# Patient Record
Sex: FEMALE | Race: WHITE | Employment: UNEMPLOYED | URBAN - METROPOLITAN AREA
[De-identification: names, ages, dates, MRNs, and addresses within clinical notes are randomized per-mention and may not be internally consistent; named-entity substitution may affect disease eponyms.]

---

## 2017-01-04 ENCOUNTER — HOSPITAL ENCOUNTER (OUTPATIENT)
Dept: WOUND CARE | Age: 52
Discharge: HOME | End: 2017-01-04
Payer: COMMERCIAL

## 2017-01-04 VITALS
SYSTOLIC BLOOD PRESSURE: 120 MMHG | HEART RATE: 80 BPM | RESPIRATION RATE: 18 BRPM | DIASTOLIC BLOOD PRESSURE: 73 MMHG | TEMPERATURE: 98 F

## 2017-01-04 DIAGNOSIS — T07.XXXA MULTIPLE OPEN WOUNDS: Chronic | ICD-10-CM

## 2017-01-04 PROCEDURE — 99213 OFFICE O/P EST LOW 20 MIN: CPT

## 2017-01-04 ASSESSMENT — PAIN DESCRIPTION - LOCATION: LOCATION: BUTTOCKS;GROIN

## 2017-01-04 ASSESSMENT — PAIN DESCRIPTION - FREQUENCY: FREQUENCY: INTERMITTENT

## 2017-01-04 ASSESSMENT — PAIN SCALES - GENERAL: PAINLEVEL_OUTOF10: 8

## 2017-03-17 ENCOUNTER — HOSPITAL ENCOUNTER (EMERGENCY)
Age: 52
Discharge: HOME | End: 2017-03-18
Payer: COMMERCIAL

## 2017-03-17 DIAGNOSIS — D64.9 ANEMIA, UNSPECIFIED TYPE: ICD-10-CM

## 2017-03-17 DIAGNOSIS — Z85.6 HISTORY OF LEUKEMIA: ICD-10-CM

## 2017-03-17 DIAGNOSIS — L97.911 ULCERS OF BOTH LOWER EXTREMITIES, LIMITED TO BREAKDOWN OF SKIN (HCC): Primary | ICD-10-CM

## 2017-03-17 DIAGNOSIS — M79.672 PAIN IN BOTH FEET: ICD-10-CM

## 2017-03-17 DIAGNOSIS — M79.671 PAIN IN BOTH FEET: ICD-10-CM

## 2017-03-17 DIAGNOSIS — L97.921 ULCERS OF BOTH LOWER EXTREMITIES, LIMITED TO BREAKDOWN OF SKIN (HCC): Primary | ICD-10-CM

## 2017-03-17 PROCEDURE — 99284 EMERGENCY DEPT VISIT MOD MDM: CPT

## 2017-03-18 VITALS
TEMPERATURE: 98.9 F | BODY MASS INDEX: 28.32 KG/M2 | OXYGEN SATURATION: 100 % | RESPIRATION RATE: 20 BRPM | HEIGHT: 65 IN | WEIGHT: 170 LBS | SYSTOLIC BLOOD PRESSURE: 120 MMHG | HEART RATE: 80 BPM | DIASTOLIC BLOOD PRESSURE: 56 MMHG

## 2017-03-18 LAB
ALBUMIN SERPL-MCNC: 3.3 G/DL (ref 3.9–4.9)
ALP BLD-CCNC: 63 U/L (ref 40–130)
ALT SERPL-CCNC: 14 U/L (ref 0–33)
ANION GAP SERPL CALCULATED.3IONS-SCNC: 12 MEQ/L (ref 7–13)
AST SERPL-CCNC: 11 U/L (ref 0–35)
BASOPHILS ABSOLUTE: 0.2 K/UL (ref 0–0.2)
BASOPHILS RELATIVE PERCENT: 1.1 %
BILIRUB SERPL-MCNC: 0.7 MG/DL (ref 0–1.2)
BUN BLDV-MCNC: 22 MG/DL (ref 6–20)
CALCIUM SERPL-MCNC: 8.9 MG/DL (ref 8.6–10.2)
CHLORIDE BLD-SCNC: 102 MEQ/L (ref 98–107)
CO2: 25 MEQ/L (ref 22–29)
CREAT SERPL-MCNC: 0.84 MG/DL (ref 0.5–0.9)
EOSINOPHILS ABSOLUTE: 0 K/UL (ref 0–0.7)
EOSINOPHILS RELATIVE PERCENT: 0.2 %
GFR AFRICAN AMERICAN: >60
GFR NON-AFRICAN AMERICAN: >60
GLOBULIN: 2.2 G/DL (ref 2.3–3.5)
GLUCOSE BLD-MCNC: 123 MG/DL (ref 74–109)
HCT VFR BLD CALC: 27.9 % (ref 37–47)
HEMOGLOBIN: 9 G/DL (ref 12–16)
LYMPHOCYTES ABSOLUTE: 3 K/UL (ref 1–4.8)
LYMPHOCYTES RELATIVE PERCENT: 18.1 %
MCH RBC QN AUTO: 31.9 PG (ref 27–31.3)
MCHC RBC AUTO-ENTMCNC: 32.3 % (ref 33–37)
MCV RBC AUTO: 98.7 FL (ref 82–100)
MONOCYTES ABSOLUTE: 0.9 K/UL (ref 0.2–0.8)
MONOCYTES RELATIVE PERCENT: 5.6 %
NEUTROPHILS ABSOLUTE: 12.2 K/UL (ref 1.4–6.5)
NEUTROPHILS RELATIVE PERCENT: 75 %
PDW BLD-RTO: 17.5 % (ref 11.5–14.5)
PLATELET # BLD: 348 K/UL (ref 130–400)
POTASSIUM SERPL-SCNC: 4 MEQ/L (ref 3.5–5.1)
RBC # BLD: 2.82 M/UL (ref 4.2–5.4)
SODIUM BLD-SCNC: 139 MEQ/L (ref 132–144)
TOTAL PROTEIN: 5.5 G/DL (ref 6.4–8.1)
WBC # BLD: 16.3 K/UL (ref 4.8–10.8)

## 2017-03-18 PROCEDURE — 80053 COMPREHEN METABOLIC PANEL: CPT

## 2017-03-18 PROCEDURE — 96375 TX/PRO/DX INJ NEW DRUG ADDON: CPT

## 2017-03-18 PROCEDURE — 36415 COLL VENOUS BLD VENIPUNCTURE: CPT

## 2017-03-18 PROCEDURE — 96374 THER/PROPH/DIAG INJ IV PUSH: CPT

## 2017-03-18 PROCEDURE — 85025 COMPLETE CBC W/AUTO DIFF WBC: CPT

## 2017-03-18 PROCEDURE — 6360000002 HC RX W HCPCS: Performed by: PHYSICIAN ASSISTANT

## 2017-03-18 RX ORDER — MORPHINE SULFATE 4 MG/ML
4 INJECTION, SOLUTION INTRAMUSCULAR; INTRAVENOUS ONCE
Status: COMPLETED | OUTPATIENT
Start: 2017-03-18 | End: 2017-03-18

## 2017-03-18 RX ORDER — PREDNISONE 1 MG/1
40 TABLET ORAL DAILY
Status: ON HOLD | COMMUNITY
End: 2017-05-02

## 2017-03-18 RX ORDER — GABAPENTIN 100 MG/1
300 CAPSULE ORAL 3 TIMES DAILY
COMMUNITY
End: 2017-05-03 | Stop reason: SDUPTHER

## 2017-03-18 RX ORDER — ONDANSETRON 2 MG/ML
4 INJECTION INTRAMUSCULAR; INTRAVENOUS ONCE
Status: COMPLETED | OUTPATIENT
Start: 2017-03-18 | End: 2017-03-18

## 2017-03-18 RX ORDER — LORAZEPAM 1 MG/1
1 TABLET ORAL EVERY 6 HOURS PRN
Status: ON HOLD | COMMUNITY
End: 2017-05-02

## 2017-03-18 RX ORDER — ROPINIROLE 1 MG/1
1 TABLET, FILM COATED ORAL 3 TIMES DAILY
COMMUNITY
End: 2017-05-03 | Stop reason: SDUPTHER

## 2017-03-18 RX ADMIN — ONDANSETRON 4 MG: 2 INJECTION, SOLUTION INTRAMUSCULAR; INTRAVENOUS at 00:31

## 2017-03-18 RX ADMIN — MORPHINE SULFATE 4 MG: 4 INJECTION, SOLUTION INTRAMUSCULAR; INTRAVENOUS at 00:31

## 2017-03-18 ASSESSMENT — PAIN SCALES - GENERAL
PAINLEVEL_OUTOF10: 10
PAINLEVEL_OUTOF10: 6

## 2017-03-18 ASSESSMENT — ENCOUNTER SYMPTOMS
NAUSEA: 0
EYE DISCHARGE: 0
ABDOMINAL PAIN: 0
VOMITING: 0
ABDOMINAL DISTENTION: 0
APNEA: 0
PHOTOPHOBIA: 0
VOICE CHANGE: 0
ANAL BLEEDING: 0

## 2017-03-18 ASSESSMENT — PAIN DESCRIPTION - DESCRIPTORS: DESCRIPTORS: BURNING

## 2017-03-18 ASSESSMENT — PAIN DESCRIPTION - LOCATION: LOCATION: LEG;FOOT

## 2017-03-18 ASSESSMENT — PAIN DESCRIPTION - PAIN TYPE: TYPE: ACUTE PAIN

## 2017-03-18 ASSESSMENT — PAIN DESCRIPTION - ONSET: ONSET: AWAKENED FROM SLEEP

## 2017-03-18 ASSESSMENT — PAIN DESCRIPTION - PROGRESSION: CLINICAL_PROGRESSION: GRADUALLY WORSENING

## 2017-03-18 ASSESSMENT — PAIN DESCRIPTION - FREQUENCY: FREQUENCY: CONTINUOUS

## 2017-04-04 ENCOUNTER — HOSPITAL ENCOUNTER (EMERGENCY)
Age: 52
Discharge: OTHER INSTITUTION | End: 2017-04-04
Attending: EMERGENCY MEDICINE
Payer: COMMERCIAL

## 2017-04-04 VITALS
SYSTOLIC BLOOD PRESSURE: 122 MMHG | HEART RATE: 82 BPM | BODY MASS INDEX: 30.73 KG/M2 | RESPIRATION RATE: 18 BRPM | TEMPERATURE: 97.5 F | HEIGHT: 64 IN | DIASTOLIC BLOOD PRESSURE: 68 MMHG | WEIGHT: 180 LBS | OXYGEN SATURATION: 95 %

## 2017-04-04 DIAGNOSIS — L02.419 CELLULITIS AND ABSCESS OF LEG: Primary | ICD-10-CM

## 2017-04-04 DIAGNOSIS — L03.119 CELLULITIS AND ABSCESS OF LEG: Primary | ICD-10-CM

## 2017-04-04 LAB
ALBUMIN SERPL-MCNC: 3.9 G/DL (ref 3.9–4.9)
ALP BLD-CCNC: 84 U/L (ref 40–130)
ALT SERPL-CCNC: 13 U/L (ref 0–33)
ANION GAP SERPL CALCULATED.3IONS-SCNC: 11 MEQ/L (ref 7–13)
AST SERPL-CCNC: 13 U/L (ref 0–35)
BASOPHILS ABSOLUTE: 0.1 K/UL (ref 0–0.2)
BASOPHILS RELATIVE PERCENT: 0.9 %
BILIRUB SERPL-MCNC: 0.2 MG/DL (ref 0–1.2)
BUN BLDV-MCNC: 14 MG/DL (ref 6–20)
CALCIUM SERPL-MCNC: 9.1 MG/DL (ref 8.6–10.2)
CHLORIDE BLD-SCNC: 103 MEQ/L (ref 98–107)
CO2: 24 MEQ/L (ref 22–29)
CREAT SERPL-MCNC: 0.78 MG/DL (ref 0.5–0.9)
EOSINOPHILS ABSOLUTE: 0 K/UL (ref 0–0.7)
EOSINOPHILS RELATIVE PERCENT: 0 %
GFR AFRICAN AMERICAN: >60
GFR NON-AFRICAN AMERICAN: >60
GLOBULIN: 2.6 G/DL (ref 2.3–3.5)
GLUCOSE BLD-MCNC: 132 MG/DL (ref 74–109)
HCT VFR BLD CALC: 35.8 % (ref 37–47)
HEMOGLOBIN: 11.7 G/DL (ref 12–16)
LACTIC ACID: 1.3 MMOL/L (ref 0.5–2.2)
LYMPHOCYTES ABSOLUTE: 1.7 K/UL (ref 1–4.8)
LYMPHOCYTES RELATIVE PERCENT: 16.1 %
MCH RBC QN AUTO: 31.5 PG (ref 27–31.3)
MCHC RBC AUTO-ENTMCNC: 32.8 % (ref 33–37)
MCV RBC AUTO: 96.2 FL (ref 82–100)
MONOCYTES ABSOLUTE: 0.5 K/UL (ref 0.2–0.8)
MONOCYTES RELATIVE PERCENT: 4.7 %
NEUTROPHILS ABSOLUTE: 8.2 K/UL (ref 1.4–6.5)
NEUTROPHILS RELATIVE PERCENT: 78.3 %
PDW BLD-RTO: 15.6 % (ref 11.5–14.5)
PLATELET # BLD: 340 K/UL (ref 130–400)
POTASSIUM SERPL-SCNC: 4.4 MEQ/L (ref 3.5–5.1)
RBC # BLD: 3.72 M/UL (ref 4.2–5.4)
SODIUM BLD-SCNC: 138 MEQ/L (ref 132–144)
TOTAL PROTEIN: 6.5 G/DL (ref 6.4–8.1)
WBC # BLD: 10.4 K/UL (ref 4.8–10.8)

## 2017-04-04 PROCEDURE — 87040 BLOOD CULTURE FOR BACTERIA: CPT

## 2017-04-04 PROCEDURE — 6360000002 HC RX W HCPCS: Performed by: STUDENT IN AN ORGANIZED HEALTH CARE EDUCATION/TRAINING PROGRAM

## 2017-04-04 PROCEDURE — 85025 COMPLETE CBC W/AUTO DIFF WBC: CPT

## 2017-04-04 PROCEDURE — 96375 TX/PRO/DX INJ NEW DRUG ADDON: CPT

## 2017-04-04 PROCEDURE — 96376 TX/PRO/DX INJ SAME DRUG ADON: CPT

## 2017-04-04 PROCEDURE — 96366 THER/PROPH/DIAG IV INF ADDON: CPT

## 2017-04-04 PROCEDURE — 96365 THER/PROPH/DIAG IV INF INIT: CPT

## 2017-04-04 PROCEDURE — 96361 HYDRATE IV INFUSION ADD-ON: CPT

## 2017-04-04 PROCEDURE — 6370000000 HC RX 637 (ALT 250 FOR IP): Performed by: EMERGENCY MEDICINE

## 2017-04-04 PROCEDURE — 83605 ASSAY OF LACTIC ACID: CPT

## 2017-04-04 PROCEDURE — 99284 EMERGENCY DEPT VISIT MOD MDM: CPT

## 2017-04-04 PROCEDURE — 80053 COMPREHEN METABOLIC PANEL: CPT

## 2017-04-04 PROCEDURE — 2580000003 HC RX 258: Performed by: EMERGENCY MEDICINE

## 2017-04-04 PROCEDURE — 6360000002 HC RX W HCPCS: Performed by: EMERGENCY MEDICINE

## 2017-04-04 PROCEDURE — 36415 COLL VENOUS BLD VENIPUNCTURE: CPT

## 2017-04-04 RX ORDER — FENTANYL CITRATE 50 UG/ML
100 INJECTION, SOLUTION INTRAMUSCULAR; INTRAVENOUS ONCE
Status: COMPLETED | OUTPATIENT
Start: 2017-04-04 | End: 2017-04-04

## 2017-04-04 RX ORDER — GABAPENTIN 100 MG/1
200 CAPSULE ORAL ONCE
Status: COMPLETED | OUTPATIENT
Start: 2017-04-04 | End: 2017-04-04

## 2017-04-04 RX ORDER — DIPHENHYDRAMINE HYDROCHLORIDE 50 MG/ML
50 INJECTION INTRAMUSCULAR; INTRAVENOUS ONCE
Status: COMPLETED | OUTPATIENT
Start: 2017-04-04 | End: 2017-04-04

## 2017-04-04 RX ORDER — SODIUM CHLORIDE 9 MG/ML
INJECTION, SOLUTION INTRAVENOUS CONTINUOUS
Status: DISCONTINUED | OUTPATIENT
Start: 2017-04-04 | End: 2017-04-04 | Stop reason: HOSPADM

## 2017-04-04 RX ORDER — MAGNESIUM HYDROXIDE 1200 MG/15ML
LIQUID ORAL
Status: DISCONTINUED
Start: 2017-04-04 | End: 2017-04-04 | Stop reason: HOSPADM

## 2017-04-04 RX ORDER — LORAZEPAM 2 MG/ML
2 INJECTION INTRAMUSCULAR ONCE
Status: COMPLETED | OUTPATIENT
Start: 2017-04-04 | End: 2017-04-04

## 2017-04-04 RX ORDER — ONDANSETRON 2 MG/ML
4 INJECTION INTRAMUSCULAR; INTRAVENOUS ONCE
Status: COMPLETED | OUTPATIENT
Start: 2017-04-04 | End: 2017-04-04

## 2017-04-04 RX ADMIN — SODIUM CHLORIDE: 9 INJECTION, SOLUTION INTRAVENOUS at 00:33

## 2017-04-04 RX ADMIN — DIPHENHYDRAMINE HYDROCHLORIDE 50 MG: 50 INJECTION, SOLUTION INTRAMUSCULAR; INTRAVENOUS at 05:43

## 2017-04-04 RX ADMIN — FENTANYL CITRATE 100 MCG: 50 INJECTION, SOLUTION INTRAMUSCULAR; INTRAVENOUS at 12:26

## 2017-04-04 RX ADMIN — VANCOMYCIN HYDROCHLORIDE 1000 MG: 1 INJECTION, POWDER, LYOPHILIZED, FOR SOLUTION INTRAVENOUS at 00:51

## 2017-04-04 RX ADMIN — ONDANSETRON 4 MG: 2 INJECTION, SOLUTION INTRAMUSCULAR; INTRAVENOUS at 00:33

## 2017-04-04 RX ADMIN — HYDROMORPHONE HYDROCHLORIDE 1 MG: 1 INJECTION, SOLUTION INTRAMUSCULAR; INTRAVENOUS; SUBCUTANEOUS at 02:06

## 2017-04-04 RX ADMIN — HYDROMORPHONE HYDROCHLORIDE 1 MG: 1 INJECTION, SOLUTION INTRAMUSCULAR; INTRAVENOUS; SUBCUTANEOUS at 00:33

## 2017-04-04 RX ADMIN — GABAPENTIN 200 MG: 100 CAPSULE ORAL at 02:54

## 2017-04-04 RX ADMIN — LORAZEPAM 2 MG: 2 INJECTION INTRAMUSCULAR; INTRAVENOUS at 05:34

## 2017-04-04 ASSESSMENT — PAIN SCALES - GENERAL
PAINLEVEL_OUTOF10: 8
PAINLEVEL_OUTOF10: 10
PAINLEVEL_OUTOF10: 10
PAINLEVEL_OUTOF10: 7
PAINLEVEL_OUTOF10: 10
PAINLEVEL_OUTOF10: 8

## 2017-04-04 ASSESSMENT — PAIN DESCRIPTION - DESCRIPTORS
DESCRIPTORS: SHARP
DESCRIPTORS: THROBBING;SHARP

## 2017-04-04 ASSESSMENT — ENCOUNTER SYMPTOMS
CHEST TIGHTNESS: 0
COUGH: 0
PHOTOPHOBIA: 0
EYE DISCHARGE: 0
ABDOMINAL DISTENTION: 0
WHEEZING: 0
ABDOMINAL PAIN: 0
SORE THROAT: 0
SHORTNESS OF BREATH: 0
VOMITING: 0

## 2017-04-04 ASSESSMENT — PAIN DESCRIPTION - PAIN TYPE
TYPE: ACUTE PAIN

## 2017-04-04 ASSESSMENT — PAIN DESCRIPTION - FREQUENCY: FREQUENCY: CONTINUOUS

## 2017-04-04 ASSESSMENT — PAIN DESCRIPTION - ORIENTATION
ORIENTATION: RIGHT;LEFT
ORIENTATION: RIGHT;LEFT

## 2017-04-04 ASSESSMENT — PAIN DESCRIPTION - LOCATION
LOCATION: LEG
LOCATION: LEG

## 2017-04-04 ASSESSMENT — PAIN SCALES - WONG BAKER
WONGBAKER_NUMERICALRESPONSE: 10
WONGBAKER_NUMERICALRESPONSE: 8

## 2017-04-04 ASSESSMENT — PAIN DESCRIPTION - ONSET: ONSET: ON-GOING

## 2017-04-09 LAB
BLOOD CULTURE, ROUTINE: NORMAL
CULTURE, BLOOD 2: NORMAL

## 2017-04-27 ENCOUNTER — HOSPITAL ENCOUNTER (INPATIENT)
Age: 52
LOS: 5 days | Discharge: NURSING, REHAB OR LONG-TERM CARE | DRG: 871 | End: 2017-05-02
Attending: HOSPITALIST | Admitting: INTERNAL MEDICINE
Payer: COMMERCIAL

## 2017-04-27 DIAGNOSIS — S81.809A OPEN WOUNDS INVOLVING MULTIPLE REGIONS OF LOWER EXTREMITY: ICD-10-CM

## 2017-04-27 DIAGNOSIS — D72.829 LEUKOCYTOSIS, UNSPECIFIED TYPE: ICD-10-CM

## 2017-04-27 DIAGNOSIS — L03.818 CELLULITIS OF OTHER SPECIFIED SITE: ICD-10-CM

## 2017-04-27 DIAGNOSIS — A41.9 SEPSIS AFFECTING SKIN: Primary | ICD-10-CM

## 2017-04-27 PROBLEM — J96.01 ACUTE RESPIRATORY FAILURE WITH HYPOXIA (HCC): Status: ACTIVE | Noted: 2017-04-27

## 2017-04-27 PROBLEM — I95.9 HYPOTENSION: Status: ACTIVE | Noted: 2017-04-27

## 2017-04-27 PROBLEM — R06.82 TACHYPNEA: Status: ACTIVE | Noted: 2017-04-27

## 2017-04-27 PROBLEM — N17.9 ACUTE KIDNEY INJURY (HCC): Status: ACTIVE | Noted: 2017-04-27

## 2017-04-27 PROBLEM — R41.82 ALTERED MENTAL STATUS: Status: ACTIVE | Noted: 2017-04-27

## 2017-04-27 LAB
ALBUMIN SERPL-MCNC: 3.3 G/DL (ref 3.9–4.9)
ALP BLD-CCNC: 78 U/L (ref 40–130)
ALT SERPL-CCNC: 19 U/L (ref 0–33)
ANION GAP SERPL CALCULATED.3IONS-SCNC: 12 MEQ/L (ref 7–13)
AST SERPL-CCNC: 22 U/L (ref 0–35)
BILIRUB SERPL-MCNC: 1.1 MG/DL (ref 0–1.2)
BUN BLDV-MCNC: 19 MG/DL (ref 6–20)
CALCIUM SERPL-MCNC: 8.3 MG/DL (ref 8.6–10.2)
CHLORIDE BLD-SCNC: 98 MEQ/L (ref 98–107)
CO2: 22 MEQ/L (ref 22–29)
CREAT SERPL-MCNC: 1.15 MG/DL (ref 0.5–0.9)
GFR AFRICAN AMERICAN: >60
GFR NON-AFRICAN AMERICAN: 49.7
GLOBULIN: 2.5 G/DL (ref 2.3–3.5)
GLUCOSE BLD-MCNC: 211 MG/DL (ref 74–109)
HCT VFR BLD CALC: 34.2 % (ref 37–47)
HEMOGLOBIN: 10.9 G/DL (ref 12–16)
LIPASE: 14 U/L (ref 13–60)
MCH RBC QN AUTO: 29.7 PG (ref 27–31.3)
MCHC RBC AUTO-ENTMCNC: 31.9 % (ref 33–37)
MCV RBC AUTO: 93 FL (ref 82–100)
PDW BLD-RTO: 16.8 % (ref 11.5–14.5)
PLATELET # BLD: 286 K/UL (ref 130–400)
POTASSIUM SERPL-SCNC: 3.9 MEQ/L (ref 3.5–5.1)
RBC # BLD: 3.67 M/UL (ref 4.2–5.4)
SODIUM BLD-SCNC: 132 MEQ/L (ref 132–144)
TOTAL PROTEIN: 5.8 G/DL (ref 6.4–8.1)
WBC # BLD: 18.1 K/UL (ref 4.8–10.8)

## 2017-04-27 PROCEDURE — 87075 CULTR BACTERIA EXCEPT BLOOD: CPT

## 2017-04-27 PROCEDURE — 1210000000 HC MED SURG R&B

## 2017-04-27 PROCEDURE — 87040 BLOOD CULTURE FOR BACTERIA: CPT

## 2017-04-27 PROCEDURE — 2580000003 HC RX 258: Performed by: HOSPITALIST

## 2017-04-27 PROCEDURE — 36415 COLL VENOUS BLD VENIPUNCTURE: CPT

## 2017-04-27 PROCEDURE — 83690 ASSAY OF LIPASE: CPT

## 2017-04-27 PROCEDURE — 80053 COMPREHEN METABOLIC PANEL: CPT

## 2017-04-27 PROCEDURE — 96375 TX/PRO/DX INJ NEW DRUG ADDON: CPT

## 2017-04-27 PROCEDURE — 6370000000 HC RX 637 (ALT 250 FOR IP): Performed by: HOSPITALIST

## 2017-04-27 PROCEDURE — 6360000002 HC RX W HCPCS: Performed by: HOSPITALIST

## 2017-04-27 PROCEDURE — 96365 THER/PROPH/DIAG IV INF INIT: CPT

## 2017-04-27 PROCEDURE — 87186 SC STD MICRODIL/AGAR DIL: CPT

## 2017-04-27 PROCEDURE — 6360000002 HC RX W HCPCS: Performed by: PHYSICIAN ASSISTANT

## 2017-04-27 PROCEDURE — 85027 COMPLETE CBC AUTOMATED: CPT

## 2017-04-27 PROCEDURE — 87070 CULTURE OTHR SPECIMN AEROBIC: CPT

## 2017-04-27 PROCEDURE — 87077 CULTURE AEROBIC IDENTIFY: CPT

## 2017-04-27 PROCEDURE — 99285 EMERGENCY DEPT VISIT HI MDM: CPT

## 2017-04-27 PROCEDURE — 2580000003 HC RX 258: Performed by: PHYSICIAN ASSISTANT

## 2017-04-27 PROCEDURE — 87205 SMEAR GRAM STAIN: CPT

## 2017-04-27 RX ORDER — SODIUM CHLORIDE 0.9 % (FLUSH) 0.9 %
10 SYRINGE (ML) INJECTION PRN
Status: DISCONTINUED | OUTPATIENT
Start: 2017-04-27 | End: 2017-04-27 | Stop reason: SDUPTHER

## 2017-04-27 RX ORDER — LORAZEPAM 1 MG/1
1 TABLET ORAL EVERY 6 HOURS PRN
Status: DISCONTINUED | OUTPATIENT
Start: 2017-04-27 | End: 2017-05-02

## 2017-04-27 RX ORDER — DULOXETIN HYDROCHLORIDE 60 MG/1
60 CAPSULE, DELAYED RELEASE ORAL DAILY
Status: DISCONTINUED | OUTPATIENT
Start: 2017-04-27 | End: 2017-05-02 | Stop reason: HOSPADM

## 2017-04-27 RX ORDER — ACETAMINOPHEN 325 MG/1
650 TABLET ORAL EVERY 4 HOURS PRN
Status: DISCONTINUED | OUTPATIENT
Start: 2017-04-27 | End: 2017-04-27 | Stop reason: SDUPTHER

## 2017-04-27 RX ORDER — ACETAMINOPHEN 325 MG/1
650 TABLET ORAL EVERY 4 HOURS PRN
Status: DISCONTINUED | OUTPATIENT
Start: 2017-04-27 | End: 2017-05-02 | Stop reason: HOSPADM

## 2017-04-27 RX ORDER — OXYCODONE HYDROCHLORIDE 5 MG/1
5 TABLET ORAL EVERY 8 HOURS PRN
Status: ON HOLD | COMMUNITY
End: 2017-05-02 | Stop reason: HOSPADM

## 2017-04-27 RX ORDER — DAPSONE 100 MG/1
100 TABLET ORAL DAILY
Status: DISCONTINUED | OUTPATIENT
Start: 2017-04-27 | End: 2017-05-02 | Stop reason: HOSPADM

## 2017-04-27 RX ORDER — DAPSONE 100 MG/1
100 TABLET ORAL DAILY
COMMUNITY
End: 2017-05-03 | Stop reason: SDUPTHER

## 2017-04-27 RX ORDER — ONDANSETRON 2 MG/ML
2 INJECTION INTRAMUSCULAR; INTRAVENOUS EVERY 6 HOURS PRN
Status: DISCONTINUED | OUTPATIENT
Start: 2017-04-27 | End: 2017-05-02 | Stop reason: HOSPADM

## 2017-04-27 RX ORDER — MORPHINE SULFATE 4 MG/ML
4 INJECTION, SOLUTION INTRAMUSCULAR; INTRAVENOUS EVERY 4 HOURS PRN
Status: DISCONTINUED | OUTPATIENT
Start: 2017-04-27 | End: 2017-05-01

## 2017-04-27 RX ORDER — SODIUM CHLORIDE 9 MG/ML
INJECTION, SOLUTION INTRAVENOUS CONTINUOUS
Status: DISCONTINUED | OUTPATIENT
Start: 2017-04-27 | End: 2017-05-01

## 2017-04-27 RX ORDER — DOCUSATE SODIUM 100 MG/1
100 CAPSULE, LIQUID FILLED ORAL 2 TIMES DAILY
Status: DISCONTINUED | OUTPATIENT
Start: 2017-04-27 | End: 2017-05-02 | Stop reason: HOSPADM

## 2017-04-27 RX ORDER — 0.9 % SODIUM CHLORIDE 0.9 %
1000 INTRAVENOUS SOLUTION INTRAVENOUS ONCE
Status: COMPLETED | OUTPATIENT
Start: 2017-04-27 | End: 2017-04-27

## 2017-04-27 RX ORDER — MORPHINE SULFATE 2 MG/ML
2 INJECTION, SOLUTION INTRAMUSCULAR; INTRAVENOUS ONCE
Status: COMPLETED | OUTPATIENT
Start: 2017-04-27 | End: 2017-04-27

## 2017-04-27 RX ORDER — SODIUM CHLORIDE 0.9 % (FLUSH) 0.9 %
10 SYRINGE (ML) INJECTION EVERY 12 HOURS SCHEDULED
Status: DISCONTINUED | OUTPATIENT
Start: 2017-04-27 | End: 2017-05-02 | Stop reason: SDUPTHER

## 2017-04-27 RX ORDER — SODIUM CHLORIDE 0.9 % (FLUSH) 0.9 %
10 SYRINGE (ML) INJECTION EVERY 12 HOURS SCHEDULED
Status: DISCONTINUED | OUTPATIENT
Start: 2017-04-27 | End: 2017-04-27 | Stop reason: SDUPTHER

## 2017-04-27 RX ORDER — ONDANSETRON 2 MG/ML
4 INJECTION INTRAMUSCULAR; INTRAVENOUS ONCE
Status: COMPLETED | OUTPATIENT
Start: 2017-04-27 | End: 2017-04-27

## 2017-04-27 RX ORDER — TRAZODONE HYDROCHLORIDE 100 MG/1
100 TABLET ORAL NIGHTLY
Status: DISCONTINUED | OUTPATIENT
Start: 2017-04-27 | End: 2017-05-02 | Stop reason: HOSPADM

## 2017-04-27 RX ORDER — SODIUM CHLORIDE 0.9 % (FLUSH) 0.9 %
10 SYRINGE (ML) INJECTION PRN
Status: DISCONTINUED | OUTPATIENT
Start: 2017-04-27 | End: 2017-05-02 | Stop reason: SDUPTHER

## 2017-04-27 RX ORDER — LANOLIN ALCOHOL/MO/W.PET/CERES
9 CREAM (GRAM) TOPICAL NIGHTLY PRN
COMMUNITY
End: 2017-05-03 | Stop reason: SDUPTHER

## 2017-04-27 RX ORDER — GABAPENTIN 300 MG/1
300 CAPSULE ORAL 3 TIMES DAILY
Status: DISCONTINUED | OUTPATIENT
Start: 2017-04-27 | End: 2017-05-02 | Stop reason: HOSPADM

## 2017-04-27 RX ORDER — ROPINIROLE 1 MG/1
1 TABLET, FILM COATED ORAL 3 TIMES DAILY
Status: DISCONTINUED | OUTPATIENT
Start: 2017-04-27 | End: 2017-05-02 | Stop reason: HOSPADM

## 2017-04-27 RX ORDER — MORPHINE SULFATE 2 MG/ML
2 INJECTION, SOLUTION INTRAMUSCULAR; INTRAVENOUS EVERY 4 HOURS PRN
Status: DISCONTINUED | OUTPATIENT
Start: 2017-04-27 | End: 2017-05-01

## 2017-04-27 RX ORDER — CYCLOSPORINE 100 MG/1
125 CAPSULE, GELATIN COATED ORAL 2 TIMES DAILY
COMMUNITY
End: 2017-05-03 | Stop reason: SDUPTHER

## 2017-04-27 RX ORDER — BENZONATATE 100 MG/1
100 CAPSULE ORAL 3 TIMES DAILY PRN
Status: DISCONTINUED | OUTPATIENT
Start: 2017-04-27 | End: 2017-05-02 | Stop reason: HOSPADM

## 2017-04-27 RX ORDER — PRAMIPEXOLE DIHYDROCHLORIDE 1 MG/1
1 TABLET ORAL DAILY
Status: DISCONTINUED | OUTPATIENT
Start: 2017-04-27 | End: 2017-04-29

## 2017-04-27 RX ORDER — AMITRIPTYLINE HYDROCHLORIDE 25 MG/1
50 TABLET, FILM COATED ORAL NIGHTLY
Status: DISCONTINUED | OUTPATIENT
Start: 2017-04-27 | End: 2017-05-02 | Stop reason: HOSPADM

## 2017-04-27 RX ADMIN — ACETAMINOPHEN 650 MG: 325 TABLET ORAL at 21:12

## 2017-04-27 RX ADMIN — GABAPENTIN 300 MG: 300 CAPSULE ORAL at 21:13

## 2017-04-27 RX ADMIN — SODIUM CHLORIDE 1000 ML: 9 INJECTION, SOLUTION INTRAVENOUS at 12:55

## 2017-04-27 RX ADMIN — ENOXAPARIN SODIUM 40 MG: 40 INJECTION SUBCUTANEOUS at 21:13

## 2017-04-27 RX ADMIN — ONDANSETRON 4 MG: 2 INJECTION, SOLUTION INTRAMUSCULAR; INTRAVENOUS at 14:36

## 2017-04-27 RX ADMIN — ONDANSETRON HYDROCHLORIDE 2 MG: 2 INJECTION, SOLUTION INTRAMUSCULAR; INTRAVENOUS at 21:14

## 2017-04-27 RX ADMIN — TRAZODONE HYDROCHLORIDE 100 MG: 100 TABLET ORAL at 21:13

## 2017-04-27 RX ADMIN — DULOXETINE 60 MG: 60 CAPSULE, DELAYED RELEASE ORAL at 21:27

## 2017-04-27 RX ADMIN — MORPHINE SULFATE 2 MG: 2 INJECTION, SOLUTION INTRAMUSCULAR; INTRAVENOUS at 14:54

## 2017-04-27 RX ADMIN — DAPSONE 100 MG: 100 TABLET ORAL at 21:12

## 2017-04-27 RX ADMIN — Medication 10 ML: at 21:22

## 2017-04-27 RX ADMIN — AMITRIPTYLINE HYDROCHLORIDE 50 MG: 25 TABLET, FILM COATED ORAL at 21:13

## 2017-04-27 RX ADMIN — DOCUSATE SODIUM 100 MG: 100 CAPSULE, LIQUID FILLED ORAL at 21:21

## 2017-04-27 RX ADMIN — SODIUM CHLORIDE: 9 INJECTION, SOLUTION INTRAVENOUS at 21:15

## 2017-04-27 RX ADMIN — VANCOMYCIN HYDROCHLORIDE 1000 MG: 1 INJECTION, POWDER, LYOPHILIZED, FOR SOLUTION INTRAVENOUS at 15:48

## 2017-04-27 RX ADMIN — SODIUM CHLORIDE 1000 ML: 9 INJECTION, SOLUTION INTRAVENOUS at 16:36

## 2017-04-27 RX ADMIN — CYCLOSPORINE 125 MG: 100 CAPSULE, GELATIN COATED ORAL at 21:13

## 2017-04-27 ASSESSMENT — PAIN SCALES - GENERAL
PAINLEVEL_OUTOF10: 5
PAINLEVEL_OUTOF10: 10
PAINLEVEL_OUTOF10: 9
PAINLEVEL_OUTOF10: 10
PAINLEVEL_OUTOF10: 10

## 2017-04-27 ASSESSMENT — ENCOUNTER SYMPTOMS
RECTAL PAIN: 0
RHINORRHEA: 0
COLOR CHANGE: 0
SORE THROAT: 0
EYE DISCHARGE: 0
NAUSEA: 0
SHORTNESS OF BREATH: 0
WHEEZING: 0
CONSTIPATION: 0
DIARRHEA: 0
VOMITING: 0
CHOKING: 0
ABDOMINAL DISTENTION: 0
ABDOMINAL PAIN: 0
STRIDOR: 0
COUGH: 0

## 2017-04-27 ASSESSMENT — PAIN DESCRIPTION - LOCATION
LOCATION: OTHER (COMMENT)
LOCATION: FOOT

## 2017-04-27 ASSESSMENT — PAIN DESCRIPTION - ORIENTATION
ORIENTATION: RIGHT
ORIENTATION: RIGHT

## 2017-04-27 ASSESSMENT — PAIN DESCRIPTION - FREQUENCY: FREQUENCY: CONTINUOUS

## 2017-04-27 ASSESSMENT — PAIN DESCRIPTION - DESCRIPTORS: DESCRIPTORS: BURNING

## 2017-04-28 ENCOUNTER — APPOINTMENT (OUTPATIENT)
Dept: NUCLEAR MEDICINE | Age: 52
DRG: 871 | End: 2017-04-28
Payer: COMMERCIAL

## 2017-04-28 ENCOUNTER — APPOINTMENT (OUTPATIENT)
Dept: MRI IMAGING | Age: 52
DRG: 871 | End: 2017-04-28
Payer: COMMERCIAL

## 2017-04-28 ENCOUNTER — APPOINTMENT (OUTPATIENT)
Dept: GENERAL RADIOLOGY | Age: 52
DRG: 871 | End: 2017-04-28
Payer: COMMERCIAL

## 2017-04-28 PROBLEM — L03.115 CELLULITIS OF RIGHT LEG: Status: ACTIVE | Noted: 2017-04-28

## 2017-04-28 PROBLEM — L88 PYODERMA GANGRENOSUM: Status: ACTIVE | Noted: 2017-04-28

## 2017-04-28 PROBLEM — L97.912 ULCER OF RIGHT LOWER EXTREMITY WITH FAT LAYER EXPOSED (HCC): Status: ACTIVE | Noted: 2017-04-28

## 2017-04-28 LAB
ANION GAP SERPL CALCULATED.3IONS-SCNC: 12 MEQ/L (ref 7–13)
BUN BLDV-MCNC: 22 MG/DL (ref 6–20)
CALCIUM SERPL-MCNC: 8.1 MG/DL (ref 8.6–10.2)
CHLORIDE BLD-SCNC: 99 MEQ/L (ref 98–107)
CO2: 21 MEQ/L (ref 22–29)
CREAT SERPL-MCNC: 1.17 MG/DL (ref 0.5–0.9)
D DIMER: 1.27 MG/L FEU (ref 0–0.5)
GFR AFRICAN AMERICAN: 58.9
GFR NON-AFRICAN AMERICAN: 48.7
GLUCOSE BLD-MCNC: 101 MG/DL (ref 74–109)
MAGNESIUM: 2.2 MG/DL (ref 1.7–2.3)
POTASSIUM SERPL-SCNC: 3.4 MEQ/L (ref 3.5–5.1)
SODIUM BLD-SCNC: 132 MEQ/L (ref 132–144)

## 2017-04-28 PROCEDURE — 6370000000 HC RX 637 (ALT 250 FOR IP): Performed by: HOSPITALIST

## 2017-04-28 PROCEDURE — 36415 COLL VENOUS BLD VENIPUNCTURE: CPT

## 2017-04-28 PROCEDURE — 2580000003 HC RX 258: Performed by: RADIOLOGY

## 2017-04-28 PROCEDURE — A9579 GAD-BASE MR CONTRAST NOS,1ML: HCPCS | Performed by: RADIOLOGY

## 2017-04-28 PROCEDURE — 2580000003 HC RX 258: Performed by: INTERNAL MEDICINE

## 2017-04-28 PROCEDURE — 94664 DEMO&/EVAL PT USE INHALER: CPT

## 2017-04-28 PROCEDURE — A9540 TC99M MAA: HCPCS | Performed by: PHYSICIAN ASSISTANT

## 2017-04-28 PROCEDURE — 3430000000 HC RX DIAGNOSTIC RADIOPHARMACEUTICAL: Performed by: PHYSICIAN ASSISTANT

## 2017-04-28 PROCEDURE — 6360000002 HC RX W HCPCS: Performed by: INTERNAL MEDICINE

## 2017-04-28 PROCEDURE — 80048 BASIC METABOLIC PNL TOTAL CA: CPT

## 2017-04-28 PROCEDURE — 83735 ASSAY OF MAGNESIUM: CPT

## 2017-04-28 PROCEDURE — 99254 IP/OBS CNSLTJ NEW/EST MOD 60: CPT | Performed by: INTERNAL MEDICINE

## 2017-04-28 PROCEDURE — 70553 MRI BRAIN STEM W/O & W/DYE: CPT

## 2017-04-28 PROCEDURE — 78582 LUNG VENTILAT&PERFUS IMAGING: CPT

## 2017-04-28 PROCEDURE — 6370000000 HC RX 637 (ALT 250 FOR IP): Performed by: PHYSICIAN ASSISTANT

## 2017-04-28 PROCEDURE — A9539 TC99M PENTETATE: HCPCS | Performed by: PHYSICIAN ASSISTANT

## 2017-04-28 PROCEDURE — 99253 IP/OBS CNSLTJ NEW/EST LOW 45: CPT | Performed by: INTERNAL MEDICINE

## 2017-04-28 PROCEDURE — 2580000003 HC RX 258: Performed by: HOSPITALIST

## 2017-04-28 PROCEDURE — 1210000000 HC MED SURG R&B

## 2017-04-28 PROCEDURE — 85379 FIBRIN DEGRADATION QUANT: CPT

## 2017-04-28 PROCEDURE — 6360000002 HC RX W HCPCS: Performed by: HOSPITALIST

## 2017-04-28 PROCEDURE — 71020 XR CHEST STANDARD TWO VW: CPT

## 2017-04-28 PROCEDURE — 6360000004 HC RX CONTRAST MEDICATION: Performed by: RADIOLOGY

## 2017-04-28 RX ORDER — ALBUTEROL SULFATE 2.5 MG/3ML
2.5 SOLUTION RESPIRATORY (INHALATION) EVERY 6 HOURS PRN
Status: DISCONTINUED | OUTPATIENT
Start: 2017-04-28 | End: 2017-04-28

## 2017-04-28 RX ORDER — KIT FOR THE PREPARATION OF TECHNETIUM TC 99M PENTETATE 20 MG/1
1 INJECTION, POWDER, LYOPHILIZED, FOR SOLUTION INTRAVENOUS; RESPIRATORY (INHALATION)
Status: COMPLETED | OUTPATIENT
Start: 2017-04-28 | End: 2017-04-28

## 2017-04-28 RX ORDER — SODIUM CHLORIDE 0.9 % (FLUSH) 0.9 %
10 SYRINGE (ML) INJECTION PRN
Status: DISCONTINUED | OUTPATIENT
Start: 2017-04-28 | End: 2017-05-02 | Stop reason: SDUPTHER

## 2017-04-28 RX ORDER — POTASSIUM CHLORIDE 20 MEQ/1
20 TABLET, EXTENDED RELEASE ORAL ONCE
Status: COMPLETED | OUTPATIENT
Start: 2017-04-28 | End: 2017-04-28

## 2017-04-28 RX ORDER — ALBUTEROL SULFATE 2.5 MG/3ML
2.5 SOLUTION RESPIRATORY (INHALATION) EVERY 4 HOURS PRN
Status: DISCONTINUED | OUTPATIENT
Start: 2017-04-28 | End: 2017-05-02 | Stop reason: HOSPADM

## 2017-04-28 RX ORDER — SODIUM CHLORIDE 0.9 % (FLUSH) 0.9 %
10 SYRINGE (ML) INJECTION 2 TIMES DAILY
Status: DISCONTINUED | OUTPATIENT
Start: 2017-04-28 | End: 2017-05-02 | Stop reason: SDUPTHER

## 2017-04-28 RX ADMIN — DULOXETINE 60 MG: 60 CAPSULE, DELAYED RELEASE ORAL at 10:32

## 2017-04-28 RX ADMIN — MORPHINE SULFATE 4 MG: 4 INJECTION, SOLUTION INTRAMUSCULAR; INTRAVENOUS at 23:01

## 2017-04-28 RX ADMIN — KIT FOR THE PREPARATION OF TECHNETIUM TC 99M PENTETATE 1 MILLICURIE: 20 INJECTION, POWDER, LYOPHILIZED, FOR SOLUTION INTRAVENOUS; RESPIRATORY (INHALATION) at 13:20

## 2017-04-28 RX ADMIN — ACETAMINOPHEN 650 MG: 325 TABLET ORAL at 04:50

## 2017-04-28 RX ADMIN — MORPHINE SULFATE 4 MG: 4 INJECTION, SOLUTION INTRAMUSCULAR; INTRAVENOUS at 14:39

## 2017-04-28 RX ADMIN — GABAPENTIN 300 MG: 300 CAPSULE ORAL at 23:01

## 2017-04-28 RX ADMIN — ONDANSETRON HYDROCHLORIDE 2 MG: 2 INJECTION, SOLUTION INTRAMUSCULAR; INTRAVENOUS at 08:05

## 2017-04-28 RX ADMIN — GABAPENTIN 300 MG: 300 CAPSULE ORAL at 10:31

## 2017-04-28 RX ADMIN — DOCUSATE SODIUM 100 MG: 100 CAPSULE, LIQUID FILLED ORAL at 23:00

## 2017-04-28 RX ADMIN — ROPINIROLE HYDROCHLORIDE 1 MG: 1 TABLET, FILM COATED ORAL at 10:32

## 2017-04-28 RX ADMIN — GABAPENTIN 300 MG: 300 CAPSULE ORAL at 14:30

## 2017-04-28 RX ADMIN — POTASSIUM CHLORIDE 20 MEQ: 1500 TABLET, EXTENDED RELEASE ORAL at 10:32

## 2017-04-28 RX ADMIN — LORAZEPAM 1 MG: 1 TABLET ORAL at 23:11

## 2017-04-28 RX ADMIN — LORAZEPAM 1 MG: 1 TABLET ORAL at 03:46

## 2017-04-28 RX ADMIN — Medication 10 ML: at 23:08

## 2017-04-28 RX ADMIN — ACETAMINOPHEN 650 MG: 325 TABLET ORAL at 10:53

## 2017-04-28 RX ADMIN — AMITRIPTYLINE HYDROCHLORIDE 50 MG: 25 TABLET, FILM COATED ORAL at 23:51

## 2017-04-28 RX ADMIN — MORPHINE SULFATE 4 MG: 4 INJECTION, SOLUTION INTRAMUSCULAR; INTRAVENOUS at 08:05

## 2017-04-28 RX ADMIN — DAPSONE 100 MG: 100 TABLET ORAL at 10:32

## 2017-04-28 RX ADMIN — MEROPENEM 1 G: 1 INJECTION, POWDER, FOR SOLUTION INTRAVENOUS at 23:13

## 2017-04-28 RX ADMIN — GADOPENTETATE DIMEGLUMINE 15 ML: 469.01 INJECTION INTRAVENOUS at 20:42

## 2017-04-28 RX ADMIN — ROPINIROLE HYDROCHLORIDE 1 MG: 1 TABLET, FILM COATED ORAL at 14:30

## 2017-04-28 RX ADMIN — Medication 10 ML: at 13:35

## 2017-04-28 RX ADMIN — LORAZEPAM 1 MG: 1 TABLET ORAL at 14:32

## 2017-04-28 RX ADMIN — Medication 5.2 MILLICURIE: at 13:35

## 2017-04-28 RX ADMIN — ENOXAPARIN SODIUM 40 MG: 40 INJECTION SUBCUTANEOUS at 10:32

## 2017-04-28 RX ADMIN — ROPINIROLE HYDROCHLORIDE 1 MG: 1 TABLET, FILM COATED ORAL at 23:00

## 2017-04-28 RX ADMIN — TRAZODONE HYDROCHLORIDE 100 MG: 100 TABLET ORAL at 23:11

## 2017-04-28 RX ADMIN — SODIUM CHLORIDE: 9 INJECTION, SOLUTION INTRAVENOUS at 22:59

## 2017-04-28 RX ADMIN — MORPHINE SULFATE 4 MG: 4 INJECTION, SOLUTION INTRAMUSCULAR; INTRAVENOUS at 02:22

## 2017-04-28 RX ADMIN — DOCUSATE SODIUM 100 MG: 100 CAPSULE, LIQUID FILLED ORAL at 10:31

## 2017-04-28 ASSESSMENT — PAIN SCALES - GENERAL
PAINLEVEL_OUTOF10: 8
PAINLEVEL_OUTOF10: 9
PAINLEVEL_OUTOF10: 10
PAINLEVEL_OUTOF10: 4
PAINLEVEL_OUTOF10: 10
PAINLEVEL_OUTOF10: 10

## 2017-04-29 LAB
ANAEROBIC CULTURE: ABNORMAL
BASOPHILS ABSOLUTE: 0.1 K/UL (ref 0–0.2)
BASOPHILS RELATIVE PERCENT: 0.4 %
EOSINOPHILS ABSOLUTE: 0 K/UL (ref 0–0.7)
EOSINOPHILS RELATIVE PERCENT: 0.2 %
GRAM STAIN RESULT: ABNORMAL
HCT VFR BLD CALC: 30.4 % (ref 37–47)
HEMOGLOBIN: 9.7 G/DL (ref 12–16)
LYMPHOCYTES ABSOLUTE: 1.7 K/UL (ref 1–4.8)
LYMPHOCYTES RELATIVE PERCENT: 12.8 %
MAGNESIUM: 1.9 MG/DL (ref 1.7–2.3)
MCH RBC QN AUTO: 29.9 PG (ref 27–31.3)
MCHC RBC AUTO-ENTMCNC: 31.9 % (ref 33–37)
MCV RBC AUTO: 93.8 FL (ref 82–100)
MONOCYTES ABSOLUTE: 0.8 K/UL (ref 0.2–0.8)
MONOCYTES RELATIVE PERCENT: 6.1 %
NEUTROPHILS ABSOLUTE: 10.8 K/UL (ref 1.4–6.5)
NEUTROPHILS RELATIVE PERCENT: 80.5 %
ORGANISM: ABNORMAL
ORGANISM: ABNORMAL
PDW BLD-RTO: 16.2 % (ref 11.5–14.5)
PLATELET # BLD: 275 K/UL (ref 130–400)
RBC # BLD: 3.24 M/UL (ref 4.2–5.4)
WBC # BLD: 13.4 K/UL (ref 4.8–10.8)
WOUND/ABSCESS: ABNORMAL

## 2017-04-29 PROCEDURE — 6360000002 HC RX W HCPCS: Performed by: INTERNAL MEDICINE

## 2017-04-29 PROCEDURE — 97163 PT EVAL HIGH COMPLEX 45 MIN: CPT

## 2017-04-29 PROCEDURE — G8982 BODY POS GOAL STATUS: HCPCS

## 2017-04-29 PROCEDURE — 2580000003 HC RX 258: Performed by: INTERNAL MEDICINE

## 2017-04-29 PROCEDURE — G8981 BODY POS CURRENT STATUS: HCPCS

## 2017-04-29 PROCEDURE — G8988 SELF CARE GOAL STATUS: HCPCS

## 2017-04-29 PROCEDURE — 6360000002 HC RX W HCPCS: Performed by: HOSPITALIST

## 2017-04-29 PROCEDURE — G8987 SELF CARE CURRENT STATUS: HCPCS

## 2017-04-29 PROCEDURE — 95816 EEG AWAKE AND DROWSY: CPT

## 2017-04-29 PROCEDURE — 85025 COMPLETE CBC W/AUTO DIFF WBC: CPT

## 2017-04-29 PROCEDURE — 99232 SBSQ HOSP IP/OBS MODERATE 35: CPT | Performed by: INTERNAL MEDICINE

## 2017-04-29 PROCEDURE — 83735 ASSAY OF MAGNESIUM: CPT

## 2017-04-29 PROCEDURE — 36415 COLL VENOUS BLD VENIPUNCTURE: CPT

## 2017-04-29 PROCEDURE — 1210000000 HC MED SURG R&B

## 2017-04-29 PROCEDURE — 97167 OT EVAL HIGH COMPLEX 60 MIN: CPT

## 2017-04-29 PROCEDURE — 6370000000 HC RX 637 (ALT 250 FOR IP): Performed by: HOSPITALIST

## 2017-04-29 RX ADMIN — DAPSONE 100 MG: 100 TABLET ORAL at 08:38

## 2017-04-29 RX ADMIN — ROPINIROLE HYDROCHLORIDE 1 MG: 1 TABLET, FILM COATED ORAL at 08:37

## 2017-04-29 RX ADMIN — GABAPENTIN 300 MG: 300 CAPSULE ORAL at 08:45

## 2017-04-29 RX ADMIN — CYCLOSPORINE 125 MG: 100 CAPSULE, GELATIN COATED ORAL at 01:21

## 2017-04-29 RX ADMIN — MORPHINE SULFATE 4 MG: 4 INJECTION, SOLUTION INTRAMUSCULAR; INTRAVENOUS at 17:03

## 2017-04-29 RX ADMIN — MEROPENEM 1 G: 1 INJECTION, POWDER, FOR SOLUTION INTRAVENOUS at 06:32

## 2017-04-29 RX ADMIN — DOCUSATE SODIUM 100 MG: 100 CAPSULE, LIQUID FILLED ORAL at 08:36

## 2017-04-29 RX ADMIN — CYCLOSPORINE 125 MG: 100 CAPSULE, GELATIN COATED ORAL at 08:36

## 2017-04-29 RX ADMIN — AMITRIPTYLINE HYDROCHLORIDE 50 MG: 25 TABLET, FILM COATED ORAL at 20:41

## 2017-04-29 RX ADMIN — ROPINIROLE HYDROCHLORIDE 1 MG: 1 TABLET, FILM COATED ORAL at 13:24

## 2017-04-29 RX ADMIN — DOCUSATE SODIUM 100 MG: 100 CAPSULE, LIQUID FILLED ORAL at 20:42

## 2017-04-29 RX ADMIN — MEROPENEM 1 G: 1 INJECTION, POWDER, FOR SOLUTION INTRAVENOUS at 20:42

## 2017-04-29 RX ADMIN — DULOXETINE 60 MG: 60 CAPSULE, DELAYED RELEASE ORAL at 08:37

## 2017-04-29 RX ADMIN — GABAPENTIN 300 MG: 300 CAPSULE ORAL at 20:42

## 2017-04-29 RX ADMIN — ROPINIROLE HYDROCHLORIDE 1 MG: 1 TABLET, FILM COATED ORAL at 20:42

## 2017-04-29 RX ADMIN — MORPHINE SULFATE 4 MG: 4 INJECTION, SOLUTION INTRAMUSCULAR; INTRAVENOUS at 11:45

## 2017-04-29 RX ADMIN — GABAPENTIN 300 MG: 300 CAPSULE ORAL at 13:24

## 2017-04-29 RX ADMIN — TRAZODONE HYDROCHLORIDE 100 MG: 100 TABLET ORAL at 20:42

## 2017-04-29 RX ADMIN — MORPHINE SULFATE 4 MG: 4 INJECTION, SOLUTION INTRAMUSCULAR; INTRAVENOUS at 06:30

## 2017-04-29 RX ADMIN — CYCLOSPORINE 125 MG: 100 CAPSULE, GELATIN COATED ORAL at 20:41

## 2017-04-29 RX ADMIN — MEROPENEM 1 G: 1 INJECTION, POWDER, FOR SOLUTION INTRAVENOUS at 13:24

## 2017-04-29 RX ADMIN — ENOXAPARIN SODIUM 40 MG: 40 INJECTION SUBCUTANEOUS at 08:45

## 2017-04-29 RX ADMIN — LORAZEPAM 1 MG: 1 TABLET ORAL at 20:40

## 2017-04-29 ASSESSMENT — PAIN SCALES - GENERAL
PAINLEVEL_OUTOF10: 8
PAINLEVEL_OUTOF10: 10
PAINLEVEL_OUTOF10: 7
PAINLEVEL_OUTOF10: 10

## 2017-04-29 ASSESSMENT — PAIN DESCRIPTION - ORIENTATION: ORIENTATION: RIGHT

## 2017-04-29 ASSESSMENT — PAIN DESCRIPTION - LOCATION: LOCATION: ANKLE;LEG

## 2017-04-30 ENCOUNTER — APPOINTMENT (OUTPATIENT)
Dept: GENERAL RADIOLOGY | Age: 52
DRG: 871 | End: 2017-04-30
Payer: COMMERCIAL

## 2017-04-30 LAB
AMPHETAMINE SCREEN, URINE: ABNORMAL
BARBITURATE SCREEN URINE: ABNORMAL
BASOPHILS ABSOLUTE: 0 K/UL (ref 0–0.2)
BASOPHILS RELATIVE PERCENT: 0.5 %
BENZODIAZEPINE SCREEN, URINE: ABNORMAL
CANNABINOID SCREEN URINE: ABNORMAL
COCAINE METABOLITE SCREEN URINE: ABNORMAL
EOSINOPHILS ABSOLUTE: 0 K/UL (ref 0–0.7)
EOSINOPHILS RELATIVE PERCENT: 0.4 %
HCT VFR BLD CALC: 25.7 % (ref 37–47)
HEMOGLOBIN: 8.5 G/DL (ref 12–16)
LYMPHOCYTES ABSOLUTE: 1.6 K/UL (ref 1–4.8)
LYMPHOCYTES RELATIVE PERCENT: 15 %
Lab: ABNORMAL
MAGNESIUM: 1.8 MG/DL (ref 1.7–2.3)
MCH RBC QN AUTO: 30.1 PG (ref 27–31.3)
MCHC RBC AUTO-ENTMCNC: 32.9 % (ref 33–37)
MCV RBC AUTO: 91.6 FL (ref 82–100)
MONOCYTES ABSOLUTE: 0.7 K/UL (ref 0.2–0.8)
MONOCYTES RELATIVE PERCENT: 7.1 %
NEUTROPHILS ABSOLUTE: 8 K/UL (ref 1.4–6.5)
NEUTROPHILS RELATIVE PERCENT: 77 %
OPIATE SCREEN URINE: POSITIVE
PDW BLD-RTO: 16.2 % (ref 11.5–14.5)
PHENCYCLIDINE SCREEN URINE: ABNORMAL
PLATELET # BLD: 277 K/UL (ref 130–400)
RBC # BLD: 2.81 M/UL (ref 4.2–5.4)
WBC # BLD: 10.4 K/UL (ref 4.8–10.8)

## 2017-04-30 PROCEDURE — 6360000002 HC RX W HCPCS: Performed by: INTERNAL MEDICINE

## 2017-04-30 PROCEDURE — 71010 XR CHEST PORTABLE: CPT

## 2017-04-30 PROCEDURE — 1210000000 HC MED SURG R&B

## 2017-04-30 PROCEDURE — 83735 ASSAY OF MAGNESIUM: CPT

## 2017-04-30 PROCEDURE — 80307 DRUG TEST PRSMV CHEM ANLYZR: CPT

## 2017-04-30 PROCEDURE — 85025 COMPLETE CBC W/AUTO DIFF WBC: CPT

## 2017-04-30 PROCEDURE — 2580000003 HC RX 258: Performed by: INTERNAL MEDICINE

## 2017-04-30 PROCEDURE — 36415 COLL VENOUS BLD VENIPUNCTURE: CPT

## 2017-04-30 PROCEDURE — 2580000003 HC RX 258: Performed by: RADIOLOGY

## 2017-04-30 PROCEDURE — 6370000000 HC RX 637 (ALT 250 FOR IP): Performed by: HOSPITALIST

## 2017-04-30 PROCEDURE — 2580000003 HC RX 258: Performed by: HOSPITALIST

## 2017-04-30 PROCEDURE — 6360000002 HC RX W HCPCS: Performed by: HOSPITALIST

## 2017-04-30 PROCEDURE — 2700000000 HC OXYGEN THERAPY PER DAY

## 2017-04-30 PROCEDURE — 99222 1ST HOSP IP/OBS MODERATE 55: CPT | Performed by: INTERNAL MEDICINE

## 2017-04-30 RX ORDER — NICOTINE 21 MG/24HR
1 PATCH, TRANSDERMAL 24 HOURS TRANSDERMAL DAILY
Status: DISCONTINUED | OUTPATIENT
Start: 2017-04-30 | End: 2017-05-02 | Stop reason: HOSPADM

## 2017-04-30 RX ADMIN — ROPINIROLE HYDROCHLORIDE 1 MG: 1 TABLET, FILM COATED ORAL at 09:11

## 2017-04-30 RX ADMIN — LORAZEPAM 1 MG: 1 TABLET ORAL at 18:40

## 2017-04-30 RX ADMIN — SODIUM CHLORIDE: 9 INJECTION, SOLUTION INTRAVENOUS at 10:28

## 2017-04-30 RX ADMIN — MORPHINE SULFATE 4 MG: 4 INJECTION, SOLUTION INTRAMUSCULAR; INTRAVENOUS at 13:40

## 2017-04-30 RX ADMIN — ROPINIROLE HYDROCHLORIDE 1 MG: 1 TABLET, FILM COATED ORAL at 13:09

## 2017-04-30 RX ADMIN — DAPSONE 100 MG: 100 TABLET ORAL at 09:11

## 2017-04-30 RX ADMIN — GABAPENTIN 300 MG: 300 CAPSULE ORAL at 09:11

## 2017-04-30 RX ADMIN — MEROPENEM 1 G: 1 INJECTION, POWDER, FOR SOLUTION INTRAVENOUS at 07:02

## 2017-04-30 RX ADMIN — MEROPENEM 1 G: 1 INJECTION, POWDER, FOR SOLUTION INTRAVENOUS at 14:36

## 2017-04-30 RX ADMIN — DOCUSATE SODIUM 100 MG: 100 CAPSULE, LIQUID FILLED ORAL at 09:10

## 2017-04-30 RX ADMIN — Medication 10 ML: at 09:10

## 2017-04-30 RX ADMIN — GABAPENTIN 300 MG: 300 CAPSULE ORAL at 13:09

## 2017-04-30 RX ADMIN — DULOXETINE 60 MG: 60 CAPSULE, DELAYED RELEASE ORAL at 09:10

## 2017-04-30 RX ADMIN — CYCLOSPORINE 125 MG: 100 CAPSULE, GELATIN COATED ORAL at 09:11

## 2017-04-30 RX ADMIN — MORPHINE SULFATE 4 MG: 4 INJECTION, SOLUTION INTRAMUSCULAR; INTRAVENOUS at 04:51

## 2017-04-30 RX ADMIN — LORAZEPAM 1 MG: 1 TABLET ORAL at 07:36

## 2017-04-30 RX ADMIN — MORPHINE SULFATE 4 MG: 4 INJECTION, SOLUTION INTRAMUSCULAR; INTRAVENOUS at 09:10

## 2017-04-30 RX ADMIN — ENOXAPARIN SODIUM 40 MG: 40 INJECTION SUBCUTANEOUS at 09:10

## 2017-04-30 ASSESSMENT — PAIN SCALES - GENERAL
PAINLEVEL_OUTOF10: 10
PAINLEVEL_OUTOF10: 10
PAINLEVEL_OUTOF10: 9
PAINLEVEL_OUTOF10: 10

## 2017-05-01 PROBLEM — D64.9 ANEMIA: Chronic | Status: ACTIVE | Noted: 2017-05-01

## 2017-05-01 PROBLEM — D72.829 LEUKOCYTOSIS: Status: RESOLVED | Noted: 2017-04-27 | Resolved: 2017-05-01

## 2017-05-01 PROBLEM — J96.01 ACUTE RESPIRATORY FAILURE WITH HYPOXIA (HCC): Status: RESOLVED | Noted: 2017-04-27 | Resolved: 2017-05-01

## 2017-05-01 PROBLEM — R06.82 TACHYPNEA: Status: RESOLVED | Noted: 2017-04-27 | Resolved: 2017-05-01

## 2017-05-01 PROBLEM — R41.82 ALTERED MENTAL STATUS: Status: RESOLVED | Noted: 2017-04-27 | Resolved: 2017-05-01

## 2017-05-01 PROBLEM — N17.9 ACUTE KIDNEY INJURY (HCC): Status: RESOLVED | Noted: 2017-04-27 | Resolved: 2017-05-01

## 2017-05-01 PROBLEM — E66.9 OBESITY: Chronic | Status: ACTIVE | Noted: 2017-05-01

## 2017-05-01 PROBLEM — L97.912 ULCER OF RIGHT LOWER EXTREMITY WITH FAT LAYER EXPOSED (HCC): Status: RESOLVED | Noted: 2017-04-28 | Resolved: 2017-05-01

## 2017-05-01 PROBLEM — T07.XXXA MULTIPLE OPEN WOUNDS: Chronic | Status: RESOLVED | Noted: 2017-01-04 | Resolved: 2017-05-01

## 2017-05-01 LAB
ALBUMIN SERPL-MCNC: 2.6 G/DL (ref 3.9–4.9)
ALP BLD-CCNC: 67 U/L (ref 40–130)
ALT SERPL-CCNC: 11 U/L (ref 0–33)
ANION GAP SERPL CALCULATED.3IONS-SCNC: 11 MEQ/L (ref 7–13)
ANISOCYTOSIS: ABNORMAL
AST SERPL-CCNC: 14 U/L (ref 0–35)
BANDED NEUTROPHILS RELATIVE PERCENT: 2 % (ref 5–11)
BASOPHILS ABSOLUTE: 0 K/UL (ref 0–0.2)
BASOPHILS RELATIVE PERCENT: 0.4 %
BILIRUB SERPL-MCNC: 0.7 MG/DL (ref 0–1.2)
BUN BLDV-MCNC: 7 MG/DL (ref 6–20)
CALCIUM SERPL-MCNC: 8.2 MG/DL (ref 8.6–10.2)
CHLORIDE BLD-SCNC: 103 MEQ/L (ref 98–107)
CO2: 24 MEQ/L (ref 22–29)
CREAT SERPL-MCNC: 0.65 MG/DL (ref 0.5–0.9)
EOSINOPHILS ABSOLUTE: 0.1 K/UL (ref 0–0.7)
EOSINOPHILS RELATIVE PERCENT: 2 %
GFR AFRICAN AMERICAN: >60
GFR NON-AFRICAN AMERICAN: >60
GLOBULIN: 2.4 G/DL (ref 2.3–3.5)
GLUCOSE BLD-MCNC: 124 MG/DL (ref 74–109)
HCT VFR BLD CALC: 26.2 % (ref 37–47)
HEMOGLOBIN: 8.7 G/DL (ref 12–16)
HYPOCHROMIA: 0
LYMPHOCYTES ABSOLUTE: 1.8 K/UL (ref 1–4.8)
LYMPHOCYTES RELATIVE PERCENT: 31 %
MACROCYTES: 0
MAGNESIUM: 1.8 MG/DL (ref 1.7–2.3)
MCH RBC QN AUTO: 30.5 PG (ref 27–31.3)
MCHC RBC AUTO-ENTMCNC: 33.3 % (ref 33–37)
MCV RBC AUTO: 91.8 FL (ref 82–100)
METAMYELOCYTES RELATIVE PERCENT: 1 %
MONOCYTES ABSOLUTE: 0.3 K/UL (ref 0.2–0.8)
MONOCYTES RELATIVE PERCENT: 5.3 %
NEUTROPHILS ABSOLUTE: 3.6 K/UL (ref 1.4–6.5)
NEUTROPHILS RELATIVE PERCENT: 60 %
PDW BLD-RTO: 16.4 % (ref 11.5–14.5)
PLATELET # BLD: 309 K/UL (ref 130–400)
PLATELET SLIDE REVIEW: ADEQUATE
POIKILOCYTES: 0
POLYCHROMASIA: 0
POTASSIUM SERPL-SCNC: 3.9 MEQ/L (ref 3.5–5.1)
RBC # BLD: 2.85 M/UL (ref 4.2–5.4)
SODIUM BLD-SCNC: 138 MEQ/L (ref 132–144)
TOTAL PROTEIN: 5 G/DL (ref 6.4–8.1)
WBC # BLD: 5.7 K/UL (ref 4.8–10.8)

## 2017-05-01 PROCEDURE — 6370000000 HC RX 637 (ALT 250 FOR IP): Performed by: INTERNAL MEDICINE

## 2017-05-01 PROCEDURE — 6360000002 HC RX W HCPCS: Performed by: INTERNAL MEDICINE

## 2017-05-01 PROCEDURE — 97112 NEUROMUSCULAR REEDUCATION: CPT

## 2017-05-01 PROCEDURE — 6360000002 HC RX W HCPCS: Performed by: HOSPITALIST

## 2017-05-01 PROCEDURE — 2700000000 HC OXYGEN THERAPY PER DAY

## 2017-05-01 PROCEDURE — 83735 ASSAY OF MAGNESIUM: CPT

## 2017-05-01 PROCEDURE — 2580000003 HC RX 258: Performed by: RADIOLOGY

## 2017-05-01 PROCEDURE — 6370000000 HC RX 637 (ALT 250 FOR IP): Performed by: HOSPITALIST

## 2017-05-01 PROCEDURE — 2580000003 HC RX 258: Performed by: HOSPITALIST

## 2017-05-01 PROCEDURE — 2580000003 HC RX 258: Performed by: INTERNAL MEDICINE

## 2017-05-01 PROCEDURE — 1210000000 HC MED SURG R&B

## 2017-05-01 PROCEDURE — 36415 COLL VENOUS BLD VENIPUNCTURE: CPT

## 2017-05-01 PROCEDURE — 80053 COMPREHEN METABOLIC PANEL: CPT

## 2017-05-01 PROCEDURE — 99232 SBSQ HOSP IP/OBS MODERATE 35: CPT | Performed by: INTERNAL MEDICINE

## 2017-05-01 PROCEDURE — 85025 COMPLETE CBC W/AUTO DIFF WBC: CPT

## 2017-05-01 RX ORDER — MORPHINE SULFATE 4 MG/ML
4 INJECTION, SOLUTION INTRAMUSCULAR; INTRAVENOUS
Status: DISCONTINUED | OUTPATIENT
Start: 2017-05-01 | End: 2017-05-02 | Stop reason: HOSPADM

## 2017-05-01 RX ORDER — OXYCODONE HYDROCHLORIDE AND ACETAMINOPHEN 5; 325 MG/1; MG/1
2 TABLET ORAL EVERY 4 HOURS PRN
Status: DISCONTINUED | OUTPATIENT
Start: 2017-05-01 | End: 2017-05-02 | Stop reason: HOSPADM

## 2017-05-01 RX ADMIN — ENOXAPARIN SODIUM 40 MG: 40 INJECTION SUBCUTANEOUS at 08:45

## 2017-05-01 RX ADMIN — ROPINIROLE HYDROCHLORIDE 1 MG: 1 TABLET, FILM COATED ORAL at 14:15

## 2017-05-01 RX ADMIN — MORPHINE SULFATE 4 MG: 4 INJECTION, SOLUTION INTRAMUSCULAR; INTRAVENOUS at 08:46

## 2017-05-01 RX ADMIN — ACETAMINOPHEN 650 MG: 325 TABLET ORAL at 00:18

## 2017-05-01 RX ADMIN — ROPINIROLE HYDROCHLORIDE 1 MG: 1 TABLET, FILM COATED ORAL at 00:18

## 2017-05-01 RX ADMIN — ONDANSETRON HYDROCHLORIDE 2 MG: 2 INJECTION, SOLUTION INTRAMUSCULAR; INTRAVENOUS at 21:01

## 2017-05-01 RX ADMIN — Medication 10 ML: at 10:13

## 2017-05-01 RX ADMIN — GABAPENTIN 300 MG: 300 CAPSULE ORAL at 14:15

## 2017-05-01 RX ADMIN — DULOXETINE 60 MG: 60 CAPSULE, DELAYED RELEASE ORAL at 08:45

## 2017-05-01 RX ADMIN — GABAPENTIN 300 MG: 300 CAPSULE ORAL at 00:17

## 2017-05-01 RX ADMIN — LORAZEPAM 1 MG: 1 TABLET ORAL at 08:46

## 2017-05-01 RX ADMIN — DAPSONE 100 MG: 100 TABLET ORAL at 08:45

## 2017-05-01 RX ADMIN — CYCLOSPORINE 125 MG: 100 CAPSULE, GELATIN COATED ORAL at 08:45

## 2017-05-01 RX ADMIN — TRAZODONE HYDROCHLORIDE 100 MG: 100 TABLET ORAL at 00:13

## 2017-05-01 RX ADMIN — OXYCODONE HYDROCHLORIDE AND ACETAMINOPHEN 2 TABLET: 5; 325 TABLET ORAL at 13:47

## 2017-05-01 RX ADMIN — AMITRIPTYLINE HYDROCHLORIDE 50 MG: 25 TABLET, FILM COATED ORAL at 00:15

## 2017-05-01 RX ADMIN — LORAZEPAM 1 MG: 1 TABLET ORAL at 01:31

## 2017-05-01 RX ADMIN — Medication 10 ML: at 00:19

## 2017-05-01 RX ADMIN — MEROPENEM 1 G: 1 INJECTION, POWDER, FOR SOLUTION INTRAVENOUS at 08:44

## 2017-05-01 RX ADMIN — CYCLOSPORINE 125 MG: 100 CAPSULE, GELATIN COATED ORAL at 00:17

## 2017-05-01 RX ADMIN — ONDANSETRON HYDROCHLORIDE 2 MG: 2 INJECTION, SOLUTION INTRAMUSCULAR; INTRAVENOUS at 00:11

## 2017-05-01 RX ADMIN — OXYCODONE HYDROCHLORIDE AND ACETAMINOPHEN 2 TABLET: 5; 325 TABLET ORAL at 21:00

## 2017-05-01 RX ADMIN — ROPINIROLE HYDROCHLORIDE 1 MG: 1 TABLET, FILM COATED ORAL at 10:12

## 2017-05-01 RX ADMIN — DOCUSATE SODIUM 100 MG: 100 CAPSULE, LIQUID FILLED ORAL at 00:17

## 2017-05-01 RX ADMIN — MORPHINE SULFATE 4 MG: 4 INJECTION, SOLUTION INTRAMUSCULAR; INTRAVENOUS at 00:12

## 2017-05-01 RX ADMIN — MORPHINE SULFATE 4 MG: 4 INJECTION, SOLUTION INTRAMUSCULAR; INTRAVENOUS at 15:33

## 2017-05-01 RX ADMIN — Medication 10 ML: at 00:20

## 2017-05-01 RX ADMIN — MEROPENEM 1 G: 1 INJECTION, POWDER, FOR SOLUTION INTRAVENOUS at 00:20

## 2017-05-01 RX ADMIN — MEROPENEM 1 G: 1 INJECTION, POWDER, FOR SOLUTION INTRAVENOUS at 16:11

## 2017-05-01 RX ADMIN — DOCUSATE SODIUM 100 MG: 100 CAPSULE, LIQUID FILLED ORAL at 08:45

## 2017-05-01 RX ADMIN — GABAPENTIN 300 MG: 300 CAPSULE ORAL at 08:46

## 2017-05-01 ASSESSMENT — PAIN SCALES - GENERAL
PAINLEVEL_OUTOF10: 5
PAINLEVEL_OUTOF10: 0
PAINLEVEL_OUTOF10: 10
PAINLEVEL_OUTOF10: 10
PAINLEVEL_OUTOF10: 7
PAINLEVEL_OUTOF10: 10
PAINLEVEL_OUTOF10: 5

## 2017-05-01 ASSESSMENT — ENCOUNTER SYMPTOMS
COUGH: 0
VOMITING: 0
SHORTNESS OF BREATH: 1
NAUSEA: 0
DIARRHEA: 0

## 2017-05-02 ENCOUNTER — APPOINTMENT (OUTPATIENT)
Dept: INTERVENTIONAL RADIOLOGY/VASCULAR | Age: 52
DRG: 871 | End: 2017-05-02
Payer: COMMERCIAL

## 2017-05-02 VITALS
WEIGHT: 185.85 LBS | TEMPERATURE: 98.4 F | HEART RATE: 67 BPM | OXYGEN SATURATION: 83 % | RESPIRATION RATE: 18 BRPM | SYSTOLIC BLOOD PRESSURE: 84 MMHG | HEIGHT: 65 IN | BODY MASS INDEX: 30.96 KG/M2 | DIASTOLIC BLOOD PRESSURE: 55 MMHG

## 2017-05-02 LAB
BLOOD CULTURE, ROUTINE: NORMAL
CULTURE, BLOOD 2: NORMAL

## 2017-05-02 PROCEDURE — 6370000000 HC RX 637 (ALT 250 FOR IP): Performed by: INTERNAL MEDICINE

## 2017-05-02 PROCEDURE — 6360000002 HC RX W HCPCS: Performed by: INTERNAL MEDICINE

## 2017-05-02 PROCEDURE — 2580000003 HC RX 258: Performed by: INTERNAL MEDICINE

## 2017-05-02 PROCEDURE — 6370000000 HC RX 637 (ALT 250 FOR IP): Performed by: HOSPITALIST

## 2017-05-02 PROCEDURE — 97535 SELF CARE MNGMENT TRAINING: CPT

## 2017-05-02 PROCEDURE — 6360000002 HC RX W HCPCS: Performed by: HOSPITALIST

## 2017-05-02 PROCEDURE — 99232 SBSQ HOSP IP/OBS MODERATE 35: CPT | Performed by: INTERNAL MEDICINE

## 2017-05-02 PROCEDURE — 2580000003 HC RX 258: Performed by: HOSPITALIST

## 2017-05-02 PROCEDURE — 2500000003 HC RX 250 WO HCPCS: Performed by: INTERNAL MEDICINE

## 2017-05-02 PROCEDURE — C1751 CATH, INF, PER/CENT/MIDLINE: HCPCS

## 2017-05-02 PROCEDURE — 2580000003 HC RX 258: Performed by: RADIOLOGY

## 2017-05-02 RX ORDER — SODIUM CHLORIDE 0.9 % (FLUSH) 0.9 %
10 SYRINGE (ML) INJECTION EVERY 12 HOURS
Status: DISCONTINUED | OUTPATIENT
Start: 2017-05-02 | End: 2017-05-02 | Stop reason: HOSPADM

## 2017-05-02 RX ORDER — LORAZEPAM 1 MG/1
1 TABLET ORAL EVERY 6 HOURS PRN
Qty: 20 TABLET | Refills: 0 | Status: SHIPPED | OUTPATIENT
Start: 2017-05-02 | End: 2017-05-03 | Stop reason: SDUPTHER

## 2017-05-02 RX ORDER — ACETAMINOPHEN 325 MG/1
650 TABLET ORAL EVERY 4 HOURS PRN
Qty: 120 TABLET | Refills: 3 | COMMUNITY
Start: 2017-05-02 | End: 2017-05-03 | Stop reason: SDUPTHER

## 2017-05-02 RX ORDER — OXYCODONE HYDROCHLORIDE AND ACETAMINOPHEN 5; 325 MG/1; MG/1
2 TABLET ORAL EVERY 4 HOURS PRN
Qty: 40 TABLET | Refills: 0 | Status: SHIPPED | OUTPATIENT
Start: 2017-05-02 | End: 2017-05-03 | Stop reason: SDUPTHER

## 2017-05-02 RX ORDER — LORAZEPAM 0.5 MG/1
0.5 TABLET ORAL EVERY 6 HOURS PRN
Status: DISCONTINUED | OUTPATIENT
Start: 2017-05-02 | End: 2017-05-02

## 2017-05-02 RX ORDER — PREDNISONE 1 MG/1
5 TABLET ORAL DAILY
DISCHARGE
Start: 2017-05-02 | End: 2017-05-03 | Stop reason: SDUPTHER

## 2017-05-02 RX ORDER — SODIUM CHLORIDE 0.9 % (FLUSH) 0.9 %
10 SYRINGE (ML) INJECTION PRN
Status: DISCONTINUED | OUTPATIENT
Start: 2017-05-02 | End: 2017-05-02 | Stop reason: HOSPADM

## 2017-05-02 RX ORDER — ALBUTEROL SULFATE 2.5 MG/3ML
2.5 SOLUTION RESPIRATORY (INHALATION) EVERY 4 HOURS PRN
Qty: 120 EACH | Refills: 3 | DISCHARGE
Start: 2017-05-02 | End: 2017-05-03 | Stop reason: SDUPTHER

## 2017-05-02 RX ORDER — LIDOCAINE HYDROCHLORIDE 20 MG/ML
5 INJECTION, SOLUTION INFILTRATION; PERINEURAL ONCE
Status: COMPLETED | OUTPATIENT
Start: 2017-05-02 | End: 2017-05-02

## 2017-05-02 RX ORDER — LORAZEPAM 1 MG/1
1 TABLET ORAL EVERY 6 HOURS PRN
Status: DISCONTINUED | OUTPATIENT
Start: 2017-05-02 | End: 2017-05-02 | Stop reason: HOSPADM

## 2017-05-02 RX ORDER — SODIUM CHLORIDE 9 MG/ML
250 INJECTION, SOLUTION INTRAVENOUS ONCE
Status: DISCONTINUED | OUTPATIENT
Start: 2017-05-02 | End: 2017-05-02

## 2017-05-02 RX ADMIN — ROPINIROLE HYDROCHLORIDE 1 MG: 1 TABLET, FILM COATED ORAL at 01:46

## 2017-05-02 RX ADMIN — OXYCODONE HYDROCHLORIDE AND ACETAMINOPHEN 2 TABLET: 5; 325 TABLET ORAL at 10:44

## 2017-05-02 RX ADMIN — DAPSONE 100 MG: 100 TABLET ORAL at 10:44

## 2017-05-02 RX ADMIN — MEROPENEM 1 G: 1 INJECTION, POWDER, FOR SOLUTION INTRAVENOUS at 15:35

## 2017-05-02 RX ADMIN — MORPHINE SULFATE 4 MG: 4 INJECTION, SOLUTION INTRAMUSCULAR; INTRAVENOUS at 08:30

## 2017-05-02 RX ADMIN — LIDOCAINE HYDROCHLORIDE 5 ML: 20 INJECTION, SOLUTION INFILTRATION; PERINEURAL at 09:50

## 2017-05-02 RX ADMIN — DOCUSATE SODIUM 100 MG: 100 CAPSULE, LIQUID FILLED ORAL at 10:43

## 2017-05-02 RX ADMIN — MORPHINE SULFATE 4 MG: 4 INJECTION, SOLUTION INTRAMUSCULAR; INTRAVENOUS at 01:13

## 2017-05-02 RX ADMIN — ONDANSETRON HYDROCHLORIDE 2 MG: 2 INJECTION, SOLUTION INTRAMUSCULAR; INTRAVENOUS at 05:13

## 2017-05-02 RX ADMIN — MEROPENEM 1 G: 1 INJECTION, POWDER, FOR SOLUTION INTRAVENOUS at 01:17

## 2017-05-02 RX ADMIN — DOCUSATE SODIUM 100 MG: 100 CAPSULE, LIQUID FILLED ORAL at 01:14

## 2017-05-02 RX ADMIN — ROPINIROLE HYDROCHLORIDE 1 MG: 1 TABLET, FILM COATED ORAL at 14:45

## 2017-05-02 RX ADMIN — Medication 10 ML: at 01:15

## 2017-05-02 RX ADMIN — LORAZEPAM 1 MG: 1 TABLET ORAL at 08:30

## 2017-05-02 RX ADMIN — CYCLOSPORINE 125 MG: 100 CAPSULE, GELATIN COATED ORAL at 01:14

## 2017-05-02 RX ADMIN — TRAZODONE HYDROCHLORIDE 100 MG: 100 TABLET ORAL at 01:13

## 2017-05-02 RX ADMIN — Medication 10 ML: at 01:16

## 2017-05-02 RX ADMIN — AMITRIPTYLINE HYDROCHLORIDE 50 MG: 25 TABLET, FILM COATED ORAL at 01:14

## 2017-05-02 RX ADMIN — Medication 10 ML: at 09:50

## 2017-05-02 RX ADMIN — DULOXETINE 60 MG: 60 CAPSULE, DELAYED RELEASE ORAL at 10:43

## 2017-05-02 RX ADMIN — ROPINIROLE HYDROCHLORIDE 1 MG: 1 TABLET, FILM COATED ORAL at 10:43

## 2017-05-02 RX ADMIN — CYCLOSPORINE 125 MG: 100 CAPSULE, GELATIN COATED ORAL at 10:42

## 2017-05-02 RX ADMIN — GABAPENTIN 300 MG: 300 CAPSULE ORAL at 10:43

## 2017-05-02 RX ADMIN — OXYCODONE HYDROCHLORIDE AND ACETAMINOPHEN 2 TABLET: 5; 325 TABLET ORAL at 03:06

## 2017-05-02 RX ADMIN — GABAPENTIN 300 MG: 300 CAPSULE ORAL at 01:13

## 2017-05-02 RX ADMIN — SODIUM CHLORIDE, PRESERVATIVE FREE 10 ML: 5 INJECTION INTRAVENOUS at 10:42

## 2017-05-02 RX ADMIN — LORAZEPAM 1 MG: 1 TABLET ORAL at 01:13

## 2017-05-02 RX ADMIN — MORPHINE SULFATE 4 MG: 4 INJECTION, SOLUTION INTRAMUSCULAR; INTRAVENOUS at 05:13

## 2017-05-02 RX ADMIN — GABAPENTIN 300 MG: 300 CAPSULE ORAL at 14:45

## 2017-05-02 RX ADMIN — OXYCODONE HYDROCHLORIDE AND ACETAMINOPHEN 2 TABLET: 5; 325 TABLET ORAL at 18:43

## 2017-05-02 RX ADMIN — MEROPENEM 1 G: 1 INJECTION, POWDER, FOR SOLUTION INTRAVENOUS at 10:42

## 2017-05-02 RX ADMIN — MORPHINE SULFATE 4 MG: 4 INJECTION, SOLUTION INTRAMUSCULAR; INTRAVENOUS at 14:45

## 2017-05-02 ASSESSMENT — ENCOUNTER SYMPTOMS
VOMITING: 0
COUGH: 0
SHORTNESS OF BREATH: 1
NAUSEA: 0
DIARRHEA: 0

## 2017-05-02 ASSESSMENT — PAIN DESCRIPTION - FREQUENCY: FREQUENCY: CONTINUOUS

## 2017-05-02 ASSESSMENT — PAIN SCALES - GENERAL
PAINLEVEL_OUTOF10: 10
PAINLEVEL_OUTOF10: 10
PAINLEVEL_OUTOF10: 7
PAINLEVEL_OUTOF10: 10
PAINLEVEL_OUTOF10: 0
PAINLEVEL_OUTOF10: 9
PAINLEVEL_OUTOF10: 5
PAINLEVEL_OUTOF10: 10
PAINLEVEL_OUTOF10: 9
PAINLEVEL_OUTOF10: 3
PAINLEVEL_OUTOF10: 5
PAINLEVEL_OUTOF10: 5

## 2017-05-02 ASSESSMENT — PAIN DESCRIPTION - PAIN TYPE
TYPE: ACUTE PAIN
TYPE: ACUTE PAIN

## 2017-05-02 ASSESSMENT — PAIN DESCRIPTION - LOCATION
LOCATION: ANKLE;FOOT;LEG
LOCATION: ANKLE;FOOT;LEG

## 2017-05-02 ASSESSMENT — PAIN DESCRIPTION - ORIENTATION: ORIENTATION: RIGHT;LEFT

## 2017-05-03 ENCOUNTER — NURSE ONLY (OUTPATIENT)
Dept: GERIATRIC MEDICINE | Age: 52
End: 2017-05-03

## 2017-05-03 DIAGNOSIS — I10 ESSENTIAL HYPERTENSION: ICD-10-CM

## 2017-05-03 DIAGNOSIS — R53.1 WEAKNESS: ICD-10-CM

## 2017-05-03 DIAGNOSIS — G25.81 RLS (RESTLESS LEGS SYNDROME): ICD-10-CM

## 2017-05-03 DIAGNOSIS — L88 PYODERMA GANGRENOSA: Primary | ICD-10-CM

## 2017-05-03 PROCEDURE — 99306 1ST NF CARE HIGH MDM 50: CPT | Performed by: INTERNAL MEDICINE

## 2017-05-03 RX ORDER — GABAPENTIN 800 MG/1
800 TABLET ORAL 4 TIMES DAILY
Status: ON HOLD | COMMUNITY
End: 2020-02-18 | Stop reason: HOSPADM

## 2017-05-03 RX ORDER — AMITRIPTYLINE HYDROCHLORIDE 50 MG/1
150 TABLET, FILM COATED ORAL NIGHTLY
Status: ON HOLD | COMMUNITY
End: 2017-12-24 | Stop reason: HOSPADM

## 2017-05-03 RX ORDER — ACETAMINOPHEN 650 MG/1
650 SUPPOSITORY RECTAL EVERY 4 HOURS PRN
Status: ON HOLD | COMMUNITY
End: 2017-12-20 | Stop reason: CLARIF

## 2017-05-03 RX ORDER — ROPINIROLE 1 MG/1
1 TABLET, FILM COATED ORAL 3 TIMES DAILY
COMMUNITY
End: 2019-09-18

## 2017-05-03 RX ORDER — MELOXICAM 15 MG/1
15 TABLET ORAL DAILY
Status: ON HOLD | COMMUNITY
End: 2020-05-08

## 2017-05-03 RX ORDER — CYCLOSPORINE 100 MG/1
125 CAPSULE, GELATIN COATED ORAL 2 TIMES DAILY
COMMUNITY
End: 2019-09-18

## 2017-05-03 RX ORDER — TRAZODONE HYDROCHLORIDE 100 MG/1
200 TABLET ORAL NIGHTLY
COMMUNITY
End: 2021-06-18

## 2017-05-03 RX ORDER — LORAZEPAM 0.5 MG/1
0.5 TABLET ORAL NIGHTLY
COMMUNITY
End: 2019-09-20 | Stop reason: SDUPTHER

## 2017-05-03 RX ORDER — OXYCODONE HYDROCHLORIDE AND ACETAMINOPHEN 5; 325 MG/1; MG/1
2 TABLET ORAL EVERY 4 HOURS PRN
Status: ON HOLD | COMMUNITY
End: 2017-12-24 | Stop reason: HOSPADM

## 2017-05-03 RX ORDER — DAPSONE 100 MG/1
100 TABLET ORAL DAILY
COMMUNITY
End: 2019-09-18

## 2017-05-03 RX ORDER — PREDNISONE 1 MG/1
5 TABLET ORAL DAILY
Status: ON HOLD | COMMUNITY
End: 2017-12-20

## 2017-05-03 RX ORDER — DULOXETIN HYDROCHLORIDE 60 MG/1
60 CAPSULE, DELAYED RELEASE ORAL 2 TIMES DAILY
COMMUNITY
End: 2019-09-18

## 2017-05-03 RX ORDER — PRAMIPEXOLE DIHYDROCHLORIDE 1 MG/1
1 TABLET ORAL EVERY MORNING
Status: ON HOLD | COMMUNITY
End: 2017-12-24 | Stop reason: HOSPADM

## 2017-05-03 RX ORDER — ALBUTEROL SULFATE 2.5 MG/3ML
2.5 SOLUTION RESPIRATORY (INHALATION) EVERY 4 HOURS PRN
COMMUNITY
End: 2019-09-18

## 2017-05-03 RX ORDER — ACETAMINOPHEN 325 MG/1
650 TABLET ORAL EVERY 4 HOURS PRN
COMMUNITY

## 2017-05-03 RX ORDER — LANOLIN ALCOHOL/MO/W.PET/CERES
9 CREAM (GRAM) TOPICAL NIGHTLY PRN
Status: ON HOLD | COMMUNITY
End: 2017-12-20

## 2017-05-03 RX ORDER — BENZONATATE 100 MG/1
100 CAPSULE ORAL 3 TIMES DAILY PRN
Status: ON HOLD | COMMUNITY
End: 2017-12-20

## 2017-05-08 ENCOUNTER — OFFICE VISIT (OUTPATIENT)
Dept: GERIATRIC MEDICINE | Age: 52
End: 2017-05-08

## 2017-05-08 DIAGNOSIS — F14.10 COCAINE ABUSE (HCC): ICD-10-CM

## 2017-05-08 DIAGNOSIS — L03.115 CELLULITIS OF RIGHT LEG: ICD-10-CM

## 2017-05-08 DIAGNOSIS — K59.00 CONSTIPATION, UNSPECIFIED CONSTIPATION TYPE: Primary | ICD-10-CM

## 2017-05-08 DIAGNOSIS — F17.200 TOBACCO DEPENDENCE: ICD-10-CM

## 2017-05-08 PROCEDURE — 99308 SBSQ NF CARE LOW MDM 20: CPT | Performed by: NURSE PRACTITIONER

## 2017-05-08 PROCEDURE — 3017F COLORECTAL CA SCREEN DOC REV: CPT | Performed by: NURSE PRACTITIONER

## 2017-05-08 RX ORDER — NICOTINE 21 MG/24HR
1 PATCH, TRANSDERMAL 24 HOURS TRANSDERMAL EVERY 24 HOURS
Qty: 30 PATCH | Refills: 3 | Status: ON HOLD
Start: 2017-05-08 | End: 2017-12-20

## 2017-05-08 ASSESSMENT — ENCOUNTER SYMPTOMS
CONSTIPATION: 1
COLOR CHANGE: 1
COUGH: 0
SHORTNESS OF BREATH: 0
ABDOMINAL PAIN: 0
WHEEZING: 0

## 2017-05-09 VITALS
HEIGHT: 65 IN | BODY MASS INDEX: 30.66 KG/M2 | SYSTOLIC BLOOD PRESSURE: 169 MMHG | HEART RATE: 77 BPM | DIASTOLIC BLOOD PRESSURE: 94 MMHG | WEIGHT: 184 LBS | RESPIRATION RATE: 20 BRPM | TEMPERATURE: 98.3 F | OXYGEN SATURATION: 95 %

## 2017-05-16 ENCOUNTER — OFFICE VISIT (OUTPATIENT)
Dept: INFECTIOUS DISEASES | Age: 52
End: 2017-05-16

## 2017-05-16 VITALS
SYSTOLIC BLOOD PRESSURE: 125 MMHG | WEIGHT: 186.6 LBS | DIASTOLIC BLOOD PRESSURE: 84 MMHG | BODY MASS INDEX: 31.09 KG/M2 | RESPIRATION RATE: 14 BRPM | HEIGHT: 65 IN | TEMPERATURE: 98.4 F | HEART RATE: 80 BPM

## 2017-05-16 DIAGNOSIS — L08.9: Primary | ICD-10-CM

## 2017-05-16 DIAGNOSIS — L98.493: Primary | ICD-10-CM

## 2017-05-16 PROCEDURE — 1111F DSCHRG MED/CURRENT MED MERGE: CPT | Performed by: INTERNAL MEDICINE

## 2017-05-16 PROCEDURE — 3014F SCREEN MAMMO DOC REV: CPT | Performed by: INTERNAL MEDICINE

## 2017-05-16 PROCEDURE — G8427 DOCREV CUR MEDS BY ELIG CLIN: HCPCS | Performed by: INTERNAL MEDICINE

## 2017-05-16 PROCEDURE — 99213 OFFICE O/P EST LOW 20 MIN: CPT | Performed by: INTERNAL MEDICINE

## 2017-05-16 PROCEDURE — G8419 CALC BMI OUT NRM PARAM NOF/U: HCPCS | Performed by: INTERNAL MEDICINE

## 2017-05-16 PROCEDURE — 4004F PT TOBACCO SCREEN RCVD TLK: CPT | Performed by: INTERNAL MEDICINE

## 2017-05-16 PROCEDURE — 3017F COLORECTAL CA SCREEN DOC REV: CPT | Performed by: INTERNAL MEDICINE

## 2017-05-16 RX ORDER — CEPHALEXIN 500 MG/1
500 CAPSULE ORAL 3 TIMES DAILY
Qty: 45 CAPSULE | Refills: 0 | Status: SHIPPED | OUTPATIENT
Start: 2017-05-16 | End: 2017-05-31

## 2017-05-16 ASSESSMENT — ENCOUNTER SYMPTOMS: RESPIRATORY NEGATIVE: 1

## 2017-12-20 ENCOUNTER — HOSPITAL ENCOUNTER (INPATIENT)
Age: 52
LOS: 1 days | Discharge: HOME | DRG: 189 | End: 2017-12-21
Attending: EMERGENCY MEDICINE | Admitting: INTERNAL MEDICINE
Payer: COMMERCIAL

## 2017-12-20 ENCOUNTER — APPOINTMENT (OUTPATIENT)
Dept: GENERAL RADIOLOGY | Age: 52
DRG: 189 | End: 2017-12-20
Payer: COMMERCIAL

## 2017-12-20 DIAGNOSIS — J20.9 ACUTE BRONCHITIS, UNSPECIFIED ORGANISM: Primary | ICD-10-CM

## 2017-12-20 DIAGNOSIS — R50.9 FEVER, UNSPECIFIED FEVER CAUSE: ICD-10-CM

## 2017-12-20 DIAGNOSIS — R09.02 HYPOXIA: ICD-10-CM

## 2017-12-20 PROBLEM — J96.01 ACUTE HYPOXEMIC RESPIRATORY FAILURE (HCC): Status: ACTIVE | Noted: 2017-12-20

## 2017-12-20 LAB
ALBUMIN SERPL-MCNC: 3.9 G/DL (ref 3.9–4.9)
ALP BLD-CCNC: 77 U/L (ref 40–130)
ALT SERPL-CCNC: 14 U/L (ref 0–33)
AMPHETAMINE SCREEN, URINE: ABNORMAL
ANION GAP SERPL CALCULATED.3IONS-SCNC: 14 MEQ/L (ref 7–13)
ANISOCYTOSIS: ABNORMAL
AST SERPL-CCNC: 23 U/L (ref 0–35)
BARBITURATE SCREEN URINE: ABNORMAL
BASE EXCESS ARTERIAL: 0 (ref -3–3)
BASOPHILS ABSOLUTE: 0.1 K/UL (ref 0–0.2)
BASOPHILS RELATIVE PERCENT: 1 %
BENZODIAZEPINE SCREEN, URINE: ABNORMAL
BILIRUB SERPL-MCNC: 0.6 MG/DL (ref 0–1.2)
BILIRUBIN URINE: NEGATIVE
BLOOD, URINE: NEGATIVE
BUN BLDV-MCNC: 11 MG/DL (ref 6–20)
CALCIUM SERPL-MCNC: 8.7 MG/DL (ref 8.6–10.2)
CANNABINOID SCREEN URINE: ABNORMAL
CHLORIDE BLD-SCNC: 99 MEQ/L (ref 98–107)
CLARITY: CLEAR
CO2: 24 MEQ/L (ref 22–29)
COCAINE METABOLITE SCREEN URINE: POSITIVE
COLOR: YELLOW
CREAT SERPL-MCNC: 0.77 MG/DL (ref 0.5–0.9)
EOSINOPHILS ABSOLUTE: 0 K/UL (ref 0–0.7)
EOSINOPHILS RELATIVE PERCENT: 0.2 %
GFR AFRICAN AMERICAN: >60
GFR NON-AFRICAN AMERICAN: >60
GLOBULIN: 2.7 G/DL (ref 2.3–3.5)
GLUCOSE BLD-MCNC: 94 MG/DL (ref 74–109)
GLUCOSE URINE: 500 MG/DL
HCO3 ARTERIAL: 25 MMOL/L (ref 21–29)
HCT VFR BLD CALC: 35.3 % (ref 37–47)
HEMOGLOBIN: 11.7 G/DL (ref 12–16)
KETONES, URINE: NEGATIVE MG/DL
LACTATE: 0.46 MMOL/L (ref 0.4–2)
LACTIC ACID: 1.7 MMOL/L (ref 0.5–2.2)
LEUKOCYTE ESTERASE, URINE: NEGATIVE
LYMPHOCYTES ABSOLUTE: 1 K/UL (ref 1–4.8)
LYMPHOCYTES RELATIVE PERCENT: 19.8 %
Lab: ABNORMAL
MCH RBC QN AUTO: 30.1 PG (ref 27–31.3)
MCHC RBC AUTO-ENTMCNC: 33.2 % (ref 33–37)
MCV RBC AUTO: 90.7 FL (ref 82–100)
MICROCYTES: ABNORMAL
MONOCYTES ABSOLUTE: 0.5 K/UL (ref 0.2–0.8)
MONOCYTES RELATIVE PERCENT: 9.7 %
NEUTROPHILS ABSOLUTE: 3.5 K/UL (ref 1.4–6.5)
NEUTROPHILS RELATIVE PERCENT: 69.3 %
NITRITE, URINE: NEGATIVE
O2 SAT, ARTERIAL: 96 % (ref 93–100)
OPIATE SCREEN URINE: ABNORMAL
PCO2 ARTERIAL: 40 MM HG (ref 35–45)
PDW BLD-RTO: 17.2 % (ref 11.5–14.5)
PERFORMED ON: ABNORMAL
PH ARTERIAL: 7.4 (ref 7.35–7.45)
PH UA: 5 (ref 5–9)
PHENCYCLIDINE SCREEN URINE: ABNORMAL
PLATELET # BLD: 200 K/UL (ref 130–400)
PO2 ARTERIAL: 85 MM HG (ref 75–108)
POC FIO2: 4
POC SAMPLE TYPE: ABNORMAL
POTASSIUM SERPL-SCNC: 4.4 MEQ/L (ref 3.5–5.1)
PROTEIN UA: NEGATIVE MG/DL
RAPID INFLUENZA  B AGN: NEGATIVE
RAPID INFLUENZA A AGN: NEGATIVE
RBC # BLD: 3.89 M/UL (ref 4.2–5.4)
SODIUM BLD-SCNC: 137 MEQ/L (ref 132–144)
SPECIFIC GRAVITY UA: 1.01 (ref 1–1.03)
TCO2 ARTERIAL: 26 (ref 22–29)
TOTAL PROTEIN: 6.6 G/DL (ref 6.4–8.1)
UROBILINOGEN, URINE: 0.2 E.U./DL
WBC # BLD: 5 K/UL (ref 4.8–10.8)

## 2017-12-20 PROCEDURE — 87040 BLOOD CULTURE FOR BACTERIA: CPT

## 2017-12-20 PROCEDURE — 6370000000 HC RX 637 (ALT 250 FOR IP): Performed by: INTERNAL MEDICINE

## 2017-12-20 PROCEDURE — 86403 PARTICLE AGGLUT ANTBDY SCRN: CPT

## 2017-12-20 PROCEDURE — 82803 BLOOD GASES ANY COMBINATION: CPT

## 2017-12-20 PROCEDURE — 6360000002 HC RX W HCPCS: Performed by: EMERGENCY MEDICINE

## 2017-12-20 PROCEDURE — 36415 COLL VENOUS BLD VENIPUNCTURE: CPT

## 2017-12-20 PROCEDURE — 94760 N-INVAS EAR/PLS OXIMETRY 1: CPT

## 2017-12-20 PROCEDURE — 99222 1ST HOSP IP/OBS MODERATE 55: CPT | Performed by: INTERNAL MEDICINE

## 2017-12-20 PROCEDURE — 6360000002 HC RX W HCPCS: Performed by: NURSE PRACTITIONER

## 2017-12-20 PROCEDURE — 94640 AIRWAY INHALATION TREATMENT: CPT

## 2017-12-20 PROCEDURE — 94664 DEMO&/EVAL PT USE INHALER: CPT

## 2017-12-20 PROCEDURE — 2580000003 HC RX 258: Performed by: NURSE PRACTITIONER

## 2017-12-20 PROCEDURE — 99285 EMERGENCY DEPT VISIT HI MDM: CPT

## 2017-12-20 PROCEDURE — 6370000000 HC RX 637 (ALT 250 FOR IP): Performed by: CLINICAL NURSE SPECIALIST

## 2017-12-20 PROCEDURE — 80053 COMPREHEN METABOLIC PANEL: CPT

## 2017-12-20 PROCEDURE — 2060000000 HC ICU INTERMEDIATE R&B

## 2017-12-20 PROCEDURE — 6370000000 HC RX 637 (ALT 250 FOR IP): Performed by: EMERGENCY MEDICINE

## 2017-12-20 PROCEDURE — 71010 XR CHEST PORTABLE: CPT

## 2017-12-20 PROCEDURE — 80307 DRUG TEST PRSMV CHEM ANLYZR: CPT

## 2017-12-20 PROCEDURE — 96374 THER/PROPH/DIAG INJ IV PUSH: CPT

## 2017-12-20 PROCEDURE — 6360000002 HC RX W HCPCS: Performed by: INTERNAL MEDICINE

## 2017-12-20 PROCEDURE — 83605 ASSAY OF LACTIC ACID: CPT

## 2017-12-20 PROCEDURE — 6370000000 HC RX 637 (ALT 250 FOR IP): Performed by: NURSE PRACTITIONER

## 2017-12-20 PROCEDURE — 2580000003 HC RX 258: Performed by: EMERGENCY MEDICINE

## 2017-12-20 PROCEDURE — 85025 COMPLETE CBC W/AUTO DIFF WBC: CPT

## 2017-12-20 PROCEDURE — 81003 URINALYSIS AUTO W/O SCOPE: CPT

## 2017-12-20 RX ORDER — ACETAMINOPHEN 325 MG/1
650 TABLET ORAL EVERY 4 HOURS PRN
Status: DISCONTINUED | OUTPATIENT
Start: 2017-12-20 | End: 2017-12-21 | Stop reason: HOSPADM

## 2017-12-20 RX ORDER — SODIUM CHLORIDE 0.9 % (FLUSH) 0.9 %
10 SYRINGE (ML) INJECTION PRN
Status: DISCONTINUED | OUTPATIENT
Start: 2017-12-20 | End: 2017-12-21 | Stop reason: HOSPADM

## 2017-12-20 RX ORDER — TRAZODONE HYDROCHLORIDE 150 MG/1
150 TABLET ORAL NIGHTLY
Status: DISCONTINUED | OUTPATIENT
Start: 2017-12-20 | End: 2017-12-21 | Stop reason: HOSPADM

## 2017-12-20 RX ORDER — NICOTINE 21 MG/24HR
1 PATCH, TRANSDERMAL 24 HOURS TRANSDERMAL EVERY 24 HOURS
Status: DISCONTINUED | OUTPATIENT
Start: 2017-12-20 | End: 2017-12-21 | Stop reason: HOSPADM

## 2017-12-20 RX ORDER — MELOXICAM 7.5 MG/1
15 TABLET ORAL DAILY
Status: DISCONTINUED | OUTPATIENT
Start: 2017-12-20 | End: 2017-12-21 | Stop reason: HOSPADM

## 2017-12-20 RX ORDER — IPRATROPIUM BROMIDE AND ALBUTEROL SULFATE 2.5; .5 MG/3ML; MG/3ML
1 SOLUTION RESPIRATORY (INHALATION) EVERY 4 HOURS PRN
Status: DISCONTINUED | OUTPATIENT
Start: 2017-12-20 | End: 2017-12-21 | Stop reason: HOSPADM

## 2017-12-20 RX ORDER — SIMVASTATIN 40 MG
40 TABLET ORAL NIGHTLY
Status: DISCONTINUED | OUTPATIENT
Start: 2017-12-20 | End: 2017-12-20

## 2017-12-20 RX ORDER — GABAPENTIN 300 MG/1
300 CAPSULE ORAL 2 TIMES DAILY
Status: DISCONTINUED | OUTPATIENT
Start: 2017-12-20 | End: 2017-12-21 | Stop reason: HOSPADM

## 2017-12-20 RX ORDER — BUSPIRONE HYDROCHLORIDE 5 MG/1
5 TABLET ORAL 3 TIMES DAILY
COMMUNITY
End: 2019-09-18

## 2017-12-20 RX ORDER — IPRATROPIUM BROMIDE AND ALBUTEROL SULFATE 2.5; .5 MG/3ML; MG/3ML
1 SOLUTION RESPIRATORY (INHALATION)
Status: DISCONTINUED | OUTPATIENT
Start: 2017-12-20 | End: 2017-12-20

## 2017-12-20 RX ORDER — TRAZODONE HYDROCHLORIDE 100 MG/1
100 TABLET ORAL NIGHTLY
Status: DISCONTINUED | OUTPATIENT
Start: 2017-12-20 | End: 2017-12-20

## 2017-12-20 RX ORDER — LEVOFLOXACIN 5 MG/ML
750 INJECTION, SOLUTION INTRAVENOUS EVERY 24 HOURS
Status: DISCONTINUED | OUTPATIENT
Start: 2017-12-21 | End: 2017-12-20

## 2017-12-20 RX ORDER — METHYLPREDNISOLONE SODIUM SUCCINATE 125 MG/2ML
125 INJECTION, POWDER, LYOPHILIZED, FOR SOLUTION INTRAMUSCULAR; INTRAVENOUS ONCE
Status: COMPLETED | OUTPATIENT
Start: 2017-12-20 | End: 2017-12-20

## 2017-12-20 RX ORDER — QUETIAPINE FUMARATE 50 MG/1
50 TABLET, EXTENDED RELEASE ORAL NIGHTLY
Status: ON HOLD | COMMUNITY
End: 2017-12-24 | Stop reason: HOSPADM

## 2017-12-20 RX ORDER — AMITRIPTYLINE HYDROCHLORIDE 75 MG/1
150 TABLET, FILM COATED ORAL NIGHTLY
Status: DISCONTINUED | OUTPATIENT
Start: 2017-12-20 | End: 2017-12-20

## 2017-12-20 RX ORDER — BUSPIRONE HYDROCHLORIDE 5 MG/1
5 TABLET ORAL 3 TIMES DAILY
Status: DISCONTINUED | OUTPATIENT
Start: 2017-12-20 | End: 2017-12-21 | Stop reason: HOSPADM

## 2017-12-20 RX ORDER — SODIUM CHLORIDE 0.9 % (FLUSH) 0.9 %
10 SYRINGE (ML) INJECTION EVERY 12 HOURS SCHEDULED
Status: DISCONTINUED | OUTPATIENT
Start: 2017-12-20 | End: 2017-12-21 | Stop reason: HOSPADM

## 2017-12-20 RX ORDER — IBUPROFEN 400 MG/1
800 TABLET ORAL ONCE
Status: COMPLETED | OUTPATIENT
Start: 2017-12-20 | End: 2017-12-20

## 2017-12-20 RX ORDER — ROPINIROLE 0.5 MG/1
1 TABLET, FILM COATED ORAL 3 TIMES DAILY
Status: DISCONTINUED | OUTPATIENT
Start: 2017-12-20 | End: 2017-12-21 | Stop reason: HOSPADM

## 2017-12-20 RX ORDER — DAPSONE 100 MG/1
100 TABLET ORAL DAILY
Status: DISCONTINUED | OUTPATIENT
Start: 2017-12-20 | End: 2017-12-21 | Stop reason: HOSPADM

## 2017-12-20 RX ORDER — ALBUTEROL SULFATE 90 UG/1
2 AEROSOL, METERED RESPIRATORY (INHALATION) EVERY 6 HOURS PRN
Status: DISCONTINUED | OUTPATIENT
Start: 2017-12-20 | End: 2017-12-21 | Stop reason: HOSPADM

## 2017-12-20 RX ORDER — SIMVASTATIN 40 MG
80 TABLET ORAL NIGHTLY
Status: ON HOLD | COMMUNITY
End: 2017-12-20

## 2017-12-20 RX ORDER — QUETIAPINE FUMARATE 50 MG/1
50 TABLET, EXTENDED RELEASE ORAL NIGHTLY
Status: DISCONTINUED | OUTPATIENT
Start: 2017-12-20 | End: 2017-12-21 | Stop reason: HOSPADM

## 2017-12-20 RX ORDER — CYCLOSPORINE 25 MG/1
75 CAPSULE, GELATIN COATED ORAL 2 TIMES DAILY
Status: DISCONTINUED | OUTPATIENT
Start: 2017-12-20 | End: 2017-12-21 | Stop reason: HOSPADM

## 2017-12-20 RX ORDER — ONDANSETRON 2 MG/ML
4 INJECTION INTRAMUSCULAR; INTRAVENOUS EVERY 6 HOURS PRN
Status: DISCONTINUED | OUTPATIENT
Start: 2017-12-20 | End: 2017-12-21 | Stop reason: HOSPADM

## 2017-12-20 RX ORDER — DULOXETIN HYDROCHLORIDE 60 MG/1
60 CAPSULE, DELAYED RELEASE ORAL 2 TIMES DAILY
Status: DISCONTINUED | OUTPATIENT
Start: 2017-12-20 | End: 2017-12-21 | Stop reason: HOSPADM

## 2017-12-20 RX ORDER — SODIUM CHLORIDE 9 MG/ML
INJECTION, SOLUTION INTRAVENOUS CONTINUOUS
Status: DISCONTINUED | OUTPATIENT
Start: 2017-12-20 | End: 2017-12-21 | Stop reason: HOSPADM

## 2017-12-20 RX ORDER — PRAVASTATIN SODIUM 20 MG
20 TABLET ORAL NIGHTLY
Status: DISCONTINUED | OUTPATIENT
Start: 2017-12-20 | End: 2017-12-21 | Stop reason: HOSPADM

## 2017-12-20 RX ADMIN — IBUPROFEN 800 MG: 400 TABLET, FILM COATED ORAL at 13:04

## 2017-12-20 RX ADMIN — CYCLOSPORINE 75 MG: 25 CAPSULE, LIQUID FILLED ORAL at 22:32

## 2017-12-20 RX ADMIN — GABAPENTIN 300 MG: 300 CAPSULE ORAL at 17:05

## 2017-12-20 RX ADMIN — AZITHROMYCIN MONOHYDRATE 500 MG: 500 INJECTION, POWDER, LYOPHILIZED, FOR SOLUTION INTRAVENOUS at 13:23

## 2017-12-20 RX ADMIN — Medication 10 ML: at 22:23

## 2017-12-20 RX ADMIN — MELOXICAM 15 MG: 7.5 TABLET ORAL at 17:01

## 2017-12-20 RX ADMIN — IPRATROPIUM BROMIDE AND ALBUTEROL SULFATE 1 AMPULE: .5; 3 SOLUTION RESPIRATORY (INHALATION) at 16:54

## 2017-12-20 RX ADMIN — BUSPIRONE HYDROCHLORIDE 5 MG: 5 TABLET ORAL at 17:05

## 2017-12-20 RX ADMIN — IPRATROPIUM BROMIDE AND ALBUTEROL SULFATE 1 AMPULE: .5; 3 SOLUTION RESPIRATORY (INHALATION) at 10:39

## 2017-12-20 RX ADMIN — IPRATROPIUM BROMIDE AND ALBUTEROL SULFATE 1 AMPULE: .5; 3 SOLUTION RESPIRATORY (INHALATION) at 11:58

## 2017-12-20 RX ADMIN — ROPINIROLE HYDROCHLORIDE 1 MG: 0.5 TABLET, FILM COATED ORAL at 17:05

## 2017-12-20 RX ADMIN — CEFTRIAXONE 1 G: 1 INJECTION, POWDER, FOR SOLUTION INTRAMUSCULAR; INTRAVENOUS at 13:06

## 2017-12-20 RX ADMIN — VANCOMYCIN HYDROCHLORIDE 1250 MG: 1 INJECTION, POWDER, LYOPHILIZED, FOR SOLUTION INTRAVENOUS at 22:23

## 2017-12-20 RX ADMIN — TRAZODONE HYDROCHLORIDE 150 MG: 150 TABLET ORAL at 21:58

## 2017-12-20 RX ADMIN — PRAVASTATIN SODIUM 20 MG: 20 TABLET ORAL at 21:58

## 2017-12-20 RX ADMIN — BUSPIRONE HYDROCHLORIDE 5 MG: 5 TABLET ORAL at 21:58

## 2017-12-20 RX ADMIN — METHYLPREDNISOLONE SODIUM SUCCINATE 125 MG: 125 INJECTION, POWDER, FOR SOLUTION INTRAMUSCULAR; INTRAVENOUS at 10:55

## 2017-12-20 RX ADMIN — ENOXAPARIN SODIUM 40 MG: 40 INJECTION, SOLUTION INTRAVENOUS; SUBCUTANEOUS at 17:06

## 2017-12-20 RX ADMIN — DAPSONE 100 MG: 100 TABLET ORAL at 22:31

## 2017-12-20 RX ADMIN — DULOXETINE HYDROCHLORIDE 60 MG: 60 CAPSULE, DELAYED RELEASE ORAL at 21:58

## 2017-12-20 RX ADMIN — TAZOBACTAM SODIUM AND PIPERACILLIN SODIUM 3.38 G: 375; 3 INJECTION, SOLUTION INTRAVENOUS at 17:06

## 2017-12-20 RX ADMIN — ROPINIROLE HYDROCHLORIDE 1 MG: 0.5 TABLET, FILM COATED ORAL at 21:59

## 2017-12-20 RX ADMIN — SODIUM CHLORIDE: 9 INJECTION, SOLUTION INTRAVENOUS at 17:06

## 2017-12-20 ASSESSMENT — ENCOUNTER SYMPTOMS
WHEEZING: 1
SHORTNESS OF BREATH: 1
SORE THROAT: 0
VOMITING: 0
ABDOMINAL PAIN: 0
EYE PAIN: 0
NAUSEA: 0
CHEST TIGHTNESS: 1

## 2017-12-20 ASSESSMENT — PAIN DESCRIPTION - LOCATION: LOCATION: THROAT

## 2017-12-20 ASSESSMENT — PAIN DESCRIPTION - FREQUENCY: FREQUENCY: CONTINUOUS

## 2017-12-20 ASSESSMENT — PAIN SCALES - GENERAL
PAINLEVEL_OUTOF10: 7
PAINLEVEL_OUTOF10: 3
PAINLEVEL_OUTOF10: 5

## 2017-12-20 ASSESSMENT — PAIN DESCRIPTION - DESCRIPTORS: DESCRIPTORS: BURNING

## 2017-12-20 NOTE — CONSULTS
pain   Nausea  Vomiting  Diarrhea   Other:  :   Dysuria   Frequency  Hematuria  Discharge   Other:  Possible Pregnancy: Yes   No   LMP:   Musculoskeletal:  Back pain  Neck pain  Recent Injury   Skin:  Rash   Itching   Other:  Neurologic:   Headache   Focal weakness   Sensory changes Other:  Endocrine:   Polyuria   Polydipsia   Hair Loss   Other:  Lymphatic:    Swollen glands   Psychiatric:  As per HPI    All other systems negative except as marked or mentioned/indicated in the HPI. Gevena Ron PHYSICAL EXAM:  Vitals:  BP (!) 106/59   Pulse 98   Temp 99.4 °F (37.4 °C) (Oral)   Resp 20   Ht 5' 4\" (1.626 m)   Wt 205 lb 4.8 oz (93.1 kg)   LMP 01/01/1997 (Exact Date) Comment: hysterectomy  SpO2 92%   BMI 35.24 kg/m²    Neuro Exam:   Muscle Strength & Tone:unable to assess pt laying in bed   Gait: unalbe to assess pt laying in bed   Involuntary Movements: No    Mental Status Examination:    Level of consciousness:  awake   Appearance:  hospital attire, lying in bed, fair grooming and fair hygiene  Behavior/Motor:  no abnormalities noted  Attitude toward examiner:  cooperative, attentive and fair  eye contact  Speech:  spontaneous, normal rate and normal volume   Mood: \"just not feeling good physically\"  Affect:  mood congruent  Thought processes:  goal directed and coherent   Thought content:  Preoccupied with follow up with [facility]:  oriented to person, place, and time   Concentration intact  Memory intact  Mini Mental Status not completed   Insight fair   Judgement fair   Fund of Knowledge adequate     DIAGNOSIS:    Unspecified Depressive Disorder with Chronic pain  R/O Bipolar type 2       RECOMMENDATIONS    Risk Management:  routine:  no special precautions necessary    Medications:  Continue current psychotropic medications / multiple meds being used for dual purpose of pain and depression . Will D/C elavil Pt reports not helpful for sleep or pain. Will increase Trazodone. Continue other meds. Follow up with outpt [address] [facility] to make further adjustments. Psychotherapy: pt has scheduled  outpt follow up with [facility]. Pt is on wait list for psychiatrist. Pt appt with counselor on 12-28-17.    Ok to discharge from psych standpoint

## 2017-12-20 NOTE — H&P
bilateral cataract surgery    HYSTERECTOMY      SKIN BIOPSY      SPINAL FUSION      TONSILLECTOMY         Medications Prior to Admission:      Prior to Admission medications    Medication Sig Start Date End Date Taking?  Authorizing Provider   simvastatin (ZOCOR) 40 MG tablet Take 80 mg by mouth nightly   Yes Historical Provider, MD   QUEtiapine (SEROQUEL XR) 50 MG extended release tablet Take 50 mg by mouth nightly   Yes Historical Provider, MD   busPIRone (BUSPAR) 5 MG tablet Take 5 mg by mouth 3 times daily   Yes Historical Provider, MD   amitriptyline (ELAVIL) 50 MG tablet Take 150 mg by mouth nightly   Yes Historical Provider, MD   DULoxetine (CYMBALTA) 60 MG extended release capsule Take 60 mg by mouth 2 times daily   Yes Historical Provider, MD   dapsone 100 MG tablet Take 100 mg by mouth daily   Yes Historical Provider, MD   gabapentin (NEURONTIN) 300 MG capsule Take 300 mg by mouth 2 times daily    Yes Historical Provider, MD   rOPINIRole (REQUIP) 1 MG tablet Take 1 mg by mouth 3 times daily   Yes Historical Provider, MD   traZODone (DESYREL) 100 MG tablet Take 100 mg by mouth nightly   Yes Historical Provider, MD   nicotine (NICODERM CQ) 21 MG/24HR Place 1 patch onto the skin every 24 hours 5/8/17 5/8/18  Carmen Buckley CNP   Probiotic CAPS Take 1 capsule by mouth 3 times daily 5/8/17   Carmen Buckley CNP   acetaminophen (TYLENOL) 650 MG suppository Place 650 mg rectally every 4 hours as needed for Fever    Historical Provider, MD   acetaminophen (TYLENOL) 325 MG tablet Take 650 mg by mouth every 4 hours as needed for Pain    Historical Provider, MD   albuterol (PROVENTIL) (2.5 MG/3ML) 0.083% nebulizer solution Take 2.5 mg by nebulization every 4 hours as needed for Wheezing    Historical Provider, MD   benzonatate (TESSALON) 100 MG capsule Take 100 mg by mouth 3 times daily as needed for Cough    Historical Provider, MD   cycloSPORINE (SANDIMMUNE) 100 MG capsule Take 125 mg by mouth 2 times daily Historical Provider, MD   LORazepam (ATIVAN) 1 MG tablet Take 1 mg by mouth every 6 hours as needed for Anxiety    Historical Provider, MD   melatonin 3 MG TABS tablet Take 9 mg by mouth nightly as needed    Historical Provider, MD   meloxicam (MOBIC) 15 MG tablet Take 15 mg by mouth daily    Historical Provider, MD   pramipexole (MIRAPEX) 1 MG tablet Take 1 mg by mouth every morning    Historical Provider, MD   oxyCODONE-acetaminophen (PERCOCET) 5-325 MG per tablet Take 2 tablets by mouth every 4 hours as needed for Pain . Historical Provider, MD   predniSONE (DELTASONE) 5 MG tablet Take 5 mg by mouth daily    Historical Provider, MD       Allergies:  Amoxicillin-pot clavulanate; Doxycycline monohydrate; and Other    Social History:      The patient currently lives in shelter    TOBACCO:   reports that she has been smoking Cigarettes. She has a 20.00 pack-year smoking history. She has never used smokeless tobacco.  ETOH:   reports that she drinks alcohol. Family History:       Reviewed in detail and negative for DM, CAD, Cancer, CVA. Positive as follows:    History reviewed. No pertinent family history. REVIEW OF SYSTEMS:   Pertinent positives as noted in the HPI. All other systems reviewed and negative. PHYSICAL EXAM:    BP 94/71   Pulse 103   Temp 101 °F (38.3 °C) (Oral)   Resp 20   Ht 5' 4\" (1.626 m)   Wt 180 lb (81.6 kg)   LMP 01/01/1997 (Exact Date) Comment: hysterectomy  SpO2 (!) 84%   BMI 30.90 kg/m²     General appearance:  Appears ill,restless, appears stated age and cooperative. HEENT:  Normal cephalic, atraumatic without obvious deformity. Pupils equal, round, and reactive to light. Extra ocular muscles intact. Conjunctivae/corneas clear. Neck: Supple, with full range of motion. No jugular venous distention. Trachea midline.   Respiratory:  Increased work of breathing, diminished lung sounds  Cardiovascular:  Regular rate and rhythm with normal S1/S2   Abdomen: Soft, non-tender, non-distended with normal bowel sounds. Musculoskeletal:  No clubbing, cyanosis or edema bilaterally. Full range of motion without deformity. Skin: open wounds to LLQ, left inner thigh and right ankle without drainage or odor  Neurologic:  Neurovascularly intact without any focal sensory/motor deficits. Cranial nerves: II-XII intact, grossly non-focal.  Psychiatric:  Alert and oriented, thought content appropriate, normal insight  Capillary Refill: Brisk,< 3 seconds   Peripheral Pulses: +2 palpable, equal bilaterally       Labs:     Recent Labs      12/20/17   1030   WBC  5.0   HGB  11.7*   HCT  35.3*   PLT  200     Recent Labs      12/20/17   1030   NA  137   K  4.4   CL  99   CO2  24   BUN  11   CREATININE  0.77   CALCIUM  8.7     Recent Labs      12/20/17   1030   AST  23   ALT  14   BILITOT  0.6   ALKPHOS  77     No results for input(s): INR in the last 72 hours. No results for input(s): Vida Jean in the last 72 hours. Urinalysis:      Lab Results   Component Value Date    NITRU Negative 11/29/2016    WBCUA 5-10 10/08/2012    BACTERIA 1+ 10/08/2012    RBCUA 0-2 10/08/2012    BLOODU Negative 11/29/2016    SPECGRAV 1.012 11/29/2016    GLUCOSEU Negative 11/29/2016    GLUCOSEU NEG 11/02/2011       Radiology:     CXR: I have reviewed the CXR with the following interpretation: pending  EKG:  I have reviewed the EKG with the following interpretation: pending    XR Chest Portable   Final Result   NO ACUTE ACTIVE CARDIOPULMONARY PROCESS           ASSESSMENT:    Active Hospital Problems    Diagnosis Date Noted    Acute hypoxemic respiratory failure ([facility] [address] [J96.01] 12/20/2017       PLAN:    1. Hypoxia- pulmonology consulted, remain on oxygen as needed  2. COPD- breathing treatments as needed, encourage cough and deep breathing with use of IS  3. Leukemia- consult to Hem/Onc, repeat labs in AM  4. Fever  5. Pyoderma- keeps wounds clean and dry, wound nurse consulted, continue home medications   6.

## 2017-12-20 NOTE — PROGRESS NOTES
[facility] EMERGENCY [facility] Respiratory Therapy Evaluation   Current Order:  Yon Sanchez Q6prn Albuterol MDI    Home Regimen: prn      Ordering Physician: Sabina Reyes  Re-evaluation Date:       Diagnosis: Fever      Patient Status: Stable / Unstable + Physician notified    The following MDI Criteria must be met in order to convert aerosol to MDI with spacer. If unable to meet, MDI will be converted to aerosol:  []  Patient able to demonstrate the ability to use MDI effectively  []  Patient alert and cooperative  []  Patient able to take deep breath with 5-10 second hold  []  Medication(s) available in this delivery method   []  Peak flow greater than or equal to 200 ml/min            Current Order Substituted To  (same drug, same frequency)   Aerosol to MDI [] Albuterol Sulfate 0.083% unit dose by aerosol Albuterol Sulfate MDI 2 puffs by inhalation with spacer    [] Levalbuterol 1.25 mg unit dose by aerosol Levalbuterol MDI 2 puffs by inhalation with spacer    [] Levalbuterol 0.63 mg unit dose by aerosol Levalbuterol MDI 2 puffs by inhalation with spacer    [] Ipratropium Bromide 0.02% unit dose by aerosol Ipratropium Bromide MDI 2 puffs by inhalation with spacer    [] Duoneb (Ipratropium + Albuterol) unit dose by aerosol Ipratropium MDI + Albuterol MDI 2 puffs by inhalation w/spacer   MDI to Aerosol [] Albuterol Sulfate MDI Albuterol Sulfate 0.083% unit dose by aerosol    [] Levalbuterol MDI 2 puffs by inhalation Levalbuterol 1.25 mg unit dose by aerosol    [] Ipratropium Bromide MDI by inhalation Ipratropium Bromide 0.02% unit dose by aerosol    [] Combivent (Ipratropium + Albuterol) MDI by inhalation Duoneb (Ipratropium + Albuterol) unit dose by aerosol   Treatment Assessment [Frequency/Schedule]:  Change frequency to: ___________no changes_______________________________________per Protocol, P&T, MEC      Points 0 1 2 3 4   Pulmonary Status  Non-Smoker  []   Smoking history   < 20 pack years  []   Smoking history  ?  20 pack years  []

## 2017-12-20 NOTE — PROGRESS NOTES
Pharmacy Note  Vancomycin Consult    Ronne Barthel is a 47 y.o. female started on Vancomycin for possible MRSA bacteremias in past due to open wounds; consult received from Dr. Dae Tyler to manage therapy. Also receiving the following antibiotics:ZOSYN    Patient Active Problem List   Diagnosis    Chronic pain    Cocaine use    Sepsis ([address]    Hypotension    Multiple open wounds of lower leg    Pyoderma gangrenosum    Cellulitis of right leg    Anemia    Obesity    Acute hypoxemic respiratory failure (HCC)       Allergies:  Amoxicillin-pot clavulanate; Doxycycline monohydrate; and Other       Recent Labs      12/20/17   1030   BUN  11       Recent Labs      12/20/17   1030   CREATININE  0.77       Recent Labs      12/20/17   1030   WBC  5.0       No intake or output data in the 24 hours ending 12/20/17 1555    Culture Date      Source                       Results  12-20-17               Blood                         In process    Ht Readings from Last 1 Encounters:   12/20/17 5' 4\" (1.626 m)        Wt Readings from Last 1 Encounters:   12/20/17 205 lb 4.8 oz (93.1 kg)         Body mass index is 35.24 kg/m². Estimated Creatinine Clearance: 96 mL/min (based on SCr of 0.77 mg/dL). Assessment/Plan:  Will initiate Vancomycin 1250 mg IV every 12 hours. Timing of trough level will be determined based on culture results, renal function, and clinical response. Please draw trough BEFORE 4th dose. (am dose on 12-22-17)      Thank you for the consult. Will continue to follow.     Jodie Weber [facility]  Staff Pharmacist  12/20/2017  4:02 PM

## 2017-12-20 NOTE — ED PROVIDER NOTES
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    Neuromuscular disorder ([facility] [address]     Osteoarthritis     Pyoderma gangrenosa     Ulcerative colitis ([facility] [address]          SURGICAL HISTORY       Past Surgical History:   Procedure Laterality Date    ABDOMEN SURGERY      sleen repair    APPENDECTOMY      BACK SURGERY      BREAST SURGERY      fatty tumor removed, benign    COLONOSCOPY      COSMETIC SURGERY      tummy tuck    EYE SURGERY      bilateral cataract surgery    HYSTERECTOMY      SKIN BIOPSY      SPINAL FUSION      TONSILLECTOMY           CURRENT MEDICATIONS       Previous Medications    ACETAMINOPHEN (TYLENOL) 325 MG TABLET    Take 650 mg by mouth every 4 hours as needed for Pain    ACETAMINOPHEN (TYLENOL) 650 MG SUPPOSITORY    Place 650 mg rectally every 4 hours as needed for Fever    ALBUTEROL (PROVENTIL) (2.5 MG/3ML) 0.083% NEBULIZER SOLUTION    Take 2.5 mg by nebulization every 4 hours as needed for Wheezing    AMITRIPTYLINE (ELAVIL) 50 MG TABLET    Take 150 mg by mouth nightly    BENZONATATE (TESSALON) 100 MG CAPSULE    Take 100 mg by mouth 3 times daily as needed for Cough    CYCLOSPORINE (SANDIMMUNE) 100 MG CAPSULE    Take 125 mg by mouth 2 times daily    DAPSONE 100 MG TABLET    Take 100 mg by mouth daily    DULOXETINE (CYMBALTA) 60 MG EXTENDED RELEASE CAPSULE    Take 60 mg by mouth 2 times daily    GABAPENTIN (NEURONTIN) 300 MG CAPSULE    Take 300 mg by mouth 3 times daily    LORAZEPAM (ATIVAN) 1 MG TABLET    Take 1 mg by mouth every 6 hours as needed for Anxiety    MELATONIN 3 MG TABS TABLET    Take 9 mg by mouth nightly as needed    MELOXICAM (MOBIC) 15 MG TABLET    Take 15 mg by mouth daily    NICOTINE (NICODERM CQ) 21 MG/24HR    Place 1 patch onto the skin every 24 hours    OXYCODONE-ACETAMINOPHEN (PERCOCET) 5-325 MG PER TABLET    Take 2 tablets by mouth distress. She has wheezes. Abdominal: Soft. Bowel sounds are normal. There is no tenderness. There is no guarding. Musculoskeletal: Normal range of motion. She exhibits no edema or tenderness. Neurological: She is alert and oriented to person, place, and time. No cranial nerve deficit. Skin: Skin is warm and dry. No rash noted. She is not diaphoretic. Psychiatric: She has a normal mood and affect. Her behavior is normal. Judgment and thought content normal.       DIAGNOSTIC RESULTS     EKG: All EKG's are interpreted by the Emergency Department Physician who either signs or Co-signs this chart in the absence of a cardiologist.        RADIOLOGY:   Non-plain film images such as CT, Ultrasound and MRI are read by the radiologist. Plain radiographic images are visualized and preliminarily interpreted by the emergency physician with the below findings:    Chest x-ray shows no acute pulmonary disease    Interpretation per the Radiologist below, if available at the time of this note:    XR Chest Portable    (Results Pending)         ED BEDSIDE ULTRASOUND:   Performed by ED Physician - none    LABS:  Labs Reviewed   CULTURE BLOOD #1   CULTURE BLOOD #2   RAPID INFLUENZA A/B ANTIGENS   COMPREHENSIVE METABOLIC PANEL   CBC WITH AUTO DIFFERENTIAL       All other labs were within normal range or not returned as of this dictation. EMERGENCY DEPARTMENT COURSE and DIFFERENTIAL DIAGNOSIS/MDM:   Vitals:    Vitals:    12/20/17 1023   BP: 117/79   Pulse: 99   Resp: 20   Temp: 102.1 °F (38.9 °C)   TempSrc: Oral   SpO2: (!) 88%   Weight: 180 lb (81.6 kg)   Height: 5' 4\" (1.626 m)       Patient presented with fever chills cough congestion and shortness of breath. She presented like influenza but her influenza swabs were negative. She was hypoxic despite several DuoNeb's and Solu-Medrol. Patient was admitted for acute bronchitis with bronchospasm and hypoxia and flulike illness.     MDM      REASSESSMENT     ED Course CRITICAL CARE TIME   Total Critical Care time was 30 minutes, excluding separately reportable procedures. There was a high probability of clinically significant/life threatening deterioration in the patient's condition which required my urgent intervention. CONSULTS:  None    PROCEDURES:  Unless otherwise noted below, none     Procedures    FINAL IMPRESSION      1. Acute bronchitis, unspecified organism    2. Hypoxia    3. Fever, unspecified fever cause          DISPOSITION/PLAN   DISPOSITION  admit      PATIENT REFERRED TO:  No follow-up provider specified.     DISCHARGE MEDICATIONS:  Current Discharge Medication List             (Please note that portions of this note were completed with a voice recognition program.  Efforts were made to edit the dictations but occasionally words are mis-transcribed.)    Tahmina Dolan DO (electronically signed)  Attending Emergency Physician          Tahmina Dolan DO  12/20/17 [id number]

## 2017-12-20 NOTE — BH NOTE
MH assessment completed. Full note to follow. Dg Unspecified depressive Disorder with chronic pain. Unspecified anxiety Disorder Will D/C Elavil. Increase Trazodone. Continue all other meds. Pt had intake with [facility] yesterday/next appt 12-18-17 they can make further adjustments to meds as needed.  OK to discharge from psych standpoint

## 2017-12-21 ENCOUNTER — APPOINTMENT (OUTPATIENT)
Dept: GENERAL RADIOLOGY | Age: 52
DRG: 189 | End: 2017-12-21
Payer: COMMERCIAL

## 2017-12-21 VITALS
TEMPERATURE: 98.4 F | BODY MASS INDEX: 35.05 KG/M2 | RESPIRATION RATE: 20 BRPM | OXYGEN SATURATION: 93 % | WEIGHT: 205.3 LBS | SYSTOLIC BLOOD PRESSURE: 102 MMHG | DIASTOLIC BLOOD PRESSURE: 50 MMHG | HEIGHT: 64 IN | HEART RATE: 76 BPM

## 2017-12-21 PROBLEM — J96.01 ACUTE HYPOXEMIC RESPIRATORY FAILURE (HCC): Status: RESOLVED | Noted: 2017-12-20 | Resolved: 2017-12-21

## 2017-12-21 LAB
ANION GAP SERPL CALCULATED.3IONS-SCNC: 15 MEQ/L (ref 7–13)
BASOPHILS ABSOLUTE: 0 K/UL (ref 0–0.2)
BASOPHILS RELATIVE PERCENT: 0.6 %
BUN BLDV-MCNC: 15 MG/DL (ref 6–20)
CALCIUM SERPL-MCNC: 8.5 MG/DL (ref 8.6–10.2)
CHLORIDE BLD-SCNC: 102 MEQ/L (ref 98–107)
CO2: 24 MEQ/L (ref 22–29)
CREAT SERPL-MCNC: 0.78 MG/DL (ref 0.5–0.9)
EKG ATRIAL RATE: 69 BPM
EKG P AXIS: 66 DEGREES
EKG P-R INTERVAL: 160 MS
EKG Q-T INTERVAL: 438 MS
EKG QRS DURATION: 96 MS
EKG QTC CALCULATION (BAZETT): 469 MS
EKG R AXIS: 48 DEGREES
EKG T AXIS: 57 DEGREES
EKG VENTRICULAR RATE: 69 BPM
EOSINOPHILS ABSOLUTE: 0 K/UL (ref 0–0.7)
EOSINOPHILS RELATIVE PERCENT: 0 %
GFR AFRICAN AMERICAN: >60
GFR NON-AFRICAN AMERICAN: >60
GLUCOSE BLD-MCNC: 124 MG/DL (ref 74–109)
HCT VFR BLD CALC: 32 % (ref 37–47)
HEMOGLOBIN: 10.7 G/DL (ref 12–16)
LYMPHOCYTES ABSOLUTE: 1.4 K/UL (ref 1–4.8)
LYMPHOCYTES RELATIVE PERCENT: 30 %
MAGNESIUM: 2.4 MG/DL (ref 1.7–2.3)
MCH RBC QN AUTO: 30.4 PG (ref 27–31.3)
MCHC RBC AUTO-ENTMCNC: 33.3 % (ref 33–37)
MCV RBC AUTO: 91.4 FL (ref 82–100)
MONOCYTES ABSOLUTE: 0.6 K/UL (ref 0.2–0.8)
MONOCYTES RELATIVE PERCENT: 12.5 %
NEUTROPHILS ABSOLUTE: 2.7 K/UL (ref 1.4–6.5)
NEUTROPHILS RELATIVE PERCENT: 56.9 %
PDW BLD-RTO: 17.4 % (ref 11.5–14.5)
PLATELET # BLD: 190 K/UL (ref 130–400)
POTASSIUM SERPL-SCNC: 4 MEQ/L (ref 3.5–5.1)
RBC # BLD: 3.5 M/UL (ref 4.2–5.4)
SODIUM BLD-SCNC: 141 MEQ/L (ref 132–144)
WBC # BLD: 4.7 K/UL (ref 4.8–10.8)

## 2017-12-21 PROCEDURE — 6370000000 HC RX 637 (ALT 250 FOR IP): Performed by: INTERNAL MEDICINE

## 2017-12-21 PROCEDURE — 85025 COMPLETE CBC W/AUTO DIFF WBC: CPT

## 2017-12-21 PROCEDURE — 6360000002 HC RX W HCPCS: Performed by: NURSE PRACTITIONER

## 2017-12-21 PROCEDURE — 36415 COLL VENOUS BLD VENIPUNCTURE: CPT

## 2017-12-21 PROCEDURE — 6370000000 HC RX 637 (ALT 250 FOR IP): Performed by: NURSE PRACTITIONER

## 2017-12-21 PROCEDURE — 99232 SBSQ HOSP IP/OBS MODERATE 35: CPT | Performed by: INTERNAL MEDICINE

## 2017-12-21 PROCEDURE — 2580000003 HC RX 258: Performed by: NURSE PRACTITIONER

## 2017-12-21 PROCEDURE — 71020 XR CHEST STANDARD TWO VW: CPT

## 2017-12-21 PROCEDURE — 94640 AIRWAY INHALATION TREATMENT: CPT

## 2017-12-21 PROCEDURE — 87075 CULTR BACTERIA EXCEPT BLOOD: CPT

## 2017-12-21 PROCEDURE — 94760 N-INVAS EAR/PLS OXIMETRY 1: CPT

## 2017-12-21 PROCEDURE — 83735 ASSAY OF MAGNESIUM: CPT

## 2017-12-21 PROCEDURE — 87070 CULTURE OTHR SPECIMN AEROBIC: CPT

## 2017-12-21 PROCEDURE — 99214 OFFICE O/P EST MOD 30 MIN: CPT

## 2017-12-21 PROCEDURE — 6360000002 HC RX W HCPCS: Performed by: INTERNAL MEDICINE

## 2017-12-21 PROCEDURE — 87205 SMEAR GRAM STAIN: CPT

## 2017-12-21 PROCEDURE — 93005 ELECTROCARDIOGRAM TRACING: CPT

## 2017-12-21 PROCEDURE — 80048 BASIC METABOLIC PNL TOTAL CA: CPT

## 2017-12-21 RX ORDER — BENZONATATE 100 MG/1
200 CAPSULE ORAL 3 TIMES DAILY PRN
Status: DISCONTINUED | OUTPATIENT
Start: 2017-12-21 | End: 2017-12-21 | Stop reason: HOSPADM

## 2017-12-21 RX ADMIN — ROPINIROLE HYDROCHLORIDE 1 MG: 0.5 TABLET, FILM COATED ORAL at 14:26

## 2017-12-21 RX ADMIN — SODIUM CHLORIDE: 9 INJECTION, SOLUTION INTRAVENOUS at 09:12

## 2017-12-21 RX ADMIN — VANCOMYCIN HYDROCHLORIDE 1250 MG: 1 INJECTION, POWDER, LYOPHILIZED, FOR SOLUTION INTRAVENOUS at 09:11

## 2017-12-21 RX ADMIN — IPRATROPIUM BROMIDE AND ALBUTEROL SULFATE 1 AMPULE: .5; 3 SOLUTION RESPIRATORY (INHALATION) at 15:20

## 2017-12-21 RX ADMIN — GABAPENTIN 300 MG: 300 CAPSULE ORAL at 09:13

## 2017-12-21 RX ADMIN — ENOXAPARIN SODIUM 40 MG: 40 INJECTION, SOLUTION INTRAVENOUS; SUBCUTANEOUS at 09:12

## 2017-12-21 RX ADMIN — TAZOBACTAM SODIUM AND PIPERACILLIN SODIUM 3.38 G: 375; 3 INJECTION, SOLUTION INTRAVENOUS at 09:11

## 2017-12-21 RX ADMIN — Medication 10 ML: at 09:15

## 2017-12-21 RX ADMIN — MELOXICAM 15 MG: 7.5 TABLET ORAL at 09:13

## 2017-12-21 RX ADMIN — ROPINIROLE HYDROCHLORIDE 1 MG: 0.5 TABLET, FILM COATED ORAL at 09:12

## 2017-12-21 RX ADMIN — ACETAMINOPHEN 650 MG: 325 TABLET, FILM COATED ORAL at 03:40

## 2017-12-21 RX ADMIN — BENZONATATE 200 MG: 100 CAPSULE ORAL at 14:27

## 2017-12-21 RX ADMIN — BUSPIRONE HYDROCHLORIDE 5 MG: 5 TABLET ORAL at 14:27

## 2017-12-21 RX ADMIN — DULOXETINE HYDROCHLORIDE 60 MG: 60 CAPSULE, DELAYED RELEASE ORAL at 09:13

## 2017-12-21 RX ADMIN — BUSPIRONE HYDROCHLORIDE 5 MG: 5 TABLET ORAL at 09:14

## 2017-12-21 RX ADMIN — TAZOBACTAM SODIUM AND PIPERACILLIN SODIUM 3.38 G: 375; 3 INJECTION, SOLUTION INTRAVENOUS at 00:30

## 2017-12-21 RX ADMIN — CYCLOSPORINE 75 MG: 25 CAPSULE, LIQUID FILLED ORAL at 09:15

## 2017-12-21 RX ADMIN — DAPSONE 100 MG: 100 TABLET ORAL at 09:13

## 2017-12-21 ASSESSMENT — PAIN SCALES - GENERAL
PAINLEVEL_OUTOF10: 7
PAINLEVEL_OUTOF10: 5

## 2017-12-21 NOTE — PROGRESS NOTES
Wound Ostomy Continence Nurse  Consult Note       NAME:  Suzie Holt RECORD NUMBER:  [id number]  AGE: 47 y.o. GENDER: female  : 1965  TODAY'S DATE:  2017    Subjective   Reason for [address] Nurse Evaluation and Assessment: multiple wounds - pyoderma gangrenosum      Yamel Wilkerson is a 47 y.o. female referred by:   [x] Physician  [] Nursing  [] Other:     Wound Identification:  Wound Type: pyoderma  Contributing Factors: History Ulcerative Colitis    Wound History: Patient has had significant history with wounds related to pyoderma gangrenosum. Previous treated by Dr. Oneil Novak. Multiple areas of healed/scar tissue from previous pyoderma wounds. Patient states wounds just gradually appear and open up - exquisite pain associated with wounds  Current Wound Care Treatment:  Due to complexity of wounds associated with pyoderma and difficulty healing - recommending Consultation to Dr. Oneil Novak for evaluation. Also, concerns for right posterior distal leg wound - pain, induration, purulent drainage, and edema. Sera SANTOS aware and to notify attending of recommendation. Silvercel dressing change ordered for now, until seen by Dr. Oneil Novak.     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    Neuromuscular disorder ([facility] [address]     Osteoarthritis     Pyoderma gangrenosa     Ulcerative colitis ([facility] [address]        PAST SURGICAL HISTORY    Past Surgical History:   Procedure Laterality Date    ABDOMEN SURGERY      sleen repair    APPENDECTOMY      BACK SURGERY      BREAST SURGERY (REQUIP) 1 MG tablet Take 1 mg by mouth nightly       traZODone (DESYREL) 100 MG tablet Take 100 mg by mouth nightly         Objective    BP (!) 102/50   Pulse 76   Temp 98.4 °F (36.9 °C) (Oral)   Resp 20   Ht 5' 4\" (1.626 m)   Wt 205 lb 4.8 oz (93.1 kg)   LMP 01/01/1997 (Exact Date) Comment: hysterectomy  SpO2 93%   BMI 35.24 kg/m²     LABS:  WBC:    Lab Results   Component Value Date    WBC 4.7 12/21/2017     H/H:    Lab Results   Component Value Date    HGB 10.7 12/21/2017    HCT 32.0 12/21/2017     PTT:  No results found for: APTT, PTT[APTT}  PT/INR:  No results found for: PROTIME, INR  HgBA1c:  No results found for: LABA1C    Assessment   Skinny Risk Score: Skinny Scale Score: 17    Patient Active Problem List   Diagnosis    Chronic pain    Cocaine use    Sepsis (HCC)    Hypotension    Multiple open wounds of lower leg    Pyoderma gangrenosum    Cellulitis of right leg    Anemia    Obesity    COPD exacerbation (HCC)       Measurements:  Wound 12/20/17 Other (Comment) Leg Right;Distal;Posterior Pyoderma Gangrenosum (Active)   Wound Type Wound 12/21/2017  4:15 PM   Wound Other 12/21/2017  4:15 PM   Wound Length (cm) 2 cm 12/21/2017  4:15 PM   Wound Width (cm) 2 cm 12/21/2017  4:15 PM   Wound Depth (cm)  1.2 12/21/2017  4:15 PM   Calculated Wound Size (cm^2) (l*w) 4 cm^2 12/21/2017  4:15 PM   Wound Assessment Drainage;Painful;Edema;Pink;Yellow 12/21/2017  4:15 PM   Drainage Amount Moderate 12/21/2017  4:15 PM   Drainage Description Purulent;Tan;Green 12/21/2017  4:15 PM   Odor None 12/21/2017  4:15 PM   Kanchan-wound Assessment Painful; Induration;Edema 12/21/2017  4:15 PM   [place]%Wound Bed 60 12/21/2017  4:15 PM   Yellow%Wound Bed 40 12/21/2017  4:15 PM   Number of days: 1       Wound 12/20/17 Other (Comment) Thigh Left; Inner Pyoderma Gangrenosum (Active)   Wound Type Wound 12/21/2017  4:15 PM   Wound Other 12/21/2017  4:15 PM   Wound Length (cm) 0.5 cm 12/21/2017  4:15 PM   Wound Width (cm) 0.5 cm 12/21/2017  4:15 PM   Wound Depth (cm)  0.3 12/21/2017  4:15 PM   Calculated Wound Size (cm^2) (l*w) 0.25 cm^2 12/21/2017  4:15 PM   Wound Assessment Clean;Painful;Red;Drainage 12/21/2017  4:15 PM   Drainage Amount Small 12/21/2017  4:15 PM   Drainage Description Serous 12/21/2017  4:15 PM   Odor None 12/21/2017  4:15 PM   Kanchan-wound Assessment Dry; Intact 12/21/2017  4:15 PM   Red%Wound Bed 100 12/21/2017  4:15 PM   Number of days: 1       Wound 12/20/17 Other (Comment) Abdomen Left; Lower;Quadrant Pyoderma Gangrenosum (Active)   Wound Type Wound 12/21/2017  4:15 PM   Wound Other 12/21/2017  4:15 PM   Wound Length (cm) 2 cm 12/21/2017  4:15 PM   Wound Width (cm) 1.5 cm 12/21/2017  4:15 PM   Wound Depth (cm)  0.1 12/21/2017  4:15 PM   Calculated Wound Size (cm^2) (l*w) 3 cm^2 12/21/2017  4:15 PM   Wound Assessment Red;Drainage;Painful 12/21/2017  4:15 PM   Drainage Amount Moderate 12/21/2017  4:15 PM   Drainage Description Brown;Green;Purulent 12/21/2017  4:15 PM   Odor Mild 12/21/2017  4:15 PM   [place]-wound Assessment Painful; Intact 12/21/2017  4:15 PM   [place]%Wound Bed 100 12/21/2017  4:15 PM   Number of days: 1       Assessment:    Patient has had significant history with wounds related to pyoderma gangrenosum. Previous treated by Dr. La Paiz. Multiple areas of healed/scar tissue from previous pyoderma wounds. Patient states wounds just gradually appear and open up - exquisite pain associated with wounds. Wounds loacted to the right leg, distal posterior aspect is patient's most concern - full thickness, affects how patient walks, thick tan/green purluent drainage present, some yellow fibrin slough in base. Wound to the LLQ of abdomen appears clean but moderate amount of brown purulent drainage coming from wound. Left innder thigh wound also appears clean at this time. Due to complexity of wounds associated with pyoderma and difficulty healing - recommending Consultation to Dr. La Paiz for evaluation.  Also, concerns

## 2017-12-21 NOTE — FLOWSHEET NOTE
Patient called me to her room. I got there and patient was fully dressed with her IV out and  Pt stated \" I am leaving right now. \"I asked her why she wanted to leave and she said \" I just want to\"  I told the patient that we are trying to help her. Patient stated that she doesn't use cocaine. I told her she wasd leaving against medical advice. Patient took off down the [place] and left with me trying to persuade her to return. Patient refused to sign the AMA form.

## 2017-12-21 NOTE — PROGRESS NOTES
INPATIENT PROGRESS NOTES    PATIENT NAME: Cheyenne Sinha  MRN: [id number]  SERVICE DATE:  December 21, 2017   SERVICE TIME:  12:05 PM      PRIMARY SERVICE:  Pulmonary Medicine     INTERVAL HPI: Patient seen and examined at bedside, Interval Notes, orders reviewed. Nursing notes noted: Patient reports some improvement in her respiratory status today. She is receiving IV Zosyn for pyoderma gangrenosum. Patient is currently on 4 L of oxygen via nasal cannula and is at 94% SPO2. Patient reports that she is chronically on oxygen at home however recently she's been living in a homeless shelter and they will not allow her to have oxygen there which is why she ended up in the hospital.  Patient reports persistent coughing but difficulty expectorating sputum. Repeat chest x-ray from today showed no acute infiltrates with just a a atelectasis. Patient reports chronic pain from fibromyalgia. Patient denies any chest pain, leg swelling, abdominal pain, nausea fever and chills. Review of Systems  Please see HPI above. OBJECTIVE    Body mass index is 35.24 kg/m². PHYSICAL EXAM:  Vitals:  BP (!) 96/53   Pulse 69   Temp 97.7 °F (36.5 °C)   Resp 20   Ht 5' 4\" (1.626 m)   Wt 205 lb 4.8 oz (93.1 kg)   LMP 01/01/1997 (Exact Date) Comment: hysterectomy  SpO2 94%   BMI 35.24 kg/m²   General: Patient is comfortable in bed, No distress. Head: Atraumatic , Normocephalic   Eyes: PERRL. No sclera icterus. No conjunctival injection. No discharge   ENT: No nasal  discharge. Pharynx clear. Neck:  Trachea midline. No thyromegaly, no JVD, No cervical adenopathy. Resp : Diminished breath sounds bilaterally with few scattered rhonchi but otherwise clear to auscultation  CV: Normal  rate. Regular rhythm. No mumur ,  Rub or gallop  ABD: Non-tender. Non-distended. No masses. No organmegaly. Normal bowel sounds. No hernia.   EXT: No Pitting, No Cyanosis No clubbing  Neuro: no focal weakness  Skin: Pyoderma gangrenosa    DATA: Recent Labs      12/20/17   1030  12/21/17   0550   WBC  5.0  4.7*   HGB  11.7*  10.7*   HCT  35.3*  32.0*   MCV  90.7  91.4   PLT  200  190     Recent Labs      12/20/17   1030  12/21/17   0550   NA  137  141   K  4.4  4.0   CL  99  102   CO2  24  24   BUN  11  15   CREATININE  0.77  0.78   GLUCOSE  94  124*   CALCIUM  8.7  8.5*   PROT  6.6   --    LABALBU  3.9   --    BILITOT  0.6   --    ALKPHOS  77   --    AST  23   --    ALT  14   --    LABGLOM  >60.0  >60.0   GFRAA  >60.0  >60.0   GLOB  2.7   --          Recent Labs      12/20/17   1326   PHART  7.402   IXH1IPB  40   PO2ART  85*   NFA7MNZ  25.0   BEART  0   [id number]  96*     O2 Device: None (Room air)  O2 Flow Rate (L/min): 4 L/min  Lab Results   Component Value Date    LACTA 1.7 12/20/2017        MEDICATIONS during current hospitalization:    Continuous Infusions:   sodium chloride 75 mL/hr at 12/21/17 9796       Scheduled Meds:   sodium chloride flush  10 mL Intravenous 2 times per day    enoxaparin  40 mg Subcutaneous Daily    busPIRone  5 mg Oral TID    dapsone  100 mg Oral Daily    DULoxetine  60 mg Oral BID    gabapentin  300 mg Oral BID    meloxicam  15 mg Oral Daily    nicotine  1 patch Transdermal Q24H    QUEtiapine  50 mg Oral Nightly    rOPINIRole  1 mg Oral TID    cycloSPORINE  75 mg Oral BID    pravastatin  20 mg Oral Nightly    piperacillin-tazobactam  3.375 g Intravenous Q8H    vancomycin  1,250 mg Intravenous Q12H    vancomycin (VANCOCIN) intermittent dosing (placeholder)   Other RX Placeholder    traZODone  150 mg Oral Nightly       PRN Meds:benzonatate, sodium chloride flush, acetaminophen, magnesium hydroxide, ondansetron, albuterol sulfate HFA, ipratropium-albuterol    Radiology  Xr Chest Standard (2 Vw)    Result Date: 12/21/2017  EXAMINATION: XR CHEST STANDARD TWO VW  CLINICAL HISTORY: Cough, congestion, fever COMPARISONS: December 20, 2017  FINDINGS: Two views of the chest are submitted.   The cardiac silhouette is of normal size configuration. The mediastinum is unremarkable. Pulmonary vascular unremarkable. Right sided trachea. Small area of atelectasis left lower lobe No focal infiltrates. No effusions. No Pneumothoraces. NO ACUTE ACTIVE CARDIOPULMONARY PROCESS    Xr Chest Portable    Result Date: 12/20/2017  EXAMINATION: CHEST PORTABLE VIEW  CLINICAL HISTORY: Fever COMPARISONS: April 30, 2017  FINDINGS: Single  views of the chest is submitted. The cardiac silhouette is of normal size configuration. The mediastinum is unremarkable. Pulmonary vascular unremarkable. Right sided trachea. No focal infiltrates. No Pneumothoraces. NO ACUTE ACTIVE CARDIOPULMONARY PROCESS     ASSESSMENT /Plan:  1. Acute viral illness with negative influenza screen  2. Superimposed bacterial pneumonia or pneumonitis cannot totally be excluded, repeat chest x-ray unremarkable  3. Underlying asthma with COPD with acute exacerbation due to to underlying illness  4. History of leukemia  5. Immunosuppression  6. Pyoderma gangrenosa    Patient is currently oxygenating well on 4 L via nasal cannula she is at 94% SPO2. She is receiving IV Zosyn as well as IV steroids and bronchodilator nebulizer treatments. Patient is slowly improving. Continue current treatment plan we will continue to follow.     Electronically signed by Marthann Essex, PA-C on 12/21/2017 at 12:05 PM

## 2017-12-21 NOTE — PROGRESS NOTES
Hospitalist Progress Note      PCP: No primary care provider on file. Date of Admission: 12/20/2017    Interval HPI: patient states feeling a little better today. She states still having midsternal Cp, but only with coughing. She is still having drainage from open wounds to right back ankle and LLQ abdomen. Patient had to be reminded again to keep her oxygen on at all times. Denies SOB/N/V/D/fever/chills    Subjective: :Constitutional: No fever, chills, weakness, otherwise negative  Eyes: No eye pain or redness, otherwise negative  Ears, nose, mouth, throat, and face: No ear ache, no nose bleed no sore throat otherwise negative  Respiratory: No cough, sob, hemoptysis, otherwise  negative  Cardiovascular: No chest pain, palpitation, otherwise negative  Gastrointestinal: No nausea, vomiting diarrhea otherwise negative  Genitourinary:No hematuria, no dysuria, no frequency otherwise negative  Integument/breast: No pain, discomfort, redness otherwise negative  Hematologic/lymphatic: No bleed, lymph node swelling, petechia otherwise negative  Musculoskeletal:No back, muscle or joint pain swelling, otherwise negative  Neurological: No headache, seizure, loss of consciousness otherwise negative  Behavioral/Psych: No depression, suicidal or homicidal ideation otherwise negative  Endocrine: No thyroid pain, warmth or tenderness.  No wt loss otherwise negative  Allergic/Immunologic: No sneezing, itching or rash otherwise negative        Medications:  Reviewed    Infusion Medications    sodium chloride 75 mL/hr at 12/21/17 0912     Scheduled Medications    sodium chloride flush  10 mL Intravenous 2 times per day    enoxaparin  40 mg Subcutaneous Daily    busPIRone  5 mg Oral TID    dapsone  100 mg Oral Daily    DULoxetine  60 mg Oral BID    gabapentin  300 mg Oral BID    meloxicam  15 mg Oral Daily    nicotine  1 patch Transdermal Q24H    QUEtiapine  50 mg Oral Nightly    rOPINIRole  1 mg Oral TID    cycloSPORINE  75 mg Oral BID    pravastatin  20 mg Oral Nightly    piperacillin-tazobactam  3.375 g Intravenous Q8H    vancomycin  1,250 mg Intravenous Q12H    vancomycin (VANCOCIN) intermittent dosing (placeholder)   Other RX Placeholder    traZODone  150 mg Oral Nightly     PRN Meds: benzonatate, sodium chloride flush, acetaminophen, magnesium hydroxide, ondansetron, albuterol sulfate HFA, ipratropium-albuterol      Intake/Output Summary (Last 24 hours) at 12/21/17 1142  Last data filed at 12/21/17 6619   Gross per 24 hour   Intake              200 ml   Output                0 ml   Net              200 ml       Exam:    BP (!) 96/53   Pulse 63   Temp 97.7 °F (36.5 °C)   Resp 20   Ht 5' 4\" (1.626 m)   Wt 205 lb 4.8 oz (93.1 kg)   LMP 01/01/1997 (Exact Date) Comment: hysterectomy  SpO2 94%   BMI 35.24 kg/m²     General appearance: No apparent distress, appears stated age and cooperative. HEENT: Pupils equal, round, and reactive to light. Conjunctivae/corneas clear. Neck: Supple, with full range of motion. No jugular venous distention. Trachea midline. Respiratory:  Normal respiratory effort. Clear to auscultation, bilaterally   Cardiovascular: Regular rate and rhythm with normal S1/S2   Abdomen: Soft, non-tender, non-distended with normal bowel sounds. Musculoskeletal: No clubbing, cyanosis or edema bilaterally. Full range of motion without deformity. Skin: open wounds to right posterior ankle and LLQ abdomen with minimal drainage, no odor. Neuro: Non Focal. Symetrical motor and tone. Nl Comprehension, Alert,awake and oriented. NL CN. Symetrical tone and reflexes.   Psychiatric: Alert and oriented, thought content appropriate, normal insight  Capillary Refill: Brisk,< 3 seconds   Peripheral Pulses: +2 palpable, equal bilaterally       Labs:   Recent Labs      12/20/17   1030  12/21/17   0550   WBC  5.0  4.7*   HGB  11.7*  10.7*   HCT  35.3*  32.0*   PLT  200  190     Recent Labs      12/20/17 1030  12/21/17   0550   NA  137  141   K  4.4  4.0   CL  99  102   CO2  24  24   BUN  11  15   CREATININE  0.77  0.78   CALCIUM  8.7  8.5*     Recent Labs      12/20/17   1030   AST  23   ALT  14   BILITOT  0.6   ALKPHOS  77     No results for input(s): INR in the last 72 hours. No results for input(s): Sagrario Bickers in the last 72 hours. Urinalysis:    Lab Results   Component Value Date    NITRU Negative 12/20/2017    WBCUA 5-10 10/08/2012    BACTERIA 1+ 10/08/2012    RBCUA 0-2 10/08/2012    BLOODU Negative 12/20/2017    SPECGRAV 1.010 12/20/2017    GLUCOSEU 500 12/20/2017    GLUCOSEU NEG 11/02/2011       Radiology:  XR CHEST STANDARD (2 VW)   Final Result   NO ACUTE ACTIVE CARDIOPULMONARY PROCESS      XR Chest Portable   Final Result   NO ACUTE ACTIVE CARDIOPULMONARY PROCESS               Assessment/Plan:    Active Hospital Problems    Diagnosis Date Noted    Acute hypoxemic respiratory failure (HCC) [J96.01] 12/20/2017    Pyoderma gangrenosum [L88] 04/28/2017    Cocaine use [F14.10] 03/24/2015     1. Acute hypoxia with respiratory failure- continuous O2 use, pulmonology following, breathing treatments as needed, encourage cough and deep breathing exercises with IS use, Tessalon Perles ordered. 2. Pyoderma gangrenosum- continue IV vanc and zosyn, PLEASE OBTAIN WOUND CULTURE, keep areas clean and dry with dressing changes daily or as needed. 3.  Depression- psych consulted and cleared, medications adjusted. Additional work up or/and treatment plan may be added today or then after based on clinical progression. I am managing a portion of pt care. Some medical issues are handled by other specialists. Additional work up and treatment should be done in out pt setting by pt PCP and other out pt providers. In addition to examining and evaluating pt, I spent additional time explaining care, normal and abnormal findings, and treatment plan. All of pt questions were answered.  Counseling, diet and

## 2017-12-21 NOTE — PROGRESS NOTES
Physical Therapy   [id number]    NAME: Lilo Moffett    : 1965 (47 y.o.)  MRN: [id number]    Account: [de-identified]  Gender: female    PT evaluation and treatment orders received. Chart reviewed. PT eval attempted. Patient unavailable: off unit at radiology    Will attempt PT evaluation again at earliest convenience.         Electronically signed by Nkechi York PT on 17 at 8:37 AM Stress testing completed. Report to follow. Patient armband removed and given to patient to take home.   Patient was informed of the privacy risks if armband lost or stolen

## 2017-12-22 ENCOUNTER — APPOINTMENT (OUTPATIENT)
Dept: GENERAL RADIOLOGY | Age: 52
DRG: 871 | End: 2017-12-22
Payer: COMMERCIAL

## 2017-12-22 ENCOUNTER — HOSPITAL ENCOUNTER (INPATIENT)
Age: 52
LOS: 2 days | Discharge: HOME | DRG: 871 | End: 2017-12-24
Attending: STUDENT IN AN ORGANIZED HEALTH CARE EDUCATION/TRAINING PROGRAM | Admitting: INTERNAL MEDICINE
Payer: COMMERCIAL

## 2017-12-22 DIAGNOSIS — A41.9 SEPSIS, DUE TO UNSPECIFIED ORGANISM: ICD-10-CM

## 2017-12-22 DIAGNOSIS — R50.9 ACUTE FEBRILE ILLNESS: ICD-10-CM

## 2017-12-22 DIAGNOSIS — J18.9 PNEUMONIA DUE TO ORGANISM: Primary | ICD-10-CM

## 2017-12-22 DIAGNOSIS — J11.00 INFLUENZA AND PNEUMONIA: ICD-10-CM

## 2017-12-22 DIAGNOSIS — E86.0 DEHYDRATION: ICD-10-CM

## 2017-12-22 DIAGNOSIS — J44.1 ACUTE EXACERBATION OF CHRONIC OBSTRUCTIVE PULMONARY DISEASE (COPD) (HCC): ICD-10-CM

## 2017-12-22 DIAGNOSIS — E78.5 DYSLIPIDEMIA (HIGH LDL; LOW HDL): ICD-10-CM

## 2017-12-22 DIAGNOSIS — E66.9 OBESITY (BMI 30-39.9): ICD-10-CM

## 2017-12-22 DIAGNOSIS — R79.82 ELEVATED C-REACTIVE PROTEIN (CRP): ICD-10-CM

## 2017-12-22 DIAGNOSIS — F17.200 TOBACCO DEPENDENCE: ICD-10-CM

## 2017-12-22 DIAGNOSIS — R09.02 HYPOXIA: ICD-10-CM

## 2017-12-22 PROBLEM — A41.3 SEPSIS DUE TO HAEMOPHILUS INFLUENZAE (HCC): Status: ACTIVE | Noted: 2017-12-22

## 2017-12-22 PROBLEM — A41.89 VIRAL SEPSIS (HCC): Status: ACTIVE | Noted: 2017-04-27

## 2017-12-22 PROBLEM — M25.571 RIGHT ANKLE PAIN: Status: ACTIVE | Noted: 2017-08-17

## 2017-12-22 PROBLEM — B97.89 VIRAL SEPSIS (HCC): Status: ACTIVE | Noted: 2017-04-27

## 2017-12-22 PROBLEM — M79.7 FIBROMYALGIA: Status: ACTIVE | Noted: 2017-04-04

## 2017-12-22 PROBLEM — J10.1 INFLUENZA A WITH RESPIRATORY MANIFESTATIONS: Status: ACTIVE | Noted: 2017-12-22

## 2017-12-22 PROBLEM — L03.012 CELLULITIS OF LEFT THUMB: Status: ACTIVE | Noted: 2017-08-19

## 2017-12-22 LAB
ALBUMIN SERPL-MCNC: 3.7 G/DL (ref 3.9–4.9)
ALBUMIN SERPL-MCNC: 3.8 G/DL (ref 3.9–4.9)
ALP BLD-CCNC: 62 U/L (ref 40–130)
ALP BLD-CCNC: 64 U/L (ref 40–130)
ALT SERPL-CCNC: 14 U/L (ref 0–33)
ALT SERPL-CCNC: 15 U/L (ref 0–33)
ANION GAP SERPL CALCULATED.3IONS-SCNC: 13 MEQ/L (ref 7–13)
ANION GAP SERPL CALCULATED.3IONS-SCNC: 16 MEQ/L (ref 7–13)
ANISOCYTOSIS: ABNORMAL
APTT: 26.8 SEC (ref 21.6–35.4)
AST SERPL-CCNC: 20 U/L (ref 0–35)
AST SERPL-CCNC: 21 U/L (ref 0–35)
BASOPHILS ABSOLUTE: 0 K/UL (ref 0–0.2)
BASOPHILS RELATIVE PERCENT: 0.8 %
BILIRUB SERPL-MCNC: 0.3 MG/DL (ref 0–1.2)
BILIRUB SERPL-MCNC: 0.5 MG/DL (ref 0–1.2)
BILIRUBIN URINE: NEGATIVE
BLOOD, URINE: NEGATIVE
BUN BLDV-MCNC: 10 MG/DL (ref 6–20)
BUN BLDV-MCNC: 9 MG/DL (ref 6–20)
C-REACTIVE PROTEIN, HIGH SENSITIVITY: 50.4 MG/L (ref 0–5)
CALCIUM SERPL-MCNC: 7.7 MG/DL (ref 8.6–10.2)
CALCIUM SERPL-MCNC: 8.6 MG/DL (ref 8.6–10.2)
CHLORIDE BLD-SCNC: 102 MEQ/L (ref 98–107)
CHLORIDE BLD-SCNC: 105 MEQ/L (ref 98–107)
CHOLESTEROL, TOTAL: 224 MG/DL (ref 0–199)
CLARITY: CLEAR
CO2: 20 MEQ/L (ref 22–29)
CO2: 24 MEQ/L (ref 22–29)
COLOR: YELLOW
CREAT SERPL-MCNC: 0.74 MG/DL (ref 0.5–0.9)
CREAT SERPL-MCNC: 0.95 MG/DL (ref 0.5–0.9)
EKG ATRIAL RATE: 78 BPM
EKG P AXIS: 63 DEGREES
EKG P-R INTERVAL: 154 MS
EKG Q-T INTERVAL: 452 MS
EKG QRS DURATION: 86 MS
EKG QTC CALCULATION (BAZETT): 515 MS
EKG R AXIS: 46 DEGREES
EKG T AXIS: 81 DEGREES
EKG VENTRICULAR RATE: 78 BPM
EOSINOPHILS ABSOLUTE: 0 K/UL (ref 0–0.7)
EOSINOPHILS RELATIVE PERCENT: 0.3 %
GFR AFRICAN AMERICAN: >60
GFR AFRICAN AMERICAN: >60
GFR NON-AFRICAN AMERICAN: >60
GFR NON-AFRICAN AMERICAN: >60
GLOBULIN: 2.2 G/DL (ref 2.3–3.5)
GLOBULIN: 2.4 G/DL (ref 2.3–3.5)
GLUCOSE BLD-MCNC: 272 MG/DL (ref 74–109)
GLUCOSE BLD-MCNC: 99 MG/DL (ref 74–109)
GLUCOSE URINE: NEGATIVE MG/DL
HCT VFR BLD CALC: 34.8 % (ref 37–47)
HDLC SERPL-MCNC: 35 MG/DL (ref 40–59)
HEMOGLOBIN: 11.5 G/DL (ref 12–16)
HYPOCHROMIA: 0
INR BLD: 0.9
KETONES, URINE: NEGATIVE MG/DL
LACTIC ACID: 1.4 MMOL/L (ref 0.5–2.2)
LDL CHOLESTEROL CALCULATED: 140 MG/DL (ref 0–129)
LEUKOCYTE ESTERASE, URINE: NEGATIVE
LYMPHOCYTES ABSOLUTE: 1.7 K/UL (ref 1–4.8)
LYMPHOCYTES RELATIVE PERCENT: 30 %
MACROCYTES: 0
MAGNESIUM: 1.8 MG/DL (ref 1.7–2.3)
MCH RBC QN AUTO: 30.1 PG (ref 27–31.3)
MCHC RBC AUTO-ENTMCNC: 33.2 % (ref 33–37)
MCV RBC AUTO: 90.8 FL (ref 82–100)
MICROCYTES: 0
MONO TEST: NEGATIVE
MONOCYTES ABSOLUTE: 0.4 K/UL (ref 0.2–0.8)
MONOCYTES RELATIVE PERCENT: 7.3 %
NEUTROPHILS ABSOLUTE: 3.4 K/UL (ref 1.4–6.5)
NEUTROPHILS RELATIVE PERCENT: 61.6 %
NITRITE, URINE: NEGATIVE
PDW BLD-RTO: 16.5 % (ref 11.5–14.5)
PH UA: 5 (ref 5–9)
PLATELET # BLD: 199 K/UL (ref 130–400)
PLATELET SLIDE REVIEW: ADEQUATE
POIKILOCYTES: 0
POLYCHROMASIA: 0
POTASSIUM SERPL-SCNC: 3.8 MEQ/L (ref 3.5–5.1)
POTASSIUM SERPL-SCNC: 4.3 MEQ/L (ref 3.5–5.1)
PRO-BNP: 917 PG/ML
PROTEIN UA: NEGATIVE MG/DL
PROTHROMBIN TIME: 9.8 SEC (ref 8.1–13.7)
RAPID INFLUENZA  B AGN: NEGATIVE
RAPID INFLUENZA A AGN: POSITIVE
RBC # BLD: 3.83 M/UL (ref 4.2–5.4)
S PYO AG THROAT QL: NEGATIVE
SLIDE REVIEW: ABNORMAL
SODIUM BLD-SCNC: 139 MEQ/L (ref 132–144)
SODIUM BLD-SCNC: 141 MEQ/L (ref 132–144)
SPECIFIC GRAVITY UA: 1.01 (ref 1–1.03)
TOTAL CK: 60 U/L (ref 0–170)
TOTAL PROTEIN: 6 G/DL (ref 6.4–8.1)
TOTAL PROTEIN: 6.1 G/DL (ref 6.4–8.1)
TRIGL SERPL-MCNC: 245 MG/DL (ref 0–200)
TROPONIN: <0.01 NG/ML (ref 0–0.01)
TSH SERPL DL<=0.05 MIU/L-ACNC: 4.18 UIU/ML (ref 0.27–4.2)
URINE REFLEX TO CULTURE: NORMAL
UROBILINOGEN, URINE: 0.2 E.U./DL
WBC # BLD: 5.6 K/UL (ref 4.8–10.8)

## 2017-12-22 PROCEDURE — 82550 ASSAY OF CK (CPK): CPT

## 2017-12-22 PROCEDURE — 94640 AIRWAY INHALATION TREATMENT: CPT

## 2017-12-22 PROCEDURE — 86403 PARTICLE AGGLUT ANTBDY SCRN: CPT

## 2017-12-22 PROCEDURE — 80061 LIPID PANEL: CPT

## 2017-12-22 PROCEDURE — 94664 DEMO&/EVAL PT USE INHALER: CPT

## 2017-12-22 PROCEDURE — 87880 STREP A ASSAY W/OPTIC: CPT

## 2017-12-22 PROCEDURE — 96368 THER/DIAG CONCURRENT INF: CPT

## 2017-12-22 PROCEDURE — 94760 N-INVAS EAR/PLS OXIMETRY 1: CPT

## 2017-12-22 PROCEDURE — 2580000003 HC RX 258: Performed by: STUDENT IN AN ORGANIZED HEALTH CARE EDUCATION/TRAINING PROGRAM

## 2017-12-22 PROCEDURE — 87040 BLOOD CULTURE FOR BACTERIA: CPT

## 2017-12-22 PROCEDURE — 6370000000 HC RX 637 (ALT 250 FOR IP): Performed by: STUDENT IN AN ORGANIZED HEALTH CARE EDUCATION/TRAINING PROGRAM

## 2017-12-22 PROCEDURE — 6360000002 HC RX W HCPCS: Performed by: INTERNAL MEDICINE

## 2017-12-22 PROCEDURE — 85025 COMPLETE CBC W/AUTO DIFF WBC: CPT

## 2017-12-22 PROCEDURE — 6360000002 HC RX W HCPCS: Performed by: STUDENT IN AN ORGANIZED HEALTH CARE EDUCATION/TRAINING PROGRAM

## 2017-12-22 PROCEDURE — 83880 ASSAY OF NATRIURETIC PEPTIDE: CPT

## 2017-12-22 PROCEDURE — 6370000000 HC RX 637 (ALT 250 FOR IP): Performed by: INTERNAL MEDICINE

## 2017-12-22 PROCEDURE — 86141 C-REACTIVE PROTEIN HS: CPT

## 2017-12-22 PROCEDURE — 2580000003 HC RX 258: Performed by: INTERNAL MEDICINE

## 2017-12-22 PROCEDURE — 80053 COMPREHEN METABOLIC PANEL: CPT

## 2017-12-22 PROCEDURE — 87081 CULTURE SCREEN ONLY: CPT

## 2017-12-22 PROCEDURE — 84484 ASSAY OF TROPONIN QUANT: CPT

## 2017-12-22 PROCEDURE — 99232 SBSQ HOSP IP/OBS MODERATE 35: CPT | Performed by: INTERNAL MEDICINE

## 2017-12-22 PROCEDURE — 93005 ELECTROCARDIOGRAM TRACING: CPT

## 2017-12-22 PROCEDURE — 83735 ASSAY OF MAGNESIUM: CPT

## 2017-12-22 PROCEDURE — 71020 XR CHEST STANDARD TWO VW: CPT

## 2017-12-22 PROCEDURE — 85610 PROTHROMBIN TIME: CPT

## 2017-12-22 PROCEDURE — 81003 URINALYSIS AUTO W/O SCOPE: CPT

## 2017-12-22 PROCEDURE — 86308 HETEROPHILE ANTIBODY SCREEN: CPT

## 2017-12-22 PROCEDURE — 96375 TX/PRO/DX INJ NEW DRUG ADDON: CPT

## 2017-12-22 PROCEDURE — 1210000000 HC MED SURG R&B

## 2017-12-22 PROCEDURE — 85730 THROMBOPLASTIN TIME PARTIAL: CPT

## 2017-12-22 PROCEDURE — 84443 ASSAY THYROID STIM HORMONE: CPT

## 2017-12-22 PROCEDURE — 83605 ASSAY OF LACTIC ACID: CPT

## 2017-12-22 PROCEDURE — 99285 EMERGENCY DEPT VISIT HI MDM: CPT

## 2017-12-22 PROCEDURE — 96365 THER/PROPH/DIAG IV INF INIT: CPT

## 2017-12-22 PROCEDURE — 36415 COLL VENOUS BLD VENIPUNCTURE: CPT

## 2017-12-22 RX ORDER — ACETAMINOPHEN 325 MG/1
650 TABLET ORAL EVERY 4 HOURS PRN
Status: DISCONTINUED | OUTPATIENT
Start: 2017-12-22 | End: 2017-12-22

## 2017-12-22 RX ORDER — ALBUTEROL SULFATE 2.5 MG/3ML
2.5 SOLUTION RESPIRATORY (INHALATION)
Status: DISCONTINUED | OUTPATIENT
Start: 2017-12-22 | End: 2017-12-24 | Stop reason: HOSPADM

## 2017-12-22 RX ORDER — OSELTAMIVIR PHOSPHATE 75 MG/1
75 CAPSULE ORAL ONCE
Status: COMPLETED | OUTPATIENT
Start: 2017-12-22 | End: 2017-12-22

## 2017-12-22 RX ORDER — GABAPENTIN 300 MG/1
600 CAPSULE ORAL 3 TIMES DAILY
Status: DISCONTINUED | OUTPATIENT
Start: 2017-12-22 | End: 2017-12-24 | Stop reason: HOSPADM

## 2017-12-22 RX ORDER — SODIUM CHLORIDE 0.9 % (FLUSH) 0.9 %
10 SYRINGE (ML) INJECTION EVERY 12 HOURS SCHEDULED
Status: DISCONTINUED | OUTPATIENT
Start: 2017-12-22 | End: 2017-12-24 | Stop reason: HOSPADM

## 2017-12-22 RX ORDER — 0.9 % SODIUM CHLORIDE 0.9 %
1000 INTRAVENOUS SOLUTION INTRAVENOUS ONCE
Status: COMPLETED | OUTPATIENT
Start: 2017-12-22 | End: 2017-12-22

## 2017-12-22 RX ORDER — ACETAMINOPHEN 325 MG/1
650 TABLET ORAL EVERY 8 HOURS SCHEDULED
Status: DISCONTINUED | OUTPATIENT
Start: 2017-12-22 | End: 2017-12-24 | Stop reason: HOSPADM

## 2017-12-22 RX ORDER — LEVOFLOXACIN 5 MG/ML
750 INJECTION, SOLUTION INTRAVENOUS ONCE
Status: COMPLETED | OUTPATIENT
Start: 2017-12-22 | End: 2017-12-22

## 2017-12-22 RX ORDER — IPRATROPIUM BROMIDE AND ALBUTEROL SULFATE 2.5; .5 MG/3ML; MG/3ML
1 SOLUTION RESPIRATORY (INHALATION)
Status: DISCONTINUED | OUTPATIENT
Start: 2017-12-22 | End: 2017-12-22

## 2017-12-22 RX ORDER — QUETIAPINE FUMARATE 50 MG/1
50 TABLET, EXTENDED RELEASE ORAL NIGHTLY
Status: DISCONTINUED | OUTPATIENT
Start: 2017-12-22 | End: 2017-12-23

## 2017-12-22 RX ORDER — HYDROCODONE BITARTRATE AND ACETAMINOPHEN 5; 325 MG/1; MG/1
2 TABLET ORAL EVERY 4 HOURS PRN
Status: DISCONTINUED | OUTPATIENT
Start: 2017-12-22 | End: 2017-12-22 | Stop reason: ALTCHOICE

## 2017-12-22 RX ORDER — ALBUTEROL SULFATE 2.5 MG/3ML
2.5 SOLUTION RESPIRATORY (INHALATION) ONCE
Status: COMPLETED | OUTPATIENT
Start: 2017-12-22 | End: 2017-12-22

## 2017-12-22 RX ORDER — IPRATROPIUM BROMIDE AND ALBUTEROL SULFATE 2.5; .5 MG/3ML; MG/3ML
1 SOLUTION RESPIRATORY (INHALATION) ONCE
Status: COMPLETED | OUTPATIENT
Start: 2017-12-22 | End: 2017-12-22

## 2017-12-22 RX ORDER — HYDROCODONE BITARTRATE AND ACETAMINOPHEN 5; 325 MG/1; MG/1
1 TABLET ORAL EVERY 4 HOURS PRN
Status: DISCONTINUED | OUTPATIENT
Start: 2017-12-22 | End: 2017-12-22

## 2017-12-22 RX ORDER — OSELTAMIVIR PHOSPHATE 75 MG/1
75 CAPSULE ORAL 2 TIMES DAILY
Status: DISCONTINUED | OUTPATIENT
Start: 2017-12-22 | End: 2017-12-24 | Stop reason: HOSPADM

## 2017-12-22 RX ORDER — LORAZEPAM 0.5 MG/1
0.5 TABLET ORAL EVERY 6 HOURS PRN
Status: DISCONTINUED | OUTPATIENT
Start: 2017-12-22 | End: 2017-12-24 | Stop reason: HOSPADM

## 2017-12-22 RX ORDER — FAMOTIDINE 20 MG/1
20 TABLET, FILM COATED ORAL 2 TIMES DAILY
Status: DISCONTINUED | OUTPATIENT
Start: 2017-12-22 | End: 2017-12-24 | Stop reason: HOSPADM

## 2017-12-22 RX ORDER — MAGNESIUM SULFATE IN WATER 40 MG/ML
4 INJECTION, SOLUTION INTRAVENOUS ONCE
Status: COMPLETED | OUTPATIENT
Start: 2017-12-22 | End: 2017-12-22

## 2017-12-22 RX ORDER — SODIUM CHLORIDE 0.9 % (FLUSH) 0.9 %
10 SYRINGE (ML) INJECTION PRN
Status: DISCONTINUED | OUTPATIENT
Start: 2017-12-22 | End: 2017-12-24 | Stop reason: HOSPADM

## 2017-12-22 RX ORDER — TRAZODONE HYDROCHLORIDE 100 MG/1
100 TABLET ORAL NIGHTLY
Status: DISCONTINUED | OUTPATIENT
Start: 2017-12-22 | End: 2017-12-24 | Stop reason: HOSPADM

## 2017-12-22 RX ORDER — NICOTINE 21 MG/24HR
1 PATCH, TRANSDERMAL 24 HOURS TRANSDERMAL DAILY
Status: DISCONTINUED | OUTPATIENT
Start: 2017-12-22 | End: 2017-12-24 | Stop reason: HOSPADM

## 2017-12-22 RX ORDER — IPRATROPIUM BROMIDE AND ALBUTEROL SULFATE 2.5; .5 MG/3ML; MG/3ML
1 SOLUTION RESPIRATORY (INHALATION) 3 TIMES DAILY
Status: DISCONTINUED | OUTPATIENT
Start: 2017-12-22 | End: 2017-12-24 | Stop reason: HOSPADM

## 2017-12-22 RX ORDER — 0.9 % SODIUM CHLORIDE 0.9 %
1000 INTRAVENOUS SOLUTION INTRAVENOUS ONCE
Status: DISCONTINUED | OUTPATIENT
Start: 2017-12-22 | End: 2017-12-22

## 2017-12-22 RX ORDER — ACETAMINOPHEN 500 MG
1000 TABLET ORAL ONCE
Status: COMPLETED | OUTPATIENT
Start: 2017-12-22 | End: 2017-12-22

## 2017-12-22 RX ORDER — METHYLPREDNISOLONE SODIUM SUCCINATE 125 MG/2ML
125 INJECTION, POWDER, LYOPHILIZED, FOR SOLUTION INTRAMUSCULAR; INTRAVENOUS ONCE
Status: COMPLETED | OUTPATIENT
Start: 2017-12-22 | End: 2017-12-22

## 2017-12-22 RX ORDER — HYDROCODONE BITARTRATE AND ACETAMINOPHEN 5; 325 MG/1; MG/1
1 TABLET ORAL EVERY 4 HOURS PRN
Status: DISCONTINUED | OUTPATIENT
Start: 2017-12-22 | End: 2017-12-24 | Stop reason: HOSPADM

## 2017-12-22 RX ORDER — ACETAMINOPHEN 325 MG/1
325 TABLET ORAL EVERY 8 HOURS PRN
Status: DISCONTINUED | OUTPATIENT
Start: 2017-12-22 | End: 2017-12-24 | Stop reason: HOSPADM

## 2017-12-22 RX ORDER — SODIUM CHLORIDE 9 MG/ML
INJECTION, SOLUTION INTRAVENOUS CONTINUOUS
Status: DISCONTINUED | OUTPATIENT
Start: 2017-12-22 | End: 2017-12-24

## 2017-12-22 RX ADMIN — HYDROCODONE BITARTRATE AND ACETAMINOPHEN 2 TABLET: 5; 325 TABLET ORAL at 13:00

## 2017-12-22 RX ADMIN — ENOXAPARIN SODIUM 40 MG: 40 INJECTION, SOLUTION INTRAVENOUS; SUBCUTANEOUS at 13:00

## 2017-12-22 RX ADMIN — ACETAMINOPHEN 650 MG: 325 TABLET, FILM COATED ORAL at 23:24

## 2017-12-22 RX ADMIN — IPRATROPIUM BROMIDE AND ALBUTEROL SULFATE 1 AMPULE: .5; 3 SOLUTION RESPIRATORY (INHALATION) at 19:47

## 2017-12-22 RX ADMIN — GABAPENTIN 600 MG: 300 CAPSULE ORAL at 23:24

## 2017-12-22 RX ADMIN — Medication 10 ML: at 20:55

## 2017-12-22 RX ADMIN — QUETIAPINE FUMARATE 50 MG: 50 TABLET, FILM COATED, EXTENDED RELEASE ORAL at 23:23

## 2017-12-22 RX ADMIN — MAGNESIUM SULFATE HEPTAHYDRATE 4 G: 40 INJECTION, SOLUTION INTRAVENOUS at 09:11

## 2017-12-22 RX ADMIN — HYDROCODONE BITARTRATE AND ACETAMINOPHEN 1 TABLET: 5; 325 TABLET ORAL at 20:22

## 2017-12-22 RX ADMIN — LEVOFLOXACIN 750 MG: 5 INJECTION, SOLUTION INTRAVENOUS at 09:44

## 2017-12-22 RX ADMIN — FAMOTIDINE 20 MG: 20 TABLET, FILM COATED ORAL at 13:00

## 2017-12-22 RX ADMIN — METHYLPREDNISOLONE SODIUM SUCCINATE 125 MG: 125 INJECTION, POWDER, FOR SOLUTION INTRAMUSCULAR; INTRAVENOUS at 09:10

## 2017-12-22 RX ADMIN — SODIUM CHLORIDE 1000 ML: 9 INJECTION, SOLUTION INTRAVENOUS at 10:27

## 2017-12-22 RX ADMIN — IPRATROPIUM BROMIDE 0.5 MG: 0.5 SOLUTION RESPIRATORY (INHALATION) at 09:14

## 2017-12-22 RX ADMIN — ACETAMINOPHEN 1000 MG: 500 TABLET ORAL at 10:26

## 2017-12-22 RX ADMIN — SODIUM CHLORIDE: 9 INJECTION, SOLUTION INTRAVENOUS at 14:48

## 2017-12-22 RX ADMIN — SODIUM CHLORIDE 1000 ML: 9 INJECTION, SOLUTION INTRAVENOUS at 13:04

## 2017-12-22 RX ADMIN — FAMOTIDINE 20 MG: 20 TABLET, FILM COATED ORAL at 20:21

## 2017-12-22 RX ADMIN — ACETAMINOPHEN 650 MG: 325 TABLET, FILM COATED ORAL at 14:48

## 2017-12-22 RX ADMIN — SODIUM CHLORIDE 1000 ML: 9 INJECTION, SOLUTION INTRAVENOUS at 09:44

## 2017-12-22 RX ADMIN — IPRATROPIUM BROMIDE AND ALBUTEROL SULFATE 1 AMPULE: .5; 3 SOLUTION RESPIRATORY (INHALATION) at 14:59

## 2017-12-22 RX ADMIN — OSELTAMIVIR PHOSPHATE 75 MG: 75 CAPSULE ORAL at 20:22

## 2017-12-22 RX ADMIN — ALBUTEROL SULFATE 2.5 MG: 2.5 SOLUTION RESPIRATORY (INHALATION) at 09:14

## 2017-12-22 RX ADMIN — IPRATROPIUM BROMIDE AND ALBUTEROL SULFATE 1 AMPULE: .5; 3 SOLUTION RESPIRATORY (INHALATION) at 09:14

## 2017-12-22 RX ADMIN — TRAZODONE HYDROCHLORIDE 100 MG: 100 TABLET ORAL at 23:24

## 2017-12-22 RX ADMIN — OSELTAMIVIR PHOSPHATE 75 MG: 75 CAPSULE ORAL at 10:26

## 2017-12-22 ASSESSMENT — PAIN DESCRIPTION - PROGRESSION: CLINICAL_PROGRESSION: NOT CHANGED

## 2017-12-22 ASSESSMENT — PAIN SCALES - GENERAL
PAINLEVEL_OUTOF10: 7
PAINLEVEL_OUTOF10: 8
PAINLEVEL_OUTOF10: 8
PAINLEVEL_OUTOF10: 7
PAINLEVEL_OUTOF10: 8
PAINLEVEL_OUTOF10: 0
PAINLEVEL_OUTOF10: 1
PAINLEVEL_OUTOF10: 8

## 2017-12-22 ASSESSMENT — ENCOUNTER SYMPTOMS
WHEEZING: 1
CHEST TIGHTNESS: 0
COUGH: 1
DIARRHEA: 0
VOMITING: 0
BACK PAIN: 0
TROUBLE SWALLOWING: 0
SHORTNESS OF BREATH: 1
ABDOMINAL PAIN: 0
SINUS PRESSURE: 0

## 2017-12-22 ASSESSMENT — PAIN DESCRIPTION - PAIN TYPE
TYPE: ACUTE PAIN
TYPE: ACUTE PAIN

## 2017-12-22 ASSESSMENT — PAIN SCALES - WONG BAKER: WONGBAKER_NUMERICALRESPONSE: 2

## 2017-12-22 ASSESSMENT — PAIN DESCRIPTION - DESCRIPTORS: DESCRIPTORS: SORE

## 2017-12-22 ASSESSMENT — PAIN DESCRIPTION - LOCATION
LOCATION: OTHER (COMMENT)
LOCATION: BACK

## 2017-12-22 ASSESSMENT — PULMONARY FUNCTION TESTS
PEFR_L/MIN: 250
PEFR_L/MIN: 210
PEFR_L/MIN: 240

## 2017-12-22 ASSESSMENT — PAIN DESCRIPTION - FREQUENCY: FREQUENCY: CONTINUOUS

## 2017-12-22 ASSESSMENT — PAIN DESCRIPTION - ONSET: ONSET: ON-GOING

## 2017-12-22 NOTE — PROGRESS NOTES
Pt arrived to 4W in stable condition. Pt temp of 99.3. Pt on 3L and pulse Ox of 93%. Pt states pain 7/10 on her area of sores. Pt oriented to room and remote use. Educated on hourly rounding of staff. No concerns at this time. Will continue to monitor.

## 2017-12-22 NOTE — ED PROVIDER NOTES
[address] [facility] ED  eMERGENCY dEPARTMENT eNCOUnter      Pt Name: Ezra Dsouza  MRN: [id number]  [place] 1965  Date of evaluation: 12/22/2017  Provider: Juanita Rolle DO    CHIEF COMPLAINT       Chief Complaint   Patient presents with    Shortness of Breath         HISTORY OF PRESENT ILLNESS   (Location/Symptom, Timing/Onset, Context/Setting, Quality, Duration, Modifying Factors, Severity)  Note limiting factors. Ezra Dsouza is a 47 y.o. female who presents to the emergency department with complaint of cough, fever, and SOB. Patient's pulse ox is 90% initially when EMS got there. They stated that her lungs were tight and he didn't urinate wheezes. Patient was receiving aerosolized breathing treatment by EMS. Patient was taken off oxygen and her sat was 88% she was wheezing and had rhonchi in all lung fields. Patient denies any chest pain, nausea, or vomiting. Patient has a fever of 101.1. Patient was just admitted to the hospital for bronchitis. HPI    Nursing Notes were reviewed. REVIEW OF SYSTEMS    (2-9 systems for level 4, 10 or more for level 5)     Review of Systems   Constitutional: Positive for fever. Negative for activity change, appetite change, chills and unexpected weight change. HENT: Negative for drooling, ear pain, nosebleeds, sinus pressure and trouble swallowing. Respiratory: Positive for cough, shortness of breath and wheezing. Negative for chest tightness. Cardiovascular: Negative for chest pain and leg swelling. Gastrointestinal: Negative for abdominal pain, diarrhea and vomiting. Endocrine: Negative for polydipsia and polyphagia. Genitourinary: Negative for dysuria, flank pain and frequency. Musculoskeletal: Negative for back pain and myalgias. Skin: Negative for pallor and rash. Neurological: Negative for syncope, weakness and headaches. Hematological: Does not bruise/bleed easily. All other systems reviewed and are negative.       Except as noted above the remainder of the review of systems was reviewed and negative.        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    [facility]romuscular disorder [facility]     Osteoarthritis     Pyoderma gangrenosa     Ulcerative colitis ([facility]          SURGICAL HISTORY       Past Surgical History:   Procedure Laterality Date    ABDOMEN SURGERY      sleen repair    APPENDECTOMY      BACK SURGERY      BREAST SURGERY      fatty tumor removed, benign    COLONOSCOPY      COSMETIC SURGERY      tummy tuck    EYE SURGERY      bilateral cataract surgery    HYSTERECTOMY      SKIN BIOPSY      SPINAL FUSION      TONSILLECTOMY           CURRENT MEDICATIONS       Previous Medications    ACETAMINOPHEN (TYLENOL) 325 MG TABLET    Take 650 mg by mouth every 4 hours as needed for Pain    ALBUTEROL (PROVENTIL) (2.5 MG/3ML) 0.083% NEBULIZER SOLUTION    Take 2.5 mg by nebulization every 4 hours as needed for Wheezing    AMITRIPTYLINE (ELAVIL) 50 MG TABLET    Take 150 mg by mouth nightly    BUSPIRONE (BUSPAR) 5 MG TABLET    Take 5 mg by mouth 3 times daily    CYCLOSPORINE (SANDIMMUNE) 100 MG CAPSULE    Take 125 mg by mouth 2 times daily    DAPSONE 100 MG TABLET    Take 100 mg by mouth daily    DULOXETINE (CYMBALTA) 60 MG EXTENDED RELEASE CAPSULE    Take 60 mg by mouth 2 times daily    GABAPENTIN (NEURONTIN) 300 MG CAPSULE    Take 600 mg by mouth 3 times daily     LORAZEPAM (ATIVAN) 1 MG TABLET    Take 1 mg by mouth every 6 hours as needed for Anxiety    MELOXICAM (MOBIC) 15 MG TABLET    Take 15 mg by mouth daily    OXYCODONE-ACETAMINOPHEN (PERCOCET) 5-325 MG PER TABLET    Take 2 tablets by mouth every 4 hours as needed for Pain . PRAMIPEXOLE (MIRAPEX) 1 MG TABLET    Take 1 mg by mouth every morning    QUETIAPINE (SEROQUEL XR) 50 MG EXTENDED RELEASE TABLET    Take 50 mg by mouth nightly    ROPINIROLE (REQUIP) 1 MG TABLET    Take 1 mg by mouth nightly     TRAZODONE (DESYREL) 100 MG TABLET    Take 100 mg by mouth nightly       ALLERGIES     Amoxicillin-pot clavulanate; Doxycycline monohydrate; and Other    FAMILY HISTORY     No family history on file. SOCIAL HISTORY       Social History     Social History    Marital status:      Spouse name: N/A    Number of children: N/A    Years of education: N/A     Social History Main Topics    Smoking status: Current Every Day Smoker     Packs/day: 0.50     Years: 40.00     Types: Cigarettes    Smokeless tobacco: Never Used    Alcohol use No      Comment: ocassionally    Drug use: No    Sexual activity: Not on file     Other Topics Concern    Not on file     Social History Narrative    No narrative on file       SCREENINGS             PHYSICAL EXAM    (up to 7 for level 4, 8 or more for level 5)     ED Triage Vitals [12/22/17 0858]   BP Temp Temp Source Pulse Resp SpO2 Height Weight   103/77 101.1 °F (38.4 °C) Oral 104 25 91 % 5' 4\" (1.626 m) 190 lb (86.2 kg)       Physical Exam   Constitutional: She is oriented to person, place, and time. She appears well-developed and well-nourished. She appears distressed. HENT:   Head: Normocephalic and atraumatic. Head is without Capps's sign. Right Ear: External ear normal.   Left Ear: External ear normal.   Nose: Nose normal.   Mouth/Throat: Oropharynx is clear and moist. No oropharyngeal exudate. Eyes: Conjunctivae and EOM are normal. Pupils are equal, round, and reactive to light. Right eye exhibits no discharge. No foreign body present in the right eye. Left eye exhibits no discharge and no exudate. No scleral icterus. Neck: Normal range of motion. Neck supple. No JVD present.

## 2017-12-22 NOTE — DISCHARGE SUMMARY
Physician Discharge Summary     Patient ID:  Jeferson Ng  [id number]  42 y.o.  1965    Admit date: 12/20/2017    Discharge date and time: 12/21/2017  5:06 PM     Admitting Physician: Carter Key MD     Discharge Physician: Carter Key MD    Admission Diagnoses: Acute hypoxemic respiratory failure [facility]) [J96.01]    Discharge Diagnoses: Active Hospital Problems    Diagnosis Date Noted    COPD exacerbation ([address] [J44.1]     Pyoderma gangrenosum [L88] 04/28/2017    Cocaine use [F14.10] 03/24/2015         Admission Condition: fair    Discharged Condition: good    Indication for Admission: Shortness of breath    Hospital Course: Patient was admitted for shortness of breath. She was treated for COPD exacerbation. She also complained of multiple wounds o her body which was diagnosed as pyoderma gangrenosum and she had been following up with a dermatologist at [facility] for the same. Her blood cultures and wound cultures were negative and she was empirically treated with vancomycin and zosyn due to MRSA in past. Patient left AMA.     Consults: wound , pulm      Discharge Exam:  BP (!) 102/50   Pulse 76   Temp 98.4 °F (36.9 °C) (Oral)   Resp 20   Ht 5' 4\" (1.626 m)   Wt 205 lb 4.8 oz (93.1 kg)   LMP 01/01/1997 (Exact Date) Comment: hysterectomy  SpO2 93%   BMI 35.24 kg/m²     General Appearance:    Alert, cooperative, no distress, appears stated age   Head:    Normocephalic, without obvious abnormality, atraumatic   Eyes:    PERRL, conjunctiva/corneas clear, EOM's intact, fundi     benign, both eyes   Ears:    Normal TM's and external ear canals, both ears   Nose:   Nares normal, septum midline, mucosa normal, no drainage    or sinus tenderness   Throat:   Lips, mucosa, and tongue normal; teeth and gums normal   Neck:   Supple, symmetrical, trachea midline, no adenopathy;     thyroid:  no enlargement/tenderness/nodules; no carotid    bruit or JVD   Back:     Symmetric, no curvature, ROM normal, no CVA capsule Take 60 mg by mouth 2 times dailyHistorical Med      dapsone 100 MG tablet Take 100 mg by mouth dailyHistorical Med      gabapentin (NEURONTIN) 300 MG capsule Take 600 mg by mouth 3 times daily Historical Med      LORazepam (ATIVAN) 1 MG tablet Take 1 mg by mouth every 6 hours as needed for AnxietyHistorical Med      meloxicam (MOBIC) 15 MG tablet Take 15 mg by mouth dailyHistorical Med      pramipexole (MIRAPEX) 1 MG tablet Take 1 mg by mouth every morningHistorical Med      oxyCODONE-acetaminophen (PERCOCET) 5-325 MG per tablet Take 2 tablets by mouth every 4 hours as needed for Pain . Historical Med      rOPINIRole (REQUIP) 1 MG tablet Take 1 mg by mouth nightly Historical Med      traZODone (DESYREL) 100 MG tablet Take 100 mg by mouth nightlyHistorical Med         STOP taking these medications       simvastatin (ZOCOR) 40 MG tablet Comments:   Reason for Stopping:         nicotine (NICODERM CQ) 21 MG/24HR Comments:   Reason for Stopping:         Probiotic CAPS Comments:   Reason for Stopping:         benzonatate (TESSALON) 100 MG capsule Comments:   Reason for Stopping:         melatonin 3 MG TABS tablet Comments:   Reason for Stopping:         predniSONE (DELTASONE) 5 MG tablet Comments:   Reason for Stopping:             Patient left AMA    Signed:  Angela Noonan MD  Pager : [phone]    12/22/2017  10:08 AM

## 2017-12-22 NOTE — PROGRESS NOTES
Assumed pt care, no note able changes since previous assessment, granddaughter in to visit, stated wei was just changed not due to be changed until next month, pt resting in bed no SOB noted, denies pain, N&V, call light within reach.

## 2017-12-22 NOTE — PROGRESS NOTES
[facility] AT [place] Respiratory Therapy Evaluation   Current Order: duo q4     Home Regimen: no      Ordering Physician: jean  Re-evaluation Date:  12/25   Diagnosis: sepsis     Patient Status: Stable / Unstable + Physician notified    The following MDI Criteria must be met in order to convert aerosol to MDI with spacer. If unable to meet, MDI will be converted to aerosol:  []  Patient able to demonstrate the ability to use MDI effectively  []  Patient alert and cooperative  []  Patient able to take deep breath with 5-10 second hold  []  Medication(s) available in this delivery method   []  Peak flow greater than or equal to 200 ml/min            Current Order Substituted To  (same drug, same frequency)   Aerosol to MDI [] Albuterol Sulfate 0.083% unit dose by aerosol Albuterol Sulfate MDI 2 puffs by inhalation with spacer    [] Levalbuterol 1.25 mg unit dose by aerosol Levalbuterol MDI 2 puffs by inhalation with spacer    [] Levalbuterol 0.63 mg unit dose by aerosol Levalbuterol MDI 2 puffs by inhalation with spacer    [] Ipratropium Bromide 0.02% unit dose by aerosol Ipratropium Bromide MDI 2 puffs by inhalation with spacer    [] Duoneb (Ipratropium + Albuterol) unit dose by aerosol Ipratropium MDI + Albuterol MDI 2 puffs by inhalation w/spacer   MDI to Aerosol [] Albuterol Sulfate MDI Albuterol Sulfate 0.083% unit dose by aerosol    [] Levalbuterol MDI 2 puffs by inhalation Levalbuterol 1.25 mg unit dose by aerosol    [] Ipratropium Bromide MDI by inhalation Ipratropium Bromide 0.02% unit dose by aerosol    [] Combivent (Ipratropium + Albuterol) MDI by inhalation Duoneb (Ipratropium + Albuterol) unit dose by aerosol   Treatment Assessment [Frequency/Schedule]:  Change frequency to: ___________duo tid_______________________________________per Protocol, P&T, MEC      Points 0 1 2 3 4   Pulmonary Status  Non-Smoker  []   Smoking history   < 20 pack years  []   Smoking history  ?  20 pack years  [x]   Pulmonary Disorder  (acute or chronic)  []   Severe or Chronic w/ Exacerbation  []     Surgical Status No [x]   Surgeries     General []   Surgery Lower []   Abdominal Thoracic or []   Upper Abdominal Thoracic with  PulmonaryDisorder  []     Chest X-ray Clear/Not  Ordered     []  Chronic Changes  Results Pending  []  Infiltrates, atelectasis, pleural effusion, or edema  [x]  Infiltrates in more than one lobe []  Infiltrate + Atelectasis, &/or pleural effusion  []    Respiratory Pattern Regular,  RR = 12-20 [x]  Increased,  RR = 21-25 []  CHU, irregular,  or RR = 26-30 []  Decreased FEV1  or RR = 31-35 []  Severe SOB, use  of accessory muscles, or RR ? 35  []    Mental Status Alert, oriented,  Cooperative [x]  Confused but Follows commands []  Lethargic or unable to follow commands []  Obtunded  []  Comatose  []    Breath Sounds Clear to  auscultation  [x]  Decreased unilaterally or  in bases only []  Decreased  bilaterally  [x]  Crackles or intermittent wheezes []  Wheezes []    Cough Strong, Spontan., & nonproductive [x]  Strong,  spontaneous, &  productive []  Weak,  Nonproductive []  Weak, productive or  with wheezes []  No spontaneous  cough or may require suctioning []    Level of Activity Ambulatory [x]  Ambulatory w/ Assist  []  Non-ambulatory []  Paraplegic []  Quadriplegic []    Total    Score:___6 ____     Triage Score:____4____      Tri       Triage:     1. (>20) Freq: Q3    2. (16-20) Freq: Q4   3. (11-15) Freq: QID & Albuterol Q2 PRN    4. (6-10) Freq: TID & Albuterol Q2 PRN    5. (0-5) Freq Q4prn

## 2017-12-22 NOTE — H&P
TONSILLECTOMY         Medications Prior to Admission:      Prior to Admission medications    Medication Sig Start Date End Date Taking? Authorizing Provider   QUEtiapine (SEROQUEL XR) 50 MG extended release tablet Take 50 mg by mouth nightly    Historical Provider, MD   busPIRone (BUSPAR) 5 MG tablet Take 5 mg by mouth 3 times daily    Historical Provider, MD   acetaminophen (TYLENOL) 325 MG tablet Take 650 mg by mouth every 4 hours as needed for Pain    Historical Provider, MD   albuterol (PROVENTIL) (2.5 MG/3ML) 0.083% nebulizer solution Take 2.5 mg by nebulization every 4 hours as needed for Wheezing    Historical Provider, MD   amitriptyline (ELAVIL) 50 MG tablet Take 150 mg by mouth nightly    Historical Provider, MD   cycloSPORINE (SANDIMMUNE) 100 MG capsule Take 125 mg by mouth 2 times daily    Historical Provider, MD   DULoxetine (CYMBALTA) 60 MG extended release capsule Take 60 mg by mouth 2 times daily    Historical Provider, MD   dapsone 100 MG tablet Take 100 mg by mouth daily    Historical Provider, MD   gabapentin (NEURONTIN) 300 MG capsule Take 600 mg by mouth 3 times daily     Historical Provider, MD   LORazepam (ATIVAN) 1 MG tablet Take 1 mg by mouth every 6 hours as needed for Anxiety    Historical Provider, MD   meloxicam (MOBIC) 15 MG tablet Take 15 mg by mouth daily    Historical Provider, MD   pramipexole (MIRAPEX) 1 MG tablet Take 1 mg by mouth every morning    Historical Provider, MD   oxyCODONE-acetaminophen (PERCOCET) 5-325 MG per tablet Take 2 tablets by mouth every 4 hours as needed for Pain .     Historical Provider, MD   rOPINIRole (REQUIP) 1 MG tablet Take 1 mg by mouth nightly     Historical Provider, MD   traZODone (DESYREL) 100 MG tablet Take 100 mg by mouth nightly    Historical Provider, MD       Allergies:  Amoxicillin-pot clavulanate; Doxycycline monohydrate; and Other    Social History:      The patient currently lives in shelter    TOBACCO:   reports that she has been smoking

## 2017-12-22 NOTE — PROGRESS NOTES
12/21/2017  EXAMINATION: XR CHEST STANDARD TWO VW  CLINICAL HISTORY: Cough, congestion, fever COMPARISONS: December 20, 2017  FINDINGS: Two views of the chest are submitted. The cardiac silhouette is of normal size configuration. The mediastinum is unremarkable. Pulmonary vascular unremarkable. Right sided trachea. Small area of atelectasis left lower lobe No focal infiltrates. No effusions. No Pneumothoraces. NO ACUTE ACTIVE CARDIOPULMONARY PROCESS    Xr Chest Portable    Result Date: 12/20/2017  EXAMINATION: CHEST PORTABLE VIEW  CLINICAL HISTORY: Fever COMPARISONS: April 30, 2017  FINDINGS: Single  views of the chest is submitted. The cardiac silhouette is of normal size configuration. The mediastinum is unremarkable. Pulmonary vascular unremarkable. Right sided trachea. No focal infiltrates. No Pneumothoraces. NO ACUTE ACTIVE CARDIOPULMONARY PROCESS             IMPRESSION AND SUGGESTION:  1. Acute viral illness with exacerbation of underlying chronic lung disease  2. Superimposed bacterial pneumonia or pneumonitis cannot totally be excluded, repeat chest x-ray unremarkable  3. Underlying asthma with COPD with acute exacerbation due to to underlying illness  4. History of leukemia  5. Immunosuppression  6.  Pyoderma gangrenosa            Electronically signed by Rodolfo Kaur MD, FCCP on 12/22/2017 at 3:51 PM

## 2017-12-22 NOTE — CARE COORDINATION
SPOKE WITH DESHAWN DAVID REGARDING CONCERNS FOR PT AT DISCHARGE, ALSO SPOKE TO PT REGARDING OPINION OF GOING TO NH FOR RECOVERY IF PREAUTHORIZATION IS OBTAINABLE. PT NODS HEAD AFFIRMATIVELY SHE IS WILLING. ELGIN TEIXEIRA MENTIONED, WAS AFFIRMATIVE NOD FOR THAT AS WELL. CALL TO JOANN TO UPDATE. PT ON WAY TO FLOOR WITH MASK IN PLACE, OXYGEN NC MAINTAINED.

## 2017-12-23 LAB
ANAEROBIC CULTURE: NORMAL
GRAM STAIN RESULT: NORMAL
WOUND/ABSCESS: NORMAL

## 2017-12-23 PROCEDURE — 99232 SBSQ HOSP IP/OBS MODERATE 35: CPT | Performed by: INTERNAL MEDICINE

## 2017-12-23 PROCEDURE — G8988 SELF CARE GOAL STATUS: HCPCS

## 2017-12-23 PROCEDURE — G8980 MOBILITY D/C STATUS: HCPCS

## 2017-12-23 PROCEDURE — G8989 SELF CARE D/C STATUS: HCPCS

## 2017-12-23 PROCEDURE — 6370000000 HC RX 637 (ALT 250 FOR IP): Performed by: PSYCHIATRY & NEUROLOGY

## 2017-12-23 PROCEDURE — 6370000000 HC RX 637 (ALT 250 FOR IP): Performed by: INTERNAL MEDICINE

## 2017-12-23 PROCEDURE — 6360000002 HC RX W HCPCS: Performed by: INTERNAL MEDICINE

## 2017-12-23 PROCEDURE — G8978 MOBILITY CURRENT STATUS: HCPCS

## 2017-12-23 PROCEDURE — G8987 SELF CARE CURRENT STATUS: HCPCS

## 2017-12-23 PROCEDURE — 97165 OT EVAL LOW COMPLEX 30 MIN: CPT

## 2017-12-23 PROCEDURE — 2580000003 HC RX 258: Performed by: INTERNAL MEDICINE

## 2017-12-23 PROCEDURE — 2700000000 HC OXYGEN THERAPY PER DAY

## 2017-12-23 PROCEDURE — 94640 AIRWAY INHALATION TREATMENT: CPT

## 2017-12-23 PROCEDURE — 1210000000 HC MED SURG R&B

## 2017-12-23 PROCEDURE — G8979 MOBILITY GOAL STATUS: HCPCS

## 2017-12-23 PROCEDURE — 97162 PT EVAL MOD COMPLEX 30 MIN: CPT

## 2017-12-23 RX ORDER — ROPINIROLE 0.25 MG/1
0.25 TABLET, FILM COATED ORAL NIGHTLY
Status: DISCONTINUED | OUTPATIENT
Start: 2017-12-23 | End: 2017-12-24 | Stop reason: HOSPADM

## 2017-12-23 RX ORDER — AMITRIPTYLINE HYDROCHLORIDE 75 MG/1
150 TABLET, FILM COATED ORAL NIGHTLY
Status: DISCONTINUED | OUTPATIENT
Start: 2017-12-23 | End: 2017-12-23

## 2017-12-23 RX ORDER — POLYETHYLENE GLYCOL 3350 17 G/17G
17 POWDER, FOR SOLUTION ORAL DAILY
Status: DISCONTINUED | OUTPATIENT
Start: 2017-12-23 | End: 2017-12-24 | Stop reason: HOSPADM

## 2017-12-23 RX ORDER — DULOXETIN HYDROCHLORIDE 60 MG/1
60 CAPSULE, DELAYED RELEASE ORAL 2 TIMES DAILY
Status: DISCONTINUED | OUTPATIENT
Start: 2017-12-23 | End: 2017-12-24 | Stop reason: HOSPADM

## 2017-12-23 RX ORDER — MELOXICAM 7.5 MG/1
15 TABLET ORAL DAILY
Status: DISCONTINUED | OUTPATIENT
Start: 2017-12-23 | End: 2017-12-24 | Stop reason: HOSPADM

## 2017-12-23 RX ORDER — BUSPIRONE HYDROCHLORIDE 5 MG/1
5 TABLET ORAL 3 TIMES DAILY
Status: DISCONTINUED | OUTPATIENT
Start: 2017-12-23 | End: 2017-12-24 | Stop reason: HOSPADM

## 2017-12-23 RX ADMIN — MELOXICAM 15 MG: 7.5 TABLET ORAL at 14:26

## 2017-12-23 RX ADMIN — ENOXAPARIN SODIUM 40 MG: 40 INJECTION, SOLUTION INTRAVENOUS; SUBCUTANEOUS at 09:15

## 2017-12-23 RX ADMIN — ROPINIROLE HYDROCHLORIDE 0.25 MG: 0.25 TABLET, FILM COATED ORAL at 20:30

## 2017-12-23 RX ADMIN — ACETAMINOPHEN 650 MG: 325 TABLET, FILM COATED ORAL at 14:26

## 2017-12-23 RX ADMIN — OSELTAMIVIR PHOSPHATE 75 MG: 75 CAPSULE ORAL at 20:33

## 2017-12-23 RX ADMIN — GABAPENTIN 600 MG: 300 CAPSULE ORAL at 09:13

## 2017-12-23 RX ADMIN — MELATONIN TAB 3 MG 5 MG: 3 TAB at 20:37

## 2017-12-23 RX ADMIN — BUSPIRONE HYDROCHLORIDE 5 MG: 5 TABLET ORAL at 14:26

## 2017-12-23 RX ADMIN — TRAZODONE HYDROCHLORIDE 100 MG: 100 TABLET ORAL at 20:30

## 2017-12-23 RX ADMIN — BUSPIRONE HYDROCHLORIDE 5 MG: 5 TABLET ORAL at 20:30

## 2017-12-23 RX ADMIN — LORAZEPAM 0.5 MG: 0.5 TABLET ORAL at 19:16

## 2017-12-23 RX ADMIN — DULOXETINE HYDROCHLORIDE 60 MG: 60 CAPSULE, DELAYED RELEASE ORAL at 14:26

## 2017-12-23 RX ADMIN — OSELTAMIVIR PHOSPHATE 75 MG: 75 CAPSULE ORAL at 09:14

## 2017-12-23 RX ADMIN — FAMOTIDINE 20 MG: 20 TABLET, FILM COATED ORAL at 20:30

## 2017-12-23 RX ADMIN — Medication 10 ML: at 20:31

## 2017-12-23 RX ADMIN — CYCLOSPORINE 125 MG: 100 CAPSULE, GELATIN COATED ORAL at 20:30

## 2017-12-23 RX ADMIN — ACETAMINOPHEN 650 MG: 325 TABLET, FILM COATED ORAL at 06:02

## 2017-12-23 RX ADMIN — POLYETHYLENE GLYCOL 3350 17 G: 17 POWDER, FOR SOLUTION ORAL at 14:27

## 2017-12-23 RX ADMIN — GABAPENTIN 600 MG: 300 CAPSULE ORAL at 20:30

## 2017-12-23 RX ADMIN — Medication 10 ML: at 09:15

## 2017-12-23 RX ADMIN — IPRATROPIUM BROMIDE AND ALBUTEROL SULFATE 1 AMPULE: .5; 3 SOLUTION RESPIRATORY (INHALATION) at 16:22

## 2017-12-23 RX ADMIN — FAMOTIDINE 20 MG: 20 TABLET, FILM COATED ORAL at 09:14

## 2017-12-23 RX ADMIN — GABAPENTIN 600 MG: 300 CAPSULE ORAL at 14:26

## 2017-12-23 RX ADMIN — DULOXETINE HYDROCHLORIDE 60 MG: 60 CAPSULE, DELAYED RELEASE ORAL at 20:30

## 2017-12-23 RX ADMIN — HYDROCODONE BITARTRATE AND ACETAMINOPHEN 1 TABLET: 5; 325 TABLET ORAL at 15:43

## 2017-12-23 ASSESSMENT — PAIN SCALES - GENERAL
PAINLEVEL_OUTOF10: 6
PAINLEVEL_OUTOF10: 7
PAINLEVEL_OUTOF10: 6

## 2017-12-23 ASSESSMENT — PAIN DESCRIPTION - LOCATION: LOCATION: FOOT

## 2017-12-23 ASSESSMENT — PAIN DESCRIPTION - PAIN TYPE
TYPE: ACUTE PAIN
TYPE: ACUTE PAIN

## 2017-12-23 ASSESSMENT — PAIN DESCRIPTION - FREQUENCY: FREQUENCY: CONTINUOUS

## 2017-12-23 ASSESSMENT — PAIN DESCRIPTION - ORIENTATION: ORIENTATION: RIGHT

## 2017-12-23 NOTE — PROGRESS NOTES
Nutrition Assessment    Type and Reason for Visit: Initial, Positive Nutrition Screen (wound)    Nutrition Recommendations: Continue with General, selective diet    Malnutrition Assessment:  · Malnutrition Status: Insufficient data  · Context: Acute illness or injury  · Findings of the 6 clinical characteristics of malnutrition (Minimum of 2 out of 6 clinical characteristics is required to make the diagnosis of moderate or severe Protein Calorie Malnutrition based on AND/ASPEN Guidelines):  1. Energy Intake-Not available, not able to assess    2. Weight Loss-No significant weight loss,    3. Fat Loss-Unable to assess (pt requested to be left alone),    4. Muscle Loss-Unable to assess,    5. Fluid Accumulation-No significant fluid accumulation,    6.  Strength-     Nutrition Diagnosis:   · Problem: No nutrition diagnosis at this time    Nutrition Assessment:  · Subjective Assessment: Pt came to hospital for SOB, left AMA and back to ED with c/o SOB ( PNA-Influenza-A), staff reports that pt wants to be left alone this am because she slept poorly last night.  Per ' Notes' has been noncompliant with care  · Nutrition-Focused Physical Findings: no edema per nursing, last Bm 12/20- stated, peripheral IV access, noted to be A & O per staff  · Wound Type: Multiple, Stage I (see wound flowsheet)  · Current Nutrition Therapies:  · Oral Diet Orders: 2gm Sodium, Cardiac   · Oral Diet intake: Unable to assess (no intake records kept, staff reprot ' about half of B')  · Oral Nutrition Supplement (ONS) Orders: None  · Anthropometric Measures:  · Ht: 5' 4\" (162.6 cm)   · Current Body Wt: 194 lb (88 kg)  · Admission Body Wt: 190 lb (86.2 kg) (stated)  · Usual Body Wt: 186 lb (84.4 kg) ((5/17), 180# ( 4/17-stated))  · % Weight Change: 0,  0  · Ideal Body Wt: 120 lb (54.4 kg), % Ideal Body > 100%  · BMI Classification: BMI 30.0 - 34.9 Obese Class I  · Comparative Standards (Estimated Nutrition Needs):  · Estimated Daily Total Kcal: 0188-5313  · Estimated Daily Protein (g): 65-76    Estimated Intake vs Estimated Needs: Insufficient Data    Nutrition Risk Level: Moderate    Nutrition Interventions:   Continue current diet  Continued Inpatient Monitoring, Education not appropriate at this time    Nutrition Evaluation:   · Evaluation: Goals set   · Goals: po  > 75%    · Monitoring: Meal Intake, Weight, Pertinent Labs, Skin Integrity, Wound Healing    See Adult Nutrition Doc Flowsheet for more detail.      Electronically signed by Pankaj Condon RD, LD on 12/23/17 at 9:57 AM

## 2017-12-23 NOTE — PROGRESS NOTES
Physical Therapy Med Surg Initial Assessment  Facility/Department: [facility] MED SURG UNIT  [id number]       NAME: Ezra Dsouza  : 1965 (47 y.o.)  MRN: [id number]  CODE STATUS: Full Code    Date of Service: 2017    Patient Diagnosis(es): Sepsis due to Haemophilus influenzae [facility]) [A41.3]   Chief Complaint   Patient presents with    Shortness of Breath     Patient Active Problem List    Diagnosis Date Noted    Sepsis due to Haemophilus influenzae ([address] 2017    Influenza A with respiratory manifestations 2017    COPD exacerbation ([address]     Cellulitis of left thumb 2017    Right ankle pain 2017    Anemia 2017    Obesity 2017    Pyoderma gangrenosum 2017    Cellulitis of right leg 2017    Viral sepsis ([address] 2017    Hypotension 2017    Multiple open wounds of lower leg 2017    Fibromyalgia 2017    Cocaine use 2015    Chronic pain 2014        Past Medical History:   Diagnosis Date    Anxiety     Arthritis     Cancer ([address] 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    Chronic kidney disease     COPD exacerbation ([address]     Depression     Disease of blood and blood forming organ 2017    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    Neuromuscular disorder ([address]     Osteoarthritis     Pyoderma gangrenosa     Ulcerative colitis ([address]      Past Surgical History:   Procedure Laterality Date    ABDOMEN SURGERY      sleen repair    APPENDECTOMY      BACK SURGERY      BREAST SURGERY      fatty tumor removed, benign    COLONOSCOPY      COSMETIC SURGERY      tummy tuck    EYE SURGERY      bilateral cataract surgery    HYSTERECTOMY      SKIN BIOPSY      SPINAL FUSION      TONSILLECTOMY         Chart Reviewed: Yes  Patient assessed for rehabilitation services?: Yes  Family / Caregiver Present: No    Restrictions:  Restrictions/Precautions: Isolation, Contact Precautions (droplet/MRSA/ influenza)  Body mass index is 33.3 kg/m².      SUBJECTIVE: Subjective: I need someone to look at my foot  Pre Treatment Pain Screening  Pain at present: 7  Scale Used: Numeric Score  Intervention List: Patient able to continue with treatment;Nurse/physician notified    Post Treatment Pain Screening:   Pain Screening  Patient Currently in Pain: Yes  Pain Assessment  Pain Assessment: 0-10  Pain Level: 7  Pain Location: Foot  Pain Orientation: Right  Pain Frequency: Continuous    Prior Level of Function:  Social/Functional History  Lives With: Other (comment) (group home/shelter)  Home Layout: Two level (12 steps to living area, 12 steps to dining area, with handrails)  Home Access: Stairs to enter with rails  Bathroom Shower/Tub: Tub/Shower unit  Home Equipment: Cane, Quad cane, Rolling walker  ADL Assistance: Independent  Homemaking Assistance: Independent  Homemaking Responsibilities: No  Ambulation Assistance: Independent  Transfer Assistance: Independent    OBJECTIVE:   Vision/Hearing:  Vision: Within Functional Limits  Hearing: Within functional limits    Cognition:  Overall Orientation Status: Within Functional Limits  Follows Commands: Within Functional Limits    Observation/Palpation  Observation: no acute distress, on RA    ROM:  RLE AROM: WFL  LLE AROM : WFL  RUE AROM : WFL  LUE AROM : WFL    Strength:  Strength RLE  Strength RLE: WFL  Strength LLE  Strength LLE: WFL  Strength Other  Other: core WFL for functional mobility tasks    Neuro:  Balance  Sitting - Static: Good  Sitting - Dynamic: Good (dons slippers without assistance)  Standing - Static: Good  Standing - Dynamic: Good;- (functional reaching >6 inch OOBOS with no UE support)     Motor Control  Gross Motor?: WFL  Sensation  Overall Sensation Status: WFL    Bed mobility  Supine to Sit: Modified independent  Sit to Supine: Modified Gabrieal Gary, PT, 12/23/17 at 10:05 AM

## 2017-12-23 NOTE — CONSULTS
PSYCHIATRY ATTENDING CONSULT    REASON FOR CONSULT:  Mood disorder hindering medical management    REQUESTING PHYSICIAN:  Dr. Marcus Lamb: \"I am feeling better\"    HISTORY OF PRESENT ILLNESS:  Anthony Boyce  is a 47 y.o. female who was admitted on 12/22/17 due to Sepsis due to Influenza A. She had been disruptive, and poorly cooperative with treatment, which led to the request for this consult. When interviewed today, the patient said she was feeling \"better\" than when she came in. She said she had been upset because \"they're not giving me my psych meds\". She said she had been on Cymbalta, Gabapentin (600 mg in am, and 900 mg qhs), Trazodone, Buspar, and Elavil. She said she had been living in a homeless shelter since November, when her sister had kicked her out. She said she has been diagnosed with Leukemia, and was taking cyclosporine at this time. She reported a history of Bipolar Disorder, and ADHD. She denied having been diagnosed by a Psychiatrist though, and did not seem to report having had manic episodes. She said she had been first told she may have Bipolar Disorder when she was in [facility], in [place]. She did confirm feeling depressed, and was emotional during the interview. She denied though having any suicidal or homicidal ideations, and she denied having any hallucinations, paranoia, or other delusions. PAST PSYCHIATRIC HISTORY:  The patient reported having been diagnosed with Bipolar Disorder while in [facility]. She denied having been admitted to Psychiatry. She denied having any current outpatient psychiatrist; however she said she had an intake appointment at the [facility].       PAST MEDICAL HISTORY:       Diagnosis Date    Anxiety     Arthritis     Cancer ([facility] 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    Chronic kidney disease     COPD exacerbation (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    Neuromuscular disorder (HCC)     Osteoarthritis     Pneumonia due to organism     Pyoderma gangrenosa     Ulcerative colitis ([facility]            PAST SURGICAL HISTORY:       Procedure Laterality Date    ABDOMEN SURGERY      sleen repair    APPENDECTOMY      BACK SURGERY      BREAST SURGERY      fatty tumor removed, benign    COLONOSCOPY      COSMETIC SURGERY      tummy tuck    EYE SURGERY      bilateral cataract surgery    HYSTERECTOMY      SKIN BIOPSY      SPINAL FUSION      TONSILLECTOMY         MEDICATIONS: Current Facility-Administered Medications: polyethylene glycol (GLYCOLAX) packet 17 g, 17 g, Oral, Daily  cycloSPORINE (SANDIMMUNE) capsule 125 mg, 125 mg, Oral, BID  amitriptyline (ELAVIL) tablet 150 mg, 150 mg, Oral, Nightly  DULoxetine (CYMBALTA) extended release capsule 60 mg, 60 mg, Oral, BID  rOPINIRole (REQUIP) tablet 0.25 mg, 0.25 mg, Oral, Nightly  busPIRone (BUSPAR) tablet 5 mg, 5 mg, Oral, TID  meloxicam (MOBIC) tablet 15 mg, 15 mg, Oral, Daily  0.9 % sodium chloride infusion, , Intravenous, Continuous  sodium chloride flush 0.9 % injection 10 mL, 10 mL, Intravenous, 2 times per day  sodium chloride flush 0.9 % injection 10 mL, 10 mL, Intravenous, PRN  famotidine (PEPCID) tablet 20 mg, 20 mg, Oral, BID  enoxaparin (LOVENOX) injection 40 mg, 40 mg, Subcutaneous, Daily  oseltamivir (TAMIFLU) capsule 75 mg, 75 mg, Oral, BID  nicotine (NICODERM CQ) patch REMOVAL, 1 patch, Transdermal, Daily  nicotine (NICODERM CQ) 14 MG/24HR 1 patch, 1 patch, Transdermal, Daily  nicotine polacrilex (NICORETTE) gum 2 mg, 2 mg, Oral, PRN  ipratropium-albuterol (DUONEB) nebulizer solution 1 ampule, 1 ampule, Inhalation, TID  acetaminophen (TYLENOL) tablet 650 mg, 650 mg, Oral, 3 times per day  HYDROcodone-acetaminophen (NORCO) 5-325 MG per tablet 1 tablet, 1 tablet, Oral, Q4H PRN **OR** [DISCONTINUED] HYDROcodone-acetaminophen Reference Range    Less than 74 yrs   0-125 pg/mL    Greater than 74 yrs   0-450 pg/mL    Other possible causes of an elevated NT-proBNP include:  cardiac ischemia, acute coronary syndrome, COPD, pneumonia,  atrial fibrillation, pulmonary emboli, pulmonary hypertension,  pericarditis    Reference:  Thania Souza., et al. NT-proBNP testing for diagnosis and  short-term prognosis in acute destabilized HF: an international  pooled analysis of 1256 patients.  Heart Journal.  [id number];[id number]        WBC 12/22/2017 5.6  4.8 - 10.8 K/uL Final    RBC 12/22/2017 3.83* 4.20 - 5.40 M/uL Final    Hemoglobin 12/22/2017 11.5* 12.0 - 16.0 g/dL Final    Hematocrit 12/22/2017 34.8* 37.0 - 47.0 % Final    MCV 12/22/2017 90.8  82.0 - 100.0 fL Final    MCH 12/22/2017 30.1  27.0 - 31.3 pg Final    MCHC 12/22/2017 33.2  33.0 - 37.0 % Final    RDW 12/22/2017 16.5* 11.5 - 14.5 % Final    Platelets 00/67/4150 199  130 - 400 K/uL Final    PLATELET SLIDE REVIEW 12/22/2017 Adequate   Final    SLIDE REVIEW 12/22/2017 see below   Final    Neutrophils % 12/22/2017 61.6  % Final    Lymphocytes % 12/22/2017 30.0  % Final    Monocytes % 12/22/2017 7.3  % Final    Eosinophils % 12/22/2017 0.3  % Final    Basophils % 12/22/2017 0.8  % Final    Neutrophils # 12/22/2017 3.4  1.4 - 6.5 K/uL Final    Lymphocytes # 12/22/2017 1.7  1.0 - 4.8 K/uL Final    Monocytes # 12/22/2017 0.4  0.2 - 0.8 K/uL Final    Eosinophils # 12/22/2017 0.0  0.0 - 0.7 K/uL Final    Basophils # 12/22/2017 0.0  0.0 - 0.2 K/uL Final    Anisocytosis 12/22/2017 1+   Final    Macrocytes 12/22/2017 0   Final    Microcytes 12/22/2017 0   Final    Polychromasia 12/22/2017 0   Final    Hypochromia 12/22/2017 0   Final    Poikilocytes 12/22/2017 0   Final    Total CK 12/22/2017 60  0 - 170 U/L Final    Sodium 12/22/2017 139  132 - 144 mEq/L Final    Comment: Revert to previous reference range.   Effective:  12/14/2017      Potassium 12/22/2017 3.8 3.5 - 5.1 mEq/L Final    Comment: Revert to previous reference range. Effective:  12/14/2017      Chloride 12/22/2017 102  98 - 107 mEq/L Final    Comment: Revert to previous reference range. Effective:  12/14/2017      CO2 12/22/2017 24  22 - 29 mEq/L Final    Comment: Revert to previous reference range. Effective:  12/14/2017      Anion Gap 12/22/2017 13  7 - 13 mEq/L Final    Comment: Revert to previous reference range. Effective:  12/14/2017      Glucose 12/22/2017 99  74 - 109 mg/dL Final    BUN 12/22/2017 10  6 - 20 mg/dL Final    CREATININE 12/22/2017 0.74  0.50 - 0.90 mg/dL Final    GFR Non- 12/22/2017 >60.0  >60 Final    Comment: >60 mL/min/1.73m2 EGFR, calc. for ages 25 and older using the  MDRD formula (not corrected for weight), is valid for stable  renal function.  GFR  12/22/2017 >60.0  >60 Final    Comment: >60 mL/min/1.73m2 EGFR, calc. for ages 25 and older using the  MDRD formula (not corrected for weight), is valid for stable  renal function.  Calcium 12/22/2017 8.6  8.6 - 10.2 mg/dL Final    Total Protein 12/22/2017 6.1* 6.4 - 8.1 g/dL Final    Alb 12/22/2017 3.7* 3.9 - 4.9 g/dL Final    Total Bilirubin 12/22/2017 0.5  0.0 - 1.2 mg/dL Final    Alkaline Phosphatase 12/22/2017 64  40 - 130 U/L Final    ALT 12/22/2017 14  0 - 33 U/L Final    AST 12/22/2017 20  0 - 35 U/L Final    Globulin 12/22/2017 2.4  2.3 - 3.5 g/dL Final    Blood Culture, Routine 12/23/2017 No Growth to date. Any change in status will be called. Preliminary    Culture, Blood 2 12/23/2017 No Growth to date. Any change in status will be called.    Preliminary    CRP High Sensitivity 12/22/2017 50.4* 0.0 - 5.0 mg/L Final    Lactic Acid 12/22/2017 1.4  0.5 - 2.2 mmol/L Final    Cholesterol, Total 12/22/2017 224* 0 - 199 mg/dL Final    Triglycerides 12/22/2017 245* 0 - 200 mg/dL Final    HDL 12/22/2017 35* 40 - 59 mg/dL Final    Comment: ATP III HDL Cholestrol Classification is low. Expected Values:    Males:    >55 = No Risk            35-55 = Moderate Risk            <35 = High Risk    Females:  >65 = No Risk            45-65 = Moderate Risk            <45 = High Risk    NCEP Guidelines: Third Report May 2001  >59 = negative risk factor for CHD  <40 = major risk factor for CHD      LDL Calculated 12/22/2017 140* 0 - 129 mg/dL Final    Magnesium 12/22/2017 1.8  1.7 - 2.3 mg/dL Final    Protime 12/22/2017 9.8  8.1 - 13.7 sec Final    INR 12/22/2017 0.9   Final    Comment: Recommended INR therapeutic ranges for oral anticoagulant  therapy    Prophylaxis/treatment of:                       INR     Venous Thrombosis, Pulmonary Embolism       2.0-3  Prevention of Systemic Embolism from:     Atrial Fibrillation                         2.0-3.0     Myocardial Infarction                       2.0-3.0     Mechanical Prosthetics Heart Valves         2.5-3.5     Recurrent Systemic Embolism                 2.5-3.5  Guidelines for patients with coagulopathy, e.g. liver disease:  Use the Protime resulted in seconds.     Mild        12.9-17.0 sec    Moderate    17.1-22.6 sec    Severe      G.T. 22.6 sec      Troponin 12/22/2017 <0.010  0.000 - 0.010 ng/mL Final    TSH 12/22/2017 4.180  0.270 - 4.200 uIU/mL Final    Color, UA 12/22/2017 Yellow  Straw/Yellow Final    Clarity, UA 12/22/2017 Clear  Clear Final    Glucose, Ur 12/22/2017 Negative  Negative mg/dL Final    Bilirubin Urine 12/22/2017 Negative  Negative Final    Ketones, Urine 12/22/2017 Negative  Negative mg/dL Final    Specific Gravity, UA 12/22/2017 1.007  1.005 - 1.030 Final    Blood, Urine 12/22/2017 Negative  Negative Final    pH, UA 12/22/2017 5.0  5.0 - 9.0 Final    Protein, UA 12/22/2017 Negative  Negative mg/dL Final    Urobilinogen, Urine 12/22/2017 0.2  <2.0 E.U./dL Final    Nitrite, Urine 12/22/2017 Negative  Negative Final    Leukocyte Esterase, Urine 12/22/2017 Negative  Negative Final    Urine Reflex to Culture 12/22/2017 Not Indicated   Final    Mono Test 12/22/2017 Negative   Final    Rapid Strep A Screen 12/22/2017 Negative  Negative Final    Comment: A culture confirmation plate has been set up and a separate  report will follow. See micro report.  Strep A Culture 12/23/2017 No Beta Streptococcus isolated in 24 hours   Preliminary    Sodium 12/22/2017 141  132 - 144 mEq/L Final    Comment: Revert to previous reference range. Effective:  12/14/2017      Potassium 12/22/2017 4.3  3.5 - 5.1 mEq/L Final    Comment: Revert to previous reference range. Effective:  12/14/2017      Chloride 12/22/2017 105  98 - 107 mEq/L Final    Comment: Revert to previous reference range. Effective:  12/14/2017      CO2 12/22/2017 20* 22 - 29 mEq/L Final    Comment: Revert to previous reference range. Effective:  12/14/2017      Anion Gap 12/22/2017 16* 7 - 13 mEq/L Final    Comment: Revert to previous reference range. Effective:  12/14/2017      Glucose 12/22/2017 272* 74 - 109 mg/dL Final    BUN 12/22/2017 9  6 - 20 mg/dL Final    CREATININE 12/22/2017 0.95* 0.50 - 0.90 mg/dL Final    GFR Non- 12/22/2017 >60.0  >60 Final    Comment: >60 mL/min/1.73m2 EGFR, calc. for ages 25 and older using the  MDRD formula (not corrected for weight), is valid for stable  renal function.  GFR  12/22/2017 >60.0  >60 Final    Comment: >60 mL/min/1.73m2 EGFR, calc. for ages 25 and older using the  MDRD formula (not corrected for weight), is valid for stable  renal function.       Calcium 12/22/2017 7.7* 8.6 - 10.2 mg/dL Final    Total Protein 12/22/2017 6.0* 6.4 - 8.1 g/dL Final    Alb 12/22/2017 3.8* 3.9 - 4.9 g/dL Final    Total Bilirubin 12/22/2017 0.3  0.0 - 1.2 mg/dL Final    Alkaline Phosphatase 12/22/2017 62  40 - 130 U/L Final    ALT 12/22/2017 15  0 - 33 U/L Final    AST 12/22/2017 21  0 - 35 U/L Final    Globulin 12/22/2017 2.2* 2.3 - 3.5 g/dL Final    Ventricular

## 2017-12-23 NOTE — PROGRESS NOTES
Hospitalist Progress Note      PCP: Thony Terrazas MD    Date of Admission: 12/22/2017    Chief Complaint:    Chief Complaint   Patient presents with    Shortness of Breath     Subjective:  Patient is tearful; has some SOB but is on room air; denies CP, fever, night sweats, chills. 12 point ROS negative other than mentioned above     Medications:  Reviewed    Infusion Medications    sodium chloride Stopped (12/23/17 0325)     Scheduled Medications    polyethylene glycol  17 g Oral Daily    cycloSPORINE  125 mg Oral BID    amitriptyline  150 mg Oral Nightly    DULoxetine  60 mg Oral BID    rOPINIRole  0.25 mg Oral Nightly    busPIRone  5 mg Oral TID    meloxicam  15 mg Oral Daily    sodium chloride flush  10 mL Intravenous 2 times per day    famotidine  20 mg Oral BID    enoxaparin  40 mg Subcutaneous Daily    oseltamivir  75 mg Oral BID    nicotine  1 patch Transdermal Daily    nicotine  1 patch Transdermal Daily    ipratropium-albuterol  1 ampule Inhalation TID    acetaminophen  650 mg Oral 3 times per day    gabapentin  600 mg Oral TID    QUEtiapine  50 mg Oral Nightly    traZODone  100 mg Oral Nightly     PRN Meds: sodium chloride flush, nicotine polacrilex, HYDROcodone 5 mg - acetaminophen **OR** [DISCONTINUED] HYDROcodone 5 mg - acetaminophen, LORazepam, acetaminophen, albuterol      Intake/Output Summary (Last 24 hours) at 12/23/17 1601  Last data filed at 12/22/17 2055   Gross per 24 hour   Intake               10 ml   Output                0 ml   Net               10 ml     Exam:    /79   Pulse 64   Temp 97.3 °F (36.3 °C) (Oral)   Resp 19   Ht 5' 4\" (1.626 m)   Wt 194 lb 0.1 oz (88 kg)   LMP 01/01/1997 (Exact Date) Comment: hysterectomy  SpO2 95%   BMI 33.30 kg/m²     General appearance: No apparent distress, appears stated age and cooperative. HEENT: Pupils equal, round, and reactive to light. Conjunctivae/corneas clear. Neck: Supple, with full range of motion.  No jugular non compliance and substance abuse      - Pt denies substance abuse; breaks down crying when confronted    #Pyoderma gangrenosa - continue wound care as previously documented      Active Hospital Problems    Diagnosis Date Noted    Influenza A with respiratory manifestations [J10.1] 12/22/2017    COPD exacerbation ([facility] [J44.1]     Obesity [E66.9] 05/01/2017    Viral sepsis ([facility] [A41.89, B97.89] 04/27/2017    Chronic pain [G89.29] 11/28/2014     Additional work up or/and treatment plan may be added today or then after based on clinical progression. I am managing a portion of pt care. Some medical issues are handled by other specialists. Additional work up and treatment should be done in out pt setting by pt PCP and other out pt providers. In addition to examining and evaluating pt, I spent additional time explaining care, normal and abnormal findings, and treatment plan. All of pt questions were answered. Counseling, diet and education were  provided. Case will be discussed with nursing staff when appropriate. Family will be updated if and when appropriate. Diet: DIET GENERAL; Low Sodium (2 GM);  Low Cholesterol    Code Status: Full Code    PT/OT Eval     Electronically signed by Sherri Lopez MD on 12/23/2017 at 4:01 PM

## 2017-12-23 NOTE — FLOWSHEET NOTE
Pt assessment complete. Pt is alert and orientated x 4. Pink, warm and dry. Pt complains of pain in her lower extremities and rates it a 8/10. No sob noted. Lungs sound clear bilaterally. Pt does have wounds on her lower left abdomen along with her left ankle. Dressings are clean, dry and intact. Call light is within reach. No other current needs at this time. Will continue to monitor along with hourly rounding.  Electronically signed by Julissa Wells RN on 12/22/2017 at 8:21 PM

## 2017-12-23 NOTE — PROGRESS NOTES
[facility] OCCUPATIONAL THERAPY EVALUATION - ACUTE     Date: 2017  Patient Name: Erika Thornton        MRN: [id number]  Account: [de-identified]   : 1965  (47 y.o.)  Room: [place] [address]    Chart Review:  Diagnosis:  The primary encounter diagnosis was Pneumonia due to organism. Diagnoses of Acute exacerbation of chronic obstructive pulmonary disease (COPD) ([facility] Dehydration, Acute febrile illness, Sepsis, due to unspecified organism ([facility] Dyslipidemia (high LDL; low HDL), Obesity (BMI 30-39.9), Tobacco dependence, Hypoxia, Elevated C-reactive protein (CRP), and Influenza and pneumonia were also pertinent to this visit. 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    Neuromuscular disorder [facility]     Osteoarthritis     Pyoderma gangrenosa     Ulcerative colitis ([facility]      Past Surgical History:   Procedure Laterality Date    ABDOMEN SURGERY      sleen repair    APPENDECTOMY      BACK SURGERY      BREAST SURGERY      fatty tumor removed, benign    COLONOSCOPY      COSMETIC SURGERY      tummy tuck    EYE SURGERY      bilateral cataract surgery    HYSTERECTOMY      SKIN BIOPSY      SPINAL FUSION      TONSILLECTOMY       Precautions:  Droplet, contact   Restrictions/Precautions: Isolation, Contact Precautions (droplet/MRSA/ influenza)    Evaluation and Pt. rights have been reviewed: [x]Yes   [] No   If no why not:   Falls safety interventions in place  []Yes   [x] No    Comments:     Subjective: Pt seen with PT.      Prior living arrangement:     Pt lives: [x] Alone   [] With spouse   [] Other   Comment: Lives at shelter     Home: [] Single level   []  Two level   [] ADLs  Feeding: IND  UE Dressing: Anticipated IND  LB Dressing:  IND   Bathing:  NT  Toileting: NT  Grooming: IND     Treatment Plan will consist of:   [] ADL Training   [] Strengthening   [] Endurance   [] Transfer Training   []  DME ed      [] HEP  [] Manual Therapy   [] AROM/PROM    [] Coordination   [] Cognitive Training   []Safety training   [] Other :    Goals:   Patient will:   []  Improve functional endurance to tolerate/complete  mins of ADL's   []  Be  in UB ADLs    []  Be  in LB ADLs   []  Be  in ADL transfers without LOB   []  Be  in toileting tasks   []  Improve  hand fine motor coordination to  in order to manage clothing fasteners/self-care containers in a timely manner   []  Improve  UE Function (AROM, strength, motor control, tone normalization) to complete ADLs as projected. []  Improve  UE strength and endurance to  in order to participate in self-care activities as projected. []  Access appropriate D/C site with as few architectural barriers as possible.   []  Sequence self-care tasks with    []  Other :      Patient Goal: Go to SNF  Discussed and agreed upon: [x] Yes   [] No         Comments:     Assessment/Discharge Disposition:     Performance deficits / Impairments: Decreased balance  Discharge Recommendations: Continue to assess pending progress  History: High   Exam: Low   Assistance / Modification: Low     Prognosis:  [x] Good   []Fair   [] Poor     Barriers to Improvement:  Pain and wound on left heel     Recommended DME:  [] W/W   [] Charmayne Brakeman   [] Rollator   [] W/C   [] Hilton Mendes  [] Shower Chair   []Dressing AD []  [facility]  [] Other:    Plan:Times per week: No OT recommended at this time. ,      G-Codes:  OT G-codes  Functional Limitation: Self care  Self Care Current Status (): At least 1 percent but less than 20 percent impaired, limited or restricted  Self Care Goal Status ():  At least 1 percent but less than 20 percent impaired, limited or restricted  Self Care Discharge Status (): At least 1 percent but less than 20 percent impaired, limited or restricted    Time in:  9:37  Time out:  9:56  Timed treatment minutes:  19  Total treatment time/minutes:  30    Electronically signed by:     Victor Manuel Rojas OT  12/23/2017, 10:07 AM

## 2017-12-23 NOTE — PROGRESS NOTES
INPATIENT PROGRESS NOTES    PATIENT NAME: Edwin Kaufman  MRN: [id number]  SERVICE DATE:  December 23, 2017   SERVICE TIME:  4:00 PM      PRIMARY SERVICE: Pulmonary Disease    CHIEF COMPLAINTS: Cough and shortness breath    INTERVAL HPI: Patient seen and examined at bedside, Interval Notes, orders reviewed. Nursing notes noted  Patient is feeling much better She states she is still having some difficulty breathing cough and wheezing still but better than she was before. No fever chills , no nausea,  vomiting , diarrhea or abdominal pain. OBJECTIVE    Body mass index is 33.3 kg/m². PHYSICAL EXAM:  Vitals:  /79   Pulse 64   Temp 97.3 °F (36.3 °C) (Oral)   Resp 19   Ht 5' 4\" (1.626 m)   Wt 194 lb 0.1 oz (88 kg)   LMP 01/01/1997 (Exact Date) Comment: hysterectomy  SpO2 95%   BMI 33.30 kg/m²   General: Patient is  Alert, awake . comfortable in bed, No distress. Head: Atraumatic , Normocephalic   Eyes: PERRL. No sclera icterus. No conjunctival injection. No discharge   ENT: No nasal  discharge. Pharynx clear. Neck:  Trachea midline. No thyromegaly, no JVD, No cervical adenopathy. Chest : Bilaterally symmetrical ,Normal effort,  No accessory muscle use  Lung : Diminished breath sounds bilaterally with few scattered rhonchi in the bases   Heart[de-identified] Normal  rate. Regular rhythm. No mumur ,  Rub or gallop  ABD: Non-tender. Non-distended. No masses. No organmegaly. Normal bowel sounds. No hernia. EXT: No Pitting both leg , No Cyanosis No clubbing  Neuro: no focal weakness  Skin: Warm and dry. No erythema rash on exposed extremities.       DATA:   Recent Labs      12/21/17   0550  12/22/17   0910   WBC  4.7*  5.6   HGB  10.7*  11.5*   HCT  32.0*  34.8*   MCV  91.4  90.8   PLT  190  199     Recent Labs      12/22/17   0911  12/22/17   1446   NA  139  141   K  3.8  4.3   CL  102  105   CO2  24  20*   BUN  10  9   CREATININE  0.74  0.95*   GLUCOSE  99  272*   CALCIUM  8.6  7.7*   PROT  6.1*  6.0*   LABALBU  3.7*

## 2017-12-23 NOTE — PLAN OF CARE
Problem: Pain:  Goal: Pain level will decrease  Pain level will decrease   Outcome: Ongoing  PRN pain meds in place and to be used as necessary

## 2017-12-23 NOTE — PLAN OF CARE
Problem: Nutrition  Goal: Optimal nutrition therapy  Outcome: Ongoing  Nutrition Problem: No nutrition diagnosis at this time  Intervention: Food and/or Nutrient Delivery: Continue current diet  Nutritional Goals: po  > 75%

## 2017-12-24 VITALS
HEART RATE: 74 BPM | HEIGHT: 64 IN | SYSTOLIC BLOOD PRESSURE: 177 MMHG | DIASTOLIC BLOOD PRESSURE: 88 MMHG | OXYGEN SATURATION: 97 % | RESPIRATION RATE: 16 BRPM | TEMPERATURE: 97.9 F | BODY MASS INDEX: 33.12 KG/M2 | WEIGHT: 194 LBS

## 2017-12-24 LAB — S PYO THROAT QL CULT: NORMAL

## 2017-12-24 PROCEDURE — 2700000000 HC OXYGEN THERAPY PER DAY

## 2017-12-24 PROCEDURE — 2580000003 HC RX 258: Performed by: INTERNAL MEDICINE

## 2017-12-24 PROCEDURE — 6360000002 HC RX W HCPCS: Performed by: INTERNAL MEDICINE

## 2017-12-24 PROCEDURE — 94640 AIRWAY INHALATION TREATMENT: CPT

## 2017-12-24 PROCEDURE — 6370000000 HC RX 637 (ALT 250 FOR IP): Performed by: INTERNAL MEDICINE

## 2017-12-24 PROCEDURE — 99232 SBSQ HOSP IP/OBS MODERATE 35: CPT | Performed by: INTERNAL MEDICINE

## 2017-12-24 RX ORDER — OSELTAMIVIR PHOSPHATE 75 MG/1
75 CAPSULE ORAL 2 TIMES DAILY
Qty: 8 CAPSULE | Refills: 0 | Status: SHIPPED | OUTPATIENT
Start: 2017-12-24 | End: 2017-12-24

## 2017-12-24 RX ORDER — OSELTAMIVIR PHOSPHATE 75 MG/1
75 CAPSULE ORAL 2 TIMES DAILY
Qty: 8 CAPSULE | Refills: 0 | Status: SHIPPED | OUTPATIENT
Start: 2017-12-24 | End: 2017-12-28

## 2017-12-24 RX ADMIN — MELOXICAM 15 MG: 7.5 TABLET ORAL at 08:49

## 2017-12-24 RX ADMIN — POLYETHYLENE GLYCOL 3350 17 G: 17 POWDER, FOR SOLUTION ORAL at 08:48

## 2017-12-24 RX ADMIN — FAMOTIDINE 20 MG: 20 TABLET, FILM COATED ORAL at 08:49

## 2017-12-24 RX ADMIN — Medication 10 ML: at 08:49

## 2017-12-24 RX ADMIN — BUSPIRONE HYDROCHLORIDE 5 MG: 5 TABLET ORAL at 08:49

## 2017-12-24 RX ADMIN — IPRATROPIUM BROMIDE AND ALBUTEROL SULFATE 1 AMPULE: .5; 3 SOLUTION RESPIRATORY (INHALATION) at 13:47

## 2017-12-24 RX ADMIN — OSELTAMIVIR PHOSPHATE 75 MG: 75 CAPSULE ORAL at 08:49

## 2017-12-24 RX ADMIN — ENOXAPARIN SODIUM 40 MG: 40 INJECTION, SOLUTION INTRAVENOUS; SUBCUTANEOUS at 08:48

## 2017-12-24 RX ADMIN — LORAZEPAM 0.5 MG: 0.5 TABLET ORAL at 12:08

## 2017-12-24 RX ADMIN — DULOXETINE HYDROCHLORIDE 60 MG: 60 CAPSULE, DELAYED RELEASE ORAL at 08:49

## 2017-12-24 RX ADMIN — CYCLOSPORINE 125 MG: 100 CAPSULE, GELATIN COATED ORAL at 08:48

## 2017-12-24 RX ADMIN — ACETAMINOPHEN 650 MG: 325 TABLET, FILM COATED ORAL at 02:46

## 2017-12-24 RX ADMIN — GABAPENTIN 600 MG: 300 CAPSULE ORAL at 08:49

## 2017-12-24 RX ADMIN — IPRATROPIUM BROMIDE AND ALBUTEROL SULFATE 1 AMPULE: .5; 3 SOLUTION RESPIRATORY (INHALATION) at 08:07

## 2017-12-24 RX ADMIN — BUSPIRONE HYDROCHLORIDE 5 MG: 5 TABLET ORAL at 15:06

## 2017-12-24 RX ADMIN — GABAPENTIN 600 MG: 300 CAPSULE ORAL at 15:06

## 2017-12-24 ASSESSMENT — PAIN SCALES - GENERAL
PAINLEVEL_OUTOF10: 7
PAINLEVEL_OUTOF10: 6

## 2017-12-24 NOTE — PLAN OF CARE
Problem: Pain:  Goal: Pain level will decrease  Pain level will decrease   Outcome: Ongoing  PRN pain meds in place and being utilized as necessary. Pt also gets scheduled meloxicam. Pt currently states her pain is controlled with current regimen.

## 2017-12-24 NOTE — PROGRESS NOTES
Patient awake and walked around the [place] a few times. She is currently in bed reading a book. She was not bothered throughout the night and states she feels much better since she has gotten sleep. She does not seem to be anxious or in any distress this morning.   Electronically signed by Manohar Burgos RN on 12/24/2017 at 6:42 AM

## 2017-12-24 NOTE — PROGRESS NOTES
INPATIENT PROGRESS NOTES    PATIENT NAME: Anthony Boyce  MRN: [id number]  SERVICE DATE:  December 24, 2017   SERVICE TIME:  2:28 PM      PRIMARY SERVICE: Pulmonary Disease    CHIEF COMPLAINTS: Cough and shortness breath    INTERVAL HPI: Patient seen and examined at bedside, Interval Notes, orders reviewed. Nursing notes noted  Patient is feeling much better, but not sure breath cough and wheezing improved. No fever chills , no nausea,  vomiting , diarrhea or abdominal pain. OBJECTIVE    Body mass index is 33.3 kg/m². PHYSICAL EXAM:  Vitals:  BP (!) 177/88   Pulse 74   Temp 97.9 °F (36.6 °C) (Oral)   Resp 16   Ht 5' 4\" (1.626 m)   Wt 194 lb 0.1 oz (88 kg)   LMP 01/01/1997 (Exact Date) Comment: hysterectomy  SpO2 97%   BMI 33.30 kg/m²   General: Patient is  Alert, awake . comfortable in bed, No distress. Head: Atraumatic , Normocephalic   Eyes: PERRL. No sclera icterus. No conjunctival injection. No discharge   ENT: No nasal  discharge. Pharynx clear. Neck:  Trachea midline. No thyromegaly, no JVD, No cervical adenopathy. Chest : Bilaterally symmetrical ,Normal effort,  No accessory muscle use  Lung : Diminished breath sounds bilaterally with few scattered rhonchi in the bases   Heart[de-identified] Normal  rate. Regular rhythm. No mumur ,  Rub or gallop  ABD: Non-tender. Non-distended. No masses. No organmegaly. Normal bowel sounds. No hernia. EXT: No Pitting both leg , No Cyanosis No clubbing  Neuro: no focal weakness  Skin: Warm and dry. No erythema rash on exposed extremities.       DATA:   Recent Labs      12/22/17   0910   WBC  5.6   HGB  11.5*   HCT  34.8*   MCV  90.8   PLT  199     Recent Labs      12/22/17   0911  12/22/17   1446   NA  139  141   K  3.8  4.3   CL  102  105   CO2  24  20*   BUN  10  9   CREATININE  0.74  0.95*   GLUCOSE  99  272*   CALCIUM  8.6  7.7*   PROT  6.1*  6.0*   LABALBU  3.7*  3.8*   BILITOT  0.5  0.3   ALKPHOS  64  62   AST  20  21   ALT  14  15   LABGLOM  >60.0  >60.0   GFRAA >60.0  >60.0   GLOB  2.4  2.2*       MV Settings:          No results for input(s): PHART, ILW1TDO, PO2ART, DFV5RNL, BEART, F3QNHKHL in the last 72 hours. O2 Device: None (Room air)  O2 Flow Rate (L/min): 2 L/min    DIET GENERAL; Low Sodium (2 GM); Low Cholesterol     MEDICATIONS during current hospitalization:    Continuous Infusions:       Scheduled Meds:   polyethylene glycol  17 g Oral Daily    cycloSPORINE  125 mg Oral BID    DULoxetine  60 mg Oral BID    rOPINIRole  0.25 mg Oral Nightly    busPIRone  5 mg Oral TID    meloxicam  15 mg Oral Daily    sodium chloride flush  10 mL Intravenous 2 times per day    famotidine  20 mg Oral BID    enoxaparin  40 mg Subcutaneous Daily    oseltamivir  75 mg Oral BID    nicotine  1 patch Transdermal Daily    nicotine  1 patch Transdermal Daily    ipratropium-albuterol  1 ampule Inhalation TID    acetaminophen  650 mg Oral 3 times per day    gabapentin  600 mg Oral TID    traZODone  100 mg Oral Nightly       PRN Meds:melatonin, sodium chloride flush, nicotine polacrilex, HYDROcodone 5 mg - acetaminophen **OR** [DISCONTINUED] HYDROcodone 5 mg - acetaminophen, LORazepam, acetaminophen, albuterol    Radiology  Xr Chest Standard (2 Vw)    Result Date: 12/22/2017  EXAMINATION: CHEST X-RAY, PA AND LATERAL CLINICAL HISTORY: UPPER RESPIRATORY CONGESTION, RESPIRATORY WHEEZING, COUGH AND FEVER COMPARISONS: 12/21/2017 FINDINGS: Heart and mediastinum appear normal. There is bibasilar subsegmental atelectasis or possibly developing small infiltrates in the lung bases. There are no pulmonary consolidations. There is no pneumothorax. There are no pleural effusions. Visualized osseous structures and remainder of the chest appear unremarkable. BIBASILAR SUBSEGMENTAL ATELECTASIS OR PERHAPS A DEVELOPING SMALL INFILTRATE IN THE LUNG BASES. THERE ARE NO PULMONARY CONSOLIDATIONS AND THERE ARE NO PLEURAL EFFUSIONS.     Xr Chest Standard (2 Vw)    Result Date: 12/21/2017  EXAMINATION: XR CHEST STANDARD TWO VW  CLINICAL HISTORY: Cough, congestion, fever COMPARISONS: December 20, 2017  FINDINGS: Two views of the chest are submitted. The cardiac silhouette is of normal size configuration. The mediastinum is unremarkable. Pulmonary vascular unremarkable. Right sided trachea. Small area of atelectasis left lower lobe No focal infiltrates. No effusions. No Pneumothoraces. NO ACUTE ACTIVE CARDIOPULMONARY PROCESS    Xr Chest Portable    Result Date: 12/20/2017  EXAMINATION: CHEST PORTABLE VIEW  CLINICAL HISTORY: Fever COMPARISONS: April 30, 2017  FINDINGS: Single  views of the chest is submitted. The cardiac silhouette is of normal size configuration. The mediastinum is unremarkable. Pulmonary vascular unremarkable. Right sided trachea. No focal infiltrates. No Pneumothoraces. NO ACUTE ACTIVE CARDIOPULMONARY PROCESS     IMPRESSION AND SUGGESTION:  1. Acute viral illness with exacerbation of underlying chronic lung disease ,  improved  2. Superimposed bacterial pneumonia or pneumonitis cannot totally be excluded most likely atelectasis  3. Underlying asthma with COPD with acute exacerbation due to to underlying illness  4. History of leukemia  5. Immunosuppression  6. Pyoderma gangrenosa    At this time continue present treatment plan with bronchodilator DuoNeb 3 times a day. Tamiflu 75 mg twice a day. last chest x-ray shows bibasilar subsegmental atelectasis and perhaps developing infiltration in lung bases.   She is doing well clinically okay to discharge    Electronically signed by Hetal Melo MD, FCCP on 12/24/2017 at 2:28 PM

## 2017-12-24 NOTE — DISCHARGE SUMMARY
Hospital Medicine Discharge Summary    Ronne Barthel  :  1965  MRN:  [id number]    Admit date:  2017  Discharge date:  2017    Admitting Physician:  No admitting provider for patient encounter. Primary Care Physician:  Pk Mai MD      Discharge Diagnoses:    Principal Problem:    Viral sepsis ([facility]  Active Problems:    Chronic pain    Obesity    Acute exacerbation of chronic obstructive pulmonary disease (COPD) ([facility]    Influenza A with respiratory manifestations    Pneumonia due to organism    Chief Complaint   Patient presents with    Shortness of [place] Course:   Ronne Barthel is a 47 y.o. female that was admitted and treated at [facility] for the following medical issues:     Principal Problem:    Viral sepsis ([facility]  Active Problems:    Chronic pain    Obesity    [facility] disease [facility]    Influenza A with respiratory manifestations    Pneumonia due to organism    46 y/o F who presented with signs of sepsis; found to have Influenza A. Improved with tamiflu and was discharged in stable condition. Pt was discharge in a stable condition. Exam on discharge:   BP (!) 177/88   Pulse 74   Temp 97.9 °F (36.6 °C) (Oral)   Resp 16   Ht 5' 4\" (1.626 m)   Wt 194 lb 0.1 oz (88 kg)   LMP 1997 (Exact Date) Comment: hysterectomy  SpO2 97%   BMI 33.30 kg/m²   General appearance: No apparent distress, appears stated age and cooperative. HEENT: Pupils equal, round, and reactive to light. Conjunctivae/corneas clear. Neck: Supple, with full range of motion. No jugular venous distention. Trachea midline. Respiratory:  Clear bilateral breath sounds  Cardiovascular: Regular rate and rhythm with normal S1/S2 without murmurs, rubs or gallops. Abdomen: Soft, non-tender, non-distended with normal bowel sounds. Musculoskeletal: No clubbing, cyanosis or edema bilaterally.     Skin: Skin color, texture, turgor gabapentin (NEURONTIN) 300 MG capsule  Take 600 mg by mouth 3 times daily              LORazepam (ATIVAN) 1 MG tablet  Take 1 mg by mouth every 6 hours as needed for Anxiety             meloxicam (MOBIC) 15 MG tablet  Take 15 mg by mouth daily             oseltamivir (TAMIFLU) 75 MG capsule  Take 1 capsule by mouth 2 times daily for 4 days             rOPINIRole (REQUIP) 1 MG tablet  Take 1 mg by mouth 3 times daily              traZODone (DESYREL) 100 MG tablet  Take 100 mg by mouth nightly                 Disposition:   If discharged to Home, Any [facility] needs that were indicated and/or required as been addressed and set up by Social Work. Condition at discharge: Pt was medically stable at the time of discharge. Activity: activity as tolerated    Total time taken for discharging this patient: 40 minutes. Greater than 70% of time was spent focused exclusively on this patient. Time was taken to review chart, discuss plans with consultants, reconciling medications, discussing plan answering questions with patient.      Kristian Henson  12/24/2017, 3:48 PM  ----------------------------------------------------------------------------------------------------------------------    Erika Thornton,

## 2017-12-25 LAB
BLOOD CULTURE, ROUTINE: NORMAL
CULTURE, BLOOD 2: NORMAL

## 2017-12-26 PROCEDURE — 93010 ELECTROCARDIOGRAM REPORT: CPT | Performed by: INTERNAL MEDICINE

## 2017-12-27 LAB
BLOOD CULTURE, ROUTINE: NORMAL
CULTURE, BLOOD 2: NORMAL

## 2018-01-20 ENCOUNTER — HOSPITAL ENCOUNTER (EMERGENCY)
Age: 53
Discharge: HOME | End: 2018-01-20
Attending: FAMILY MEDICINE
Payer: COMMERCIAL

## 2018-01-20 ENCOUNTER — APPOINTMENT (OUTPATIENT)
Dept: GENERAL RADIOLOGY | Age: 53
End: 2018-01-20
Payer: COMMERCIAL

## 2018-01-20 VITALS
RESPIRATION RATE: 20 BRPM | DIASTOLIC BLOOD PRESSURE: 75 MMHG | HEIGHT: 64 IN | HEART RATE: 62 BPM | OXYGEN SATURATION: 97 % | BODY MASS INDEX: 33.12 KG/M2 | SYSTOLIC BLOOD PRESSURE: 116 MMHG | WEIGHT: 194 LBS | TEMPERATURE: 97.9 F

## 2018-01-20 DIAGNOSIS — S83.92XA SPRAIN OF LEFT KNEE, UNSPECIFIED LIGAMENT, INITIAL ENCOUNTER: Primary | ICD-10-CM

## 2018-01-20 PROCEDURE — 6370000000 HC RX 637 (ALT 250 FOR IP): Performed by: FAMILY MEDICINE

## 2018-01-20 PROCEDURE — 99284 EMERGENCY DEPT VISIT MOD MDM: CPT

## 2018-01-20 PROCEDURE — 73562 X-RAY EXAM OF KNEE 3: CPT

## 2018-01-20 PROCEDURE — 29505 APPLICATION LONG LEG SPLINT: CPT

## 2018-01-20 RX ORDER — HYDROCODONE BITARTRATE AND ACETAMINOPHEN 5; 325 MG/1; MG/1
1 TABLET ORAL ONCE
Status: COMPLETED | OUTPATIENT
Start: 2018-01-20 | End: 2018-01-20

## 2018-01-20 RX ORDER — CYCLOBENZAPRINE HCL 10 MG
10 TABLET ORAL 3 TIMES DAILY PRN
Qty: 20 TABLET | Refills: 0 | Status: SHIPPED | OUTPATIENT
Start: 2018-01-20 | End: 2018-01-30

## 2018-01-20 RX ADMIN — HYDROCODONE BITARTRATE AND ACETAMINOPHEN 1 TABLET: 5; 325 TABLET ORAL at 18:47

## 2018-01-20 ASSESSMENT — PAIN DESCRIPTION - PAIN TYPE: TYPE: ACUTE PAIN

## 2018-01-20 ASSESSMENT — PAIN SCALES - GENERAL
PAINLEVEL_OUTOF10: 7
PAINLEVEL_OUTOF10: 9

## 2018-01-20 ASSESSMENT — PAIN DESCRIPTION - ORIENTATION: ORIENTATION: LEFT

## 2018-01-20 ASSESSMENT — PAIN DESCRIPTION - LOCATION: LOCATION: KNEE

## 2018-01-20 NOTE — ED NOTES
Bed: 24  Expected date: 1/20/18  Expected time:   Means of arrival:   Comments:  47 female cancer/o left leg pain. felt a pop behind knee earlier in the day. Pain from thigh to toes. 152/80-88-99% on ra.      Nacho Laureano, DANIELLE  01/20/18 0712

## 2018-01-20 NOTE — ED PROVIDER NOTES
ABDOMEN SURGERY      sleen repair    APPENDECTOMY      BACK SURGERY      BREAST SURGERY      fatty tumor removed, benign    COLONOSCOPY      COSMETIC SURGERY      tummy tuck    EYE SURGERY      bilateral cataract surgery    HYSTERECTOMY      SKIN BIOPSY      SPINAL FUSION      TONSILLECTOMY           CURRENT MEDICATIONS       Discharge Medication List as of 1/20/2018  7:18 PM      CONTINUE these medications which have NOT CHANGED    Details   busPIRone (BUSPAR) 5 MG tablet Take 5 mg by mouth 3 times dailyHistorical Med      acetaminophen (TYLENOL) 325 MG tablet Take 650 mg by mouth every 4 hours as needed for PainHistorical Med      albuterol (PROVENTIL) (2.5 MG/3ML) 0.083% nebulizer solution Take 2.5 mg by nebulization every 4 hours as needed for Wheezing (night time) Historical Med      cycloSPORINE (SANDIMMUNE) 100 MG capsule Take 125 mg by mouth 2 times dailyHistorical Med      DULoxetine (CYMBALTA) 60 MG extended release capsule Take 60 mg by mouth 2 times dailyHistorical Med      dapsone 100 MG tablet Take 100 mg by mouth dailyHistorical Med      gabapentin (NEURONTIN) 300 MG capsule Take 600 mg by mouth 3 times daily Historical Med      LORazepam (ATIVAN) 1 MG tablet Take 1 mg by mouth every 6 hours as needed for AnxietyHistorical Med      meloxicam (MOBIC) 15 MG tablet Take 15 mg by mouth dailyHistorical Med      rOPINIRole (REQUIP) 1 MG tablet Take 1 mg by mouth 3 times daily Historical Med      traZODone (DESYREL) 100 MG tablet Take 100 mg by mouth nightlyHistorical Med             ALLERGIES     Amoxicillin-pot clavulanate and Other    FAMILY HISTORY     History reviewed. No pertinent family history. SOCIAL HISTORY       Social History     Social History    Marital status:       Spouse name: N/A    Number of children: N/A    Years of education: N/A     Social History Main Topics    Smoking status: Current Every Day Smoker     Packs/day: 0.50     Years: 40.00     Types: Cigarettes  Smokeless tobacco: Never Used    Alcohol use No      Comment: ocassionally    Drug use: No    Sexual activity: Not Asked     Other Topics Concern    None     Social History Narrative    None       SCREENINGS             PHYSICAL EXAM    (up to 7 for level 4, 8 or more for level 5)     ED Triage Vitals [01/20/18 1738]   BP Temp Temp Source Pulse Resp SpO2 Height Weight   116/75 97.9 °F (36.6 °C) Oral 65 22 91 % 5' 4\" (1.626 m) 194 lb (88 kg)       Physical Exam   Constitutional: She appears well-developed and well-nourished. HENT:   Head: Normocephalic and atraumatic. Eyes: Conjunctivae and EOM are normal. Pupils are equal, round, and reactive to light. Neck: Normal range of motion. Neck supple. Cardiovascular: Normal rate and regular rhythm. Pulmonary/Chest: Effort normal and breath sounds normal.   Abdominal: Soft. Bowel sounds are normal.   Musculoskeletal:        Left knee: She exhibits decreased range of motion. She exhibits no swelling, no effusion, no ecchymosis, no deformity, no laceration, no erythema and normal alignment. Neurological: She is alert. She has normal reflexes. Skin: Skin is warm and dry. Psychiatric: She has a normal mood and affect. Her behavior is normal. Judgment and thought content normal.   Nursing note and vitals reviewed.       DIAGNOSTIC RESULTS     EKG: All EKG's are interpreted by the Emergency Department Physician who either signs or Co-signs this chart in the absence of a cardiologist.         RADIOLOGY:   Non-plain film images such as CT, Ultrasound and MRI are read by the radiologist. Plain radiographic images are visualized and preliminarily interpreted by the emergency physician with the below findings:    Interpretation per the Radiologist below, if available at the time of this note:    XR KNEE LEFT (3 VIEWS)    (Results Pending)   No acute fracture    ED BEDSIDE ULTRASOUND:   Performed by ED Physician - none    LABS:  Labs Reviewed - No data to

## 2018-01-21 ASSESSMENT — ENCOUNTER SYMPTOMS: RESPIRATORY NEGATIVE: 1

## 2018-03-20 ENCOUNTER — HOSPITAL ENCOUNTER (INPATIENT)
Age: 53
LOS: 3 days | Discharge: NURSING, REHAB OR LONG-TERM CARE | DRG: 603 | End: 2018-03-23
Attending: INTERNAL MEDICINE | Admitting: INTERNAL MEDICINE
Payer: COMMERCIAL

## 2018-03-20 ENCOUNTER — APPOINTMENT (OUTPATIENT)
Dept: GENERAL RADIOLOGY | Age: 53
DRG: 603 | End: 2018-03-20
Payer: COMMERCIAL

## 2018-03-20 ENCOUNTER — APPOINTMENT (OUTPATIENT)
Dept: MRI IMAGING | Age: 53
DRG: 603 | End: 2018-03-20
Payer: COMMERCIAL

## 2018-03-20 DIAGNOSIS — L88 PYODERMA GANGRENOSUM: ICD-10-CM

## 2018-03-20 DIAGNOSIS — G89.4 CHRONIC PAIN SYNDROME: ICD-10-CM

## 2018-03-20 DIAGNOSIS — L03.115 CELLULITIS OF RIGHT LEG: ICD-10-CM

## 2018-03-20 DIAGNOSIS — L08.0 PYODERMA (SKIN INFECTION): Primary | ICD-10-CM

## 2018-03-20 PROBLEM — A41.3 SEPSIS DUE TO HAEMOPHILUS INFLUENZAE (HCC): Status: RESOLVED | Noted: 2017-12-22 | Resolved: 2018-03-20

## 2018-03-20 PROBLEM — L03.012 CELLULITIS OF LEFT THUMB: Status: RESOLVED | Noted: 2017-08-19 | Resolved: 2018-03-20

## 2018-03-20 PROBLEM — L03.119 RECURRENT CELLULITIS OF LOWER LEG: Status: ACTIVE | Noted: 2018-03-20

## 2018-03-20 PROBLEM — J10.1 INFLUENZA A WITH RESPIRATORY MANIFESTATIONS: Status: RESOLVED | Noted: 2017-12-22 | Resolved: 2018-03-20

## 2018-03-20 PROBLEM — I95.9 HYPOTENSION: Status: RESOLVED | Noted: 2017-04-27 | Resolved: 2018-03-20

## 2018-03-20 PROBLEM — S81.809A MULTIPLE OPEN WOUNDS OF LOWER LEG: Status: RESOLVED | Noted: 2017-04-27 | Resolved: 2018-03-20

## 2018-03-20 PROBLEM — M25.571 RIGHT ANKLE PAIN: Status: RESOLVED | Noted: 2017-08-17 | Resolved: 2018-03-20

## 2018-03-20 PROBLEM — A41.89 VIRAL SEPSIS (HCC): Status: RESOLVED | Noted: 2017-04-27 | Resolved: 2018-03-20

## 2018-03-20 PROBLEM — B97.89 VIRAL SEPSIS (HCC): Status: RESOLVED | Noted: 2017-04-27 | Resolved: 2018-03-20

## 2018-03-20 PROBLEM — L03.119 RECURRENT CELLULITIS OF LOWER LEG: Status: RESOLVED | Noted: 2018-03-20 | Resolved: 2018-03-20

## 2018-03-20 LAB
ALBUMIN SERPL-MCNC: 3.8 G/DL (ref 3.9–4.9)
ALP BLD-CCNC: 90 U/L (ref 40–130)
ALT SERPL-CCNC: 9 U/L (ref 0–33)
AMPHETAMINE SCREEN, URINE: ABNORMAL
ANION GAP SERPL CALCULATED.3IONS-SCNC: 13 MEQ/L (ref 7–13)
AST SERPL-CCNC: 13 U/L (ref 0–35)
BARBITURATE SCREEN URINE: ABNORMAL
BASOPHILS ABSOLUTE: 0.1 K/UL (ref 0–0.2)
BASOPHILS RELATIVE PERCENT: 1.3 %
BENZODIAZEPINE SCREEN, URINE: ABNORMAL
BILIRUB SERPL-MCNC: 0.6 MG/DL (ref 0–1.2)
BILIRUBIN URINE: NEGATIVE
BLOOD, URINE: NEGATIVE
BUN BLDV-MCNC: 9 MG/DL (ref 6–20)
C-REACTIVE PROTEIN, HIGH SENSITIVITY: 108.7 MG/L (ref 0–5)
CALCIUM SERPL-MCNC: 9 MG/DL (ref 8.6–10.2)
CANNABINOID SCREEN URINE: ABNORMAL
CHLORIDE BLD-SCNC: 103 MEQ/L (ref 98–107)
CLARITY: CLEAR
CO2: 22 MEQ/L (ref 22–29)
COCAINE METABOLITE SCREEN URINE: POSITIVE
COLOR: YELLOW
CREAT SERPL-MCNC: 0.76 MG/DL (ref 0.5–0.9)
EOSINOPHILS ABSOLUTE: 0.1 K/UL (ref 0–0.7)
EOSINOPHILS RELATIVE PERCENT: 0.7 %
GFR AFRICAN AMERICAN: >60
GFR NON-AFRICAN AMERICAN: >60
GLOBULIN: 2.6 G/DL (ref 2.3–3.5)
GLUCOSE BLD-MCNC: 109 MG/DL (ref 74–109)
GLUCOSE URINE: NEGATIVE MG/DL
HCT VFR BLD CALC: 33.5 % (ref 37–47)
HEMOGLOBIN: 11.1 G/DL (ref 12–16)
KETONES, URINE: NEGATIVE MG/DL
LACTIC ACID: 0.8 MMOL/L (ref 0.5–2.2)
LEUKOCYTE ESTERASE, URINE: NEGATIVE
LYMPHOCYTES ABSOLUTE: 2.2 K/UL (ref 1–4.8)
LYMPHOCYTES RELATIVE PERCENT: 22.4 %
Lab: ABNORMAL
MCH RBC QN AUTO: 30.8 PG (ref 27–31.3)
MCHC RBC AUTO-ENTMCNC: 33.1 % (ref 33–37)
MCV RBC AUTO: 93.1 FL (ref 82–100)
MONOCYTES ABSOLUTE: 0.9 K/UL (ref 0.2–0.8)
MONOCYTES RELATIVE PERCENT: 8.9 %
NEUTROPHILS ABSOLUTE: 6.5 K/UL (ref 1.4–6.5)
NEUTROPHILS RELATIVE PERCENT: 66.7 %
NITRITE, URINE: NEGATIVE
OPIATE SCREEN URINE: POSITIVE
PDW BLD-RTO: 13.9 % (ref 11.5–14.5)
PH UA: 5.5 (ref 5–9)
PHENCYCLIDINE SCREEN URINE: ABNORMAL
PLATELET # BLD: 251 K/UL (ref 130–400)
POTASSIUM SERPL-SCNC: 4.4 MEQ/L (ref 3.5–5.1)
PROTEIN UA: NEGATIVE MG/DL
RBC # BLD: 3.6 M/UL (ref 4.2–5.4)
SEDIMENTATION RATE, ERYTHROCYTE: 59 MM (ref 0–30)
SODIUM BLD-SCNC: 138 MEQ/L (ref 132–144)
SPECIFIC GRAVITY UA: 1.01 (ref 1–1.03)
TOTAL PROTEIN: 6.4 G/DL (ref 6.4–8.1)
URINE REFLEX TO CULTURE: NORMAL
UROBILINOGEN, URINE: 1 E.U./DL
WBC # BLD: 9.8 K/UL (ref 4.8–10.8)

## 2018-03-20 PROCEDURE — 80307 DRUG TEST PRSMV CHEM ANLYZR: CPT

## 2018-03-20 PROCEDURE — 80053 COMPREHEN METABOLIC PANEL: CPT

## 2018-03-20 PROCEDURE — 1210000000 HC MED SURG R&B

## 2018-03-20 PROCEDURE — 6370000000 HC RX 637 (ALT 250 FOR IP): Performed by: INTERNAL MEDICINE

## 2018-03-20 PROCEDURE — 99222 1ST HOSP IP/OBS MODERATE 55: CPT | Performed by: INTERNAL MEDICINE

## 2018-03-20 PROCEDURE — G8988 SELF CARE GOAL STATUS: HCPCS

## 2018-03-20 PROCEDURE — G8987 SELF CARE CURRENT STATUS: HCPCS

## 2018-03-20 PROCEDURE — 85025 COMPLETE CBC W/AUTO DIFF WBC: CPT

## 2018-03-20 PROCEDURE — 87040 BLOOD CULTURE FOR BACTERIA: CPT

## 2018-03-20 PROCEDURE — 86141 C-REACTIVE PROTEIN HS: CPT

## 2018-03-20 PROCEDURE — 83605 ASSAY OF LACTIC ACID: CPT

## 2018-03-20 PROCEDURE — 6360000002 HC RX W HCPCS: Performed by: PHYSICIAN ASSISTANT

## 2018-03-20 PROCEDURE — 2580000003 HC RX 258: Performed by: INTERNAL MEDICINE

## 2018-03-20 PROCEDURE — 2580000003 HC RX 258: Performed by: PHYSICIAN ASSISTANT

## 2018-03-20 PROCEDURE — 81003 URINALYSIS AUTO W/O SCOPE: CPT

## 2018-03-20 PROCEDURE — 99285 EMERGENCY DEPT VISIT HI MDM: CPT

## 2018-03-20 PROCEDURE — 96374 THER/PROPH/DIAG INJ IV PUSH: CPT

## 2018-03-20 PROCEDURE — 73610 X-RAY EXAM OF ANKLE: CPT

## 2018-03-20 PROCEDURE — 85652 RBC SED RATE AUTOMATED: CPT

## 2018-03-20 PROCEDURE — 97165 OT EVAL LOW COMPLEX 30 MIN: CPT

## 2018-03-20 PROCEDURE — 36415 COLL VENOUS BLD VENIPUNCTURE: CPT

## 2018-03-20 PROCEDURE — 96375 TX/PRO/DX INJ NEW DRUG ADDON: CPT

## 2018-03-20 PROCEDURE — 6360000002 HC RX W HCPCS: Performed by: INTERNAL MEDICINE

## 2018-03-20 RX ORDER — BUSPIRONE HYDROCHLORIDE 5 MG/1
5 TABLET ORAL 3 TIMES DAILY
Status: DISCONTINUED | OUTPATIENT
Start: 2018-03-20 | End: 2018-03-23 | Stop reason: HOSPADM

## 2018-03-20 RX ORDER — DAPSONE 100 MG/1
100 TABLET ORAL DAILY
Status: DISCONTINUED | OUTPATIENT
Start: 2018-03-20 | End: 2018-03-23 | Stop reason: HOSPADM

## 2018-03-20 RX ORDER — TRAZODONE HYDROCHLORIDE 50 MG/1
100 TABLET ORAL NIGHTLY
Status: DISCONTINUED | OUTPATIENT
Start: 2018-03-20 | End: 2018-03-23 | Stop reason: HOSPADM

## 2018-03-20 RX ORDER — DOCUSATE SODIUM 100 MG/1
100 CAPSULE, LIQUID FILLED ORAL 2 TIMES DAILY
Status: DISCONTINUED | OUTPATIENT
Start: 2018-03-20 | End: 2018-03-23 | Stop reason: HOSPADM

## 2018-03-20 RX ORDER — ONDANSETRON 2 MG/ML
4 INJECTION INTRAMUSCULAR; INTRAVENOUS EVERY 6 HOURS PRN
Status: DISCONTINUED | OUTPATIENT
Start: 2018-03-20 | End: 2018-03-23 | Stop reason: HOSPADM

## 2018-03-20 RX ORDER — VANCOMYCIN HYDROCHLORIDE 1 G/200ML
15 INJECTION, SOLUTION INTRAVENOUS EVERY 12 HOURS
Status: DISCONTINUED | OUTPATIENT
Start: 2018-03-21 | End: 2018-03-22

## 2018-03-20 RX ORDER — ONDANSETRON 2 MG/ML
4 INJECTION INTRAMUSCULAR; INTRAVENOUS ONCE
Status: COMPLETED | OUTPATIENT
Start: 2018-03-20 | End: 2018-03-20

## 2018-03-20 RX ORDER — LORAZEPAM 1 MG/1
1 TABLET ORAL EVERY 6 HOURS PRN
Status: DISCONTINUED | OUTPATIENT
Start: 2018-03-20 | End: 2018-03-23 | Stop reason: HOSPADM

## 2018-03-20 RX ORDER — VANCOMYCIN HYDROCHLORIDE 1 G/200ML
1000 INJECTION, SOLUTION INTRAVENOUS EVERY 12 HOURS
Status: DISCONTINUED | OUTPATIENT
Start: 2018-03-20 | End: 2018-03-20

## 2018-03-20 RX ORDER — ACETAMINOPHEN 325 MG/1
650 TABLET ORAL EVERY 4 HOURS PRN
Status: DISCONTINUED | OUTPATIENT
Start: 2018-03-20 | End: 2018-03-23 | Stop reason: HOSPADM

## 2018-03-20 RX ORDER — FAMOTIDINE 20 MG/1
20 TABLET, FILM COATED ORAL 2 TIMES DAILY
Status: DISCONTINUED | OUTPATIENT
Start: 2018-03-20 | End: 2018-03-23 | Stop reason: HOSPADM

## 2018-03-20 RX ORDER — MORPHINE SULFATE 4 MG/ML
4 INJECTION, SOLUTION INTRAMUSCULAR; INTRAVENOUS EVERY 4 HOURS PRN
Status: DISCONTINUED | OUTPATIENT
Start: 2018-03-20 | End: 2018-03-23

## 2018-03-20 RX ORDER — SODIUM CHLORIDE 0.9 % (FLUSH) 0.9 %
10 SYRINGE (ML) INJECTION EVERY 12 HOURS SCHEDULED
Status: DISCONTINUED | OUTPATIENT
Start: 2018-03-20 | End: 2018-03-23 | Stop reason: HOSPADM

## 2018-03-20 RX ORDER — ROPINIROLE 1 MG/1
1 TABLET, FILM COATED ORAL 3 TIMES DAILY
Status: DISCONTINUED | OUTPATIENT
Start: 2018-03-20 | End: 2018-03-23 | Stop reason: HOSPADM

## 2018-03-20 RX ORDER — GABAPENTIN 300 MG/1
600 CAPSULE ORAL 3 TIMES DAILY
Status: DISCONTINUED | OUTPATIENT
Start: 2018-03-20 | End: 2018-03-23 | Stop reason: HOSPADM

## 2018-03-20 RX ORDER — SODIUM CHLORIDE 0.9 % (FLUSH) 0.9 %
10 SYRINGE (ML) INJECTION PRN
Status: DISCONTINUED | OUTPATIENT
Start: 2018-03-20 | End: 2018-03-23 | Stop reason: HOSPADM

## 2018-03-20 RX ORDER — ALBUTEROL SULFATE 2.5 MG/3ML
2.5 SOLUTION RESPIRATORY (INHALATION) EVERY 4 HOURS PRN
Status: DISCONTINUED | OUTPATIENT
Start: 2018-03-20 | End: 2018-03-23 | Stop reason: HOSPADM

## 2018-03-20 RX ORDER — MORPHINE SULFATE 4 MG/ML
4 INJECTION, SOLUTION INTRAMUSCULAR; INTRAVENOUS ONCE
Status: COMPLETED | OUTPATIENT
Start: 2018-03-20 | End: 2018-03-20

## 2018-03-20 RX ORDER — DULOXETIN HYDROCHLORIDE 60 MG/1
60 CAPSULE, DELAYED RELEASE ORAL 2 TIMES DAILY
Status: DISCONTINUED | OUTPATIENT
Start: 2018-03-20 | End: 2018-03-23 | Stop reason: HOSPADM

## 2018-03-20 RX ORDER — 0.9 % SODIUM CHLORIDE 0.9 %
500 INTRAVENOUS SOLUTION INTRAVENOUS ONCE
Status: COMPLETED | OUTPATIENT
Start: 2018-03-20 | End: 2018-03-20

## 2018-03-20 RX ADMIN — MORPHINE SULFATE 4 MG: 4 INJECTION, SOLUTION INTRAMUSCULAR; INTRAVENOUS at 06:57

## 2018-03-20 RX ADMIN — VANCOMYCIN HYDROCHLORIDE 1000 MG: 1 INJECTION, SOLUTION INTRAVENOUS at 14:07

## 2018-03-20 RX ADMIN — MORPHINE SULFATE 4 MG: 4 INJECTION, SOLUTION INTRAMUSCULAR; INTRAVENOUS at 19:19

## 2018-03-20 RX ADMIN — GABAPENTIN 600 MG: 300 CAPSULE ORAL at 14:16

## 2018-03-20 RX ADMIN — MORPHINE SULFATE 4 MG: 4 INJECTION, SOLUTION INTRAMUSCULAR; INTRAVENOUS at 15:01

## 2018-03-20 RX ADMIN — GABAPENTIN 600 MG: 300 CAPSULE ORAL at 20:18

## 2018-03-20 RX ADMIN — FAMOTIDINE 20 MG: 20 TABLET, FILM COATED ORAL at 20:18

## 2018-03-20 RX ADMIN — GABAPENTIN 600 MG: 300 CAPSULE ORAL at 10:52

## 2018-03-20 RX ADMIN — DULOXETINE HYDROCHLORIDE 60 MG: 60 CAPSULE, DELAYED RELEASE ORAL at 10:52

## 2018-03-20 RX ADMIN — ONDANSETRON 4 MG: 2 INJECTION, SOLUTION INTRAMUSCULAR; INTRAVENOUS at 06:57

## 2018-03-20 RX ADMIN — DOCUSATE SODIUM 100 MG: 100 CAPSULE, LIQUID FILLED ORAL at 20:17

## 2018-03-20 RX ADMIN — Medication 10 ML: at 14:11

## 2018-03-20 RX ADMIN — TRAZODONE HYDROCHLORIDE 100 MG: 50 TABLET ORAL at 20:18

## 2018-03-20 RX ADMIN — BUSPIRONE HYDROCHLORIDE 5 MG: 5 TABLET ORAL at 20:18

## 2018-03-20 RX ADMIN — FAMOTIDINE 20 MG: 20 TABLET, FILM COATED ORAL at 10:53

## 2018-03-20 RX ADMIN — MORPHINE SULFATE 4 MG: 4 INJECTION, SOLUTION INTRAMUSCULAR; INTRAVENOUS at 10:49

## 2018-03-20 RX ADMIN — SODIUM CHLORIDE 500 ML: 9 INJECTION, SOLUTION INTRAVENOUS at 06:56

## 2018-03-20 RX ADMIN — ROPINIROLE HYDROCHLORIDE 1 MG: 1 TABLET, FILM COATED ORAL at 10:52

## 2018-03-20 RX ADMIN — DOCUSATE SODIUM 100 MG: 100 CAPSULE, LIQUID FILLED ORAL at 10:52

## 2018-03-20 RX ADMIN — CYCLOSPORINE 125 MG: 100 CAPSULE, GELATIN COATED ORAL at 14:06

## 2018-03-20 RX ADMIN — DULOXETINE HYDROCHLORIDE 60 MG: 60 CAPSULE, DELAYED RELEASE ORAL at 20:17

## 2018-03-20 RX ADMIN — Medication 10 ML: at 20:21

## 2018-03-20 RX ADMIN — ROPINIROLE HYDROCHLORIDE 1 MG: 1 TABLET, FILM COATED ORAL at 14:16

## 2018-03-20 RX ADMIN — BUSPIRONE HYDROCHLORIDE 5 MG: 5 TABLET ORAL at 14:16

## 2018-03-20 RX ADMIN — ROPINIROLE HYDROCHLORIDE 1 MG: 1 TABLET, FILM COATED ORAL at 20:18

## 2018-03-20 RX ADMIN — ENOXAPARIN SODIUM 40 MG: 40 INJECTION SUBCUTANEOUS at 10:53

## 2018-03-20 RX ADMIN — BUSPIRONE HYDROCHLORIDE 5 MG: 5 TABLET ORAL at 10:52

## 2018-03-20 ASSESSMENT — PAIN DESCRIPTION - PAIN TYPE
TYPE: ACUTE PAIN

## 2018-03-20 ASSESSMENT — PAIN DESCRIPTION - ORIENTATION
ORIENTATION: RIGHT

## 2018-03-20 ASSESSMENT — PAIN DESCRIPTION - LOCATION
LOCATION: ANKLE

## 2018-03-20 ASSESSMENT — ENCOUNTER SYMPTOMS
BACK PAIN: 0
TROUBLE SWALLOWING: 0
ABDOMINAL PAIN: 0
SORE THROAT: 0
SHORTNESS OF BREATH: 0
PHOTOPHOBIA: 0
EYES NEGATIVE: 1
COUGH: 0
RESPIRATORY NEGATIVE: 1
GASTROINTESTINAL NEGATIVE: 1
VOMITING: 0

## 2018-03-20 ASSESSMENT — PAIN SCALES - GENERAL
PAINLEVEL_OUTOF10: 5
PAINLEVEL_OUTOF10: 10
PAINLEVEL_OUTOF10: 10
PAINLEVEL_OUTOF10: 5
PAINLEVEL_OUTOF10: 5
PAINLEVEL_OUTOF10: 10
PAINLEVEL_OUTOF10: 9
PAINLEVEL_OUTOF10: 10

## 2018-03-20 ASSESSMENT — PAIN DESCRIPTION - FREQUENCY: FREQUENCY: CONTINUOUS

## 2018-03-20 ASSESSMENT — PAIN DESCRIPTION - DESCRIPTORS: DESCRIPTORS: THROBBING;NAGGING;ACHING

## 2018-03-20 NOTE — PROGRESS NOTES
Disorder  (acute or chronic)  [x]   Severe or Chronic w/ Exacerbation  []     Surgical Status No [x]   Surgeries     General []   Surgery Lower []   Abdominal Thoracic or []   Upper Abdominal Thoracic with  PulmonaryDisorder  []     Chest X-ray Clear/Not  Ordered     [x]  Chronic Changes  Results Pending  []  Infiltrates, atelectasis, pleural effusion, or edema  []  Infiltrates in more than one lobe []  Infiltrate + Atelectasis, &/or pleural effusion  []    Respiratory Pattern Regular,  RR = 12-20 [x]  Increased,  RR = 21-25 []  CHU, irregular,  or RR = 26-30 []  Decreased FEV1  or RR = 31-35 []  Severe SOB, use  of accessory muscles, or RR ? 35  []    Mental Status Alert, oriented,  Cooperative [x]  Confused but Follows commands []  Lethargic or unable to follow commands []  Obtunded  []  Comatose  []    Breath Sounds Clear to  auscultation  [x]  Decreased unilaterally or  in bases only []  Decreased  bilaterally  []  Crackles or intermittent wheezes []  Wheezes []    Cough Strong, Spontan., & nonproductive [x]  Strong,  spontaneous, &  productive []  Weak,  Nonproductive []  Weak, productive or  with wheezes []  No spontaneous  cough or may require suctioning []    Level of Activity Ambulatory [x]  Ambulatory w/ Assist  []  Non-ambulatory []  Paraplegic []  Quadriplegic []    Total    Score:__3_____     Triage Score:__5______      Tri       Triage:     1. (>20) Freq: Q3    2. (16-20) Freq: Q4   3. (11-15) Freq: QID & Albuterol Q2 PRN    4. (6-10) Freq: TID & Albuterol Q2 PRN    5. (0-5) Freq Q4prn

## 2018-03-20 NOTE — CONSULTS
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    Neuromuscular disorder (HCC)     Osteoarthritis     Pneumonia due to organism     Pyoderma gangrenosa     Ulcerative colitis ([facility]        Past Surgical History:   Procedure Laterality Date    ABDOMEN SURGERY      sleen repair    APPENDECTOMY      BACK SURGERY      BREAST SURGERY      fatty tumor removed, benign    COLONOSCOPY      COSMETIC SURGERY      tummy tuck    EYE SURGERY      bilateral cataract surgery    HYSTERECTOMY      SKIN BIOPSY      SPINAL FUSION      TONSILLECTOMY         No current facility-administered medications on file prior to encounter. Current Outpatient Prescriptions on File Prior to Encounter   Medication Sig Dispense Refill    busPIRone (BUSPAR) 5 MG tablet Take 5 mg by mouth 3 times daily      acetaminophen (TYLENOL) 325 MG tablet Take 650 mg by mouth every 4 hours as needed for Pain      albuterol (PROVENTIL) (2.5 MG/3ML) 0.083% nebulizer solution Take 2.5 mg by nebulization every 4 hours as needed for Wheezing (night time)       cycloSPORINE (SANDIMMUNE) 100 MG capsule Take 125 mg by mouth 2 times daily      DULoxetine (CYMBALTA) 60 MG extended release capsule Take 60 mg by mouth 2 times daily      dapsone 100 MG tablet Take 100 mg by mouth daily      gabapentin (NEURONTIN) 300 MG capsule Take 600 mg by mouth 3 times daily       LORazepam (ATIVAN) 1 MG tablet Take 1 mg by mouth every 6 hours as needed for Anxiety      meloxicam (MOBIC) 15 MG tablet Take 15 mg by mouth daily      rOPINIRole (REQUIP) 1 MG tablet Take 1 mg by mouth 3 times daily       traZODone (DESYREL) 100 MG tablet Take 100 mg by mouth nightly         Allergies   Allergen Reactions    Amoxicillin-Pot Clavulanate      Other reaction(s): GI Upset, Intolerance, Other: See Comments  Nose bleed    Other Itching     IVP dye         History reviewed.  No pertinent family AM    The resident/student was under my direct supervision during today's patient encounter. reflection of my personal examination, assessment and treatment plan. The patient was seen and examined with the resident/student. The above findings and recommendations were reviewed and I agree with above treatment plan. Any questions or concerns, please Dr. Debbie Pérez or podiatry resident on call.     Mauricio Tobar DPM  Podiatry Attending  03/21/18  9:11 AM

## 2018-03-20 NOTE — ED NOTES
Report given to Evelyne Solomon on [address], [facility]  03/20/18 6794

## 2018-03-20 NOTE — PLAN OF CARE
Problem: Activity:  Goal: Ability to tolerate increased activity will improve  Ability to tolerate increased activity will improve  Outcome: Ongoing  See pt's current OT evaluation and/or progress notes for pt's present level of function. Pt's OT POC addresses pt's need for increased activity tolerance to complete ADLs with greater safety and independence.

## 2018-03-20 NOTE — H&P
Fabricio Gunter is an 47 y.o.  female. presents for evaluation of Redness, swelling and tenderness to the right lower leg/ ankle area. Patient states that this has been intermittently present over the past several months, but has worsened since Friday. She does report a history of leukemia with pyoderma gangrenosa that is being followed by dermatology at the [facility]. She states she saw her dermatologist approximately one month ago and at that time things were improving. She denies fever, but does report chills. She was seen at [facility] ED yesterday for same. Chart review does reveal normal laboratory evaluation and negative DVT study.       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       Current Outpatient Prescriptions on File Prior to Encounter   Medication Sig Dispense Refill    busPIRone (BUSPAR) 5 MG tablet Take 5 mg by mouth 3 times daily      acetaminophen (TYLENOL) 325 MG tablet Take 650 mg by mouth every 4 hours as needed for Pain      albuterol (PROVENTIL) (2.5 MG/3ML) 0.083% nebulizer solution Take 2.5 mg by nebulization every 4 hours as needed for Wheezing (night time)       cycloSPORINE (SANDIMMUNE) 100 MG capsule Take 125 mg by mouth 2 times daily      DULoxetine (CYMBALTA) 60 MG extended release capsule Take 60 mg by mouth 2 times daily      dapsone 100 MG tablet Take 100 mg by mouth daily      gabapentin (NEURONTIN) 300 MG capsule Take 600 mg by mouth 3 times daily       LORazepam (ATIVAN) 1 MG tablet Take 1 mg by mouth every 6 hours as needed for Anxiety      meloxicam (MOBIC) 15 MG tablet Take 15 mg by mouth daily      rOPINIRole (REQUIP) 1 MG tablet Take 1 mg by mouth 3 times daily       traZODone (DESYREL) 100 MG tablet Take 100 mg by mouth nightly       Social History     Social History    Marital status:      Spouse name: N/A    Number of children: N/A    Years of education: N/A     Occupational History    Not on file. Social History Main Topics    Smoking status: Current Every Day Smoker     Packs/day: 0.50     Years: 40.00     Types: Cigarettes    Smokeless tobacco: Never Used    Alcohol use No      Comment: ocassionally    Drug use: No    Sexual activity: Not on file     Other Topics Concern    Not on file     Social History Narrative    No narrative on file     History reviewed. No pertinent family history. Past Surgical History:   Procedure Laterality Date    ABDOMEN SURGERY      sleen repair    APPENDECTOMY      BACK SURGERY      BREAST SURGERY      fatty tumor removed, benign    COLONOSCOPY      COSMETIC SURGERY      tummy tuck    EYE SURGERY      bilateral cataract surgery    HYSTERECTOMY      SKIN BIOPSY      SPINAL FUSION      TONSILLECTOMY         Allergies: Allergies   Allergen Reactions    Amoxicillin-Pot Clavulanate      Other reaction(s): GI Upset, Intolerance, Other: See Comments  Nose bleed    Other Itching     IVP dye         Principal Problem:    Cellulitis of right leg  Active Problems:    Chronic pain    Cocaine use    Pyoderma gangrenosum    Anemia    Obesity    COPD (chronic obstructive pulmonary disease) (HCC)    Fibromyalgia    Depression  Resolved Problems:    Recurrent cellulitis of lower leg    Blood pressure 105/64, pulse 67, temperature 99.1 °F (37.3 °C), temperature source Oral, resp.  rate 16, weight 155 lb (70.3 kg), last menstrual period 01/01/1997, SpO2 96

## 2018-03-21 ENCOUNTER — APPOINTMENT (OUTPATIENT)
Dept: MRI IMAGING | Age: 53
DRG: 603 | End: 2018-03-21
Payer: COMMERCIAL

## 2018-03-21 PROCEDURE — 6360000002 HC RX W HCPCS: Performed by: INTERNAL MEDICINE

## 2018-03-21 PROCEDURE — 1210000000 HC MED SURG R&B

## 2018-03-21 PROCEDURE — 2700000000 HC OXYGEN THERAPY PER DAY

## 2018-03-21 PROCEDURE — 99232 SBSQ HOSP IP/OBS MODERATE 35: CPT | Performed by: INTERNAL MEDICINE

## 2018-03-21 PROCEDURE — 6370000000 HC RX 637 (ALT 250 FOR IP): Performed by: INTERNAL MEDICINE

## 2018-03-21 PROCEDURE — 73721 MRI JNT OF LWR EXTRE W/O DYE: CPT

## 2018-03-21 PROCEDURE — 2580000003 HC RX 258: Performed by: INTERNAL MEDICINE

## 2018-03-21 RX ORDER — KETOROLAC TROMETHAMINE 15 MG/ML
15 INJECTION, SOLUTION INTRAMUSCULAR; INTRAVENOUS EVERY 6 HOURS PRN
Status: DISCONTINUED | OUTPATIENT
Start: 2018-03-21 | End: 2018-03-23 | Stop reason: HOSPADM

## 2018-03-21 RX ADMIN — ACETAMINOPHEN 650 MG: 325 TABLET ORAL at 04:55

## 2018-03-21 RX ADMIN — KETOROLAC TROMETHAMINE 15 MG: 15 INJECTION, SOLUTION INTRAMUSCULAR; INTRAVENOUS at 23:21

## 2018-03-21 RX ADMIN — DULOXETINE HYDROCHLORIDE 60 MG: 60 CAPSULE, DELAYED RELEASE ORAL at 23:24

## 2018-03-21 RX ADMIN — DOCUSATE SODIUM 100 MG: 100 CAPSULE, LIQUID FILLED ORAL at 23:23

## 2018-03-21 RX ADMIN — LORAZEPAM 1 MG: 1 TABLET ORAL at 23:23

## 2018-03-21 RX ADMIN — BUSPIRONE HYDROCHLORIDE 5 MG: 5 TABLET ORAL at 23:23

## 2018-03-21 RX ADMIN — MORPHINE SULFATE 4 MG: 4 INJECTION, SOLUTION INTRAMUSCULAR; INTRAVENOUS at 01:56

## 2018-03-21 RX ADMIN — ROPINIROLE HYDROCHLORIDE 1 MG: 1 TABLET, FILM COATED ORAL at 15:08

## 2018-03-21 RX ADMIN — Medication 10 ML: at 23:27

## 2018-03-21 RX ADMIN — ROPINIROLE HYDROCHLORIDE 1 MG: 1 TABLET, FILM COATED ORAL at 09:04

## 2018-03-21 RX ADMIN — DULOXETINE HYDROCHLORIDE 60 MG: 60 CAPSULE, DELAYED RELEASE ORAL at 09:04

## 2018-03-21 RX ADMIN — VANCOMYCIN HYDROCHLORIDE 1000 MG: 1 INJECTION, SOLUTION INTRAVENOUS at 01:56

## 2018-03-21 RX ADMIN — FAMOTIDINE 20 MG: 20 TABLET, FILM COATED ORAL at 09:05

## 2018-03-21 RX ADMIN — BUSPIRONE HYDROCHLORIDE 5 MG: 5 TABLET ORAL at 15:09

## 2018-03-21 RX ADMIN — LORAZEPAM 1 MG: 1 TABLET ORAL at 12:07

## 2018-03-21 RX ADMIN — GABAPENTIN 600 MG: 300 CAPSULE ORAL at 15:08

## 2018-03-21 RX ADMIN — GABAPENTIN 600 MG: 300 CAPSULE ORAL at 23:23

## 2018-03-21 RX ADMIN — KETOROLAC TROMETHAMINE 15 MG: 15 INJECTION, SOLUTION INTRAMUSCULAR; INTRAVENOUS at 11:29

## 2018-03-21 RX ADMIN — VANCOMYCIN HYDROCHLORIDE 1000 MG: 1 INJECTION, SOLUTION INTRAVENOUS at 15:08

## 2018-03-21 RX ADMIN — MORPHINE SULFATE 4 MG: 4 INJECTION, SOLUTION INTRAMUSCULAR; INTRAVENOUS at 12:07

## 2018-03-21 RX ADMIN — Medication 10 ML: at 09:10

## 2018-03-21 RX ADMIN — ENOXAPARIN SODIUM 40 MG: 40 INJECTION SUBCUTANEOUS at 09:05

## 2018-03-21 RX ADMIN — FAMOTIDINE 20 MG: 20 TABLET, FILM COATED ORAL at 23:23

## 2018-03-21 RX ADMIN — MORPHINE SULFATE 4 MG: 4 INJECTION, SOLUTION INTRAMUSCULAR; INTRAVENOUS at 06:02

## 2018-03-21 RX ADMIN — DOCUSATE SODIUM 100 MG: 100 CAPSULE, LIQUID FILLED ORAL at 09:04

## 2018-03-21 RX ADMIN — TRAZODONE HYDROCHLORIDE 100 MG: 50 TABLET ORAL at 23:24

## 2018-03-21 RX ADMIN — ROPINIROLE HYDROCHLORIDE 1 MG: 1 TABLET, FILM COATED ORAL at 23:28

## 2018-03-21 RX ADMIN — GABAPENTIN 600 MG: 300 CAPSULE ORAL at 09:04

## 2018-03-21 RX ADMIN — MORPHINE SULFATE 4 MG: 4 INJECTION, SOLUTION INTRAMUSCULAR; INTRAVENOUS at 20:27

## 2018-03-21 RX ADMIN — Medication 10 ML: at 01:57

## 2018-03-21 RX ADMIN — BUSPIRONE HYDROCHLORIDE 5 MG: 5 TABLET ORAL at 09:04

## 2018-03-21 ASSESSMENT — PAIN DESCRIPTION - LOCATION
LOCATION: ANKLE

## 2018-03-21 ASSESSMENT — PAIN DESCRIPTION - FREQUENCY
FREQUENCY: CONTINUOUS

## 2018-03-21 ASSESSMENT — PAIN DESCRIPTION - DESCRIPTORS
DESCRIPTORS: ACHING;NAGGING;THROBBING
DESCRIPTORS: ACHING;NAGGING;THROBBING
DESCRIPTORS: ACHING;THROBBING;NAGGING
DESCRIPTORS: THROBBING;ACHING;NAGGING
DESCRIPTORS: ACHING;NAGGING;THROBBING
DESCRIPTORS: ACHING;NAGGING;THROBBING

## 2018-03-21 ASSESSMENT — ENCOUNTER SYMPTOMS
VOMITING: 0
SHORTNESS OF BREATH: 0
NAUSEA: 0
RESPIRATORY NEGATIVE: 1

## 2018-03-21 ASSESSMENT — PAIN DESCRIPTION - ORIENTATION
ORIENTATION: RIGHT

## 2018-03-21 ASSESSMENT — PAIN SCALES - GENERAL
PAINLEVEL_OUTOF10: 8
PAINLEVEL_OUTOF10: 8
PAINLEVEL_OUTOF10: 6
PAINLEVEL_OUTOF10: 4
PAINLEVEL_OUTOF10: 9
PAINLEVEL_OUTOF10: 9
PAINLEVEL_OUTOF10: 6
PAINLEVEL_OUTOF10: 6
PAINLEVEL_OUTOF10: 3
PAINLEVEL_OUTOF10: 10
PAINLEVEL_OUTOF10: 6
PAINLEVEL_OUTOF10: 9
PAINLEVEL_OUTOF10: 10
PAINLEVEL_OUTOF10: 6

## 2018-03-21 ASSESSMENT — PAIN DESCRIPTION - PAIN TYPE
TYPE: ACUTE PAIN

## 2018-03-21 NOTE — PROGRESS NOTES
[facility]   [facility]    NAME: Raisa Storey    : 1965 (47 y.o.)  MRN: [id number]    Account: [de-identified]  Gender: female    PT evaluation and treatment orders received. Chart reviewed. PT eval attempted. Patient Unavailable: off unit at MRI      Will attempt PT evaluation again at earliest convenience.       Electronically signed by Helio Sánchez PT on 3/21/18 at 1:18 PM

## 2018-03-21 NOTE — PROGRESS NOTES
outlined above. Darshan Brock, PGY-1  Please first page Podiatry On Call, [phone]  March 21, 2018  1:33 PM    The resident/student was under my direct supervision during today's patient encounter. reflection of my personal examination, assessment and treatment plan. The patient was seen and examined with the resident/student. The above findings and recommendations were reviewed and I agree with above treatment plan. Any questions or concerns, please Dr. Loki Garner or podiatry resident on call.     Betsy Childers DPM  Podiatry Attending  03/22/18  2:03 PM

## 2018-03-21 NOTE — PROGRESS NOTES
tenderness. There is no rebound tenderness. There is no guarding. There is no mass. There is no splenomegaly. There is no hepatomegaly. Extremities: (Right leg remains somewhat red and warm and tender)  Neurological: Patient is alert. MRI ANKLE RIGHT WO CONTRAST   Status: Final result   Order Providers     Authorizing Billing   TERELL [place]   Replaced:  MRI ANKLE RIGHT W WO CONTRAST          Signed by     Signed Date/Time  Phone Pager   Sam Emmanuel 3/21/2018 15:59 [phone]    [facility]     Read Date Phone Pager   Sam Emmanuel Mar 21, 2018 [phone]    Radiation Dose Estimates     No radiation information found for this patient   Narrative       Patient: Milli Allen  Time Out: 15:59   Exam(s): MRI RIGHT ANKLE Without Contrast        EXAM:     MR Right Lower Extremity Without Intravenous Contrast, Ankle       CLINICAL HISTORY:      Reason for exam: possible right ankle abscess. . Additional notes: 391    images       TECHNIQUE:     Multiplanar magnetic resonance images of the right ankle without    intravenous contrast.       COMPARISON:     No relevant prior studies available.       FINDINGS:     Artifacts:  Exam is limited by motion artifact.    LIGAMENTS:     Anterior talofibular:  Unremarkable.     Posterior talofibular:  Unremarkable.     Anterior tibiofibular:  Unremarkable.     Posterior tibiofibular: Milon SealOdetta Plants.     Deltoid: Hurman Kays.     Spring:  Unremarkable.     Lisfranc:  Unremarkable.        TENDONS:     Achilles:  There is T2 signal hyperintensity extending through the full-   thickness of the Achilles tendon.  No evidence of rupture.  The signal is    located 7.8 cm from the insertion on the Achilles tendon.  Skin defect    noted overlying the T2 signal hyperintensity.  Edema and a small amount    of fluid are seen underlying the Achilles tendon defect in an area    measuring approximately

## 2018-03-21 NOTE — PROGRESS NOTES
Pt had a temp of 102.2  Tylenol given. Rt foot ankle area draining small amount of bloody drainage. abd. Dressing applied.

## 2018-03-21 NOTE — PROGRESS NOTES
Late entry  Patient returned from MRI at approximately 14:30. There were two people waiting in her room for her to return, one male and one female. Angélica  went to see patient and called me to tell me that she was acting really strange and unable to answer questions. Spoke with Betty Gatica,  regarding concerns of patient taking other medications that are not ordered for her. At this time we are keeping patient door open and monitoring patient often. Jany Monroe RN  will speak with Jessica LEIVA when she returns.

## 2018-03-22 LAB — VANCOMYCIN TROUGH: 9.8 UG/ML (ref 10–20)

## 2018-03-22 PROCEDURE — 6370000000 HC RX 637 (ALT 250 FOR IP): Performed by: INTERNAL MEDICINE

## 2018-03-22 PROCEDURE — 2580000003 HC RX 258: Performed by: INTERNAL MEDICINE

## 2018-03-22 PROCEDURE — 6370000000 HC RX 637 (ALT 250 FOR IP): Performed by: HOSPITALIST

## 2018-03-22 PROCEDURE — 6360000002 HC RX W HCPCS: Performed by: INTERNAL MEDICINE

## 2018-03-22 PROCEDURE — 2700000000 HC OXYGEN THERAPY PER DAY

## 2018-03-22 PROCEDURE — 80202 ASSAY OF VANCOMYCIN: CPT

## 2018-03-22 PROCEDURE — 36415 COLL VENOUS BLD VENIPUNCTURE: CPT

## 2018-03-22 PROCEDURE — 97162 PT EVAL MOD COMPLEX 30 MIN: CPT

## 2018-03-22 PROCEDURE — 1210000000 HC MED SURG R&B

## 2018-03-22 PROCEDURE — 99232 SBSQ HOSP IP/OBS MODERATE 35: CPT | Performed by: INTERNAL MEDICINE

## 2018-03-22 PROCEDURE — G8978 MOBILITY CURRENT STATUS: HCPCS

## 2018-03-22 PROCEDURE — G8979 MOBILITY GOAL STATUS: HCPCS

## 2018-03-22 RX ORDER — NICOTINE 21 MG/24HR
1 PATCH, TRANSDERMAL 24 HOURS TRANSDERMAL DAILY
Status: DISCONTINUED | OUTPATIENT
Start: 2018-03-22 | End: 2018-03-23 | Stop reason: HOSPADM

## 2018-03-22 RX ADMIN — ROPINIROLE HYDROCHLORIDE 1 MG: 1 TABLET, FILM COATED ORAL at 09:39

## 2018-03-22 RX ADMIN — VANCOMYCIN HYDROCHLORIDE 1000 MG: 1 INJECTION, SOLUTION INTRAVENOUS at 02:58

## 2018-03-22 RX ADMIN — Medication 10 ML: at 22:03

## 2018-03-22 RX ADMIN — GABAPENTIN 600 MG: 300 CAPSULE ORAL at 14:32

## 2018-03-22 RX ADMIN — DOCUSATE SODIUM 100 MG: 100 CAPSULE, LIQUID FILLED ORAL at 09:39

## 2018-03-22 RX ADMIN — FAMOTIDINE 20 MG: 20 TABLET, FILM COATED ORAL at 20:31

## 2018-03-22 RX ADMIN — DULOXETINE HYDROCHLORIDE 60 MG: 60 CAPSULE, DELAYED RELEASE ORAL at 20:31

## 2018-03-22 RX ADMIN — KETOROLAC TROMETHAMINE 15 MG: 15 INJECTION, SOLUTION INTRAMUSCULAR; INTRAVENOUS at 09:38

## 2018-03-22 RX ADMIN — GABAPENTIN 600 MG: 300 CAPSULE ORAL at 20:31

## 2018-03-22 RX ADMIN — ENOXAPARIN SODIUM 40 MG: 40 INJECTION SUBCUTANEOUS at 09:39

## 2018-03-22 RX ADMIN — FAMOTIDINE 20 MG: 20 TABLET, FILM COATED ORAL at 09:39

## 2018-03-22 RX ADMIN — DOCUSATE SODIUM 100 MG: 100 CAPSULE, LIQUID FILLED ORAL at 20:31

## 2018-03-22 RX ADMIN — BUSPIRONE HYDROCHLORIDE 5 MG: 5 TABLET ORAL at 20:31

## 2018-03-22 RX ADMIN — TRAZODONE HYDROCHLORIDE 100 MG: 50 TABLET ORAL at 20:31

## 2018-03-22 RX ADMIN — MORPHINE SULFATE 4 MG: 4 INJECTION, SOLUTION INTRAMUSCULAR; INTRAVENOUS at 22:02

## 2018-03-22 RX ADMIN — BUSPIRONE HYDROCHLORIDE 5 MG: 5 TABLET ORAL at 09:39

## 2018-03-22 RX ADMIN — MORPHINE SULFATE 4 MG: 4 INJECTION, SOLUTION INTRAMUSCULAR; INTRAVENOUS at 06:07

## 2018-03-22 RX ADMIN — VANCOMYCIN HYDROCHLORIDE 1250 MG: 5 INJECTION, POWDER, LYOPHILIZED, FOR SOLUTION INTRAVENOUS at 15:18

## 2018-03-22 RX ADMIN — DULOXETINE HYDROCHLORIDE 60 MG: 60 CAPSULE, DELAYED RELEASE ORAL at 09:39

## 2018-03-22 RX ADMIN — ROPINIROLE HYDROCHLORIDE 1 MG: 1 TABLET, FILM COATED ORAL at 14:33

## 2018-03-22 RX ADMIN — BUSPIRONE HYDROCHLORIDE 5 MG: 5 TABLET ORAL at 14:33

## 2018-03-22 RX ADMIN — ROPINIROLE HYDROCHLORIDE 1 MG: 1 TABLET, FILM COATED ORAL at 20:31

## 2018-03-22 RX ADMIN — LORAZEPAM 1 MG: 1 TABLET ORAL at 09:39

## 2018-03-22 RX ADMIN — GABAPENTIN 600 MG: 300 CAPSULE ORAL at 09:39

## 2018-03-22 RX ADMIN — MORPHINE SULFATE 4 MG: 4 INJECTION, SOLUTION INTRAMUSCULAR; INTRAVENOUS at 11:14

## 2018-03-22 RX ADMIN — MORPHINE SULFATE 4 MG: 4 INJECTION, SOLUTION INTRAMUSCULAR; INTRAVENOUS at 17:59

## 2018-03-22 RX ADMIN — Medication 10 ML: at 09:40

## 2018-03-22 ASSESSMENT — PAIN SCALES - GENERAL
PAINLEVEL_OUTOF10: 8
PAINLEVEL_OUTOF10: 8
PAINLEVEL_OUTOF10: 6
PAINLEVEL_OUTOF10: 8
PAINLEVEL_OUTOF10: 8
PAINLEVEL_OUTOF10: 6

## 2018-03-22 ASSESSMENT — ENCOUNTER SYMPTOMS
NAUSEA: 0
VOMITING: 0

## 2018-03-22 ASSESSMENT — PAIN DESCRIPTION - LOCATION: LOCATION: ANKLE

## 2018-03-22 ASSESSMENT — PAIN DESCRIPTION - PAIN TYPE: TYPE: ACUTE PAIN

## 2018-03-22 ASSESSMENT — PAIN DESCRIPTION - FREQUENCY: FREQUENCY: CONTINUOUS

## 2018-03-22 ASSESSMENT — PAIN DESCRIPTION - DESCRIPTORS: DESCRIPTORS: ACHING

## 2018-03-22 NOTE — PROGRESS NOTES
Physical Therapy Med Surg Initial Assessment  Facility/Department[facility]  [facility]       NAME: Jesus Marx  : 1965 (47 y.o.)  MRN: [id number]  CODE STATUS: Full Code    Date of Service: 3/22/2018    Patient Diagnosis(es): Recurrent cellulitis of lower leg [U14.357]   Chief Complaint   Patient presents with    Wound Infection     Patient Active Problem List    Diagnosis Date Noted    Depression     [facility] [address]     Anemia 2017    Obesity 2017    Pyoderma gangrenosum 2017    Cellulitis of right leg 2017    Fibromyalgia 2017    Cocaine use 2015    Chronic pain 2014        Past Medical History:   Diagnosis Date    Anxiety     Arthritis     Cancer ([facility] [address] 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    Chronic kidney disease     COPD exacerbation ([facility] [address]     Depression     Disease of blood and blood forming organ 2017    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    Neuromuscular disorder ([facility] [address]     Osteoarthritis     Pneumonia due to organism     Pyoderma gangrenosa     Ulcerative colitis ([facility] [address]      Past Surgical History:   Procedure Laterality Date    ABDOMEN SURGERY      sleen repair    APPENDECTOMY      BACK SURGERY      BREAST SURGERY      fatty tumor removed, benign    COLONOSCOPY      COSMETIC SURGERY      tummy tuck    EYE SURGERY      bilateral cataract surgery    HYSTERECTOMY      SKIN BIOPSY      SPINAL FUSION      TONSILLECTOMY         Chart Reviewed: Yes  Patient assessed for rehabilitation services?: Yes  Family / Caregiver Present: Yes    Restrictions:  Restrictions/Precautions: Fall Risk, Weight Bearing  Lower Extremity Weight Bearing Restrictions  Right Lower Extremity Weight Bearing: Non Weight Bearing  Partial Weight Bearing Percentage Or Pounds: Pt reports that podiatrist told her she

## 2018-03-22 NOTE — CARE COORDINATION
Pt was residing at the [facility] homeless shelter. Met with her due to current care needs and recommendation for SNF at [place]. Pt in agreement and would like DC to [facility] first and [facility] 2nd. Referral made to [facility]. Pre-cert will be needed.

## 2018-03-22 NOTE — PROGRESS NOTES
mg Oral BID Citlali Lundberg MD   20 mg at 03/22/18 6853    enoxaparin (LOVENOX) injection 40 mg  40 mg Subcutaneous Daily Citlali Lundberg MD   40 mg at 03/22/18 6083    vancomycin (VANCOCIN) intermittent dosing (placeholder)   Other RX Placeholder Shirley Hilario MD         Allergies   Allergen Reactions    Amoxicillin-Pot Clavulanate      Other reaction(s): GI Upset, Intolerance, Other: See Comments  Nose bleed    Other Itching     IVP dye       Principal Problem:    Cellulitis of right leg  Active Problems:    Chronic pain    Cocaine use    Pyoderma gangrenosum    Anemia    Obesity    COPD (chronic obstructive pulmonary disease) ([facility])    Fibromyalgia    Depression  Resolved Problems:    Recurrent cellulitis of lower leg    Blood pressure (!) 137/55, pulse 67, temperature 97.9 °F (36.6 °C), temperature source Oral, resp. rate 16, height 5' 4.17\" (1.63 m), weight 155 lb (70.3 kg), last menstrual period 01/01/1997, SpO2 92 %. Subjective:  Symptoms:  (Complains of right ankle pain). Diet:  Adequate intake. No nausea or vomiting. Activity level: Impaired due to pain. Pain:  She complains of pain that is moderate. She reports pain is unchanged. Pain is requiring pain medication. Objective:  General Appearance: In no acute distress. Vital signs: (most recent): Blood pressure (!) 137/55, pulse 67, temperature 97.9 °F (36.6 °C), temperature source Oral, resp. rate 16, height 5' 4.17\" (1.63 m), weight 155 lb (70.3 kg), last menstrual period 01/01/1997, SpO2 92 %. Vital signs are normal.  No fever. Output: Producing urine. HEENT: Normal HEENT exam.    Lungs:  Normal effort and normal respiratory rate. Heart: Normal rate. Regular rhythm. Abdomen: Abdomen is soft. Hypoactive bowel sounds. There is no abdominal tenderness. Extremities: Decreased range of motion. Pulses: Distal pulses are intact. Neurological: Patient is alert and oriented to person, place and time. Pupils:  Pupils are equal, round, and reactive to light. Skin:  Warm. Assessment:  (Right ankle abscess  Patient Active Problem List:     Chronic pain     Cocaine use     Pyoderma gangrenosum     Cellulitis of right leg     Anemia     Obesity     COPD (chronic obstructive pulmonary disease) ([facility])     Fibromyalgia     Depression    ). Plan:   (Continue Vancomycin for now  DC planning in progress).        Harshal Woods MD  3/22/2018

## 2018-03-22 NOTE — PROGRESS NOTES
Rebecca Coles is a 47 y.o. female patient. Right ankle cellulitis    MRI does not show any osteomyelitis or abscess were made  And soft tissue defect       Principal Problem:    Cellulitis of right leg  Active Problems:    Chronic pain    Cocaine use    Pyoderma gangrenosum    Anemia    Obesity    COPD (chronic obstructive pulmonary disease) ([facility])    Fibromyalgia    Depression  Resolved Problems:    Recurrent cellulitis of lower leg        Pain still with the  Denies fevers chills  No nausea vomiting diarrhea  No chest pain heaviness  noShortness of breath    BP (!) 137/55   Pulse 67   Temp 97.9 °F (36.6 °C) (Oral)   Resp 16   Ht 5' 4.17\" (1.63 m)   Wt 155 lb (70.3 kg)   LMP 01/01/1997 (Exact Date) Comment: hysterectomy  SpO2 92%   BMI 26.46 kg/m²      Lungs:  Normal effort. She is not in respiratory distress. No decreased breath sounds or wheezes. Heart: S1 normal and S2 normal.  No murmur or gallop. Abdomen: There is no abdominal tenderness. There is no dalia-umbilical, suprapubic area or incisional tenderness. There is no rebound tenderness. There is no guarding. There is no mass. There is no splenomegaly. There is no hepatomegaly. Extremities: (Right leg remains somewhat red and warm and tender)  Neurological: Patient is alert.          MRI ANKLE RIGHT WO CONTRAST   Status: Final result   Order Providers     Authorizing Billing   TERELL Mclean [place]   Replaced:  MRI ANKLE RIGHT W WO CONTRAST          Signed by     Signed Date/Time  Phone Pager   Cherelle Persons 3/21/2018 15:59 [phone]    [facility]     Read Date Phone Pager   Cherelle Persons Mar 21, 2018 [phone]    Radiation Dose Estimates     No radiation information found for this patient   Narrative       Patient: Liane Come  Time Out: 15:59   Exam(s): MRI RIGHT ANKLE Without Contrast        EXAM:     MR Right Lower Extremity Without Intravenous Contrast, Ankle       CLINICAL HISTORY:    Reason for exam: possible right ankle abscess. . Additional notes: 391    images       TECHNIQUE:     Multiplanar magnetic resonance images of the right ankle without    intravenous contrast.       COMPARISON:     No relevant prior studies available.       FINDINGS:     Artifacts:  Exam is limited by motion artifact.    LIGAMENTS:     Anterior talofibular:  Unremarkable.     Posterior talofibular:  Unremarkable.     Anterior tibiofibular:  Unremarkable.     Posterior tibiofibular: Veleta GeorgiaLeander Canner.     Deltoid: Travis Savory.     Spring:  Unremarkable.     Lisfranc:  Unremarkable.        TENDONS:     Achilles:  There is T2 signal hyperintensity extending through the full-   thickness of the Achilles tendon.  No evidence of rupture.  The signal is    located 7.8 cm from the insertion on the Achilles tendon.  Skin defect    noted overlying the T2 signal hyperintensity.  Edema and a small amount    of fluid are seen underlying the Achilles tendon defect in an area    measuring approximately 2.7 x 1.1 x 1.6 cm.     Flexor:  Unremarkable.     Extensor: Travis Savory.     PeronealLeander Canner.     Tibialis anterior:  Unremarkable.     Tibialis posterior:  Unremarkable.         Muscles:  Unremarkable.     Fluid:  Unremarkable.  No joint effusion.     Sinus tarsi:  Unremarkable.     Tarsal tunnel:  Unremarkable.     Plantar fascia:  Unremarkable.     Cartilage:  Unremarkable.     Bones/joints:  Unremarkable.           Impression     1.  Findings consistent with a focal defect or laceration extending    through the Achilles tendon causing a full-thickness intrasubstance or    split tear type defect without evidence of rupture or retraction.  Fluid    noted in the defect extending into the tail years fat pad where there is    a soft tissue edema and a small amount of fluid.  No evidence of well-   formed abscess; however, phlegmon or early abscess formation are not    excluded.

## 2018-03-22 NOTE — PROGRESS NOTES
Saw patient walking in [place] with jacket on while sitting at nursing station. Reminded patient that she is a falls risk and she has an order for nonweightbearing to right leg. Pt refuses to sit in chair and demanding to go outside to smoke. Greta RN notified and tried to offer wheelchair for patient but pt refused. Security called and Greta RN brought patient back up to room. Cigarettes and lighter locked in patient lock box. Safety maintained. Perfect serve sent to hospitalist for nicotine patch. Call light in reach. Bed alarm on.  Electronically signed by Kalie Bro RN on 3/22/2018 at 5:48 PM

## 2018-03-23 ENCOUNTER — APPOINTMENT (OUTPATIENT)
Dept: INTERVENTIONAL RADIOLOGY/VASCULAR | Age: 53
DRG: 603 | End: 2018-03-23
Payer: COMMERCIAL

## 2018-03-23 VITALS
WEIGHT: 155 LBS | DIASTOLIC BLOOD PRESSURE: 53 MMHG | SYSTOLIC BLOOD PRESSURE: 105 MMHG | HEART RATE: 59 BPM | BODY MASS INDEX: 26.46 KG/M2 | OXYGEN SATURATION: 97 % | TEMPERATURE: 98.2 F | HEIGHT: 64 IN | RESPIRATION RATE: 18 BRPM

## 2018-03-23 PROCEDURE — 2500000003 HC RX 250 WO HCPCS: Performed by: INTERNAL MEDICINE

## 2018-03-23 PROCEDURE — 97535 SELF CARE MNGMENT TRAINING: CPT

## 2018-03-23 PROCEDURE — 6370000000 HC RX 637 (ALT 250 FOR IP): Performed by: INTERNAL MEDICINE

## 2018-03-23 PROCEDURE — 76937 US GUIDE VASCULAR ACCESS: CPT | Performed by: RADIOLOGY

## 2018-03-23 PROCEDURE — 2580000003 HC RX 258: Performed by: INTERNAL MEDICINE

## 2018-03-23 PROCEDURE — 02HV33Z INSERTION OF INFUSION DEVICE INTO SUPERIOR VENA CAVA, PERCUTANEOUS APPROACH: ICD-10-PCS | Performed by: INTERNAL MEDICINE

## 2018-03-23 PROCEDURE — 99232 SBSQ HOSP IP/OBS MODERATE 35: CPT | Performed by: INTERNAL MEDICINE

## 2018-03-23 PROCEDURE — C1751 CATH, INF, PER/CENT/MIDLINE: HCPCS

## 2018-03-23 PROCEDURE — 6370000000 HC RX 637 (ALT 250 FOR IP): Performed by: HOSPITALIST

## 2018-03-23 PROCEDURE — 97116 GAIT TRAINING THERAPY: CPT

## 2018-03-23 PROCEDURE — 6360000002 HC RX W HCPCS: Performed by: INTERNAL MEDICINE

## 2018-03-23 PROCEDURE — 36569 INSJ PICC 5 YR+ W/O IMAGING: CPT | Performed by: RADIOLOGY

## 2018-03-23 PROCEDURE — 77001 FLUOROGUIDE FOR VEIN DEVICE: CPT | Performed by: RADIOLOGY

## 2018-03-23 RX ORDER — LIDOCAINE HYDROCHLORIDE 20 MG/ML
5 INJECTION, SOLUTION INFILTRATION; PERINEURAL ONCE
Status: COMPLETED | OUTPATIENT
Start: 2018-03-23 | End: 2018-03-23

## 2018-03-23 RX ORDER — SODIUM CHLORIDE 9 MG/ML
250 INJECTION, SOLUTION INTRAVENOUS ONCE
Status: COMPLETED | OUTPATIENT
Start: 2018-03-23 | End: 2018-03-23

## 2018-03-23 RX ORDER — OXYCODONE HYDROCHLORIDE AND ACETAMINOPHEN 5; 325 MG/1; MG/1
1 TABLET ORAL EVERY 4 HOURS PRN
Qty: 18 TABLET | Refills: 0 | Status: SHIPPED | OUTPATIENT
Start: 2018-03-23 | End: 2018-03-26

## 2018-03-23 RX ORDER — SODIUM CHLORIDE 0.9 % (FLUSH) 0.9 %
10 SYRINGE (ML) INJECTION PRN
Status: DISCONTINUED | OUTPATIENT
Start: 2018-03-23 | End: 2018-03-23 | Stop reason: HOSPADM

## 2018-03-23 RX ORDER — SODIUM CHLORIDE 0.9 % (FLUSH) 0.9 %
10 SYRINGE (ML) INJECTION EVERY 12 HOURS SCHEDULED
Status: DISCONTINUED | OUTPATIENT
Start: 2018-03-23 | End: 2018-03-23 | Stop reason: HOSPADM

## 2018-03-23 RX ORDER — OXYCODONE HYDROCHLORIDE AND ACETAMINOPHEN 5; 325 MG/1; MG/1
1 TABLET ORAL EVERY 4 HOURS PRN
Status: DISCONTINUED | OUTPATIENT
Start: 2018-03-23 | End: 2018-03-23 | Stop reason: HOSPADM

## 2018-03-23 RX ADMIN — GABAPENTIN 600 MG: 300 CAPSULE ORAL at 11:15

## 2018-03-23 RX ADMIN — VANCOMYCIN HYDROCHLORIDE 1250 MG: 5 INJECTION, POWDER, LYOPHILIZED, FOR SOLUTION INTRAVENOUS at 15:23

## 2018-03-23 RX ADMIN — DOCUSATE SODIUM 100 MG: 100 CAPSULE, LIQUID FILLED ORAL at 11:16

## 2018-03-23 RX ADMIN — FAMOTIDINE 20 MG: 20 TABLET, FILM COATED ORAL at 11:16

## 2018-03-23 RX ADMIN — ROPINIROLE HYDROCHLORIDE 1 MG: 1 TABLET, FILM COATED ORAL at 11:16

## 2018-03-23 RX ADMIN — DULOXETINE HYDROCHLORIDE 60 MG: 60 CAPSULE, DELAYED RELEASE ORAL at 11:15

## 2018-03-23 RX ADMIN — LIDOCAINE HYDROCHLORIDE 5 ML: 20 INJECTION, SOLUTION EPIDURAL; INFILTRATION; INTRACAUDAL; PERINEURAL at 17:41

## 2018-03-23 RX ADMIN — MORPHINE SULFATE 4 MG: 4 INJECTION, SOLUTION INTRAMUSCULAR; INTRAVENOUS at 07:04

## 2018-03-23 RX ADMIN — SODIUM CHLORIDE 250 ML: 9 INJECTION, SOLUTION INTRAVENOUS at 17:41

## 2018-03-23 RX ADMIN — Medication 10 ML: at 11:19

## 2018-03-23 RX ADMIN — KETOROLAC TROMETHAMINE 15 MG: 15 INJECTION, SOLUTION INTRAMUSCULAR; INTRAVENOUS at 00:58

## 2018-03-23 RX ADMIN — DAPSONE 100 MG: 100 TABLET ORAL at 15:22

## 2018-03-23 RX ADMIN — VANCOMYCIN HYDROCHLORIDE 1250 MG: 5 INJECTION, POWDER, LYOPHILIZED, FOR SOLUTION INTRAVENOUS at 02:03

## 2018-03-23 RX ADMIN — Medication 10 ML: at 11:17

## 2018-03-23 RX ADMIN — KETOROLAC TROMETHAMINE 15 MG: 15 INJECTION, SOLUTION INTRAMUSCULAR; INTRAVENOUS at 14:20

## 2018-03-23 RX ADMIN — BUSPIRONE HYDROCHLORIDE 5 MG: 5 TABLET ORAL at 14:20

## 2018-03-23 RX ADMIN — GABAPENTIN 600 MG: 300 CAPSULE ORAL at 14:20

## 2018-03-23 RX ADMIN — MORPHINE SULFATE 4 MG: 4 INJECTION, SOLUTION INTRAMUSCULAR; INTRAVENOUS at 02:03

## 2018-03-23 RX ADMIN — BUSPIRONE HYDROCHLORIDE 5 MG: 5 TABLET ORAL at 11:16

## 2018-03-23 RX ADMIN — OXYCODONE HYDROCHLORIDE AND ACETAMINOPHEN 1 TABLET: 5; 325 TABLET ORAL at 15:22

## 2018-03-23 RX ADMIN — OXYCODONE HYDROCHLORIDE AND ACETAMINOPHEN 1 TABLET: 5; 325 TABLET ORAL at 11:16

## 2018-03-23 RX ADMIN — ROPINIROLE HYDROCHLORIDE 1 MG: 1 TABLET, FILM COATED ORAL at 14:20

## 2018-03-23 ASSESSMENT — PAIN DESCRIPTION - ORIENTATION
ORIENTATION: RIGHT

## 2018-03-23 ASSESSMENT — PAIN SCALES - GENERAL
PAINLEVEL_OUTOF10: 0
PAINLEVEL_OUTOF10: 6
PAINLEVEL_OUTOF10: 4
PAINLEVEL_OUTOF10: 10
PAINLEVEL_OUTOF10: 8
PAINLEVEL_OUTOF10: 0
PAINLEVEL_OUTOF10: 0

## 2018-03-23 ASSESSMENT — PAIN DESCRIPTION - PAIN TYPE
TYPE: ACUTE PAIN

## 2018-03-23 ASSESSMENT — PAIN DESCRIPTION - LOCATION
LOCATION: FOOT;LEG
LOCATION: FOOT;LEG
LOCATION: LEG;FOOT
LOCATION: FOOT;LEG

## 2018-03-23 ASSESSMENT — ENCOUNTER SYMPTOMS
VOMITING: 0
NAUSEA: 0

## 2018-03-23 ASSESSMENT — PAIN DESCRIPTION - DESCRIPTORS
DESCRIPTORS: ACHING
DESCRIPTORS: ACHING

## 2018-03-23 NOTE — PROGRESS NOTES
01/01/1997 (Exact Date) Comment: hysterectomy  SpO2 94%   BMI 26.46 kg/m²   Patient is alert and oriented x 3 in NAD. Vascular:   Palpable Dorsalis Pedis and Palpable Posterior Tibial Pulses B/L   Capillary Fill time < 3 seconds to B/L digits  Skin temperature warm to warm tibial tuberosity to the digits B/L, noticeable increase in warmth to touch on the right LE medial and lateral ankle and anterior leg. Improving erythema and edema to right posterior and medial ankle. Hair growth present to digits  moderate nonpitting edema, + varicosities      Neurological:   Epicritic sensation intact B/L  Protective sensation via monofilament testing intact B/L  Sharp/dull sensation intact to plantar foot B/L     Musculoskeletal/Orthopaedic:   Structural Deformities: decreased medial longitudinal arch   5/5 muscle strength Dorsiflexion, Plantarflexion, Inversion, Eversion B/L  ROM decreased pedal and ankle joints B/L.   ++ severe pain on palpation to right LE especially periwound , right medial and lateral ankle, anterior lower leg.     Dermatological:   Skin appears well hydrated and supple with good temperature, texture, turgor. .  Nails 1-5 B/L appear wnl. Interspaces 1-4 B/L are clear and without debris. Improving Erythema noted extending from periwound and up the anterior ankle, greatest foci to medial ankle periwound  Open lesions present to right LE as described below.      Ulceration #1:   Location: posterior right ankle  Measurements: 0.3 cm x 0.3 cm x ( unable to assess depth secondary to pain)  Base: Fibrotic slough with new central opening  Borders: extensive pink, new skin  Exudate: heavy serohemmoragic drainage. Comments: improving periwound erythema and edema extending beyond wound borders with evidence of ascending lymphangitis.      Ulceration #2:   Location: right medial ankle  Measurements: 0.1 cm x 0.1cm x depth unknown (unable to probe due to pain)  Borders: epithelial edges  Exudate: heavy Call, [phone]  March 23, 2018  10:16 AM    The resident/student was under my direct supervision during today's patient encounter. reflection of my personal examination, assessment and treatment plan. The patient was seen and examined with the resident/student. The above findings and recommendations were reviewed and I agree with above treatment plan. Any questions or concerns, please Dr. Georgia Sherwood or podiatry resident on call.     Francisco Teague DPM  Podiatry Attending  03/23/18  2:46 PM

## 2018-03-23 NOTE — PROGRESS NOTES
order    Subjective   General  Chart Reviewed: Yes  Response To Previous Treatment: Patient with no complaints from previous session. Family / Caregiver Present: No  Pre Treatment Pain Screening  Pain at present: 7  Scale Used: Numeric Score  Intervention List: Patient able to continue with treatment;Patient declined any intervention  Comments / Details: right foot; constant throb    Pain Screening  Patient Currently in Pain: Yes     Pain Reassessment:   Pain Assessment  Pain Level: 8  Pain Type: Acute pain  Pain Location: Foot;Leg  Pain Orientation: Right  Pain Descriptors: Aching  Pain Intervention(s): Medication (see eMar); Declines       Orientation  Orientation  Overall Orientation Status: Within Normal Limits    Objective   Bed mobility  Rolling to Left: Modified independent  Rolling to Right: Modified independent  Supine to Sit: Modified independent  Sit to Supine: Modified independent  Scooting: Modified independent    Transfers  Sit to Stand: Stand by assistance  Stand to sit: Stand by assistance  Bed to Chair: Stand by assistance  Comment: Pt educated to use back of leg against toilet for balance while managing pants. Ambulation  Ambulation?: Yes  Ambulation 1  Surface: level tile  Device: Rolling Walker  Assistance: Stand by assistance  Quality of Gait: Able to maintain right NWB well. Steady gait. Distance: 20' x 2  Comments: Pt limited destination secondary pain and fatigue. Stairs/Curb  Stairs?: No      Assessment   Pt with fair tolerance. Discharge Recommendations:  Continue to assess pending progress    Goals  Short term goals  Short term goal 1: indep with bed mobility   Short term goal 2: indep with functional transfers  Short term goal 3: amb >50ft with 2ww and mod I  Short term goal 4: indep with HEP to improve LE strength   Short term goal 5: standing balance with UE support >Fair+  Patient Goals   Patient goals : \"get stronger. \"    Plan    Plan  Times per week: 3-6  Times per

## 2018-03-23 NOTE — PROGRESS NOTES
Jose M Paredes is a 47 y.o. female patient.     Current Facility-Administered Medications   Medication Dose Route Frequency Provider Last Rate Last Dose    oxyCODONE-acetaminophen (PERCOCET) 5-325 MG per tablet 1 tablet  1 tablet Oral Q4H PRN Stephen Mcneil MD        vancomycin (VANCOCIN) 1,250 mg in dextrose 5 % 250 mL IVPB  1,250 mg Intravenous Q12H Charisma Saxena MD   Stopped at 03/23/18 0410    nicotine (NICODERM CQ) 21 MG/24HR 1 patch  1 patch Transdermal Daily Stefani Dumas MD   1 patch at 03/22/18 1811    ketorolac (TORADOL) injection 15 mg  15 mg Intravenous Q6H PRN Stephen Mcneil MD   15 mg at 03/23/18 0058    ondansetron (ZOFRAN) injection 4 mg  4 mg Intravenous Q6H PRN Stephen Mcneil MD        albuterol (PROVENTIL) nebulizer solution 2.5 mg  2.5 mg Nebulization Q4H PRN Stephen Mcneil MD        busPIRone (BUSPAR) tablet 5 mg  5 mg Oral TID Stephen Mcneil MD   5 mg at 03/22/18 2031    dapsone tablet 100 mg  100 mg Oral Daily Stephen Mcneil MD        DULoxetine (CYMBALTA) extended release capsule 60 mg  60 mg Oral BID Stephen Mcneil MD   60 mg at 03/22/18 2031    gabapentin (NEURONTIN) capsule 600 mg  600 mg Oral TID Stephen Mcneil MD   600 mg at 03/22/18 2031    LORazepam (ATIVAN) tablet 1 mg  1 mg Oral Q6H PRN Stephen Mcneil MD   1 mg at 03/22/18 4987    rOPINIRole (REQUIP) tablet 1 mg  1 mg Oral TID Stephen Mcneil MD   1 mg at 03/22/18 2031    traZODone (DESYREL) tablet 100 mg  100 mg Oral Nightly Stephen Mcneil MD   100 mg at 03/22/18 2031    sodium chloride flush 0.9 % injection 10 mL  10 mL Intravenous 2 times per day Stephen Mcneil MD   10 mL at 03/22/18 2203    sodium chloride flush 0.9 % injection 10 mL  10 mL Intravenous PRN Stephen Mcneil MD        acetaminophen (TYLENOL) tablet 650 mg  650 mg Oral Q4H PRN Stephen Mcneil MD   650 mg at 03/21/18 0455    magnesium hydroxide (MILK OF MAGNESIA) 400 MG/5ML suspension 30 mL  30 mL Oral Daily PRN Gail ALEMAN

## 2018-03-25 LAB
BLOOD CULTURE, ROUTINE: NORMAL
CULTURE, BLOOD 2: NORMAL

## 2018-03-26 LAB
HCT VFR BLD CALC: 30.6 % (ref 37–47)
HEMOGLOBIN: 10.1 G/DL (ref 12–16)
MCH RBC QN AUTO: 31.2 PG (ref 27–31.3)
MCHC RBC AUTO-ENTMCNC: 33.2 % (ref 33–37)
MCV RBC AUTO: 94 FL (ref 82–100)
PDW BLD-RTO: 14 % (ref 11.5–14.5)
PLATELET # BLD: 312 K/UL (ref 130–400)
RBC # BLD: 3.25 M/UL (ref 4.2–5.4)
VANCOMYCIN TROUGH: 11.5 UG/ML (ref 10–20)
WBC # BLD: 7 K/UL (ref 4.8–10.8)

## 2018-03-28 NOTE — DISCHARGE SUMMARY
Physician Discharge Summary     Patient ID:  Jesus Marx  [id number]  37 y.o.  1965    Admit date: 3/20/2018    Discharge date and time: 3/23/2018  8:00 PM     Admitting Physician: Cuong Woo MD     Discharge Physician: Cuong Woo      Admission Diagnoses: Recurrent cellulitis of lower leg [W98.938]    Discharge Diagnoses: same    Admission Condition: fair    Discharged Condition: fair    Indication for Admission: as above    Hospital Course: The patient was admitted to medical floor and she was given IV vancomycin. Infectious disease and podiatry consultations were obtained. Her right ankle abscess drained and dressing changes were applied. The patient required long-term IV antibiotics and she was discharged to skilled nursing unit for that. Consults: ID and podiatry    Significant Diagnostic Studies: see chart    Treatments: antibiotics: vancomycin    Discharge Exam:  Middle-aged female with right ankle dressing for abscess with stable vital signs and no acute distress    Disposition: SNF    In process/preliminary results:  Outstanding Order Results     Date and Time Order Name Status Description    3/23/2018 1745 IR ULTRASOUND GUIDANCE VASCULAR ACCESS In process     3/23/2018 1745 IR FLUORO GUIDED CVA DEVICE PLACEMENT In process     3/23/2018 1745 IR PICC WO SQ PORT/PUMP > 5 YEARS In process           Patient Instructions:   Discharge Medication List as of 3/23/2018  7:01 PM      START taking these medications    Details   oxyCODONE-acetaminophen (PERCOCET) 5-325 MG per tablet Take 1 tablet by mouth every 4 hours as needed for Pain for up to 3 days. , Disp-18 tablet, R-0Print      vancomycin (VANCOCIN) infusion Infuse 1,250 mg intravenously every 12 hours for 14 days Compound per protocol., Disp-29828 mg, R-0Print         CONTINUE these medications which have NOT CHANGED    Details   busPIRone (BUSPAR) 5 MG tablet Take 5 mg by mouth 3 times dailyHistorical Med      acetaminophen (TYLENOL) 325

## 2018-03-29 ENCOUNTER — TELEPHONE (OUTPATIENT)
Dept: INFECTIOUS DISEASES | Age: 53
End: 2018-03-29

## 2018-03-30 ENCOUNTER — TELEPHONE (OUTPATIENT)
Dept: INFECTIOUS DISEASES | Age: 53
End: 2018-03-30

## 2018-04-10 ENCOUNTER — OFFICE VISIT (OUTPATIENT)
Dept: INFECTIOUS DISEASES | Age: 53
End: 2018-04-10
Payer: COMMERCIAL

## 2018-04-10 VITALS
HEIGHT: 64 IN | WEIGHT: 174 LBS | TEMPERATURE: 99.3 F | HEART RATE: 82 BPM | BODY MASS INDEX: 29.71 KG/M2 | DIASTOLIC BLOOD PRESSURE: 71 MMHG | SYSTOLIC BLOOD PRESSURE: 116 MMHG | RESPIRATION RATE: 22 BRPM

## 2018-04-10 DIAGNOSIS — L03.119 CELLULITIS OF LOWER EXTREMITY, UNSPECIFIED LATERALITY: Primary | ICD-10-CM

## 2018-04-10 PROCEDURE — G8427 DOCREV CUR MEDS BY ELIG CLIN: HCPCS | Performed by: INTERNAL MEDICINE

## 2018-04-10 PROCEDURE — G8419 CALC BMI OUT NRM PARAM NOF/U: HCPCS | Performed by: INTERNAL MEDICINE

## 2018-04-10 PROCEDURE — 3014F SCREEN MAMMO DOC REV: CPT | Performed by: INTERNAL MEDICINE

## 2018-04-10 PROCEDURE — 4004F PT TOBACCO SCREEN RCVD TLK: CPT | Performed by: INTERNAL MEDICINE

## 2018-04-10 PROCEDURE — 99212 OFFICE O/P EST SF 10 MIN: CPT | Performed by: INTERNAL MEDICINE

## 2018-04-10 PROCEDURE — 3017F COLORECTAL CA SCREEN DOC REV: CPT | Performed by: INTERNAL MEDICINE

## 2018-04-10 PROCEDURE — 1111F DSCHRG MED/CURRENT MED MERGE: CPT | Performed by: INTERNAL MEDICINE

## 2018-04-10 ASSESSMENT — ENCOUNTER SYMPTOMS
RESPIRATORY NEGATIVE: 1
GASTROINTESTINAL NEGATIVE: 1

## 2018-04-18 ENCOUNTER — HOSPITAL ENCOUNTER (EMERGENCY)
Age: 53
Discharge: HOME | End: 2018-04-18
Payer: COMMERCIAL

## 2019-04-12 ENCOUNTER — HOSPITAL ENCOUNTER (EMERGENCY)
Age: 54
Discharge: HOME | End: 2019-04-12
Attending: EMERGENCY MEDICINE
Payer: COMMERCIAL

## 2019-04-12 VITALS
OXYGEN SATURATION: 95 % | HEIGHT: 64 IN | HEART RATE: 60 BPM | SYSTOLIC BLOOD PRESSURE: 90 MMHG | BODY MASS INDEX: 28.68 KG/M2 | WEIGHT: 168 LBS | TEMPERATURE: 98.7 F | DIASTOLIC BLOOD PRESSURE: 52 MMHG | RESPIRATION RATE: 16 BRPM

## 2019-04-12 DIAGNOSIS — G89.4 CHRONIC PAIN SYNDROME: Primary | ICD-10-CM

## 2019-04-12 PROCEDURE — 6360000002 HC RX W HCPCS: Performed by: EMERGENCY MEDICINE

## 2019-04-12 PROCEDURE — 99283 EMERGENCY DEPT VISIT LOW MDM: CPT

## 2019-04-12 PROCEDURE — 96374 THER/PROPH/DIAG INJ IV PUSH: CPT

## 2019-04-12 RX ORDER — LORAZEPAM 2 MG/ML
1 INJECTION INTRAMUSCULAR ONCE
Status: DISCONTINUED | OUTPATIENT
Start: 2019-04-12 | End: 2019-04-12

## 2019-04-12 RX ADMIN — HYDROMORPHONE HYDROCHLORIDE 0.5 MG: 1 INJECTION, SOLUTION INTRAMUSCULAR; INTRAVENOUS; SUBCUTANEOUS at 02:45

## 2019-04-12 ASSESSMENT — ENCOUNTER SYMPTOMS
COUGH: 0
ABDOMINAL PAIN: 0
PHOTOPHOBIA: 0
SORE THROAT: 0
SHORTNESS OF BREATH: 0
WHEEZING: 0
VOMITING: 0
EYE DISCHARGE: 0
CHEST TIGHTNESS: 0
ABDOMINAL DISTENTION: 0

## 2019-04-12 ASSESSMENT — PAIN DESCRIPTION - ORIENTATION: ORIENTATION: RIGHT

## 2019-04-12 ASSESSMENT — PAIN DESCRIPTION - FREQUENCY: FREQUENCY: CONTINUOUS

## 2019-04-12 ASSESSMENT — PAIN DESCRIPTION - ONSET: ONSET: ON-GOING

## 2019-04-12 ASSESSMENT — PAIN DESCRIPTION - DESCRIPTORS: DESCRIPTORS: STABBING

## 2019-04-12 ASSESSMENT — PAIN SCALES - GENERAL
PAINLEVEL_OUTOF10: 8
PAINLEVEL_OUTOF10: 8

## 2019-04-12 ASSESSMENT — PAIN DESCRIPTION - PAIN TYPE: TYPE: CHRONIC PAIN

## 2019-04-12 ASSESSMENT — PAIN DESCRIPTION - LOCATION: LOCATION: FOOT

## 2019-04-12 NOTE — ED TRIAGE NOTES
Patient to ED via [facility] with c/o 8/10 right foot pain. Patient has a chronic ulcer on achilles tendon which she is receiving wound care 3 x week. Patient states pain has worsened over the last three days. Patient states she is unable to tolerate pain any longer. Patient states she is going for lidocaine infusion in the morning in [facility]. Wound is reddened with slight drainage. No acute distress noted.

## 2019-04-12 NOTE — ED PROVIDER NOTES
[address] [facility] ED  eMERGENCY dEPARTMENT eNCOUnter      Pt Name: Everett Meza  MRN: [id number]  [place] 1965  Date of evaluation: 4/12/2019  Provider: Brittni Bucklye MD    CHIEF COMPLAINT       Chief Complaint   Patient presents with    Ankle Pain     right         HISTORY OF PRESENT ILLNESS   (Location/Symptom, Timing/Onset,Context/Setting, Quality, Duration, Modifying Factors, Severity)  Note limiting factors. Everett Meza is a 49 y.o. female who presents to the emergency department with complaints of nerve pain in her right foot and around her chronic ulcer involving the right heel area. Patient's had a open ulceration for the past 2 years involving her right Achilles area. This is being treated 3 times weekly with the nurse visiting nurse. She has a dermatologist following the progress. She is currently on gabapentin and presents via EMS tonight for breakthrough pain. She denies fever or chills. She admits the wound does not look any different than it has last several months. Current pain level is 8 out of 10. HPI    NursingNotes were reviewed. REVIEW OF SYSTEMS    (2-9 systems for level 4, 10 or more for level 5)     Review of Systems   Constitutional: Negative for chills and diaphoresis. HENT: Negative for congestion, ear pain, mouth sores and sore throat. Eyes: Negative for photophobia and discharge. Respiratory: Negative for cough, chest tightness, shortness of breath and wheezing. Cardiovascular: Negative for chest pain and palpitations. Gastrointestinal: Negative for abdominal distention, abdominal pain and vomiting. Endocrine: Negative for cold intolerance. Genitourinary: Negative for difficulty urinating and genital sores. Musculoskeletal: Negative for arthralgias, myalgias and neck pain. Skin: Positive for wound. Negative for pallor and rash. Allergic/Immunologic: Negative for immunocompromised state.    Neurological: Negative for dizziness and syncope. Hematological: Negative for adenopathy. Psychiatric/Behavioral: Negative for agitation and hallucinations. All other systems reviewed and are negative. Except as noted above the remainder of the review of systems was reviewed and negative.        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    Neuromuscular disorder ([facility] [address] [facility]     Osteoarthritis     Pneumonia due to organism     Pyoderma gangrenosa     Ulcerative colitis ([facility] [address]          SURGICALHISTORY       Past Surgical History:   Procedure Laterality Date    ABDOMEN SURGERY      sleen repair    APPENDECTOMY      BACK SURGERY      BREAST SURGERY      fatty tumor removed, benign    COLONOSCOPY      COSMETIC SURGERY      tummy tuck    EYE SURGERY      bilateral cataract surgery    HYSTERECTOMY      SKIN BIOPSY      SPINAL FUSION      TONSILLECTOMY           CURRENT MEDICATIONS       Previous Medications    ACETAMINOPHEN (TYLENOL) 325 MG TABLET    Take 650 mg by mouth every 4 hours as needed for Pain    ALBUTEROL (PROVENTIL) (2.5 MG/3ML) 0.083% NEBULIZER SOLUTION    Take 2.5 mg by nebulization every 4 hours as needed for Wheezing (night time)     BUSPIRONE (BUSPAR) 5 MG TABLET    Take 5 mg by mouth 3 times daily    CYCLOSPORINE (SANDIMMUNE) 100 MG CAPSULE    Take 125 mg by mouth 2 times daily    DAPSONE 100 MG TABLET    Take 100 mg by mouth daily    DULOXETINE (CYMBALTA) 60 MG EXTENDED RELEASE CAPSULE    Take 60 mg by mouth 2 times daily    GABAPENTIN (NEURONTIN) 300 MG CAPSULE    Take 600 mg by mouth 3 times daily     LORAZEPAM (ATIVAN) 1 MG TABLET    Take 1 mg by mouth every 6 hours as needed for Anxiety    MELOXICAM (MOBIC) 15 MG TABLET    Take 15 mg by mouth daily    ROPINIROLE (REQUIP) 1 MG TABLET    Take 1 mg by mouth 3 times daily     TRAZODONE (DESYREL) 100 MG TABLET    Take 100 mg by mouth nightly       ALLERGIES     Amoxicillin-pot clavulanate and Other    FAMILY HISTORY     History reviewed. No pertinent family history. SOCIAL HISTORY       Social History     Socioeconomic History    Marital status:       Spouse name: None    Number of children: None    Years of education: None    Highest education level: None   Occupational History    None   Social Needs    Financial resource strain: None    Food insecurity:     Worry: None     Inability: None    Transportation needs:     Medical: None     Non-medical: None   Tobacco Use    Smoking status: Current Every Day Smoker     Packs/day: 0.50     Years: 40.00     Pack years: 20.00     Types: Cigarettes    Smokeless tobacco: Never Used   Substance and Sexual Activity    Alcohol use: No     Comment: ocassionally    Drug use: No    Sexual activity: None   Lifestyle    Physical activity:     Days per week: None     Minutes per session: None    Stress: None   Relationships    Social connections:     Talks on phone: None     Gets together: None     Attends Hinduism service: None     Active member of club or organization: None     Attends meetings of clubs or organizations: None     Relationship status: None    Intimate partner violence:     Fear of current or ex partner: None     Emotionally abused: None     Physically abused: None     Forced sexual activity: None   Other Topics Concern    None   Social History Narrative    None       SCREENINGS      @FLOW([id number])@      PHYSICAL EXAM    (up to 7 for level 4, 8 or more for level 5)     ED Triage Vitals [04/12/19 0032]   BP Temp Temp Source Pulse Resp SpO2 Height Weight   (!) 110/99 98.7 °F (37.1 °C) Oral 60 18 95 % 5' 4\" (1.626 m) 168 lb (76.2 kg)       Physical Exam Constitutional: She is oriented to person, place, and time. She appears well-developed. HENT:   Head: Normocephalic. Nose: Nose normal.   Eyes: Pupils are equal, round, and reactive to light. Conjunctivae are normal.   Neck: Normal range of motion. Neck supple. Cardiovascular: Normal rate, regular rhythm, normal heart sounds and intact distal pulses. Pulmonary/Chest: Effort normal and breath sounds normal.   Abdominal: Soft. Bowel sounds are normal. There is no tenderness. There is no guarding. No hernia. Musculoskeletal: Normal range of motion. Feet:    Neurological: She is alert and oriented to person, place, and time. Skin: Skin is warm and dry. Psychiatric: She has a normal mood and affect. Thought content normal.   Nursing note and vitals reviewed. DIAGNOSTIC RESULTS     EKG: All EKG's are interpreted by the Emergency Department Physician who either signs or Co-signsthis chart in the absence of a cardiologist.        RADIOLOGY:   Arleene Sam such as CT, Ultrasound and MRI are read by the radiologist. Plain radiographic images are visualized and preliminarily interpreted by the emergency physician with the below findings:        Interpretation per the Radiologist below, if available at the time ofthis note:    No orders to display         ED BEDSIDE ULTRASOUND:   Performed by ED Physician - none    LABS:  Labs Reviewed - No data to display    All other labs were within normal range or not returned as of this dictation. EMERGENCY DEPARTMENT COURSE and DIFFERENTIAL DIAGNOSIS/MDM:   Vitals:    Vitals:    04/12/19 0032 04/12/19 0131   BP: (!) 110/99 108/72   Pulse: 60 61   Resp: 18 16   Temp: 98.7 °F (37.1 °C)    TempSrc: Oral    SpO2: 95% 94%   Weight: 168 lb (76.2 kg)    Height: 5' 4\" (1.626 m)             MDM patient basically had breakthrough pain. This does not appear to be infected. She is received narcotic prescription earlier this month.   For comfort prescribing any

## 2019-04-12 NOTE — ED NOTES
Bed: 15  Expected date: 4/12/19  Expected time:   Means of arrival:   Comments:  49 F, foot pain      Norbert Cueto RN  04/12/19 8871

## 2019-04-12 NOTE — ED NOTES
Pt resting in bed with eyes closed, skin w/d/pale, pulses palp, msp's intact, easy to arouse, slightly drowsy, 0 c/o at this time, will monitor.      Christian Trevino RN  04/12/19 9133

## 2019-04-12 NOTE — ED NOTES
Wound packed with prism, covered with 2x2 and wrapped with kerlix.      Alma Warren RN  04/12/19 1534

## 2019-06-01 ENCOUNTER — HOSPITAL ENCOUNTER (EMERGENCY)
Age: 54
Discharge: HOME | End: 2019-06-01
Payer: COMMERCIAL

## 2019-06-01 ENCOUNTER — APPOINTMENT (OUTPATIENT)
Dept: GENERAL RADIOLOGY | Age: 54
End: 2019-06-01
Payer: COMMERCIAL

## 2019-06-01 VITALS
WEIGHT: 155 LBS | HEIGHT: 64 IN | OXYGEN SATURATION: 98 % | RESPIRATION RATE: 18 BRPM | TEMPERATURE: 98.1 F | BODY MASS INDEX: 26.46 KG/M2 | HEART RATE: 82 BPM | DIASTOLIC BLOOD PRESSURE: 82 MMHG | SYSTOLIC BLOOD PRESSURE: 116 MMHG

## 2019-06-01 DIAGNOSIS — L03.019 FELON OF FINGER: Primary | ICD-10-CM

## 2019-06-01 DIAGNOSIS — S63.502A SPRAIN OF LEFT WRIST, INITIAL ENCOUNTER: ICD-10-CM

## 2019-06-01 PROCEDURE — 99283 EMERGENCY DEPT VISIT LOW MDM: CPT

## 2019-06-01 PROCEDURE — 6370000000 HC RX 637 (ALT 250 FOR IP): Performed by: PERSONAL EMERGENCY RESPONSE ATTENDANT

## 2019-06-01 PROCEDURE — 73130 X-RAY EXAM OF HAND: CPT

## 2019-06-01 RX ORDER — DIAPER,BRIEF,INFANT-TODD,DISP
EACH MISCELLANEOUS ONCE
Status: COMPLETED | OUTPATIENT
Start: 2019-06-01 | End: 2019-06-01

## 2019-06-01 RX ORDER — CLINDAMYCIN HYDROCHLORIDE 150 MG/1
300 CAPSULE ORAL 4 TIMES DAILY
Qty: 28 CAPSULE | Refills: 0 | Status: SHIPPED | OUTPATIENT
Start: 2019-06-01 | End: 2019-06-08

## 2019-06-01 RX ORDER — CLINDAMYCIN HYDROCHLORIDE 150 MG/1
300 CAPSULE ORAL ONCE
Status: COMPLETED | OUTPATIENT
Start: 2019-06-01 | End: 2019-06-01

## 2019-06-01 RX ORDER — HYDROCODONE BITARTRATE AND ACETAMINOPHEN 5; 325 MG/1; MG/1
1 TABLET ORAL ONCE
Status: COMPLETED | OUTPATIENT
Start: 2019-06-01 | End: 2019-06-01

## 2019-06-01 RX ADMIN — HYDROCODONE BITARTRATE AND ACETAMINOPHEN 1 TABLET: 5; 325 TABLET ORAL at 05:40

## 2019-06-01 RX ADMIN — CLINDAMYCIN HYDROCHLORIDE 300 MG: 150 CAPSULE ORAL at 05:40

## 2019-06-01 RX ADMIN — BACITRACIN ZINC 1 G: 500 OINTMENT TOPICAL at 05:40

## 2019-06-01 ASSESSMENT — PAIN DESCRIPTION - PROGRESSION: CLINICAL_PROGRESSION: GRADUALLY IMPROVING

## 2019-06-01 ASSESSMENT — PAIN DESCRIPTION - ORIENTATION: ORIENTATION: LEFT

## 2019-06-01 ASSESSMENT — PAIN SCALES - GENERAL
PAINLEVEL_OUTOF10: 9
PAINLEVEL_OUTOF10: 4
PAINLEVEL_OUTOF10: 9

## 2019-06-01 ASSESSMENT — ENCOUNTER SYMPTOMS
NAUSEA: 0
SORE THROAT: 0
COUGH: 0
ABDOMINAL PAIN: 0
DIARRHEA: 0
BLOOD IN STOOL: 0
SHORTNESS OF BREATH: 0
COLOR CHANGE: 0
RHINORRHEA: 0
VOMITING: 0

## 2019-06-01 ASSESSMENT — PAIN DESCRIPTION - LOCATION: LOCATION: HAND

## 2019-06-01 ASSESSMENT — PAIN DESCRIPTION - PAIN TYPE: TYPE: ACUTE PAIN

## 2019-06-01 ASSESSMENT — PAIN DESCRIPTION - FREQUENCY
FREQUENCY: CONTINUOUS
FREQUENCY: CONTINUOUS

## 2019-06-01 ASSESSMENT — PAIN DESCRIPTION - ONSET: ONSET: PROGRESSIVE

## 2019-06-01 ASSESSMENT — PAIN DESCRIPTION - DESCRIPTORS: DESCRIPTORS: THROBBING

## 2019-06-01 NOTE — ED PROVIDER NOTES
 APPENDECTOMY      BACK SURGERY      BREAST SURGERY      fatty tumor removed, benign    COLONOSCOPY      COSMETIC SURGERY      tummy tuck    EYE SURGERY      bilateral cataract surgery    HYSTERECTOMY      SKIN BIOPSY      SPINAL FUSION      TONSILLECTOMY           CURRENT MEDICATIONS       Discharge Medication List as of 6/1/2019  6:21 AM      CONTINUE these medications which have NOT CHANGED    Details   busPIRone (BUSPAR) 5 MG tablet Take 5 mg by mouth 3 times dailyHistorical Med      acetaminophen (TYLENOL) 325 MG tablet Take 650 mg by mouth every 4 hours as needed for PainHistorical Med      albuterol (PROVENTIL) (2.5 MG/3ML) 0.083% nebulizer solution Take 2.5 mg by nebulization every 4 hours as needed for Wheezing (night time) Historical Med      cycloSPORINE (SANDIMMUNE) 100 MG capsule Take 125 mg by mouth 2 times dailyHistorical Med      DULoxetine (CYMBALTA) 60 MG extended release capsule Take 60 mg by mouth 2 times dailyHistorical Med      dapsone 100 MG tablet Take 100 mg by mouth dailyHistorical Med      gabapentin (NEURONTIN) 300 MG capsule Take 600 mg by mouth 3 times daily Historical Med      LORazepam (ATIVAN) 1 MG tablet Take 1 mg by mouth every 6 hours as needed for AnxietyHistorical Med      meloxicam (MOBIC) 15 MG tablet Take 15 mg by mouth dailyHistorical Med      rOPINIRole (REQUIP) 1 MG tablet Take 1 mg by mouth 3 times daily Historical Med      traZODone (DESYREL) 100 MG tablet Take 100 mg by mouth nightlyHistorical Med             ALLERGIES     Amoxicillin-pot clavulanate and Other    FAMILY HISTORY     History reviewed. No pertinent family history. SOCIAL HISTORY       Social History     Socioeconomic History    Marital status:       Spouse name: None    Number of children: None    Years of education: None    Highest education level: None   Occupational History    None   Social Needs    Financial resource strain: None    Food insecurity:     Worry: None Inability: None    Transportation needs:     Medical: None     Non-medical: None   Tobacco Use    Smoking status: Current Every Day Smoker     Packs/day: 0.50     Years: 40.00     Pack years: 20.00     Types: Cigarettes    Smokeless tobacco: Never Used   Substance and Sexual Activity    Alcohol use: No     Comment: ocassionally    Drug use: No    Sexual activity: None   Lifestyle    Physical activity:     Days per week: None     Minutes per session: None    Stress: None   Relationships    Social connections:     Talks on phone: None     Gets together: None     Attends Baptist service: None     Active member of club or organization: None     Attends meetings of clubs or organizations: None     Relationship status: None    Intimate partner violence:     Fear of current or ex partner: None     Emotionally abused: None     Physically abused: None     Forced sexual activity: None   Other Topics Concern    None   Social History Narrative    None         PHYSICAL EXAM         ED Triage Vitals [06/01/19 0528]   BP Temp Temp Source Pulse Resp SpO2 Height Weight   115/81 98.1 °F (36.7 °C) Oral 84 18 97 % 5' 4\" (1.626 m) 155 lb (70.3 kg)       Physical Exam   Constitutional: She is oriented to person, place, and time. She appears well-developed and well-nourished. HENT:   Head: Normocephalic and atraumatic. Mouth/Throat: Oropharynx is clear and moist.   Eyes: Pupils are equal, round, and reactive to light. Conjunctivae and EOM are normal.   Neck: Normal range of motion. Neck supple. No tracheal deviation present. Cardiovascular: Normal heart sounds and intact distal pulses. Pulmonary/Chest: Effort normal and breath sounds normal. No stridor. No respiratory distress. Abdominal: Soft. Bowel sounds are normal. She exhibits no distension and no mass. There is no tenderness. There is no rebound and no guarding. Musculoskeletal: Normal range of motion.    Neurological: She is alert and oriented to person,

## 2019-06-01 NOTE — ED NOTES
Bed: 04  Expected date: 6/1/19  Expected time: 5:22 AM  Means of arrival:   Comments:  48 y/o female   Finger turning black     Ramiro Strong RN  06/01/19 2808

## 2019-08-18 ENCOUNTER — HOSPITAL ENCOUNTER (INPATIENT)
Age: 54
LOS: 3 days | Discharge: ANOTHER HOSPITAL | DRG: 603 | End: 2019-08-21
Attending: EMERGENCY MEDICINE | Admitting: INTERNAL MEDICINE
Payer: COMMERCIAL

## 2019-08-18 DIAGNOSIS — L88 PYODERMA GANGRENOSA: Primary | ICD-10-CM

## 2019-08-18 PROBLEM — L03.90 CELLULITIS: Status: ACTIVE | Noted: 2019-08-18

## 2019-08-18 LAB
ALBUMIN SERPL-MCNC: 3.4 G/DL (ref 3.5–4.6)
ALP BLD-CCNC: 84 U/L (ref 40–130)
ALT SERPL-CCNC: 12 U/L (ref 0–33)
ANION GAP SERPL CALCULATED.3IONS-SCNC: 15 MEQ/L (ref 9–15)
AST SERPL-CCNC: 16 U/L (ref 0–35)
BASOPHILS ABSOLUTE: 0.1 K/UL (ref 0–0.2)
BASOPHILS RELATIVE PERCENT: 0.9 %
BILIRUB SERPL-MCNC: <0.2 MG/DL (ref 0.2–0.7)
BUN BLDV-MCNC: 15 MG/DL (ref 6–20)
CALCIUM SERPL-MCNC: 8.8 MG/DL (ref 8.5–9.9)
CHLORIDE BLD-SCNC: 102 MEQ/L (ref 95–107)
CO2: 26 MEQ/L (ref 20–31)
CREAT SERPL-MCNC: 0.76 MG/DL (ref 0.5–0.9)
EOSINOPHILS ABSOLUTE: 0.1 K/UL (ref 0–0.7)
EOSINOPHILS RELATIVE PERCENT: 0.6 %
GFR AFRICAN AMERICAN: >60
GFR NON-AFRICAN AMERICAN: >60
GLOBULIN: 3.6 G/DL (ref 2.3–3.5)
GLUCOSE BLD-MCNC: 175 MG/DL (ref 70–99)
HCT VFR BLD CALC: 38.8 % (ref 37–47)
HEMOGLOBIN: 13.2 G/DL (ref 12–16)
LACTIC ACID: 1.9 MMOL/L (ref 0.5–2.2)
LYMPHOCYTES ABSOLUTE: 2.8 K/UL (ref 1–4.8)
LYMPHOCYTES RELATIVE PERCENT: 28.6 %
MCH RBC QN AUTO: 29.2 PG (ref 27–31.3)
MCHC RBC AUTO-ENTMCNC: 34 % (ref 33–37)
MCV RBC AUTO: 85.9 FL (ref 82–100)
MONOCYTES ABSOLUTE: 0.5 K/UL (ref 0.2–0.8)
MONOCYTES RELATIVE PERCENT: 5.2 %
NEUTROPHILS ABSOLUTE: 6.2 K/UL (ref 1.4–6.5)
NEUTROPHILS RELATIVE PERCENT: 64.7 %
PDW BLD-RTO: 17.3 % (ref 11.5–14.5)
PLATELET # BLD: 363 K/UL (ref 130–400)
POTASSIUM SERPL-SCNC: 4.1 MEQ/L (ref 3.4–4.9)
RBC # BLD: 4.52 M/UL (ref 4.2–5.4)
SODIUM BLD-SCNC: 143 MEQ/L (ref 135–144)
TOTAL PROTEIN: 7 G/DL (ref 6.3–8)
WBC # BLD: 9.6 K/UL (ref 4.8–10.8)

## 2019-08-18 PROCEDURE — 1210000000 HC MED SURG R&B

## 2019-08-18 PROCEDURE — 85025 COMPLETE CBC W/AUTO DIFF WBC: CPT

## 2019-08-18 PROCEDURE — 96375 TX/PRO/DX INJ NEW DRUG ADDON: CPT

## 2019-08-18 PROCEDURE — 96374 THER/PROPH/DIAG INJ IV PUSH: CPT

## 2019-08-18 PROCEDURE — 96372 THER/PROPH/DIAG INJ SC/IM: CPT

## 2019-08-18 PROCEDURE — 80053 COMPREHEN METABOLIC PANEL: CPT

## 2019-08-18 PROCEDURE — 6360000002 HC RX W HCPCS: Performed by: EMERGENCY MEDICINE

## 2019-08-18 PROCEDURE — 83605 ASSAY OF LACTIC ACID: CPT

## 2019-08-18 PROCEDURE — 99284 EMERGENCY DEPT VISIT MOD MDM: CPT

## 2019-08-18 PROCEDURE — 87040 BLOOD CULTURE FOR BACTERIA: CPT

## 2019-08-18 PROCEDURE — 36415 COLL VENOUS BLD VENIPUNCTURE: CPT

## 2019-08-18 PROCEDURE — 2580000003 HC RX 258: Performed by: EMERGENCY MEDICINE

## 2019-08-18 RX ORDER — LORAZEPAM 2 MG/ML
1 INJECTION INTRAMUSCULAR ONCE
Status: COMPLETED | OUTPATIENT
Start: 2019-08-18 | End: 2019-08-18

## 2019-08-18 RX ORDER — SODIUM CHLORIDE 9 MG/ML
INJECTION, SOLUTION INTRAVENOUS CONTINUOUS
Status: DISCONTINUED | OUTPATIENT
Start: 2019-08-18 | End: 2019-08-20

## 2019-08-18 RX ORDER — ONDANSETRON 2 MG/ML
4 INJECTION INTRAMUSCULAR; INTRAVENOUS EVERY 6 HOURS PRN
Status: DISCONTINUED | OUTPATIENT
Start: 2019-08-18 | End: 2019-08-21 | Stop reason: HOSPADM

## 2019-08-18 RX ORDER — ONDANSETRON 2 MG/ML
4 INJECTION INTRAMUSCULAR; INTRAVENOUS ONCE
Status: COMPLETED | OUTPATIENT
Start: 2019-08-18 | End: 2019-08-18

## 2019-08-18 RX ORDER — SODIUM CHLORIDE 0.9 % (FLUSH) 0.9 %
10 SYRINGE (ML) INJECTION PRN
Status: DISCONTINUED | OUTPATIENT
Start: 2019-08-18 | End: 2019-08-21 | Stop reason: HOSPADM

## 2019-08-18 RX ORDER — 0.9 % SODIUM CHLORIDE 0.9 %
1000 INTRAVENOUS SOLUTION INTRAVENOUS ONCE
Status: COMPLETED | OUTPATIENT
Start: 2019-08-18 | End: 2019-08-18

## 2019-08-18 RX ORDER — SODIUM CHLORIDE 0.9 % (FLUSH) 0.9 %
10 SYRINGE (ML) INJECTION EVERY 12 HOURS SCHEDULED
Status: DISCONTINUED | OUTPATIENT
Start: 2019-08-18 | End: 2019-08-21 | Stop reason: HOSPADM

## 2019-08-18 RX ADMIN — LORAZEPAM 1 MG: 2 INJECTION INTRAMUSCULAR; INTRAVENOUS at 21:40

## 2019-08-18 RX ADMIN — VANCOMYCIN HYDROCHLORIDE 1000 MG: 1 INJECTION, POWDER, LYOPHILIZED, FOR SOLUTION INTRAVENOUS at 21:40

## 2019-08-18 RX ADMIN — ONDANSETRON 4 MG: 2 INJECTION INTRAMUSCULAR; INTRAVENOUS at 21:40

## 2019-08-18 RX ADMIN — HYDROMORPHONE HYDROCHLORIDE 2 MG: 1 INJECTION, SOLUTION INTRAMUSCULAR; INTRAVENOUS; SUBCUTANEOUS at 21:39

## 2019-08-18 RX ADMIN — SODIUM CHLORIDE 1000 ML: 9 INJECTION, SOLUTION INTRAVENOUS at 21:40

## 2019-08-18 ASSESSMENT — PAIN SCALES - GENERAL
PAINLEVEL_OUTOF10: 10
PAINLEVEL_OUTOF10: 0
PAINLEVEL_OUTOF10: 0
PAINLEVEL_OUTOF10: 10

## 2019-08-18 ASSESSMENT — ENCOUNTER SYMPTOMS
VOMITING: 0
EYE DISCHARGE: 0
COLOR CHANGE: 0
SORE THROAT: 0
CHEST TIGHTNESS: 0
BACK PAIN: 0
VOICE CHANGE: 0
EYE REDNESS: 0
ANAL BLEEDING: 0
TROUBLE SWALLOWING: 0
BLOOD IN STOOL: 0
CHOKING: 0
DIARRHEA: 0
EYE ITCHING: 0
SINUS PRESSURE: 0
SHORTNESS OF BREATH: 0
ABDOMINAL DISTENTION: 0
STRIDOR: 0
FACIAL SWELLING: 0
COUGH: 0
NAUSEA: 0
PHOTOPHOBIA: 0
ABDOMINAL PAIN: 0
EYE PAIN: 0
WHEEZING: 0
RHINORRHEA: 0
CONSTIPATION: 0

## 2019-08-18 ASSESSMENT — PAIN DESCRIPTION - ORIENTATION: ORIENTATION: RIGHT;LEFT

## 2019-08-18 ASSESSMENT — PAIN DESCRIPTION - PAIN TYPE: TYPE: ACUTE PAIN;CHRONIC PAIN

## 2019-08-19 LAB
BILIRUBIN URINE: NEGATIVE
BLOOD, URINE: NEGATIVE
CLARITY: CLEAR
COLOR: YELLOW
GLUCOSE URINE: NEGATIVE MG/DL
KETONES, URINE: NEGATIVE MG/DL
LEUKOCYTE ESTERASE, URINE: NEGATIVE
NITRITE, URINE: NEGATIVE
PH UA: 5.5 (ref 5–9)
PROTEIN UA: NEGATIVE MG/DL
SPECIFIC GRAVITY UA: 1.02 (ref 1–1.03)
URINE REFLEX TO CULTURE: NORMAL
UROBILINOGEN, URINE: 0.2 E.U./DL

## 2019-08-19 PROCEDURE — 97166 OT EVAL MOD COMPLEX 45 MIN: CPT

## 2019-08-19 PROCEDURE — 6370000000 HC RX 637 (ALT 250 FOR IP): Performed by: HOSPITALIST

## 2019-08-19 PROCEDURE — 99222 1ST HOSP IP/OBS MODERATE 55: CPT | Performed by: INTERNAL MEDICINE

## 2019-08-19 PROCEDURE — 99211 OFF/OP EST MAY X REQ PHY/QHP: CPT

## 2019-08-19 PROCEDURE — 87077 CULTURE AEROBIC IDENTIFY: CPT

## 2019-08-19 PROCEDURE — 94664 DEMO&/EVAL PT USE INHALER: CPT

## 2019-08-19 PROCEDURE — 97167 OT EVAL HIGH COMPLEX 60 MIN: CPT

## 2019-08-19 PROCEDURE — 97116 GAIT TRAINING THERAPY: CPT

## 2019-08-19 PROCEDURE — 6360000002 HC RX W HCPCS: Performed by: PHYSICIAN ASSISTANT

## 2019-08-19 PROCEDURE — 97163 PT EVAL HIGH COMPLEX 45 MIN: CPT

## 2019-08-19 PROCEDURE — 6360000002 HC RX W HCPCS: Performed by: INTERNAL MEDICINE

## 2019-08-19 PROCEDURE — 2580000003 HC RX 258: Performed by: PHYSICIAN ASSISTANT

## 2019-08-19 PROCEDURE — 87070 CULTURE OTHR SPECIMN AEROBIC: CPT

## 2019-08-19 PROCEDURE — 1210000000 HC MED SURG R&B

## 2019-08-19 PROCEDURE — 2580000003 HC RX 258: Performed by: INTERNAL MEDICINE

## 2019-08-19 PROCEDURE — 81003 URINALYSIS AUTO W/O SCOPE: CPT

## 2019-08-19 PROCEDURE — 87205 SMEAR GRAM STAIN: CPT

## 2019-08-19 PROCEDURE — 87186 SC STD MICRODIL/AGAR DIL: CPT

## 2019-08-19 RX ORDER — DULOXETIN HYDROCHLORIDE 60 MG/1
60 CAPSULE, DELAYED RELEASE ORAL 2 TIMES DAILY
Status: DISCONTINUED | OUTPATIENT
Start: 2019-08-19 | End: 2019-08-21 | Stop reason: HOSPADM

## 2019-08-19 RX ORDER — TRAZODONE HYDROCHLORIDE 100 MG/1
200 TABLET ORAL NIGHTLY
Status: DISCONTINUED | OUTPATIENT
Start: 2019-08-19 | End: 2019-08-21 | Stop reason: HOSPADM

## 2019-08-19 RX ORDER — PREGABALIN 100 MG/1
100 CAPSULE ORAL 3 TIMES DAILY
Status: DISCONTINUED | OUTPATIENT
Start: 2019-08-19 | End: 2019-08-21 | Stop reason: HOSPADM

## 2019-08-19 RX ORDER — TRAMADOL HYDROCHLORIDE 50 MG/1
50 TABLET ORAL EVERY 6 HOURS PRN
Status: DISCONTINUED | OUTPATIENT
Start: 2019-08-19 | End: 2019-08-21 | Stop reason: HOSPADM

## 2019-08-19 RX ORDER — ACETAMINOPHEN 325 MG/1
650 TABLET ORAL EVERY 4 HOURS PRN
Status: DISCONTINUED | OUTPATIENT
Start: 2019-08-19 | End: 2019-08-21 | Stop reason: HOSPADM

## 2019-08-19 RX ORDER — MORPHINE SULFATE 2 MG/ML
2 INJECTION, SOLUTION INTRAMUSCULAR; INTRAVENOUS EVERY 4 HOURS PRN
Status: DISCONTINUED | OUTPATIENT
Start: 2019-08-19 | End: 2019-08-20

## 2019-08-19 RX ORDER — KETOROLAC TROMETHAMINE 15 MG/ML
15 INJECTION, SOLUTION INTRAMUSCULAR; INTRAVENOUS EVERY 6 HOURS PRN
Status: DISCONTINUED | OUTPATIENT
Start: 2019-08-19 | End: 2019-08-21 | Stop reason: HOSPADM

## 2019-08-19 RX ORDER — METHYLPREDNISOLONE SODIUM SUCCINATE 40 MG/ML
40 INJECTION, POWDER, LYOPHILIZED, FOR SOLUTION INTRAMUSCULAR; INTRAVENOUS DAILY
Status: DISCONTINUED | OUTPATIENT
Start: 2019-08-19 | End: 2019-08-21 | Stop reason: HOSPADM

## 2019-08-19 RX ORDER — GABAPENTIN 400 MG/1
800 CAPSULE ORAL 4 TIMES DAILY
Status: DISCONTINUED | OUTPATIENT
Start: 2019-08-19 | End: 2019-08-21 | Stop reason: HOSPADM

## 2019-08-19 RX ORDER — PREGABALIN 100 MG/1
100 CAPSULE ORAL 3 TIMES DAILY
COMMUNITY
End: 2019-09-20 | Stop reason: SDUPTHER

## 2019-08-19 RX ORDER — MELOXICAM 7.5 MG/1
15 TABLET ORAL DAILY
Status: DISCONTINUED | OUTPATIENT
Start: 2019-08-19 | End: 2019-08-20

## 2019-08-19 RX ORDER — LORAZEPAM 0.5 MG/1
0.5 TABLET ORAL DAILY PRN
Status: DISCONTINUED | OUTPATIENT
Start: 2019-08-19 | End: 2019-08-21 | Stop reason: HOSPADM

## 2019-08-19 RX ADMIN — PREGABALIN 100 MG: 100 CAPSULE ORAL at 12:43

## 2019-08-19 RX ADMIN — MORPHINE SULFATE 2 MG: 2 INJECTION, SOLUTION INTRAMUSCULAR; INTRAVENOUS at 12:44

## 2019-08-19 RX ADMIN — GABAPENTIN 800 MG: 400 CAPSULE ORAL at 10:14

## 2019-08-19 RX ADMIN — DULOXETINE HYDROCHLORIDE 60 MG: 60 CAPSULE, DELAYED RELEASE ORAL at 22:48

## 2019-08-19 RX ADMIN — Medication 10 ML: at 22:53

## 2019-08-19 RX ADMIN — GABAPENTIN 800 MG: 400 CAPSULE ORAL at 12:43

## 2019-08-19 RX ADMIN — DULOXETINE HYDROCHLORIDE 60 MG: 60 CAPSULE, DELAYED RELEASE ORAL at 10:13

## 2019-08-19 RX ADMIN — SODIUM CHLORIDE: 9 INJECTION, SOLUTION INTRAVENOUS at 10:54

## 2019-08-19 RX ADMIN — SODIUM CHLORIDE: 9 INJECTION, SOLUTION INTRAVENOUS at 00:38

## 2019-08-19 RX ADMIN — ACETAMINOPHEN 650 MG: 325 TABLET ORAL at 04:54

## 2019-08-19 RX ADMIN — METHYLPREDNISOLONE SODIUM SUCCINATE 40 MG: 40 INJECTION, POWDER, FOR SOLUTION INTRAMUSCULAR; INTRAVENOUS at 10:14

## 2019-08-19 RX ADMIN — TRAZODONE HYDROCHLORIDE 200 MG: 100 TABLET ORAL at 22:49

## 2019-08-19 RX ADMIN — TRAMADOL HYDROCHLORIDE 50 MG: 50 TABLET, FILM COATED ORAL at 04:13

## 2019-08-19 RX ADMIN — PREGABALIN 100 MG: 100 CAPSULE ORAL at 22:49

## 2019-08-19 RX ADMIN — GABAPENTIN 800 MG: 400 CAPSULE ORAL at 17:48

## 2019-08-19 RX ADMIN — KETOROLAC TROMETHAMINE 15 MG: 15 INJECTION, SOLUTION INTRAMUSCULAR; INTRAVENOUS at 16:19

## 2019-08-19 RX ADMIN — PREGABALIN 100 MG: 100 CAPSULE ORAL at 10:13

## 2019-08-19 RX ADMIN — KETOROLAC TROMETHAMINE 15 MG: 15 INJECTION, SOLUTION INTRAMUSCULAR; INTRAVENOUS at 05:25

## 2019-08-19 RX ADMIN — MELOXICAM 15 MG: 7.5 TABLET ORAL at 16:17

## 2019-08-19 RX ADMIN — ENOXAPARIN SODIUM 40 MG: 40 INJECTION SUBCUTANEOUS at 10:21

## 2019-08-19 RX ADMIN — TRAMADOL HYDROCHLORIDE 50 MG: 50 TABLET, FILM COATED ORAL at 10:13

## 2019-08-19 RX ADMIN — TRAMADOL HYDROCHLORIDE 50 MG: 50 TABLET, FILM COATED ORAL at 16:19

## 2019-08-19 RX ADMIN — VANCOMYCIN HYDROCHLORIDE 1000 MG: 1 INJECTION, POWDER, LYOPHILIZED, FOR SOLUTION INTRAVENOUS at 10:17

## 2019-08-19 RX ADMIN — GABAPENTIN 800 MG: 400 CAPSULE ORAL at 22:49

## 2019-08-19 RX ADMIN — LORAZEPAM 0.5 MG: 0.5 TABLET ORAL at 04:54

## 2019-08-19 RX ADMIN — Medication 10 ML: at 00:38

## 2019-08-19 RX ADMIN — MORPHINE SULFATE 2 MG: 2 INJECTION, SOLUTION INTRAMUSCULAR; INTRAVENOUS at 22:46

## 2019-08-19 RX ADMIN — MORPHINE SULFATE 2 MG: 2 INJECTION, SOLUTION INTRAMUSCULAR; INTRAVENOUS at 18:24

## 2019-08-19 RX ADMIN — VANCOMYCIN HYDROCHLORIDE 1000 MG: 1 INJECTION, POWDER, LYOPHILIZED, FOR SOLUTION INTRAVENOUS at 22:52

## 2019-08-19 ASSESSMENT — PAIN SCALES - GENERAL
PAINLEVEL_OUTOF10: 10
PAINLEVEL_OUTOF10: 9
PAINLEVEL_OUTOF10: 8
PAINLEVEL_OUTOF10: 7
PAINLEVEL_OUTOF10: 10
PAINLEVEL_OUTOF10: 8
PAINLEVEL_OUTOF10: 8
PAINLEVEL_OUTOF10: 10
PAINLEVEL_OUTOF10: 10
PAINLEVEL_OUTOF10: 2
PAINLEVEL_OUTOF10: 9

## 2019-08-19 ASSESSMENT — PAIN DESCRIPTION - ORIENTATION: ORIENTATION: RIGHT;LEFT

## 2019-08-19 ASSESSMENT — PAIN DESCRIPTION - DESCRIPTORS: DESCRIPTORS: SHARP;CONSTANT

## 2019-08-19 ASSESSMENT — PAIN DESCRIPTION - FREQUENCY: FREQUENCY: CONTINUOUS

## 2019-08-19 ASSESSMENT — PAIN DESCRIPTION - LOCATION
LOCATION: ABDOMEN
LOCATION: ABDOMEN

## 2019-08-19 ASSESSMENT — PAIN DESCRIPTION - PAIN TYPE
TYPE: ACUTE PAIN
TYPE: ACUTE PAIN

## 2019-08-19 NOTE — CARE COORDINATION
LSW spoke with admissions at [facility] and they are checking pt's insurance and will start a precert if appropriate. LSW to follow.

## 2019-08-19 NOTE — ED PROVIDER NOTES
normal. No stridor. No respiratory distress. She has no wheezes. She has no rales. She exhibits no tenderness. Abdominal: Soft. Bowel sounds are normal. She exhibits no distension and no mass. There is no tenderness. There is no rebound and no guarding. Musculoskeletal: Normal range of motion. She exhibits no edema or tenderness. Lymphadenopathy:     She has no cervical adenopathy. Neurological: She is alert and oriented to person, place, and time. She has normal reflexes. She displays normal reflexes. No cranial nerve deficit. She exhibits normal muscle tone. Coordination normal.   Skin: Skin is warm and dry. Rash noted. She is not diaphoretic. No erythema. No pallor. Patient has a large excoriated ulcer on the right upper quadrant of her abdomen. She also has multiple ulcers on the hips and buttocks. There are very deep ulcers on the inner aspect of the right calf and Achilles area. The depth of these ulcers is over 2 cm. Psychiatric: She has a normal mood and affect. Her behavior is normal. Judgment and thought content normal.   Nursing note and vitals reviewed. DIAGNOSTIC RESULTS     EKG: All EKG's are interpreted by the Emergency Department Physician who either signs or Co-signs this chart in the absence of a cardiologist.    No EKG was indicated or ordered.     RADIOLOGY:   Non-plain film images such as CT, Ultrasound and MRI are read by the radiologist. Plain radiographic images are visualized and preliminarily interpreted by the emergency physician with the below findings:    No diagnostic imaging was indicated or ordered    Interpretation per the Radiologist below, if available at the time of this note:    No orders to display         ED BEDSIDE ULTRASOUND:   Performed by ED Physician - none    LABS:  Labs Reviewed   COMPREHENSIVE METABOLIC PANEL - Abnormal; Notable for the following components:       Result Value    Glucose 175 (*)     Alb 3.4 (*)     Globulin 3.6 (*)     All other

## 2019-08-19 NOTE — CONSULTS
Infectious Diseases Inpatient Consult Note      Reason for Consult:   Wound infection  Requesting Physician:   López York CNP  Primary Care Physician:  Candelaria Beckford MD  History Obtained From:   Pt, EPIC    Admit Date: 8/18/2019  Hospital Day: 2      HISTORY OF PRESENT ILLNESS:  This is a 49 y.o. female was admitted to [address] [place] [address]  from home through ER with progressively worsening necrotic wounds over abdomen and B thighs and legs. + copious drainage that started 1 year ago. No fevers, normal WBC. Has H/O pyoderma gangrenosum and was treated with Prednisone 03/2019 by Dr Becky Doshi. Has been off Prednisone recently. Has severe burning pain, in all wounds. Miserable.     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    Neuromuscular disorder ([facility] [address]     Osteoarthritis     Pneumonia due to organism     Pyoderma gangrenosa     Ulcerative colitis ([facility] [address]        Past Surgical History:   Procedure Laterality Date    ABDOMEN SURGERY      sleen repair    APPENDECTOMY      BACK SURGERY      BREAST SURGERY      fatty tumor removed, benign    COLONOSCOPY      COSMETIC SURGERY      tummy tuck    EYE SURGERY      bilateral cataract surgery    HYSTERECTOMY      SKIN BIOPSY      SPINAL FUSION      TONSILLECTOMY         Current Medications:     methylPREDNISolone  40 mg Intravenous Daily    DULoxetine  60 mg Oral BID    gabapentin  800 mg Oral 4x Daily    pregabalin  100 mg Oral TID    meloxicam  15 mg Oral Daily    traZODone  200 mg Oral Nightly    sodium chloride flush  10 mL Intravenous 2 times per day    vancomycin  1,000 mg Intravenous Q12H    enoxaparin  40 wound Cx  · IV Solumedrol for now    Discussed with patient    Rhys Maciel MD

## 2019-08-19 NOTE — ED NOTES
Report given to Oneil Chan RN [address] [place] [address], [address] [place] [address]  08/18/19 0674

## 2019-08-19 NOTE — CONSULTS
Consults                           Consultation Dictated                Impression: Pyoderma Gangrenosum. Recommend: Agree with IV Steroid treatement,    In view of that the wounds woursening with topical steroid. No indications for surgical debridement at present time. [facility]

## 2019-08-19 NOTE — DISCHARGE SUMMARY
Hospital Medicine Discharge Summary    Aminta Wild  :  1965  MRN:  [id number]    Admit date:  2019  Discharge date:  19    Admitting Physician: Bridget Sapp MD  Primary Care Physician:  Oksana Garcia MD      Discharge Diagnoses: Active Problems:    Cellulitis  Resolved Problems:    * No resolved hospital problems. *      Hospital Course:   Aminta Wild is a 49 y.o. female that was admitted and treated at [facility] for progressive Pyoderma gangrenosum. Patient arrived to the emergency department several 1 to 2 cm gluteal lesions are very relatively superficial, 2 very large tunneling right ankle ulcerations, one very large ventral ulceration with satellite ulcerations developing. Patient was seen and evaluated by wound care, infectious disease, general surgery who all recommended and agreed with transfer to tertiary care. On arrival patient was started on IV vancomycin due to the purulent nature of the wounds is been continued wound care has been recommended however due to the severity of the wounds and the progressive worsening with current treatment the determination was made that the patient will need transfer to tertiary care for escalation of management, rheumatologic evaluation and further care. Patient was seen by the following consultants while admitted to [facility]:   Consults:  Chasity Rivera CONSULT TO PAIN MANAGEMENT    Physical Exam:   General appearance: Moderate to severe distress and pain, awake alert pleasant  HEENT: Pupils equal, round, and reactive to light. Conjunctivae/corneas clear. Neck: Supple, with full range of motion. No jugular venous distention. Trachea midline. Respiratory:  Normal respiratory effort. Clear to auscultation, bilaterally without Rales/Wheezes/Rhonchi.   Cardiovascular: Regular rate and rhythm with normal S1/S2

## 2019-08-19 NOTE — DISCHARGE INSTR - COC
Continuity of Care Form    Patient Name: Inessa England   :  1965  MRN:  [id number]    Admit date:  2019  Discharge date:  ***    Code Status Order: Full Code   Advance Directives:   Advance Care Flowsheet Documentation     Date/Time Healthcare Directive Type of Healthcare Directive Copy in [address] Agent's Name Healthcare Agent's Phone Number    [phone]  No, patient does not have an advance directive for healthcare treatment -- -- -- -- --          Admitting Physician: Leann Webb MD  PCP: Velvet Salguero MD    Discharging Nurse: [facility] Unit/Room#: B373/Y876-09  Discharging Unit Phone Number: ***    Emergency Contact:   Extended Emergency Contact Information  Primary Emergency Contact: Hu Michel [address] Phone: [phone]  Relation: Parent    Past Surgical History:  Past Surgical History:   Procedure Laterality Date    ABDOMEN SURGERY      sleen repair    APPENDECTOMY      BACK SURGERY      BREAST SURGERY      fatty tumor removed, benign    COLONOSCOPY      COSMETIC SURGERY      tummy tuck    EYE SURGERY      bilateral cataract surgery    HYSTERECTOMY      SKIN BIOPSY      SPINAL FUSION      TONSILLECTOMY         Immunization History: There is no immunization history on file for this patient.     Active Problems:  Patient Active Problem List   Diagnosis Code    Chronic pain G89.29    Cocaine use F14.90    Pyoderma gangrenosum L88    Cellulitis of right leg L03.115    Anemia D64.9    Obesity E66.9    COPD (chronic obstructive pulmonary disease) ([facility]) J44.9    Fibromyalgia M79.7    Depression F32.9    Cellulitis L03.90       Isolation/Infection:   Isolation          Contact        Patient Infection Status     Infection Onset Added Last Indicated Last Indicated By Review Planned Expiration Resolved Resolved By    MRSA [address] Chung RN        MRSA skin on

## 2019-08-19 NOTE — H&P
motion. No jugular venous distention. Trachea midline. Respiratory:  Normal respiratory effort. Clear to auscultation, bilaterally without Rales/Wheezes/Rhonchi. Cardiovascular:  Regular rate and rhythm with normal S1/S2 without murmurs, rubs or gallops. Abdomen: Soft, non-tender, non-distended with normal bowel sounds. Musculoskeletal:  No clubbing, cyanosis or edema bilaterally. Full range of motion without deformity. Skin: multiple ulcers on the abdomin and posterior aspect of the leg  Neurologic:  Neurovascularly intact without any focal sensory/motor deficits. Cranial nerves: II-XII intact, grossly non-focal.  Psychiatric:  Alert and oriented, thought content appropriate, normal insight  Capillary Refill: Brisk,< 3 seconds   Peripheral Pulses: +2 palpable, equal bilaterally       Labs:     Recent Labs     08/18/19  2130   WBC 9.6   HGB 13.2   HCT 38.8        Recent Labs     08/18/19  2130      K 4.1      CO2 26   BUN 15   CREATININE 0.76   CALCIUM 8.8     Recent Labs     08/18/19  2130   AST 16   ALT 12   BILITOT <0.2   ALKPHOS 84     No results for input(s): INR in the last 72 hours. No results for input(s): Redd Prier in the last 72 hours. Urinalysis:      Lab Results   Component Value Date    NITRU Negative 03/20/2018    WBCUA 5-10 10/08/2012    BACTERIA 1+ 10/08/2012    RBCUA 0-2 10/08/2012    BLOODU Negative 03/20/2018    SPECGRAV 1.006 03/20/2018    GLUCOSEU Negative 03/20/2018    GLUCOSEU NEG 11/02/2011       Radiology:     CXR: I have reviewed the CXR with the following interpretation: pending  EKG:  I have reviewed the EKG with the following interpretation: pending    No orders to display       ASSESSMENT:    Active Hospital Problems    Diagnosis Date Noted    Cellulitis [L03.90] 08/18/2019       PLAN:        DVT Prophylaxis: lovenox  Diet: No diet orders on file  Code Status: Prior    PT/OT Eval Status: eval and tx    1.  Pyoderma gangrenosa-will admit and consult

## 2019-08-19 NOTE — PROGRESS NOTES
Nutrition Assessment    Type and Reason for Visit: Initial, Positive Nutrition Screen(Poor po)    Nutrition Recommendations: Continue current diet, Start ONS(Wound healing ONS BID)    Nutrition Assessment: Pt compromised from a nutrition standpoint as evidenced by weight loss, poor po intake, and wounds. At risk for further decline due to continued poor po intake related to pain and increased nutrient losses for wound healing. Will start ONS. Malnutrition Assessment:  · Malnutrition Status: At risk for malnutrition  · Context: Chronic illness  · Findings of the 6 clinical characteristics of malnutrition (Minimum of 2 out of 6 clinical characteristics is required to make the diagnosis of moderate or severe Protein Calorie Malnutrition based on AND/ASPEN Guidelines):  1. Energy Intake-Less than or equal to 75% of estimated energy requirement, Greater than or equal to 3 months    2. Weight Loss-10% loss or greater, in 3 months  3. Fat Loss-No significant subcutaneous fat loss,    4. Muscle Loss-No significant muscle mass loss,    5. Fluid Accumulation-No significant fluid accumulation,    6.  Strength-Not measured    Nutrition Risk Level: High    Nutrient Needs:  · Estimated Daily Total Kcal: 8619-2404 (23-25 kcals/kg)  · Estimated Daily Protein (g): 76-87 (1.4-1.6 g/kg)  · Estimated Daily Total Fluid (ml/day): 0188-6194 mL (1 mL/kcal)    Nutrition Diagnosis:   · Problem: Increased nutrient needs  · Etiology: related to Increased demand for energy/nutrients     Signs and symptoms:  as evidenced by Presence of wounds    Objective Information:  · Nutrition-Focused Physical Findings: NS @100 mL/hr. No edema per nsg.  BM 8/18  · Wound Type: (Infected skin ulbers on ABD and RLE)  · Current Nutrition Therapies:  · Oral Diet Orders: General   · Oral Diet intake: 1-25%  · Oral Nutrition Supplement (ONS) Orders: None  · Anthropometric Measures:  · Ht: 5' 4\" (162.6 cm)   · Current Body Wt: 150 lb (68 kg)(8/19 Bed scale)  · Admission Body Wt: 150 lb (68 kg)(8/18 Stated)  · Usual Body Wt: 168 lb (76.2 kg)(4/12 Stated; 155 lbs. 6/1)  · % Weight Change:  ,  10.7% (18 lbs.) x4.5 months per EMR weights. Pt states that she has lost weight but is unsure of amount. · Ideal Body Wt: 120 lb (54.4 kg), % Ideal Body >100%  · BMI Classification: BMI 25.0 - 29.9 Overweight    Nutrition Interventions:   Continue current diet, Start ONS(Wound healing ONS BID)  Continued Inpatient Monitoring, Education Not Indicated    Nutrition Evaluation:   · Evaluation: Goals set   · Goals: PO intake >50% of meals/ONS. Improved wound status.     · Monitoring: Meal Intake, Supplement Intake, Weight, Pertinent Labs, Wound Healing      Electronically signed by Pat Sanford RD, MICHA on 8/19/19 at 1:28 PM

## 2019-08-19 NOTE — PROGRESS NOTES
[facility] OCCUPATIONAL THERAPY EVALUATION - ACUTE     Date: 2019  Patient Name: Aminta Wild        MRN: [id number]  Account: [de-identified]   : 1965  (49 y.o.)  [facility]    Chart Review:  Diagnosis:  The encounter diagnosis was Pyoderma gangrenosa. 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     Osteoarthritis     Pneumonia due to organism     Pyoderma gangrenosa     Ulcerative colitis ([facility]      Past Surgical History:   Procedure Laterality Date    ABDOMEN SURGERY      sleen repair    APPENDECTOMY      BACK SURGERY      BREAST SURGERY      fatty tumor removed, benign    COLONOSCOPY      COSMETIC SURGERY      tummy tuck    EYE SURGERY      bilateral cataract surgery    HYSTERECTOMY      SKIN BIOPSY      SPINAL FUSION      TONSILLECTOMY         Restrictions  Restrictions/Precautions: Fall Risk, Contact Precautions     Safety Devices: Safety Devices  Safety Devices in place: Yes  Type of devices:  All fall risk precautions in place    Subjective  Pre Treatment Pain Screening  Pain at present: 8  Scale Used: Numeric Score  Intervention List: Patient able to continue with treatment, Nurse/Physician notified    Pain Reassessment:   Pain Assessment  Patient Currently in Pain: Yes  Pain Assessment: 0-10  Pain Level: 10  Pain Type: Acute pain  Pain Location: Abdomen  Pain Orientation: Right, Left  Pain Descriptors: Sharp, Constant  Pain Frequency: Continuous       Prior Level of Function:  Social/Functional History  Lives With: Alone  Type of Home: Apartment  Home Layout: (7th story)  Home Access: Elevator, Level entry  [place]

## 2019-08-19 NOTE — PROGRESS NOTES
Pharmacy Note  Vancomycin Consult    Adilia Moses is a 49 y.o. female started on Vancomycin for cellulitis; consult received from Belen ROJAS to manage therapy. Also receiving the following antibiotics: none. Patient Active Problem List   Diagnosis    Chronic pain    Cocaine use    Pyoderma gangrenosum    Cellulitis of right leg    Anemia    Obesity    COPD (chronic obstructive pulmonary disease) (HCC)    Fibromyalgia    Depression    Cellulitis       Allergies:  Amoxicillin-pot clavulanate and Other     Temp max: 98.2F    Recent Labs     08/18/19  2130   BUN 15       Recent Labs     08/18/19  2130   CREATININE 0.76       Recent Labs     08/18/19  2130   WBC 9.6         Intake/Output Summary (Last 24 hours) at 8/18/2019 2341  Last data filed at 8/18/2019 2251  Gross per 24 hour   Intake 250 ml   Output --   Net 250 ml       Culture Date      Source                       Results  8/18/19                blood                          pending    Ht Readings from Last 1 Encounters:   08/18/19 5' 4\" (1.626 m)        Wt Readings from Last 1 Encounters:   08/18/19 150 lb (68 kg)         Body mass index is 25.75 kg/m². Estimated Creatinine Clearance: 81 mL/min (based on SCr of 0.76 mg/dL). Goal Trough Level: 10-20 mcg/mL    Assessment/Plan:  Will initiate vancomycin 1000 mg IV every 12 hours, first dose given in ED 8/18 2200. Trough prior to 4th dose 8/20 1000. Timing of trough level will be determined based on culture results, renal function, and clinical response. Thank you for the consult. Will continue to follow. IVONNE Barrientos. Ph.  8/18/2019  11:48 PM

## 2019-08-20 VITALS
RESPIRATION RATE: 18 BRPM | DIASTOLIC BLOOD PRESSURE: 86 MMHG | WEIGHT: 150 LBS | HEIGHT: 64 IN | HEART RATE: 61 BPM | SYSTOLIC BLOOD PRESSURE: 156 MMHG | BODY MASS INDEX: 25.61 KG/M2 | TEMPERATURE: 97.9 F | OXYGEN SATURATION: 99 %

## 2019-08-20 PROBLEM — T14.8XXA PAIN ASSOCIATED WITH WOUND: Status: ACTIVE | Noted: 2019-08-20

## 2019-08-20 PROBLEM — R52 PAIN ASSOCIATED WITH WOUND: Status: ACTIVE | Noted: 2019-08-20

## 2019-08-20 PROBLEM — L88: Status: ACTIVE | Noted: 2019-08-20

## 2019-08-20 LAB
ANION GAP SERPL CALCULATED.3IONS-SCNC: 13 MEQ/L (ref 9–15)
BASOPHILS ABSOLUTE: 0 K/UL (ref 0–0.2)
BASOPHILS RELATIVE PERCENT: 0.6 %
BUN BLDV-MCNC: 12 MG/DL (ref 6–20)
CALCIUM SERPL-MCNC: 9.1 MG/DL (ref 8.5–9.9)
CHLORIDE BLD-SCNC: 106 MEQ/L (ref 95–107)
CO2: 26 MEQ/L (ref 20–31)
CREAT SERPL-MCNC: 0.58 MG/DL (ref 0.5–0.9)
EOSINOPHILS ABSOLUTE: 0 K/UL (ref 0–0.7)
EOSINOPHILS RELATIVE PERCENT: 0.2 %
GFR AFRICAN AMERICAN: >60
GFR NON-AFRICAN AMERICAN: >60
GLUCOSE BLD-MCNC: 170 MG/DL (ref 70–99)
HCT VFR BLD CALC: 36.6 % (ref 37–47)
HEMOGLOBIN: 12.3 G/DL (ref 12–16)
LACTIC ACID: 2.1 MMOL/L (ref 0.5–2.2)
LYMPHOCYTES ABSOLUTE: 2.2 K/UL (ref 1–4.8)
LYMPHOCYTES RELATIVE PERCENT: 28.2 %
MCH RBC QN AUTO: 29.5 PG (ref 27–31.3)
MCHC RBC AUTO-ENTMCNC: 33.6 % (ref 33–37)
MCV RBC AUTO: 88 FL (ref 82–100)
MONOCYTES ABSOLUTE: 0.6 K/UL (ref 0.2–0.8)
MONOCYTES RELATIVE PERCENT: 7.9 %
NEUTROPHILS ABSOLUTE: 4.8 K/UL (ref 1.4–6.5)
NEUTROPHILS RELATIVE PERCENT: 63.1 %
PDW BLD-RTO: 17.1 % (ref 11.5–14.5)
PLATELET # BLD: 277 K/UL (ref 130–400)
POTASSIUM REFLEX MAGNESIUM: 4.3 MEQ/L (ref 3.4–4.9)
RBC # BLD: 4.16 M/UL (ref 4.2–5.4)
SODIUM BLD-SCNC: 145 MEQ/L (ref 135–144)
VANCOMYCIN TROUGH: 10 UG/ML (ref 10–20)
WBC # BLD: 7.6 K/UL (ref 4.8–10.8)

## 2019-08-20 PROCEDURE — 2580000003 HC RX 258: Performed by: INTERNAL MEDICINE

## 2019-08-20 PROCEDURE — 36415 COLL VENOUS BLD VENIPUNCTURE: CPT

## 2019-08-20 PROCEDURE — 6360000002 HC RX W HCPCS: Performed by: INTERNAL MEDICINE

## 2019-08-20 PROCEDURE — 97116 GAIT TRAINING THERAPY: CPT

## 2019-08-20 PROCEDURE — 1210000000 HC MED SURG R&B

## 2019-08-20 PROCEDURE — 6370000000 HC RX 637 (ALT 250 FOR IP): Performed by: INTERNAL MEDICINE

## 2019-08-20 PROCEDURE — 85025 COMPLETE CBC W/AUTO DIFF WBC: CPT

## 2019-08-20 PROCEDURE — 6360000002 HC RX W HCPCS: Performed by: PHYSICIAN ASSISTANT

## 2019-08-20 PROCEDURE — 80202 ASSAY OF VANCOMYCIN: CPT

## 2019-08-20 PROCEDURE — 83605 ASSAY OF LACTIC ACID: CPT

## 2019-08-20 PROCEDURE — 80048 BASIC METABOLIC PNL TOTAL CA: CPT

## 2019-08-20 PROCEDURE — 6360000002 HC RX W HCPCS: Performed by: ANESTHESIOLOGY

## 2019-08-20 PROCEDURE — 6370000000 HC RX 637 (ALT 250 FOR IP): Performed by: HOSPITALIST

## 2019-08-20 PROCEDURE — 99232 SBSQ HOSP IP/OBS MODERATE 35: CPT | Performed by: INTERNAL MEDICINE

## 2019-08-20 PROCEDURE — 2580000003 HC RX 258: Performed by: PHYSICIAN ASSISTANT

## 2019-08-20 RX ORDER — LORAZEPAM 0.5 MG/1
0.5 TABLET ORAL ONCE
Status: COMPLETED | OUTPATIENT
Start: 2019-08-20 | End: 2019-08-20

## 2019-08-20 RX ORDER — HYDROXYZINE HYDROCHLORIDE 10 MG/1
25 TABLET, FILM COATED ORAL 3 TIMES DAILY PRN
Status: DISCONTINUED | OUTPATIENT
Start: 2019-08-20 | End: 2019-08-21 | Stop reason: HOSPADM

## 2019-08-20 RX ORDER — IPRATROPIUM BROMIDE AND ALBUTEROL SULFATE 2.5; .5 MG/3ML; MG/3ML
1 SOLUTION RESPIRATORY (INHALATION) EVERY 8 HOURS PRN
Status: DISCONTINUED | OUTPATIENT
Start: 2019-08-20 | End: 2019-08-21 | Stop reason: HOSPADM

## 2019-08-20 RX ORDER — NICOTINE 21 MG/24HR
1 PATCH, TRANSDERMAL 24 HOURS TRANSDERMAL DAILY
Status: DISCONTINUED | OUTPATIENT
Start: 2019-08-20 | End: 2019-08-21 | Stop reason: HOSPADM

## 2019-08-20 RX ADMIN — KETOROLAC TROMETHAMINE 15 MG: 15 INJECTION, SOLUTION INTRAMUSCULAR; INTRAVENOUS at 02:59

## 2019-08-20 RX ADMIN — SODIUM CHLORIDE: 9 INJECTION, SOLUTION INTRAVENOUS at 03:04

## 2019-08-20 RX ADMIN — HYDROMORPHONE HYDROCHLORIDE 1 MG: 1 INJECTION, SOLUTION INTRAMUSCULAR; INTRAVENOUS; SUBCUTANEOUS at 23:19

## 2019-08-20 RX ADMIN — KETOROLAC TROMETHAMINE 15 MG: 15 INJECTION, SOLUTION INTRAMUSCULAR; INTRAVENOUS at 18:58

## 2019-08-20 RX ADMIN — GABAPENTIN 800 MG: 400 CAPSULE ORAL at 09:58

## 2019-08-20 RX ADMIN — Medication 10 ML: at 10:04

## 2019-08-20 RX ADMIN — DULOXETINE HYDROCHLORIDE 60 MG: 60 CAPSULE, DELAYED RELEASE ORAL at 20:21

## 2019-08-20 RX ADMIN — GABAPENTIN 800 MG: 400 CAPSULE ORAL at 11:57

## 2019-08-20 RX ADMIN — PREGABALIN 100 MG: 100 CAPSULE ORAL at 11:57

## 2019-08-20 RX ADMIN — HYDROMORPHONE HYDROCHLORIDE 1 MG: 1 INJECTION, SOLUTION INTRAMUSCULAR; INTRAVENOUS; SUBCUTANEOUS at 09:57

## 2019-08-20 RX ADMIN — HYDROMORPHONE HYDROCHLORIDE 1 MG: 1 INJECTION, SOLUTION INTRAMUSCULAR; INTRAVENOUS; SUBCUTANEOUS at 16:11

## 2019-08-20 RX ADMIN — MEROPENEM 1 G: 1 INJECTION INTRAVENOUS at 20:19

## 2019-08-20 RX ADMIN — PREGABALIN 100 MG: 100 CAPSULE ORAL at 09:58

## 2019-08-20 RX ADMIN — GABAPENTIN 800 MG: 400 CAPSULE ORAL at 16:11

## 2019-08-20 RX ADMIN — VANCOMYCIN HYDROCHLORIDE 1000 MG: 1 INJECTION, POWDER, LYOPHILIZED, FOR SOLUTION INTRAVENOUS at 10:46

## 2019-08-20 RX ADMIN — Medication 10 ML: at 20:20

## 2019-08-20 RX ADMIN — PREGABALIN 100 MG: 100 CAPSULE ORAL at 20:21

## 2019-08-20 RX ADMIN — METHYLPREDNISOLONE SODIUM SUCCINATE 40 MG: 40 INJECTION, POWDER, FOR SOLUTION INTRAMUSCULAR; INTRAVENOUS at 09:57

## 2019-08-20 RX ADMIN — DULOXETINE HYDROCHLORIDE 60 MG: 60 CAPSULE, DELAYED RELEASE ORAL at 09:58

## 2019-08-20 RX ADMIN — LORAZEPAM 0.5 MG: 0.5 TABLET ORAL at 18:32

## 2019-08-20 RX ADMIN — TRAMADOL HYDROCHLORIDE 50 MG: 50 TABLET, FILM COATED ORAL at 18:58

## 2019-08-20 RX ADMIN — HYDROMORPHONE HYDROCHLORIDE 1 MG: 1 INJECTION, SOLUTION INTRAMUSCULAR; INTRAVENOUS; SUBCUTANEOUS at 13:37

## 2019-08-20 RX ADMIN — MEROPENEM 1 G: 1 INJECTION INTRAVENOUS at 14:06

## 2019-08-20 RX ADMIN — GABAPENTIN 800 MG: 400 CAPSULE ORAL at 20:20

## 2019-08-20 RX ADMIN — TRAZODONE HYDROCHLORIDE 200 MG: 100 TABLET ORAL at 20:21

## 2019-08-20 RX ADMIN — ENOXAPARIN SODIUM 40 MG: 40 INJECTION SUBCUTANEOUS at 09:57

## 2019-08-20 ASSESSMENT — PAIN DESCRIPTION - LOCATION
LOCATION: ABDOMEN;FOOT
LOCATION: ABDOMEN;FOOT;TOE (COMMENT WHICH ONE)

## 2019-08-20 ASSESSMENT — PAIN SCALES - GENERAL
PAINLEVEL_OUTOF10: 4
PAINLEVEL_OUTOF10: 10
PAINLEVEL_OUTOF10: 10
PAINLEVEL_OUTOF10: 3
PAINLEVEL_OUTOF10: 6
PAINLEVEL_OUTOF10: 7
PAINLEVEL_OUTOF10: 10
PAINLEVEL_OUTOF10: 9
PAINLEVEL_OUTOF10: 0
PAINLEVEL_OUTOF10: 9

## 2019-08-20 ASSESSMENT — PAIN DESCRIPTION - PAIN TYPE: TYPE: ACUTE PAIN

## 2019-08-20 ASSESSMENT — ENCOUNTER SYMPTOMS
VOMITING: 0
ABDOMINAL PAIN: 1
BLOOD IN STOOL: 0
ABDOMINAL DISTENTION: 0
ANAL BLEEDING: 0
RECTAL PAIN: 0
CONSTIPATION: 0
NAUSEA: 0
EYES NEGATIVE: 1
RESPIRATORY NEGATIVE: 1
BACK PAIN: 1
COLOR CHANGE: 1
DIARRHEA: 0

## 2019-08-20 ASSESSMENT — PAIN DESCRIPTION - ORIENTATION: ORIENTATION: RIGHT

## 2019-08-20 ASSESSMENT — PAIN DESCRIPTION - DESCRIPTORS: DESCRIPTORS: ACHING;CONSTANT

## 2019-08-20 NOTE — CONSULTS
[address]                      [address], [address] [facility]                                  CONSULTATION    PATIENT NAME: Emilio Mccray                      :        1965  MED REC NO:   [id number]                            ROOM:       W266  ACCOUNT NO:   [de-identified]                           ADMIT DATE: 2019  PROVIDER:     Luz Whitney MD    CONSULT DATE:  2019    HISTORY OF PRESENT ILLNESS:  This is a 60-year-old female patient is  seen in consultation because of multiple wounds involving the abdominal  wall, the left gluteal area, sacral area, the legs, and thighs. This  patient is known to have a history of pyoderma gangrenosum, which has  been treated with topical steroid by Dr. Ramakrishna Chand, and according to the  patient the wound has been very progressively worsening. The patient is  complaining of the pain and increasing drainage. Otherwise, the patient  is alert and stable. 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 PAST SURGICAL HISTORY:  The patient has repair of the splenic injury,  has a history of appendectomy, back surgery, has an excision of the  benign tumor of the breast, colonoscopy, the patient had cosmetic  surgery with tummy tuck, bilateral cataract surgery, hysterectomy, skin  biopsy, spinal fusion, and tonsillectomy. HOME MEDICATIONS:  The patient was on duloxetine, gabapentin,  pregabalin, meloxicam, trazodone, fish oil, subcu heparin, and  vancomycin. ALLERGIES:  The patient is allergic to AMOXICILLIN.     SOCIAL HABITS:  The patient smokes about a pack of cigarettes a day,  denies any alcohol or drugs.    PHYSICAL EXAMINATION:  GENERAL:  Otherwise, the patient is stable and comfortable. VITAL SIGNS:  Temperature of 98.8, respirations were 18, pulse was 62,  and blood pressure, the last one 96/56. HEENT:  Sclerae, no jaundice. NECK:  Supple. LUNGS:  Clear. ABDOMEN:  Soft, no tenderness, and no distention. SKIN:  The patient presented with multiple skin ulcerations and lesions  involving the anterior abdominal wall, about 8 round full-thickness  ulcerations involving the gluteal area measuring each one 1 or 2 cm in  diameter, two large ulcerations involving the muscle and fascia  involving the right ankle with tunneling, also one in the lower anterior  abdominal wall in the suprapubic area, also which is full-thickness with  some granulation tissue, also presented one small in the sacral area. EXTREMITIES:  The peripheral pulses are present. LABORATORY TESTS:  The electrolytes are normal, BUN and creatinine  normal, lactic acid normal, glucose 175, the liver function tests with  albumin 3.4, globulin 3.6, the remaining liver function tests are  normal.  CBC with normal WBC, hemoglobin 13.2, hematocrit 38.28, and  platelet count 298,023. IMPRESSION:  The patient has pyoderma gangrenosum, which apparently has  now been responding to topical steroid, which according to the patient  has been treated at the [facility] by Dr. Yuri Arechiga. RECOMMENDATIONS:  I agree with treatment with IV steroid at the present  time. Also, the patient should avoid any irritating-type dressing in  the wound to avoid any aggravation. I also agree with transferring the  patient to a tertiary center, the transfer is pending according to the  medical doctor. At the present time, I do not believe a surgical  debridement has a place.         Yash Varela MD    D: 08/19/2019 19:07:26       T: 08/20/2019 0:18:34     [email or web address]  Job#: [id number]     Doc#: [id number]    CC:  Alejandra Felix DO

## 2019-08-20 NOTE — CONSULTS
organization: Not on file     Attends meetings of clubs or organizations: Not on file     Relationship status: Not on file    Intimate partner violence:     Fear of current or ex partner: Not on file     Emotionally abused: Not on file     Physically abused: Not on file     Forced sexual activity: Not on file   Other Topics Concern    Not on file   Social History Narrative    Not on file     PSYCHOLOGICAL HISTORY: Chronic depression and anxiety she is on Ativan and trazodone at home    MEDICATIONS:  Medications Prior to Admission: pregabalin (LYRICA) 100 MG capsule, Take 100 mg by mouth 3 times daily. acetaminophen (TYLENOL) 325 MG tablet, Take 650 mg by mouth every 4 hours as needed for Pain  DULoxetine (CYMBALTA) 60 MG extended release capsule, Take 60 mg by mouth 2 times daily  gabapentin (NEURONTIN) 300 MG capsule, Take 800 mg by mouth 4 times daily. LORazepam (ATIVAN) 1 MG tablet, Take 0.5 mg by mouth daily as needed for Anxiety. meloxicam (MOBIC) 15 MG tablet, Take 15 mg by mouth daily  traZODone (DESYREL) 100 MG tablet, Take 200 mg by mouth nightly   busPIRone (BUSPAR) 5 MG tablet, Take 5 mg by mouth 3 times daily  albuterol (PROVENTIL) (2.5 MG/3ML) 0.083% nebulizer solution, Take 2.5 mg by nebulization every 4 hours as needed for Wheezing (night time)   cycloSPORINE (SANDIMMUNE) 100 MG capsule, Take 125 mg by mouth 2 times daily  dapsone 100 MG tablet, Take 100 mg by mouth daily  rOPINIRole (REQUIP) 1 MG tablet, Take 1 mg by mouth 3 times daily   [unfilled]    ALLERGIES:  Amoxicillin-pot clavulanate and Other    COMPLETE REVIEW OF SYSTEMS:  As noted in HPI, 12 point ROS reviewed and otherwise negative. Review of Systems   Constitutional: Positive for activity change and fatigue. Negative for appetite change, chills, diaphoresis, fever and unexpected weight change. HENT: Negative. Eyes: Negative. Respiratory: Negative. Cardiovascular: Negative.     Gastrointestinal: Positive for abdominal pain. Negative for abdominal distention, anal bleeding, blood in stool, constipation, diarrhea, nausea, rectal pain and vomiting. Endocrine: Negative. Genitourinary: Negative. Musculoskeletal: Positive for arthralgias, back pain, myalgias and neck stiffness. Negative for gait problem, joint swelling and neck pain. Skin: Positive for color change, pallor, rash and wound. Allergic/Immunologic: Positive for immunocompromised state. Negative for environmental allergies and food allergies. Neurological: Negative. Hematological: Negative for adenopathy. Bruises/bleeds easily. Psychiatric/Behavioral: Positive for sleep disturbance. Negative for agitation, behavioral problems, confusion, decreased concentration, dysphoric mood, hallucinations, self-injury and suicidal ideas. The patient is nervous/anxious. The patient is not hyperactive. OBJECTIVE  PHYSICAL EXAM:  /60   Pulse 64   Temp 98.6 °F (37 °C) (Oral)   Resp 18   Ht 5' 4\" (1.626 m)   Wt 150 lb (68 kg)   LMP 01/01/1997 (Exact Date) Comment: hysterectomy  SpO2 98%   BMI 25.75 kg/m²   Body mass index is 25.75 kg/m².   CONSTITUTIONAL: Patient awake, alert cooperative but appears to be in severe intractable pain involving the whole abdominal region and thigh and leg area  EYES:  vision intact  ENT:  normocepalic, without obvious abnormality, atraumatic  NECK:  supple, symmetrical, trachea midline, skin normal and no stridor  BACK:  symmetric and no curvature  LUNGS:  no increased work of breathing and good air exchange  CARDIOVASCULAR:  regular rate and rhythm  ABDOMEN: Soft nontender nondistended however skin exam seems multiple large infiltrative wound ulcerative throughout the whole skin and underlying area surrounded with black painful and tender edges  MUSCULOSKELETAL: Generalized muscular achiness around the wound involving upper and lower extremity mainly affected the wound in the abdomen and thigh and pelvic and lower

## 2019-08-20 NOTE — PROGRESS NOTES
mL/hr at 08/20/19 0304     Scheduled Medications:    methylPREDNISolone  40 mg Intravenous Daily    DULoxetine  60 mg Oral BID    gabapentin  800 mg Oral 4x Daily    pregabalin  100 mg Oral TID    meloxicam  15 mg Oral Daily    traZODone  200 mg Oral Nightly    sodium chloride flush  10 mL Intravenous 2 times per day    vancomycin  1,000 mg Intravenous Q12H    enoxaparin  40 mg Subcutaneous Daily    vancomycin (VANCOCIN) intermittent dosing (placeholder)   Other RX Placeholder     PRN Meds: acetaminophen, LORazepam, traMADol, ketorolac, morphine, sodium chloride flush, magnesium hydroxide, ondansetron    Labs:   Recent Labs     08/18/19 2130 08/20/19  0523   WBC 9.6 7.6   HGB 13.2 12.3   HCT 38.8 36.6*    277     Recent Labs     08/18/19 2130 08/20/19  0523    145*   K 4.1 4.3    106   CO2 26 26   BUN 15 12   CREATININE 0.76 0.58   CALCIUM 8.8 9.1     Recent Labs     08/18/19 2130   AST 16   ALT 12   BILITOT <0.2   ALKPHOS 84     No results for input(s): INR in the last 72 hours. No results for input(s): Ardyce Cane in the last 72 hours. Urinalysis:   Lab Results   Component Value Date    NITRU Negative 08/19/2019    WBCUA 5-10 10/08/2012    BACTERIA 1+ 10/08/2012    RBCUA 0-2 10/08/2012    BLOODU Negative 08/19/2019    SPECGRAV 1.016 08/19/2019    GLUCOSEU Negative 08/19/2019    GLUCOSEU NEG 11/02/2011       Radiology:   Most recent    Chest CT      WITH CONTRAST:No results found for this or any previous visit. WITHOUT CONTRAST: No results found for this or any previous visit.     CXR      2-view:   Results for orders placed during the hospital encounter of 12/22/17   XR CHEST STANDARD (2 VW)    Narrative EXAMINATION: CHEST X-RAY, PA AND LATERAL    CLINICAL HISTORY: UPPER RESPIRATORY CONGESTION, RESPIRATORY WHEEZING, COUGH AND FEVER    COMPARISONS: 12/21/2017    FINDINGS: Heart and mediastinum appear normal. There is bibasilar subsegmental atelectasis or possibly

## 2019-08-20 NOTE — PROGRESS NOTES
Physical Therapy Med Surg Daily Treatment Note  [facility]  [facility]       NAME: Alfreda Pineda  : 1965 (49 y.o.)  MRN: [id number]  CODE STATUS: Full Code    Date of Service: 2019    Patient Diagnosis(es): Cellulitis [D77.02]   Chief Complaint   Patient presents with    Other     skin blisters caused by patient's leukemia     Patient Active Problem List    Diagnosis Date Noted    Pyodermic gangrenosum 2019    Pain associated with wound 2019    Cellulitis 2019    Depression     COPD [facility]ronic obstructive [facility] disease) [facility] [address]     Anemia 2017    Obesity 2017    Pyoderma gangrenosum 2017    Cellulitis of right leg 2017    Fibromyalgia 2017    Cocaine use 2015    Chronic pain 2014        Past Medical History:   Diagnosis Date    Anxiety     Arthritis     Cancer ([facility] [facility] 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    Chronic kidney disease     COPD exacerbation (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    Neuromuscular disorder ([address]     Osteoarthritis     Pneumonia due to organism     Pyoderma gangrenosa     Ulcerative colitis ([address]      Past Surgical History:   Procedure Laterality Date    ABDOMEN SURGERY      sleen repair    APPENDECTOMY      BACK SURGERY      BREAST SURGERY      fatty tumor removed, benign    COLONOSCOPY      COSMETIC SURGERY      tummy tuck    EYE SURGERY      bilateral cataract surgery    HYSTERECTOMY      SKIN BIOPSY      SPINAL FUSION      TONSILLECTOMY            Restrictions:  Restrictions/Precautions: Fall Risk, Contact Precautions    SUBJECTIVE:  Subjective  Subjective: Pt agreeable to tx. I'm just waiting for a bed to [facility] to [facility].      Pre Pain Assessment:  Pre Treatment Pain Screening  Pain at present: 6  Intervention List: Patient able to continue with treatment  Comments / Details: recently medicated          Post Pain Assessment:   Pain Assessment  Pain Level: 6  Pain Type: Acute pain  Pain Location: Abdomen; Foot  Pain Orientation: Right  Pain Descriptors: Aching;Constant       OBJECTIVE:         Bed mobility  Supine to Sit: Supervision  Sit to Supine: Supervision  Comment: steady, increased time to complete    Transfers  Sit to Stand: Supervision  Stand to sit: Supervision  Bed to Chair: Supervision  Comment: slow paced, good hand placement    Ambulation 1  Surface: level tile  Device: No Device  Assistance: Supervision  Quality of Gait: pt flexed fwd at trunk/hips, markedly slow ana maria due to pain  Distance: 48'  Comments: ambulation in room only. Neuromuscular Education  Neuromuscular Comments: reaching OOBOS confidently                           ASSESSMENT:  Body structures, Functions, Activity limitations: Decreased functional mobility ; Decreased strength;Decreased endurance;Decreased balance; Increased Pain    Assessment: Good participation. Pt up walking in room without device.  Steady    Activity Tolerance  Activity Tolerance: Patient Tolerated treatment well       Discharge Recommendations:  Continue to assess pending progress, Patient would benefit from continued therapy after discharge    Goals:  Long term goals  Long term goal 1: indep with bed mobility  Long term goal 2: indep with transfers  Long term goal 3: pt to ambulate >50 ft LRAD mod indep   Long term goal 4: tolerate >10 min dynamic activity  Patient Goals   Patient goals : to get rid of this pain    PLAN:   Plan  Times per week: 3-6  Current Treatment Recommendations: Strengthening, Functional Mobility Training, Neuromuscular Re-education, Equipment Evaluation, Education, & procurement, Transfer Training, Gait Training, Safety Education & Training, Balance Training, Endurance Training, Pain Management, Patient/Caregiver Education & Training, Positioning, Home Exercise Program  Plan Comment: Cont. POC  Safety Devices  Type of devices:  All fall risk precautions in place, Call light within reach     [facility] (6 CLICK) [facility] 28 Inpatient Mobility Raw Score : 23      Therapy Time   Individual   Time In 1435   Time Out 1450   Minutes 15      bm/Trsf - 5 mins  Gait - 10 mins       Pialr Thompson PTA, 08/20/19 at 2:56 PM

## 2019-08-21 LAB
ANION GAP SERPL CALCULATED.3IONS-SCNC: 15 MEQ/L (ref 9–15)
BASOPHILS ABSOLUTE: 0.1 K/UL (ref 0–0.2)
BASOPHILS RELATIVE PERCENT: 0.9 %
BUN BLDV-MCNC: 18 MG/DL (ref 6–20)
CALCIUM SERPL-MCNC: 9.5 MG/DL (ref 8.5–9.9)
CHLORIDE BLD-SCNC: 103 MEQ/L (ref 95–107)
CO2: 21 MEQ/L (ref 20–31)
CREAT SERPL-MCNC: 0.56 MG/DL (ref 0.5–0.9)
EOSINOPHILS ABSOLUTE: 0 K/UL (ref 0–0.7)
EOSINOPHILS RELATIVE PERCENT: 0.2 %
GFR AFRICAN AMERICAN: >60
GFR NON-AFRICAN AMERICAN: >60
GLUCOSE BLD-MCNC: 138 MG/DL (ref 70–99)
GRAM STAIN RESULT: ABNORMAL
HCT VFR BLD CALC: 36.3 % (ref 37–47)
HEMOGLOBIN: 11.9 G/DL (ref 12–16)
LACTIC ACID: 1 MMOL/L (ref 0.5–2.2)
LYMPHOCYTES ABSOLUTE: 3 K/UL (ref 1–4.8)
LYMPHOCYTES RELATIVE PERCENT: 27.5 %
MCH RBC QN AUTO: 28.8 PG (ref 27–31.3)
MCHC RBC AUTO-ENTMCNC: 32.8 % (ref 33–37)
MCV RBC AUTO: 87.9 FL (ref 82–100)
MONOCYTES ABSOLUTE: 0.6 K/UL (ref 0.2–0.8)
MONOCYTES RELATIVE PERCENT: 5.2 %
NEUTROPHILS ABSOLUTE: 7.1 K/UL (ref 1.4–6.5)
NEUTROPHILS RELATIVE PERCENT: 66.2 %
ORGANISM: ABNORMAL
PDW BLD-RTO: 16.9 % (ref 11.5–14.5)
PLATELET # BLD: 250 K/UL (ref 130–400)
PLATELET SLIDE REVIEW: ADEQUATE
POTASSIUM REFLEX MAGNESIUM: 5.3 MEQ/L (ref 3.4–4.9)
RBC # BLD: 4.13 M/UL (ref 4.2–5.4)
SLIDE REVIEW: ABNORMAL
SODIUM BLD-SCNC: 139 MEQ/L (ref 135–144)
WBC # BLD: 10.7 K/UL (ref 4.8–10.8)
WOUND/ABSCESS: ABNORMAL

## 2019-08-21 PROCEDURE — 6360000002 HC RX W HCPCS: Performed by: INTERNAL MEDICINE

## 2019-08-21 PROCEDURE — 6360000002 HC RX W HCPCS: Performed by: ANESTHESIOLOGY

## 2019-08-21 PROCEDURE — 2580000003 HC RX 258: Performed by: PHYSICIAN ASSISTANT

## 2019-08-21 PROCEDURE — 6370000000 HC RX 637 (ALT 250 FOR IP): Performed by: HOSPITALIST

## 2019-08-21 PROCEDURE — 36415 COLL VENOUS BLD VENIPUNCTURE: CPT

## 2019-08-21 PROCEDURE — 99232 SBSQ HOSP IP/OBS MODERATE 35: CPT | Performed by: INTERNAL MEDICINE

## 2019-08-21 PROCEDURE — 85025 COMPLETE CBC W/AUTO DIFF WBC: CPT

## 2019-08-21 PROCEDURE — 2580000003 HC RX 258: Performed by: INTERNAL MEDICINE

## 2019-08-21 PROCEDURE — 80048 BASIC METABOLIC PNL TOTAL CA: CPT

## 2019-08-21 PROCEDURE — 6360000002 HC RX W HCPCS: Performed by: PHYSICIAN ASSISTANT

## 2019-08-21 PROCEDURE — 83605 ASSAY OF LACTIC ACID: CPT

## 2019-08-21 PROCEDURE — 6370000000 HC RX 637 (ALT 250 FOR IP): Performed by: INTERNAL MEDICINE

## 2019-08-21 RX ORDER — MORPHINE SULFATE 2 MG/ML
1 INJECTION, SOLUTION INTRAMUSCULAR; INTRAVENOUS ONCE
Status: COMPLETED | OUTPATIENT
Start: 2019-08-21 | End: 2019-08-21

## 2019-08-21 RX ADMIN — HYDROMORPHONE HYDROCHLORIDE 1 MG: 1 INJECTION, SOLUTION INTRAMUSCULAR; INTRAVENOUS; SUBCUTANEOUS at 14:09

## 2019-08-21 RX ADMIN — Medication 10 ML: at 09:40

## 2019-08-21 RX ADMIN — GABAPENTIN 800 MG: 400 CAPSULE ORAL at 16:20

## 2019-08-21 RX ADMIN — HYDROMORPHONE HYDROCHLORIDE 1 MG: 1 INJECTION, SOLUTION INTRAMUSCULAR; INTRAVENOUS; SUBCUTANEOUS at 05:16

## 2019-08-21 RX ADMIN — DULOXETINE HYDROCHLORIDE 60 MG: 60 CAPSULE, DELAYED RELEASE ORAL at 09:41

## 2019-08-21 RX ADMIN — PREGABALIN 100 MG: 100 CAPSULE ORAL at 14:17

## 2019-08-21 RX ADMIN — MORPHINE SULFATE 1 MG: 2 INJECTION, SOLUTION INTRAMUSCULAR; INTRAVENOUS at 16:20

## 2019-08-21 RX ADMIN — METHYLPREDNISOLONE SODIUM SUCCINATE 40 MG: 40 INJECTION, POWDER, FOR SOLUTION INTRAMUSCULAR; INTRAVENOUS at 09:40

## 2019-08-21 RX ADMIN — GABAPENTIN 800 MG: 400 CAPSULE ORAL at 09:40

## 2019-08-21 RX ADMIN — PREGABALIN 100 MG: 100 CAPSULE ORAL at 09:40

## 2019-08-21 RX ADMIN — HYDROMORPHONE HYDROCHLORIDE 1 MG: 1 INJECTION, SOLUTION INTRAMUSCULAR; INTRAVENOUS; SUBCUTANEOUS at 09:41

## 2019-08-21 RX ADMIN — HYDROMORPHONE HYDROCHLORIDE 1 MG: 1 INJECTION, SOLUTION INTRAMUSCULAR; INTRAVENOUS; SUBCUTANEOUS at 02:21

## 2019-08-21 RX ADMIN — HYDROXYZINE HYDROCHLORIDE 25 MG: 10 TABLET, FILM COATED ORAL at 14:08

## 2019-08-21 RX ADMIN — ENOXAPARIN SODIUM 40 MG: 40 INJECTION SUBCUTANEOUS at 09:40

## 2019-08-21 RX ADMIN — GABAPENTIN 800 MG: 400 CAPSULE ORAL at 14:09

## 2019-08-21 RX ADMIN — Medication 10 ML: at 05:16

## 2019-08-21 RX ADMIN — MEROPENEM 1 G: 1 INJECTION INTRAVENOUS at 14:09

## 2019-08-21 RX ADMIN — MEROPENEM 1 G: 1 INJECTION INTRAVENOUS at 05:17

## 2019-08-21 RX ADMIN — Medication 10 ML: at 02:21

## 2019-08-21 ASSESSMENT — PAIN SCALES - GENERAL
PAINLEVEL_OUTOF10: 0
PAINLEVEL_OUTOF10: 10

## 2019-08-21 ASSESSMENT — ENCOUNTER SYMPTOMS: SHORTNESS OF BREATH: 1

## 2019-08-21 NOTE — PROGRESS NOTES
Infectious Diseases Inpatient Progress Note          HISTORY OF PRESENT ILLNESS:  Follow up multiple wounds 2ry to pyoderma gangrenosum with severe pain, decreased with pain meds. Infected wounds on  IV Meropenem, well tolerated. Patient has no fevers, normal WBC, on IV Solumedrol. + nausea, feels like R leg is going to fall off. Awaiting bed at [facility]. Current Medications:     meropenem  1 g Intravenous Q8H    nicotine  1 patch Transdermal Daily    methylPREDNISolone  40 mg Intravenous Daily    DULoxetine  60 mg Oral BID    gabapentin  800 mg Oral 4x Daily    pregabalin  100 mg Oral TID    traZODone  200 mg Oral Nightly    sodium chloride flush  10 mL Intravenous 2 times per day    enoxaparin  40 mg Subcutaneous Daily       Allergies:  Amoxicillin-pot clavulanate and Other      Review of Systems   Respiratory: Positive for shortness of breath. Skin: Positive for wound. 14 system review is negative other than HPI    Physical Exam  Vitals:    08/18/19 2323 08/19/19 0725 08/19/19 1915 08/20/19 2035   BP: 118/74 (!) 96/56 100/60 (!) 156/86   Pulse: 72 62 64 61   Resp: 20 18 18    Temp: 98.8 °F (37.1 °C) 98.8 °F (37.1 °C) 98.6 °F (37 °C) 97.9 °F (36.6 °C)   TempSrc: Oral Oral Oral    SpO2: 98% 98%  99%   Weight:       Height:         General Appearance: alert and oriented to person, place and time, well-developed and well-nourished, in no acute distress  Skin: warm and dry, no rash. Head: normocephalic and atraumatic  Eyes: anicteric sclerae  ENT: oropharynx clear and moist with normal mucous membranes.  No oral thrush  Lungs: normal respiratory effort  Abdomen: soft, no tenderness  No leg edema  No erythema, no tenderness  Extensive tender ulcers of B thighs, legs, L foot and abdomen    DATA:    Lab Results   Component Value Date    WBC 10.7 08/21/2019    HGB 11.9 (L) 08/21/2019    HCT 36.3 (L) 08/21/2019    MCV 87.9 08/21/2019     08/21/2019     Lab Results   Component Value Date

## 2019-08-24 LAB
BLOOD CULTURE, ROUTINE: NORMAL
CULTURE, BLOOD 2: NORMAL

## 2019-09-02 ENCOUNTER — HOSPITAL ENCOUNTER (EMERGENCY)
Age: 54
Discharge: LEFT BEFORE DISCHARGE | End: 2019-09-02
Payer: COMMERCIAL

## 2019-09-02 VITALS
HEART RATE: 78 BPM | SYSTOLIC BLOOD PRESSURE: 155 MMHG | OXYGEN SATURATION: 97 % | BODY MASS INDEX: 25.61 KG/M2 | HEIGHT: 64 IN | TEMPERATURE: 98.8 F | DIASTOLIC BLOOD PRESSURE: 132 MMHG | WEIGHT: 150 LBS | RESPIRATION RATE: 22 BRPM

## 2019-09-02 DIAGNOSIS — R52 PAIN ASSOCIATED WITH WOUND: Primary | ICD-10-CM

## 2019-09-02 DIAGNOSIS — T14.8XXA PAIN ASSOCIATED WITH WOUND: Primary | ICD-10-CM

## 2019-09-02 PROCEDURE — 6360000002 HC RX W HCPCS: Performed by: PHYSICIAN ASSISTANT

## 2019-09-02 PROCEDURE — 6370000000 HC RX 637 (ALT 250 FOR IP): Performed by: PHYSICIAN ASSISTANT

## 2019-09-02 PROCEDURE — 96372 THER/PROPH/DIAG INJ SC/IM: CPT

## 2019-09-02 PROCEDURE — 99281 EMR DPT VST MAYX REQ PHY/QHP: CPT

## 2019-09-02 RX ORDER — KETOROLAC TROMETHAMINE 30 MG/ML
60 INJECTION, SOLUTION INTRAMUSCULAR; INTRAVENOUS ONCE
Status: COMPLETED | OUTPATIENT
Start: 2019-09-02 | End: 2019-09-02

## 2019-09-02 RX ORDER — MORPHINE SULFATE 2 MG/ML
4 INJECTION, SOLUTION INTRAMUSCULAR; INTRAVENOUS ONCE
Status: COMPLETED | OUTPATIENT
Start: 2019-09-02 | End: 2019-09-02

## 2019-09-02 RX ORDER — ONDANSETRON 4 MG/1
4 TABLET, ORALLY DISINTEGRATING ORAL ONCE
Status: COMPLETED | OUTPATIENT
Start: 2019-09-02 | End: 2019-09-02

## 2019-09-02 RX ADMIN — ONDANSETRON 4 MG: 4 TABLET, ORALLY DISINTEGRATING ORAL at 00:47

## 2019-09-02 RX ADMIN — KETOROLAC TROMETHAMINE 60 MG: 30 INJECTION, SOLUTION INTRAMUSCULAR; INTRAVENOUS at 00:47

## 2019-09-02 RX ADMIN — MORPHINE SULFATE 4 MG: 2 INJECTION, SOLUTION INTRAMUSCULAR; INTRAVENOUS at 00:47

## 2019-09-02 ASSESSMENT — PAIN DESCRIPTION - DESCRIPTORS: DESCRIPTORS: STABBING;ACHING

## 2019-09-02 ASSESSMENT — ENCOUNTER SYMPTOMS
APNEA: 0
EYE PAIN: 0
ABDOMINAL PAIN: 0
ALLERGIC/IMMUNOLOGIC NEGATIVE: 1
COLOR CHANGE: 0
SHORTNESS OF BREATH: 0
TROUBLE SWALLOWING: 0

## 2019-09-02 ASSESSMENT — PAIN DESCRIPTION - FREQUENCY: FREQUENCY: CONTINUOUS

## 2019-09-02 ASSESSMENT — PAIN DESCRIPTION - LOCATION: LOCATION: OTHER (COMMENT)

## 2019-09-02 ASSESSMENT — PAIN SCALES - GENERAL: PAINLEVEL_OUTOF10: 10

## 2019-09-02 ASSESSMENT — PAIN DESCRIPTION - PAIN TYPE: TYPE: ACUTE PAIN

## 2019-09-02 NOTE — ED PROVIDER NOTES
3 times daily. ROPINIROLE (REQUIP) 1 MG TABLET    Take 1 mg by mouth 3 times daily     TRAZODONE (DESYREL) 100 MG TABLET    Take 200 mg by mouth nightly        ALLERGIES     Amoxicillin-pot clavulanate and Other    FAMILY HISTORY     History reviewed. No pertinent family history. SOCIAL HISTORY       Social History     Socioeconomic History    Marital status:      Spouse name: None    Number of children: None    Years of education: None    Highest education level: None   Occupational History    None   Social Needs    Financial resource strain: None    Food insecurity:     Worry: None     Inability: None    Transportation needs:     Medical: None     Non-medical: None   Tobacco Use    Smoking status: Current Every Day Smoker     Packs/day: 0.50     Years: 40.00     Pack years: 20.00     Types: Cigarettes    Smokeless tobacco: Never Used   Substance and Sexual Activity    Alcohol use: No     Comment: ocassionally    Drug use: No    Sexual activity: None   Lifestyle    Physical activity:     Days per week: None     Minutes per session: None    Stress: None   Relationships    Social connections:     Talks on phone: None     Gets together: None     Attends [employer] service: None     Active member of club or organization: None     Attends meetings of clubs or organizations: None     Relationship status: None    Intimate partner violence:     Fear of current or ex partner: None     Emotionally abused: None     Physically abused: None     Forced sexual activity: None   Other Topics Concern    None   Social History Narrative    None       SCREENINGS           PHYSICAL EXAM    (up to 7 forlevel 4, 8 or more for level 5)     ED Triage Vitals [09/02/19 0035]   BP Temp Temp Source Pulse Resp SpO2 Height Weight   (!) 155/132 98.8 °F (37.1 °C) Oral 78 22 97 % 5' 4\" (1.626 m) 150 lb (68 kg)       Physical Exam   Constitutional: She is oriented to person, place, and time.  She appears and Toradol. Patient continually stating that we are doing nothing for her and her pain, peeling off all of her bandages that were in place and patient got up and left the emergency department. Attempts to get the patient to return of the room were unsuccessful. Patient was alert and oriented x3 and able to make her own decisions. [facility]    PROCEDURES:    Procedures      FINAL IMPRESSION      1. Pain associated with wound          DISPOSITION/PLAN   DISPOSITION Eloped - Left Before Treatment Complete 09/02/2019 01:23:29 AM      PATIENT REFERRED TO:  No follow-up provider specified.     DISCHARGE MEDICATIONS:  New Prescriptions    No medications on file       (Please note that portions of this note were completed with a voice recognition program.  Efforts were made to edit the dictations but occasionally words are mis-transcribed.)    MARY Bernal PA-C  09/02/19 0123

## 2019-09-02 NOTE — ED NOTES
Patient wondering through hallway speaking in loud voice \" I want more pain meds, what that Dr gave me didn't work. \"   This RN informed patient that she was medicated with more than one pain medication. Patient replied, \" So they aren't going to give me anything stronger? \"  This RN replied, most likely not. Patient replied, \" Fine! Where's the damn door at then? Get me the fuck out of here. You people don't even care that I am having pain. \"  Patient walked over and got a cup of ice water and left out of the exit with a steady gait.         Lucy Mendez RN  09/02/19 0911

## 2019-09-16 DIAGNOSIS — G89.4 CHRONIC PAIN DISORDER: Primary | ICD-10-CM

## 2019-09-16 RX ORDER — OXYCODONE HYDROCHLORIDE 5 MG/1
5 TABLET ORAL EVERY 6 HOURS PRN
COMMUNITY
End: 2019-09-20 | Stop reason: SDUPTHER

## 2019-09-18 RX ORDER — DULOXETIN HYDROCHLORIDE 60 MG/1
120 CAPSULE, DELAYED RELEASE ORAL DAILY
COMMUNITY
End: 2021-03-31 | Stop reason: SDUPTHER

## 2019-09-18 RX ORDER — SENNA PLUS 8.6 MG/1
1 TABLET ORAL 2 TIMES DAILY PRN
COMMUNITY
End: 2020-05-22

## 2019-09-18 RX ORDER — PREDNISONE 10 MG/1
40 TABLET ORAL DAILY
Status: ON HOLD | COMMUNITY
End: 2020-02-18 | Stop reason: HOSPADM

## 2019-09-18 RX ORDER — ERTAPENEM 1 G/1
1000 INJECTION, POWDER, LYOPHILIZED, FOR SOLUTION INTRAMUSCULAR; INTRAVENOUS EVERY 24 HOURS
COMMUNITY
Start: 2019-09-11 | End: 2019-10-11

## 2019-09-18 RX ORDER — FOLIC ACID 1 MG/1
1 TABLET ORAL DAILY
Status: ON HOLD | COMMUNITY
End: 2020-05-08

## 2019-09-18 RX ORDER — HYDROXYZINE HYDROCHLORIDE 25 MG/1
25 TABLET, FILM COATED ORAL EVERY 6 HOURS PRN
COMMUNITY
End: 2022-04-12 | Stop reason: SDUPTHER

## 2019-09-18 RX ORDER — LANOLIN ALCOHOL/MO/W.PET/CERES
1000 CREAM (GRAM) TOPICAL DAILY
COMMUNITY
End: 2020-02-25 | Stop reason: SDUPTHER

## 2019-09-19 ENCOUNTER — OFFICE VISIT (OUTPATIENT)
Dept: GERIATRIC MEDICINE | Age: 54
End: 2019-09-19
Payer: COMMERCIAL

## 2019-09-19 DIAGNOSIS — R23.8 SKIN BREAKDOWN: Primary | ICD-10-CM

## 2019-09-19 DIAGNOSIS — M06.9 RHEUMATOID ARTHRITIS, RHEUMATOID FACTOR STATUS UNKNOWN (HCC): ICD-10-CM

## 2019-09-19 DIAGNOSIS — G89.4 CHRONIC PAIN DISORDER: ICD-10-CM

## 2019-09-19 PROCEDURE — 99305 1ST NF CARE MODERATE MDM 35: CPT | Performed by: INTERNAL MEDICINE

## 2019-09-20 LAB
ALBUMIN SERPL-MCNC: 3.7 G/DL (ref 3.5–4.6)
ALP BLD-CCNC: 60 U/L (ref 40–130)
ALT SERPL-CCNC: 32 U/L (ref 0–33)
ANION GAP SERPL CALCULATED.3IONS-SCNC: 12 MEQ/L (ref 9–15)
AST SERPL-CCNC: 17 U/L (ref 0–35)
BASOPHILS ABSOLUTE: 0.1 K/UL (ref 0–0.2)
BASOPHILS RELATIVE PERCENT: 1.3 %
BILIRUB SERPL-MCNC: 0.3 MG/DL (ref 0.2–0.7)
BUN BLDV-MCNC: 16 MG/DL (ref 6–20)
CALCIUM SERPL-MCNC: 9.1 MG/DL (ref 8.5–9.9)
CHLORIDE BLD-SCNC: 104 MEQ/L (ref 95–107)
CO2: 27 MEQ/L (ref 20–31)
CREAT SERPL-MCNC: 0.75 MG/DL (ref 0.5–0.9)
EOSINOPHILS ABSOLUTE: 0.1 K/UL (ref 0–0.7)
EOSINOPHILS RELATIVE PERCENT: 0.9 %
GFR AFRICAN AMERICAN: >60
GFR NON-AFRICAN AMERICAN: >60
GLOBULIN: 2.9 G/DL (ref 2.3–3.5)
GLUCOSE BLD-MCNC: 78 MG/DL (ref 70–99)
HCT VFR BLD CALC: 38 % (ref 37–47)
HEMOGLOBIN: 12.3 G/DL (ref 12–16)
LYMPHOCYTES ABSOLUTE: 4 K/UL (ref 1–4.8)
LYMPHOCYTES RELATIVE PERCENT: 36.2 %
MCH RBC QN AUTO: 29.1 PG (ref 27–31.3)
MCHC RBC AUTO-ENTMCNC: 32.4 % (ref 33–37)
MCV RBC AUTO: 89.7 FL (ref 82–100)
MONOCYTES ABSOLUTE: 0.7 K/UL (ref 0.2–0.8)
MONOCYTES RELATIVE PERCENT: 6.2 %
NEUTROPHILS ABSOLUTE: 6.2 K/UL (ref 1.4–6.5)
NEUTROPHILS RELATIVE PERCENT: 55.4 %
PDW BLD-RTO: 18.3 % (ref 11.5–14.5)
PLATELET # BLD: 338 K/UL (ref 130–400)
POTASSIUM SERPL-SCNC: 4.4 MEQ/L (ref 3.4–4.9)
RBC # BLD: 4.23 M/UL (ref 4.2–5.4)
SODIUM BLD-SCNC: 143 MEQ/L (ref 135–144)
TOTAL PROTEIN: 6.6 G/DL (ref 6.3–8)
WBC # BLD: 11.1 K/UL (ref 4.8–10.8)

## 2019-09-20 RX ORDER — OXYCODONE HYDROCHLORIDE 5 MG/1
5 TABLET ORAL EVERY 6 HOURS PRN
Qty: 40 TABLET | Refills: 0 | Status: SHIPPED | OUTPATIENT
Start: 2019-09-20 | End: 2019-09-30 | Stop reason: SDUPTHER

## 2019-09-20 RX ORDER — LORAZEPAM 0.5 MG/1
0.5 TABLET ORAL NIGHTLY
Qty: 14 TABLET | Refills: 0 | Status: SHIPPED | OUTPATIENT
Start: 2019-09-20 | End: 2019-10-04

## 2019-09-20 RX ORDER — PREGABALIN 100 MG/1
100 CAPSULE ORAL 3 TIMES DAILY
Qty: 90 CAPSULE | Refills: 0 | Status: SHIPPED | OUTPATIENT
Start: 2019-09-20 | End: 2020-02-25

## 2019-09-25 LAB
ANION GAP SERPL CALCULATED.3IONS-SCNC: 12 MEQ/L (ref 9–15)
BUN BLDV-MCNC: 14 MG/DL (ref 6–20)
CALCIUM SERPL-MCNC: 8.9 MG/DL (ref 8.5–9.9)
CHLORIDE BLD-SCNC: 107 MEQ/L (ref 95–107)
CO2: 26 MEQ/L (ref 20–31)
CREAT SERPL-MCNC: 0.76 MG/DL (ref 0.5–0.9)
GFR AFRICAN AMERICAN: >60
GFR NON-AFRICAN AMERICAN: >60
GLUCOSE BLD-MCNC: 141 MG/DL (ref 70–99)
HCT VFR BLD CALC: 38.9 % (ref 37–47)
HEMOGLOBIN: 12.4 G/DL (ref 12–16)
MCH RBC QN AUTO: 28.7 PG (ref 27–31.3)
MCHC RBC AUTO-ENTMCNC: 31.9 % (ref 33–37)
MCV RBC AUTO: 89.8 FL (ref 82–100)
PDW BLD-RTO: 18.4 % (ref 11.5–14.5)
PLATELET # BLD: 334 K/UL (ref 130–400)
POTASSIUM SERPL-SCNC: 4.1 MEQ/L (ref 3.4–4.9)
RBC # BLD: 4.33 M/UL (ref 4.2–5.4)
SODIUM BLD-SCNC: 145 MEQ/L (ref 135–144)
WBC # BLD: 9.2 K/UL (ref 4.8–10.8)

## 2019-09-26 LAB
ALBUMIN SERPL-MCNC: 3.6 G/DL (ref 3.5–4.6)
ALP BLD-CCNC: 70 U/L (ref 40–130)
ALT SERPL-CCNC: 25 U/L (ref 0–33)
AST SERPL-CCNC: 16 U/L (ref 0–35)
BILIRUB SERPL-MCNC: <0.2 MG/DL (ref 0.2–0.7)
BILIRUBIN DIRECT: <0.2 MG/DL (ref 0–0.4)
BILIRUBIN, INDIRECT: NORMAL MG/DL (ref 0–0.6)
HCT VFR BLD CALC: 38.8 % (ref 37–47)
HEMOGLOBIN: 12.3 G/DL (ref 12–16)
MCH RBC QN AUTO: 28.8 PG (ref 27–31.3)
MCHC RBC AUTO-ENTMCNC: 31.7 % (ref 33–37)
MCV RBC AUTO: 90.9 FL (ref 82–100)
PDW BLD-RTO: 17.8 % (ref 11.5–14.5)
PLATELET # BLD: 348 K/UL (ref 130–400)
RBC # BLD: 4.26 M/UL (ref 4.2–5.4)
TOTAL PROTEIN: 6.7 G/DL (ref 6.3–8)
WBC # BLD: 11 K/UL (ref 4.8–10.8)

## 2019-09-30 ENCOUNTER — HOSPITAL ENCOUNTER (EMERGENCY)
Age: 54
Discharge: HOME | End: 2019-09-30
Payer: COMMERCIAL

## 2019-09-30 ENCOUNTER — APPOINTMENT (OUTPATIENT)
Dept: GENERAL RADIOLOGY | Age: 54
End: 2019-09-30
Payer: COMMERCIAL

## 2019-09-30 VITALS
OXYGEN SATURATION: 94 % | BODY MASS INDEX: 29.02 KG/M2 | HEIGHT: 64 IN | RESPIRATION RATE: 18 BRPM | WEIGHT: 170 LBS | DIASTOLIC BLOOD PRESSURE: 73 MMHG | TEMPERATURE: 98.7 F | SYSTOLIC BLOOD PRESSURE: 130 MMHG | HEART RATE: 76 BPM

## 2019-09-30 DIAGNOSIS — R07.89 CHEST WALL PAIN: Primary | ICD-10-CM

## 2019-09-30 DIAGNOSIS — G89.4 CHRONIC PAIN DISORDER: ICD-10-CM

## 2019-09-30 LAB
ALBUMIN SERPL-MCNC: 3.7 G/DL (ref 3.5–4.6)
ALP BLD-CCNC: 78 U/L (ref 40–130)
ALT SERPL-CCNC: 22 U/L (ref 0–33)
ANION GAP SERPL CALCULATED.3IONS-SCNC: 15 MEQ/L (ref 9–15)
ANION GAP SERPL CALCULATED.3IONS-SCNC: 9 MEQ/L (ref 9–15)
AST SERPL-CCNC: 16 U/L (ref 0–35)
BASOPHILS ABSOLUTE: 0 K/UL (ref 0–0.2)
BASOPHILS ABSOLUTE: 0.1 K/UL (ref 0–0.2)
BASOPHILS RELATIVE PERCENT: 0.5 %
BASOPHILS RELATIVE PERCENT: 0.7 %
BILIRUB SERPL-MCNC: 0.3 MG/DL (ref 0.2–0.7)
BUN BLDV-MCNC: 11 MG/DL (ref 6–20)
BUN BLDV-MCNC: 11 MG/DL (ref 6–20)
CALCIUM SERPL-MCNC: 9.3 MG/DL (ref 8.5–9.9)
CALCIUM SERPL-MCNC: 9.6 MG/DL (ref 8.5–9.9)
CHLORIDE BLD-SCNC: 103 MEQ/L (ref 95–107)
CHLORIDE BLD-SCNC: 104 MEQ/L (ref 95–107)
CO2: 24 MEQ/L (ref 20–31)
CO2: 31 MEQ/L (ref 20–31)
CREAT SERPL-MCNC: 0.74 MG/DL (ref 0.5–0.9)
CREAT SERPL-MCNC: 0.81 MG/DL (ref 0.5–0.9)
EKG ATRIAL RATE: 65 BPM
EKG P AXIS: 48 DEGREES
EKG P-R INTERVAL: 130 MS
EKG Q-T INTERVAL: 408 MS
EKG QRS DURATION: 84 MS
EKG QTC CALCULATION (BAZETT): 424 MS
EKG R AXIS: 43 DEGREES
EKG T AXIS: 48 DEGREES
EKG VENTRICULAR RATE: 65 BPM
EOSINOPHILS ABSOLUTE: 0 K/UL (ref 0–0.7)
EOSINOPHILS ABSOLUTE: 0 K/UL (ref 0–0.7)
EOSINOPHILS RELATIVE PERCENT: 0.5 %
EOSINOPHILS RELATIVE PERCENT: 0.5 %
GFR AFRICAN AMERICAN: >60
GFR AFRICAN AMERICAN: >60
GFR NON-AFRICAN AMERICAN: >60
GFR NON-AFRICAN AMERICAN: >60
GLOBULIN: 3.1 G/DL (ref 2.3–3.5)
GLUCOSE BLD-MCNC: 93 MG/DL (ref 70–99)
GLUCOSE BLD-MCNC: 99 MG/DL (ref 70–99)
HCT VFR BLD CALC: 37.1 % (ref 37–47)
HCT VFR BLD CALC: 37.2 % (ref 37–47)
HEMOGLOBIN: 12.1 G/DL (ref 12–16)
HEMOGLOBIN: 12.4 G/DL (ref 12–16)
LACTIC ACID: 1.2 MMOL/L (ref 0.5–2.2)
LIPASE: 31 U/L (ref 12–95)
LYMPHOCYTES ABSOLUTE: 2.8 K/UL (ref 1–4.8)
LYMPHOCYTES ABSOLUTE: 3.4 K/UL (ref 1–4.8)
LYMPHOCYTES RELATIVE PERCENT: 35.7 %
LYMPHOCYTES RELATIVE PERCENT: 36.7 %
MCH RBC QN AUTO: 29.4 PG (ref 27–31.3)
MCH RBC QN AUTO: 29.6 PG (ref 27–31.3)
MCHC RBC AUTO-ENTMCNC: 32.6 % (ref 33–37)
MCHC RBC AUTO-ENTMCNC: 33.3 % (ref 33–37)
MCV RBC AUTO: 88.8 FL (ref 82–100)
MCV RBC AUTO: 90.2 FL (ref 82–100)
MONOCYTES ABSOLUTE: 0.4 K/UL (ref 0.2–0.8)
MONOCYTES ABSOLUTE: 0.5 K/UL (ref 0.2–0.8)
MONOCYTES RELATIVE PERCENT: 5.1 %
MONOCYTES RELATIVE PERCENT: 5.3 %
NEUTROPHILS ABSOLUTE: 4.4 K/UL (ref 1.4–6.5)
NEUTROPHILS ABSOLUTE: 5.6 K/UL (ref 1.4–6.5)
NEUTROPHILS RELATIVE PERCENT: 57 %
NEUTROPHILS RELATIVE PERCENT: 58 %
PDW BLD-RTO: 17.8 % (ref 11.5–14.5)
PDW BLD-RTO: 18.1 % (ref 11.5–14.5)
PLATELET # BLD: 300 K/UL (ref 130–400)
PLATELET # BLD: 322 K/UL (ref 130–400)
POTASSIUM SERPL-SCNC: 3.7 MEQ/L (ref 3.4–4.9)
POTASSIUM SERPL-SCNC: 4 MEQ/L (ref 3.4–4.9)
RBC # BLD: 4.11 M/UL (ref 4.2–5.4)
RBC # BLD: 4.19 M/UL (ref 4.2–5.4)
SODIUM BLD-SCNC: 143 MEQ/L (ref 135–144)
SODIUM BLD-SCNC: 143 MEQ/L (ref 135–144)
TOTAL CK: 41 U/L (ref 0–170)
TOTAL PROTEIN: 6.8 G/DL (ref 6.3–8)
TROPONIN: <0.01 NG/ML (ref 0–0.01)
WBC # BLD: 7.7 K/UL (ref 4.8–10.8)
WBC # BLD: 9.7 K/UL (ref 4.8–10.8)

## 2019-09-30 PROCEDURE — 82550 ASSAY OF CK (CPK): CPT

## 2019-09-30 PROCEDURE — 83605 ASSAY OF LACTIC ACID: CPT

## 2019-09-30 PROCEDURE — 84484 ASSAY OF TROPONIN QUANT: CPT

## 2019-09-30 PROCEDURE — 83690 ASSAY OF LIPASE: CPT

## 2019-09-30 PROCEDURE — 85025 COMPLETE CBC W/AUTO DIFF WBC: CPT

## 2019-09-30 PROCEDURE — 71045 X-RAY EXAM CHEST 1 VIEW: CPT

## 2019-09-30 PROCEDURE — 36415 COLL VENOUS BLD VENIPUNCTURE: CPT

## 2019-09-30 PROCEDURE — 93005 ELECTROCARDIOGRAM TRACING: CPT | Performed by: EMERGENCY MEDICINE

## 2019-09-30 PROCEDURE — 99285 EMERGENCY DEPT VISIT HI MDM: CPT

## 2019-09-30 PROCEDURE — 80053 COMPREHEN METABOLIC PANEL: CPT

## 2019-09-30 RX ORDER — KETOROLAC TROMETHAMINE 15 MG/ML
15 INJECTION, SOLUTION INTRAMUSCULAR; INTRAVENOUS ONCE
Status: DISCONTINUED | OUTPATIENT
Start: 2019-09-30 | End: 2019-09-30 | Stop reason: HOSPADM

## 2019-09-30 RX ORDER — OXYCODONE HYDROCHLORIDE 5 MG/1
5 TABLET ORAL EVERY 6 HOURS PRN
Qty: 28 TABLET | Refills: 0 | Status: SHIPPED | OUTPATIENT
Start: 2019-09-30 | End: 2019-10-07

## 2019-09-30 ASSESSMENT — PAIN DESCRIPTION - LOCATION: LOCATION: CHEST

## 2019-09-30 ASSESSMENT — ENCOUNTER SYMPTOMS
ABDOMINAL DISTENTION: 0
DIARRHEA: 0
EYE DISCHARGE: 0
NAUSEA: 0
RHINORRHEA: 0
CONSTIPATION: 0
ABDOMINAL PAIN: 0
COLOR CHANGE: 0
VOMITING: 0
SORE THROAT: 0
SHORTNESS OF BREATH: 0

## 2019-09-30 ASSESSMENT — PAIN SCALES - GENERAL: PAINLEVEL_OUTOF10: 8

## 2019-09-30 ASSESSMENT — PAIN DESCRIPTION - PAIN TYPE: TYPE: ACUTE PAIN

## 2019-10-01 PROCEDURE — 93010 ELECTROCARDIOGRAM REPORT: CPT | Performed by: INTERNAL MEDICINE

## 2019-10-02 LAB
ANION GAP SERPL CALCULATED.3IONS-SCNC: 11 MEQ/L (ref 9–15)
BUN BLDV-MCNC: 14 MG/DL (ref 6–20)
CALCIUM SERPL-MCNC: 9.3 MG/DL (ref 8.5–9.9)
CHLORIDE BLD-SCNC: 104 MEQ/L (ref 95–107)
CO2: 27 MEQ/L (ref 20–31)
CREAT SERPL-MCNC: 0.75 MG/DL (ref 0.5–0.9)
GFR AFRICAN AMERICAN: >60
GFR NON-AFRICAN AMERICAN: >60
GLUCOSE BLD-MCNC: 126 MG/DL (ref 70–99)
HCT VFR BLD CALC: 37.9 % (ref 37–47)
HEMOGLOBIN: 12.3 G/DL (ref 12–16)
MCH RBC QN AUTO: 29.3 PG (ref 27–31.3)
MCHC RBC AUTO-ENTMCNC: 32.4 % (ref 33–37)
MCV RBC AUTO: 90.4 FL (ref 82–100)
PDW BLD-RTO: 18.3 % (ref 11.5–14.5)
PLATELET # BLD: 347 K/UL (ref 130–400)
POTASSIUM SERPL-SCNC: 3.8 MEQ/L (ref 3.4–4.9)
RBC # BLD: 4.19 M/UL (ref 4.2–5.4)
SODIUM BLD-SCNC: 142 MEQ/L (ref 135–144)
WBC # BLD: 8.3 K/UL (ref 4.8–10.8)

## 2019-10-05 ENCOUNTER — HOSPITAL ENCOUNTER (EMERGENCY)
Age: 54
Discharge: OTHER INSTITUTION | End: 2019-10-05
Payer: COMMERCIAL

## 2019-10-05 VITALS
WEIGHT: 170 LBS | BODY MASS INDEX: 29.02 KG/M2 | TEMPERATURE: 99 F | OXYGEN SATURATION: 96 % | RESPIRATION RATE: 18 BRPM | SYSTOLIC BLOOD PRESSURE: 110 MMHG | DIASTOLIC BLOOD PRESSURE: 67 MMHG | HEIGHT: 64 IN | HEART RATE: 80 BPM

## 2019-10-05 DIAGNOSIS — T80.219A INFECTION OF PERIPHERALLY INSERTED CENTRAL VENOUS CATHETER (PICC), INITIAL ENCOUNTER: Primary | ICD-10-CM

## 2019-10-05 LAB
ANION GAP SERPL CALCULATED.3IONS-SCNC: 15 MEQ/L (ref 9–15)
APTT: 32.9 SEC (ref 24.4–36.8)
BASOPHILS ABSOLUTE: 0.1 K/UL (ref 0–0.2)
BASOPHILS RELATIVE PERCENT: 0.6 %
BUN BLDV-MCNC: 11 MG/DL (ref 6–20)
CALCIUM SERPL-MCNC: 8.9 MG/DL (ref 8.5–9.9)
CHLORIDE BLD-SCNC: 101 MEQ/L (ref 95–107)
CO2: 23 MEQ/L (ref 20–31)
CREAT SERPL-MCNC: 0.76 MG/DL (ref 0.5–0.9)
EOSINOPHILS ABSOLUTE: 0 K/UL (ref 0–0.7)
EOSINOPHILS RELATIVE PERCENT: 0.3 %
GFR AFRICAN AMERICAN: >60
GFR NON-AFRICAN AMERICAN: >60
GLUCOSE BLD-MCNC: 176 MG/DL (ref 70–99)
HCT VFR BLD CALC: 37.4 % (ref 37–47)
HEMOGLOBIN: 12.2 G/DL (ref 12–16)
INR BLD: 0.9
LYMPHOCYTES ABSOLUTE: 1.1 K/UL (ref 1–4.8)
LYMPHOCYTES RELATIVE PERCENT: 12 %
MCH RBC QN AUTO: 29 PG (ref 27–31.3)
MCHC RBC AUTO-ENTMCNC: 32.5 % (ref 33–37)
MCV RBC AUTO: 89 FL (ref 82–100)
MONOCYTES ABSOLUTE: 0.3 K/UL (ref 0.2–0.8)
MONOCYTES RELATIVE PERCENT: 2.8 %
NEUTROPHILS ABSOLUTE: 7.7 K/UL (ref 1.4–6.5)
NEUTROPHILS RELATIVE PERCENT: 84.3 %
PDW BLD-RTO: 17.8 % (ref 11.5–14.5)
PLATELET # BLD: 340 K/UL (ref 130–400)
POTASSIUM SERPL-SCNC: 4.2 MEQ/L (ref 3.4–4.9)
PROTHROMBIN TIME: 13 SEC (ref 12.3–14.9)
RBC # BLD: 4.2 M/UL (ref 4.2–5.4)
SODIUM BLD-SCNC: 139 MEQ/L (ref 135–144)
WBC # BLD: 9.1 K/UL (ref 4.8–10.8)

## 2019-10-05 PROCEDURE — 96374 THER/PROPH/DIAG INJ IV PUSH: CPT

## 2019-10-05 PROCEDURE — 87075 CULTR BACTERIA EXCEPT BLOOD: CPT

## 2019-10-05 PROCEDURE — 2580000003 HC RX 258: Performed by: NURSE PRACTITIONER

## 2019-10-05 PROCEDURE — 85025 COMPLETE CBC W/AUTO DIFF WBC: CPT

## 2019-10-05 PROCEDURE — 87070 CULTURE OTHR SPECIMN AEROBIC: CPT

## 2019-10-05 PROCEDURE — 85610 PROTHROMBIN TIME: CPT

## 2019-10-05 PROCEDURE — 80048 BASIC METABOLIC PNL TOTAL CA: CPT

## 2019-10-05 PROCEDURE — 99283 EMERGENCY DEPT VISIT LOW MDM: CPT

## 2019-10-05 PROCEDURE — 6360000002 HC RX W HCPCS: Performed by: NURSE PRACTITIONER

## 2019-10-05 PROCEDURE — 85730 THROMBOPLASTIN TIME PARTIAL: CPT

## 2019-10-05 PROCEDURE — 36415 COLL VENOUS BLD VENIPUNCTURE: CPT

## 2019-10-05 RX ORDER — 0.9 % SODIUM CHLORIDE 0.9 %
1000 INTRAVENOUS SOLUTION INTRAVENOUS ONCE
Status: COMPLETED | OUTPATIENT
Start: 2019-10-05 | End: 2019-10-05

## 2019-10-05 RX ORDER — KETOROLAC TROMETHAMINE 30 MG/ML
30 INJECTION, SOLUTION INTRAMUSCULAR; INTRAVENOUS ONCE
Status: COMPLETED | OUTPATIENT
Start: 2019-10-05 | End: 2019-10-05

## 2019-10-05 RX ADMIN — KETOROLAC TROMETHAMINE 30 MG: 30 INJECTION, SOLUTION INTRAMUSCULAR at 15:03

## 2019-10-05 RX ADMIN — SODIUM CHLORIDE 1000 ML: 9 INJECTION, SOLUTION INTRAVENOUS at 14:32

## 2019-10-05 ASSESSMENT — ENCOUNTER SYMPTOMS
EYE PAIN: 0
SHORTNESS OF BREATH: 0
VOMITING: 0
PHOTOPHOBIA: 0
SORE THROAT: 0
BACK PAIN: 0
DIARRHEA: 0
NAUSEA: 0
RHINORRHEA: 0
COUGH: 0
ABDOMINAL PAIN: 0

## 2019-10-05 ASSESSMENT — PAIN SCALES - GENERAL
PAINLEVEL_OUTOF10: 10
PAINLEVEL_OUTOF10: 8

## 2019-10-05 ASSESSMENT — PAIN DESCRIPTION - DESCRIPTORS: DESCRIPTORS: BURNING

## 2019-10-05 ASSESSMENT — PAIN DESCRIPTION - ORIENTATION: ORIENTATION: RIGHT

## 2019-10-05 ASSESSMENT — PAIN DESCRIPTION - LOCATION: LOCATION: ARM

## 2019-10-05 ASSESSMENT — PAIN DESCRIPTION - FREQUENCY: FREQUENCY: CONTINUOUS

## 2019-10-07 ENCOUNTER — TELEPHONE (OUTPATIENT)
Dept: GERIATRIC MEDICINE | Age: 54
End: 2019-10-07

## 2019-10-08 LAB
CULTURE CATHETER TIP: ABNORMAL
ORGANISM: ABNORMAL

## 2019-10-09 LAB
ANION GAP SERPL CALCULATED.3IONS-SCNC: 15 MEQ/L (ref 9–15)
BUN BLDV-MCNC: 14 MG/DL (ref 6–20)
CALCIUM SERPL-MCNC: 9.4 MG/DL (ref 8.5–9.9)
CHLORIDE BLD-SCNC: 103 MEQ/L (ref 95–107)
CO2: 26 MEQ/L (ref 20–31)
CREAT SERPL-MCNC: 0.71 MG/DL (ref 0.5–0.9)
GFR AFRICAN AMERICAN: >60
GFR NON-AFRICAN AMERICAN: >60
GLUCOSE BLD-MCNC: 89 MG/DL (ref 70–99)
HCT VFR BLD CALC: 38.1 % (ref 37–47)
HEMOGLOBIN: 12.2 G/DL (ref 12–16)
MCH RBC QN AUTO: 28.6 PG (ref 27–31.3)
MCHC RBC AUTO-ENTMCNC: 32 % (ref 33–37)
MCV RBC AUTO: 89.3 FL (ref 82–100)
PDW BLD-RTO: 17.9 % (ref 11.5–14.5)
PLATELET # BLD: 431 K/UL (ref 130–400)
POTASSIUM SERPL-SCNC: 4.4 MEQ/L (ref 3.4–4.9)
RBC # BLD: 4.27 M/UL (ref 4.2–5.4)
SODIUM BLD-SCNC: 144 MEQ/L (ref 135–144)
WBC # BLD: 11.3 K/UL (ref 4.8–10.8)

## 2019-10-13 LAB
BASOPHILS ABSOLUTE: 0.1 K/UL (ref 0–0.2)
BASOPHILS RELATIVE PERCENT: 0.6 %
EOSINOPHILS ABSOLUTE: 0.1 K/UL (ref 0–0.7)
EOSINOPHILS RELATIVE PERCENT: 0.5 %
HCT VFR BLD CALC: 37.3 % (ref 37–47)
HEMOGLOBIN: 12.1 G/DL (ref 12–16)
LYMPHOCYTES ABSOLUTE: 3.5 K/UL (ref 1–4.8)
LYMPHOCYTES RELATIVE PERCENT: 30 %
MCH RBC QN AUTO: 28.9 PG (ref 27–31.3)
MCHC RBC AUTO-ENTMCNC: 32.3 % (ref 33–37)
MCV RBC AUTO: 89.3 FL (ref 82–100)
MONOCYTES ABSOLUTE: 0.7 K/UL (ref 0.2–0.8)
MONOCYTES RELATIVE PERCENT: 5.8 %
NEUTROPHILS ABSOLUTE: 7.4 K/UL (ref 1.4–6.5)
NEUTROPHILS RELATIVE PERCENT: 63.1 %
PDW BLD-RTO: 17.2 % (ref 11.5–14.5)
PLATELET # BLD: 421 K/UL (ref 130–400)
RBC # BLD: 4.18 M/UL (ref 4.2–5.4)
WBC # BLD: 11.7 K/UL (ref 4.8–10.8)

## 2020-02-16 ENCOUNTER — APPOINTMENT (OUTPATIENT)
Dept: CT IMAGING | Age: 55
DRG: 092 | End: 2020-02-16
Payer: COMMERCIAL

## 2020-02-16 ENCOUNTER — APPOINTMENT (OUTPATIENT)
Dept: GENERAL RADIOLOGY | Age: 55
DRG: 092 | End: 2020-02-16
Payer: COMMERCIAL

## 2020-02-16 ENCOUNTER — HOSPITAL ENCOUNTER (INPATIENT)
Age: 55
LOS: 2 days | Discharge: HOME | DRG: 092 | End: 2020-02-18
Attending: EMERGENCY MEDICINE | Admitting: INTERNAL MEDICINE
Payer: COMMERCIAL

## 2020-02-16 PROBLEM — J44.1 COPD EXACERBATION (HCC): Status: ACTIVE | Noted: 2020-02-16

## 2020-02-16 LAB
ALBUMIN SERPL-MCNC: 3.7 G/DL (ref 3.5–4.6)
ALP BLD-CCNC: 98 U/L (ref 40–130)
ALT SERPL-CCNC: <5 U/L (ref 0–33)
ANION GAP SERPL CALCULATED.3IONS-SCNC: 16 MEQ/L (ref 9–15)
AST SERPL-CCNC: 12 U/L (ref 0–35)
BACTERIA: NEGATIVE /HPF
BASOPHILS ABSOLUTE: 0.2 K/UL (ref 0–0.2)
BASOPHILS RELATIVE PERCENT: 1.3 %
BILIRUB SERPL-MCNC: 0.5 MG/DL (ref 0.2–0.7)
BILIRUBIN URINE: NEGATIVE
BLOOD, URINE: NEGATIVE
BUN BLDV-MCNC: 12 MG/DL (ref 6–20)
C-REACTIVE PROTEIN: 70.6 MG/L (ref 0–5)
CALCIUM SERPL-MCNC: 9 MG/DL (ref 8.5–9.9)
CHLORIDE BLD-SCNC: 100 MEQ/L (ref 95–107)
CLARITY: CLEAR
CO2: 22 MEQ/L (ref 20–31)
COLOR: YELLOW
CREAT SERPL-MCNC: 0.91 MG/DL (ref 0.5–0.9)
EKG ATRIAL RATE: 87 BPM
EKG P AXIS: 73 DEGREES
EKG P-R INTERVAL: 132 MS
EKG Q-T INTERVAL: 346 MS
EKG QRS DURATION: 80 MS
EKG QTC CALCULATION (BAZETT): 416 MS
EKG R AXIS: 52 DEGREES
EKG T AXIS: 157 DEGREES
EKG VENTRICULAR RATE: 87 BPM
EOSINOPHILS ABSOLUTE: 0 K/UL (ref 0–0.7)
EOSINOPHILS RELATIVE PERCENT: 0 %
EPITHELIAL CELLS, UA: ABNORMAL /HPF (ref 0–5)
GFR AFRICAN AMERICAN: >60
GFR NON-AFRICAN AMERICAN: >60
GLOBULIN: 3.5 G/DL (ref 2.3–3.5)
GLUCOSE BLD-MCNC: 169 MG/DL (ref 70–99)
GLUCOSE URINE: NEGATIVE MG/DL
HBA1C MFR BLD: 6.2 % (ref 4.8–5.9)
HCT VFR BLD CALC: 38.7 % (ref 37–47)
HEMOGLOBIN: 12.6 G/DL (ref 12–16)
HYALINE CASTS: ABNORMAL /HPF (ref 0–5)
INFLUENZA A BY PCR: NEGATIVE
INFLUENZA B BY PCR: NEGATIVE
KETONES, URINE: NEGATIVE MG/DL
LACTIC ACID: 2.6 MMOL/L (ref 0.5–2.2)
LEUKOCYTE ESTERASE, URINE: ABNORMAL
LYMPHOCYTES ABSOLUTE: 1.6 K/UL (ref 1–4.8)
LYMPHOCYTES RELATIVE PERCENT: 11.5 %
MAGNESIUM: 1.8 MG/DL (ref 1.7–2.4)
MCH RBC QN AUTO: 25.9 PG (ref 27–31.3)
MCHC RBC AUTO-ENTMCNC: 32.6 % (ref 33–37)
MCV RBC AUTO: 79.4 FL (ref 82–100)
MONOCYTES ABSOLUTE: 0.4 K/UL (ref 0.2–0.8)
MONOCYTES RELATIVE PERCENT: 3.1 %
NEUTROPHILS ABSOLUTE: 11.9 K/UL (ref 1.4–6.5)
NEUTROPHILS RELATIVE PERCENT: 84.1 %
NITRITE, URINE: NEGATIVE
PDW BLD-RTO: 16.3 % (ref 11.5–14.5)
PH UA: 5.5 (ref 5–9)
PLATELET # BLD: 236 K/UL (ref 130–400)
POTASSIUM SERPL-SCNC: 4.2 MEQ/L (ref 3.4–4.9)
PROTEIN UA: NEGATIVE MG/DL
RBC # BLD: 4.87 M/UL (ref 4.2–5.4)
RBC UA: ABNORMAL /HPF (ref 0–5)
SEDIMENTATION RATE, ERYTHROCYTE: 41 MM (ref 0–30)
SODIUM BLD-SCNC: 138 MEQ/L (ref 135–144)
SPECIFIC GRAVITY UA: 1.03 (ref 1–1.03)
TOTAL PROTEIN: 7.2 G/DL (ref 6.3–8)
UROBILINOGEN, URINE: 0.2 E.U./DL
WBC # BLD: 14.2 K/UL (ref 4.8–10.8)
WBC UA: ABNORMAL /HPF (ref 0–5)

## 2020-02-16 PROCEDURE — 94761 N-INVAS EAR/PLS OXIMETRY MLT: CPT

## 2020-02-16 PROCEDURE — 6370000000 HC RX 637 (ALT 250 FOR IP): Performed by: EMERGENCY MEDICINE

## 2020-02-16 PROCEDURE — 6360000002 HC RX W HCPCS: Performed by: EMERGENCY MEDICINE

## 2020-02-16 PROCEDURE — 6370000000 HC RX 637 (ALT 250 FOR IP): Performed by: INTERNAL MEDICINE

## 2020-02-16 PROCEDURE — 87502 INFLUENZA DNA AMP PROBE: CPT

## 2020-02-16 PROCEDURE — 83036 HEMOGLOBIN GLYCOSYLATED A1C: CPT

## 2020-02-16 PROCEDURE — 6360000002 HC RX W HCPCS: Performed by: NURSE PRACTITIONER

## 2020-02-16 PROCEDURE — 87040 BLOOD CULTURE FOR BACTERIA: CPT

## 2020-02-16 PROCEDURE — 80053 COMPREHEN METABOLIC PANEL: CPT

## 2020-02-16 PROCEDURE — 99222 1ST HOSP IP/OBS MODERATE 55: CPT | Performed by: INTERNAL MEDICINE

## 2020-02-16 PROCEDURE — 85025 COMPLETE CBC W/AUTO DIFF WBC: CPT

## 2020-02-16 PROCEDURE — 6370000000 HC RX 637 (ALT 250 FOR IP): Performed by: NURSE PRACTITIONER

## 2020-02-16 PROCEDURE — 81001 URINALYSIS AUTO W/SCOPE: CPT

## 2020-02-16 PROCEDURE — 99285 EMERGENCY DEPT VISIT HI MDM: CPT

## 2020-02-16 PROCEDURE — 74177 CT ABD & PELVIS W/CONTRAST: CPT

## 2020-02-16 PROCEDURE — 83735 ASSAY OF MAGNESIUM: CPT

## 2020-02-16 PROCEDURE — 94664 DEMO&/EVAL PT USE INHALER: CPT

## 2020-02-16 PROCEDURE — 1210000000 HC MED SURG R&B

## 2020-02-16 PROCEDURE — 2580000003 HC RX 258: Performed by: EMERGENCY MEDICINE

## 2020-02-16 PROCEDURE — 96374 THER/PROPH/DIAG INJ IV PUSH: CPT

## 2020-02-16 PROCEDURE — 83605 ASSAY OF LACTIC ACID: CPT

## 2020-02-16 PROCEDURE — 6360000004 HC RX CONTRAST MEDICATION: Performed by: EMERGENCY MEDICINE

## 2020-02-16 PROCEDURE — 85652 RBC SED RATE AUTOMATED: CPT

## 2020-02-16 PROCEDURE — 86140 C-REACTIVE PROTEIN: CPT

## 2020-02-16 PROCEDURE — 36415 COLL VENOUS BLD VENIPUNCTURE: CPT

## 2020-02-16 PROCEDURE — 93005 ELECTROCARDIOGRAM TRACING: CPT | Performed by: EMERGENCY MEDICINE

## 2020-02-16 PROCEDURE — 2580000003 HC RX 258: Performed by: NURSE PRACTITIONER

## 2020-02-16 PROCEDURE — 96375 TX/PRO/DX INJ NEW DRUG ADDON: CPT

## 2020-02-16 PROCEDURE — 94640 AIRWAY INHALATION TREATMENT: CPT

## 2020-02-16 PROCEDURE — 94760 N-INVAS EAR/PLS OXIMETRY 1: CPT

## 2020-02-16 PROCEDURE — 71045 X-RAY EXAM CHEST 1 VIEW: CPT

## 2020-02-16 RX ORDER — ONDANSETRON 2 MG/ML
4 INJECTION INTRAMUSCULAR; INTRAVENOUS ONCE
Status: COMPLETED | OUTPATIENT
Start: 2020-02-16 | End: 2020-02-16

## 2020-02-16 RX ORDER — FAMOTIDINE 20 MG/1
20 TABLET, FILM COATED ORAL 2 TIMES DAILY
Status: DISCONTINUED | OUTPATIENT
Start: 2020-02-16 | End: 2020-02-18 | Stop reason: HOSPADM

## 2020-02-16 RX ORDER — ONDANSETRON 2 MG/ML
4 INJECTION INTRAMUSCULAR; INTRAVENOUS EVERY 6 HOURS PRN
Status: DISCONTINUED | OUTPATIENT
Start: 2020-02-16 | End: 2020-02-18 | Stop reason: HOSPADM

## 2020-02-16 RX ORDER — 0.9 % SODIUM CHLORIDE 0.9 %
1000 INTRAVENOUS SOLUTION INTRAVENOUS ONCE
Status: COMPLETED | OUTPATIENT
Start: 2020-02-16 | End: 2020-02-16

## 2020-02-16 RX ORDER — SODIUM CHLORIDE 9 MG/ML
INJECTION, SOLUTION INTRAVENOUS CONTINUOUS
Status: DISCONTINUED | OUTPATIENT
Start: 2020-02-16 | End: 2020-02-17

## 2020-02-16 RX ORDER — NICOTINE POLACRILEX 4 MG
15 LOZENGE BUCCAL PRN
Status: DISCONTINUED | OUTPATIENT
Start: 2020-02-16 | End: 2020-02-18 | Stop reason: HOSPADM

## 2020-02-16 RX ORDER — GABAPENTIN 800 MG/1
800 TABLET ORAL 4 TIMES DAILY
Status: DISCONTINUED | OUTPATIENT
Start: 2020-02-16 | End: 2020-02-16

## 2020-02-16 RX ORDER — DEXTROSE MONOHYDRATE 50 MG/ML
100 INJECTION, SOLUTION INTRAVENOUS PRN
Status: DISCONTINUED | OUTPATIENT
Start: 2020-02-16 | End: 2020-02-18 | Stop reason: HOSPADM

## 2020-02-16 RX ORDER — PREGABALIN 50 MG/1
100 CAPSULE ORAL 2 TIMES DAILY
Status: DISCONTINUED | OUTPATIENT
Start: 2020-02-16 | End: 2020-02-18 | Stop reason: HOSPADM

## 2020-02-16 RX ORDER — FENTANYL CITRATE 50 UG/ML
50 INJECTION, SOLUTION INTRAMUSCULAR; INTRAVENOUS ONCE
Status: COMPLETED | OUTPATIENT
Start: 2020-02-16 | End: 2020-02-16

## 2020-02-16 RX ORDER — GUAIFENESIN 600 MG/1
600 TABLET, EXTENDED RELEASE ORAL 2 TIMES DAILY
Status: DISCONTINUED | OUTPATIENT
Start: 2020-02-16 | End: 2020-02-18 | Stop reason: HOSPADM

## 2020-02-16 RX ORDER — SODIUM CHLORIDE 0.9 % (FLUSH) 0.9 %
10 SYRINGE (ML) INJECTION PRN
Status: DISCONTINUED | OUTPATIENT
Start: 2020-02-16 | End: 2020-02-18 | Stop reason: HOSPADM

## 2020-02-16 RX ORDER — SODIUM CHLORIDE 0.9 % (FLUSH) 0.9 %
10 SYRINGE (ML) INJECTION EVERY 12 HOURS SCHEDULED
Status: DISCONTINUED | OUTPATIENT
Start: 2020-02-16 | End: 2020-02-18 | Stop reason: HOSPADM

## 2020-02-16 RX ORDER — KETOROLAC TROMETHAMINE 30 MG/ML
30 INJECTION, SOLUTION INTRAMUSCULAR; INTRAVENOUS EVERY 6 HOURS PRN
Status: DISCONTINUED | OUTPATIENT
Start: 2020-02-16 | End: 2020-02-18 | Stop reason: HOSPADM

## 2020-02-16 RX ORDER — ACETAMINOPHEN 325 MG/1
650 TABLET ORAL EVERY 4 HOURS PRN
Status: DISCONTINUED | OUTPATIENT
Start: 2020-02-16 | End: 2020-02-18 | Stop reason: HOSPADM

## 2020-02-16 RX ORDER — ACETAMINOPHEN 500 MG
1000 TABLET ORAL ONCE
Status: COMPLETED | OUTPATIENT
Start: 2020-02-16 | End: 2020-02-16

## 2020-02-16 RX ORDER — NICOTINE 21 MG/24HR
1 PATCH, TRANSDERMAL 24 HOURS TRANSDERMAL DAILY
Status: DISCONTINUED | OUTPATIENT
Start: 2020-02-16 | End: 2020-02-18 | Stop reason: HOSPADM

## 2020-02-16 RX ORDER — VITAMIN B COMPLEX
1000 TABLET ORAL DAILY
Status: DISCONTINUED | OUTPATIENT
Start: 2020-02-16 | End: 2020-02-18 | Stop reason: HOSPADM

## 2020-02-16 RX ORDER — ALBUTEROL SULFATE 2.5 MG/3ML
2.5 SOLUTION RESPIRATORY (INHALATION)
Status: COMPLETED | OUTPATIENT
Start: 2020-02-16 | End: 2020-02-16

## 2020-02-16 RX ORDER — NALOXONE HYDROCHLORIDE 0.4 MG/ML
0.4 INJECTION, SOLUTION INTRAMUSCULAR; INTRAVENOUS; SUBCUTANEOUS PRN
Status: DISCONTINUED | OUTPATIENT
Start: 2020-02-16 | End: 2020-02-18 | Stop reason: HOSPADM

## 2020-02-16 RX ORDER — IPRATROPIUM BROMIDE AND ALBUTEROL SULFATE 2.5; .5 MG/3ML; MG/3ML
1 SOLUTION RESPIRATORY (INHALATION) 4 TIMES DAILY
Status: DISCONTINUED | OUTPATIENT
Start: 2020-02-16 | End: 2020-02-18 | Stop reason: HOSPADM

## 2020-02-16 RX ORDER — FOLIC ACID 1 MG/1
1 TABLET ORAL DAILY
Status: DISCONTINUED | OUTPATIENT
Start: 2020-02-16 | End: 2020-02-18 | Stop reason: HOSPADM

## 2020-02-16 RX ORDER — DULOXETIN HYDROCHLORIDE 60 MG/1
120 CAPSULE, DELAYED RELEASE ORAL DAILY
Status: DISCONTINUED | OUTPATIENT
Start: 2020-02-16 | End: 2020-02-18 | Stop reason: HOSPADM

## 2020-02-16 RX ORDER — ALBUTEROL SULFATE 2.5 MG/3ML
2.5 SOLUTION RESPIRATORY (INHALATION)
Status: DISCONTINUED | OUTPATIENT
Start: 2020-02-16 | End: 2020-02-18 | Stop reason: HOSPADM

## 2020-02-16 RX ORDER — MORPHINE SULFATE 2 MG/ML
4 INJECTION, SOLUTION INTRAMUSCULAR; INTRAVENOUS
Status: COMPLETED | OUTPATIENT
Start: 2020-02-16 | End: 2020-02-18

## 2020-02-16 RX ORDER — LEVOFLOXACIN 5 MG/ML
750 INJECTION, SOLUTION INTRAVENOUS ONCE
Status: COMPLETED | OUTPATIENT
Start: 2020-02-16 | End: 2020-02-16

## 2020-02-16 RX ORDER — LEVOFLOXACIN 5 MG/ML
500 INJECTION, SOLUTION INTRAVENOUS EVERY 24 HOURS
Status: DISCONTINUED | OUTPATIENT
Start: 2020-02-16 | End: 2020-02-18

## 2020-02-16 RX ORDER — HYDROXYZINE HYDROCHLORIDE 10 MG/1
25 TABLET, FILM COATED ORAL EVERY 6 HOURS PRN
Status: DISCONTINUED | OUTPATIENT
Start: 2020-02-16 | End: 2020-02-18 | Stop reason: HOSPADM

## 2020-02-16 RX ORDER — IPRATROPIUM BROMIDE AND ALBUTEROL SULFATE 2.5; .5 MG/3ML; MG/3ML
1 SOLUTION RESPIRATORY (INHALATION) EVERY 4 HOURS PRN
Status: DISCONTINUED | OUTPATIENT
Start: 2020-02-16 | End: 2020-02-16

## 2020-02-16 RX ORDER — SENNA PLUS 8.6 MG/1
1 TABLET ORAL DAILY PRN
Status: DISCONTINUED | OUTPATIENT
Start: 2020-02-16 | End: 2020-02-18 | Stop reason: HOSPADM

## 2020-02-16 RX ORDER — CHOLECALCIFEROL (VITAMIN D3) 125 MCG
1000 CAPSULE ORAL DAILY
Status: DISCONTINUED | OUTPATIENT
Start: 2020-02-16 | End: 2020-02-18 | Stop reason: HOSPADM

## 2020-02-16 RX ORDER — KETOROLAC TROMETHAMINE 30 MG/ML
30 INJECTION, SOLUTION INTRAMUSCULAR; INTRAVENOUS ONCE
Status: COMPLETED | OUTPATIENT
Start: 2020-02-16 | End: 2020-02-16

## 2020-02-16 RX ORDER — METHYLPREDNISOLONE SODIUM SUCCINATE 125 MG/2ML
125 INJECTION, POWDER, LYOPHILIZED, FOR SOLUTION INTRAMUSCULAR; INTRAVENOUS ONCE
Status: COMPLETED | OUTPATIENT
Start: 2020-02-16 | End: 2020-02-16

## 2020-02-16 RX ORDER — DEXTROSE MONOHYDRATE 25 G/50ML
12.5 INJECTION, SOLUTION INTRAVENOUS PRN
Status: DISCONTINUED | OUTPATIENT
Start: 2020-02-16 | End: 2020-02-18 | Stop reason: HOSPADM

## 2020-02-16 RX ORDER — TRAZODONE HYDROCHLORIDE 50 MG/1
200 TABLET ORAL NIGHTLY
Status: DISCONTINUED | OUTPATIENT
Start: 2020-02-16 | End: 2020-02-18 | Stop reason: HOSPADM

## 2020-02-16 RX ORDER — IPRATROPIUM BROMIDE AND ALBUTEROL SULFATE 2.5; .5 MG/3ML; MG/3ML
1 SOLUTION RESPIRATORY (INHALATION)
Status: DISCONTINUED | OUTPATIENT
Start: 2020-02-16 | End: 2020-02-16

## 2020-02-16 RX ORDER — PREDNISONE 20 MG/1
40 TABLET ORAL DAILY
Status: DISCONTINUED | OUTPATIENT
Start: 2020-02-16 | End: 2020-02-17

## 2020-02-16 RX ADMIN — IOPAMIDOL 100 ML: 755 INJECTION, SOLUTION INTRAVENOUS at 12:26

## 2020-02-16 RX ADMIN — SODIUM CHLORIDE: 9 INJECTION, SOLUTION INTRAVENOUS at 14:56

## 2020-02-16 RX ADMIN — FENTANYL CITRATE 50 MCG: 50 INJECTION, SOLUTION INTRAMUSCULAR; INTRAVENOUS at 13:27

## 2020-02-16 RX ADMIN — SODIUM CHLORIDE 1000 ML: 9 INJECTION, SOLUTION INTRAVENOUS at 10:06

## 2020-02-16 RX ADMIN — GUAIFENESIN 600 MG: 600 TABLET, EXTENDED RELEASE ORAL at 14:57

## 2020-02-16 RX ADMIN — TRAZODONE HYDROCHLORIDE 200 MG: 50 TABLET ORAL at 20:54

## 2020-02-16 RX ADMIN — KETOROLAC TROMETHAMINE 30 MG: 30 INJECTION, SOLUTION INTRAMUSCULAR; INTRAVENOUS at 10:07

## 2020-02-16 RX ADMIN — SODIUM CHLORIDE 1000 ML: 9 INJECTION, SOLUTION INTRAVENOUS at 11:05

## 2020-02-16 RX ADMIN — FAMOTIDINE 20 MG: 20 TABLET ORAL at 14:57

## 2020-02-16 RX ADMIN — MORPHINE SULFATE 4 MG: 2 INJECTION, SOLUTION INTRAMUSCULAR; INTRAVENOUS at 10:08

## 2020-02-16 RX ADMIN — DULOXETINE HYDROCHLORIDE 120 MG: 60 CAPSULE, DELAYED RELEASE ORAL at 20:57

## 2020-02-16 RX ADMIN — ALBUTEROL SULFATE 2.5 MG: 2.5 SOLUTION RESPIRATORY (INHALATION) at 13:01

## 2020-02-16 RX ADMIN — PREDNISONE 40 MG: 20 TABLET ORAL at 14:57

## 2020-02-16 RX ADMIN — Medication 10 ML: at 20:54

## 2020-02-16 RX ADMIN — LEVOFLOXACIN 750 MG: 5 INJECTION, SOLUTION INTRAVENOUS at 12:58

## 2020-02-16 RX ADMIN — ACETAMINOPHEN 1000 MG: 500 TABLET ORAL at 12:56

## 2020-02-16 RX ADMIN — IPRATROPIUM BROMIDE AND ALBUTEROL SULFATE 1 AMPULE: .5; 3 SOLUTION RESPIRATORY (INHALATION) at 19:23

## 2020-02-16 RX ADMIN — ALBUTEROL SULFATE 2.5 MG: 2.5 SOLUTION RESPIRATORY (INHALATION) at 12:54

## 2020-02-16 RX ADMIN — ONDANSETRON 4 MG: 2 INJECTION INTRAMUSCULAR; INTRAVENOUS at 10:07

## 2020-02-16 RX ADMIN — ALBUTEROL SULFATE 2.5 MG: 2.5 SOLUTION RESPIRATORY (INHALATION) at 12:44

## 2020-02-16 RX ADMIN — METHYLPREDNISOLONE SODIUM SUCCINATE 125 MG: 125 INJECTION, POWDER, FOR SOLUTION INTRAMUSCULAR; INTRAVENOUS at 12:56

## 2020-02-16 RX ADMIN — FAMOTIDINE 20 MG: 20 TABLET ORAL at 20:54

## 2020-02-16 RX ADMIN — PREGABALIN 100 MG: 50 CAPSULE ORAL at 20:54

## 2020-02-16 RX ADMIN — ENOXAPARIN SODIUM 40 MG: 40 INJECTION SUBCUTANEOUS at 14:56

## 2020-02-16 RX ADMIN — GUAIFENESIN 600 MG: 600 TABLET, EXTENDED RELEASE ORAL at 20:54

## 2020-02-16 ASSESSMENT — PAIN DESCRIPTION - LOCATION
LOCATION: LEG
LOCATION: LEG

## 2020-02-16 ASSESSMENT — PAIN DESCRIPTION - PAIN TYPE
TYPE: CHRONIC PAIN
TYPE: ACUTE PAIN

## 2020-02-16 ASSESSMENT — PAIN SCALES - GENERAL
PAINLEVEL_OUTOF10: 10
PAINLEVEL_OUTOF10: 9
PAINLEVEL_OUTOF10: 2
PAINLEVEL_OUTOF10: 10
PAINLEVEL_OUTOF10: 6
PAINLEVEL_OUTOF10: 6

## 2020-02-16 ASSESSMENT — ENCOUNTER SYMPTOMS
GASTROINTESTINAL NEGATIVE: 1
ABDOMINAL PAIN: 0
COUGH: 1
BACK PAIN: 0
ALLERGIC/IMMUNOLOGIC NEGATIVE: 1
DIARRHEA: 0
VOMITING: 0
SORE THROAT: 0
COLOR CHANGE: 1
SHORTNESS OF BREATH: 0
NAUSEA: 0
EYES NEGATIVE: 1

## 2020-02-16 ASSESSMENT — PAIN DESCRIPTION - ORIENTATION: ORIENTATION: RIGHT;LEFT

## 2020-02-16 ASSESSMENT — PAIN DESCRIPTION - FREQUENCY: FREQUENCY: CONTINUOUS

## 2020-02-16 ASSESSMENT — PAIN DESCRIPTION - DESCRIPTORS: DESCRIPTORS: BURNING;THROBBING

## 2020-02-16 NOTE — CARE COORDINATION
Pt is too ill and short of breath for d/c review at this time. I did give her a list of [facility] pcp's as she said she had no pcp.

## 2020-02-16 NOTE — ED PROVIDER NOTES
[address] [facility] ED  eMERGENCYdEPARTMENT eNCOUnter      Pt Name: Katie Gu  MRN: [id number]  [place] 1965  Date of evaluation: 2/16/2020  Alethea Shah MD    CHIEF COMPLAINT           HPI  Katie Gu is a 48 y.o. female per chart review has a h/o chronic pain, COPD, fibromyalgia presents to the ED with myalgias. Pt notes gradual onset, severe, constant, diffuse myalgias since last night.  +Cough. Pt denies fever, n/v, cp, sob, dysuria, diarrhea. ROS  Review of Systems   Constitutional: Negative for activity change, chills and fever. HENT: Negative for ear pain and sore throat. Eyes: Negative for visual disturbance. Respiratory: Positive for cough. Negative for shortness of breath. Cardiovascular: Negative for chest pain, palpitations and leg swelling. Gastrointestinal: Negative for abdominal pain, diarrhea, nausea and vomiting. Genitourinary: Negative for dysuria. Musculoskeletal: Positive for myalgias. Negative for back pain. Skin: Negative for rash. Neurological: Negative for dizziness and weakness. Except as noted above the remainder of the review of systems was reviewed and negative.        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    Neuromuscular disorder ([facility] [address]     Osteoarthritis     Pneumonia due to organism     Pyoderma gangrenosa     Ulcerative colitis ([facility] [address]          SURGICAL HISTORY       Past Surgical History:   Procedure Laterality Date    ABDOMEN SURGERY      sleen repair    APPENDECTOMY      BACK SURGERY      BREAST SURGERY      fatty tumor removed, benign    COLONOSCOPY      COSMETIC SURGERY      tummy [facility]    EYE SURGERY      bilateral cataract surgery    HYSTERECTOMY      SKIN BIOPSY      SPINAL FUSION      TONSILLECTOMY           CURRENTMEDICATIONS       Previous Medications    ACETAMINOPHEN (TYLENOL) 325 MG TABLET    Take 650 mg by mouth every 4 hours as needed for Pain    DULOXETINE (CYMBALTA) 60 MG EXTENDED RELEASE CAPSULE    Take 120 mg by mouth daily    FOLIC ACID (FOLVITE) 1 MG TABLET    Take 1 mg by mouth daily    GABAPENTIN (NEURONTIN) 800 MG TABLET    Take 800 mg by mouth 4 times daily. HYDROXYZINE (ATARAX) 25 MG TABLET    Take 25 mg by mouth every 6 hours as needed for Itching    INSULIN LISPRO (HUMALOG) 100 UNIT/ML INJECTION VIAL    Inject 2-10 Units into the skin 3 times daily (before meals) 151-200=2UNITS  201-250=4 UNITS  251-300=6UNITS  301-350=8 UNITS  351-400= 10 UNITS    MELOXICAM (MOBIC) 15 MG TABLET    Take 15 mg by mouth daily    METHOTREXATE, ANTI-RHEUMATIC, PO    Take 15 mg by mouth twice a week Sunday AND MONDAY    PREDNISONE (DELTASONE) 10 MG TABLET    Take 40 mg by mouth daily    PREGABALIN (LYRICA) 100 MG CAPSULE    Take 1 capsule by mouth 3 times daily for 30 days. SENNA (SENOKOT) 8.6 MG TABLET    Take 1 tablet by mouth 2 times daily as needed for Constipation    TRAZODONE (DESYREL) 100 MG TABLET    Take 200 mg by mouth nightly     VITAMIN B-12 (CYANOCOBALAMIN) 1000 MCG TABLET    Take 1,000 mcg by mouth daily    VITAMIN D (CHOLECALCIFEROL) 1000 UNIT TABS TABLET    Take 1,000 Units by mouth daily       ALLERGIES     Amoxicillin-pot clavulanate and Other    FAMILY HISTORY     History reviewed. No pertinent family history. SOCIAL HISTORY       Social History     Socioeconomic History    Marital status:       Spouse name: None    Number of children: None    Years of education: None    Highest education level: None   Occupational History    None   Social Needs    Financial resource strain: None    Food insecurity:     Worry: None noted in RLQ. Mild purulence. Nontender to palpation. Musculoskeletal: Normal range of motion. Skin:     General: Skin is warm and dry. Neurological:      Mental Status: She is alert and oriented to person, place, and time. Psychiatric:         Mood and Affect: Mood normal.           MDM  47 yo female presents to the ED with myalgias. Pt noted to have temp 100.2 in the ED. Pt given 1 L NS, IV morphine, IV zofran, IV toradol with moderate relief. EKG shows NSR with HR 87, normal axis, normal intervals, no ST changes. Labs remarkable for WBC 14, glucose 169, CRP 70.6. CXR negative. Pt reassessed and feeling much better however pt's bp noted to be 18H systolics. Pt given 2nd L of NS in the ED with persistent bp in the 90s. Unsure of etiology of leukocytosis. CT AP done which shows bibasilar pneumonia. Pt with a h/o COPD. Will treat for COPD exacerbation. Pt given albuterol nebs x 3, IV solumedrol in the ED. Given pneumonia, blood cultures drawn and pt given IV levaquin in the ED. Given pneumonia, COPD exacerbation, hypotension, myalgia, case discussed with Dr. Russ and pt admitted to medicine in stable condition. Pt understands plan. FINAL IMPRESSION      1. Myalgia    2. Pneumonia due to organism    3. Fever, unspecified fever cause    4. Hypotension, unspecified hypotension type    5.  COPD with acute exacerbation [facility])          DISPOSITION/PLAN   DISPOSITION Decision To Admit 02/16/2020 12:38:57 PM        DISCHARGE MEDICATIONS:  [unfilled]         Danyell Wynne MD(electronically signed)  Attending Emergency Physician            Danyell Wynne MD  02/16/20 7161

## 2020-02-16 NOTE — ED TRIAGE NOTES
Patient presents to the ER with complaints of bilateral leg pain that started this morning around 0500. Patient states that she has a condition called pyoderma gangrenosa that causes pain and wounds since being diagnosed with Leukemia. Patient has been in remission from leukemia since 2016. Patient has a dressed wound to the right side of abdomen and a dressed wound to right leg. Patient tearful, crying, and begging for the pain to stop during triage. Patient takes 800mg gabapentin QID.

## 2020-02-16 NOTE — CONSULTS
hip.     Small bilateral posterior lower lobe consolidations may relate to pneumonia in the appropriate clinical setting or may be due to atelectasis. A moderate amount stool is present throughout the colon and may relate to constipation. Skin thickening and mild subcutaneous edema anterior abdominal wall. Correlation is recommended. All CT scans at this facility use dose modulation, iterative reconstruction, and/or weight based dosing when appropriate to reduce radiation dose to as low as reasonably achievable. Xr Chest Portable    Result Date: 2/16/2020  Portable chest radiograph History: Fever and cough Technique: AP portable view of the chest obtained. Comparison: Portable chest radiograph from September 30, 2019 Findings: Suboptimal inspiration. The cardiomediastinal silhouette is within normal limits. No pneumothorax, pleural effusion, or consolidation. Bones of the thorax appear intact. Postsurgical changes of cervical spine fusion noted. No acute intrathoracic process. Assessment, plan:   1. Bibasilar dependent atelectasis, pneumonia is unlikely given the lack of symptoms, physical findings, but cannot be totally excluded given her immunosuppression  2. Severe myalgias and arthralgias of unclear etiology  3. Patient is on aggressive immunosuppressive therapy for some form of colitis and pyoderma gangrenosum  4. Clear evidence of COPD clinically with active smoking history, mild exacerbation  We will initiate bronchodilator therapy, Acapella valve, encourage ambulation if tolerated, repeat a PA and lateral chest x-ray tomorrow.         Electronically signed by Carmencita Newell MD, FCCP on 2/16/2020 at 3:56 PM

## 2020-02-16 NOTE — ED NOTES
Pt resting comfortably in bed with cory ROJAS at bedside. No distress noted at this time.       Carmen Gutierrez RN  02/16/20 3048

## 2020-02-16 NOTE — H&P
Hospitalist History and Physical  Name: Meghann Bridges  Age: 48 y.o. Gender: female  CodeStatus: Prior  Allergies: Amoxicillin-Pot Clavulanate  Other    Chief Complaint:Leg Pain (bilateral leg pain started this morning at 0500)    Primary Care Provider: No primary care provider on file. InpatientTreatment Team: Treatment Team: Attending Provider: Matthew Christine MD  Admission Date: 2/16/2020      Subjective: 47 y/o female w/PMH of Large Granular Lymphocyte Leukemia diagnosed 2013, Pyoderma gangrenosa poorly controlled was on Dapsone,Prednisone, Cyclosporine. Fibromyalgia, chronic pain syndrome (follows pain management), drug use (cocaine), COPD current 1ppd smoker,  who initially presented to ED today with bilateral lower extremity pain. Patient states she is on 800 mg of gabapentin twice daily, Lyrica, Requip, Mobic, morphine sulfate, oxycodone, lorazepam and is seen by pain management for regular ketamine, lidocaine, propofol infusions. Patient states that her pain started at 5 this morning she denies injury she does have wound to right heel and right side of abdomen which is not uncommon given her poorly controlled PG. On arrival patient was noted to have a temperature of 100.2 she was given IV morphine which caused hypotension. Patient responded to 2 L bolus. Patient was noted to have WBCs of 14 CT showed bibasilar pneumonia with COPD exacerbation. On assessment patient is in deep sleep, difficult to arouse. Once awake patient denies fever nausea vomiting chest pain diarrhea dysuria or shortness of breath. Patient again states she was only here for her bilateral leg pain. Patient now hemodynamically stable after 2 L bolus, normal sinus rhythm, lungs clear but diminished. Patient does not display discomfort or distress. Physical Exam  Constitutional:       General: She is sleeping. Appearance: She is obese.       Comments: Patient sleeping, was difficult to arouse likely from morphine and Intravenous Once     PRN Meds: morphine    Labs:   Recent Labs     02/16/20  1018   WBC 14.2*   HGB 12.6   HCT 38.7        Recent Labs     02/16/20  1000      K 4.2      CO2 22   BUN 12   CREATININE 0.91*   CALCIUM 9.0     Recent Labs     02/16/20  1000   AST 12   ALT <5   BILITOT 0.5   ALKPHOS 98     No results for input(s): INR in the last 72 hours. No results for input(s): Monda Saupe in the last 72 hours. Urinalysis:   Lab Results   Component Value Date    NITRU Negative 08/19/2019    WBCUA 5-10 10/08/2012    BACTERIA 1+ 10/08/2012    RBCUA 0-2 10/08/2012    BLOODU Negative 08/19/2019    SPECGRAV 1.016 08/19/2019    GLUCOSEU Negative 08/19/2019    GLUCOSEU NEG 11/02/2011       Radiology:   Most recent    Chest CT      WITH CONTRAST:No results found for this or any previous visit. WITHOUT CONTRAST: No results found for this or any previous visit. CXR      2-view:   Results for orders placed during the hospital encounter of 12/22/17   XR CHEST STANDARD (2 VW)    Narrative EXAMINATION: CHEST X-RAY, PA AND LATERAL    CLINICAL HISTORY: UPPER RESPIRATORY CONGESTION, RESPIRATORY WHEEZING, COUGH AND FEVER    COMPARISONS: 12/21/2017    FINDINGS: Heart and mediastinum appear normal. There is bibasilar subsegmental atelectasis or possibly developing small infiltrates in the lung bases. There are no pulmonary consolidations. There is no pneumothorax. There are no pleural effusions. Visualized osseous structures and remainder of the chest appear unremarkable. Impression BIBASILAR SUBSEGMENTAL ATELECTASIS OR PERHAPS A DEVELOPING SMALL INFILTRATE IN THE LUNG BASES. THERE ARE NO PULMONARY CONSOLIDATIONS AND THERE ARE NO PLEURAL EFFUSIONS. Portable:   Results for orders placed during the hospital encounter of 02/16/20   XR CHEST PORTABLE    Narrative Portable chest radiograph    History: Fever and cough    Technique: AP portable view of the chest obtained.     Comparison: Portable chest radiograph from September 30, 2019    Findings:    Suboptimal inspiration. The cardiomediastinal silhouette is within normal limits. No pneumothorax, pleural effusion, or consolidation. Bones of the thorax appear intact. Postsurgical changes of cervical spine fusion noted. Impression No acute intrathoracic process. Echo No results found for this or any previous visit. Assessment/Plan:    # Acute on Chronic COPD exacerbation and bibasilar PNA: Obtain ABGs if needed, continue supplemental O2 with goal SPO2 of 92% or greater. Duonebs Q4hrs, Pulmonology consulted, IV antibiotics, IV solumedrol followed by prednisone taper once no longer wheezing, chest PT, acapella, incentive spirometry, mucinex also ordered. Sputum Cx and gram stain. If spikes fever of 100.4F or greater obtain 2 SETS of blood cultures in addition to PRN tylenol. # Hypotension likely cause from morphine and fentanyl. Systolic BP was 693 on arrival  IV Fluids; monitor BP, Hold BP lowering meds    # Pyodermic gangrenosum: Consult infectious disease as patient has been on multiple antibiotics in the past. wound care consult. Wound culture. Silver calcium alginate and Allevyn on wounds once daily. # Elevated glucose: Possibly new onset of type 2 diabetes likely cause from chronic prednisone. Will obtain hemoglobin A1c and monitor Accu-Cheks    # Tobacco and nicotine abuse and dependence: Counseled for greater than 3 minutes on importance of smoking cessation to reduce risk of associated morbidity and mortality. Nicotine patch. # Restless leg syndrome/neuropathy: We will resume 800 mg gabapentin twice daily. And will treat pain with appropriate medications. I personally spent estimated 60 minutes with this patient today. Additional work up or/and treatment plan may be added today or then after based on clinical progression. I am managing a portion of pt care.  Some medical issues are handled

## 2020-02-17 ENCOUNTER — APPOINTMENT (OUTPATIENT)
Dept: GENERAL RADIOLOGY | Age: 55
DRG: 092 | End: 2020-02-17
Payer: COMMERCIAL

## 2020-02-17 LAB
ALBUMIN SERPL-MCNC: 3.2 G/DL (ref 3.5–4.6)
ALP BLD-CCNC: 75 U/L (ref 40–130)
ALT SERPL-CCNC: 8 U/L (ref 0–33)
ANION GAP SERPL CALCULATED.3IONS-SCNC: 13 MEQ/L (ref 9–15)
AST SERPL-CCNC: 10 U/L (ref 0–35)
BASOPHILS ABSOLUTE: 0.1 K/UL (ref 0–0.2)
BASOPHILS RELATIVE PERCENT: 0.4 %
BILIRUB SERPL-MCNC: 0.3 MG/DL (ref 0.2–0.7)
BUN BLDV-MCNC: 13 MG/DL (ref 6–20)
CALCIUM SERPL-MCNC: 9 MG/DL (ref 8.5–9.9)
CHLORIDE BLD-SCNC: 106 MEQ/L (ref 95–107)
CO2: 22 MEQ/L (ref 20–31)
CREAT SERPL-MCNC: 0.7 MG/DL (ref 0.5–0.9)
EOSINOPHILS ABSOLUTE: 0 K/UL (ref 0–0.7)
EOSINOPHILS RELATIVE PERCENT: 0 %
GFR AFRICAN AMERICAN: >60
GFR NON-AFRICAN AMERICAN: >60
GLOBULIN: 3.6 G/DL (ref 2.3–3.5)
GLUCOSE BLD-MCNC: 150 MG/DL (ref 60–115)
GLUCOSE BLD-MCNC: 171 MG/DL (ref 60–115)
GLUCOSE BLD-MCNC: 188 MG/DL (ref 70–99)
GLUCOSE BLD-MCNC: 193 MG/DL (ref 60–115)
GLUCOSE BLD-MCNC: 334 MG/DL (ref 60–115)
HCT VFR BLD CALC: 32.7 % (ref 37–47)
HEMOGLOBIN: 10.5 G/DL (ref 12–16)
LACTIC ACID: 1.8 MMOL/L (ref 0.5–2.2)
LYMPHOCYTES ABSOLUTE: 1.4 K/UL (ref 1–4.8)
LYMPHOCYTES RELATIVE PERCENT: 7.6 %
MCH RBC QN AUTO: 25.9 PG (ref 27–31.3)
MCHC RBC AUTO-ENTMCNC: 32 % (ref 33–37)
MCV RBC AUTO: 81 FL (ref 82–100)
MONOCYTES ABSOLUTE: 0.9 K/UL (ref 0.2–0.8)
MONOCYTES RELATIVE PERCENT: 4.7 %
NEUTROPHILS ABSOLUTE: 16.7 K/UL (ref 1.4–6.5)
NEUTROPHILS RELATIVE PERCENT: 87.3 %
PDW BLD-RTO: 15.9 % (ref 11.5–14.5)
PERFORMED ON: ABNORMAL
PLATELET # BLD: 216 K/UL (ref 130–400)
POTASSIUM REFLEX MAGNESIUM: 3.9 MEQ/L (ref 3.4–4.9)
PROCALCITONIN: 0.2 NG/ML (ref 0–0.15)
RBC # BLD: 4.04 M/UL (ref 4.2–5.4)
SODIUM BLD-SCNC: 141 MEQ/L (ref 135–144)
TOTAL PROTEIN: 6.8 G/DL (ref 6.3–8)
WBC # BLD: 19.1 K/UL (ref 4.8–10.8)

## 2020-02-17 PROCEDURE — 71046 X-RAY EXAM CHEST 2 VIEWS: CPT

## 2020-02-17 PROCEDURE — 6370000000 HC RX 637 (ALT 250 FOR IP): Performed by: INTERNAL MEDICINE

## 2020-02-17 PROCEDURE — 99222 1ST HOSP IP/OBS MODERATE 55: CPT | Performed by: INTERNAL MEDICINE

## 2020-02-17 PROCEDURE — 84145 PROCALCITONIN (PCT): CPT

## 2020-02-17 PROCEDURE — 83605 ASSAY OF LACTIC ACID: CPT

## 2020-02-17 PROCEDURE — 99214 OFFICE O/P EST MOD 30 MIN: CPT

## 2020-02-17 PROCEDURE — 94640 AIRWAY INHALATION TREATMENT: CPT

## 2020-02-17 PROCEDURE — 6360000002 HC RX W HCPCS: Performed by: NURSE PRACTITIONER

## 2020-02-17 PROCEDURE — 93010 ELECTROCARDIOGRAM REPORT: CPT | Performed by: INTERNAL MEDICINE

## 2020-02-17 PROCEDURE — 99232 SBSQ HOSP IP/OBS MODERATE 35: CPT | Performed by: INTERNAL MEDICINE

## 2020-02-17 PROCEDURE — 97161 PT EVAL LOW COMPLEX 20 MIN: CPT

## 2020-02-17 PROCEDURE — 1210000000 HC MED SURG R&B

## 2020-02-17 PROCEDURE — 85025 COMPLETE CBC W/AUTO DIFF WBC: CPT

## 2020-02-17 PROCEDURE — 80053 COMPREHEN METABOLIC PANEL: CPT

## 2020-02-17 PROCEDURE — 2580000003 HC RX 258: Performed by: NURSE PRACTITIONER

## 2020-02-17 PROCEDURE — 97165 OT EVAL LOW COMPLEX 30 MIN: CPT

## 2020-02-17 PROCEDURE — 94761 N-INVAS EAR/PLS OXIMETRY MLT: CPT

## 2020-02-17 PROCEDURE — 36415 COLL VENOUS BLD VENIPUNCTURE: CPT

## 2020-02-17 PROCEDURE — 2700000000 HC OXYGEN THERAPY PER DAY

## 2020-02-17 PROCEDURE — 94760 N-INVAS EAR/PLS OXIMETRY 1: CPT

## 2020-02-17 PROCEDURE — 6370000000 HC RX 637 (ALT 250 FOR IP): Performed by: NURSE PRACTITIONER

## 2020-02-17 RX ORDER — BUDESONIDE AND FORMOTEROL FUMARATE DIHYDRATE 80; 4.5 UG/1; UG/1
2 AEROSOL RESPIRATORY (INHALATION) 2 TIMES DAILY
Status: DISCONTINUED | OUTPATIENT
Start: 2020-02-17 | End: 2020-02-18 | Stop reason: HOSPADM

## 2020-02-17 RX ADMIN — GUAIFENESIN 600 MG: 600 TABLET, EXTENDED RELEASE ORAL at 10:05

## 2020-02-17 RX ADMIN — KETOROLAC TROMETHAMINE 30 MG: 30 INJECTION, SOLUTION INTRAMUSCULAR; INTRAVENOUS at 01:47

## 2020-02-17 RX ADMIN — Medication 10 ML: at 20:47

## 2020-02-17 RX ADMIN — ENOXAPARIN SODIUM 40 MG: 40 INJECTION SUBCUTANEOUS at 10:06

## 2020-02-17 RX ADMIN — CYANOCOBALAMIN TAB 500 MCG 1000 MCG: 500 TAB at 10:05

## 2020-02-17 RX ADMIN — Medication 10 ML: at 10:06

## 2020-02-17 RX ADMIN — BUDESONIDE AND FORMOTEROL FUMARATE DIHYDRATE 2 PUFF: 80; 4.5 AEROSOL RESPIRATORY (INHALATION) at 19:43

## 2020-02-17 RX ADMIN — IPRATROPIUM BROMIDE AND ALBUTEROL SULFATE 1 AMPULE: .5; 3 SOLUTION RESPIRATORY (INHALATION) at 19:43

## 2020-02-17 RX ADMIN — LEVOFLOXACIN 500 MG: 5 INJECTION, SOLUTION INTRAVENOUS at 15:18

## 2020-02-17 RX ADMIN — IPRATROPIUM BROMIDE AND ALBUTEROL SULFATE 1 AMPULE: .5; 3 SOLUTION RESPIRATORY (INHALATION) at 11:52

## 2020-02-17 RX ADMIN — DULOXETINE HYDROCHLORIDE 120 MG: 60 CAPSULE, DELAYED RELEASE ORAL at 10:05

## 2020-02-17 RX ADMIN — GUAIFENESIN 600 MG: 600 TABLET, EXTENDED RELEASE ORAL at 20:47

## 2020-02-17 RX ADMIN — PREGABALIN 100 MG: 50 CAPSULE ORAL at 10:05

## 2020-02-17 RX ADMIN — KETOROLAC TROMETHAMINE 30 MG: 30 INJECTION, SOLUTION INTRAMUSCULAR; INTRAVENOUS at 12:40

## 2020-02-17 RX ADMIN — FAMOTIDINE 20 MG: 20 TABLET ORAL at 10:05

## 2020-02-17 RX ADMIN — PREDNISONE 40 MG: 20 TABLET ORAL at 10:05

## 2020-02-17 RX ADMIN — PREGABALIN 100 MG: 50 CAPSULE ORAL at 20:48

## 2020-02-17 RX ADMIN — IPRATROPIUM BROMIDE AND ALBUTEROL SULFATE 1 AMPULE: .5; 3 SOLUTION RESPIRATORY (INHALATION) at 07:12

## 2020-02-17 RX ADMIN — VITAMIN D, TAB 1000IU (100/BT) 1000 UNITS: 25 TAB at 10:05

## 2020-02-17 RX ADMIN — IPRATROPIUM BROMIDE AND ALBUTEROL SULFATE 1 AMPULE: .5; 3 SOLUTION RESPIRATORY (INHALATION) at 16:21

## 2020-02-17 RX ADMIN — BUDESONIDE AND FORMOTEROL FUMARATE DIHYDRATE 2 PUFF: 80; 4.5 AEROSOL RESPIRATORY (INHALATION) at 11:52

## 2020-02-17 RX ADMIN — FOLIC ACID 1 MG: 1 TABLET ORAL at 10:06

## 2020-02-17 RX ADMIN — FAMOTIDINE 20 MG: 20 TABLET ORAL at 20:47

## 2020-02-17 RX ADMIN — TRAZODONE HYDROCHLORIDE 200 MG: 50 TABLET ORAL at 20:48

## 2020-02-17 ASSESSMENT — PAIN DESCRIPTION - ORIENTATION
ORIENTATION: RIGHT;LEFT
ORIENTATION: RIGHT

## 2020-02-17 ASSESSMENT — PAIN SCALES - GENERAL
PAINLEVEL_OUTOF10: 10
PAINLEVEL_OUTOF10: 7
PAINLEVEL_OUTOF10: 7
PAINLEVEL_OUTOF10: 9
PAINLEVEL_OUTOF10: 7
PAINLEVEL_OUTOF10: 0

## 2020-02-17 ASSESSMENT — PAIN DESCRIPTION - LOCATION
LOCATION: ANKLE;HAND
LOCATION: ANKLE
LOCATION: HAND;ANKLE

## 2020-02-17 ASSESSMENT — PAIN DESCRIPTION - PAIN TYPE
TYPE: CHRONIC PAIN

## 2020-02-17 ASSESSMENT — PAIN DESCRIPTION - DESCRIPTORS: DESCRIPTORS: BURNING;THROBBING

## 2020-02-17 NOTE — PLAN OF CARE
Nutrition Problem: Increased nutrient needs  Intervention: Food and/or Nutrient Delivery: Start ONS(provide wound supplement bid)  Nutritional Goals: Intake >75% of meals/supplement. Improved wound status.

## 2020-02-17 NOTE — CONSULTS
Units Subcutaneous Nightly    levofloxacin  500 mg Intravenous Q24H    guaiFENesin  600 mg Oral BID    nicotine  1 patch Transdermal Daily    ipratropium-albuterol  1 ampule Inhalation 4x daily    DULoxetine  120 mg Oral Daily    folic acid  1 mg Oral Daily    vitamin B-12  1,000 mcg Oral Daily    Vitamin D  1,000 Units Oral Daily    traZODone  200 mg Oral Nightly    pregabalin  100 mg Oral BID       Allergies:  Amoxicillin-pot clavulanate and Other    Social History     Socioeconomic History    Marital status:       Spouse name: Not on file    Number of children: Not on file    Years of education: Not on file    Highest education level: Not on file   Occupational History    Not on file   Social Needs    Financial resource strain: Not on file    Food insecurity:     Worry: Not on file     Inability: Not on file    Transportation needs:     Medical: Not on file     Non-medical: Not on file   Tobacco Use    Smoking status: Current Every Day Smoker     Packs/day: 0.50     Years: 40.00     Pack years: 20.00     Types: Cigarettes    Smokeless tobacco: Never Used   Substance and Sexual Activity    Alcohol use: No     Comment: ocassionally    Drug use: No    Sexual activity: Not on file   Lifestyle    Physical activity:     Days per week: Not on file     Minutes per session: Not on file    Stress: Not on file   Relationships    Social connections:     Talks on phone: Not on file     Gets together: Not on file     Attends Restoration service: Not on file     Active member of club or organization: Not on file     Attends meetings of clubs or organizations: Not on file     Relationship status: Not on file    Intimate partner violence:     Fear of current or ex partner: Not on file     Emotionally abused: Not on file     Physically abused: Not on file     Forced sexual activity: Not on file   Other Topics Concern    Not on file   Social History Narrative    Not on file         Family History: History reviewed. No pertinent family history. Review of Systems  14 system review is negative other than HPI    Physical Exam  Vitals:    02/16/20 1916 02/16/20 1923 02/17/20 0712 02/17/20 0723   BP: 132/70   133/82   Pulse: 84   85   Resp: 18 16 18    Temp: 98.6 °F (37 °C)   98.1 °F (36.7 °C)   TempSrc: Oral   Oral   SpO2: 100% 96% 99% 99%   Weight:       Height:         General Appearance: alert and oriented to person, place and time, well-developed and well-nourished, in no acute distress  Skin: warm and dry, no rash. Head: normocephalic and atraumatic  Eyes: extraocular eye movements intact, conjunctivae normal, anicteric sclerae  ENT: oropharynx clear and moist with normal mucous membranes.  No thrush  Lungs: normal respiratory effort, Clear Lungs, no rhonchi, no crackles, no wheezes  Heart:RRR, nl S1/S2, no murmur  Abdomen: soft, no tenderness, no H-S-megaly, + BS  NEUROLOGICAL: alert and oriented x 3, no focal deficits  No leg edema  No erythema, no warmth, no tenderness  R lower abdomen and R buttock area with small FTSL ulcers, clean no drainage  R posterior ankle large and deep down to tendon, no necrosis or malodor, no drainage  Extensive scars over abdomen and thighs      DATA:    Lab Results   Component Value Date    WBC 19.1 (H) 02/17/2020    HGB 10.5 (L) 02/17/2020    HCT 32.7 (L) 02/17/2020    MCV 81.0 (L) 02/17/2020     02/17/2020     Lab Results   Component Value Date    CREATININE 0.70 02/17/2020    BUN 13 02/17/2020     02/17/2020    K 3.9 02/17/2020     02/17/2020    CO2 22 02/17/2020       Hepatic Function Panel:   Lab Results   Component Value Date    ALKPHOS 75 02/17/2020    ALT 8 02/17/2020    AST 10 02/17/2020    PROT 6.8 02/17/2020    BILITOT 0.3 02/17/2020    BILIDIR <0.2 09/26/2019    IBILI see below 09/26/2019    LABALBU 3.2 02/17/2020    LABALBU 4.1 11/02/2011         Imaging:   CT A/P:     Impression       Small bilateral posterior lower lobe consolidations may relate to pneumonia in the appropriate clinical setting or may be due to atelectasis.       A moderate amount stool is present throughout the colon and may relate to constipation.       Skin thickening and mild subcutaneous edema anterior abdominal wall.  Correlation is recommended.             IMPRESSION:    · SIRS, Acute febrile illness with Myalgias, unknown etiology  · Chronic multiple wounds 2ry to Pyoderma gangrenosum    Patient Active Problem List   Diagnosis    Chronic pain    Cocaine use    Pyoderma gangrenosum    Cellulitis of right leg    Anemia    Obesity    COPD (chronic obstructive pulmonary disease) (HCC)    Fibromyalgia    Depression    Cellulitis    Pyodermic gangrenosum    Pain associated with wound    COPD exacerbation (HCC)       PLAN:  · Check Blood Cx  · Levaquin for now  · CBC in am  · Local wound care    Discussed with patient    Whit Todd MD

## 2020-02-17 NOTE — PROGRESS NOTES
[facility] OCCUPATIONAL THERAPY EVALUATION - ACUTE     NAME: Bienvenido James  : 1965 (48 y.o.)  MRN: [id number]  CODE STATUS: Full Code  Room: [place] [address]    Date of Service: 2020    Patient Diagnosis(es): COPD exacerbation [facility]) [J44.1]   Chief Complaint   Patient presents with    Leg Pain     bilateral leg pain started this morning at 0500     Patient Active Problem List    Diagnosis Date Noted    COPD exacerbation ([facility] 2020    Pyodermic gangrenosum 2019    Pain associated with wound 2019    Cellulitis 2019    Depression     COPD (chronic obstructive pulmonary disease) ([facility]     Anemia 2017    Obesity 2017    Pyoderma gangrenosum 2017    Cellulitis of right leg 2017    Fibromyalgia 2017    Cocaine use 2015    Chronic pain 2014        Past Medical History:   Diagnosis Date    Anxiety     Arthritis     Cancer ([facility] 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    Chronic kidney disease     COPD exacerbation ([address]     Depression     Disease of blood and blood forming organ 2017    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    Neuromuscular disorder ([address]     Osteoarthritis     Pneumonia due to organism     Pyoderma gangrenosa     Ulcerative colitis ([address]      Past Surgical History:   Procedure Laterality Date    ABDOMEN SURGERY      sleen repair    APPENDECTOMY      BACK SURGERY      BREAST SURGERY      fatty tumor removed, benign    COLONOSCOPY      COSMETIC SURGERY      tummy tuck    EYE SURGERY      bilateral cataract surgery    HYSTERECTOMY      SKIN BIOPSY      SPINAL FUSION      TONSILLECTOMY          Restrictions  Restrictions/Precautions: Fall Risk, Contact Precautions(MRSA)     Safety Devices: Safety Devices  Safety Devices in place: Yes  Type of devices:  All fall risk precautions in place    Subjective  Pre Treatment

## 2020-02-17 NOTE — CARE COORDINATION
[facility] [place] Case Management Initial Discharge Assessment    Met with Patient to discuss discharge plan. PCP: No primary care provider on file. DR Valerie Dixon ASSIGNED                            Date of Last Visit: na    If no PCP, list provided? Yes    Discharge Planning    Living Arrangements: independently at home    Who do you live with? SELF    Who helps you with your care:  self    If lives at home:     Do you have any barriers navigating in your home? no    Patient can perform ADL? Yes    Current Services (outpatient and in home) :  None    Dialysis: No    Is transportation available to get to your appointments? Yes, USES [id number]    DME Equipment:  no    Respiratory equipment: None    Respiratory provider:  no     Pharmacy:  yes - [address] [place] [address] with Medication Assistance Program?  No      Patient agreeable to [employer]? Yes, Company IF NEEDED    Patient agreeable to SNF/Rehab? Yes, Company IF NEEDED    Other discharge needs identified? N/A    Freedom of choice list provided with basic dialogue that supports the patient's individualized plan of care/goals and shares the quality data associated with the providers. Yes    Does Patient Have a High-Risk for Readmission Diagnosis (CHF, PN, MI, COPD)? Pt states, \"Why would I need a pulmonologist, I do not have COPD. I do not want a pulmonologist.\"       The plan for Transition of Care is related to the following treatment goals:  PT/OT consult and Neurology consult. Initial Discharge Plan? (Note: please see concurrent daily documentation for any updates after initial note). Home wit self care. The Patient and/or patient representative:  was provided with choice of any post-acute providers for care and equipment and agrees with discharge plan  Yes    Electronically signed by Jose Marshall RN on 2/17/2020 at 3:18 PM

## 2020-02-17 NOTE — PROGRESS NOTES
bilateral cataract surgery    HYSTERECTOMY      SKIN BIOPSY      SPINAL FUSION      TONSILLECTOMY         FAMILY HISTORY    History reviewed. No pertinent family history. SOCIAL HISTORY    Social History     Tobacco Use    Smoking status: Current Every Day Smoker     Packs/day: 0.50     Years: 40.00     Pack years: 20.00     Types: Cigarettes    Smokeless tobacco: Never Used   Substance Use Topics    Alcohol use: No     Comment: ocassionally    Drug use: No       ALLERGIES    Allergies   Allergen Reactions    Amoxicillin-Pot Clavulanate      Other reaction(s): GI Upset, Intolerance, Other: See Comments  Nose bleed    Other Itching     IVP dye         MEDICATIONS    No current facility-administered medications on file prior to encounter. Current Outpatient Medications on File Prior to Encounter   Medication Sig Dispense Refill    folic acid (FOLVITE) 1 MG tablet Take 1 mg by mouth daily      METHOTREXATE, ANTI-RHEUMATIC, PO Take 15 mg by mouth twice a week Sunday AND MONDAY      predniSONE (DELTASONE) 10 MG tablet Take 40 mg by mouth daily      vitamin B-12 (CYANOCOBALAMIN) 1000 MCG tablet Take 1,000 mcg by mouth daily      vitamin D (CHOLECALCIFEROL) 1000 UNIT TABS tablet Take 1,000 Units by mouth daily      senna (SENOKOT) 8.6 MG tablet Take 1 tablet by mouth 2 times daily as needed for Constipation      insulin lispro (HUMALOG) 100 UNIT/ML injection vial Inject 2-10 Units into the skin 3 times daily (before meals) 151-200=2UNITS  201-250=4 UNITS  251-300=6UNITS  301-350=8 UNITS  351-400= 10 UNITS      hydrOXYzine (ATARAX) 25 MG tablet Take 25 mg by mouth every 6 hours as needed for Itching      DULoxetine (CYMBALTA) 60 MG extended release capsule Take 120 mg by mouth daily      acetaminophen (TYLENOL) 325 MG tablet Take 650 mg by mouth every 4 hours as needed for Pain      gabapentin (NEURONTIN) 800 MG tablet Take 800 mg by mouth 4 times daily.        meloxicam (MOBIC) 15 MG tablet 2/17/2020 10:15 AM   Change in Wound Size % (l*w) 47.37 2/17/2020 10:15 AM   Wound Assessment Clean;Pink 2/17/2020 10:15 AM   Drainage Amount Scant 2/17/2020 10:15 AM   Drainage Description Serous 2/17/2020 10:15 AM   Odor None 2/17/2020 10:15 AM   Kanchan-wound Assessment Clean; Intact 2/17/2020 10:15 AM   Wrens%Wound Bed 100 2/17/2020 10:15 AM   Number of days: 6463       Wound 12/20/17 Other (Comment) Leg Right;Distal;Posterior Pyoderma Gangrenosum (Active)   Wound Type Wound 2/17/2020 10:15 AM   Wound Other 2/17/2020 10:15 AM   Dressing Status Changed 2/17/2020 10:15 AM   Dressing Changed Changed/New 2/17/2020 10:15 AM   Dressing/Treatment Hydrating gel;4x4;ABD 2/17/2020 10:15 AM   Wound Cleansed Rinsed/Irrigated with saline 2/17/2020 10:15 AM   Dressing Change Due 02/19/20 2/17/2020 10:15 AM   Wound Length (cm) 5 cm 2/17/2020 10:15 AM   Wound Width (cm) 2 cm 2/17/2020 10:15 AM   Wound Depth (cm)  0.3 2/17/2020 10:15 AM   Calculated Wound Size (cm^2) (l*w) 10 cm^2 2/17/2020 10:15 AM   Change in Wound Size % (l*w) -150 2/17/2020 10:15 AM   Wound Assessment Clean;Pink;Granulation tissue 2/17/2020 10:15 AM   Drainage Amount Small 2/17/2020 10:15 AM   Drainage Description Serosanguinous 2/17/2020 10:15 AM   Odor None 2/17/2020 10:15 AM   Exposed structure Tendon 2/17/2020 10:15 AM   Kanchan-wound Assessment Clean; Intact 2/17/2020 10:15 AM   Non-staged Wound Description Full thickness 2/17/2020 10:15 AM   Wrens%Wound Bed 100 2/17/2020 10:15 AM   Number of days: 789       Wound 12/20/17 Other (Comment) Abdomen Lower;Quadrant;Right Pyoderma Gangrenosum (Active)   Wound Type Wound 2/17/2020 10:15 AM   Wound Other 2/17/2020 10:15 AM   Dressing Status Changed 2/17/2020 10:15 AM   Dressing Changed Changed/New 2/17/2020 10:15 AM   Dressing/Treatment Hydrating gel;4x4;ABD 2/17/2020 10:15 AM   Wound Cleansed Rinsed/Irrigated with saline 2/17/2020 10:15 AM   Dressing Change Due 02/19/20 2/17/2020 10:15 AM   Wound Length (cm) 1 cm 2/17/2020 10:15 AM   Wound Width (cm) 2.5 cm 2/17/2020 10:15 AM   Wound Depth (cm)  0.1 2/17/2020 10:15 AM   Calculated Wound Size (cm^2) (l*w) 2.5 cm^2 2/17/2020 10:15 AM   Change in Wound Size % (l*w) 16.67 2/17/2020 10:15 AM   Wound Assessment Clean;Red;Granulation tissue 2/17/2020 10:15 AM   Drainage Amount Scant 2/17/2020 10:15 AM   Drainage Description Serosanguinous 2/17/2020 10:15 AM   Odor None 2/17/2020 10:15 AM   Kanchan-wound Assessment Dry;Clean; Intact 2/17/2020 10:15 AM   Non-staged Wound Description Full thickness 2/17/2020 10:15 AM   Red%Wound Bed 100 2/17/2020 10:15 AM   Number of days: 022           Plan   Plan of Care:     Wound care: R. Hip, RLQ, and RLE wounds  Irrigate wounds with saline. Apply Hydrogel directly to wound bed and cover with 4x4 followed by abd. Change MWF    Specialty Bed Required : N/A   [] Low Air Loss   [] Pressure Redistribution  [] Fluid Immersion  [] Bariatric  [] Other:     Current Diet: DIET CARB CONTROL;   Dietician consult:  Yes    Discharge Plan:  Placement for patient upon discharge: home with support    Patient appropriate for Outpatient [address]: N/A [facility]    Referrals:  []   [] [facility]  [] Supplies  [] Other    Patient/Caregiver Teaching:  Level of patient/caregiver understanding able to:   [] Indicates understanding       [] Needs reinforcement  [] Unsuccessful      [] Verbal Understanding  [] Demonstrated understanding       [] No evidence of learning  [] Refused teaching         [x] N/A       Electronically signed by Dolores Ferreira BSN, RN, CWOCN on 2/17/2020 at 11:32 AM

## 2020-02-17 NOTE — PROGRESS NOTES
visualized. There are no pulmonary consolidations and no evidence of pneumonia. Bony thorax and remainder of the chest appears unremarkable. 1. PREVIOUSLY REPORTED BIBASILAR ATELECTASIS/CONSOLIDATIONS HAVE CLEARED. 2. NO RADIOGRAPHIC EVIDENCE OF ACTIVE DISEASE IN THE CHEST. Ct Abdomen Pelvis W Iv Contrast Additional Contrast? None    Result Date: 2/16/2020  EXAM:  CT ABDOMEN PELVIS W IV CONTRAST History: Abdominal pain and fever. Technique: Multiple contiguous axial images were obtained of the abdomen and pelvis from an level of the lung bases through the ischial tuberosities with contrast. Multiplanar reformats were obtained. Delayed images were obtained. Comparison: None available Findings: Small bilateral lung consolidations. She Skin thickening and irregularity along the anterior abdominal wall, right greater than left. Mild edema deep to this skin thickening on the right. The liver, gallbladder, spleen, stomach, pancreas, and adrenal glands are within normal limits. The kidneys enhance uniformly. No urinary tract calculi or hydronephrosis. Urinary bladder is well distended. The uterus is absent. Abdominal aorta is nonaneurysmal  and demonstrates atherosclerotic calcification . No retroperitoneal or abdominal/pelvic lymphadenopathy. Sacral nerve stimulator lead noted. No small bowel obstruction. A moderate amount stool is present throughout the colon. No overt colonic mass or pericolonic inflammation. Appendix is surgically absent. No free fluid or free air. Mild degenerative changes of the lower lumbar spine and left hip. Small bilateral posterior lower lobe consolidations may relate to pneumonia in the appropriate clinical setting or may be due to atelectasis. A moderate amount stool is present throughout the colon and may relate to constipation. Skin thickening and mild subcutaneous edema anterior abdominal wall. Correlation is recommended.  All CT scans at this facility use dose modulation,

## 2020-02-17 NOTE — PROGRESS NOTES
effort. Clear to auscultation  Cardiovascular: Regular rate and rhythm  Abdomen: Soft, non-tender, non-distended with normal bowel sounds. Musculoskeletal: No clubbing, cyanosis or edema bilaterally. Neuro: Non Focal.   Capillary Refill: Brisk,< 3 seconds   Peripheral Pulses: +2 palpable, equal bilaterally     Labs:   Recent Labs     02/16/20  1018 02/17/20  0541   WBC 14.2* 19.1*   HGB 12.6 10.5*   HCT 38.7 32.7*    216     Recent Labs     02/16/20  1000 02/17/20  0541    141   K 4.2 3.9    106   CO2 22 22   BUN 12 13   CREATININE 0.91* 0.70   CALCIUM 9.0 9.0     Recent Labs     02/16/20  1000 02/17/20  0541   AST 12 10   ALT <5 8   BILITOT 0.5 0.3   ALKPHOS 98 75     No results for input(s): INR in the last 72 hours. No results for input(s): Verneta Mary in the last 72 hours. Urinalysis:      Lab Results   Component Value Date    NITRU Negative 02/16/2020    WBCUA 6-10 02/16/2020    BACTERIA Negative 02/16/2020    RBCUA 0-2 02/16/2020    BLOODU Negative 02/16/2020    SPECGRAV 1.032 02/16/2020    GLUCOSEU Negative 02/16/2020    GLUCOSEU NEG 11/02/2011     Radiology:  XR CHEST STANDARD (2 VW)   Final Result   1. PREVIOUSLY REPORTED BIBASILAR ATELECTASIS/CONSOLIDATIONS HAVE CLEARED. 2. NO RADIOGRAPHIC EVIDENCE OF ACTIVE DISEASE IN THE CHEST. CT ABDOMEN PELVIS W IV CONTRAST Additional Contrast? None   Final Result      Small bilateral posterior lower lobe consolidations may relate to pneumonia in the appropriate clinical setting or may be due to atelectasis. A moderate amount stool is present throughout the colon and may relate to constipation. Skin thickening and mild subcutaneous edema anterior abdominal wall. Correlation is recommended. All CT scans at this facility use dose modulation, iterative reconstruction, and/or weight based dosing when appropriate to reduce radiation dose to as low as reasonably achievable.       XR CHEST PORTABLE   Final Result No acute intrathoracic process. Assessment/Plan:    #Leg weakness/pain     - Neuro consult; improving; symmetric; no urinary retention or fecal incontinence    #Acute exacerbation of COPD     - No pneumonia; mild exacerbation; steroids d/c'd per pulm; d/c abx if procal negative tomorrow    #Pyodermic gangrenosum     - Follows at [facility]    #Pre-diabetes     - HbA1c 6.2; carb control; outpatient follow up    C/Rashid Manley 1106 Problems    Diagnosis Date Noted    [facility] ([facility] [J44.1] 02/16/2020     Additional work up or/and treatment plan may be added today or then after based on clinical progression. I am managing a portion of pt care. Some medical issues are handled by other specialists. Additional work up and treatment should be done in out pt setting by pt PCP and other out pt providers. In addition to examining and evaluating pt, I spent additional time explaining care, normal and abnormal findings, and treatment plan. All of pt questions were answered. Counseling, diet and education were  provided. Case will be discussed with nursing staff when appropriate. Family will be updated if and when appropriate.       Diet: DIET GENERAL;    Code Status: Full Code    PT/OT Eval     Electronically signed by Mikayla Anne MD on 2/17/2020 at 1:03 PM

## 2020-02-17 NOTE — CARE COORDINATION
Rehab ref received. PT and OT saw pt this afternoon and she is indep. with her ADL's and Mobility.  Talked to [address] [place] [address] and indicated she is not rehab appropriate at this time, no need for additional PT and OT , too functional. Electronically signed by Shavonne Anthony RN on 2/17/20 at 4:35 PM

## 2020-02-17 NOTE — PROGRESS NOTES
reports no SOB              Activity Tolerance  Activity Tolerance: Patient Tolerated treatment well          PT Education  PT Education: PT Role;Functional Mobility Training    ASSESSMENT:   Body structures, Functions, Activity limitations: Increased pain  Decision Making: Low Complexity  History: high  Exam: low  Clinical Presentation: low  No Skilled PT: Independent with functional mobility     Prognosis: Good    DISCHARGE RECOMMENDATIONS:  No Skilled PT: Independent with functional mobility     Assessment: Pt with pain in wound areas, though no more pain that she described when she initially came into ED (bilat lower legs). Pt is indep with all mobility and no further PT needs identified at this time. Pt educated in importance of progressive mobility, HEP, monitoring s/s. REQUIRES PT FOLLOW UP: No      PLAN OF CARE:  Safety Devices  Type of devices:  All fall risk precautions in place      [facility] (6 CLICK) Sumaya Winston 28 Inpatient Mobility Raw Score : 24     Therapy Time:   Individual   Time In 1345   Time Out 1400   Minutes 15           Jeremias Garcia, [address], 02/17/20 at 2:44 PM

## 2020-02-17 NOTE — PLAN OF CARE
See OT evaluation for all goals and OT POC.  Electronically signed by MAXIMUS Blount/L on 2/17/2020 at 2:17 PM

## 2020-02-18 VITALS
RESPIRATION RATE: 18 BRPM | HEIGHT: 64 IN | TEMPERATURE: 98.1 F | DIASTOLIC BLOOD PRESSURE: 53 MMHG | HEART RATE: 71 BPM | BODY MASS INDEX: 28.17 KG/M2 | OXYGEN SATURATION: 94 % | WEIGHT: 165 LBS | SYSTOLIC BLOOD PRESSURE: 91 MMHG

## 2020-02-18 PROBLEM — R29.898 LEG WEAKNESS, BILATERAL: Status: ACTIVE | Noted: 2020-02-18

## 2020-02-18 LAB
GLUCOSE BLD-MCNC: 110 MG/DL (ref 60–115)
GLUCOSE BLD-MCNC: 143 MG/DL (ref 60–115)
HCT VFR BLD CALC: 30.4 % (ref 37–47)
HEMOGLOBIN: 9.7 G/DL (ref 12–16)
MCH RBC QN AUTO: 26.1 PG (ref 27–31.3)
MCHC RBC AUTO-ENTMCNC: 31.9 % (ref 33–37)
MCV RBC AUTO: 81.6 FL (ref 82–100)
PDW BLD-RTO: 16.1 % (ref 11.5–14.5)
PERFORMED ON: ABNORMAL
PERFORMED ON: NORMAL
PLATELET # BLD: 217 K/UL (ref 130–400)
PROCALCITONIN: 0.21 NG/ML (ref 0–0.15)
RBC # BLD: 3.72 M/UL (ref 4.2–5.4)
WBC # BLD: 8.1 K/UL (ref 4.8–10.8)

## 2020-02-18 PROCEDURE — 85027 COMPLETE CBC AUTOMATED: CPT

## 2020-02-18 PROCEDURE — 6370000000 HC RX 637 (ALT 250 FOR IP): Performed by: NURSE PRACTITIONER

## 2020-02-18 PROCEDURE — 36415 COLL VENOUS BLD VENIPUNCTURE: CPT

## 2020-02-18 PROCEDURE — 6370000000 HC RX 637 (ALT 250 FOR IP): Performed by: INTERNAL MEDICINE

## 2020-02-18 PROCEDURE — 6360000002 HC RX W HCPCS: Performed by: EMERGENCY MEDICINE

## 2020-02-18 PROCEDURE — 94761 N-INVAS EAR/PLS OXIMETRY MLT: CPT

## 2020-02-18 PROCEDURE — 94640 AIRWAY INHALATION TREATMENT: CPT

## 2020-02-18 PROCEDURE — 94669 MECHANICAL CHEST WALL OSCILL: CPT

## 2020-02-18 PROCEDURE — 99232 SBSQ HOSP IP/OBS MODERATE 35: CPT | Performed by: INTERNAL MEDICINE

## 2020-02-18 PROCEDURE — 84145 PROCALCITONIN (PCT): CPT

## 2020-02-18 PROCEDURE — 6360000002 HC RX W HCPCS: Performed by: NURSE PRACTITIONER

## 2020-02-18 RX ADMIN — ACETAMINOPHEN 650 MG: 325 TABLET ORAL at 11:50

## 2020-02-18 RX ADMIN — IPRATROPIUM BROMIDE AND ALBUTEROL SULFATE 1 AMPULE: .5; 3 SOLUTION RESPIRATORY (INHALATION) at 06:58

## 2020-02-18 RX ADMIN — FOLIC ACID 1 MG: 1 TABLET ORAL at 09:59

## 2020-02-18 RX ADMIN — IPRATROPIUM BROMIDE AND ALBUTEROL SULFATE 1 AMPULE: .5; 3 SOLUTION RESPIRATORY (INHALATION) at 11:00

## 2020-02-18 RX ADMIN — FAMOTIDINE 20 MG: 20 TABLET ORAL at 09:59

## 2020-02-18 RX ADMIN — ENOXAPARIN SODIUM 40 MG: 40 INJECTION SUBCUTANEOUS at 09:59

## 2020-02-18 RX ADMIN — GUAIFENESIN 600 MG: 600 TABLET, EXTENDED RELEASE ORAL at 09:59

## 2020-02-18 RX ADMIN — VITAMIN D, TAB 1000IU (100/BT) 1000 UNITS: 25 TAB at 09:59

## 2020-02-18 RX ADMIN — DULOXETINE HYDROCHLORIDE 120 MG: 60 CAPSULE, DELAYED RELEASE ORAL at 09:59

## 2020-02-18 RX ADMIN — BUDESONIDE AND FORMOTEROL FUMARATE DIHYDRATE 2 PUFF: 80; 4.5 AEROSOL RESPIRATORY (INHALATION) at 06:58

## 2020-02-18 RX ADMIN — PREGABALIN 100 MG: 50 CAPSULE ORAL at 09:59

## 2020-02-18 RX ADMIN — MORPHINE SULFATE 4 MG: 2 INJECTION, SOLUTION INTRAMUSCULAR; INTRAVENOUS at 03:28

## 2020-02-18 RX ADMIN — CYANOCOBALAMIN TAB 500 MCG 1000 MCG: 500 TAB at 09:59

## 2020-02-18 ASSESSMENT — PAIN SCALES - GENERAL
PAINLEVEL_OUTOF10: 10
PAINLEVEL_OUTOF10: 10

## 2020-02-18 NOTE — CONSULTS
 Marital status:       Spouse name: Not on file    Number of children: Not on file    Years of education: Not on file    Highest education level: Not on file   Occupational History    Not on file   Social Needs    Financial resource strain: Not on file    Food insecurity:     Worry: Not on file     Inability: Not on file    Transportation needs:     Medical: Not on file     Non-medical: Not on file   Tobacco Use    Smoking status: Current Every Day Smoker     Packs/day: 0.50     Years: 40.00     Pack years: 20.00     Types: Cigarettes    Smokeless tobacco: Never Used   Substance and Sexual Activity    Alcohol use: No     Comment: ocassionally    Drug use: No    Sexual activity: Not on file   Lifestyle    Physical activity:     Days per week: Not on file     Minutes per session: Not on file    Stress: Not on file   Relationships    Social connections:     Talks on phone: Not on file     Gets together: Not on file     Attends [employer] service: Not on file     Active member of club or organization: Not on file     Attends meetings of clubs or organizations: Not on file     Relationship status: Not on file    Intimate partner violence:     Fear of current or ex partner: Not on file     Emotionally abused: Not on file     Physically abused: Not on file     Forced sexual activity: Not on file   Other Topics Concern    Not on file   Social History Narrative    Not on file      [] Unable to obtain due to ventilated and/ or neurologic status      Home Medications:      Medications Prior to Admission: folic acid (FOLVITE) 1 MG tablet, Take 1 mg by mouth daily  METHOTREXATE, ANTI-RHEUMATIC, PO, Take 15 mg by mouth twice a week Sunday AND MONDAY  predniSONE (DELTASONE) 10 MG tablet, Take 40 mg by mouth daily  vitamin B-12 (CYANOCOBALAMIN) 1000 MCG tablet, Take 1,000 mcg by mouth daily  vitamin D (CHOLECALCIFEROL) 1000 UNIT TABS tablet, Take 1,000 Units by mouth daily  senna (SENOKOT) 8.6 MG tablet, Vitamin D  1,000 Units Oral Daily    traZODone  200 mg Oral Nightly    pregabalin  100 mg Oral BID     Continuous Infusions:   dextrose         Recent Labs     02/16/20  1018 02/17/20  0541 02/18/20  0539   WBC 14.2* 19.1* 8.1   HGB 12.6 10.5* 9.7*   HCT 38.7 32.7* 30.4*   MCV 79.4* 81.0* 81.6*    216 217     Recent Labs     02/16/20  1000 02/17/20  0541    141   K 4.2 3.9    106   CO2 22 22   BUN 12 13   CREATININE 0.91* 0.70     Recent Labs     02/16/20  1000 02/17/20  0541   AST 12 10   ALT <5 8   BILITOT 0.5 0.3   ALKPHOS 98 75     No results for input(s): LIPASE, AMYLASE in the last 72 hours. Recent Labs     02/16/20  1000 02/17/20  0541   PROT 7.2 6.8     Xr Chest Standard (2 Vw)    Result Date: 2/17/2020  EXAM: CHEST, 2 VIEWS COMPARISON: 2/16/2020, 9/30/2019, 12/22/2017 AND CT ABDOMEN FROM 2/16/2020 REASON FOR EXAMINATION: UPPER RESPIRATORY CONGESTION, FOLLOW-UP BIBASILAR ATELECTASIS FINDINGS:   Two views of the chest demonstrate normal appearance of the heart and mediastinum. Previously reported bibasilar pulmonary consolidations/atelectasis are no longer visualized. There are no pulmonary consolidations and no evidence of pneumonia. Bony thorax and remainder of the chest appears unremarkable. 1. PREVIOUSLY REPORTED BIBASILAR ATELECTASIS/CONSOLIDATIONS HAVE CLEARED. 2. NO RADIOGRAPHIC EVIDENCE OF ACTIVE DISEASE IN THE CHEST. Ct Abdomen Pelvis W Iv Contrast Additional Contrast? None    Result Date: 2/16/2020  EXAM:  CT ABDOMEN PELVIS W IV CONTRAST History: Abdominal pain and fever. Technique: Multiple contiguous axial images were obtained of the abdomen and pelvis from an level of the lung bases through the ischial tuberosities with contrast. Multiplanar reformats were obtained. Delayed images were obtained. Comparison: None available Findings: Small bilateral lung consolidations. She Skin thickening and irregularity along the anterior abdominal wall, right greater than left.

## 2020-02-18 NOTE — PROGRESS NOTES
Infectious Diseases Inpatient Progress Note          HISTORY OF PRESENT ILLNESS:  Follow up SIRS on Levaquin, well tolerated. Patient had remarkable clinical improvement with resolved fevers, negative Blood Cx. Has severe pain related to R ankle wound. .    Current Medications:     budesonide-formoterol  2 puff Inhalation BID    sodium chloride flush  10 mL Intravenous 2 times per day    enoxaparin  40 mg Subcutaneous Daily    famotidine  20 mg Oral BID    insulin lispro  0-12 Units Subcutaneous TID WC    insulin lispro  0-6 Units Subcutaneous Nightly    levofloxacin  500 mg Intravenous Q24H    guaiFENesin  600 mg Oral BID    nicotine  1 patch Transdermal Daily    ipratropium-albuterol  1 ampule Inhalation 4x daily    DULoxetine  120 mg Oral Daily    folic acid  1 mg Oral Daily    vitamin B-12  1,000 mcg Oral Daily    Vitamin D  1,000 Units Oral Daily    traZODone  200 mg Oral Nightly    pregabalin  100 mg Oral BID       Allergies:  Amoxicillin-pot clavulanate and Other      Review of Systems  14 system review is negative other than HPI    Physical Exam  Vitals:    02/17/20 1943 02/18/20 0658 02/18/20 0811 02/18/20 1101   BP:   (!) 91/53    Pulse:   71    Resp:   18    Temp:   98.1 °F (36.7 °C)    TempSrc:   Oral    SpO2: 98% 96% 92% 94%   Weight:       Height:         General Appearance: alert and oriented to person, place and time, well-developed and well-nourished, in no acute distress  Skin: warm and dry, no rash. Head: normocephalic and atraumatic  Eyes: anicteric sclerae  ENT: oropharynx clear and moist with normal mucous membranes.  No oral thrush  Lungs: normal respiratory effort, clear Lungs  Heart: RRR, no murmur  Abdomen: soft, no tenderness  No leg edema  No erythema, no tenderness  R ankle posterior aspect ulcer down to tendon with 100% granulation, no drainage, photo obtained      DATA:    Lab Results   Component Value Date    WBC 8.1 02/18/2020    HGB 9.7 (L) 02/18/2020    HCT 30.4 (L) 02/18/2020    MCV 81.6 (L) 02/18/2020     02/18/2020     Lab Results   Component Value Date    CREATININE 0.70 02/17/2020    BUN 13 02/17/2020     02/17/2020    K 3.9 02/17/2020     02/17/2020    CO2 22 02/17/2020       Hepatic Function Panel:  Lab Results   Component Value Date    ALKPHOS 75 02/17/2020    ALT 8 02/17/2020    AST 10 02/17/2020    PROT 6.8 02/17/2020    BILITOT 0.3 02/17/2020    BILIDIR <0.2 09/26/2019    IBILI see below 09/26/2019    LABALBU 3.2 02/17/2020    LABALBU 4.1 11/02/2011       Microbiology:   Recent Labs     02/16/20  1251   BC No Growth to date. Any change in status will be called. Recent Labs     02/16/20  1251   BLOODCULT2 No Growth to date. Any change in status will be called.          IMPRESSION:    · SIRS, probable viral  · Chronic multiple wounds 2ry to Pyoderma gangrenosum       Patient Active Problem List   Diagnosis    Chronic pain    Cocaine use    Pyoderma gangrenosum    Cellulitis of right leg    Anemia    Obesity    COPD (chronic obstructive pulmonary disease) (HCC)    Fibromyalgia    Depression    Cellulitis    Pyodermic gangrenosum    Pain associated with wound    COPD exacerbation (HCC)       PLAN:  · D/C Levaquin and discharge home off antibiotics  · F/U at wound care center    Discussed with patient    Candi Leon MD

## 2020-02-18 NOTE — DISCHARGE SUMMARY
Hospital Medicine Discharge Summary    Danielle Smith  :  1965  MRN:  [id number]    Admit date:  2020  Discharge date:  2020    Admitting Physician:  RELL ABRAMSHDO DAGOBERTO  Primary Care Physician:  Suze Nuno MD      Discharge Diagnoses:    Leg weakness    Chief Complaint   Patient presents with    Leg Pain     bilateral leg pain started this morning at [facility] Course:   Patient presented with leg pain and weakness of sudden onset. Initially history was poor but on further discussion she felt like it may have been related to taking double her regular dose of gabapentin. Of note the patient per [place] should no longer have been on gabapentin but insists she still takes it (discussed with neurology who recently switched to her to lyrica to inform them). Evaluated by neurology who felt patient could be safely discharged as she was back to baseline with no focal deficits. Of note there was concern for possible COPD exacerbation and pneumonia both of which were ruled out. Exam on discharge:   BP (!) 91/53   Pulse 71   Temp 98.1 °F (36.7 °C) (Oral)   Resp 18   Ht 5' 4\" (1.626 m)   Wt 165 lb (74.8 kg)   LMP 1997 (Exact Date) Comment: hysterectomy  SpO2 94%   BMI 28.32 kg/m²   General appearance: No apparent distress, appears stated age and cooperative. HEENT: Conjunctivae/corneas clear. Neck:  Trachea midline. Respiratory:  Normal respiratory effort. Clear to auscultation  Cardiovascular: Regular rate and rhythm  Abdomen: Soft, non-tender, non-distended with normal bowel sounds. Musculoskeletal: No clubbing, cyanosis or edema bilaterally.    Neuro: Non Focal.   Capillary Refill: Brisk,< 3 seconds   Peripheral Pulses: +2 palpable, equal bilaterally     Patient was seen by the following consultants while admitted to [facility]:   Consults:  [address] TO REHAB/TCU ADMISSION COORDINATOR  IP CONSULT TO NEUROLOGY    Significant Diagnostic Studies:    Refer to chart     Please refer to chart if no studies are shown here    Xr Chest Standard (2 Vw)    Result Date: 2/17/2020  EXAM: CHEST, 2 VIEWS COMPARISON: 2/16/2020, 9/30/2019, 12/22/2017 AND CT ABDOMEN FROM 2/16/2020 REASON FOR EXAMINATION: UPPER RESPIRATORY CONGESTION, FOLLOW-UP BIBASILAR ATELECTASIS FINDINGS:   Two views of the chest demonstrate normal appearance of the heart and mediastinum. Previously reported bibasilar pulmonary consolidations/atelectasis are no longer visualized. There are no pulmonary consolidations and no evidence of pneumonia. Bony thorax and remainder of the chest appears unremarkable. 1. PREVIOUSLY REPORTED BIBASILAR ATELECTASIS/CONSOLIDATIONS HAVE CLEARED. 2. NO RADIOGRAPHIC EVIDENCE OF ACTIVE DISEASE IN THE CHEST. Ct Abdomen Pelvis W Iv Contrast Additional Contrast? None    Result Date: 2/16/2020  EXAM:  CT ABDOMEN PELVIS W IV CONTRAST History: Abdominal pain and fever. Technique: Multiple contiguous axial images were obtained of the abdomen and pelvis from an level of the lung bases through the ischial tuberosities with contrast. Multiplanar reformats were obtained. Delayed images were obtained. Comparison: None available Findings: Small bilateral lung consolidations. She Skin thickening and irregularity along the anterior abdominal wall, right greater than left. Mild edema deep to this skin thickening on the right. The liver, gallbladder, spleen, stomach, pancreas, and adrenal glands are within normal limits. The kidneys enhance uniformly. No urinary tract calculi or hydronephrosis. Urinary bladder is well distended. The uterus is absent. Abdominal aorta is nonaneurysmal  and demonstrates atherosclerotic calcification . No retroperitoneal or abdominal/pelvic lymphadenopathy. Sacral nerve stimulator lead noted. No small bowel obstruction. A moderate amount stool is present throughout the colon.  No overt colonic mass

## 2020-02-21 LAB
BLOOD CULTURE, ROUTINE: NORMAL
CULTURE, BLOOD 2: NORMAL

## 2020-02-25 ENCOUNTER — TELEPHONE (OUTPATIENT)
Dept: FAMILY MEDICINE CLINIC | Age: 55
End: 2020-02-25

## 2020-02-25 ENCOUNTER — OFFICE VISIT (OUTPATIENT)
Dept: FAMILY MEDICINE CLINIC | Age: 55
End: 2020-02-25
Payer: COMMERCIAL

## 2020-02-25 VITALS
SYSTOLIC BLOOD PRESSURE: 112 MMHG | DIASTOLIC BLOOD PRESSURE: 66 MMHG | BODY MASS INDEX: 30.21 KG/M2 | WEIGHT: 176 LBS | TEMPERATURE: 97.8 F | HEART RATE: 100 BPM | OXYGEN SATURATION: 95 %

## 2020-02-25 PROCEDURE — G8427 DOCREV CUR MEDS BY ELIG CLIN: HCPCS | Performed by: INTERNAL MEDICINE

## 2020-02-25 PROCEDURE — G8484 FLU IMMUNIZE NO ADMIN: HCPCS | Performed by: INTERNAL MEDICINE

## 2020-02-25 PROCEDURE — 3017F COLORECTAL CA SCREEN DOC REV: CPT | Performed by: INTERNAL MEDICINE

## 2020-02-25 PROCEDURE — 4004F PT TOBACCO SCREEN RCVD TLK: CPT | Performed by: INTERNAL MEDICINE

## 2020-02-25 PROCEDURE — 99213 OFFICE O/P EST LOW 20 MIN: CPT | Performed by: INTERNAL MEDICINE

## 2020-02-25 PROCEDURE — 1111F DSCHRG MED/CURRENT MED MERGE: CPT | Performed by: INTERNAL MEDICINE

## 2020-02-25 PROCEDURE — G8417 CALC BMI ABV UP PARAM F/U: HCPCS | Performed by: INTERNAL MEDICINE

## 2020-02-25 RX ORDER — GABAPENTIN 100 MG/1
100 CAPSULE ORAL
Status: ON HOLD | COMMUNITY
Start: 2019-08-26 | End: 2020-05-08

## 2020-02-25 RX ORDER — LANOLIN ALCOHOL/MO/W.PET/CERES
1000 CREAM (GRAM) TOPICAL DAILY
Qty: 30 TABLET | Refills: 0 | Status: SHIPPED | OUTPATIENT
Start: 2020-02-25 | End: 2020-05-15 | Stop reason: SDUPTHER

## 2020-02-25 RX ORDER — OXYCODONE AND ACETAMINOPHEN 10; 325 MG/1; MG/1
1 TABLET ORAL EVERY 8 HOURS PRN
Qty: 90 TABLET | Refills: 0 | Status: SHIPPED | OUTPATIENT
Start: 2020-02-25 | End: 2020-04-06 | Stop reason: SDUPTHER

## 2020-02-25 ASSESSMENT — ENCOUNTER SYMPTOMS
ABDOMINAL DISTENTION: 0
SORE THROAT: 0
CHEST TIGHTNESS: 0
COUGH: 0
VOMITING: 0
CONSTIPATION: 0
SHORTNESS OF BREATH: 0
FACIAL SWELLING: 0
RECTAL PAIN: 0
BLOOD IN STOOL: 0
DIARRHEA: 0
EYE ITCHING: 0
VOICE CHANGE: 0
ABDOMINAL PAIN: 0
NAUSEA: 0
TROUBLE SWALLOWING: 0
PHOTOPHOBIA: 0
EYE DISCHARGE: 0
SINUS PAIN: 0
EYE REDNESS: 0
WHEEZING: 0
EYE PAIN: 0
SINUS PRESSURE: 0
RHINORRHEA: 0
BACK PAIN: 0
APNEA: 0
COLOR CHANGE: 0

## 2020-02-25 NOTE — PROGRESS NOTES
Not on file     Relationship status: Not on file    Intimate partner violence     Fear of current or ex partner: Not on file     Emotionally abused: Not on file     Physically abused: Not on file     Forced sexual activity: Not on file   Other Topics Concern    Not on file   Social History Narrative    Not on file        No family history on file. Vitals:    02/25/20 1503   BP: 112/66   Site: Left Upper Arm   Position: Sitting   Cuff Size: Large Adult   Pulse: 100   Temp: 97.8 °F (36.6 °C)   TempSrc: Temporal   SpO2: 95%   Weight: 176 lb (79.8 kg)     Estimated body mass index is 30.21 kg/m² as calculated from the following:    Height as of 2/16/20: 5' 4\" (1.626 m). Weight as of this encounter: 176 lb (79.8 kg). Physical Exam  Constitutional:       General: She is not in acute distress. Appearance: She is well-developed. HENT:      Head: Normocephalic. Right Ear: External ear normal.      Left Ear: External ear normal.   Eyes:      Conjunctiva/sclera: Conjunctivae normal.      Pupils: Pupils are equal, round, and reactive to light. Neck:      Musculoskeletal: Neck supple. Vascular: No JVD. Trachea: No tracheal deviation. Cardiovascular:      Rate and Rhythm: Normal rate and regular rhythm. Heart sounds: Normal heart sounds. Pulmonary:      Effort: Pulmonary effort is normal. No respiratory distress. Breath sounds: Normal breath sounds. No wheezing or rales. Chest:      Chest wall: No tenderness. Abdominal:      General: Bowel sounds are normal. There is no distension. Palpations: Abdomen is soft. There is no mass. Tenderness: There is no abdominal tenderness. There is no guarding or rebound. Musculoskeletal:         General: No tenderness or deformity. Comments: Healing well posterior to the patient's right lower extremity. No malodor or erythema. Lymphadenopathy:      Cervical: No cervical adenopathy. Skin:     General: Skin is warm and dry.

## 2020-02-28 LAB
6-ACETYLMORPHINE: NOT DETECTED
7-AMINOCLONAZEPAM: NOT DETECTED
ALPHA-OH-ALPRAZOLAM: NOT DETECTED
ALPRAZOLAM: NOT DETECTED
AMPHETAMINE: NOT DETECTED
BARBITURATES: NOT DETECTED
BENZOYLECGONINE: NOT DETECTED
BUPRENORPHINE: NOT DETECTED
CARISOPRODOL: NOT DETECTED
CLONAZEPAM: NOT DETECTED
CODEINE: NOT DETECTED
CREATININE URINE: 51.3 MG/DL (ref 20–400)
DIAZEPAM: NOT DETECTED
EER PAIN MGT DRUG PANEL, HIGH RES/EMIT U: NORMAL
ETHYL GLUCURONIDE: NOT DETECTED
FENTANYL: NOT DETECTED
HYDROCODONE: NOT DETECTED
HYDROMORPHONE: NOT DETECTED
LORAZEPAM: NOT DETECTED
MARIJUANA METABOLITE: NOT DETECTED
MDA: NOT DETECTED
MDEA: NOT DETECTED
MDMA URINE: NOT DETECTED
MEPERIDINE: NOT DETECTED
METHADONE: NOT DETECTED
METHAMPHETAMINE: NOT DETECTED
METHYLPHENIDATE: NOT DETECTED
MIDAZOLAM: NOT DETECTED
MORPHINE: NOT DETECTED
NORBUPRENORPHINE, FREE: NOT DETECTED
NORDIAZEPAM: NOT DETECTED
NORFENTANYL: NOT DETECTED
NORHYDROCODONE, URINE: NOT DETECTED
NOROXYCODONE: NOT DETECTED
NOROXYMORPHONE, URINE: NOT DETECTED
OXAZEPAM: NOT DETECTED
OXYCODONE: NOT DETECTED
OXYMORPHONE: NOT DETECTED
PAIN MANAGEMENT DRUG PANEL: NORMAL
PCP: NOT DETECTED
PHENTERMINE: NOT DETECTED
PROPOXYPHENE: NOT DETECTED
TAPENTADOL, URINE: NOT DETECTED
TAPENTADOL-O-SULFATE, URINE: NOT DETECTED
TEMAZEPAM: NOT DETECTED
TRAMADOL: NOT DETECTED
ZOLPIDEM: NOT DETECTED

## 2020-03-19 ENCOUNTER — VIRTUAL VISIT (OUTPATIENT)
Dept: FAMILY MEDICINE CLINIC | Age: 55
End: 2020-03-19
Payer: COMMERCIAL

## 2020-03-19 PROCEDURE — 99441 PR PHYS/QHP TELEPHONE EVALUATION 5-10 MIN: CPT | Performed by: INTERNAL MEDICINE

## 2020-03-19 RX ORDER — MELATONIN
1000 DAILY
Qty: 30 TABLET | Refills: 3 | Status: SHIPPED | OUTPATIENT
Start: 2020-03-19 | End: 2020-07-06

## 2020-03-19 ASSESSMENT — ENCOUNTER SYMPTOMS
VOMITING: 0
PHOTOPHOBIA: 0
EYE REDNESS: 0
NAUSEA: 0
EYE ITCHING: 0
BACK PAIN: 0
TROUBLE SWALLOWING: 0
DIARRHEA: 0
CHEST TIGHTNESS: 0
BLOOD IN STOOL: 0
SINUS PAIN: 0
FACIAL SWELLING: 0
SINUS PRESSURE: 0
ABDOMINAL DISTENTION: 0
EYE DISCHARGE: 0
SORE THROAT: 0
COUGH: 0
RECTAL PAIN: 0
WHEEZING: 0
SHORTNESS OF BREATH: 0
RHINORRHEA: 0
EYE PAIN: 0
ABDOMINAL PAIN: 0
COLOR CHANGE: 0
CONSTIPATION: 0
VOICE CHANGE: 0
APNEA: 0

## 2020-03-19 NOTE — PROGRESS NOTES
rash and wound. Allergic/Immunologic: Negative for environmental allergies and food allergies. Neurological: Negative for dizziness, tremors, seizures, syncope, facial asymmetry, speech difficulty, weakness, light-headedness, numbness and headaches. Hematological: Negative for adenopathy. Does not bruise/bleed easily. Psychiatric/Behavioral: Negative for agitation, confusion, decreased concentration, hallucinations, self-injury, sleep disturbance and suicidal ideas. The patient is not nervous/anxious. Prior to Visit Medications    Medication Sig Taking? Authorizing Provider   gabapentin (NEURONTIN) 100 MG capsule Take 100 mg by mouth. Historical Provider, MD   vitamin B-12 (CYANOCOBALAMIN) 1000 MCG tablet Take 1 tablet by mouth daily  Gloria Alva MD   oxyCODONE-acetaminophen (PERCOCET)  MG per tablet Take 1 tablet by mouth every 8 hours as needed for Pain for up to 30 days.  Intended supply: 30 days  Gloria Alva MD   folic acid (FOLVITE) 1 MG tablet Take 1 mg by mouth daily  Historical Provider, MD   METHOTREXATE, ANTI-RHEUMATIC, PO Take 15 mg by mouth twice a week Sunday AND MONDAY  Historical Provider, MD   vitamin D (CHOLECALCIFEROL) 1000 UNIT TABS tablet Take 1,000 Units by mouth daily  Historical Provider, MD   senna (SENOKOT) 8.6 MG tablet Take 1 tablet by mouth 2 times daily as needed for Constipation  Historical Provider, MD   hydrOXYzine (ATARAX) 25 MG tablet Take 25 mg by mouth every 6 hours as needed for Itching  Historical Provider, MD   DULoxetine (CYMBALTA) 60 MG extended release capsule Take 120 mg by mouth daily  Historical Provider, MD   acetaminophen (TYLENOL) 325 MG tablet Take 650 mg by mouth every 4 hours as needed for Pain  Historical Provider, MD   meloxicam (MOBIC) 15 MG tablet Take 15 mg by mouth daily  Historical Provider, MD   traZODone (DESYREL) 100 MG tablet Take 200 mg by mouth nightly   Historical Provider, MD        Allergies   Allergen Reactions    Amoxicillin-Pot Clavulanate      Other reaction(s): GI Upset, Intolerance, Other: See Comments  Nose bleed    Other Itching     IVP dye         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    [facility]     Osteoarthritis     Pneumonia due to organism     Pyoderma gangrenosa     Ul[facility]ative colitis ([facility]        Past Surgical History:   Procedure Laterality Date    ABDOMEN SURGERY      sleen repair    APPENDECTOMY      BACK SURGERY      BREAST SURGERY      fatty tumor removed, benign    COLONOSCOPY      COSMETIC SURGERY      tummy tuck    EYE SURGERY      bilateral cataract surgery    HYSTERECTOMY      SKIN BIOPSY      SPINAL FUSION      TONSILLECTOMY         Social History     Socioeconomic History    Marital status:       Spouse name: Not on file    Number of children: Not on file    Years of education: Not on file    Highest education level: Not on file   Occupational History    Not on file   Social Needs    Financial resource strain: Not on file    Food insecurity     Worry: Not on file     Inability: Not on file    Transportation needs     Medical: Not on file     Non-medical: Not on file   Tobacco Use    Smoking status: Current Every Day Smoker     Packs/day: 0.50     Years: 40.00     Pack years: 20.00     Types: Cigarettes    Smokeless tobacco: Never Used   Substance and Sexual Activity    Alcohol use: No     Comment: ocassionally    Drug use: No    Sexual activity: Not on file   Lifestyle    Physical activity     Days per week: Not on file     Minutes per session: Not on file    Stress: Not on file   Relationships    Social connections     Talks on phone: Not on file     Gets together: Not on file     Attends Restorationist service: Not on file     Active member of club or organization: Not on file     Attends meetings of clubs or organizations: Not on file     Relationship status: Not on file    Intimate partner violence     Fear of current or ex partner: Not on file     Emotionally abused: Not on file     Physically abused: Not on file     Forced sexual activity: Not on file   Other Topics Concern    Not on file   Social History Narrative    Not on file        No family history on file. There were no vitals filed for this visit. Estimated body mass index is 30.21 kg/m² as calculated from the following:    Height as of 2/16/20: 5' 4\" (1.626 m). Weight as of 2/25/20: 176 lb (79.8 kg). Physical Exam  Constitutional:       General: She is not in acute distress. Appearance: She is well-developed. HENT:      Head: Normocephalic. Right Ear: External ear normal.      Left Ear: External ear normal.   Eyes:      Conjunctiva/sclera: Conjunctivae normal.      Pupils: Pupils are equal, round, and reactive to light. Neck:      Musculoskeletal: Neck supple. Vascular: No JVD. Trachea: No tracheal deviation. Cardiovascular:      Rate and Rhythm: Normal rate and regular rhythm. Heart sounds: Normal heart sounds. Pulmonary:      Effort: Pulmonary effort is normal. No respiratory distress. Breath sounds: Normal breath sounds. No wheezing or rales. Chest:      Chest wall: No tenderness. Abdominal:      General: Bowel sounds are normal. There is no distension. Palpations: Abdomen is soft. There is no mass. Tenderness: There is no abdominal tenderness. There is no guarding or rebound. Musculoskeletal:         General: No tenderness or deformity. Comments: Healing well posterior to the patient's right lower extremity. No malodor or erythema.    Lymphadenopathy:      Cervical: No cervical

## 2020-03-26 ENCOUNTER — APPOINTMENT (OUTPATIENT)
Dept: GENERAL RADIOLOGY | Age: 55
DRG: 871 | End: 2020-03-26
Payer: COMMERCIAL

## 2020-03-26 ENCOUNTER — HOSPITAL ENCOUNTER (INPATIENT)
Age: 55
LOS: 2 days | Discharge: LEFT BEFORE DISCHARGE | DRG: 871 | End: 2020-03-28
Attending: EMERGENCY MEDICINE | Admitting: INTERNAL MEDICINE
Payer: COMMERCIAL

## 2020-03-26 PROBLEM — R65.21 SEPTIC SHOCK (HCC): Status: ACTIVE | Noted: 2020-03-26

## 2020-03-26 PROBLEM — A41.9 SEPTIC SHOCK (HCC): Status: ACTIVE | Noted: 2020-03-26

## 2020-03-26 LAB
ALBUMIN SERPL-MCNC: 3.7 G/DL (ref 3.5–4.6)
ALP BLD-CCNC: 93 U/L (ref 40–130)
ALT SERPL-CCNC: 11 U/L (ref 0–33)
ANION GAP SERPL CALCULATED.3IONS-SCNC: 14 MEQ/L (ref 9–15)
AST SERPL-CCNC: 14 U/L (ref 0–35)
BACTERIA: NEGATIVE /HPF
BASOPHILS ABSOLUTE: 0.2 K/UL (ref 0–0.2)
BASOPHILS RELATIVE PERCENT: 1 %
BILIRUB SERPL-MCNC: 0.7 MG/DL (ref 0.2–0.7)
BILIRUBIN URINE: NEGATIVE
BLOOD, URINE: NEGATIVE
BUN BLDV-MCNC: 17 MG/DL (ref 6–20)
CALCIUM SERPL-MCNC: 8.7 MG/DL (ref 8.5–9.9)
CHLORIDE BLD-SCNC: 99 MEQ/L (ref 95–107)
CLARITY: CLEAR
CO2: 23 MEQ/L (ref 20–31)
COLOR: YELLOW
CREAT SERPL-MCNC: 0.73 MG/DL (ref 0.5–0.9)
EKG ATRIAL RATE: 86 BPM
EKG P AXIS: 63 DEGREES
EKG P-R INTERVAL: 144 MS
EKG Q-T INTERVAL: 384 MS
EKG QRS DURATION: 80 MS
EKG QTC CALCULATION (BAZETT): 459 MS
EKG R AXIS: 28 DEGREES
EKG T AXIS: 24 DEGREES
EKG VENTRICULAR RATE: 86 BPM
EOSINOPHILS ABSOLUTE: 0 K/UL (ref 0–0.7)
EOSINOPHILS RELATIVE PERCENT: 0.1 %
EPITHELIAL CELLS, UA: NORMAL /HPF (ref 0–5)
GFR AFRICAN AMERICAN: >60
GFR NON-AFRICAN AMERICAN: >60
GLOBULIN: 3.5 G/DL (ref 2.3–3.5)
GLUCOSE BLD-MCNC: 135 MG/DL (ref 70–99)
GLUCOSE URINE: NEGATIVE MG/DL
HCT VFR BLD CALC: 37.7 % (ref 37–47)
HEMOGLOBIN: 12.1 G/DL (ref 12–16)
HYALINE CASTS: NORMAL /HPF (ref 0–5)
KETONES, URINE: NEGATIVE MG/DL
LACTIC ACID: 1.1 MMOL/L (ref 0.5–2.2)
LEUKOCYTE ESTERASE, URINE: ABNORMAL
LYMPHOCYTES ABSOLUTE: 3 K/UL (ref 1–4.8)
LYMPHOCYTES RELATIVE PERCENT: 20 %
MAGNESIUM: 1.9 MG/DL (ref 1.7–2.4)
MCH RBC QN AUTO: 25.6 PG (ref 27–31.3)
MCHC RBC AUTO-ENTMCNC: 32.1 % (ref 33–37)
MCV RBC AUTO: 79.7 FL (ref 82–100)
MONOCYTES ABSOLUTE: 0.7 K/UL (ref 0.2–0.8)
MONOCYTES RELATIVE PERCENT: 4.5 %
NEUTROPHILS ABSOLUTE: 11.3 K/UL (ref 1.4–6.5)
NEUTROPHILS RELATIVE PERCENT: 74.4 %
NITRITE, URINE: NEGATIVE
PDW BLD-RTO: 16.4 % (ref 11.5–14.5)
PH UA: 5 (ref 5–9)
PLATELET # BLD: 325 K/UL (ref 130–400)
POTASSIUM SERPL-SCNC: 3.6 MEQ/L (ref 3.4–4.9)
PROTEIN UA: NEGATIVE MG/DL
RBC # BLD: 4.73 M/UL (ref 4.2–5.4)
RBC UA: NORMAL /HPF (ref 0–5)
SODIUM BLD-SCNC: 136 MEQ/L (ref 135–144)
SPECIFIC GRAVITY UA: 1.02 (ref 1–1.03)
TOTAL CK: 64 U/L (ref 0–170)
TOTAL PROTEIN: 7.2 G/DL (ref 6.3–8)
TROPONIN: <0.01 NG/ML (ref 0–0.01)
TSH SERPL DL<=0.05 MIU/L-ACNC: 1.03 UIU/ML (ref 0.44–3.86)
UROBILINOGEN, URINE: 0.2 E.U./DL
WBC # BLD: 15.2 K/UL (ref 4.8–10.8)
WBC UA: NORMAL /HPF (ref 0–5)

## 2020-03-26 PROCEDURE — 71045 X-RAY EXAM CHEST 1 VIEW: CPT

## 2020-03-26 PROCEDURE — 6370000000 HC RX 637 (ALT 250 FOR IP): Performed by: FAMILY MEDICINE

## 2020-03-26 PROCEDURE — 87186 SC STD MICRODIL/AGAR DIL: CPT

## 2020-03-26 PROCEDURE — 51702 INSERT TEMP BLADDER CATH: CPT

## 2020-03-26 PROCEDURE — 2580000003 HC RX 258: Performed by: EMERGENCY MEDICINE

## 2020-03-26 PROCEDURE — 36415 COLL VENOUS BLD VENIPUNCTURE: CPT

## 2020-03-26 PROCEDURE — 2500000003 HC RX 250 WO HCPCS: Performed by: EMERGENCY MEDICINE

## 2020-03-26 PROCEDURE — 96375 TX/PRO/DX INJ NEW DRUG ADDON: CPT

## 2020-03-26 PROCEDURE — 84443 ASSAY THYROID STIM HORMONE: CPT

## 2020-03-26 PROCEDURE — 96374 THER/PROPH/DIAG INJ IV PUSH: CPT

## 2020-03-26 PROCEDURE — 82550 ASSAY OF CK (CPK): CPT

## 2020-03-26 PROCEDURE — 87077 CULTURE AEROBIC IDENTIFY: CPT

## 2020-03-26 PROCEDURE — 83735 ASSAY OF MAGNESIUM: CPT

## 2020-03-26 PROCEDURE — 6370000000 HC RX 637 (ALT 250 FOR IP): Performed by: EMERGENCY MEDICINE

## 2020-03-26 PROCEDURE — 2000000000 HC ICU R&B

## 2020-03-26 PROCEDURE — 87147 CULTURE TYPE IMMUNOLOGIC: CPT

## 2020-03-26 PROCEDURE — 85025 COMPLETE CBC W/AUTO DIFF WBC: CPT

## 2020-03-26 PROCEDURE — 87205 SMEAR GRAM STAIN: CPT

## 2020-03-26 PROCEDURE — 87040 BLOOD CULTURE FOR BACTERIA: CPT

## 2020-03-26 PROCEDURE — 80053 COMPREHEN METABOLIC PANEL: CPT

## 2020-03-26 PROCEDURE — 2500000003 HC RX 250 WO HCPCS: Performed by: INTERNAL MEDICINE

## 2020-03-26 PROCEDURE — 83605 ASSAY OF LACTIC ACID: CPT

## 2020-03-26 PROCEDURE — 99284 EMERGENCY DEPT VISIT MOD MDM: CPT

## 2020-03-26 PROCEDURE — 6360000002 HC RX W HCPCS: Performed by: EMERGENCY MEDICINE

## 2020-03-26 PROCEDURE — 84484 ASSAY OF TROPONIN QUANT: CPT

## 2020-03-26 PROCEDURE — 6360000002 HC RX W HCPCS: Performed by: PHYSICIAN ASSISTANT

## 2020-03-26 PROCEDURE — 02HV33Z INSERTION OF INFUSION DEVICE INTO SUPERIOR VENA CAVA, PERCUTANEOUS APPROACH: ICD-10-PCS | Performed by: EMERGENCY MEDICINE

## 2020-03-26 PROCEDURE — 87070 CULTURE OTHR SPECIMN AEROBIC: CPT

## 2020-03-26 PROCEDURE — 2580000003 HC RX 258: Performed by: INTERNAL MEDICINE

## 2020-03-26 PROCEDURE — 81001 URINALYSIS AUTO W/SCOPE: CPT

## 2020-03-26 RX ORDER — SODIUM CHLORIDE 9 MG/ML
INJECTION, SOLUTION INTRAVENOUS CONTINUOUS
Status: DISCONTINUED | OUTPATIENT
Start: 2020-03-26 | End: 2020-03-28 | Stop reason: HOSPADM

## 2020-03-26 RX ORDER — ONDANSETRON 2 MG/ML
4 INJECTION INTRAMUSCULAR; INTRAVENOUS ONCE
Status: COMPLETED | OUTPATIENT
Start: 2020-03-26 | End: 2020-03-26

## 2020-03-26 RX ORDER — 0.9 % SODIUM CHLORIDE 0.9 %
2000 INTRAVENOUS SOLUTION INTRAVENOUS ONCE
Status: COMPLETED | OUTPATIENT
Start: 2020-03-26 | End: 2020-03-26

## 2020-03-26 RX ORDER — MAGNESIUM SULFATE IN WATER 40 MG/ML
2 INJECTION, SOLUTION INTRAVENOUS PRN
Status: DISCONTINUED | OUTPATIENT
Start: 2020-03-26 | End: 2020-03-28 | Stop reason: HOSPADM

## 2020-03-26 RX ORDER — FENTANYL CITRATE 50 UG/ML
50 INJECTION, SOLUTION INTRAMUSCULAR; INTRAVENOUS ONCE
Status: DISCONTINUED | OUTPATIENT
Start: 2020-03-26 | End: 2020-03-28 | Stop reason: HOSPADM

## 2020-03-26 RX ORDER — 0.9 % SODIUM CHLORIDE 0.9 %
1000 INTRAVENOUS SOLUTION INTRAVENOUS ONCE
Status: COMPLETED | OUTPATIENT
Start: 2020-03-26 | End: 2020-03-26

## 2020-03-26 RX ORDER — SODIUM CHLORIDE 0.9 % (FLUSH) 0.9 %
10 SYRINGE (ML) INJECTION EVERY 12 HOURS SCHEDULED
Status: DISCONTINUED | OUTPATIENT
Start: 2020-03-26 | End: 2020-03-28 | Stop reason: HOSPADM

## 2020-03-26 RX ORDER — ONDANSETRON 2 MG/ML
4 INJECTION INTRAMUSCULAR; INTRAVENOUS EVERY 6 HOURS PRN
Status: DISCONTINUED | OUTPATIENT
Start: 2020-03-26 | End: 2020-03-28 | Stop reason: HOSPADM

## 2020-03-26 RX ORDER — ACETAMINOPHEN 325 MG/1
650 TABLET ORAL EVERY 6 HOURS PRN
Status: DISCONTINUED | OUTPATIENT
Start: 2020-03-26 | End: 2020-03-28 | Stop reason: HOSPADM

## 2020-03-26 RX ORDER — MORPHINE SULFATE 2 MG/ML
4 INJECTION, SOLUTION INTRAMUSCULAR; INTRAVENOUS
Status: DISCONTINUED | OUTPATIENT
Start: 2020-03-26 | End: 2020-03-26

## 2020-03-26 RX ORDER — POLYETHYLENE GLYCOL 3350 17 G/17G
17 POWDER, FOR SOLUTION ORAL DAILY PRN
Status: DISCONTINUED | OUTPATIENT
Start: 2020-03-26 | End: 2020-03-28 | Stop reason: HOSPADM

## 2020-03-26 RX ORDER — IPRATROPIUM BROMIDE AND ALBUTEROL SULFATE 2.5; .5 MG/3ML; MG/3ML
1 SOLUTION RESPIRATORY (INHALATION) 4 TIMES DAILY
Status: DISCONTINUED | OUTPATIENT
Start: 2020-03-26 | End: 2020-03-26

## 2020-03-26 RX ORDER — ALBUTEROL SULFATE 2.5 MG/3ML
2.5 SOLUTION RESPIRATORY (INHALATION) EVERY 4 HOURS
Status: DISCONTINUED | OUTPATIENT
Start: 2020-03-27 | End: 2020-03-26

## 2020-03-26 RX ORDER — KETOROLAC TROMETHAMINE 30 MG/ML
30 INJECTION, SOLUTION INTRAMUSCULAR; INTRAVENOUS ONCE
Status: COMPLETED | OUTPATIENT
Start: 2020-03-26 | End: 2020-03-26

## 2020-03-26 RX ORDER — SODIUM CHLORIDE 0.9 % (FLUSH) 0.9 %
10 SYRINGE (ML) INJECTION PRN
Status: DISCONTINUED | OUTPATIENT
Start: 2020-03-26 | End: 2020-03-28 | Stop reason: HOSPADM

## 2020-03-26 RX ORDER — FENTANYL CITRATE 50 UG/ML
50 INJECTION, SOLUTION INTRAMUSCULAR; INTRAVENOUS ONCE
Status: COMPLETED | OUTPATIENT
Start: 2020-03-26 | End: 2020-03-26

## 2020-03-26 RX ORDER — ACETAMINOPHEN 650 MG/1
650 SUPPOSITORY RECTAL EVERY 6 HOURS PRN
Status: DISCONTINUED | OUTPATIENT
Start: 2020-03-26 | End: 2020-03-28 | Stop reason: HOSPADM

## 2020-03-26 RX ORDER — PROMETHAZINE HYDROCHLORIDE 12.5 MG/1
12.5 TABLET ORAL EVERY 6 HOURS PRN
Status: DISCONTINUED | OUTPATIENT
Start: 2020-03-26 | End: 2020-03-28 | Stop reason: HOSPADM

## 2020-03-26 RX ORDER — OXYCODONE AND ACETAMINOPHEN 10; 325 MG/1; MG/1
1 TABLET ORAL EVERY 8 HOURS PRN
Status: DISCONTINUED | OUTPATIENT
Start: 2020-03-26 | End: 2020-03-28 | Stop reason: HOSPADM

## 2020-03-26 RX ORDER — ACETAMINOPHEN 500 MG
1000 TABLET ORAL ONCE
Status: COMPLETED | OUTPATIENT
Start: 2020-03-26 | End: 2020-03-26

## 2020-03-26 RX ORDER — POTASSIUM CHLORIDE 29.8 MG/ML
20 INJECTION INTRAVENOUS PRN
Status: DISCONTINUED | OUTPATIENT
Start: 2020-03-26 | End: 2020-03-28 | Stop reason: HOSPADM

## 2020-03-26 RX ADMIN — ACETAMINOPHEN 1000 MG: 500 TABLET ORAL at 14:23

## 2020-03-26 RX ADMIN — FENTANYL CITRATE 50 MCG: 50 INJECTION INTRAMUSCULAR; INTRAVENOUS at 18:37

## 2020-03-26 RX ADMIN — ONDANSETRON 4 MG: 2 INJECTION INTRAMUSCULAR; INTRAVENOUS at 14:19

## 2020-03-26 RX ADMIN — SODIUM CHLORIDE: 9 INJECTION, SOLUTION INTRAVENOUS at 22:12

## 2020-03-26 RX ADMIN — NOREPINEPHRINE BITARTRATE 5 MCG/MIN: 1 INJECTION, SOLUTION, CONCENTRATE INTRAVENOUS at 22:06

## 2020-03-26 RX ADMIN — Medication 10 ML: at 22:07

## 2020-03-26 RX ADMIN — NOREPINEPHRINE BITARTRATE 2 MCG/MIN: 1 INJECTION INTRAVENOUS at 19:21

## 2020-03-26 RX ADMIN — SODIUM CHLORIDE 2000 ML: 9 INJECTION, SOLUTION INTRAVENOUS at 14:19

## 2020-03-26 RX ADMIN — MORPHINE SULFATE 4 MG: 2 INJECTION, SOLUTION INTRAMUSCULAR; INTRAVENOUS at 14:22

## 2020-03-26 RX ADMIN — VANCOMYCIN HYDROCHLORIDE 1000 MG: 1 INJECTION, POWDER, LYOPHILIZED, FOR SOLUTION INTRAVENOUS at 18:42

## 2020-03-26 RX ADMIN — SODIUM CHLORIDE 1000 ML: 9 INJECTION, SOLUTION INTRAVENOUS at 15:06

## 2020-03-26 RX ADMIN — CEFEPIME HYDROCHLORIDE 2 G: 2 INJECTION, POWDER, FOR SOLUTION INTRAVENOUS at 18:42

## 2020-03-26 RX ADMIN — KETOROLAC TROMETHAMINE 30 MG: 30 INJECTION, SOLUTION INTRAMUSCULAR at 14:20

## 2020-03-26 RX ADMIN — OXYCODONE AND ACETAMINOPHEN 1 TABLET: 10; 325 TABLET ORAL at 22:48

## 2020-03-26 ASSESSMENT — PAIN SCALES - GENERAL
PAINLEVEL_OUTOF10: 0
PAINLEVEL_OUTOF10: 10
PAINLEVEL_OUTOF10: 9
PAINLEVEL_OUTOF10: 0
PAINLEVEL_OUTOF10: 7
PAINLEVEL_OUTOF10: 10
PAINLEVEL_OUTOF10: 10
PAINLEVEL_OUTOF10: 4

## 2020-03-26 ASSESSMENT — ENCOUNTER SYMPTOMS
DIARRHEA: 0
NAUSEA: 0
SORE THROAT: 0
ABDOMINAL PAIN: 0
COUGH: 0
BACK PAIN: 0
SHORTNESS OF BREATH: 0
VOMITING: 0

## 2020-03-26 ASSESSMENT — PAIN DESCRIPTION - LOCATION
LOCATION: GENERALIZED
LOCATION: GENERALIZED

## 2020-03-26 ASSESSMENT — PAIN DESCRIPTION - DESCRIPTORS
DESCRIPTORS: ACHING
DESCRIPTORS: ACHING

## 2020-03-26 NOTE — ED NOTES
Urine obtained  Via straight cath, labeled and sent to lab     April PRESTON Rodríguez RN  03/26/20 [id number]

## 2020-03-26 NOTE — CARE COORDINATION
[facility] [place] Case Management Initial Discharge Assessment    Met with Patient to discuss discharge plan. PCP: Vandana Chaudhari MD                                Date of Last Visit: virtual visit with Dr Britni So 3/19/20    If no PCP, list provided? N/A    Discharge Planning    Living Arrangements: independently at home    Who do you live with? self    Who helps you with your care:  self    If lives at home:     Do you have any barriers navigating in your home? no    Patient can perform ADL? Yes    Current Services (outpatient and in home) :  None    Dialysis: No    Is transportation available to get to your appointments? Yes    DME Equipment:  yes - cane and walker    Respiratory equipment: None    Respiratory provider:  no     Pharmacy:  yes - [facility] [employer] with Medication Assistance Program?  No      Does Patient Have a High-Risk for Readmission Diagnosis (CHF, PN, MI, COPD)? H/o copd    Initial Discharge Plan? (Note: please see concurrent daily documentation for any updates after initial note). Cm to follow for further d/c needs and referrals. Pt was drowsy during my questioning. May need to be asked again about any home-going needs.     Electronically signed by Vicenta Schilling on 3/26/2020 at 6:58 PM

## 2020-03-26 NOTE — ED PROVIDER NOTES
 BACK SURGERY      BREAST SURGERY      fatty tumor removed, benign    COLONOSCOPY      COSMETIC SURGERY      tummy tuck    EYE SURGERY      bilateral cataract surgery    HYSTERECTOMY      SKIN BIOPSY      SPINAL FUSION      TONSILLECTOMY           CURRENTMEDICATIONS       Previous Medications    ACETAMINOPHEN (TYLENOL) 325 MG TABLET    Take 650 mg by mouth every 4 hours as needed for Pain    CHOLECALCIFEROL (VITAMIN D3) 25 MCG (1000 UT) TABS    Take 1 tablet by mouth daily    CIPROFLOXACIN HCL (CILOXAN) 0.3 % OPHTHALMIC OINTMENT    3 times daily. DULOXETINE (CYMBALTA) 60 MG EXTENDED RELEASE CAPSULE    Take 120 mg by mouth daily    FOLIC ACID (FOLVITE) 1 MG TABLET    Take 1 mg by mouth daily    GABAPENTIN (NEURONTIN) 100 MG CAPSULE    Take 100 mg by mouth. HYDROXYZINE (ATARAX) 25 MG TABLET    Take 25 mg by mouth every 6 hours as needed for Itching    MELOXICAM (MOBIC) 15 MG TABLET    Take 15 mg by mouth daily    METHOTREXATE, ANTI-RHEUMATIC, PO    Take 15 mg by mouth twice a week Sunday AND MONDAY    OXYCODONE-ACETAMINOPHEN (PERCOCET)  MG PER TABLET    Take 1 tablet by mouth every 8 hours as needed for Pain for up to 30 days. Intended supply: 30 days    SENNA (SENOKOT) 8.6 MG TABLET    Take 1 tablet by mouth 2 times daily as needed for Constipation    TRAZODONE (DESYREL) 100 MG TABLET    Take 200 mg by mouth nightly     VITAMIN B-12 (CYANOCOBALAMIN) 1000 MCG TABLET    Take 1 tablet by mouth daily    VITAMIN D (CHOLECALCIFEROL) 1000 UNIT TABS TABLET    Take 1,000 Units by mouth daily       ALLERGIES     Amoxicillin-pot clavulanate and Other    FAMILY HISTORY     History reviewed. No pertinent family history. SOCIAL HISTORY       Social History     Socioeconomic History    Marital status:       Spouse name: None    Number of children: None    Years of education: None    Highest education level: None   Occupational History    None   Social Needs    Financial resource strain: None

## 2020-03-27 VITALS
OXYGEN SATURATION: 98 % | SYSTOLIC BLOOD PRESSURE: 107 MMHG | RESPIRATION RATE: 16 BRPM | HEIGHT: 64 IN | BODY MASS INDEX: 30.3 KG/M2 | HEART RATE: 57 BPM | TEMPERATURE: 97.5 F | WEIGHT: 177.47 LBS | DIASTOLIC BLOOD PRESSURE: 65 MMHG

## 2020-03-27 LAB
ANION GAP SERPL CALCULATED.3IONS-SCNC: 9 MEQ/L (ref 9–15)
BASOPHILS ABSOLUTE: 0.1 K/UL (ref 0–0.2)
BASOPHILS RELATIVE PERCENT: 0.7 %
BUN BLDV-MCNC: 13 MG/DL (ref 6–20)
CALCIUM SERPL-MCNC: 8.2 MG/DL (ref 8.5–9.9)
CHLORIDE BLD-SCNC: 107 MEQ/L (ref 95–107)
CO2: 24 MEQ/L (ref 20–31)
CREAT SERPL-MCNC: 0.57 MG/DL (ref 0.5–0.9)
EOSINOPHILS ABSOLUTE: 0.2 K/UL (ref 0–0.7)
EOSINOPHILS RELATIVE PERCENT: 1.5 %
GFR AFRICAN AMERICAN: >60
GFR NON-AFRICAN AMERICAN: >60
GLUCOSE BLD-MCNC: 114 MG/DL (ref 70–99)
HCT VFR BLD CALC: 32.3 % (ref 37–47)
HEMOGLOBIN: 10.6 G/DL (ref 12–16)
LV EF: 60 %
LVEF MODALITY: NORMAL
LYMPHOCYTES ABSOLUTE: 3.1 K/UL (ref 1–4.8)
LYMPHOCYTES RELATIVE PERCENT: 30.6 %
MCH RBC QN AUTO: 26.3 PG (ref 27–31.3)
MCHC RBC AUTO-ENTMCNC: 32.7 % (ref 33–37)
MCV RBC AUTO: 80.4 FL (ref 82–100)
MONOCYTES ABSOLUTE: 0.5 K/UL (ref 0.2–0.8)
MONOCYTES RELATIVE PERCENT: 5.3 %
NEUTROPHILS ABSOLUTE: 6.4 K/UL (ref 1.4–6.5)
NEUTROPHILS RELATIVE PERCENT: 61.9 %
PDW BLD-RTO: 15.8 % (ref 11.5–14.5)
PLATELET # BLD: 295 K/UL (ref 130–400)
POTASSIUM REFLEX MAGNESIUM: 3.8 MEQ/L (ref 3.4–4.9)
RBC # BLD: 4.02 M/UL (ref 4.2–5.4)
SODIUM BLD-SCNC: 140 MEQ/L (ref 135–144)
WBC # BLD: 10.3 K/UL (ref 4.8–10.8)

## 2020-03-27 PROCEDURE — 6370000000 HC RX 637 (ALT 250 FOR IP): Performed by: INTERNAL MEDICINE

## 2020-03-27 PROCEDURE — 1210000000 HC MED SURG R&B

## 2020-03-27 PROCEDURE — 93306 TTE W/DOPPLER COMPLETE: CPT

## 2020-03-27 PROCEDURE — 2580000003 HC RX 258: Performed by: INTERNAL MEDICINE

## 2020-03-27 PROCEDURE — 36415 COLL VENOUS BLD VENIPUNCTURE: CPT

## 2020-03-27 PROCEDURE — 85025 COMPLETE CBC W/AUTO DIFF WBC: CPT

## 2020-03-27 PROCEDURE — 2700000000 HC OXYGEN THERAPY PER DAY

## 2020-03-27 PROCEDURE — 99254 IP/OBS CNSLTJ NEW/EST MOD 60: CPT | Performed by: INTERNAL MEDICINE

## 2020-03-27 PROCEDURE — 6370000000 HC RX 637 (ALT 250 FOR IP): Performed by: FAMILY MEDICINE

## 2020-03-27 PROCEDURE — 80048 BASIC METABOLIC PNL TOTAL CA: CPT

## 2020-03-27 PROCEDURE — 99223 1ST HOSP IP/OBS HIGH 75: CPT | Performed by: INTERNAL MEDICINE

## 2020-03-27 PROCEDURE — 6360000002 HC RX W HCPCS: Performed by: INTERNAL MEDICINE

## 2020-03-27 RX ORDER — CLINDAMYCIN HYDROCHLORIDE 300 MG/1
300 CAPSULE ORAL EVERY 6 HOURS SCHEDULED
Status: DISCONTINUED | OUTPATIENT
Start: 2020-03-27 | End: 2020-03-28 | Stop reason: HOSPADM

## 2020-03-27 RX ADMIN — CLINDAMYCIN HYDROCHLORIDE 300 MG: 300 CAPSULE ORAL at 18:36

## 2020-03-27 RX ADMIN — SODIUM CHLORIDE: 9 INJECTION, SOLUTION INTRAVENOUS at 06:32

## 2020-03-27 RX ADMIN — ENOXAPARIN SODIUM 40 MG: 40 INJECTION SUBCUTANEOUS at 08:25

## 2020-03-27 RX ADMIN — CEFEPIME HYDROCHLORIDE 2 G: 2 INJECTION, POWDER, FOR SOLUTION INTRAVENOUS at 06:06

## 2020-03-27 RX ADMIN — OXYCODONE AND ACETAMINOPHEN 1 TABLET: 10; 325 TABLET ORAL at 16:46

## 2020-03-27 RX ADMIN — Medication 10 ML: at 08:25

## 2020-03-27 RX ADMIN — ACETAMINOPHEN 650 MG: 325 TABLET ORAL at 23:13

## 2020-03-27 RX ADMIN — CLINDAMYCIN HYDROCHLORIDE 300 MG: 300 CAPSULE ORAL at 23:12

## 2020-03-27 RX ADMIN — VANCOMYCIN HYDROCHLORIDE 1250 MG: 500 INJECTION, POWDER, LYOPHILIZED, FOR SOLUTION INTRAVENOUS at 06:06

## 2020-03-27 ASSESSMENT — PAIN SCALES - GENERAL
PAINLEVEL_OUTOF10: 0
PAINLEVEL_OUTOF10: 9
PAINLEVEL_OUTOF10: 3
PAINLEVEL_OUTOF10: 0

## 2020-03-27 NOTE — ED NOTES
Levophed increased. Patient resting with eyes closed. Warm blankets provided and lights turned off for patient comfort.       Obinna Frazier RN  03/26/20 2002

## 2020-03-27 NOTE — CONSULTS
Pulmonary and Critical Care Medicine  Consult Note  Encounter Date: 3/27/2020 2:59 PM    Ms. Yamel Wilkerson is a 48 y.o. female  : 1965  Requesting Provider: Madonna Bustillos DO    Reason for request: Shock            HISTORY OF PRESENT ILLNESS:    Patient is 48 y.o. presents with generalized joint ache, she is on Remicade for pyoderma gangrenosum, she received her dose recently, she started having joint ache and came to emergency room, in ED she was noted to be hypotensive  Today she feels better, no chest pain, no coughing, no shortness of breath, no dysuria no abdominal pain, no nausea no vomiting and no diarrhea. 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    Neuromuscular disorder ([facility] [facility]     Osteoarthritis     Pneumonia due to organism     Pyoderma gangrenosa     Ulcerative colitis ([facility] [address] [facility]        Past Surgical History:        Procedure Laterality Date    ABDOMEN SURGERY      sleen repair    APPENDECTOMY      BACK SURGERY      BREAST SURGERY      fatty tumor removed, benign    COLONOSCOPY      COSMETIC SURGERY      tummy tuck    EYE SURGERY      bilateral cataract surgery    HYSTERECTOMY      SKIN BIOPSY      SPINAL FUSION      TONSILLECTOMY         Social History:     reports that she has been smoking cigarettes. She has a 20.00 pack-year smoking history. She has never used smokeless tobacco. She reports that she does not drink alcohol or use drugs. Family History:   History reviewed. No pertinent family history.   No family history of lung disease  Allergies:  Amoxicillin-pot clavulanate and Other        MEDICATIONS

## 2020-03-27 NOTE — PROGRESS NOTES
Patient bed exiting. Oriented , reusing to follow commands. Pulling at CVC in left neck. Patient hs been talked to by physicians and nurses and manager. Trying to go AMA. Lvo being leaned to so we can get the CVC out. Levo off , bp after levo was off was 150. CVC removed ith no complications, drsg over site.

## 2020-03-27 NOTE — PROGRESS NOTES
atraumatic  Eyes: EOMI, PERRLA  ENT: moist mucous membranes  Neck: neck supple, trachea midline, LIJ central line in place  Lungs: Good inspiratory effort, CTABL, no wheeze, no rhonchi, no rales  Heart: RRR, normal S1 and S2, no murmurs  GI: Soft, non-distended, non tender, no guarding, no rebound, +BS  MSK: no edema noted  Pulses: 2+ pulses bilaterally  Skin: leg and abd wound with dressing in place  Psych: appropriate affect  Data    CBC:   Lab Results   Component Value Date    WBC 10.3 03/27/2020    RBC 4.02 03/27/2020    RBC 4.59 11/02/2011    HGB 10.6 03/27/2020    HCT 32.3 03/27/2020    MCV 80.4 03/27/2020    MCH 26.3 03/27/2020    MCHC 32.7 03/27/2020    RDW 15.8 03/27/2020     03/27/2020    MPV 9.1 11/05/2012     CMP:    Lab Results   Component Value Date     03/27/2020    K 3.8 03/27/2020     03/27/2020    CO2 24 03/27/2020    BUN 13 03/27/2020    CREATININE 0.57 03/27/2020    GFRAA >60.0 03/27/2020    LABGLOM >60.0 03/27/2020    GLUCOSE 114 03/27/2020    GLUCOSE 146 11/02/2011    PROT 7.2 03/26/2020    LABALBU 3.7 03/26/2020    LABALBU 4.1 11/02/2011    CALCIUM 8.2 03/27/2020    BILITOT 0.7 03/26/2020    ALKPHOS 93 03/26/2020    AST 14 03/26/2020    ALT 11 03/26/2020       ASSESSMENT AND PLAN      # Septic shock likely 2/2 R heel and abdominal wound  - shock resolved - now off of pressors. Will transfer to floor today  - wounds with purulence and erythema  - no recent abx  - febrile, leukocytosis, tachypneic, hypotensive despite fluid resuscitation  - started on levophed. Discontinued on 3/27  - cardiology consulted  - echo   - cont vanc/cefepime  - follow blood cultures, wound culture  - wound care  - PT/OT    Disposition: Patient admitted with septic shock initially on pressors which are now discontinued. Will transfer to the floor today. Continuing abx and following cultures. Anticipated length of stay greater than 48 hours.       Trinity Todd, DO  Internal Medicine

## 2020-03-27 NOTE — CONSULTS
PRN **OR** ondansetron (ZOFRAN) injection 4 mg, 4 mg, Intravenous, Q6H PRN, Wanda Roth, DO    enoxaparin (LOVENOX) injection 40 mg, 40 mg, Subcutaneous, Daily, Wanda Roth, DO, 40 mg at 03/27/20 0825    0.9 % sodium chloride infusion, , Intravenous, Continuous, Wanda Roth, DO, Last Rate: 125 mL/hr at 03/27/20 6080    potassium chloride 20 mEq/50 mL IVPB (Central Line), 20 mEq, Intravenous, PRN, Wanda Roth, DO    magnesium sulfate 2 g in 50 mL IVPB premix, 2 g, Intravenous, PRN, Wanda Roth, DO    norepinephrine (LEVOPHED) 16 mg in dextrose 5 % 250 mL infusion, 10 mcg/min, Intravenous, Continuous, Wanda Roth, DO, Last Rate: 2.8 mL/hr at 03/27/20 3991, 3 mcg/min at 03/27/20 7028    cefepime (MAXIPIME) 2 g IVPB minibag, 2 g, Intravenous, Q12H, Wanda Roth, DO, Stopped at 03/27/20 8140    vancomycin (VANCOCIN) intermittent dosing (placeholder), , Other, RX Placeholder, Wanda Roth, DO    vancomycin (VANCOCIN) 1,250 mg in dextrose 5 % 250 mL IVPB, 15 mg/kg, Intravenous, Q12H, Wanda Roth, DO, Stopped at 03/27/20 0730    oxyCODONE-acetaminophen (PERCOCET)  MG per tablet 1 tablet, 1 tablet, Oral, Q8H PRN, Frank Hook MD, 1 tablet at 03/26/20 2248    Physical Examination:    BP (!) 163/93   Pulse 64   Temp 98.1 °F (36.7 °C) (Oral)   Resp 16   Ht 5' 4\" (1.626 m)   Wt 177 lb 7.5 oz (80.5 kg)   LMP 01/01/1997 (Exact Date) Comment: hysterectomy  SpO2 94%   BMI 30.46 kg/m²    Physical Exam  Constitutional:       Appearance: She is well-developed. HENT:      Head: Normocephalic and atraumatic. Eyes:      Pupils: Pupils are equal, round, and reactive to light. Neck:      Musculoskeletal: Normal range of motion and neck supple. Cardiovascular:      Rate and Rhythm: Normal rate and regular rhythm. Heart sounds: No murmur. No friction rub. No gallop. Pulmonary:      Effort: Pulmonary effort is normal. No respiratory distress.       Breath sounds: Normal breath 03/27/2020    CO2 24 03/27/2020    BUN 13 03/27/2020    LABALBU 3.7 03/26/2020    LABALBU 4.1 11/02/2011    CREATININE 0.57 03/27/2020    CALCIUM 8.2 03/27/2020    GFRAA >60.0 03/27/2020    LABGLOM >60.0 03/27/2020    GLUCOSE 114 03/27/2020    GLUCOSE 146 11/02/2011     Magnesium:    Lab Results   Component Value Date    MG 1.9 03/26/2020     Troponin:    Lab Results   Component Value Date    TROPONINI <0.010 03/26/2020       EKG: EKG: normal sinus rhythm, nonspecific ST and T waves changes. Prior EKG was anterolateral TWI    ECHO normal LV. ASSESSMENT/PLAN:         Active Hospital Problems    Diagnosis Date Noted    Septic shock ([facility] [address] [A41.9, R65.21] 03/26/2020        Patient with likely sepsis, seems to be resolving. Transferring out of the ICU. ECHO with normal LV. Normal troponin but does have chronic EKG changes. Would benefit eventually from outpatient ischemia evaluation. Will defer to later date. Electronically signed by Segundo Benítez MD [facility] Director of Cardiology Services and CardiacCatheterization Laboratory  [facility]   on 3/27/2020 at 12:19 PM

## 2020-03-27 NOTE — PROGRESS NOTES
Pharmacy Note  Vancomycin Consult    Madison Linder is a 48 y.o. female started on Vancomycin for sepsis; consult received from Dr. GUERRA [facility] to manage therapy. Also receiving the following antibiotics: Cefepime. Patient Active Problem List   Diagnosis    Chronic pain    Cocaine use    Pyoderma gangrenosum    Cellulitis of right leg    Anemia    Obesity    COPD (chronic obstructive pulmonary disease) (HCC)    Fibromyalgia    Depression    Cellulitis    Pyodermic gangrenosum    Pain associated with wound    COPD exacerbation ([facility])    Leg weakness, bilateral    Septic shock ([facility])       Allergies:  Amoxicillin-pot clavulanate and Other     Temp max: 100F    Recent Labs     03/26/20  1400   BUN 17       Recent Labs     03/26/20  1400   CREATININE 0.73       Recent Labs     03/26/20  1400   WBC 15.2*         Intake/Output Summary (Last 24 hours) at 3/26/2020 2213  Last data filed at 3/26/2020 1610  Gross per 24 hour   Intake 1000 ml   Output --   Net 1000 ml       Culture Date      Source                       Results  3/26/20                blood                          pending    Ht Readings from Last 1 Encounters:   03/26/20 5' 4\" (1.626 m)        Wt Readings from Last 1 Encounters:   03/26/20 177 lb 7.5 oz (80.5 kg)         Body mass index is 30.46 kg/m². Estimated Creatinine Clearance: 90 mL/min (based on SCr of 0.73 mg/dL). Goal Trough Level: 15-20 mcg/mL    Assessment/Plan:  Will initiate vancomycin 1250 mg IV every 12 hours. Trough prior to 4th dose 3/28 1830. Timing of trough level will be determined based on culture results, renal function, and clinical response. Thank you for the consult. Will continue to follow. IVONNE Lowry. Ph.  3/26/2020  10:15 PM

## 2020-03-27 NOTE — PROGRESS NOTES
Patient arrived with no IV access, was told by ICU nurse patient pulled out CVC line and refused to let anyone try to do IV. Dr Russ notified due to her antibiotics are IV.

## 2020-03-28 PROCEDURE — 97161 PT EVAL LOW COMPLEX 20 MIN: CPT

## 2020-03-28 PROCEDURE — 99232 SBSQ HOSP IP/OBS MODERATE 35: CPT | Performed by: INTERNAL MEDICINE

## 2020-03-28 PROCEDURE — 6370000000 HC RX 637 (ALT 250 FOR IP): Performed by: INTERNAL MEDICINE

## 2020-03-28 PROCEDURE — 97165 OT EVAL LOW COMPLEX 30 MIN: CPT

## 2020-03-28 PROCEDURE — 6370000000 HC RX 637 (ALT 250 FOR IP): Performed by: FAMILY MEDICINE

## 2020-03-28 RX ADMIN — OXYCODONE AND ACETAMINOPHEN 1 TABLET: 10; 325 TABLET ORAL at 10:02

## 2020-03-28 RX ADMIN — CLINDAMYCIN HYDROCHLORIDE 300 MG: 300 CAPSULE ORAL at 05:49

## 2020-03-28 RX ADMIN — OXYCODONE AND ACETAMINOPHEN 1 TABLET: 10; 325 TABLET ORAL at 02:51

## 2020-03-28 RX ADMIN — CLINDAMYCIN HYDROCHLORIDE 300 MG: 300 CAPSULE ORAL at 12:14

## 2020-03-28 ASSESSMENT — PAIN SCALES - GENERAL
PAINLEVEL_OUTOF10: 9
PAINLEVEL_OUTOF10: 5
PAINLEVEL_OUTOF10: 6
PAINLEVEL_OUTOF10: 0

## 2020-03-28 NOTE — PROGRESS NOTES
scale)  · Usual Body Wt: 176 lb (79.8 kg)(2/25; 170 lbs. 10/2019; 168 lbs. 4/2019 Stated)  · % Weight Change:  ,  No weight loss  · Ideal Body Wt: 120 lb (54.4 kg), % Ideal Body >100%  · BMI Classification: BMI 30.0 - 34.9 Obese Class I    Nutrition Interventions:   Continue current diet, Start ONS(General Diet; wound healing ONS BID)  Continued Inpatient Monitoring, Education not appropriate at this time    Nutrition Evaluation:   · Evaluation: Goals set   · Goals: PO intake >75% of meals/ONS. Weight stable ~177 lbs. Improved wound status.     · Monitoring: Meal Intake, Supplement Intake, Weight, Wound Healing      Electronically signed by Dacia Champion, MS, RD, LD on 3/28/20 at 1:23 PM EDT     Noted assessment completed via EMR review and discussion with staff, face to face assessment deferred due to current COVID-19 prevention protocol

## 2020-03-28 NOTE — DISCHARGE SUMMARY
Physician Discharge Summary     Patient ID:  Beatrice Cannon  [id number]  77 y.o.  1965    Admit date: 3/26/2020    Discharge date : 03/28/20     Admitting Physician: RELL ABRAMSH.S.,      Discharge Physician: RELL MORROW,      Admission Diagnoses: Septic shock ([address] [A41.9, R65.21]    Discharge Diagnoses: Septic shock 2/2 wounds    Admission Condition: poor    Discharged Condition: fair    Hospital Course: Patient presented with arthralgias and found to be in septic shock and admitted to the ICU on levophed for BP support. Initially lethargic, likely metabolic encephalopathy 2/2 septic shock. Was started on vanc and zosyn. Weaned off of pressors. Patient was combative and argumentative with staff. Took out her central line and refused placement of peripheral IVs. Pt moved to the floor. Cultures came back positive for pseudomonas and MRSA. Patient refused IV placement and signed herself out AMA. Patient counseled that leaving AMA puts her at increased risk of worsening infection, septic shock and death and pt was verbally abusive and left AMA before completing treatment. Pt is a high risk for readmission. Consults: pulmonary/intensive care      Labs:   Recent Labs     03/26/20  1400 03/27/20  0520   WBC 15.2* 10.3   HGB 12.1 10.6*   HCT 37.7 32.3*    295     Recent Labs     03/26/20  1400 03/27/20  0521    140   K 3.6 3.8   CL 99 107   CO2 23 24   BUN 17 13   CREATININE 0.73 0.57   CALCIUM 8.7 8.2*     Recent Labs     03/26/20  1400   AST 14   ALT 11   BILITOT 0.7   ALKPHOS 93     No results for input(s): INR in the last 72 hours.   Recent Labs     03/26/20  1400   CKTOTAL 59   TROPONINI <0.010       Urinalysis:   Lab Results   Component Value Date    NITRU Negative 03/26/2020    WBCUA 3-5 03/26/2020    BACTERIA Negative 03/26/2020    RBCUA 0-2 03/26/2020    BLOODU Negative 03/26/2020    SPECGRAV 1.016 03/26/2020    GLUCOSEU Negative 03/26/2020    GLUCOSEU NEG 11/02/2011       Radiology:   Most Order Results     Date and Time Order Name Status Description    3/26/2020 1800 Culture, Wound Preliminary     3/26/2020 1800 Culture, Wound Preliminary     3/26/2020 1417 Culture, Blood 2 Preliminary     3/26/2020 1417 Culture, Blood 1 Preliminary     3/26/2020 1412 EKG 12 Lead - Chest Pain Preliminary           Patient Instructions:   Discharge Medication List as of 3/28/2020  1:21 PM      CONTINUE these medications which have NOT CHANGED    Details   ciprofloxacin HCl (CILOXAN) 0.3 % ophthalmic ointment 3 times daily. , Disp-1 Tube, R-0, Normal      !! Cholecalciferol (VITAMIN D3) 25 MCG (1000 UT) TABS Take 1 tablet by mouth daily, Disp-30 tablet, R-3Normal      gabapentin (NEURONTIN) 100 MG capsule Take 100 mg by mouth. Historical Med      vitamin B-12 (CYANOCOBALAMIN) 1000 MCG tablet Take 1 tablet by mouth daily, Disp-30 tablet, [id number]      folic acid (FOLVITE) 1 MG tablet Take 1 mg by mouth dailyHistorical Med      METHOTREXATE, ANTI-RHEUMATIC, PO Take 15 mg by mouth twice a week Sunday AND MONDAYHistorical Med      !! vitamin D (CHOLECALCIFEROL) 1000 UNIT TABS tablet Take 1,000 Units by mouth dailyHistorical Med      senna (SENOKOT) 8.6 MG tablet Take 1 tablet by mouth 2 times daily as needed for ConstipationHistorical Med      hydrOXYzine (ATARAX) 25 MG tablet Take 25 mg by mouth every 6 hours as needed for ItchingHistorical Med      DULoxetine (CYMBALTA) 60 MG extended release capsule Take 120 mg by mouth dailyHistorical Med      acetaminophen (TYLENOL) 325 MG tablet Take 650 mg by mouth every 4 hours as needed for PainHistorical Med      meloxicam (MOBIC) 15 MG tablet Take 15 mg by mouth dailyHistorical Med      traZODone (DESYREL) 100 MG tablet Take 200 mg by mouth nightly Historical Med       !! - Potential duplicate medications found. Please discuss with provider.       STOP taking these medications       oxyCODONE-acetaminophen (PERCOCET)  MG per tablet Comments:   Reason for Stopping: DC time 35 minutes    Signed:  Electronically signed by Mahnaz Panchal DO on 3/28/2020 at 1:32 PM

## 2020-03-28 NOTE — PROGRESS NOTES
eriberto, able to simulate cleaning after BM)  Additional Comments: Pt don/doff sock seated EOB, washed hands at sink. Other ADL's simulated          Therapy key for assistance levels -   Independent = Pt. is able to perform task with no assistance but may require a device   Stand by assistance = Pt. does not perform task at an independent level but does not need physical assistance, requires verbal cues  Minimal, Moderate, Maximal Assistance = Pt. requires physical assistance (25%, 50%, 75% assist from helper) for task but is able to actively participate in task   Dependent = Pt. requires total assistance with task and is not able to actively participate with task completion     Functional Mobility:  Functional Mobility  Functional - Mobility Device: No device  Activity: Other(to sink )  Assist Level: Supervision(cue to manage wei )       Bed Mobility  Bed mobility  Supine to Sit: Independent  Sit to Supine: Independent    Seated and Standing Balance:  Balance  Sitting Balance: Independent  Standing Balance: Independent    Functional Endurance:  Activity Tolerance  Activity Tolerance: Patient Tolerated treatment well    D/C Recommendations:  OT D/C RECOMMENDATIONS  REQUIRES OT FOLLOW UP: No    Equipment Recommendations:  OT Equipment Recommendations  Equipment Needed: No    OT Education:   OT Education  OT Education: OT Role    OT Follow Up:  OT D/C RECOMMENDATIONS  REQUIRES OT FOLLOW UP: No       Assessment/Discharge Disposition:  Assessment: Pt is a 48 y.o. female observed as IND in room. No acute OT recommended at this time.    Discharge Recommendations: Continue to assess pending progress  Decision Making: Low Complexity  History: Multi comorb   Exam: IND  Assistance / Modification: IND    Six Click Score   How much help for putting on and taking off regular lower body clothing?: None  How much help for Bathing?: None  How much help for Toileting?: A Little  How much help for putting on and taking off regular upper body clothing?: None  How much help for taking care of personal grooming?: None  How much help for eating meals?: None  AM-PAC Inpatient Daily Activity Raw Score: 23  AM-PAC Inpatient ADL T-Scale Score : 51.12  ADL Inpatient CMS 0-100% Score: 15.86    Plan:  Plan  Times per week: No acute OT recommended at this time. Goals:   N/A    Patient Goal:    home   Discussed and agreed upon: Yes Comments:     Therapy Time:   OT Individual Minutes  Time In: 9347  Time Out: 6077  Minutes: 14    Eval: 14 minutes     Electronically signed by:     TIFFANY Grant  3/28/2020, 9:48 AM

## 2020-03-29 LAB
GRAM STAIN RESULT: ABNORMAL
ORGANISM: ABNORMAL
ORGANISM: ABNORMAL
WOUND/ABSCESS: ABNORMAL
WOUND/ABSCESS: ABNORMAL

## 2020-03-30 ENCOUNTER — CARE COORDINATION (OUTPATIENT)
Dept: CASE MANAGEMENT | Age: 55
End: 2020-03-30

## 2020-03-30 NOTE — CARE COORDINATION
[facility] Transitions Initial Follow Up Call    Call within 2 business days of discharge: Yes    Patient: Glendy Jerome Patient : 1965   MRN: [id number]  Reason for Admission: 3/26-3/28/2020 [address] IP Septic shock secondary to 2 wounds. +WC for MRSA and Pseudomonas aeruginosa. Left AMA. Discharge Date: 3/28/20 RARS: Readmission Risk Score: 20  Risk 93%  NR    Last Discharge [facility]       Complaint Diagnosis Description Type Department Provider    3/26/20 Generalized Body Aches Fever, unspecified fever cause . .. ED to Hosp-Admission (Discharged) (ADMITTED) [address], DO; Filiberto Ruiz MD           Spoke with: Katie Mcneill    Reports wounds are draining a little yellow and pink-tinged drainage to dressing. She does not feel it is any worse and she is getting around well. Has WC appt. In good spirits and denies any home or DME needs. Facility: [facility]    Wound care appt . Med rec/1111F order completed. Reports taking as directed. Non-face-to-face services provided:  Obtained and reviewed discharge summary and/or continuity of care documents  Education of patient/family/caregiver/guardian to support self-management-Reviewed s/s devloping infection and cellulitis. reviewed CV-19 symptoms to report. Care Transitions 24 Hour Call    Do you have any ongoing symptoms?:  Yes  Patient-reported symptoms:  Other (Comment: Yellow and pink-tinged wound drainage.)  Do you have a copy of your discharge instructions?:  Yes  Do you have all of your prescriptions and are they filled?:  Yes  Have you been contacted by a [address]?:  No  Have you scheduled your follow up appointment?:  Yes  How are you going to get to your appointment?:  Car - drive self  Were you discharged with any Home Care or Post Acute Services:  No  Do you feel like you have everything you need to keep you well at home?:  Yes  Care Transitions Interventions         Follow Up  No future appointments.     Y[place] during their illness. You should restrict activities outside your home, except for getting medical care.   ; Avoid public areas: Do not go to work, school, or public areas.   ; Avoid public transportation: Avoid using public transportation, ride-sharing, or taxis.  ; Separate yourself from other people and animals in your home   ; Stay away from others: As much as possible, you should stay in a specific room and away from other people in your home. Also, you should use a separate bathroom, if available.   ; Limit contact with pets & animals: You should restrict contact with pets and other animals while you are sick with COVID-19, just like you would around other people. Although there have not been reports of pets or other animals becoming sick with COVID-19, it is still recommended that people sick with COVID-19 limit contact with animals until more information is known about the virus. ; When possible, have another member of your household care for your animals while you are sick. If you are sick with COVID-19, avoid contact with your pet, including petting, snuggling, being kissed or licked, and sharing food. If you must care for your pet or be around animals while you are sick, wash your hands before and after you interact with pets and wear a facemask. See COVID-19 and Animals for more information. Other considerations   The ill person should eat/be fed in their room if possible. Non-disposable  items used should be handled with gloves and washed with hot water or in a . Clean hands after handling used  items.  If possible, dedicate a lined trash can for the ill person. Use gloves when removing garbage bags, handling, and disposing of trash. Wash hands after handling or disposing of trash.  Consider consulting with your local health department about trash disposal guidance if available.     Information for Household Members and Caregivers of Someone who is Sick Call ahead before visiting your doctor   Call ahead: If you have a medical appointment, call the healthcare provider and tell them that you have or may have COVID-19. This will help the healthcare provider's office take steps to keep other people from getting infected or exposed. Wear a facemask if you are sick   ; If you are sick: You should wear a facemask when you are around other people (e.g., sharing a room or vehicle) or pets and before you enter a healthcare provider's office. ; If you are caring for others: If the person who is sick is not able to wear a facemask (for example, because it causes trouble breathing), then people who live with the person who is sick should not stay in the same room with them, or they should wear a facemask if they enter a room with the person who is sick. Cover your coughs and sneezes   ; Cover: Cover your mouth and nose with a tissue when you cough or sneeze.   ; Dispose: Throw used tissues in a lined trash can.   ; Wash hands: Immediately wash your hands with soap and water for at least 20 seconds or, if soap and water are not available, clean your hands with an alcohol-based hand  that contains at least 60% alcohol. Clean your hands often   ; Wash hands: Wash your hands often with soap and water for at least 20 seconds, especially after blowing your nose, coughing, or sneezing; going to the bathroom; and before eating or preparing food.   ; Hand : If soap and water are not readily available, use an alcohol-based hand  with at least 60% alcohol, covering all surfaces of your hands and rubbing them together until they feel dry.   ; Soap and water: Soap and water are the best option if hands are visibly dirty.   ; Avoid touching: Avoid touching your eyes, nose, and mouth with unwashed hands. Handwashing Tips   ;  Wet your hands with clean, running water (warm or cold), turn off the tap, and apply soap.  ; Lather your hands by rubbing them who are placed under active monitoring or facilitated self-monitoring should follow instructions provided by their local health department or occupational health professionals, as appropriate.  ; Call 911 if you have a medical emergency: If you have a medical emergency and need to call 911, notify the dispatch personnel that you have, or are being evaluated for COVID-19. If possible, put on a facemask before emergency medical services arrive.

## 2020-03-31 LAB
BLOOD CULTURE, ROUTINE: NORMAL
CULTURE, BLOOD 2: NORMAL

## 2020-04-06 RX ORDER — OXYCODONE AND ACETAMINOPHEN 10; 325 MG/1; MG/1
21 TABLET ORAL EVERY 8 HOURS PRN
Qty: 21 TABLET | Refills: 0 | OUTPATIENT
Start: 2020-04-06 | End: 2020-04-13

## 2020-04-06 RX ORDER — OXYCODONE AND ACETAMINOPHEN 10; 325 MG/1; MG/1
1 TABLET ORAL EVERY 8 HOURS PRN
Qty: 90 TABLET | Refills: 0 | Status: SHIPPED | OUTPATIENT
Start: 2020-04-06 | End: 2020-04-06

## 2020-04-06 NOTE — TELEPHONE ENCOUNTER
Patient requesting medication refill. Please approve or deny this request.    Rx requested:  Requested Prescriptions     Pending Prescriptions Disp Refills    oxyCODONE-acetaminophen (PERCOCET)  MG per tablet 90 tablet 0     Sig: Take 1 tablet by mouth every 8 hours as needed for Pain for up to 30 days. Intended supply: 30 days         Last Office Visit:   2/25/2020      Next Visit Date:  No future appointments.

## 2020-04-08 RX ORDER — ISOPROPYL ALCOHOL 0.7 ML/1
1 SWAB TOPICAL DAILY
Qty: 100 EACH | Refills: 3 | Status: SHIPPED | OUTPATIENT
Start: 2020-04-08 | End: 2020-04-09

## 2020-04-08 RX ORDER — HYDROCORTISONE BUTYRATE 0.1 %
1 CREAM (GRAM) TOPICAL 2 TIMES DAILY
Qty: 15 G | Refills: 0 | Status: SHIPPED | OUTPATIENT
Start: 2020-04-08 | End: 2020-04-09

## 2020-04-08 RX ORDER — CALCIPOTRIENE 50 UG/G
CREAM TOPICAL
Qty: 1 TUBE | Refills: 4 | Status: SHIPPED | OUTPATIENT
Start: 2020-04-08 | End: 2020-04-09

## 2020-04-09 RX ORDER — CALCIPOTRIENE 50 UG/G
CREAM TOPICAL
Qty: 360 G | Refills: 0 | Status: ON HOLD | OUTPATIENT
Start: 2020-04-09 | End: 2020-05-08

## 2020-04-09 RX ORDER — BLOOD SUGAR DIAGNOSTIC
STRIP MISCELLANEOUS
Qty: 100 EACH | Refills: 0 | Status: SHIPPED | OUTPATIENT
Start: 2020-04-09

## 2020-04-09 RX ORDER — HYDROCORTISONE BUTYRATE 0.1 %
CREAM (GRAM) TOPICAL
Qty: 360 G | Refills: 0 | Status: ON HOLD | OUTPATIENT
Start: 2020-04-09 | End: 2020-05-08

## 2020-04-10 NOTE — TELEPHONE ENCOUNTER
Tung Moore is requesting medication refill. Prev request was over looked. Please advise. Rx requested:  Requested Prescriptions     Pending Prescriptions Disp Refills    oxyCODONE-acetaminophen (PERCOCET)  MG per tablet 90 tablet 0     Sig: Take 1 tablet by mouth every 8 hours as needed for Pain for up to 30 days. Intended supply: 30 days       Last Office Visit:   2/25/2020    Last Tox screen:    02-25-20    Last Medication contract:    ?/Needed    Next Visit Date:  No future appointments.

## 2020-04-13 RX ORDER — OXYCODONE AND ACETAMINOPHEN 10; 325 MG/1; MG/1
1 TABLET ORAL EVERY 8 HOURS PRN
Qty: 90 TABLET | Refills: 0 | Status: SHIPPED | OUTPATIENT
Start: 2020-04-13 | End: 2020-05-13

## 2020-04-16 ENCOUNTER — CARE COORDINATION (OUTPATIENT)
Dept: CASE MANAGEMENT | Age: 55
End: 2020-04-16

## 2020-04-16 NOTE — CARE COORDINATION
COVID-19 Screening Follow-up Note    Patient contacted regarding COVID-19 risk and screening. Care Transition Nurse/ Ambulatory Care Manager contacted the patient by telephone to perform follow-up assessment. Verified name and  with patient as identifiers. Patient has following risk factors of: Smoker, drug abuse history, Obesity, COPD, Leukemia, CA history. Symptoms reviewed with patient who verbalized the following symptoms: None      Due to no new or worsening symptoms encounter was not routed to provider for escalation. Education provided regarding infection prevention, and signs and symptoms of COVID-19 and when to seek medical attention with patient who verbalized understanding. Discussed exposure protocols and quarantine from [address] you at higher risk for severe illness  and given an opportunity for questions and concerns. The patient agrees to contact the COVID-19 hotline [phone] or PCP office for questions related to their healthcare. CTN/ACM provided contact information for future reference. From CDC: Are you at higher risk for severe illness?  Wash your hands often.  Avoid close contact (6 feet, which is about two arm lengths) with people who are sick.  Put distance between yourself and other people if COVID-19 is spreading in your community.  Clean and disinfect frequently touched surfaces.  Avoid all cruise travel and non-essential air travel.  Call your healthcare professional if you have concerns about COVID-19 and your underlying condition or if you are sick. For more information on steps you can take to protect yourself, see CDC's How to Protect Yourself      Denies fever, chills, cough, or flu-like symptoms. Denies need for further follow up or CV-19 education. Precautions reviewed. CTN s/o.     Advance Care Planning  People with COVID-19 may have no symptoms, mild symptoms, such as fever, cough, and shortness of breath or they may have more severe illness, developing severe and fatal pneumonia. As a result, Advance Care Planning with attention to naming a health care decision maker (someone you trust to make healthcare decisions for you if you could not speak for yourself) and sharing other health care preferences is important BEFORE a possible health crisis. Please contact your Primary Care Provider to discuss Advance Care Planning. Preventing the Spread of Coronavirus Disease 2019 in Homes and Residential Communities  For the most recent information go to [employer]    Prevention steps for People with confirmed or suspected COVID-19 (including persons under investigation) who do not need to be hospitalized  and   People with confirmed COVID-19 who were hospitalized and determined to be medically stable to go home    Your healthcare provider and public health staff will evaluate whether you can be cared for at home. If it is determined that you do not need to be hospitalized and can be isolated at home, you will be monitored by staff from your local or state health department. You should follow the prevention steps below until a healthcare provider or local or state health department says you can return to your normal activities. Stay home except to get medical care  People who are mildly ill with COVID-19 are able to isolate at home during their illness. You should restrict activities outside your home, except for getting medical care. Do not go to work, school, or public areas. Avoid using public transportation, ride-sharing, or taxis. Separate yourself from other people and animals in your home  People: As much as possible, you should stay in a specific room and away from other people in your home. Also, you should use a separate bathroom, if available. Animals:  You should restrict contact with pets and other animals while you are sick with COVID-19, just like you would around

## 2020-04-22 ENCOUNTER — HOSPITAL ENCOUNTER (INPATIENT)
Age: 55
LOS: 1 days | Discharge: LEFT BEFORE DISCHARGE | DRG: 603 | End: 2020-04-23
Attending: INTERNAL MEDICINE
Payer: COMMERCIAL

## 2020-04-22 ENCOUNTER — APPOINTMENT (OUTPATIENT)
Dept: CT IMAGING | Age: 55
DRG: 603 | End: 2020-04-22
Payer: COMMERCIAL

## 2020-04-22 ENCOUNTER — OFFICE VISIT (OUTPATIENT)
Dept: PRIMARY CARE CLINIC | Age: 55
End: 2020-04-22
Payer: COMMERCIAL

## 2020-04-22 VITALS
SYSTOLIC BLOOD PRESSURE: 100 MMHG | HEART RATE: 74 BPM | RESPIRATION RATE: 16 BRPM | OXYGEN SATURATION: 98 % | TEMPERATURE: 98.8 F | DIASTOLIC BLOOD PRESSURE: 60 MMHG

## 2020-04-22 LAB
ALBUMIN SERPL-MCNC: 3.7 G/DL (ref 3.5–4.6)
ALP BLD-CCNC: 86 U/L (ref 40–130)
ALT SERPL-CCNC: 10 U/L (ref 0–33)
ANION GAP SERPL CALCULATED.3IONS-SCNC: 11 MEQ/L (ref 9–15)
AST SERPL-CCNC: 13 U/L (ref 0–35)
BASOPHILS ABSOLUTE: 0 K/UL (ref 0–0.2)
BASOPHILS RELATIVE PERCENT: 0.1 %
BILIRUB SERPL-MCNC: <0.2 MG/DL (ref 0.2–0.7)
BUN BLDV-MCNC: 15 MG/DL (ref 6–20)
CALCIUM SERPL-MCNC: 9 MG/DL (ref 8.5–9.9)
CHLORIDE BLD-SCNC: 101 MEQ/L (ref 95–107)
CO2: 25 MEQ/L (ref 20–31)
CREAT SERPL-MCNC: 0.9 MG/DL (ref 0.5–0.9)
EOSINOPHILS ABSOLUTE: 0.3 K/UL (ref 0–0.7)
EOSINOPHILS RELATIVE PERCENT: 3 %
GFR AFRICAN AMERICAN: >60
GFR NON-AFRICAN AMERICAN: >60
GLOBULIN: 3.4 G/DL (ref 2.3–3.5)
GLUCOSE BLD-MCNC: 107 MG/DL (ref 70–99)
HCT VFR BLD CALC: 40 % (ref 37–47)
HEMOGLOBIN: 13 G/DL (ref 12–16)
LACTIC ACID: 1.3 MMOL/L (ref 0.5–2.2)
LIPASE: 42 U/L (ref 12–95)
LYMPHOCYTES ABSOLUTE: 3.8 K/UL (ref 1–4.8)
LYMPHOCYTES RELATIVE PERCENT: 35.5 %
MCH RBC QN AUTO: 25.6 PG (ref 27–31.3)
MCHC RBC AUTO-ENTMCNC: 32.4 % (ref 33–37)
MCV RBC AUTO: 79.2 FL (ref 82–100)
MONOCYTES ABSOLUTE: 0.6 K/UL (ref 0.2–0.8)
MONOCYTES RELATIVE PERCENT: 5.3 %
NEUTROPHILS ABSOLUTE: 6 K/UL (ref 1.4–6.5)
NEUTROPHILS RELATIVE PERCENT: 56.1 %
PDW BLD-RTO: 16.2 % (ref 11.5–14.5)
PLATELET # BLD: 281 K/UL (ref 130–400)
POC CREATININE WHOLE BLOOD: 0.9
POTASSIUM SERPL-SCNC: 3.8 MEQ/L (ref 3.4–4.9)
RBC # BLD: 5.05 M/UL (ref 4.2–5.4)
SODIUM BLD-SCNC: 137 MEQ/L (ref 135–144)
TOTAL PROTEIN: 7.1 G/DL (ref 6.3–8)
WBC # BLD: 10.8 K/UL (ref 4.8–10.8)

## 2020-04-22 PROCEDURE — 96365 THER/PROPH/DIAG IV INF INIT: CPT

## 2020-04-22 PROCEDURE — 2580000003 HC RX 258: Performed by: PERSONAL EMERGENCY RESPONSE ATTENDANT

## 2020-04-22 PROCEDURE — 3017F COLORECTAL CA SCREEN DOC REV: CPT | Performed by: NURSE PRACTITIONER

## 2020-04-22 PROCEDURE — 6360000004 HC RX CONTRAST MEDICATION: Performed by: PERSONAL EMERGENCY RESPONSE ATTENDANT

## 2020-04-22 PROCEDURE — 96367 TX/PROPH/DG ADDL SEQ IV INF: CPT

## 2020-04-22 PROCEDURE — 74177 CT ABD & PELVIS W/CONTRAST: CPT

## 2020-04-22 PROCEDURE — 36415 COLL VENOUS BLD VENIPUNCTURE: CPT

## 2020-04-22 PROCEDURE — 6370000000 HC RX 637 (ALT 250 FOR IP): Performed by: INTERNAL MEDICINE

## 2020-04-22 PROCEDURE — G8417 CALC BMI ABV UP PARAM F/U: HCPCS | Performed by: NURSE PRACTITIONER

## 2020-04-22 PROCEDURE — 99213 OFFICE O/P EST LOW 20 MIN: CPT | Performed by: NURSE PRACTITIONER

## 2020-04-22 PROCEDURE — 99285 EMERGENCY DEPT VISIT HI MDM: CPT

## 2020-04-22 PROCEDURE — G0378 HOSPITAL OBSERVATION PER HR: HCPCS

## 2020-04-22 PROCEDURE — 87070 CULTURE OTHR SPECIMN AEROBIC: CPT

## 2020-04-22 PROCEDURE — 1111F DSCHRG MED/CURRENT MED MERGE: CPT | Performed by: NURSE PRACTITIONER

## 2020-04-22 PROCEDURE — 83690 ASSAY OF LIPASE: CPT

## 2020-04-22 PROCEDURE — 87147 CULTURE TYPE IMMUNOLOGIC: CPT

## 2020-04-22 PROCEDURE — 2500000003 HC RX 250 WO HCPCS: Performed by: PERSONAL EMERGENCY RESPONSE ATTENDANT

## 2020-04-22 PROCEDURE — 4004F PT TOBACCO SCREEN RCVD TLK: CPT | Performed by: NURSE PRACTITIONER

## 2020-04-22 PROCEDURE — 2060000000 HC ICU INTERMEDIATE R&B

## 2020-04-22 PROCEDURE — G8427 DOCREV CUR MEDS BY ELIG CLIN: HCPCS | Performed by: NURSE PRACTITIONER

## 2020-04-22 PROCEDURE — 87040 BLOOD CULTURE FOR BACTERIA: CPT

## 2020-04-22 PROCEDURE — 6360000002 HC RX W HCPCS: Performed by: PERSONAL EMERGENCY RESPONSE ATTENDANT

## 2020-04-22 PROCEDURE — 87077 CULTURE AEROBIC IDENTIFY: CPT

## 2020-04-22 PROCEDURE — 96376 TX/PRO/DX INJ SAME DRUG ADON: CPT

## 2020-04-22 PROCEDURE — 80053 COMPREHEN METABOLIC PANEL: CPT

## 2020-04-22 PROCEDURE — 96375 TX/PRO/DX INJ NEW DRUG ADDON: CPT

## 2020-04-22 PROCEDURE — 87186 SC STD MICRODIL/AGAR DIL: CPT

## 2020-04-22 PROCEDURE — 87075 CULTR BACTERIA EXCEPT BLOOD: CPT

## 2020-04-22 PROCEDURE — 83605 ASSAY OF LACTIC ACID: CPT

## 2020-04-22 PROCEDURE — 87205 SMEAR GRAM STAIN: CPT

## 2020-04-22 PROCEDURE — 85025 COMPLETE CBC W/AUTO DIFF WBC: CPT

## 2020-04-22 RX ORDER — CHOLECALCIFEROL (VITAMIN D3) 50 MCG
TABLET ORAL
COMMUNITY
Start: 2020-03-19 | End: 2021-02-21

## 2020-04-22 RX ORDER — MORPHINE SULFATE 2 MG/ML
4 INJECTION, SOLUTION INTRAMUSCULAR; INTRAVENOUS ONCE
Status: COMPLETED | OUTPATIENT
Start: 2020-04-22 | End: 2020-04-22

## 2020-04-22 RX ORDER — DIPHENHYDRAMINE HYDROCHLORIDE 50 MG/ML
25 INJECTION INTRAMUSCULAR; INTRAVENOUS ONCE
Status: COMPLETED | OUTPATIENT
Start: 2020-04-22 | End: 2020-04-22

## 2020-04-22 RX ORDER — DIPHENHYDRAMINE HCL 25 MG
12.5 TABLET ORAL ONCE
Status: COMPLETED | OUTPATIENT
Start: 2020-04-22 | End: 2020-04-22

## 2020-04-22 RX ORDER — ONDANSETRON 2 MG/ML
4 INJECTION INTRAMUSCULAR; INTRAVENOUS ONCE
Status: COMPLETED | OUTPATIENT
Start: 2020-04-22 | End: 2020-04-22

## 2020-04-22 RX ADMIN — MORPHINE SULFATE 4 MG: 2 INJECTION, SOLUTION INTRAMUSCULAR; INTRAVENOUS at 19:24

## 2020-04-22 RX ADMIN — VANCOMYCIN HYDROCHLORIDE 1000 MG: 1 INJECTION, POWDER, LYOPHILIZED, FOR SOLUTION INTRAVENOUS at 21:27

## 2020-04-22 RX ADMIN — ONDANSETRON 4 MG: 2 INJECTION INTRAMUSCULAR; INTRAVENOUS at 19:23

## 2020-04-22 RX ADMIN — DIPHENHYDRAMINE HYDROCHLORIDE 25 MG: 50 INJECTION, SOLUTION INTRAMUSCULAR; INTRAVENOUS at 19:24

## 2020-04-22 RX ADMIN — CEFTRIAXONE SODIUM 1 G: 1 INJECTION, POWDER, FOR SOLUTION INTRAMUSCULAR; INTRAVENOUS at 20:45

## 2020-04-22 RX ADMIN — MORPHINE SULFATE 4 MG: 2 INJECTION, SOLUTION INTRAMUSCULAR; INTRAVENOUS at 20:58

## 2020-04-22 RX ADMIN — SILVER SULFADIAZINE: 10 CREAM TOPICAL at 21:02

## 2020-04-22 RX ADMIN — DIPHENHYDRAMINE HCL 12.5 MG: 25 TABLET ORAL at 23:21

## 2020-04-22 RX ADMIN — IOPAMIDOL 100 ML: 612 INJECTION, SOLUTION INTRAVENOUS at 19:49

## 2020-04-22 ASSESSMENT — PAIN DESCRIPTION - FREQUENCY
FREQUENCY: CONTINUOUS
FREQUENCY: CONTINUOUS
FREQUENCY: INTERMITTENT

## 2020-04-22 ASSESSMENT — PAIN DESCRIPTION - LOCATION
LOCATION: ABDOMEN
LOCATION_2: ANKLE
LOCATION_2: ANKLE

## 2020-04-22 ASSESSMENT — PAIN SCALES - GENERAL
PAINLEVEL_OUTOF10: 9
PAINLEVEL_OUTOF10: 9
PAINLEVEL_OUTOF10: 7
PAINLEVEL_OUTOF10: 5
PAINLEVEL_OUTOF10: 9

## 2020-04-22 ASSESSMENT — PAIN DESCRIPTION - DESCRIPTORS
DESCRIPTORS_2: BURNING
DESCRIPTORS_2: BURNING
DESCRIPTORS: ACHING
DESCRIPTORS: PRESSURE
DESCRIPTORS: ACHING

## 2020-04-22 ASSESSMENT — PAIN DESCRIPTION - PROGRESSION
CLINICAL_PROGRESSION: GRADUALLY IMPROVING
CLINICAL_PROGRESSION_2: NOT CHANGED

## 2020-04-22 ASSESSMENT — ENCOUNTER SYMPTOMS
DIARRHEA: 0
RHINORRHEA: 0
ABDOMINAL PAIN: 1
NAUSEA: 0
VOMITING: 0
BLOOD IN STOOL: 0
COUGH: 0
COLOR CHANGE: 0
SORE THROAT: 0
SHORTNESS OF BREATH: 0
RESPIRATORY NEGATIVE: 1

## 2020-04-22 ASSESSMENT — PAIN DESCRIPTION - INTENSITY
RATING_2: 4
RATING_2: 5

## 2020-04-22 ASSESSMENT — PAIN DESCRIPTION - ORIENTATION
ORIENTATION_2: RIGHT
ORIENTATION_2: RIGHT

## 2020-04-22 ASSESSMENT — PAIN DESCRIPTION - DURATION: DURATION_2: CONTINUOUS

## 2020-04-22 ASSESSMENT — PAIN DESCRIPTION - PAIN TYPE
TYPE: ACUTE PAIN

## 2020-04-22 NOTE — ED PROVIDER NOTES
negative. 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    Neuromuscular disorder ([facility]     Osteoarthritis     Pneumonia due to organism     Pyoderma gangrenosa     Ulcerative colitis ([facility]          SURGICAL HISTORY       Past Surgical History:   Procedure Laterality Date    ABDOMEN SURGERY      sleen repair    APPENDECTOMY      BACK SURGERY      BREAST SURGERY      fatty tumor removed, benign    COLONOSCOPY      COSMETIC SURGERY      tummy tuck    EYE SURGERY      bilateral cataract surgery    HYSTERECTOMY      SKIN BIOPSY      SPINAL FUSION      TONSILLECTOMY           CURRENT MEDICATIONS       Previous Medications    ACETAMINOPHEN (TYLENOL) 325 MG TABLET    Take 650 mg by mouth every 4 hours as needed for Pain    ALCOHOL SWABS (ALCOHOL PADS) 70 % PADS    CLEAN THE SKIN BY USING 1 PAD ON THE AFFECTED AREA BEFORE APPLYING ANY TOPICAL CREAM, 3 TIMES DAILY    CALCIPOTRIENE (DOVONEX) 0.005 % CREAM    CALCIP    CHOLECALCIFEROL (VITAMIN D) 50 MCG (2000 UT) TABS TABLET        CHOLECALCIFEROL (VITAMIN D3) 25 MCG (1000 UT) TABS    Take 1 tablet by mouth daily    CIPROFLOXACIN HCL (CILOXAN) 0.3 % OPHTHALMIC OINTMENT    3 times daily. DULOXETINE (CYMBALTA) 60 MG EXTENDED RELEASE CAPSULE    Take 120 mg by mouth daily    FOLIC ACID (FOLVITE) 1 MG TABLET    Take 1 mg by mouth daily    GABAPENTIN (NEURONTIN) 100 MG CAPSULE    Take 100 mg by mouth. HYDROCORTISONE BUTYRATE 0.1 % CREA    APPLY 1-2 GRAMS TOPICALLY TO AFFECTED AREA 1-2 TIMES PER DAY. DO NOT APPLY TO FACE, GROIN, UNDERARMS, UNLESS DIRECTED BY PHYSICIAN.     HYDROXYZINE (ATARAX) 25 MG TABLET    Take 25 mg by mouth every 6 hours as needed for Itching    MELOXICAM (MOBIC) 15 MG TABLET    Take 15 mg by mouth daily    METHOTREXATE, ANTI-RHEUMATIC, PO    Take 15 mg by mouth twice a week Sunday AND MONDAY    OXYCODONE-ACETAMINOPHEN (PERCOCET)  MG PER TABLET    Take 1 tablet by mouth every 8 hours as needed for Pain for up to 30 days. Intended supply: 30 days    SENNA (SENOKOT) 8.6 MG TABLET    Take 1 tablet by mouth 2 times daily as needed for Constipation    TRAZODONE (DESYREL) 100 MG TABLET    Take 200 mg by mouth nightly     VITAMIN B-12 (CYANOCOBALAMIN) 1000 MCG TABLET    Take 1 tablet by mouth daily    VITAMIN D (CHOLECALCIFEROL) 1000 UNIT TABS TABLET    Take 1,000 Units by mouth daily       ALLERGIES     Amoxicillin-pot clavulanate and Other    FAMILY HISTORY     No family history on file. SOCIAL HISTORY       Social History     Socioeconomic History    Marital status:       Spouse name: Not on file    Number of children: Not on file    Years of education: Not on file    Highest education level: Not on file   Occupational History    Not on file   Social Needs    Financial resource strain: Not on file    Food insecurity     Worry: Not on file     Inability: Not on file    Transportation needs     Medical: Not on file     Non-medical: Not on file   Tobacco Use    Smoking status: Current Every Day Smoker     Packs/day: 0.50     Years: 40.00     Pack years: 20.00     Types: Cigarettes    Smokeless tobacco: Never Used   Substance and Sexual Activity    Alcohol use: No     Comment: ocassionally    Drug use: No    Sexual activity: Not Currently   Lifestyle    Physical activity     Days per week: Not on file     Minutes per session: Not on file    Stress: Not on file   Relationships    Social connections     Talks on phone: Not on file     Gets together: Not on file     Attends Zoroastrian service: Not on file     Active member of club or organization: Not on file her Achilles with minimal white drainage. No tenderness. MSP intact distally. Faint erythematous pinpoint maculopapular rash throughout body including trunk, and bilateral upper and lower extremities. Neurological:      Mental Status: She is alert and oriented to person, place, and time. Deep Tendon Reflexes: Reflexes are normal and symmetric. Psychiatric:         Behavior: Behavior normal.         Thought Content: Thought content normal.         Judgment: Judgment normal.         DIAGNOSTIC RESULTS     EKG:All EKG's are interpreted by the Emergency Department Physician who either signs or Co-signs this chart in the absence of a cardiologist.        RADIOLOGY:   Non-plain film images such as CT, Ultrasound and MRI are read by theradiologist. Plain radiographic images are visualized and preliminarily interpreted by the emergency physician with the below findings:    Interpretation per theRadiologist below, if available at the time of this note:    CT ABDOMEN PELVIS W IV CONTRAST Additional Contrast? None   Final Result   1. Approximately 50-60% stenosis of the right common femoral artery by calcified and noncalcified plaque. 2. Severe diffuse fatty infiltration of liver. 3. No bowel obstruction or free intraperitoneal air. 4. There is skin thickening noted in the right mid lower abdominal region with some stranding in the subcutaneous fat. No air within these tissues. There is an area of abnormality that does extend to the abdominal wall. Ulceration is noted along the skin    surface. No abscess. Surgical consultation recommended. 5. Fecal retention throughout colon consistent with constipation.    6. Multiple areas of abnormally enlarged right inguinal lymphadenopathy, there are at least 4 abnormally enlarged lymph nodes, findings could reflect infection, inflammatory/reactive lymphadenopathy, malignancy or metastatic disease also within the    differential diagnosis              LABS:  Labs Reviewed   COMPREHENSIVE METABOLIC PANEL - Abnormal; Notable for the following components:       Result Value    Glucose 107 (*)     All other components within normal limits   CBC WITH AUTO DIFFERENTIAL - Abnormal; Notable for the following components:    MCV 79.2 (*)     MCH 25.6 (*)     MCHC 32.4 (*)     RDW 16.2 (*)     All other components within normal limits   POCT CREATININE - URINE - Normal   CULTURE, BLOOD 1   CULTURE, BLOOD 2   CULTURE, ANAEROBIC AND AEROBIC   LACTIC ACID, PLASMA   LIPASE   URINE RT REFLEX TO CULTURE   URINE DRUG SCREEN       All other labs were within normal range or not returned as of this dictation. EMERGENCY DEPARTMENT COURSE and DIFFERENTIAL DIAGNOSIS/MDM:   Vitals:    Vitals:    04/22/20 1802 04/22/20 1938 04/22/20 2034   BP: 113/80 114/65 (!) 103/58   Pulse: 69 58 56   Resp: 18 18 18   Temp: 99.8 °F (37.7 °C)     SpO2: 93% 95% 95%   Weight:  165 lb (74.8 kg)    Height:  5' 4\" (1.626 m)          MDM    CT of abdomen shows approximately 50 to 60% stenosis of the right common femoral artery by calcified and noncalcified plaque. Severe diffuse fatty infiltration of liver. Skin thickening noted in right mid lower abdominal region with some stranding in the subcutaneous fat. No air. Ulcerations noted along skin surface. Fecal retention throughout colon consistent with constipation. Multiple areas of abnormally enlarged right inguinal lymphadenopathy. Lab work is unremarkable. Pt was started on Rocephin and vanco IV and given morphine for pain. Full body rash does seem to be possible allergic component. CRITICAL CARE TIME   Total Critical Caretime was 0 minutes, excluding separately reportable procedures. There was a high probability of clinically significant/life threatening deterioration in the patient's condition which required my urgent intervention. Procedures    FINAL IMPRESSION      1. Cellulitis of right lower extremity    2.  Failure of outpatient treatment    3. Dermatitis    4. Constipation, unspecified constipation type          DISPOSITION/PLAN   DISPOSITION Decision To Admit 04/22/2020 08:37:22 PM      PATIENT REFERRED TO:  No follow-up provider specified. DISCHARGE MEDICATIONS:  New Prescriptions    No medications on file          (Please notethat portions of this note were completed with a voice recognition program.  Efforts were made to edit the dictations but occasionally words are mis-transcribed. )    BOB Dolan (electronically signed)  Emergency Physician Assistant          Bijan Caldwell, [address]  48/25/08 [id number]

## 2020-04-22 NOTE — PROGRESS NOTES
history on file. Social History     Socioeconomic History    Marital status:      Spouse name: Not on file    Number of children: Not on file    Years of education: Not on file    Highest education level: Not on file   Occupational History    Not on file   Social Needs    Financial resource strain: Not on file    Food insecurity     Worry: Not on file     Inability: Not on file    Transportation needs     Medical: Not on file     Non-medical: Not on file   Tobacco Use    Smoking status: Current Every Day Smoker     Packs/day: 0.50     Years: 40.00     Pack years: 20.00     Types: Cigarettes    Smokeless tobacco: Never Used   Substance and Sexual Activity    Alcohol use: No     Comment: ocassionally    Drug use: No    Sexual activity: Not Currently   Lifestyle    Physical activity     Days per week: Not on file     Minutes per session: Not on file    Stress: Not on file   Relationships    Social connections     Talks on phone: Not on file     Gets together: Not on file     Attends Latter day service: Not on file     Active member of club or organization: Not on file     Attends meetings of clubs or organizations: Not on file     Relationship status: Not on file    Intimate partner violence     Fear of current or ex partner: Not on file     Emotionally abused: Not on file     Physically abused: Not on file     Forced sexual activity: Not on file   Other Topics Concern    Not on file   Social History Narrative    Not on file     Current Outpatient Medications on File Prior to Visit   Medication Sig Dispense Refill    Cholecalciferol (VITAMIN D) 50 MCG (2000 UT) TABS tablet       oxyCODONE-acetaminophen (PERCOCET)  MG per tablet Take 1 tablet by mouth every 8 hours as needed for Pain for up to 30 days.  Intended supply: 30 days 90 tablet 0    Alcohol Swabs (ALCOHOL PADS) 70 % PADS CLEAN THE SKIN BY USING 1 PAD ON THE AFFECTED AREA BEFORE APPLYING ANY TOPICAL CREAM, 3 TIMES DAILY 100 is warm. Comments: Bright red erythematous spots to arms, legs, mostly near RLE pyoderma ulcer area. Neurological:      Mental Status: She is alert and oriented to person, place, and time. Assessment:       Diagnosis Orders   1. Rash           Plan:       No orders of the defined types were placed in this encounter. No orders of the defined types were placed in this encounter. Discussed that her rash could be a reaction to the Humara and with her autoimmune issues and medications, I do not feel comfortable giving her steroids, she has atarax. She is to call the doctor that started her on Humara for further direction. Side effects, adverse effects of the medication prescribed today, as well as treatment plan and result expectations have been discussed with the patient who expresses understanding and desires to proceed.     Close follow up to evaluate treatment results and for coordination of care. I have reviewed the patient's medical history in detail and updated the computerized patient record. Return if symptoms worsen or fail to improve.     Hanna Ayala, APRN - CNP

## 2020-04-23 ENCOUNTER — APPOINTMENT (OUTPATIENT)
Dept: CT IMAGING | Age: 55
DRG: 603 | End: 2020-04-23
Payer: COMMERCIAL

## 2020-04-23 VITALS
WEIGHT: 174.6 LBS | HEART RATE: 53 BPM | OXYGEN SATURATION: 91 % | HEIGHT: 64 IN | BODY MASS INDEX: 29.81 KG/M2 | TEMPERATURE: 98.6 F | SYSTOLIC BLOOD PRESSURE: 91 MMHG | DIASTOLIC BLOOD PRESSURE: 46 MMHG | RESPIRATION RATE: 18 BRPM

## 2020-04-23 LAB
GFR AFRICAN AMERICAN: >60
GFR NON-AFRICAN AMERICAN: >60
PERFORMED ON: NORMAL
POC CREATININE: 0.9 MG/DL (ref 0.6–1.1)
POC SAMPLE TYPE: NORMAL

## 2020-04-23 PROCEDURE — 2580000003 HC RX 258: Performed by: PHYSICIAN ASSISTANT

## 2020-04-23 PROCEDURE — 96376 TX/PRO/DX INJ SAME DRUG ADON: CPT

## 2020-04-23 PROCEDURE — 6370000000 HC RX 637 (ALT 250 FOR IP): Performed by: INTERNAL MEDICINE

## 2020-04-23 PROCEDURE — 2500000003 HC RX 250 WO HCPCS: Performed by: PHYSICIAN ASSISTANT

## 2020-04-23 PROCEDURE — 6370000000 HC RX 637 (ALT 250 FOR IP): Performed by: PHYSICIAN ASSISTANT

## 2020-04-23 PROCEDURE — G0378 HOSPITAL OBSERVATION PER HR: HCPCS

## 2020-04-23 PROCEDURE — 96366 THER/PROPH/DIAG IV INF ADDON: CPT

## 2020-04-23 PROCEDURE — 97165 OT EVAL LOW COMPLEX 30 MIN: CPT

## 2020-04-23 PROCEDURE — 99255 IP/OBS CONSLTJ NEW/EST HI 80: CPT | Performed by: INTERNAL MEDICINE

## 2020-04-23 PROCEDURE — 6360000004 HC RX CONTRAST MEDICATION: Performed by: INTERNAL MEDICINE

## 2020-04-23 PROCEDURE — 75635 CT ANGIO ABDOMINAL ARTERIES: CPT

## 2020-04-23 PROCEDURE — 6360000002 HC RX W HCPCS: Performed by: PHYSICIAN ASSISTANT

## 2020-04-23 PROCEDURE — 99254 IP/OBS CNSLTJ NEW/EST MOD 60: CPT | Performed by: INTERNAL MEDICINE

## 2020-04-23 PROCEDURE — 96375 TX/PRO/DX INJ NEW DRUG ADDON: CPT

## 2020-04-23 RX ORDER — ATORVASTATIN CALCIUM 10 MG/1
10 TABLET, FILM COATED ORAL NIGHTLY
Status: DISCONTINUED | OUTPATIENT
Start: 2020-04-23 | End: 2020-04-23 | Stop reason: HOSPADM

## 2020-04-23 RX ORDER — ACETAMINOPHEN 325 MG/1
650 TABLET ORAL EVERY 6 HOURS PRN
Status: DISCONTINUED | OUTPATIENT
Start: 2020-04-23 | End: 2020-04-23 | Stop reason: HOSPADM

## 2020-04-23 RX ORDER — PROMETHAZINE HYDROCHLORIDE 12.5 MG/1
12.5 TABLET ORAL EVERY 6 HOURS PRN
Status: DISCONTINUED | OUTPATIENT
Start: 2020-04-23 | End: 2020-04-23 | Stop reason: HOSPADM

## 2020-04-23 RX ORDER — HYDROXYZINE HYDROCHLORIDE 25 MG/1
25 TABLET, FILM COATED ORAL EVERY 6 HOURS PRN
Status: DISCONTINUED | OUTPATIENT
Start: 2020-04-23 | End: 2020-04-23 | Stop reason: HOSPADM

## 2020-04-23 RX ORDER — GABAPENTIN 800 MG/1
800 TABLET ORAL 4 TIMES DAILY
COMMUNITY
End: 2021-01-18 | Stop reason: SDUPTHER

## 2020-04-23 RX ORDER — DIPHENHYDRAMINE HYDROCHLORIDE 50 MG/ML
25 INJECTION INTRAMUSCULAR; INTRAVENOUS EVERY 6 HOURS PRN
Status: DISCONTINUED | OUTPATIENT
Start: 2020-04-23 | End: 2020-04-23 | Stop reason: HOSPADM

## 2020-04-23 RX ORDER — SODIUM CHLORIDE 0.9 % (FLUSH) 0.9 %
10 SYRINGE (ML) INJECTION EVERY 12 HOURS SCHEDULED
Status: DISCONTINUED | OUTPATIENT
Start: 2020-04-23 | End: 2020-04-23 | Stop reason: HOSPADM

## 2020-04-23 RX ORDER — TRAZODONE HYDROCHLORIDE 100 MG/1
200 TABLET ORAL NIGHTLY
Status: DISCONTINUED | OUTPATIENT
Start: 2020-04-23 | End: 2020-04-23 | Stop reason: HOSPADM

## 2020-04-23 RX ORDER — FLUCONAZOLE 100 MG/1
100 TABLET ORAL DAILY
Status: DISCONTINUED | OUTPATIENT
Start: 2020-04-23 | End: 2020-04-23 | Stop reason: HOSPADM

## 2020-04-23 RX ORDER — FOLIC ACID 1 MG/1
1 TABLET ORAL DAILY
Status: DISCONTINUED | OUTPATIENT
Start: 2020-04-23 | End: 2020-04-23 | Stop reason: HOSPADM

## 2020-04-23 RX ORDER — ACETAMINOPHEN 650 MG/1
650 SUPPOSITORY RECTAL EVERY 6 HOURS PRN
Status: DISCONTINUED | OUTPATIENT
Start: 2020-04-23 | End: 2020-04-23 | Stop reason: HOSPADM

## 2020-04-23 RX ORDER — FLUCONAZOLE 100 MG/1
100 TABLET ORAL DAILY
Qty: 7 TABLET | Refills: 0 | Status: SHIPPED | OUTPATIENT
Start: 2020-04-23 | End: 2020-04-30

## 2020-04-23 RX ORDER — SODIUM CHLORIDE 9 MG/ML
INJECTION, SOLUTION INTRAVENOUS CONTINUOUS
Status: DISCONTINUED | OUTPATIENT
Start: 2020-04-23 | End: 2020-04-23 | Stop reason: HOSPADM

## 2020-04-23 RX ORDER — POLYETHYLENE GLYCOL 3350 17 G/17G
17 POWDER, FOR SOLUTION ORAL DAILY PRN
Status: DISCONTINUED | OUTPATIENT
Start: 2020-04-23 | End: 2020-04-23 | Stop reason: HOSPADM

## 2020-04-23 RX ORDER — OXYBUTYNIN CHLORIDE 15 MG/1
25 TABLET, EXTENDED RELEASE ORAL NIGHTLY
COMMUNITY
End: 2020-05-22

## 2020-04-23 RX ORDER — CIPROFLOXACIN 2 MG/ML
400 INJECTION, SOLUTION INTRAVENOUS EVERY 12 HOURS
Status: DISCONTINUED | OUTPATIENT
Start: 2020-04-23 | End: 2020-04-23

## 2020-04-23 RX ORDER — OXYCODONE AND ACETAMINOPHEN 10; 325 MG/1; MG/1
1 TABLET ORAL EVERY 8 HOURS PRN
Status: DISCONTINUED | OUTPATIENT
Start: 2020-04-23 | End: 2020-04-23 | Stop reason: HOSPADM

## 2020-04-23 RX ORDER — ONDANSETRON 2 MG/ML
4 INJECTION INTRAMUSCULAR; INTRAVENOUS EVERY 6 HOURS PRN
Status: DISCONTINUED | OUTPATIENT
Start: 2020-04-23 | End: 2020-04-23 | Stop reason: HOSPADM

## 2020-04-23 RX ORDER — GABAPENTIN 100 MG/1
100 CAPSULE ORAL NIGHTLY
Status: DISCONTINUED | OUTPATIENT
Start: 2020-04-23 | End: 2020-04-23 | Stop reason: HOSPADM

## 2020-04-23 RX ORDER — CEPHALEXIN 500 MG/1
500 CAPSULE ORAL EVERY 8 HOURS SCHEDULED
Status: DISCONTINUED | OUTPATIENT
Start: 2020-04-23 | End: 2020-04-23 | Stop reason: HOSPADM

## 2020-04-23 RX ORDER — SODIUM CHLORIDE 0.9 % (FLUSH) 0.9 %
10 SYRINGE (ML) INJECTION PRN
Status: DISCONTINUED | OUTPATIENT
Start: 2020-04-23 | End: 2020-04-23 | Stop reason: HOSPADM

## 2020-04-23 RX ORDER — SENNA PLUS 8.6 MG/1
1 TABLET ORAL 2 TIMES DAILY
Status: DISCONTINUED | OUTPATIENT
Start: 2020-04-23 | End: 2020-04-23 | Stop reason: HOSPADM

## 2020-04-23 RX ORDER — CEPHALEXIN 500 MG/1
500 CAPSULE ORAL 3 TIMES DAILY
Qty: 21 CAPSULE | Refills: 0 | Status: SHIPPED | OUTPATIENT
Start: 2020-04-23 | End: 2020-04-30

## 2020-04-23 RX ORDER — ASPIRIN 81 MG/1
81 TABLET, CHEWABLE ORAL DAILY
Status: DISCONTINUED | OUTPATIENT
Start: 2020-04-23 | End: 2020-04-23 | Stop reason: HOSPADM

## 2020-04-23 RX ORDER — DULOXETIN HYDROCHLORIDE 60 MG/1
120 CAPSULE, DELAYED RELEASE ORAL DAILY
Status: DISCONTINUED | OUTPATIENT
Start: 2020-04-23 | End: 2020-04-23 | Stop reason: HOSPADM

## 2020-04-23 RX ADMIN — FAMOTIDINE 20 MG: 10 INJECTION, SOLUTION INTRAVENOUS at 14:33

## 2020-04-23 RX ADMIN — VANCOMYCIN HYDROCHLORIDE 1000 MG: 1 INJECTION, POWDER, LYOPHILIZED, FOR SOLUTION INTRAVENOUS at 12:56

## 2020-04-23 RX ADMIN — HYDROXYZINE HYDROCHLORIDE 25 MG: 25 TABLET, FILM COATED ORAL at 12:48

## 2020-04-23 RX ADMIN — FOLIC ACID 1 MG: 1 TABLET ORAL at 12:48

## 2020-04-23 RX ADMIN — IOPAMIDOL 150 ML: 612 INJECTION, SOLUTION INTRAVENOUS at 16:31

## 2020-04-23 RX ADMIN — Medication 10 ML: at 08:07

## 2020-04-23 RX ADMIN — ASPIRIN 81 MG 81 MG: 81 TABLET ORAL at 14:34

## 2020-04-23 RX ADMIN — CEPHALEXIN 500 MG: 500 CAPSULE ORAL at 14:34

## 2020-04-23 RX ADMIN — DIPHENHYDRAMINE HYDROCHLORIDE 25 MG: 50 INJECTION, SOLUTION INTRAMUSCULAR; INTRAVENOUS at 08:07

## 2020-04-23 RX ADMIN — SENNOSIDES 8.6 MG: 8.6 TABLET, FILM COATED ORAL at 12:48

## 2020-04-23 RX ADMIN — ACETAMINOPHEN 650 MG: 325 TABLET ORAL at 11:37

## 2020-04-23 ASSESSMENT — PAIN DESCRIPTION - DESCRIPTORS
DESCRIPTORS: SORE;ACHING;CONSTANT
DESCRIPTORS: TENDER;SORE

## 2020-04-23 ASSESSMENT — PAIN SCALES - GENERAL
PAINLEVEL_OUTOF10: 6
PAINLEVEL_OUTOF10: 5
PAINLEVEL_OUTOF10: 7

## 2020-04-23 ASSESSMENT — PAIN DESCRIPTION - LOCATION
LOCATION: ABDOMEN
LOCATION: ABDOMEN;OTHER (COMMENT)

## 2020-04-23 ASSESSMENT — ENCOUNTER SYMPTOMS
VOMITING: 0
WHEEZING: 0
ABDOMINAL DISTENTION: 0
GASTROINTESTINAL NEGATIVE: 1
DIARRHEA: 0
RESPIRATORY NEGATIVE: 1
ABDOMINAL PAIN: 0
EYES NEGATIVE: 1
ABDOMINAL PAIN: 1
CONSTIPATION: 0
NAUSEA: 0
SHORTNESS OF BREATH: 0

## 2020-04-23 ASSESSMENT — PAIN DESCRIPTION - PAIN TYPE
TYPE: ACUTE PAIN
TYPE: ACUTE PAIN

## 2020-04-23 ASSESSMENT — PAIN DESCRIPTION - FREQUENCY
FREQUENCY: CONTINUOUS
FREQUENCY: CONTINUOUS

## 2020-04-23 ASSESSMENT — PAIN DESCRIPTION - ONSET
ONSET: ON-GOING
ONSET: ON-GOING

## 2020-04-23 ASSESSMENT — PAIN DESCRIPTION - PROGRESSION: CLINICAL_PROGRESSION: NOT CHANGED

## 2020-04-23 ASSESSMENT — PAIN - FUNCTIONAL ASSESSMENT: PAIN_FUNCTIONAL_ASSESSMENT: PREVENTS OR INTERFERES SOME ACTIVE ACTIVITIES AND ADLS

## 2020-04-23 ASSESSMENT — PAIN DESCRIPTION - ORIENTATION: ORIENTATION: RIGHT

## 2020-04-23 NOTE — PROGRESS NOTES
5126 Patient up in room c/o rash itching and abdominal wound hurting. Medicated with IV benadryl for itching, patient wants something stronger than tylenol for pain. Will notify Dr. Krysten Hart. Patient still refusing tele monitor, will ask to discontinue order. 1140 Medicated with Tylenol for c/o pain. Asked for benadryl but it is not time for her to have it yet. 1600 Patient to CT via wheelchair. 1848 Patient left AMA, refused to sign AMA paper. Removed IV.
BP: (!) 104/54   Pulse: 57   Resp: 18   Temp: 98.5 °F (36.9 °C)   SpO2: 94%       Focal exam:  Redness involving left lower anterior abdominal wall with ulcer  Ulcer involving right lwoer leg    General Exam (except as mentioned above):  CONSTITUTIONAL: Awake, alert, no apparent distress  EYES:  PERRL, conjunctiva normal  ENT:  Normocephalic, atraumatic  NECK:  Supple  BACK:  Symmetric  LUNGS:  CTAB except bilateral basilar crackles. CARDIOVASCULAR:  S1S2 present  ABDOMEN:  soft, non-distended, non-tender  MUSCULOSKELETAL:  There is no redness, warmth, or swelling of the joints. NEUROLOGIC:  Alert, awake, oriented x 3. No FND  EXTREMITIES: Warm and well perfused. LABS  Recent Labs     04/22/20 1815   WBC 10.8   RBC 5.05   HGB 13.0   HCT 40.0   MCV 79.2*   MCH 25.6*   MCHC 32.4*   RDW 16.2*          Recent Labs     04/22/20 1815 04/22/20  1931     --    K 3.8  --      --    CO2 25  --    BUN 15  --    CREATININE 0.90 0.9   GLUCOSE 107*  --    CALCIUM 9.0  --        No results for input(s): MG in the last 72 hours.         ASSESSMENT AND PLAN    Active Hospital Problems    Diagnosis Date Noted    Cellulitis of right leg [E15.049] 04/28/2017     - consult cardiology to evaluate for peripheral arterial disease given non healing right lower extremity ulcer  - current everyday smoker  - pyoderma gangrenosum: keflex and fluconazole for 7 days  - anticipate[ate discharge tomorrow if no vascular intervention planned  -     DVT prophylaxis: Lovenox    35 minutes total care time, >1/2 in unit/floor time and care coordination   Rufino Sheffield MD  Pager : [phone]
Island Pedicle Flap With Canthal Suspension Text: The defect edges were debeveled with a #15 scalpel blade.  Given the location of the defect, shape of the defect and the proximity to free margins an island pedicle advancement flap was deemed most appropriate.  Using a sterile surgical marker, an appropriate advancement flap was drawn incorporating the defect, outlining the appropriate donor tissue and placing the expected incisions within the relaxed skin tension lines where possible. The area thus outlined was incised deep to adipose tissue with a #15 scalpel blade.  The skin margins were undermined to an appropriate distance in all directions around the primary defect and laterally outward around the island pedicle utilizing iris scissors.  There was minimal undermining beneath the pedicle flap. A suspension suture was placed in the canthal tendon to prevent tension and prevent ectropion.

## 2020-04-23 NOTE — CONSULTS
tablet 0    folic acid (FOLVITE) 1 MG tablet Take 1 mg by mouth daily      METHOTREXATE, ANTI-RHEUMATIC, PO Take 15 mg by mouth twice a week Sunday AND MONDAY      vitamin D (CHOLECALCIFEROL) 1000 UNIT TABS tablet Take 1,000 Units by mouth daily      senna (SENOKOT) 8.6 MG tablet Take 1 tablet by mouth 2 times daily as needed for Constipation      hydrOXYzine (ATARAX) 25 MG tablet Take 25 mg by mouth every 6 hours as needed for Itching      DULoxetine (CYMBALTA) 60 MG extended release capsule Take 120 mg by mouth daily      acetaminophen (TYLENOL) 325 MG tablet Take 650 mg by mouth every 4 hours as needed for Pain      meloxicam (MOBIC) 15 MG tablet Take 15 mg by mouth daily      traZODone (DESYREL) 100 MG tablet Take 200 mg by mouth nightly          Allergies   Allergen Reactions    Amoxicillin-Pot Clavulanate      Other reaction(s): GI Upset, Intolerance, Other: See Comments  Nose bleed    Other Itching     IVP dye           No family history on file. Physical Exam:      Physical Exam   Constitutional: She is oriented to person, place, and time. No distress. HENT:   Head: Normocephalic. Eyes: Pupils are equal, round, and reactive to light. Neck: Normal range of motion. Neck supple. No JVD present. No tracheal deviation present. No thyromegaly present. Cardiovascular: Normal heart sounds. No murmur heard. Pulmonary/Chest: Effort normal and breath sounds normal. No respiratory distress. She has no wheezes. She has no rales. She exhibits no tenderness. Abdominal: Soft. Bowel sounds are normal. She exhibits no distension and no mass. There is abdominal tenderness. There is no rebound and no guarding. Musculoskeletal: Normal range of motion. General: Tenderness and edema present. Lymphadenopathy:     She has no cervical adenopathy. Neurological: She is alert and oriented to person, place, and time. Skin: Skin is warm. She is not diaphoretic. There is erythema. fatty infiltration of liver. 3. No bowel obstruction or free intraperitoneal air. 4. There is skin thickening noted in the right mid lower abdominal region with some stranding in the subcutaneous fat. No air within these tissues. There is an area of abnormality that does extend to the abdominal wall. Ulceration is noted along the skin    surface. No abscess. Surgical consultation recommended. 5. Fecal retention throughout colon consistent with constipation. 6. Multiple areas of abnormally enlarged right inguinal lymphadenopathy, there are at least 4 abnormally enlarged lymph nodes, findings could reflect infection, inflammatory/reactive lymphadenopathy, malignancy or metastatic disease also within the    differential diagnosis     Patient MRN: [id number]       : 1965       Age:  55 years       Gender: Female       Order Date: 3/26/2020 7:00 PM.        Exam: XR CHEST PORTABLE       Number of Views: 1        Indication:  Central line placement       Comparison: 3/26/2020       Findings: Incompletely imaged previous anterior cervical fusion seen on previous exam. Left-sided central line placed with its distal tip overlying the region of the distal superior vena cava. Cardiomediastinal silhouette within normal limits. Suspected    bibasilar atelectasis. No pneumothorax.           Impression   Impression:  Left-sided central line placement as above. No identifiable pneumothorax with possible bibasilar atelectasis, although an early developing infiltrate/pneumonia might have the same appearance.          ASSESSMENT:  Patient Active Problem List   Diagnosis    Chronic pain    Cocaine use    Pyoderma gangrenosum    Cellulitis of right leg    Anemia    Obesity    COPD (chronic obstructive pulmonary disease) (HCC)    Fibromyalgia    Depression    Cellulitis    Pyodermic gangrenosum    Pain associated with wound    COPD exacerbation (HCC)    Leg weakness, bilateral    Septic shock ([address]

## 2020-04-23 NOTE — ED NOTES
Patient states pain medication helped take the edge off. States she is still in pain, but feels a little more comfortable.       Ayaan Bravo RN  04/22/20 2005

## 2020-04-23 NOTE — CARE COORDINATION
[facility] Case Management Initial Discharge Assessment    Met with patient to discuss discharge plan. PCP: Margot Nugent MD                                Date of Last Visit: \"Last month\"    If no PCP, list provided? N/A    Discharge Planning    Living Arrangements: independently at home    Who do you live with? Alone    Who helps you with your care:  self    If lives at home:     Do you have any barriers navigating in your home? no    Patient can perform ADL? Yes    Current Services (outpatient and in home) :  None    Dialysis: No    Is transportation available to get to your appointments? Yes    DME Equipment:  yes - Cane and walker    Respiratory equipment: None    Respiratory provider:  no     Pharmacy:  yes - [address] with Medication Assistance Program?  No      Patient agreeable to [facility]? N/A    Patient agreeable to SNF/Rehab? No    Other discharge needs identified? N/A    Freedom of choice list provided with basic dialogue that supports the patient's individualized plan of care/goals and shares the quality data associated with the providers. Yes    Does Patient Have a High-Risk for Readmission Diagnosis (CHF, PN, MI, COPD)? No    The plan for Transition of Care is related to the following treatment goals:Wound treatment    Initial Discharge Plan? (Note: please see concurrent daily documentation for any updates after initial note). Pt reports she is independent at home and has not been using her cane or walker. She also states she is able to do dressing changes herself. Plan return to home unless some new needs arise.     The Patient and/or patient representative: patient was provided with choice of any post-acute providers for care and equipment and agrees with discharge plan  Yes    Electronically signed by DESHAWN Lechuga on 4/23/2020 at 11:50 AM

## 2020-04-23 NOTE — H&P
Hospital Medicine History & Physical      PCP: Vandana Chaudhari MD    Date of Admission: 4/22/2020    Date of Service: 4/22/20      Chief Complaint:  wounds      History Of Present Illness:  48 y.o. female who presented to [facility] ED for complaints of wounds to her her right calf. The patient states that it started 5 weeks ago as a rash and has progressively worsened. The patient also has a chronic ulcer to her right calf which she has had two previous skin grafts. The area is erythematous and edematous. She also developed a rash to her abdomin and torso which is erythematous and itchy. She admits to being on iv abx and steroids last weeks from [facility] wound care. The patient was admitted one month a go for the same issue but left AMA. She has a known history of CKD, COPD and pyoderma gangrenosum. 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    Neuromuscular disorder ([facility] [address]     Osteoarthritis     Pneumonia due to organism     Pyoderma gangrenosa     Ulcerative colitis ([facility] [address]        Past Surgical History:          Procedure Laterality Date    ABDOMEN SURGERY      sleen repair    APPENDECTOMY      BACK SURGERY      BREAST SURGERY      fatty tumor removed, benign    COLONOSCOPY      COSMETIC SURGERY      tummy tuck    EYE SURGERY      bilateral cataract surgery    HYSTERECTOMY      SKIN BIOPSY      SPINAL FUSION      TONSILLECTOMY         Medications Prior to Admission:      Prior to Admission medications    Medication Sig Start Date End Date Taking?  Authorizing Provider   Cholecalciferol (VITAMIN D) 50 MCG (2000 UT) TABS tablet 281     Recent Labs     04/22/20  1815      K 3.8      CO2 25   BUN 15   CREATININE 0.90   CALCIUM 9.0     Recent Labs     04/22/20  1815   AST 13   ALT 10   BILITOT <0.2   ALKPHOS 86     No results for input(s): INR in the last 72 hours. No results for input(s): Idania Aurora in the last 72 hours. Urinalysis:      Lab Results   Component Value Date    NITRU Negative 03/26/2020    WBCUA 3-5 03/26/2020    BACTERIA Negative 03/26/2020    RBCUA 0-2 03/26/2020    BLOODU Negative 03/26/2020    SPECGRAV 1.016 03/26/2020    GLUCOSEU Negative 03/26/2020    GLUCOSEU NEG 11/02/2011       Radiology:     CXR: I have reviewed the CXR with the following interpretation: pending  EKG:  I have reviewed the EKG with the following interpretation: pending    CT ABDOMEN PELVIS W IV CONTRAST Additional Contrast? None   Final Result   1. Approximately 50-60% stenosis of the right common femoral artery by calcified and noncalcified plaque. 2. Severe diffuse fatty infiltration of liver. 3. No bowel obstruction or free intraperitoneal air. 4. There is skin thickening noted in the right mid lower abdominal region with some stranding in the subcutaneous fat. No air within these tissues. There is an area of abnormality that does extend to the abdominal wall. Ulceration is noted along the skin    surface. No abscess. Surgical consultation recommended. 5. Fecal retention throughout colon consistent with constipation.    6. Multiple areas of abnormally enlarged right inguinal lymphadenopathy, there are at least 4 abnormally enlarged lymph nodes, findings could reflect infection, inflammatory/reactive lymphadenopathy, malignancy or metastatic disease also within the    differential diagnosis          ASSESSMENT:    Active Hospital Problems    Diagnosis Date Noted    Cellulitis of right leg [Z27.428] 04/28/2017       PLAN:        DVT Prophylaxis: lovenox  Diet: No diet orders on file  Code Status: Prior    PT/OT Eval

## 2020-04-23 NOTE — ED NOTES
4/16/2018         RE: Rian Malik  [address] [place] [address]        Dear Colleague,    Thank you for referring your patient, Rian Malik, to the [facility]. Please see a copy of my visit note below.      FOLLOW-UP VISIT NOTE    PATIENT NAME: Rian Malik MRN # [id number]  DATE OF VISIT: Apr 16, 2018 YOB: 1959    REFERRING PROVIDER: No referring provider defined for this encounter.    CANCER TYPE: Adenocarcinoma colon  STAGE: III pT4N1c  ECOG PS: 0    ONCOLOGY HISTORY:  58-year-old male who around February 2017r, developed intermittent cramping lower abdominal pain. Was evaluated by PCP and clinical impression was colitis. He was put on p.o. antibiotics. However symptoms did not improve. He was eventually referred to surgery and underwent imaging with CT findings suspicious of sigmoid abscess. He was admitted to the hospital for resection of sigmoid abscess 10/11/17. However intraoperatively was noted to have a large firmly adherent sigmoid colon mass. Laparoscopic surgery was converted to open sigmoidectomy with end colostomy.     Surgical pathology   B.  COLON, SIGMOID, HEMICOLECTOMY   - Poorly differentiated adenocarcinoma, with signet ring cell features   - Tumor size: 5 cm   - Tumor is present on serosal surface and microscopically perforates   serosal surface   - One surgical margin shows extensive serosal involvement by tumor. The   opposite, undesignated margin shows no evidence of malignancy   - Lymphovascular invasion is present   - Perineural invasion is not identified   - Tumor deposits are present   - Immunohistochemistry for mismatch repair proteins shows intact nuclear   expression for MLH1, MSH2, MSH6, and PMS2   - Three reactive lymph nodes, negative for malignancy (0/3)   - Pathologic staging: pT4N1c      Patient's case was discussed at the tumor board. Recommendation was to obtain new staging scans as well as to proceed with systemic  Patient asking for ice chips. PA states patient can have ice chips. Ice chips provided to patient.       Ayaan Bravo RN  04/22/20 2009 chemotherapy at this point with plans for further surgery in future. Staging scans negative for distant disease.     - Started on Folfox 11/21/17    SUBJECTIVE     Patient is here today for his next cycle of chemotherapy. No active complaints. Denies bleeding/axis bruising, fever/chills, nausea/vomiting, abdominal pain. Ostomy working well with no issues.      PAST MEDICAL HISTORY     Past Medical History:   Diagnosis Date     Cirrhosis (H)      Colon cancer (H) 10/11/2017    Poorly differentiated adenocarcinoma     Hepatitis C          CURRENT OUTPATIENT MEDICATIONS     Current Outpatient Prescriptions   Medication Sig Dispense Refill     ondansetron (ZOFRAN) 8 MG tablet Take 1 tablet (8 mg) by mouth every 8 hours as needed (Nausea/Vomiting) 30 tablet 1     LORazepam (ATIVAN) 0.5 MG tablet Take 1 tablet (0.5 mg) by mouth every 4 hours as needed (Anxiety, Nausea/Vomiting or Sleep) 30 tablet 2     prochlorperazine (COMPAZINE) 10 MG tablet Take 1 tablet (10 mg) by mouth every 6 hours as needed (Nausea/Vomiting) 30 tablet 2     docusate sodium (COLACE) 100 MG tablet Take 100 mg by mouth daily 60 tablet 1        ALLERGIES     Allergies   Allergen Reactions     Acetaminophen      Naproxen         REVIEW OF SYSTEMS   As above in the HPI, o/w complete 14-point ROS was negative.     PHYSICAL EXAM   B/P: 138/78, T: 98.1, P: 64, R: 18  SpO2 Readings from Last 4 Encounters:   04/16/18 98%   04/09/18 99%   03/26/18 98%   03/12/18 96%     Wt Readings from Last 3 Encounters:   04/16/18 76.4 kg (168 lb 8 oz)   04/09/18 76 kg (167 lb 8 oz)   03/26/18 73.7 kg (162 lb 7 oz)     GEN: NAD  EYES:PERRLA  Mouth/ENT: Oropharynx is clear.  NECK: no  lymphadenopathy  LUNGS: clear bilaterally  CV: regular, no murmurs, rubs, or gallops  ABDOMEN: soft, non-tender, non-distended,Ostomy in place  EXT: warm, well perfused, no edema  NEURO: alert  SKIN: no rashes     LABORATORY AND IMAGING STUDIES     Lab Results   Component Value Date    WBC 4.0  04/16/2018     Lab Results   Component Value Date    RBC 4.13 04/16/2018     Lab Results   Component Value Date    HGB 13.7 04/16/2018     Lab Results   Component Value Date    HCT 39.6 04/16/2018     No components found for: MCT  Lab Results   Component Value Date    MCV 96 04/16/2018     Lab Results   Component Value Date    MCH 33.2 04/16/2018     Lab Results   Component Value Date    MCHC 34.6 04/16/2018     Lab Results   Component Value Date    RDW 13.4 04/16/2018     Lab Results   Component Value Date    PLT 69 04/16/2018     Recent Labs   Lab Test  04/16/18   0825  04/09/18   0855   NA  139  139   POTASSIUM  4.3  3.9   CHLORIDE  106  106   CO2  28  27   ANIONGAP  5  6   GLC  92  95   BUN  19  16   CR  0.61*  0.74   MAICOL  8.6  8.7        ASSESSMENT AND PLAN     58-year-old male with newly diagnosed     - Poorly differentiated adenocarcinoma colon  [id number](tumor deposits) with margin involvement -  Stage III   CT scans negative for evidence of metastatic disease  Patient's case was discussed at the tumor board 11/13 and recommendation was to proceed with systemic chemotherapy at this point with plans for additional surgery in future if needed after completion of chemotherapy.  Started on FOLFOX q2 week regimen for 6 months duration - s/p 7 cycles so far.  Chemotherapy has been delayed for the last 3 weeks given cytopenias.  Neutrophils have normalized. However platelets have fluctuated up and down and today are 69K.   Pt clinically doing well with no active complaints. He is informed about the platelet counts. Had a discussion with patient today about borderline cell counts and continuing with chemotherapy at reduced dose with watchful monitoring  versus consideration of stopping treatment. He is agrreable to proceed with treatment at this point.  We'll proceed with with cycle 8 today with omitting 5-FU bolus as well as reduced dose oxaliplatin at 65 mg/m2. Will recheck CBC next week for monitoring counts. He  was advised to call back if he has any concerns of bleeding/excess bruising.      - Hepatitis C  Completed antiviral treatment per GI    - Thrombocytopenia  Secondary to chemotherapy Vs Hep C   Continue Oxaliplatin dose reduced at 65 mg/m2  Will omit 5-FU bolus from the remaining cycles    RTC in 2 weeks for cycle 9.    The patient is ready to learn, no apparent learning barriers were identified, Diagnosis and treatment plans were explained to the patient. The patient expressed understanding of the content. The patient questions were answered to his satisfaction.    Chart documentation with Dragon Voice recognition Software. Although reviewed after completion, some words and grammatical errors may remain.  Padilla Last MD  Attending Physician   Hematology/Medical Oncology    Again, thank you for allowing me to participate in the care of your patient.        Sincerely,        Padilla Last MD

## 2020-04-23 NOTE — CONSULTS
 EYE SURGERY      bilateral cataract surgery    HYSTERECTOMY      SKIN BIOPSY      SPINAL FUSION      TONSILLECTOMY         Social History     Socioeconomic History    Marital status:      Spouse name: Not on file    Number of children: Not on file    Years of education: Not on file    Highest education level: Not on file   Occupational History    Not on file   Social Needs    Financial resource strain: Not on file    Food insecurity     Worry: Not on file     Inability: Not on file    Transportation needs     Medical: Not on file     Non-medical: Not on file   Tobacco Use    Smoking status: Current Every Day Smoker     Packs/day: 0.50     Years: 40.00     Pack years: 20.00     Types: Cigarettes    Smokeless tobacco: Never Used   Substance and Sexual Activity    Alcohol use: No     Comment: ocassionally    Drug use: No    Sexual activity: Not Currently   Lifestyle    Physical activity     Days per week: Not on file     Minutes per session: Not on file    Stress: Not on file   Relationships    Social connections     Talks on phone: Not on file     Gets together: Not on file     Attends [employer]ism service: Not on file     Active member of club or organization: Not on file     Attends meetings of clubs or organizations: Not on file     Relationship status: Not on file    Intimate partner violence     Fear of current or ex partner: Not on file     Emotionally abused: Not on file     Physically abused: Not on file     Forced sexual activity: Not on file   Other Topics Concern    Not on file   Social History Narrative    Not on file       No family history on file.     Current Facility-Administered Medications   Medication Dose Route Frequency Provider Last Rate Last Dose    sodium chloride flush 0.9 % injection 10 mL  10 mL Intravenous 2 times per day BOB Kilgore   10 mL at 04/23/20 0807    sodium chloride flush 0.9 % injection 10 mL  10 mL Intravenous PRN Nargis Shin normal. Judgment and thought content normal.       LABS:  Recent Results (from the past 24 hour(s))   Comprehensive Metabolic Panel    Collection Time: 04/22/20  6:15 PM   Result Value Ref Range    Sodium 137 135 - 144 mEq/L    Potassium 3.8 3.4 - 4.9 mEq/L    Chloride 101 95 - 107 mEq/L    CO2 25 20 - 31 mEq/L    Anion Gap 11 9 - 15 mEq/L    Glucose 107 (H) 70 - 99 mg/dL    BUN 15 6 - 20 mg/dL    CREATININE 0.90 0.50 - 0.90 mg/dL    GFR Non-African American >60.0 >60    GFR  >60.0 >60    Calcium 9.0 8.5 - 9.9 mg/dL    Total Protein 7.1 6.3 - 8.0 g/dL    Alb 3.7 3.5 - 4.6 g/dL    Total Bilirubin <0.2 0.2 - 0.7 mg/dL    Alkaline Phosphatase 86 40 - 130 U/L    ALT 10 0 - 33 U/L    AST 13 0 - 35 U/L    Globulin 3.4 2.3 - 3.5 g/dL   CBC Auto Differential    Collection Time: 04/22/20  6:15 PM   Result Value Ref Range    WBC 10.8 4.8 - 10.8 K/uL    RBC 5.05 4.20 - 5.40 M/uL    Hemoglobin 13.0 12.0 - 16.0 g/dL    Hematocrit 40.0 37.0 - 47.0 %    MCV 79.2 (L) 82.0 - 100.0 fL    MCH 25.6 (L) 27.0 - 31.3 pg    MCHC 32.4 (L) 33.0 - 37.0 %    RDW 16.2 (H) 11.5 - 14.5 %    Platelets 558 440 - 436 K/uL    Neutrophils % 56.1 %    Lymphocytes % 35.5 %    Monocytes % 5.3 %    Eosinophils % 3.0 %    Basophils % 0.1 %    Neutrophils Absolute 6.0 1.4 - 6.5 K/uL    Lymphocytes Absolute 3.8 1.0 - 4.8 K/uL    Monocytes Absolute 0.6 0.2 - 0.8 K/uL    Eosinophils Absolute 0.3 0.0 - 0.7 K/uL    Basophils Absolute 0.0 0.0 - 0.2 K/uL   Lactic Acid, Plasma    Collection Time: 04/22/20  6:15 PM   Result Value Ref Range    Lactic Acid 1.3 0.5 - 2.2 mmol/L   Lipase    Collection Time: 04/22/20  6:15 PM   Result Value Ref Range    Lipase 42 12 - 95 U/L   Culture, Anaerobic and Aerobic    Collection Time: 04/22/20  7:00 PM   Result Value Ref Range    Gram Stain Result       Rare WBC's  No epithelial cells  Many Gram positive cocci in clusters-resembling Staph  Moderate Gram negative rods     POCT Venous    Collection Time: 04/22/20

## 2020-04-25 LAB
ANAEROBIC CULTURE: ABNORMAL
GRAM STAIN RESULT: ABNORMAL
ORGANISM: ABNORMAL
ORGANISM: ABNORMAL
WOUND/ABSCESS: ABNORMAL
WOUND/ABSCESS: ABNORMAL

## 2020-04-25 NOTE — DISCHARGE SUMMARY
Physician Discharge Summary     Patient ID:  Tameka Ferrari  [id number]  48 y.o.  1965    Admit date: 2020    Discharge date and time:2020  6:55 PM    Admitting Physician: No admitting provider for patient encounter. Discharge Physician: Bárbara Rushing MD    Admission Diagnoses: Cellulitis of right leg [Q28.134]    Discharge Diagnoses: Active Hospital Problems    Diagnosis Date Noted    Cellulitis of right leg [H63.096] 2017       Admission Condition: fair    Discharged Condition: good    Indication for Admission: Right leg cellulitis    Hospital Course: Patient was admitted with right leg cellulitis and nonhealing wound over right anterior abdominal wall. Patient was admitted with similar complaints 1 week ago but left AMA. Patient was started on IV antibiotics and the plan was to have patient go home on p.o. antibiotics for wound infection. The CT scan of the abdomen showed right common femoral femoral stenosis and hence cardiology was consulted to evaluate for further peripheral vascular disease. The patient however left AMA prior to complete evaluation are completing antibiotic therapy. Inpatient meds:    Labs:  LABS  Recent Labs     20   WBC 10.8   RBC 5.05   HGB 13.0   HCT 40.0   MCV 79.2*   MCH 25.6*   MCHC 32.4*   RDW 16.2*          Recent Labs     20  1931     --    K 3.8  --      --    CO2 25  --    BUN 15  --    CREATININE 0.90 0.9   GLUCOSE 107*  --    CALCIUM 9.0  --        No results for input(s): MG in the last 72 hours. Imaging: Ct Abdomen Pelvis W Iv Contrast Additional Contrast? None    Result Date: 2020  Patient MRN: [id number] : 1965 Age:  48 years Order Date: 2020 6:45 PM. Gender: Female Exam: CT ABDOMEN PELVIS W IV CONTRAST Number of Images: 515 Indication:   Generalized abdominal pain with a large wound in the front of the right abdomen.  Patient reports that when this been there for one days. Intended supply: 30 days     senna 8.6 MG tablet  Commonly known as:  SENOKOT     traZODone 100 MG tablet  Commonly known as:  DESYREL     vitamin B-12 1000 MCG tablet  Commonly known as:  CYANOCOBALAMIN  Take 1 tablet by mouth daily     * vitamin D3 25 MCG (1000 UT) Tabs  Take 1 tablet by mouth daily     * vitamin D 50 MCG (2000 UT) Tabs tablet     * vitamin D 1000 UNIT Tabs tablet  Commonly known as:  CHOLECALCIFEROL         * This list has 5 medication(s) that are the same as other medications prescribed for you. Read the directions carefully, and ask your doctor or other care provider to review them with you. Where to Get Your Medications      You can get these medications from any pharmacy    Bring a paper prescription for each of these medications  · cephALEXin 500 MG capsule  · fluconazole 100 MG tablet           Patient Instructions:   Discharge Medication List as of 4/23/2020  6:56 PM      START taking these medications    Details   cephALEXin (KEFLEX) 500 MG capsule Take 1 capsule by mouth 3 times daily for 7 days, Disp-21 capsule, R-0Print      fluconazole (DIFLUCAN) 100 MG tablet Take 1 tablet by mouth daily for 7 days, Disp-7 tablet, R-0Print         CONTINUE these medications which have NOT CHANGED    Details   gabapentin (NEURONTIN) 800 MG tablet Take 800 mg by mouth 4 times daily. Historical Med      oxybutynin (DITROPAN XL) 15 MG extended release tablet Take 25 mg by mouth nightlyHistorical Med      !! Cholecalciferol (VITAMIN D) 50 MCG (2000 UT) TABS tablet Historical Med      oxyCODONE-acetaminophen (PERCOCET)  MG per tablet Take 1 tablet by mouth every 8 hours as needed for Pain for up to 30 days. Intended supply: 30 days, Disp-90 tablet, R-0Normal      Alcohol Swabs (ALCOHOL PADS) 70 % PADS Disp-100 each, R-0, Normal      Hydrocortisone Butyrate 0.1 % CREA APPLY 1-2 GRAMS TOPICALLY TO AFFECTED AREA 1-2 TIMES PER DAY.  DO NOT APPLY TO FACE, GROIN, UNDERARMS, UNLESS

## 2020-04-27 LAB
BLOOD CULTURE, ROUTINE: NORMAL
CULTURE, BLOOD 2: NORMAL

## 2020-05-08 ENCOUNTER — HOSPITAL ENCOUNTER (INPATIENT)
Age: 55
LOS: 3 days | Discharge: HOME | DRG: 872 | End: 2020-05-11
Attending: EMERGENCY MEDICINE | Admitting: INTERNAL MEDICINE
Payer: COMMERCIAL

## 2020-05-08 ENCOUNTER — APPOINTMENT (OUTPATIENT)
Dept: GENERAL RADIOLOGY | Age: 55
DRG: 872 | End: 2020-05-08
Payer: COMMERCIAL

## 2020-05-08 PROBLEM — A41.9 SEPSIS (HCC): Status: ACTIVE | Noted: 2020-05-08

## 2020-05-08 LAB
ALBUMIN SERPL-MCNC: 3.8 G/DL (ref 3.5–4.6)
ALP BLD-CCNC: 108 U/L (ref 40–130)
ALT SERPL-CCNC: 10 U/L (ref 0–33)
ANION GAP SERPL CALCULATED.3IONS-SCNC: 15 MEQ/L (ref 9–15)
AST SERPL-CCNC: 18 U/L (ref 0–35)
BACTERIA: NEGATIVE /HPF
BASOPHILS ABSOLUTE: 0.1 K/UL (ref 0–0.2)
BASOPHILS RELATIVE PERCENT: 0.7 %
BILIRUB SERPL-MCNC: 0.3 MG/DL (ref 0.2–0.7)
BILIRUBIN URINE: NEGATIVE
BLOOD, URINE: NEGATIVE
BUN BLDV-MCNC: 7 MG/DL (ref 6–20)
CALCIUM SERPL-MCNC: 9.2 MG/DL (ref 8.5–9.9)
CHLORIDE BLD-SCNC: 98 MEQ/L (ref 95–107)
CLARITY: CLEAR
CO2: 25 MEQ/L (ref 20–31)
COLOR: YELLOW
CREAT SERPL-MCNC: 0.74 MG/DL (ref 0.5–0.9)
CRYSTALS, UA: ABNORMAL /HPF
EKG ATRIAL RATE: 104 BPM
EKG P AXIS: 62 DEGREES
EKG P-R INTERVAL: 134 MS
EKG Q-T INTERVAL: 328 MS
EKG QRS DURATION: 76 MS
EKG QTC CALCULATION (BAZETT): 431 MS
EKG R AXIS: 29 DEGREES
EKG T AXIS: 39 DEGREES
EKG VENTRICULAR RATE: 104 BPM
EOSINOPHILS ABSOLUTE: 0.1 K/UL (ref 0–0.7)
EOSINOPHILS RELATIVE PERCENT: 0.6 %
EPITHELIAL CELLS, UA: ABNORMAL /HPF (ref 0–5)
GFR AFRICAN AMERICAN: >60
GFR NON-AFRICAN AMERICAN: >60
GLOBULIN: 3.9 G/DL (ref 2.3–3.5)
GLUCOSE BLD-MCNC: 118 MG/DL (ref 70–99)
GLUCOSE URINE: NEGATIVE MG/DL
HCT VFR BLD CALC: 41 % (ref 37–47)
HEMOGLOBIN: 13.1 G/DL (ref 12–16)
HYALINE CASTS: ABNORMAL /HPF (ref 0–5)
KETONES, URINE: NEGATIVE MG/DL
LACTIC ACID: 1.8 MMOL/L (ref 0.5–2.2)
LEUKOCYTE ESTERASE, URINE: ABNORMAL
LYMPHOCYTES ABSOLUTE: 1.5 K/UL (ref 1–4.8)
LYMPHOCYTES RELATIVE PERCENT: 8.8 %
MCH RBC QN AUTO: 25.2 PG (ref 27–31.3)
MCHC RBC AUTO-ENTMCNC: 31.9 % (ref 33–37)
MCV RBC AUTO: 79.1 FL (ref 82–100)
MONOCYTES ABSOLUTE: 0.7 K/UL (ref 0.2–0.8)
MONOCYTES RELATIVE PERCENT: 4 %
NEUTROPHILS ABSOLUTE: 14.2 K/UL (ref 1.4–6.5)
NEUTROPHILS RELATIVE PERCENT: 85.9 %
NITRITE, URINE: NEGATIVE
PDW BLD-RTO: 16.9 % (ref 11.5–14.5)
PH UA: 8 (ref 5–9)
PLATELET # BLD: 450 K/UL (ref 130–400)
POTASSIUM REFLEX MAGNESIUM: 4.2 MEQ/L (ref 3.4–4.9)
PROTEIN UA: NEGATIVE MG/DL
RBC # BLD: 5.18 M/UL (ref 4.2–5.4)
RBC UA: ABNORMAL /HPF (ref 0–5)
SARS-COV-2, NAAT: NOT DETECTED
SODIUM BLD-SCNC: 138 MEQ/L (ref 135–144)
SPECIFIC GRAVITY UA: 1.01 (ref 1–1.03)
TOTAL PROTEIN: 7.7 G/DL (ref 6.3–8)
TROPONIN: <0.01 NG/ML (ref 0–0.01)
URINE REFLEX TO CULTURE: NO
UROBILINOGEN, URINE: 0.2 E.U./DL
WBC # BLD: 16.5 K/UL (ref 4.8–10.8)

## 2020-05-08 PROCEDURE — 6360000002 HC RX W HCPCS: Performed by: INTERNAL MEDICINE

## 2020-05-08 PROCEDURE — 6370000000 HC RX 637 (ALT 250 FOR IP): Performed by: INTERNAL MEDICINE

## 2020-05-08 PROCEDURE — 84484 ASSAY OF TROPONIN QUANT: CPT

## 2020-05-08 PROCEDURE — 36415 COLL VENOUS BLD VENIPUNCTURE: CPT

## 2020-05-08 PROCEDURE — 6370000000 HC RX 637 (ALT 250 FOR IP): Performed by: EMERGENCY MEDICINE

## 2020-05-08 PROCEDURE — 80053 COMPREHEN METABOLIC PANEL: CPT

## 2020-05-08 PROCEDURE — 2580000003 HC RX 258: Performed by: INTERNAL MEDICINE

## 2020-05-08 PROCEDURE — 99285 EMERGENCY DEPT VISIT HI MDM: CPT

## 2020-05-08 PROCEDURE — 6360000002 HC RX W HCPCS: Performed by: EMERGENCY MEDICINE

## 2020-05-08 PROCEDURE — 81001 URINALYSIS AUTO W/SCOPE: CPT

## 2020-05-08 PROCEDURE — 85025 COMPLETE CBC W/AUTO DIFF WBC: CPT

## 2020-05-08 PROCEDURE — U0002 COVID-19 LAB TEST NON-CDC: HCPCS

## 2020-05-08 PROCEDURE — 2580000003 HC RX 258: Performed by: EMERGENCY MEDICINE

## 2020-05-08 PROCEDURE — 1210000000 HC MED SURG R&B

## 2020-05-08 PROCEDURE — 71045 X-RAY EXAM CHEST 1 VIEW: CPT

## 2020-05-08 PROCEDURE — 93005 ELECTROCARDIOGRAM TRACING: CPT | Performed by: EMERGENCY MEDICINE

## 2020-05-08 PROCEDURE — 83605 ASSAY OF LACTIC ACID: CPT

## 2020-05-08 PROCEDURE — 99254 IP/OBS CNSLTJ NEW/EST MOD 60: CPT | Performed by: INTERNAL MEDICINE

## 2020-05-08 PROCEDURE — 87040 BLOOD CULTURE FOR BACTERIA: CPT

## 2020-05-08 RX ORDER — POLYETHYLENE GLYCOL 3350 17 G
4 POWDER IN PACKET (EA) ORAL
Status: DISCONTINUED | OUTPATIENT
Start: 2020-05-08 | End: 2020-05-11 | Stop reason: HOSPADM

## 2020-05-08 RX ORDER — OXYCODONE HYDROCHLORIDE AND ACETAMINOPHEN 5; 325 MG/1; MG/1
1 TABLET ORAL EVERY 6 HOURS PRN
Status: DISCONTINUED | OUTPATIENT
Start: 2020-05-08 | End: 2020-05-11 | Stop reason: HOSPADM

## 2020-05-08 RX ORDER — ASPIRIN 81 MG/1
81 TABLET, CHEWABLE ORAL DAILY
Status: DISCONTINUED | OUTPATIENT
Start: 2020-05-08 | End: 2020-05-11 | Stop reason: HOSPADM

## 2020-05-08 RX ORDER — ACETAMINOPHEN 500 MG
1000 TABLET ORAL ONCE
Status: COMPLETED | OUTPATIENT
Start: 2020-05-08 | End: 2020-05-08

## 2020-05-08 RX ORDER — NICOTINE 21 MG/24HR
1 PATCH, TRANSDERMAL 24 HOURS TRANSDERMAL DAILY
Status: DISCONTINUED | OUTPATIENT
Start: 2020-05-08 | End: 2020-05-11 | Stop reason: HOSPADM

## 2020-05-08 RX ORDER — HYDROXYZINE HYDROCHLORIDE 25 MG/1
25 TABLET, FILM COATED ORAL EVERY 6 HOURS PRN
Status: DISCONTINUED | OUTPATIENT
Start: 2020-05-08 | End: 2020-05-10

## 2020-05-08 RX ORDER — KETOROLAC TROMETHAMINE 15 MG/ML
15 INJECTION, SOLUTION INTRAMUSCULAR; INTRAVENOUS EVERY 8 HOURS
Status: COMPLETED | OUTPATIENT
Start: 2020-05-08 | End: 2020-05-10

## 2020-05-08 RX ORDER — SODIUM CHLORIDE 0.9 % (FLUSH) 0.9 %
10 SYRINGE (ML) INJECTION PRN
Status: DISCONTINUED | OUTPATIENT
Start: 2020-05-08 | End: 2020-05-11 | Stop reason: HOSPADM

## 2020-05-08 RX ORDER — ACETAMINOPHEN 325 MG/1
650 TABLET ORAL EVERY 6 HOURS PRN
Status: DISCONTINUED | OUTPATIENT
Start: 2020-05-08 | End: 2020-05-11 | Stop reason: HOSPADM

## 2020-05-08 RX ORDER — 0.9 % SODIUM CHLORIDE 0.9 %
30 INTRAVENOUS SOLUTION INTRAVENOUS ONCE
Status: COMPLETED | OUTPATIENT
Start: 2020-05-08 | End: 2020-05-08

## 2020-05-08 RX ORDER — SODIUM CHLORIDE 9 MG/ML
INJECTION, SOLUTION INTRAVENOUS CONTINUOUS
Status: DISPENSED | OUTPATIENT
Start: 2020-05-08 | End: 2020-05-09

## 2020-05-08 RX ORDER — SODIUM CHLORIDE 0.9 % (FLUSH) 0.9 %
10 SYRINGE (ML) INJECTION EVERY 12 HOURS SCHEDULED
Status: DISCONTINUED | OUTPATIENT
Start: 2020-05-08 | End: 2020-05-11 | Stop reason: HOSPADM

## 2020-05-08 RX ORDER — DULOXETIN HYDROCHLORIDE 60 MG/1
120 CAPSULE, DELAYED RELEASE ORAL DAILY
Status: DISCONTINUED | OUTPATIENT
Start: 2020-05-09 | End: 2020-05-11 | Stop reason: HOSPADM

## 2020-05-08 RX ORDER — 0.9 % SODIUM CHLORIDE 0.9 %
1000 INTRAVENOUS SOLUTION INTRAVENOUS ONCE
Status: COMPLETED | OUTPATIENT
Start: 2020-05-08 | End: 2020-05-09

## 2020-05-08 RX ORDER — ACETAMINOPHEN 650 MG/1
650 SUPPOSITORY RECTAL EVERY 6 HOURS PRN
Status: DISCONTINUED | OUTPATIENT
Start: 2020-05-08 | End: 2020-05-11 | Stop reason: HOSPADM

## 2020-05-08 RX ORDER — GABAPENTIN 400 MG/1
800 CAPSULE ORAL 4 TIMES DAILY
Status: DISCONTINUED | OUTPATIENT
Start: 2020-05-08 | End: 2020-05-11 | Stop reason: HOSPADM

## 2020-05-08 RX ADMIN — VANCOMYCIN HYDROCHLORIDE 1000 MG: 1 INJECTION, POWDER, LYOPHILIZED, FOR SOLUTION INTRAVENOUS at 15:43

## 2020-05-08 RX ADMIN — SODIUM CHLORIDE 2244 ML: 9 INJECTION, SOLUTION INTRAVENOUS at 13:51

## 2020-05-08 RX ADMIN — OXYCODONE HYDROCHLORIDE AND ACETAMINOPHEN 1 TABLET: 5; 325 TABLET ORAL at 18:05

## 2020-05-08 RX ADMIN — GABAPENTIN 800 MG: 400 CAPSULE ORAL at 21:21

## 2020-05-08 RX ADMIN — TRAZODONE HYDROCHLORIDE 200 MG: 150 TABLET ORAL at 21:21

## 2020-05-08 RX ADMIN — ACETAMINOPHEN 1000 MG: 500 TABLET ORAL at 13:52

## 2020-05-08 RX ADMIN — PIPERACILLIN AND TAZOBACTAM 3.38 G: 3; .375 INJECTION, POWDER, LYOPHILIZED, FOR SOLUTION INTRAVENOUS at 21:20

## 2020-05-08 RX ADMIN — ASPIRIN 81 MG 81 MG: 81 TABLET ORAL at 18:05

## 2020-05-08 RX ADMIN — ENOXAPARIN SODIUM 40 MG: 40 INJECTION SUBCUTANEOUS at 21:21

## 2020-05-08 RX ADMIN — GABAPENTIN 800 MG: 400 CAPSULE ORAL at 16:57

## 2020-05-08 RX ADMIN — SODIUM CHLORIDE 1000 ML: 9 INJECTION, SOLUTION INTRAVENOUS at 22:35

## 2020-05-08 RX ADMIN — PIPERACILLIN AND TAZOBACTAM 3.38 G: 3; .375 INJECTION, POWDER, LYOPHILIZED, FOR SOLUTION INTRAVENOUS at 14:04

## 2020-05-08 RX ADMIN — SODIUM CHLORIDE: 9 INJECTION, SOLUTION INTRAVENOUS at 16:58

## 2020-05-08 RX ADMIN — KETOROLAC TROMETHAMINE 15 MG: 15 INJECTION, SOLUTION INTRAMUSCULAR; INTRAVENOUS at 18:05

## 2020-05-08 ASSESSMENT — PAIN SCALES - GENERAL
PAINLEVEL_OUTOF10: 10
PAINLEVEL_OUTOF10: 8

## 2020-05-08 ASSESSMENT — PAIN DESCRIPTION - LOCATION: LOCATION: LEG

## 2020-05-08 ASSESSMENT — PAIN DESCRIPTION - ORIENTATION: ORIENTATION: RIGHT;LEFT

## 2020-05-08 ASSESSMENT — PAIN DESCRIPTION - PAIN TYPE: TYPE: ACUTE PAIN

## 2020-05-08 NOTE — ED NOTES
EKG done and given to Dr. Mari Savage. Pt placed on continuous cardiac monitor. Tele strip printed and mounted.      Govind Briones RN  05/08/20 2090

## 2020-05-08 NOTE — ED PROVIDER NOTES
HPI:  5/8/20, Time: 1:44 PM EDT         Janna Roberts is a 48 y.o. female presenting to the ED for fever chills, beginning several hours ago. The complaint has been persistent, moderate in severity, and worsened by nothing. Patient states that she developed fever chills and generalized body aches several hours ago. She has a history of pyoderma gangrenosum. And is noticed increasing redness to her abdominal wounds and the wound on her right lower extremity. There is been no cough no shortness of breath no chest pain. No urinary complaints    ROS:   Pertinent positives and negatives are stated within HPI, all other systems reviewed and are negative.  --------------------------------------------- PAST HISTORY ---------------------------------------------  Past Medical History:  has a past medical history of Anxiety, Arthritis, Cancer ([facility] Chronic kidney disease, COPD exacerbation ([facility] [facility] Depression, Disease of blood and blood forming organ, Fibromyalgia, History of blood transfusion, Liver disease, Neuromuscular disorder ([facility] [facility] Osteoarthritis, Pneumonia due to organism, Pyoderma gangrenosa, and Ulcerative colitis ([facility] [facility] Past Surgical History:  has a past surgical history that includes Appendectomy; Spinal fusion; eye surgery; Hysterectomy; Tonsillectomy; Abdomen surgery; back surgery; Breast surgery; Colonoscopy; Cosmetic surgery; and skin biopsy. Social History:  reports that she has been smoking cigarettes. She has a 20.00 pack-year smoking history. She has never used smokeless tobacco. She reports that she does not drink alcohol or use drugs. Family History: family history is not on file. The patients home medications have been reviewed.     Allergies: Amoxicillin-pot clavulanate and Other    ---------------------------------------------------PHYSICAL EXAM--------------------------------------     Constitutional/General: Alert and oriented x3, well appearing, non toxic in NAD  Head: Normocephalic and atraumatic  Eyes: PERRL, EOMI  Mouth: Oropharynx clear, handling secretions, no trismus  Neck: Supple, full ROM, non tender to palpation in the midline, no stridor, no crepitus, no meningeal signs  Pulmonary: Lungs clear to auscultation bilaterally, no wheezes, rales, or rhonchi. Not in respiratory distress  Cardiovascular: Tachycardic rate. Regular rhythm. No murmurs, gallops, or rubs. 2+ distal pulses  Chest: no chest wall tenderness  Abdomen: Soft. Non tender. Non distended. +BS. No rebound, guarding, or rigidity. No pulsatile masses appreciated. Musculoskeletal: Moves all extremities x 4. Warm and well perfused, no clubbing, cyanosis, or edema. Capillary refill <3 seconds  Skin: warm and dry. There are multiple lesions of the abdomen and right lower extremity with a secondary cellulitis. Neurologic: GCS 15, CN 2-12 grossly intact, no focal deficits, symmetric strength 5/5 in the upper and lower extremities bilaterally  Psych: Normal Affect    -------------------------------------------------- RESULTS -------------------------------------------------  I have personally reviewed all laboratory and imaging results for this patient. Results are listed below.      LABS:  Results for orders placed or performed during the hospital encounter of 05/08/20   COVID-19   Result Value Ref Range    SARS-CoV-2, NAAT Not Detected Not Detected   CBC Auto Differential   Result Value Ref Range    WBC 16.5 (H) 4.8 - 10.8 K/uL    RBC 5.18 4.20 - 5.40 M/uL    Hemoglobin 13.1 12.0 - 16.0 g/dL    Hematocrit 41.0 37.0 - 47.0 %    MCV 79.1 (L) 82.0 - 100.0 fL    MCH 25.2 (L) 27.0 - 31.3 pg    MCHC 31.9 (L) 33.0 - 37.0 %    RDW 16.9 (H) 11.5 - 14.5 %    Platelets 936 (H) 652 - 400 K/uL    Neutrophils % 85.9 %    Lymphocytes % 8.8 %    Monocytes % 4.0 %    Eosinophils % 0.6 %    Basophils % 0.7 %    Neutrophils Absolute 14.2 (H) 1.4 - 6.5 K/uL    Lymphocytes Absolute 1.5 1.0 - 4.8 K/uL    Monocytes Absolute 0.7 0.2 - 0.8 K/uL    Eosinophils Absolute 0.1 0.0 - 0.7 K/uL    Basophils Absolute 0.1 0.0 - 0.2 K/uL   Comprehensive Metabolic Panel w/ Reflex to MG   Result Value Ref Range    Sodium 138 135 - 144 mEq/L    Potassium reflex Magnesium 4.2 3.4 - 4.9 mEq/L    Chloride 98 95 - 107 mEq/L    CO2 25 20 - 31 mEq/L    Anion Gap 15 9 - 15 mEq/L    Glucose 118 (H) 70 - 99 mg/dL    BUN 7 6 - 20 mg/dL    CREATININE 0.74 0.50 - 0.90 mg/dL    GFR Non-African American >60.0 >60    GFR  >60.0 >60    Calcium 9.2 8.5 - 9.9 mg/dL    Total Protein 7.7 6.3 - 8.0 g/dL    Alb 3.8 3.5 - 4.6 g/dL    Total Bilirubin 0.3 0.2 - 0.7 mg/dL    Alkaline Phosphatase 108 40 - 130 U/L    ALT 10 0 - 33 U/L    AST 18 0 - 35 U/L    Globulin 3.9 (H) 2.3 - 3.5 g/dL   Troponin   Result Value Ref Range    Troponin <0.010 0.000 - 0.010 ng/mL   Lactic Acid, Plasma   Result Value Ref Range    Lactic Acid 1.8 0.5 - 2.2 mmol/L   EKG 12 Lead   Result Value Ref Range    Ventricular Rate 104 BPM    Atrial Rate 104 BPM    P-R Interval 134 ms    QRS Duration 76 ms    Q-T Interval 328 ms    QTc Calculation (Bazett) 431 ms    P Axis 62 degrees    R Axis 29 degrees    T Axis 39 degrees       RADIOLOGY:  Interpreted by Radiologist.  XR CHEST PORTABLE   Final Result   NO ACUTE CARDIOPULMONARY ABNORMALITY. NO CHANGE. EKG Interpretation  Interpreted by emergency department physician    Rhythm: sinus tachycardia  Rate: tachycardia  Axis: normal  Conduction: normal  ST Segments: no acute change  T Waves: no acute change    Clinical Impression: sinus tachycardia  Comparison to prior EKG: no previous EKG      ------------------------- NURSING NOTES AND VITALS REVIEWED ---------------------------   The nursing notes within the ED encounter and vital signs as below have been reviewed by myself.   BP 99/88   Pulse 99   Temp 102.7 °F (39.3 °C) (Oral)   Resp 18   Ht 5' 4\" (1.626 m)   Wt 165 lb (74.8 kg)   LMP 01/01/1997 (Exact Date) Comment: hysterectomy plan.       --------------------------------- IMPRESSION AND DISPOSITION ---------------------------------    IMPRESSION  1. Pyoderma gangrenosa    2. SIRS (systemic inflammatory response syndrome) ([facility])        DISPOSITION  Disposition: Admit to general medical floor  Patient condition is fair        NOTE: This report was transcribed using voice recognition software.  Every effort was made to ensure accuracy; however, inadvertent computerized transcription errors may be present          Marilia Beaz MD  05/08/20 [address], [place]  05/08/20 3075

## 2020-05-09 ENCOUNTER — APPOINTMENT (OUTPATIENT)
Dept: GENERAL RADIOLOGY | Age: 55
DRG: 872 | End: 2020-05-09
Payer: COMMERCIAL

## 2020-05-09 LAB
ANION GAP SERPL CALCULATED.3IONS-SCNC: 11 MEQ/L (ref 9–15)
BASOPHILS ABSOLUTE: 0.1 K/UL (ref 0–0.2)
BASOPHILS RELATIVE PERCENT: 0.8 %
BUN BLDV-MCNC: 7 MG/DL (ref 6–20)
C-REACTIVE PROTEIN: 139.6 MG/L (ref 0–5)
CALCIUM SERPL-MCNC: 8.3 MG/DL (ref 8.5–9.9)
CHLORIDE BLD-SCNC: 107 MEQ/L (ref 95–107)
CHOLESTEROL, TOTAL: 163 MG/DL (ref 0–199)
CO2: 22 MEQ/L (ref 20–31)
CREAT SERPL-MCNC: 0.82 MG/DL (ref 0.5–0.9)
EOSINOPHILS ABSOLUTE: 0 K/UL (ref 0–0.7)
EOSINOPHILS RELATIVE PERCENT: 0.2 %
GFR AFRICAN AMERICAN: >60
GFR NON-AFRICAN AMERICAN: >60
GLUCOSE BLD-MCNC: 118 MG/DL (ref 70–99)
HAV IGM SER IA-ACNC: NORMAL
HBA1C MFR BLD: 6.3 % (ref 4.8–5.9)
HCT VFR BLD CALC: 33.9 % (ref 37–47)
HDLC SERPL-MCNC: 40 MG/DL (ref 40–59)
HEMOGLOBIN: 10.9 G/DL (ref 12–16)
HEPATITIS B CORE IGM ANTIBODY: NORMAL
HEPATITIS B SURFACE ANTIGEN INTERPRETATION: NORMAL
HEPATITIS C ANTIBODY INTERPRETATION: NORMAL
HEPATITIS INTERPRETATION:: NORMAL
LDL CHOLESTEROL CALCULATED: 90 MG/DL (ref 0–129)
LYMPHOCYTES ABSOLUTE: 1.3 K/UL (ref 1–4.8)
LYMPHOCYTES RELATIVE PERCENT: 14 %
MCH RBC QN AUTO: 26 PG (ref 27–31.3)
MCHC RBC AUTO-ENTMCNC: 32.2 % (ref 33–37)
MCV RBC AUTO: 80.8 FL (ref 82–100)
MONOCYTES ABSOLUTE: 0.4 K/UL (ref 0.2–0.8)
MONOCYTES RELATIVE PERCENT: 4.9 %
NEUTROPHILS ABSOLUTE: 7.2 K/UL (ref 1.4–6.5)
NEUTROPHILS RELATIVE PERCENT: 80.1 %
PDW BLD-RTO: 17.4 % (ref 11.5–14.5)
PLATELET # BLD: 341 K/UL (ref 130–400)
POTASSIUM REFLEX MAGNESIUM: 3.7 MEQ/L (ref 3.4–4.9)
RBC # BLD: 4.2 M/UL (ref 4.2–5.4)
RHEUMATOID FACTOR: 14.1 IU/ML (ref 0–14)
SARS-COV-2, NAAT: NOT DETECTED
SEDIMENTATION RATE, ERYTHROCYTE: 53 MM (ref 0–30)
SODIUM BLD-SCNC: 140 MEQ/L (ref 135–144)
TRIGL SERPL-MCNC: 163 MG/DL (ref 0–150)
WBC # BLD: 9 K/UL (ref 4.8–10.8)

## 2020-05-09 PROCEDURE — 73610 X-RAY EXAM OF ANKLE: CPT

## 2020-05-09 PROCEDURE — 80048 BASIC METABOLIC PNL TOTAL CA: CPT

## 2020-05-09 PROCEDURE — 82787 IGG 1 2 3 OR 4 EACH: CPT

## 2020-05-09 PROCEDURE — 86140 C-REACTIVE PROTEIN: CPT

## 2020-05-09 PROCEDURE — 73590 X-RAY EXAM OF LOWER LEG: CPT

## 2020-05-09 PROCEDURE — 36415 COLL VENOUS BLD VENIPUNCTURE: CPT

## 2020-05-09 PROCEDURE — 6360000002 HC RX W HCPCS: Performed by: INTERNAL MEDICINE

## 2020-05-09 PROCEDURE — U0002 COVID-19 LAB TEST NON-CDC: HCPCS

## 2020-05-09 PROCEDURE — 83516 IMMUNOASSAY NONANTIBODY: CPT

## 2020-05-09 PROCEDURE — 2580000003 HC RX 258: Performed by: INTERNAL MEDICINE

## 2020-05-09 PROCEDURE — 6370000000 HC RX 637 (ALT 250 FOR IP): Performed by: INTERNAL MEDICINE

## 2020-05-09 PROCEDURE — 83036 HEMOGLOBIN GLYCOSYLATED A1C: CPT

## 2020-05-09 PROCEDURE — 85025 COMPLETE CBC W/AUTO DIFF WBC: CPT

## 2020-05-09 PROCEDURE — 80061 LIPID PANEL: CPT

## 2020-05-09 PROCEDURE — 82784 ASSAY IGA/IGD/IGG/IGM EACH: CPT

## 2020-05-09 PROCEDURE — 86431 RHEUMATOID FACTOR QUANT: CPT

## 2020-05-09 PROCEDURE — 87389 HIV-1 AG W/HIV-1&-2 AB AG IA: CPT

## 2020-05-09 PROCEDURE — 86160 COMPLEMENT ANTIGEN: CPT

## 2020-05-09 PROCEDURE — 80074 ACUTE HEPATITIS PANEL: CPT

## 2020-05-09 PROCEDURE — 85652 RBC SED RATE AUTOMATED: CPT

## 2020-05-09 PROCEDURE — 84155 ASSAY OF PROTEIN SERUM: CPT

## 2020-05-09 PROCEDURE — 1210000000 HC MED SURG R&B

## 2020-05-09 PROCEDURE — 84165 PROTEIN E-PHORESIS SERUM: CPT

## 2020-05-09 PROCEDURE — 99254 IP/OBS CNSLTJ NEW/EST MOD 60: CPT | Performed by: INTERNAL MEDICINE

## 2020-05-09 RX ORDER — PANTOPRAZOLE SODIUM 40 MG/1
40 TABLET, DELAYED RELEASE ORAL
Status: DISCONTINUED | OUTPATIENT
Start: 2020-05-10 | End: 2020-05-11 | Stop reason: HOSPADM

## 2020-05-09 RX ORDER — POLYETHYLENE GLYCOL 3350 17 G/17G
17 POWDER, FOR SOLUTION ORAL DAILY
Status: DISCONTINUED | OUTPATIENT
Start: 2020-05-09 | End: 2020-05-11 | Stop reason: HOSPADM

## 2020-05-09 RX ORDER — SENNA AND DOCUSATE SODIUM 50; 8.6 MG/1; MG/1
2 TABLET, FILM COATED ORAL DAILY
Status: DISCONTINUED | OUTPATIENT
Start: 2020-05-09 | End: 2020-05-11 | Stop reason: RX

## 2020-05-09 RX ORDER — ONDANSETRON 2 MG/ML
4 INJECTION INTRAMUSCULAR; INTRAVENOUS EVERY 6 HOURS PRN
Status: DISCONTINUED | OUTPATIENT
Start: 2020-05-09 | End: 2020-05-11 | Stop reason: HOSPADM

## 2020-05-09 RX ORDER — DIPHENHYDRAMINE HCL 12.5MG/5ML
12.5 LIQUID (ML) ORAL ONCE
Status: COMPLETED | OUTPATIENT
Start: 2020-05-09 | End: 2020-05-09

## 2020-05-09 RX ADMIN — DULOXETINE HYDROCHLORIDE 120 MG: 60 CAPSULE, DELAYED RELEASE ORAL at 09:53

## 2020-05-09 RX ADMIN — TRAZODONE HYDROCHLORIDE 200 MG: 150 TABLET ORAL at 20:43

## 2020-05-09 RX ADMIN — PIPERACILLIN AND TAZOBACTAM 3.38 G: 3; .375 INJECTION, POWDER, LYOPHILIZED, FOR SOLUTION INTRAVENOUS at 05:41

## 2020-05-09 RX ADMIN — KETOROLAC TROMETHAMINE 15 MG: 15 INJECTION, SOLUTION INTRAMUSCULAR; INTRAVENOUS at 00:10

## 2020-05-09 RX ADMIN — DIPHENHYDRAMINE HYDROCHLORIDE 12.5 MG: 25 SOLUTION ORAL at 20:43

## 2020-05-09 RX ADMIN — SENNOSIDES AND DOCUSATE SODIUM 2 TABLET: 8.6; 5 TABLET ORAL at 12:46

## 2020-05-09 RX ADMIN — ASPIRIN 81 MG 81 MG: 81 TABLET ORAL at 09:53

## 2020-05-09 RX ADMIN — OXYCODONE HYDROCHLORIDE AND ACETAMINOPHEN 1 TABLET: 5; 325 TABLET ORAL at 09:53

## 2020-05-09 RX ADMIN — Medication 10 ML: at 20:49

## 2020-05-09 RX ADMIN — KETOROLAC TROMETHAMINE 15 MG: 15 INJECTION, SOLUTION INTRAMUSCULAR; INTRAVENOUS at 18:03

## 2020-05-09 RX ADMIN — GABAPENTIN 800 MG: 400 CAPSULE ORAL at 18:04

## 2020-05-09 RX ADMIN — PIPERACILLIN AND TAZOBACTAM 3.38 G: 3; .375 INJECTION, POWDER, LYOPHILIZED, FOR SOLUTION INTRAVENOUS at 12:47

## 2020-05-09 RX ADMIN — ONDANSETRON 4 MG: 2 INJECTION INTRAMUSCULAR; INTRAVENOUS at 14:30

## 2020-05-09 RX ADMIN — SODIUM CHLORIDE: 9 INJECTION, SOLUTION INTRAVENOUS at 00:32

## 2020-05-09 RX ADMIN — OXYCODONE HYDROCHLORIDE AND ACETAMINOPHEN 1 TABLET: 5; 325 TABLET ORAL at 21:06

## 2020-05-09 RX ADMIN — PIPERACILLIN AND TAZOBACTAM 3.38 G: 3; .375 INJECTION, POWDER, LYOPHILIZED, FOR SOLUTION INTRAVENOUS at 20:43

## 2020-05-09 RX ADMIN — GABAPENTIN 800 MG: 400 CAPSULE ORAL at 09:53

## 2020-05-09 RX ADMIN — ENOXAPARIN SODIUM 40 MG: 40 INJECTION SUBCUTANEOUS at 09:52

## 2020-05-09 RX ADMIN — KETOROLAC TROMETHAMINE 15 MG: 15 INJECTION, SOLUTION INTRAMUSCULAR; INTRAVENOUS at 09:56

## 2020-05-09 RX ADMIN — HYDROXYZINE HYDROCHLORIDE 25 MG: 25 TABLET, FILM COATED ORAL at 18:04

## 2020-05-09 RX ADMIN — GABAPENTIN 800 MG: 400 CAPSULE ORAL at 20:43

## 2020-05-09 RX ADMIN — POLYETHYLENE GLYCOL 3350 17 G: 17 POWDER, FOR SOLUTION ORAL at 12:46

## 2020-05-09 RX ADMIN — GABAPENTIN 800 MG: 400 CAPSULE ORAL at 12:46

## 2020-05-09 RX ADMIN — VANCOMYCIN HYDROCHLORIDE 1000 MG: 1 INJECTION, POWDER, LYOPHILIZED, FOR SOLUTION INTRAVENOUS at 04:25

## 2020-05-09 ASSESSMENT — PAIN SCALES - GENERAL
PAINLEVEL_OUTOF10: 5
PAINLEVEL_OUTOF10: 7
PAINLEVEL_OUTOF10: 4

## 2020-05-09 ASSESSMENT — ENCOUNTER SYMPTOMS
RESPIRATORY NEGATIVE: 1
EYES NEGATIVE: 1
GASTROINTESTINAL NEGATIVE: 1

## 2020-05-09 NOTE — PROGRESS NOTES
Infectious Disease     Patient Name: Ezra Dsouza  Date: 5/9/2020  YOB: 1965  Medical Record Number: [id number]      Fever in adult  Vasculitis      History of Present Illness:    Past medical history COPD fibromyalgia chronic wounds on abdomen right heel involving Achilles tendon chronic kidney disease  Pyoderma gangrenosum ulcerative colitis    Skin rash ongoing for 5 weeks since she took Remicade       started Humira for ulcerative colitis and pyoderma gangrenosum     Recently hospitalized for inflammation around her chronic wounds  on fungal therapy and Keflex for small amount of cellulitis and topical fungal infection  Left AMA at that time     Comes in now because of fever increased right lower extremity pain by Achilles tendon    Fever 103.2    Creatinine normal  White cell count 16,500  Lymphocyte count normal  Urinalysis negative      Chest x-ray is clear    COVID-19 negative        Review of Systems   Constitutional: Positive for chills and fever. HENT: Negative. Eyes: Negative. Respiratory: Negative. Cardiovascular: Negative. Gastrointestinal: Negative. Endocrine: Negative. Genitourinary: Negative. Musculoskeletal: Negative. Skin: Positive for wound. Allergic/Immunologic: Positive for immunocompromised state. Neurological: Negative.         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    Chronic kidney disease     COPD exacerbation ([facility]     Depression     Disease of blood and blood forming organ 01/04/2017 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    Neuromuscular disorder (HCC)     Osteoarthritis     Pneumonia due to organism     Pyoderma gangrenosa     Ulcerative colitis ([facility] [address]            Past Surgical History:   Procedure Laterality Date    ABDOMEN SURGERY      sleen repair    APPENDECTOMY      BACK SURGERY      BREAST SURGERY      fatty tumor removed, benign    COLONOSCOPY      COSMETIC SURGERY      tummy tuck    EYE SURGERY      bilateral cataract surgery    HYSTERECTOMY      SKIN BIOPSY      SPINAL FUSION      TONSILLECTOMY           No current facility-administered medications on file prior to encounter. Current Outpatient Medications on File Prior to Encounter   Medication Sig Dispense Refill    gabapentin (NEURONTIN) 800 MG tablet Take 800 mg by mouth 4 times daily.  oxybutynin (DITROPAN XL) 15 MG extended release tablet Take 25 mg by mouth nightly      Cholecalciferol (VITAMIN D) 50 MCG (2000 UT) TABS tablet       oxyCODONE-acetaminophen (PERCOCET)  MG per tablet Take 1 tablet by mouth every 8 hours as needed for Pain for up to 30 days.  Intended supply: 30 days 90 tablet 0    Cholecalciferol (VITAMIN D3) 25 MCG (1000 UT) TABS Take 1 tablet by mouth daily 30 tablet 3    vitamin B-12 (CYANOCOBALAMIN) 1000 MCG tablet Take 1 tablet by mouth daily 30 tablet 0    vitamin D (CHOLECALCIFEROL) 1000 UNIT TABS tablet Take 1,000 Units by mouth daily      senna (SENOKOT) 8.6 MG tablet Take 1 tablet by mouth 2 times daily as needed for Constipation      hydrOXYzine (ATARAX) 25 MG tablet Take 25 mg by mouth every 6 hours as needed for Itching      DULoxetine (CYMBALTA) 60 MG extended release capsule Take 120 mg by mouth daily      acetaminophen (TYLENOL) 325 MG tablet Take 650 mg by mouth every 4 hours as needed for Pain      traZODone (DESYREL) 100 MG tablet Take 200 mg by mouth nightly       Alcohol Swabs (ALCOHOL PADS) 70 % PADS CLEAN THE SKIN BY USING 1 PAD ON THE AFFECTED AREA BEFORE APPLYING ANY TOPICAL CREAM, 3 TIMES DAILY 100 each 0       Allergies   Allergen Reactions    Amoxicillin-Pot Clavulanate      Other reaction(s): GI Upset, Intolerance, Other: See Comments  Nose bleed    Other Itching     IVP dye           History reviewed. No pertinent family history. Physical Exam:      Physical Exam   Constitutional: She is oriented to person, place, and time. No distress. HENT:   Head: Normocephalic. Eyes: Pupils are equal, round, and reactive to light. Neck: Normal range of motion. Neck supple. No JVD present. No tracheal deviation present. No thyromegaly present. Cardiovascular: Normal heart sounds. No murmur heard. Pulmonary/Chest: Effort normal and breath sounds normal. No respiratory distress. She has no wheezes. She has no rales. She exhibits no tenderness. Abdominal: Soft. Bowel sounds are normal. She exhibits no distension and no mass. There is no abdominal tenderness. There is no rebound and no guarding. Musculoskeletal:         General: Edema present. Lymphadenopathy:     She has no cervical adenopathy. Neurological: She is alert and oriented to person, place, and time. Skin: Skin is warm. Rash noted. She is not diaphoretic. There is erythema. Patient has diffuse rash macular over hands dorsum skin peeling on the palms of the hands over legs chest abdomen has been persistent for 5 weeks per her history       Blood pressure 132/69, pulse 103, temperature 101.7 °F (38.7 °C), resp. rate 18, height 5' 4\" (1.626 m), weight 179 lb 6.9 oz (81.4 kg), last menstrual period 01/01/1997, SpO2 98 %.       .   Lab Results   Component Value Date    WBC 9.0 05/09/2020    HGB 10.9 (L) 05/09/2020    HCT 33.9 (L) 05/09/2020    MCV 80.8 (L) 05/09/2020     05/09/2020     Lab Results   Component Value Date     05/09/2020    K 3.7 05/09/2020     05/09/2020    CO2 22

## 2020-05-10 LAB
ANION GAP SERPL CALCULATED.3IONS-SCNC: 9 MEQ/L (ref 9–15)
BASOPHILS ABSOLUTE: 0.1 K/UL (ref 0–0.2)
BASOPHILS RELATIVE PERCENT: 1.3 %
BUN BLDV-MCNC: 9 MG/DL (ref 6–20)
CALCIUM SERPL-MCNC: 8.6 MG/DL (ref 8.5–9.9)
CHLORIDE BLD-SCNC: 106 MEQ/L (ref 95–107)
CO2: 24 MEQ/L (ref 20–31)
CREAT SERPL-MCNC: 0.89 MG/DL (ref 0.5–0.9)
EOSINOPHILS ABSOLUTE: 0.2 K/UL (ref 0–0.7)
EOSINOPHILS RELATIVE PERCENT: 5.1 %
GFR AFRICAN AMERICAN: >60
GFR NON-AFRICAN AMERICAN: >60
GLUCOSE BLD-MCNC: 102 MG/DL (ref 70–99)
HCT VFR BLD CALC: 30.6 % (ref 37–47)
HEMOGLOBIN: 10 G/DL (ref 12–16)
LYMPHOCYTES ABSOLUTE: 2.2 K/UL (ref 1–4.8)
LYMPHOCYTES RELATIVE PERCENT: 48.6 %
MCH RBC QN AUTO: 26.4 PG (ref 27–31.3)
MCHC RBC AUTO-ENTMCNC: 32.6 % (ref 33–37)
MCV RBC AUTO: 80.8 FL (ref 82–100)
MONOCYTES ABSOLUTE: 0.5 K/UL (ref 0.2–0.8)
MONOCYTES RELATIVE PERCENT: 10.3 %
NEUTROPHILS ABSOLUTE: 1.6 K/UL (ref 1.4–6.5)
NEUTROPHILS RELATIVE PERCENT: 34.7 %
PDW BLD-RTO: 17 % (ref 11.5–14.5)
PLATELET # BLD: 292 K/UL (ref 130–400)
POTASSIUM REFLEX MAGNESIUM: 3.9 MEQ/L (ref 3.4–4.9)
RBC # BLD: 3.78 M/UL (ref 4.2–5.4)
SODIUM BLD-SCNC: 139 MEQ/L (ref 135–144)
WBC # BLD: 4.6 K/UL (ref 4.8–10.8)

## 2020-05-10 PROCEDURE — 36415 COLL VENOUS BLD VENIPUNCTURE: CPT

## 2020-05-10 PROCEDURE — 6360000002 HC RX W HCPCS: Performed by: INTERNAL MEDICINE

## 2020-05-10 PROCEDURE — 80048 BASIC METABOLIC PNL TOTAL CA: CPT

## 2020-05-10 PROCEDURE — 87147 CULTURE TYPE IMMUNOLOGIC: CPT

## 2020-05-10 PROCEDURE — 6370000000 HC RX 637 (ALT 250 FOR IP): Performed by: INTERNAL MEDICINE

## 2020-05-10 PROCEDURE — 85025 COMPLETE CBC W/AUTO DIFF WBC: CPT

## 2020-05-10 PROCEDURE — 87070 CULTURE OTHR SPECIMN AEROBIC: CPT

## 2020-05-10 PROCEDURE — 87186 SC STD MICRODIL/AGAR DIL: CPT

## 2020-05-10 PROCEDURE — 87077 CULTURE AEROBIC IDENTIFY: CPT

## 2020-05-10 PROCEDURE — 99232 SBSQ HOSP IP/OBS MODERATE 35: CPT | Performed by: INTERNAL MEDICINE

## 2020-05-10 PROCEDURE — 1210000000 HC MED SURG R&B

## 2020-05-10 PROCEDURE — 2580000003 HC RX 258: Performed by: INTERNAL MEDICINE

## 2020-05-10 PROCEDURE — 87075 CULTR BACTERIA EXCEPT BLOOD: CPT

## 2020-05-10 PROCEDURE — 87205 SMEAR GRAM STAIN: CPT

## 2020-05-10 RX ORDER — DIPHENHYDRAMINE HCL 25 MG
25 TABLET ORAL EVERY 6 HOURS PRN
Status: DISCONTINUED | OUTPATIENT
Start: 2020-05-10 | End: 2020-05-11 | Stop reason: HOSPADM

## 2020-05-10 RX ORDER — LACTULOSE 10 G/15ML
20 SOLUTION ORAL ONCE
Status: COMPLETED | OUTPATIENT
Start: 2020-05-10 | End: 2020-05-10

## 2020-05-10 RX ADMIN — SENNOSIDES AND DOCUSATE SODIUM 2 TABLET: 8.6; 5 TABLET ORAL at 08:52

## 2020-05-10 RX ADMIN — OXYCODONE HYDROCHLORIDE AND ACETAMINOPHEN 1 TABLET: 5; 325 TABLET ORAL at 19:51

## 2020-05-10 RX ADMIN — OXYCODONE HYDROCHLORIDE AND ACETAMINOPHEN 1 TABLET: 5; 325 TABLET ORAL at 06:15

## 2020-05-10 RX ADMIN — KETOROLAC TROMETHAMINE 15 MG: 15 INJECTION, SOLUTION INTRAMUSCULAR; INTRAVENOUS at 00:54

## 2020-05-10 RX ADMIN — POLYETHYLENE GLYCOL 3350 17 G: 17 POWDER, FOR SOLUTION ORAL at 08:53

## 2020-05-10 RX ADMIN — PANTOPRAZOLE SODIUM 40 MG: 40 TABLET, DELAYED RELEASE ORAL at 06:15

## 2020-05-10 RX ADMIN — DIPHENHYDRAMINE HCL 25 MG: 25 TABLET ORAL at 19:51

## 2020-05-10 RX ADMIN — OXYCODONE HYDROCHLORIDE AND ACETAMINOPHEN 1 TABLET: 5; 325 TABLET ORAL at 12:59

## 2020-05-10 RX ADMIN — GABAPENTIN 800 MG: 400 CAPSULE ORAL at 21:35

## 2020-05-10 RX ADMIN — PIPERACILLIN AND TAZOBACTAM 3.38 G: 3; .375 INJECTION, POWDER, LYOPHILIZED, FOR SOLUTION INTRAVENOUS at 14:35

## 2020-05-10 RX ADMIN — ACETAMINOPHEN 650 MG: 325 TABLET ORAL at 17:08

## 2020-05-10 RX ADMIN — Medication 10 ML: at 21:35

## 2020-05-10 RX ADMIN — GABAPENTIN 800 MG: 400 CAPSULE ORAL at 12:59

## 2020-05-10 RX ADMIN — Medication 10 ML: at 08:56

## 2020-05-10 RX ADMIN — ENOXAPARIN SODIUM 40 MG: 40 INJECTION SUBCUTANEOUS at 08:53

## 2020-05-10 RX ADMIN — DULOXETINE HYDROCHLORIDE 120 MG: 60 CAPSULE, DELAYED RELEASE ORAL at 08:52

## 2020-05-10 RX ADMIN — DIPHENHYDRAMINE HCL 25 MG: 25 TABLET ORAL at 11:29

## 2020-05-10 RX ADMIN — KETOROLAC TROMETHAMINE 15 MG: 15 INJECTION, SOLUTION INTRAMUSCULAR; INTRAVENOUS at 08:53

## 2020-05-10 RX ADMIN — GABAPENTIN 800 MG: 400 CAPSULE ORAL at 08:52

## 2020-05-10 RX ADMIN — PIPERACILLIN AND TAZOBACTAM 3.38 G: 3; .375 INJECTION, POWDER, LYOPHILIZED, FOR SOLUTION INTRAVENOUS at 21:35

## 2020-05-10 RX ADMIN — LACTULOSE 20 G: 20 SOLUTION ORAL at 11:30

## 2020-05-10 RX ADMIN — GABAPENTIN 800 MG: 400 CAPSULE ORAL at 17:08

## 2020-05-10 RX ADMIN — ASPIRIN 81 MG 81 MG: 81 TABLET ORAL at 08:52

## 2020-05-10 RX ADMIN — TRAZODONE HYDROCHLORIDE 200 MG: 150 TABLET ORAL at 21:35

## 2020-05-10 RX ADMIN — PIPERACILLIN AND TAZOBACTAM 3.38 G: 3; .375 INJECTION, POWDER, LYOPHILIZED, FOR SOLUTION INTRAVENOUS at 06:15

## 2020-05-10 ASSESSMENT — PAIN DESCRIPTION - PROGRESSION
CLINICAL_PROGRESSION: NOT CHANGED
CLINICAL_PROGRESSION: NOT CHANGED

## 2020-05-10 ASSESSMENT — PAIN SCALES - GENERAL
PAINLEVEL_OUTOF10: 8
PAINLEVEL_OUTOF10: 9
PAINLEVEL_OUTOF10: 3
PAINLEVEL_OUTOF10: 8
PAINLEVEL_OUTOF10: 7
PAINLEVEL_OUTOF10: 3
PAINLEVEL_OUTOF10: 8
PAINLEVEL_OUTOF10: 6

## 2020-05-10 ASSESSMENT — ENCOUNTER SYMPTOMS
GASTROINTESTINAL NEGATIVE: 1
RESPIRATORY NEGATIVE: 1
EYES NEGATIVE: 1

## 2020-05-10 NOTE — PROGRESS NOTES
Collected: 05/08/20 1359   Order Status: Completed Specimen: Blood Updated: 05/09/20 1415    Blood Culture, Routine No Growth to date.  Any change in status will be called. Narrative:     ORDER#: [id number]                          ORDERED BY: SUZETTE CLARK  SOURCE: Blood                              COLLECTED:  05/08/20 13:59  ANTIBIOTICS AT [facility]. :                      RECEIVED :  05/08/20 13:59           ASSESSMENT:  Patient Active Problem List   Diagnosis    Chronic pain    Cocaine use    Pyoderma gangrenosum    Cellulitis of right leg    Anemia    Obesity    [facility]    Fibromyalgia    Depression    Cellulitis    Pyodermic gangrenosum    Pain associated with wound    COPD exacerbation ([facility]    Leg weakness, bilateral    Septic shock ([facility]    Sepsis ([facility])         PLAN:      Fever in adult  Vasculitis    Secondary probably to rash which I believe is vasculitis  Do not see active infection of patient's wounds      Continue Zosyn for now but discontinue if and if cultures are negative

## 2020-05-10 NOTE — CARE COORDINATION
[facility] Initial Discharge Assessment    Met with Patient to discuss discharge plan. PCP: Jose Raygoza MD                                Date of Last Visit: LAST MONTH    If no PCP, list provided? N/A    Discharge Planning    Living Arrangements: independently at home    Who do you live with? SELF    Who helps you with your care:  self    If lives at home:     Do you have any barriers navigating in your home? no    Patient can perform ADL? Yes    Current Services (outpatient and in home) :  None    Dialysis: No    Is transportation available to get to your appointments? Yes    DME Equipment:  no    Respiratory equipment: None    Respiratory provider:  no     Pharmacy:  yes - [address] [place] [address] with Medication Assistance Program?  No      Patient agreeable to [address]? Declined    Patient agreeable to SNF/Rehab? Declined    Other discharge needs identified? N/A    Freedom of choice list provided with basic dialogue that supports the patient's individualized plan of care/goals and shares the quality data associated with the providers. Yes    Does Patient Have a High-Risk for Readmission Diagnosis (CHF, PN, MI, COPD)? No      The plan for Transition of Care is related to the following treatment goals:BLOOD CULTURES, LABS, VSS    Initial Discharge Plan? (Note: please see concurrent daily documentation for any updates after initial note).     HOME, DECLINES [address] AND OR SNF    The Patient and/or patient representative: Doris Lopez was provided with choice of any post-acute providers for care and equipment and agrees with discharge plan  Yes    Electronically signed by Sunny Erwin RN on 5/10/2020 at 2:06 PM

## 2020-05-10 NOTE — FLOWSHEET NOTE
Shift assessment completed. VSS. Lung sounds diminished, S1 and S2 noted. Bowel sounds present x4. Patient has generalized rash/ redness all over body. Patient also has RLE wound and swelling and abdominal wound. Patient c/o pain 8/10, medicated per MAR. Dressing change on RLE completed. Patient is up independent and able to make needs known. Call light in reach, Will contiue to monitor. Perfect serve sent to Dr. Marcelino List for patient c/o of itching to RLE. New orders received.

## 2020-05-10 NOTE — PROGRESS NOTES
Physician Progress Note    5/10/2020   11:20 AM    Name:  Daisha Salazar  MRN:    [id number]      Day: 2     Admit Date: 5/8/2020  1:22 PM  PCP: Stephanie Mccarthy MD    Code Status:  Full Code    Subjective:     Still no BM. Not much change in RLE pain. She feels her rash is slightly worsening. No fevers so far today.      Current Facility-Administered Medications   Medication Dose Route Frequency Provider Last Rate Last Dose    diphenhydrAMINE (BENADRYL) tablet 25 mg  25 mg Oral Q6H PRN Gabriele Poet, DO        lactulose (CHRONULAC) 10 GM/15ML solution 20 g  20 g Oral Once Gabriele Poet, DO        polyethylene glycol (GLYCOLAX) packet 17 g  17 g Oral Daily Gabriele Poet, DO   17 g at 05/10/20 0853    sennosides-docusate sodium (SENOKOT-S) 8.6-50 MG tablet 2 tablet  2 tablet Oral Daily Gabriele Poet, DO   2 tablet at 05/10/20 0852    ondansetron (ZOFRAN) injection 4 mg  4 mg Intravenous Q6H PRN Gabriele Poet, DO   4 mg at 05/09/20 1430    pantoprazole (PROTONIX) tablet 40 mg  40 mg Oral QAM AC Gabriele Poet, DO   40 mg at 05/10/20 0615    DULoxetine (CYMBALTA) extended release capsule 120 mg  120 mg Oral Daily Gabriele Poet, DO   120 mg at 05/10/20 4637    gabapentin (NEURONTIN) capsule 800 mg  800 mg Oral 4x Daily Gabriele Poet, DO   800 mg at 05/10/20 4991    traZODone (DESYREL) tablet 200 mg  200 mg Oral Nightly Gabriele Poet, DO   200 mg at 05/09/20 2043    sodium chloride flush 0.9 % injection 10 mL  10 mL Intravenous 2 times per day Gabriele Poet, DO   10 mL at 05/10/20 0856    sodium chloride flush 0.9 % injection 10 mL  10 mL Intravenous PRN Gabriele Poet, DO        acetaminophen (TYLENOL) tablet 650 mg  650 mg Oral Q6H PRN Gabriele Poet, DO        Or    acetaminophen (TYLENOL) suppository 650 mg  650 mg Rectal Q6H PRN Gabriele Poet, DO        enoxaparin (LOVENOX) injection 40 mg  40 mg Subcutaneous Daily Gabriele Poet, DO   40 mg at 05/10/20 7597    piperacillin-tazobactam (ZOSYN) 3.375 g in dextrose 5 % 50 mL IVPB extended infusion (mini-bag)  3.375 g Intravenous Q8H Claressa Corpus, DO 12.5 mL/hr at 05/10/20 0615 3.375 g at 05/10/20 0615    oxyCODONE-acetaminophen (PERCOCET) 5-325 MG per tablet 1 tablet  1 tablet Oral Q6H PRN Claressa Corpus, DO   1 tablet at 05/10/20 0615    nicotine (NICODERM CQ) 21 MG/24HR 1 patch  1 patch Transdermal Daily Claressa Corpus, DO   1 patch at 05/10/20 3366    nicotine polacrilex (COMMIT) lozenge 4 mg  4 mg Oral Q1H PRN Claressa Corpus, DO        aspirin chewable tablet 81 mg  81 mg Oral Daily Claressa Corpus, DO   81 mg at 05/10/20 9723       Physical Examination:      Vitals:  /65   Pulse 52   Temp 97.6 °F (36.4 °C) (Oral)   Resp 16   Ht 5' 4\" (1.626 m)   Wt 179 lb 6.9 oz (81.4 kg)   LMP 1997 (Exact Date) Comment: hysterectomy  SpO2 95%   BMI 30.80 kg/m²   Temp (24hrs), Av.9 °F (36.6 °C), Min:97.6 °F (36.4 °C), Max:98.1 °F (36.7 °C)      General appearance: resting comfortably. Answers questions appropriately. Erythematous maculopapular rash noted on hands, abdomen, right upper arm, thighs.   Skin: see above  Mental Status: oriented to person, place and time and normal affect  Lungs: clear to auscultation bilaterally, normal effort  Heart: regular rate and rhythm, no murmur  Abdomen: soft, nontender, nondistended, bowel sounds present, no masses  Extremities: RLE unwrapped- ulcer with clear drainage and surrounding erythema / warmth      Data:     Labs:  Recent Labs     05/09/20  0624 05/10/20  0519   WBC 9.0 4.6*   HGB 10.9* 10.0*    292     Recent Labs     05/09/20  0624 05/10/20  0519    139   K 3.7 3.9    106   CO2 22 24   BUN 7 9   CREATININE 0.82 0.89   GLUCOSE 118* 102*     Recent Labs     20  1345   AST 18   ALT 10   BILITOT 0.3   ALKPHOS 108       Assessment and Plan:        50-year-old female with history of cocaine use disorder (last use 2019),

## 2020-05-11 ENCOUNTER — APPOINTMENT (OUTPATIENT)
Dept: ULTRASOUND IMAGING | Age: 55
DRG: 872 | End: 2020-05-11
Payer: COMMERCIAL

## 2020-05-11 VITALS
HEART RATE: 68 BPM | DIASTOLIC BLOOD PRESSURE: 60 MMHG | OXYGEN SATURATION: 93 % | HEIGHT: 64 IN | TEMPERATURE: 99.5 F | SYSTOLIC BLOOD PRESSURE: 121 MMHG | BODY MASS INDEX: 30.63 KG/M2 | RESPIRATION RATE: 18 BRPM | WEIGHT: 179.43 LBS

## 2020-05-11 LAB
ANION GAP SERPL CALCULATED.3IONS-SCNC: 12 MEQ/L (ref 9–15)
BASOPHILS ABSOLUTE: 0.1 K/UL (ref 0–0.2)
BASOPHILS RELATIVE PERCENT: 0.9 %
BUN BLDV-MCNC: 8 MG/DL (ref 6–20)
CALCIUM SERPL-MCNC: 9 MG/DL (ref 8.5–9.9)
CHLORIDE BLD-SCNC: 108 MEQ/L (ref 95–107)
CO2: 26 MEQ/L (ref 20–31)
CREAT SERPL-MCNC: 0.85 MG/DL (ref 0.5–0.9)
EOSINOPHILS ABSOLUTE: 0.3 K/UL (ref 0–0.7)
EOSINOPHILS RELATIVE PERCENT: 4.2 %
GFR AFRICAN AMERICAN: >60
GFR NON-AFRICAN AMERICAN: >60
GLUCOSE BLD-MCNC: 99 MG/DL (ref 70–99)
HCT VFR BLD CALC: 36.6 % (ref 37–47)
HEMOGLOBIN: 11.7 G/DL (ref 12–16)
LYMPHOCYTES ABSOLUTE: 2.1 K/UL (ref 1–4.8)
LYMPHOCYTES RELATIVE PERCENT: 31.7 %
MCH RBC QN AUTO: 25.6 PG (ref 27–31.3)
MCHC RBC AUTO-ENTMCNC: 31.9 % (ref 33–37)
MCV RBC AUTO: 80.2 FL (ref 82–100)
MONOCYTES ABSOLUTE: 0.6 K/UL (ref 0.2–0.8)
MONOCYTES RELATIVE PERCENT: 9 %
NEUTROPHILS ABSOLUTE: 3.5 K/UL (ref 1.4–6.5)
NEUTROPHILS RELATIVE PERCENT: 54.2 %
PDW BLD-RTO: 16.4 % (ref 11.5–14.5)
PLATELET # BLD: 386 K/UL (ref 130–400)
POTASSIUM REFLEX MAGNESIUM: 4.3 MEQ/L (ref 3.4–4.9)
RBC # BLD: 4.57 M/UL (ref 4.2–5.4)
SODIUM BLD-SCNC: 146 MEQ/L (ref 135–144)
WBC # BLD: 6.5 K/UL (ref 4.8–10.8)

## 2020-05-11 PROCEDURE — 80048 BASIC METABOLIC PNL TOTAL CA: CPT

## 2020-05-11 PROCEDURE — 99211 OFF/OP EST MAY X REQ PHY/QHP: CPT

## 2020-05-11 PROCEDURE — 6360000002 HC RX W HCPCS: Performed by: INTERNAL MEDICINE

## 2020-05-11 PROCEDURE — 2580000003 HC RX 258: Performed by: INTERNAL MEDICINE

## 2020-05-11 PROCEDURE — 85025 COMPLETE CBC W/AUTO DIFF WBC: CPT

## 2020-05-11 PROCEDURE — 93971 EXTREMITY STUDY: CPT

## 2020-05-11 PROCEDURE — 6370000000 HC RX 637 (ALT 250 FOR IP): Performed by: INTERNAL MEDICINE

## 2020-05-11 PROCEDURE — 99232 SBSQ HOSP IP/OBS MODERATE 35: CPT | Performed by: INTERNAL MEDICINE

## 2020-05-11 PROCEDURE — 36415 COLL VENOUS BLD VENIPUNCTURE: CPT

## 2020-05-11 PROCEDURE — 93010 ELECTROCARDIOGRAM REPORT: CPT | Performed by: INTERNAL MEDICINE

## 2020-05-11 RX ORDER — CLOTRIMAZOLE AND BETAMETHASONE DIPROPIONATE 10; .64 MG/G; MG/G
CREAM TOPICAL 2 TIMES DAILY
Status: DISCONTINUED | OUTPATIENT
Start: 2020-05-11 | End: 2020-05-11 | Stop reason: HOSPADM

## 2020-05-11 RX ORDER — NICOTINE 21 MG/24HR
1 PATCH, TRANSDERMAL 24 HOURS TRANSDERMAL DAILY
Qty: 30 PATCH | Refills: 3 | Status: SHIPPED | OUTPATIENT
Start: 2020-05-12 | End: 2020-05-22

## 2020-05-11 RX ORDER — ASPIRIN 81 MG/1
81 TABLET, CHEWABLE ORAL DAILY
Qty: 30 TABLET | Refills: 3 | Status: SHIPPED | OUTPATIENT
Start: 2020-05-12 | End: 2020-05-22

## 2020-05-11 RX ORDER — AMOXICILLIN 250 MG
2 CAPSULE ORAL DAILY
Status: DISCONTINUED | OUTPATIENT
Start: 2020-05-11 | End: 2020-05-11 | Stop reason: HOSPADM

## 2020-05-11 RX ORDER — LACTULOSE 10 G/15ML
30 SOLUTION ORAL ONCE
Status: COMPLETED | OUTPATIENT
Start: 2020-05-11 | End: 2020-05-11

## 2020-05-11 RX ORDER — SULFAMETHOXAZOLE AND TRIMETHOPRIM 800; 160 MG/1; MG/1
1 TABLET ORAL 2 TIMES DAILY
Qty: 20 TABLET | Refills: 0 | Status: SHIPPED | OUTPATIENT
Start: 2020-05-11 | End: 2020-05-21

## 2020-05-11 RX ADMIN — OXYCODONE HYDROCHLORIDE AND ACETAMINOPHEN 1 TABLET: 5; 325 TABLET ORAL at 02:25

## 2020-05-11 RX ADMIN — GABAPENTIN 800 MG: 400 CAPSULE ORAL at 09:54

## 2020-05-11 RX ADMIN — SENNOSIDES AND DOCUSATE SODIUM 2 TABLET: 8.6; 5 TABLET ORAL at 09:54

## 2020-05-11 RX ADMIN — DIPHENHYDRAMINE HCL 25 MG: 25 TABLET ORAL at 02:25

## 2020-05-11 RX ADMIN — PIPERACILLIN AND TAZOBACTAM 3.38 G: 3; .375 INJECTION, POWDER, LYOPHILIZED, FOR SOLUTION INTRAVENOUS at 05:29

## 2020-05-11 RX ADMIN — ASPIRIN 81 MG 81 MG: 81 TABLET ORAL at 09:54

## 2020-05-11 RX ADMIN — Medication 10 ML: at 09:53

## 2020-05-11 RX ADMIN — DIPHENHYDRAMINE HCL 25 MG: 25 TABLET ORAL at 09:55

## 2020-05-11 RX ADMIN — POLYETHYLENE GLYCOL 3350 17 G: 17 POWDER, FOR SOLUTION ORAL at 09:57

## 2020-05-11 RX ADMIN — OXYCODONE HYDROCHLORIDE AND ACETAMINOPHEN 1 TABLET: 5; 325 TABLET ORAL at 09:55

## 2020-05-11 RX ADMIN — OXYCODONE HYDROCHLORIDE AND ACETAMINOPHEN 1 TABLET: 5; 325 TABLET ORAL at 15:56

## 2020-05-11 RX ADMIN — ENOXAPARIN SODIUM 40 MG: 40 INJECTION SUBCUTANEOUS at 09:56

## 2020-05-11 RX ADMIN — PANTOPRAZOLE SODIUM 40 MG: 40 TABLET, DELAYED RELEASE ORAL at 05:30

## 2020-05-11 RX ADMIN — LACTULOSE 30 G: 20 SOLUTION ORAL at 13:45

## 2020-05-11 RX ADMIN — GABAPENTIN 800 MG: 400 CAPSULE ORAL at 13:45

## 2020-05-11 RX ADMIN — DULOXETINE HYDROCHLORIDE 120 MG: 60 CAPSULE, DELAYED RELEASE ORAL at 09:53

## 2020-05-11 RX ADMIN — DIPHENHYDRAMINE HCL 25 MG: 25 TABLET ORAL at 15:56

## 2020-05-11 ASSESSMENT — PAIN SCALES - GENERAL
PAINLEVEL_OUTOF10: 7

## 2020-05-11 ASSESSMENT — ENCOUNTER SYMPTOMS
GASTROINTESTINAL NEGATIVE: 1
RESPIRATORY NEGATIVE: 1

## 2020-05-11 NOTE — CONSULTS
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    [facility]rom[facility] ([facility]     Osteoarthritis     Pneumonia due to organism     Pyoderma gangrenosa     Sweet syndrome     Ulcerative colitis ([facility] [facility]            Past Surgical History:   Procedure Laterality Date    ABDOMEN SURGERY      sleen repair    APPENDECTOMY      BACK SURGERY      BREAST SURGERY      fatty tumor removed, benign    COLONOSCOPY      COSMETIC SURGERY      tummy tuck    EYE SURGERY      bilateral cataract surgery    HYSTERECTOMY      SKIN BIOPSY      SPINAL FUSION      TONSILLECTOMY           No current facility-administered medications on file prior to encounter. Current Outpatient Medications on File Prior to Encounter   Medication Sig Dispense Refill    gabapentin (NEURONTIN) 800 MG tablet Take 800 mg by mouth 4 times daily.  oxybutynin (DITROPAN XL) 15 MG extended release tablet Take 25 mg by mouth nightly      Cholecalciferol (VITAMIN D) 50 MCG (2000 UT) TABS tablet       oxyCODONE-acetaminophen (PERCOCET)  MG per tablet Take 1 tablet by mouth every 8 hours as needed for Pain for up to 30 days.  Intended supply: 30 days 90 tablet 0    Cholecalciferol (VITAMIN D3) 25 MCG (1000 UT) TABS Take 1 tablet by mouth daily 30 tablet 3    vitamin B-12 (CYANOCOBALAMIN) 1000 MCG tablet Take 1 tablet by mouth daily 30 tablet 0    vitamin D (CHOLECALCIFEROL) 1000 UNIT TABS tablet Take 1,000 Units by mouth daily      senna (SENOKOT) 8.6 MG tablet Take 1 tablet by mouth 2 times daily as needed for Constipation      hydrOXYzine (ATARAX) 25 MG tablet Take 25 mg by mouth every 6 hours as needed for Itching      DULoxetine (CYMBALTA) 60 MG extended release capsule Take 120 mg by mouth daily      acetaminophen (TYLENOL) 325 MG tablet Take 650 mg by mouth every 4 hours as needed for Pain      traZODone (DESYREL) 100
 HYSTERECTOMY      SKIN BIOPSY      SPINAL FUSION      TONSILLECTOMY         No current facility-administered medications on file prior to encounter. Current Outpatient Medications on File Prior to Encounter   Medication Sig Dispense Refill    gabapentin (NEURONTIN) 800 MG tablet Take 800 mg by mouth 4 times daily.  oxybutynin (DITROPAN XL) 15 MG extended release tablet Take 25 mg by mouth nightly      Cholecalciferol (VITAMIN D) 50 MCG (2000 UT) TABS tablet       oxyCODONE-acetaminophen (PERCOCET)  MG per tablet Take 1 tablet by mouth every 8 hours as needed for Pain for up to 30 days. Intended supply: 30 days 90 tablet 0    Cholecalciferol (VITAMIN D3) 25 MCG (1000 UT) TABS Take 1 tablet by mouth daily 30 tablet 3    vitamin B-12 (CYANOCOBALAMIN) 1000 MCG tablet Take 1 tablet by mouth daily 30 tablet 0    vitamin D (CHOLECALCIFEROL) 1000 UNIT TABS tablet Take 1,000 Units by mouth daily      senna (SENOKOT) 8.6 MG tablet Take 1 tablet by mouth 2 times daily as needed for Constipation      hydrOXYzine (ATARAX) 25 MG tablet Take 25 mg by mouth every 6 hours as needed for Itching      DULoxetine (CYMBALTA) 60 MG extended release capsule Take 120 mg by mouth daily      acetaminophen (TYLENOL) 325 MG tablet Take 650 mg by mouth every 4 hours as needed for Pain      traZODone (DESYREL) 100 MG tablet Take 200 mg by mouth nightly       Alcohol Swabs (ALCOHOL PADS) 70 % PADS CLEAN THE SKIN BY USING 1 PAD ON THE AFFECTED AREA BEFORE APPLYING ANY TOPICAL CREAM, 3 TIMES DAILY 100 each 0       Allergies   Allergen Reactions    Amoxicillin-Pot Clavulanate      Other reaction(s): GI Upset, Intolerance, Other: See Comments  Nose bleed    Other Itching     IVP dye         History reviewed. No pertinent family history. Social History     Socioeconomic History    Marital status:       Spouse name: Not on file    Number of children: Not on file    Years of education: Not on file   

## 2020-05-11 NOTE — DISCHARGE SUMMARY
Hospital Medicine Discharge Summary    Tameka Ferrari  :  1965  MRN:  [id number]    Admit date:  2020  Discharge date:  2020    Admitting Physician:  Nathan Shafer DO  Primary Care Physician:  Morales Herrera MD      Discharge Diagnoses:    20    Chief Complaint   Patient presents with    Fever   [facility] Course:   Patient presented with Sepsis secondary to skin/soft tissue infection of right lower extremity chronic wound. Concerning that it may be vasculitis opposed to infection but patient stated she improved with treatment so ID is discharging on PO abx. Patient states she has appointments and plans on following up closely with rheumatology and dermatology. Exam on discharge:   /60   Pulse 68   Temp 99.5 °F (37.5 °C) (Oral)   Resp 18   Ht 5' 4\" (1.626 m)   Wt 179 lb 6.9 oz (81.4 kg)   LMP 1997 (Exact Date) Comment: hysterectomy  SpO2 93%   BMI 30.80 kg/m²   General appearance: No apparent distress, appears stated age and cooperative. HEENT:  Conjunctivae/corneas clear. Neck: Trachea midline. Respiratory:  Normal respiratory effort. Clear to auscultation  Cardiovascular: Regular rate and rhythm   Abdomen: Soft, non-tender, non-distended with normal bowel sounds.   Musculoskeletal:Right leg with +2 edema and wrapped  Neuro: Non Focal  Capillary Refill: Brisk,< 3 seconds   Peripheral Pulses: +2 palpable, equal bilaterally     Patient was seen by the following consultants while admitted to [facility]:   Consults:  [id number] [facility] TO PODIATRY    Significant Diagnostic Studies:    Refer to chart     Please refer to chart if no studies are shown here    Xr Tibia Fibula Right (2 Views)    Result Date: 2020  EXAMINATION:  XR TIBIA FIBULA RIGHT (2 VIEWS), XR ANKLE RIGHT (MIN 3 VIEWS) HISTORY:   Wound overlying Achilles tendon   A41.9 Sepsis ([address] ICD10. TECHNIQUE:  XR TIBIA

## 2020-05-11 NOTE — PROGRESS NOTES
955-- assessment completed, patient alert and oriented x 4, complaining of all over pain especially in the right heel area, 7/10 on scale, pain medication given as ordered along with benadryl for complaints of itching, patient states the atarax does not work for her, lungs diminished, heart rate regular, bowel sounds present but hypoactive, last bm 5-7, medication given to help this but the patient states that it is not helping her, I will make Dr Lily Villasenor aware, moderate pedal pulses not bilaterally, generalized rash noted throughout, abdomen with red, mariah, dry flaky areas with right side noted with scabbed over area from healing wounds, will continue to monitor    1345-- right lower posterior wound dressing changed, leg noted weeping copious amount of serous fluid, cleansed leg and wound with saline, applied maxsorb, ABD and kerlix, patient tolerated with little complaint

## 2020-05-11 NOTE — PROGRESS NOTES
Patient ride is waiting at the patient entrance, Alexa BEAN walking with the patient downstairs due to the patient not waiting for a transporter

## 2020-05-11 NOTE — PROGRESS NOTES
IVP dye         History reviewed. No pertinent family history. Social History     Socioeconomic History    Marital status:      Spouse name: Not on file    Number of children: Not on file    Years of education: Not on file    Highest education level: Not on file   Occupational History    Not on file   Social Needs    Financial resource strain: Not on file    Food insecurity     Worry: Not on file     Inability: Not on file    Transportation needs     Medical: Not on file     Non-medical: Not on file   Tobacco Use    Smoking status: Current Every Day Smoker     Packs/day: 0.50     Years: 40.00     Pack years: 20.00     Types: Cigarettes    Smokeless tobacco: Never Used   Substance and Sexual Activity    Alcohol use: No     Comment: ocassionally    Drug use: No    Sexual activity: Not Currently   Lifestyle    Physical activity     Days per week: Not on file     Minutes per session: Not on file    Stress: Not on file   Relationships    Social connections     Talks on phone: Not on file     Gets together: Not on file     Attends Ep[employer]opalian service: Not on file     Active member of club or organization: Not on file     Attends meetings of clubs or organizations: Not on file     Relationship status: Not on file    Intimate partner violence     Fear of current or ex partner: Not on file     Emotionally abused: Not on file     Physically abused: Not on file     Forced sexual activity: Not on file   Other Topics Concern    Not on file   Social History Narrative    Not on file       OBJECTIVE:     /60   Pulse 68   Temp 99.5 °F (37.5 °C) (Oral)   Resp 18   Ht 5' 4\" (1.626 m)   Wt 179 lb 6.9 oz (81.4 kg)   LMP 01/01/1997 (Exact Date) Comment: hysterectomy  SpO2 93%   BMI 30.80 kg/m²     - Patient is alert and oriented to person, place, and time. In no acute distress.  Dressing is clean, dry, and intact.   - RLE with circumferential erythema, calor, and mild-to-moderate serosanguinous

## 2020-05-12 ENCOUNTER — CARE COORDINATION (OUTPATIENT)
Dept: CASE MANAGEMENT | Age: 55
End: 2020-05-12

## 2020-05-12 LAB
ANAEROBIC CULTURE: ABNORMAL
COMPLEMENT C3: 117 MG/DL (ref 90–180)
COMPLEMENT C4: 10 MG/DL (ref 10–40)
GRAM STAIN RESULT: ABNORMAL
HIV 1,2 COMBO ANTIGEN/ANTIBODY: NEGATIVE
MYELOPEROXIDASE AB: 4 AU/ML (ref 0–19)
ORGANISM: ABNORMAL
SERINE PROTEASE 3 AB: 1 AU/ML (ref 0–19)
WOUND/ABSCESS: ABNORMAL

## 2020-05-13 LAB
BLOOD CULTURE, ROUTINE: NORMAL
CULTURE, BLOOD 2: NORMAL

## 2020-05-14 LAB
ALBUMIN SERPL-MCNC: 2.84 G/DL (ref 3.75–5.01)
ALPHA-1-GLOBULIN: 0.36 G/DL (ref 0.19–0.46)
ALPHA-2-GLOBULIN: 0.71 G/DL (ref 0.48–1.05)
BETA GLOBULIN: 0.9 G/DL (ref 0.48–1.1)
GAMMA GLOBULIN: 0.89 G/DL (ref 0.62–1.51)
IGA 1: 235 MG/DL (ref 60–294)
IGA 2: 103 MG/DL (ref 6–61)
IGA: 310 MG/DL (ref 68–408)
PROTEIN ELECTROPHORESIS, SERUM: ABNORMAL
SPE/IFE INTERPRETATION: ABNORMAL
TOTAL PROTEIN: 5.7 G/DL (ref 6.3–8.2)

## 2020-05-15 ENCOUNTER — VIRTUAL VISIT (OUTPATIENT)
Dept: FAMILY MEDICINE CLINIC | Age: 55
End: 2020-05-15
Payer: COMMERCIAL

## 2020-05-15 PROCEDURE — 99442 PR PHYS/QHP TELEPHONE EVALUATION 11-20 MIN: CPT | Performed by: INTERNAL MEDICINE

## 2020-05-15 RX ORDER — LANOLIN ALCOHOL/MO/W.PET/CERES
1000 CREAM (GRAM) TOPICAL DAILY
Qty: 30 TABLET | Refills: 0 | Status: SHIPPED | OUTPATIENT
Start: 2020-05-15 | End: 2020-07-10

## 2020-05-15 RX ORDER — OXYCODONE HYDROCHLORIDE AND ACETAMINOPHEN 5; 325 MG/1; MG/1
1 TABLET ORAL EVERY 6 HOURS PRN
Qty: 28 TABLET | Refills: 0 | Status: SHIPPED | OUTPATIENT
Start: 2020-05-15 | End: 2020-05-26 | Stop reason: SDUPTHER

## 2020-05-15 ASSESSMENT — ENCOUNTER SYMPTOMS
RECTAL PAIN: 0
SINUS PRESSURE: 0
BLOOD IN STOOL: 0
EYE PAIN: 0
CONSTIPATION: 0
EYE DISCHARGE: 0
SINUS PAIN: 0
WHEEZING: 0
EYE ITCHING: 0
VOICE CHANGE: 0
SORE THROAT: 0
ABDOMINAL DISTENTION: 0
FACIAL SWELLING: 0
EYE REDNESS: 0
SHORTNESS OF BREATH: 0
VOMITING: 0
COLOR CHANGE: 0
PHOTOPHOBIA: 0
COUGH: 0
NAUSEA: 0
TROUBLE SWALLOWING: 0
RHINORRHEA: 0
CHEST TIGHTNESS: 0
APNEA: 0
DIARRHEA: 0
ABDOMINAL PAIN: 0
BACK PAIN: 0

## 2020-05-15 NOTE — PROGRESS NOTES
cymbalta        Hypovitaminosis D-oral vitamin d refilled            No follow-ups on file. An  electronic signature was used to authenticate this note. --Evert Cockayne, MD on 5/15/2020 at 2:24 PM  Bryce Johnson is a 48 y.o. female evaluated via telephone on 5/15/2020. Consent:  She and/or health care decision maker is aware that that she may receive a bill for this telephone service, depending on her insurance coverage, and has provided verbal consent to proceed: Yes      Documentation:  I communicated with the patient and/or health care decision maker about pyoderma gangrenosum and associated pain. Details of this discussion including any medical advice provided: Please refer to note above      I affirm this is a Patient Initiated Episode with a Patient who has not had a related appointment within my department in the past 7 days or scheduled within the next 24 hours. Patient identification was verified at the start of the visit: Yes    Total Time: 12 minutes    Note: not billable if this call serves to triage the patient into an appointment for the relevant concern      [address] [facility] -- Audio/Visual (During PXKGU-01 public health emergency)    -   Bryce Johnson is a 48 y.o. female being evaluated by a Virtual Visit (video visit) encounter to address concerns as mentioned above. A caregiver was present when appropriate. Due to this being a TeleHealth encounter (During EHV-74 public health emergency), evaluation of the following organ systems was limited: Vitals/Constitutional/EENT/Resp/CV/GI//MS/Neuro/Skin/Heme-Lymph-Imm.   Pursuant to the emergency declaration under the [address] [place] [address], [address] [place] [address] authority and the [employer] and [employer] Act, this Virtual Visit was conducted with patient's (and/or legal guardian's) consent, to reduce the patient's risk of exposure to COVID-19 and provide

## 2020-05-22 ENCOUNTER — TELEPHONE (OUTPATIENT)
Dept: INFECTIOUS DISEASES | Age: 55
End: 2020-05-22

## 2020-05-22 ENCOUNTER — OFFICE VISIT (OUTPATIENT)
Dept: FAMILY MEDICINE CLINIC | Age: 55
End: 2020-05-22
Payer: COMMERCIAL

## 2020-05-22 VITALS
OXYGEN SATURATION: 98 % | HEART RATE: 69 BPM | TEMPERATURE: 98.7 F | BODY MASS INDEX: 29.53 KG/M2 | WEIGHT: 173 LBS | SYSTOLIC BLOOD PRESSURE: 110 MMHG | HEIGHT: 64 IN | RESPIRATION RATE: 14 BRPM | DIASTOLIC BLOOD PRESSURE: 70 MMHG

## 2020-05-22 PROCEDURE — 1111F DSCHRG MED/CURRENT MED MERGE: CPT | Performed by: INTERNAL MEDICINE

## 2020-05-22 PROCEDURE — 3017F COLORECTAL CA SCREEN DOC REV: CPT | Performed by: INTERNAL MEDICINE

## 2020-05-22 PROCEDURE — G8417 CALC BMI ABV UP PARAM F/U: HCPCS | Performed by: INTERNAL MEDICINE

## 2020-05-22 PROCEDURE — G8427 DOCREV CUR MEDS BY ELIG CLIN: HCPCS | Performed by: INTERNAL MEDICINE

## 2020-05-22 PROCEDURE — 99214 OFFICE O/P EST MOD 30 MIN: CPT | Performed by: INTERNAL MEDICINE

## 2020-05-22 PROCEDURE — 4004F PT TOBACCO SCREEN RCVD TLK: CPT | Performed by: INTERNAL MEDICINE

## 2020-05-22 RX ORDER — HYDROXYZINE HYDROCHLORIDE 25 MG/1
25 TABLET, FILM COATED ORAL EVERY 8 HOURS PRN
Qty: 30 TABLET | Refills: 0 | Status: SHIPPED | OUTPATIENT
Start: 2020-05-22 | End: 2020-06-01

## 2020-05-22 RX ORDER — SULFAMETHOXAZOLE AND TRIMETHOPRIM 800; 160 MG/1; MG/1
1 TABLET ORAL 2 TIMES DAILY
Qty: 14 TABLET | Refills: 0 | Status: SHIPPED | OUTPATIENT
Start: 2020-05-22 | End: 2020-05-29

## 2020-05-22 NOTE — PROGRESS NOTES
2020   This telephone encounter is being conducted to reduce the spread of coronavirus and reduce the morbidity and mortality risk of exposure related to the virus. Jerald Weathers (:  1965) is a 48 y.o. female, here for evaluation of the following medical concerns:pyoderma gangrenosum      HPI    60-year-old female with a history of pyoderma gangrenosum with associated abdominal and right lower extremity wounds presents for follow-up visit. She was recently hospitalized from  through 2020. She was discharged with oral antibiotics and a follow-up appointment with dermatology and rheumatology. She is presently receiving Percocet every 6 hours as needed for pain. She completed the course of oral Bactrim at which time erythema returned to the right lower extremity. She is followed in wound care and by dermatology. Depression/anxiety: Receiving Cymbalta and trazodone      Fibromyalgia and Wound pain: on neurontin , oxycodone q 8 hours. At present he denies polyuria,  Polydipsia, constitutional, sinus, visual, cardiopulmonary, urologic, gastrointestinal, immunologic/hematologic, additional musculoskeletal, neurologic,dermatologic, or psychiatric complaints. Review of Systems   Constitutional: Negative for chills, diaphoresis, fatigue and fever. HENT: Negative for congestion, dental problem, drooling, ear discharge, ear pain, facial swelling, hearing loss, mouth sores, nosebleeds, postnasal drip, rhinorrhea, sinus pressure, sinus pain, sneezing, sore throat, tinnitus, trouble swallowing and voice change. Eyes: Negative for photophobia, pain, discharge, redness, itching and visual disturbance. Respiratory: Negative for apnea, cough, chest tightness, shortness of breath and wheezing. Cardiovascular: Negative for chest pain, palpitations and leg swelling.    Gastrointestinal: Negative for abdominal distention, abdominal pain, blood in stool, constipation, diarrhea, Yes Margot Nugent MD   Cholecalciferol (VITAMIN D3) 25 MCG (1000 UT) TABS Take 1 tablet by mouth daily Yes Margot Nugent MD   vitamin D (CHOLECALCIFEROL) 1000 UNIT TABS tablet Take 1,000 Units by mouth daily Yes Historical Provider, MD   hydrOXYzine (ATARAX) 25 MG tablet Take 25 mg by mouth every 6 hours as needed for Itching Yes Historical Provider, MD   DULoxetine (CYMBALTA) 60 MG extended release capsule Take 120 mg by mouth daily Yes Historical Provider, MD   acetaminophen (TYLENOL) 325 MG tablet Take 650 mg by mouth every 4 hours as needed for Pain Yes Historical Provider, MD   traZODone (DESYREL) 100 MG tablet Take 200 mg by mouth nightly  Yes Historical Provider, MD        Allergies   Allergen Reactions    Amoxicillin-Pot Clavulanate      Other reaction(s): GI Upset, Intolerance, Other: See Comments  Nose bleed    Other Itching     IVP dye         Past Medical History:   Diagnosis Date    Anxiety     Arthritis     [facility] 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    Chronic kidney disease     COPD exacerbation ([facility]    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    Neuromuscular [facility]     Osteoarthritis     Pneumonia due to organism     Pyoderma gangrenosa     Sweet syndrome     Ulcerative colitis ([facility]        Past Surgical History:   Procedure Laterality Date    ABDOMEN SURGERY      sleen repair    APPENDECTOMY      BACK SURGERY      BREAST SURGERY      fatty tumor removed, benign    COLONOSCOPY      COSMETIC SURGERY      tummy tuck    EYE SURGERY      bilateral cataract surgery    HYSTERECTOMY      SKIN BIOPSY      SPINAL FUSION      TONSILLECTOMY         Social History     Socioeconomic History    Marital status:       Spouse name: Not on file    Number of children: Not on file    Years of education: Not on Trachea: No tracheal deviation. Cardiovascular:      Rate and Rhythm: Normal rate and regular rhythm. Heart sounds: Normal heart sounds. Pulmonary:      Effort: Pulmonary effort is normal. No respiratory distress. Breath sounds: Normal breath sounds. No wheezing or rales. Chest:      Chest wall: No tenderness. Abdominal:      General: Bowel sounds are normal. There is no distension. Palpations: Abdomen is soft. There is no mass. Tenderness: There is no abdominal tenderness. There is no guarding or rebound. Musculoskeletal:         General: No tenderness or deformity. Comments: Open wound and calcaneal region approximately 2 x 2 cm. No malodor noted. Lymphadenopathy:      Cervical: No cervical adenopathy. Skin:     General: Skin is warm and dry. Coloration: Skin is not pale. Findings: No erythema or rash. Comments: Right lower extremity erythema extending from dorsum to mid shin. Neurological:      Mental Status: She is alert and oriented to person, place, and time. Motor: No abnormal muscle tone. Psychiatric:         Thought Content: Thought content normal.         Judgment: Judgment normal.           ASSESSMENT/PLAN:     Wound of right lower extremity, sequela  -Requested that the patient follow-up with  dermatology. Will refer the patient to infectious disease.  -Resume oral Bactrim  - oxyCODONE-acetaminophen (PERCOCET)  MG per tablet; Take 1 tablet by mouth every 6 hours as needed for Pain  -Atarax for itching        Fibromyalgia:continue cymbalta        Hypovitaminosis D-oral vitamin d refilled      Follow-up-1 week        --Maria Elena Funes MD on 5/22/2020 at 11:05 AM    An electronic signature was used to authenticate this note.

## 2020-05-26 RX ORDER — OXYCODONE HYDROCHLORIDE AND ACETAMINOPHEN 5; 325 MG/1; MG/1
1 TABLET ORAL EVERY 6 HOURS PRN
Qty: 28 TABLET | Refills: 0 | Status: SHIPPED | OUTPATIENT
Start: 2020-05-26 | End: 2020-05-29 | Stop reason: SDUPTHER

## 2020-05-27 ENCOUNTER — TELEPHONE (OUTPATIENT)
Dept: INFECTIOUS DISEASES | Age: 55
End: 2020-05-27

## 2020-05-28 ENCOUNTER — VIRTUAL VISIT (OUTPATIENT)
Dept: INFECTIOUS DISEASES | Age: 55
End: 2020-05-28
Payer: COMMERCIAL

## 2020-05-28 PROCEDURE — 1111F DSCHRG MED/CURRENT MED MERGE: CPT | Performed by: INTERNAL MEDICINE

## 2020-05-28 PROCEDURE — 4004F PT TOBACCO SCREEN RCVD TLK: CPT | Performed by: INTERNAL MEDICINE

## 2020-05-28 PROCEDURE — G8428 CUR MEDS NOT DOCUMENT: HCPCS | Performed by: INTERNAL MEDICINE

## 2020-05-28 PROCEDURE — 99214 OFFICE O/P EST MOD 30 MIN: CPT | Performed by: INTERNAL MEDICINE

## 2020-05-28 PROCEDURE — 3017F COLORECTAL CA SCREEN DOC REV: CPT | Performed by: INTERNAL MEDICINE

## 2020-05-28 PROCEDURE — G8417 CALC BMI ABV UP PARAM F/U: HCPCS | Performed by: INTERNAL MEDICINE

## 2020-05-28 RX ORDER — SULFAMETHOXAZOLE AND TRIMETHOPRIM 800; 160 MG/1; MG/1
1 TABLET ORAL 2 TIMES DAILY
Qty: 60 TABLET | Refills: 0 | Status: SHIPPED | OUTPATIENT
Start: 2020-05-28 | End: 2020-06-27

## 2020-05-28 ASSESSMENT — ENCOUNTER SYMPTOMS: CONSTIPATION: 1

## 2020-05-29 ENCOUNTER — TELEPHONE (OUTPATIENT)
Dept: FAMILY MEDICINE CLINIC | Age: 55
End: 2020-05-29

## 2020-05-29 RX ORDER — OXYCODONE HYDROCHLORIDE AND ACETAMINOPHEN 5; 325 MG/1; MG/1
1 TABLET ORAL EVERY 6 HOURS PRN
Qty: 28 TABLET | Refills: 0 | Status: SHIPPED | OUTPATIENT
Start: 2020-05-29 | End: 2020-06-05

## 2020-06-10 ENCOUNTER — HOSPITAL ENCOUNTER (OUTPATIENT)
Dept: WOMENS IMAGING | Age: 55
Discharge: HOME | End: 2020-06-12
Payer: COMMERCIAL

## 2020-06-10 ENCOUNTER — OFFICE VISIT (OUTPATIENT)
Dept: FAMILY MEDICINE CLINIC | Age: 55
End: 2020-06-10
Payer: COMMERCIAL

## 2020-06-10 VITALS
HEIGHT: 64 IN | RESPIRATION RATE: 16 BRPM | TEMPERATURE: 98 F | BODY MASS INDEX: 28.17 KG/M2 | WEIGHT: 165 LBS | SYSTOLIC BLOOD PRESSURE: 120 MMHG | DIASTOLIC BLOOD PRESSURE: 69 MMHG | HEART RATE: 64 BPM | OXYGEN SATURATION: 98 %

## 2020-06-10 PROCEDURE — 99214 OFFICE O/P EST MOD 30 MIN: CPT | Performed by: INTERNAL MEDICINE

## 2020-06-10 PROCEDURE — 4004F PT TOBACCO SCREEN RCVD TLK: CPT | Performed by: INTERNAL MEDICINE

## 2020-06-10 PROCEDURE — 3017F COLORECTAL CA SCREEN DOC REV: CPT | Performed by: INTERNAL MEDICINE

## 2020-06-10 PROCEDURE — 1111F DSCHRG MED/CURRENT MED MERGE: CPT | Performed by: INTERNAL MEDICINE

## 2020-06-10 PROCEDURE — 77067 SCR MAMMO BI INCL CAD: CPT

## 2020-06-10 PROCEDURE — G8427 DOCREV CUR MEDS BY ELIG CLIN: HCPCS | Performed by: INTERNAL MEDICINE

## 2020-06-10 PROCEDURE — G8417 CALC BMI ABV UP PARAM F/U: HCPCS | Performed by: INTERNAL MEDICINE

## 2020-06-10 RX ORDER — OXYCODONE AND ACETAMINOPHEN 10; 325 MG/1; MG/1
1 TABLET ORAL EVERY 8 HOURS PRN
Qty: 21 TABLET | Refills: 0 | Status: SHIPPED | OUTPATIENT
Start: 2020-06-10 | End: 2020-06-16 | Stop reason: SDUPTHER

## 2020-06-10 NOTE — PROGRESS NOTES
6/10/2020      Kodi Longoria (:  1965) is a 48 y.o. female, here for evaluation of the following medical concerns:pyoderma gangrenosum      HPI    52-year-old female with a history of pyoderma gangrenosum with associated abdominal and right lower extremity wounds presents for follow-up visit. She was recently hospitalized from  through 2020. She has been followed by dermatology wound care and rheumatology. She is presently receiving Percocet every 6 hours as needed for pain. She completed the course of oral Bactrim at which time erythema returned to the right lower extremity. She is followed in wound care and by dermatology. Depression/anxiety: Receiving Cymbalta and trazodone      Fibromyalgia and Wound pain: on neurontin , oxycodone q 8 hours. At present he denies polyuria,  Polydipsia, constitutional, sinus, visual, cardiopulmonary, urologic, gastrointestinal, immunologic/hematologic, additional musculoskeletal, neurologic,dermatologic, or psychiatric complaints. Review of Systems   Constitutional: Negative for chills, diaphoresis, fatigue and fever. HENT: Negative for congestion, dental problem, drooling, ear discharge, ear pain, facial swelling, hearing loss, mouth sores, nosebleeds, postnasal drip, rhinorrhea, sinus pressure, sinus pain, sneezing, sore throat, tinnitus, trouble swallowing and voice change. Eyes: Negative for photophobia, pain, discharge, redness, itching and visual disturbance. Respiratory: Negative for apnea, cough, chest tightness, shortness of breath and wheezing. Cardiovascular: Negative for chest pain, palpitations and leg swelling. Gastrointestinal: Negative for abdominal distention, abdominal pain, blood in stool, constipation, diarrhea, nausea, rectal pain and vomiting. Endocrine: Negative for cold intolerance, heat intolerance, polydipsia, polyphagia and polyuria.    Genitourinary: Negative for decreased urine volume, difficulty urinating, dysuria, flank pain, frequency, genital sores, hematuria and urgency. Musculoskeletal: Negative for arthralgias, back pain, gait problem, joint swelling, myalgias, neck pain and neck stiffness. Skin: Negative for color change, rash and wound. Allergic/Immunologic: Negative for environmental allergies and food allergies. Neurological: Negative for dizziness, tremors, seizures, syncope, facial asymmetry, speech difficulty, weakness, light-headedness, numbness and headaches. Hematological: Negative for adenopathy. Does not bruise/bleed easily. Psychiatric/Behavioral: Negative for agitation, confusion, decreased concentration, hallucinations, self-injury, sleep disturbance and suicidal ideas. The patient is not nervous/anxious. Prior to Visit Medications    Medication Sig Taking? Authorizing Provider   sulfamethoxazole-trimethoprim (BACTRIM DS) 800-160 MG per tablet Take 1 tablet by mouth 2 times daily Yes Haja Estrada MD   vitamin B-12 (CYANOCOBALAMIN) 1000 MCG tablet Take 1 tablet by mouth daily Yes Gely Fuentes MD   gabapentin (NEURONTIN) 800 MG tablet Take 800 mg by mouth 4 times daily.  Yes Historical Provider, MD   Cholecalciferol (VITAMIN D) 50 MCG (2000 UT) TABS tablet  Yes Historical Provider, MD   Alcohol Swabs (ALCOHOL PADS) 70 % PADS CLEAN THE SKIN BY USING 1 PAD ON THE AFFECTED AREA BEFORE APPLYING ANY TOPICAL CREAM, 3 TIMES DAILY Yes Gely Fuentes MD   Cholecalciferol (VITAMIN D3) 25 MCG (1000 UT) TABS Take 1 tablet by mouth daily Yes Gely Fuentes MD   vitamin D (CHOLECALCIFEROL) 1000 UNIT TABS tablet Take 1,000 Units by mouth daily Yes Historical Provider, MD   hydrOXYzine (ATARAX) 25 MG tablet Take 25 mg by mouth every 6 hours as needed for Itching Yes Historical Provider, MD   DULoxetine (CYMBALTA) 60 MG extended release capsule Take 120 mg by mouth daily Yes Historical Provider, MD   acetaminophen (TYLENOL) 325 MG tablet Take 650 mg by mouth every 4 hours as needed for Pain Yes Historical Provider, MD   traZODone (DESYREL) 100 MG tablet Take 200 mg by mouth nightly  Yes Historical Provider, MD        Allergies   Allergen Reactions    Amoxicillin-Pot Clavulanate      Other reaction(s): GI Upset, Intolerance, Other: See Comments  Nose bleed    Other Itching     IVP dye         Past Medical History:   Diagnosis Date    Anxiety     Arthritis     Cancer ([facility] [address] 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    Chronic kidney disease     COPD exacerbation ([facility]    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    [facility]     Osteoarthritis     Pneumonia due to organism     Pyoderma gangrenosa     Sweet syndrome     Ulcerative colitis ([facility]        Past Surgical History:   Procedure Laterality Date    ABDOMEN SURGERY      sleen repair    APPENDECTOMY      BACK SURGERY      BREAST SURGERY      fatty tumor removed, benign    COLONOSCOPY      COSMETIC SURGERY      tummy tuck    EYE SURGERY      bilateral cataract surgery    HYSTERECTOMY      SKIN BIOPSY      SPINAL FUSION      TONSILLECTOMY         Social History     Socioeconomic History    Marital status:       Spouse name: Not on file    Number of children: Not on file    Years of education: Not on file    Highest education level: Not on file   Occupational History    Not on file   Social Needs    Financial resource strain: Not on file    Food insecurity     Worry: Not on file     Inability: Not on file    Transportation needs     Medical: Not on file     Non-medical: Not on file   Tobacco Use    Smoking status: Current Every Day Smoker     Packs/day: 0.50     Years: 40.00     Pack years: 20.00     Types: Cigarettes    Smokeless tobacco: Never Used   Substance and Sexual Activity    Alcohol use: No     Comment: ocassionally Abdomen is soft. There is no mass. Tenderness: There is no abdominal tenderness. There is no guarding or rebound. Musculoskeletal:         General: No tenderness or deformity. Lymphadenopathy:      Cervical: No cervical adenopathy. Skin:     General: Skin is warm and dry. Coloration: Skin is not pale. Findings: No erythema or rash. Comments: Dressing dry and intact. No malodor noted. .   Neurological:      Mental Status: She is alert and oriented to person, place, and time. Motor: No abnormal muscle tone. Psychiatric:         Thought Content: Thought content normal.         Judgment: Judgment normal.           ASSESSMENT/PLAN:     Wound of right lower extremity, sequela  -Continue to follow-up with dermatology wound care and infectious disease.  - oxyCODONE-acetaminophen (PERCOCET)  MG per tablet; Take 1 tablet by mouth every 6 hours as needed for Pain  -Atarax as needed for itching        Fibromyalgia:continue cymbalta        Hypovitaminosis D- continue oral vitamin D replacement. Follow-up- on June 24, 2020. --Vandana Chaudhari MD on 6/10/2020 at 4:01 PM    An electronic signature was used to authenticate this note.

## 2020-06-15 ENCOUNTER — TELEPHONE (OUTPATIENT)
Dept: FAMILY MEDICINE CLINIC | Age: 55
End: 2020-06-15

## 2020-06-16 RX ORDER — OXYCODONE AND ACETAMINOPHEN 10; 325 MG/1; MG/1
1 TABLET ORAL EVERY 8 HOURS PRN
Qty: 21 TABLET | Refills: 0 | Status: SHIPPED | OUTPATIENT
Start: 2020-06-16 | End: 2020-06-16 | Stop reason: SDUPTHER

## 2020-06-16 RX ORDER — OXYCODONE AND ACETAMINOPHEN 10; 325 MG/1; MG/1
1 TABLET ORAL EVERY 8 HOURS PRN
Qty: 21 TABLET | Refills: 0 | Status: SHIPPED | OUTPATIENT
Start: 2020-06-16 | End: 2020-06-23

## 2020-06-16 NOTE — TELEPHONE ENCOUNTER
Pt called office stating her colonoscopy is scheduled 7/27/2020. Dr Desire Jackson is requiring an order for colonoscopy to be faxed w/ Dx of Positive Cologuard testing.     Order to be faxed to #  [phone]   [facility]

## 2020-06-24 ENCOUNTER — OFFICE VISIT (OUTPATIENT)
Dept: FAMILY MEDICINE CLINIC | Age: 55
End: 2020-06-24
Payer: COMMERCIAL

## 2020-06-24 VITALS
WEIGHT: 164 LBS | HEIGHT: 64 IN | TEMPERATURE: 98 F | RESPIRATION RATE: 14 BRPM | DIASTOLIC BLOOD PRESSURE: 70 MMHG | SYSTOLIC BLOOD PRESSURE: 120 MMHG | BODY MASS INDEX: 28 KG/M2 | HEART RATE: 69 BPM | OXYGEN SATURATION: 99 %

## 2020-06-24 PROCEDURE — G8417 CALC BMI ABV UP PARAM F/U: HCPCS | Performed by: INTERNAL MEDICINE

## 2020-06-24 PROCEDURE — G8427 DOCREV CUR MEDS BY ELIG CLIN: HCPCS | Performed by: INTERNAL MEDICINE

## 2020-06-24 PROCEDURE — 3017F COLORECTAL CA SCREEN DOC REV: CPT | Performed by: INTERNAL MEDICINE

## 2020-06-24 PROCEDURE — 4004F PT TOBACCO SCREEN RCVD TLK: CPT | Performed by: INTERNAL MEDICINE

## 2020-06-24 PROCEDURE — 99213 OFFICE O/P EST LOW 20 MIN: CPT | Performed by: INTERNAL MEDICINE

## 2020-06-24 RX ORDER — OXYCODONE HYDROCHLORIDE AND ACETAMINOPHEN 5; 325 MG/1; MG/1
1 TABLET ORAL EVERY 6 HOURS PRN
Qty: 28 TABLET | Refills: 0 | Status: SHIPPED | OUTPATIENT
Start: 2020-06-24 | End: 2020-06-25

## 2020-06-24 NOTE — PROGRESS NOTES
2020      Concetta Rodrigez (:  1965) is a 48 y.o. female, here for evaluation of the following medical concerns:pyoderma gangrenosum      HPI    60-year-old female with a history of pyoderma gangrenosum with associated abdominal and right lower extremity wounds presents for follow-up visit. She was recently hospitalized from  through 2020. She has been followed by dermatology wound care and rheumatology. She is presently receiving Percocet every 6 hours as needed for pain. She completed the course of oral Bactrim at which time erythema returned to the right lower extremity. She is followed in wound care and by dermatology. Depression/anxiety: Continuing Cymbalta and trazodone      Fibromyalgia and Wound pain: on neurontin , oxycodone q 8 hours. At present he denies polyuria,  Polydipsia, constitutional, sinus, visual, cardiopulmonary, urologic, gastrointestinal, immunologic/hematologic, additional musculoskeletal, neurologic,dermatologic, or psychiatric complaints. Review of Systems   Constitutional: Negative for chills, diaphoresis, fatigue and fever. HENT: Negative for congestion, dental problem, drooling, ear discharge, ear pain, facial swelling, hearing loss, mouth sores, nosebleeds, postnasal drip, rhinorrhea, sinus pressure, sinus pain, sneezing, sore throat, tinnitus, trouble swallowing and voice change. Eyes: Negative for photophobia, pain, discharge, redness, itching and visual disturbance. Respiratory: Negative for apnea, cough, chest tightness, shortness of breath and wheezing. Cardiovascular: Negative for chest pain, palpitations and leg swelling. Gastrointestinal: Negative for abdominal distention, abdominal pain, blood in stool, constipation, diarrhea, nausea, rectal pain and vomiting. Endocrine: Negative for cold intolerance, heat intolerance, polydipsia, polyphagia and polyuria.    Genitourinary: Negative for decreased urine volume,  Drug use: No    Sexual activity: Not Currently   Lifestyle    Physical activity     Days per week: Not on file     Minutes per session: Not on file    Stress: Not on file   Relationships    Social connections     Talks on phone: Not on file     Gets together: Not on file     Attends Restorationist service: Not on file     Active member of club or organization: Not on file     Attends meetings of clubs or organizations: Not on file     Relationship status: Not on file    Intimate partner violence     Fear of current or ex partner: Not on file     Emotionally abused: Not on file     Physically abused: Not on file     Forced sexual activity: Not on file   Other Topics Concern    Not on file   Social History Narrative    Not on file        No family history on file. Vitals:    06/24/20 1432   BP: 120/70   Pulse: 69   Resp: 14   Temp: 98 °F (36.7 °C)   SpO2: 99%   Weight: 164 lb (74.4 kg)   Height: 5' 4\" (1.626 m)     Estimated body mass index is 28.15 kg/m² as calculated from the following:    Height as of this encounter: 5' 4\" (1.626 m). Weight as of this encounter: 164 lb (74.4 kg). Physical Exam  Constitutional:       General: She is not in acute distress. Appearance: She is well-developed. HENT:      Head: Normocephalic. Right Ear: External ear normal.      Left Ear: External ear normal.   Eyes:      Conjunctiva/sclera: Conjunctivae normal.      Pupils: Pupils are equal, round, and reactive to light. Neck:      Musculoskeletal: Neck supple. Vascular: No JVD. Trachea: No tracheal deviation. Cardiovascular:      Rate and Rhythm: Normal rate and regular rhythm. Heart sounds: Normal heart sounds. Pulmonary:      Effort: Pulmonary effort is normal. No respiratory distress. Breath sounds: Normal breath sounds. No wheezing or rales. Chest:      Chest wall: No tenderness. Abdominal:      General: Bowel sounds are normal. There is no distension.       Palpations:

## 2020-06-25 ENCOUNTER — TELEPHONE (OUTPATIENT)
Dept: FAMILY MEDICINE CLINIC | Age: 55
End: 2020-06-25

## 2020-06-25 RX ORDER — OXYCODONE AND ACETAMINOPHEN 10; 325 MG/1; MG/1
1 TABLET ORAL EVERY 6 HOURS PRN
Qty: 28 TABLET | Refills: 0 | Status: SHIPPED | OUTPATIENT
Start: 2020-06-25 | End: 2020-06-25

## 2020-06-25 NOTE — TELEPHONE ENCOUNTER
Patient left message on refill today asking if she can 10mg percocet instead of 5mg 10mg is what you have been giving her.

## 2020-07-01 ENCOUNTER — TELEPHONE (OUTPATIENT)
Dept: FAMILY MEDICINE CLINIC | Age: 55
End: 2020-07-01

## 2020-07-01 NOTE — TELEPHONE ENCOUNTER
Pt called office stating she can not be scheduled w/ [facility] pain management due to her discharging herself years ago.   Pt requesting new referral.

## 2020-07-02 RX ORDER — CALCIPOTRIENE 50 UG/G
CREAM TOPICAL
Qty: 360 G | Refills: 0 | Status: SHIPPED | OUTPATIENT
Start: 2020-07-02 | End: 2021-03-08

## 2020-07-06 ENCOUNTER — TELEPHONE (OUTPATIENT)
Dept: ADMINISTRATIVE | Age: 55
End: 2020-07-06

## 2020-07-06 RX ORDER — CHOLECALCIFEROL (VITAMIN D3) 50 MCG
TABLET ORAL
Qty: 30 TABLET | Refills: 3 | Status: SHIPPED | OUTPATIENT
Start: 2020-07-06 | End: 2020-11-02

## 2020-07-06 NOTE — TELEPHONE ENCOUNTER
Patient called for a refill of percocet 10mg called into [address]. Her phone # [phone].

## 2020-07-06 NOTE — TELEPHONE ENCOUNTER
Requesting medication refill.  Please approve or deny this request.    Rx requested:  Requested Prescriptions     Pending Prescriptions Disp Refills    Cholecalciferol (VITAMIN D) 50 MCG (2000 UT) TABS tablet [Pharmacy Med Name: VITAMIN D3 2,000 UNIT TAB 50 MCG TAB] 30 tablet 3     Sig: TAKE 1 TABLET BY MOUTH DAILY       Last Office Visit:   6/24/2020    Last Filled:      Last Labs:      Next Visit Date:  Future Appointments   Date Time Provider Manan Honeycutt   7/8/2020 11:15 AM [place] MD [address] [place], [place]

## 2020-07-07 NOTE — TELEPHONE ENCOUNTER
Please ensure that the patient has an appt with pain management. If she does I can refill this medication.

## 2020-07-08 ENCOUNTER — VIRTUAL VISIT (OUTPATIENT)
Dept: FAMILY MEDICINE CLINIC | Age: 55
End: 2020-07-08
Payer: COMMERCIAL

## 2020-07-08 PROCEDURE — 99442 PR PHYS/QHP TELEPHONE EVALUATION 11-20 MIN: CPT | Performed by: INTERNAL MEDICINE

## 2020-07-08 NOTE — PROGRESS NOTES
2020      Valerie Gaffney (:  1965) is a 48 y.o. female, here for evaluation of the following medical concerns:pyoderma gangrenosum     This telephone encounter is being conducted to reduce the spread of coronavirus and reduce the morbidity and mortality risk of exposure related to the virus. HPI    64-year-old female with a history of pyoderma gangrenosum with associated abdominal and right lower extremity wounds presents for follow-up visit. She washospitalized from  through 2020. She has been followed by dermatology wound care and rheumatology. She is presently receiving Percocet every 6 hours as needed for pain. I have referred her to pain management in the past and the patient has been unable to secure an appointment with pain management to date. She completed the course of oral Bactrim at which time erythema returned to the right lower extremity. She is followed in wound care and by dermatology. Depression/anxiety: Continuing Cymbalta and trazodone      Fibromyalgia and Wound pain: on neurontin , oxycodone q 8 hours. At present he denies polyuria,  Polydipsia, constitutional, sinus, visual, cardiopulmonary, urologic, gastrointestinal, immunologic/hematologic, additional musculoskeletal, neurologic,dermatologic, or psychiatric complaints. Review of Systems   Constitutional: Negative for chills, diaphoresis, fatigue and fever. HENT: Negative for congestion, dental problem, drooling, ear discharge, ear pain, facial swelling, hearing loss, mouth sores, nosebleeds, postnasal drip, rhinorrhea, sinus pressure, sinus pain, sneezing, sore throat, tinnitus, trouble swallowing and voice change. Eyes: Negative for photophobia, pain, discharge, redness, itching and visual disturbance. Respiratory: Negative for apnea, cough, chest tightness, shortness of breath and wheezing. Cardiovascular: Negative for chest pain, palpitations and leg swelling. Gastrointestinal: Negative for abdominal distention, abdominal pain, blood in stool, constipation, diarrhea, nausea, rectal pain and vomiting. Endocrine: Negative for cold intolerance, heat intolerance, polydipsia, polyphagia and polyuria. Genitourinary: Negative for decreased urine volume, difficulty urinating, dysuria, flank pain, frequency, genital sores, hematuria and urgency. Musculoskeletal: Negative for arthralgias, back pain, gait problem, joint swelling, myalgias, neck pain and neck stiffness. Skin: Negative for color change, rash and wound. Allergic/Immunologic: Negative for environmental allergies and food allergies. Neurological: Negative for dizziness, tremors, seizures, syncope, facial asymmetry, speech difficulty, weakness, light-headedness, numbness and headaches. Hematological: Negative for adenopathy. Does not bruise/bleed easily. Psychiatric/Behavioral: Negative for agitation, confusion, decreased concentration, hallucinations, self-injury, sleep disturbance and suicidal ideas. The patient is not nervous/anxious. Prior to Visit Medications    Medication Sig Taking? Authorizing Provider   Cholecalciferol (VITAMIN D) 50 MCG (2000 UT) TABS tablet TAKE 1 TABLET BY MOUTH DAILY  Rahat Hunter MD   calcipotriene (DOVONEX) 0.005 % cream APPLY 1-2 GRAMS TOPICALLY TO AFFECTED AREA 1-2 TIMES A DAY. DO NOT APPLY TO Gregg Carrington MD   vitamin B-12 (CYANOCOBALAMIN) 1000 MCG tablet Take 1 tablet by mouth daily  Rahat Hunter MD   gabapentin (NEURONTIN) 800 MG tablet Take 800 mg by mouth 4 times daily.   Mitesh Snyder MD   Cholecalciferol (VITAMIN D) 50 MCG (2000 UT) TABS tablet   Historical Provider, MD   Alcohol Swabs (ALCOHOL PADS) 70 % PADS CLEAN THE SKIN BY USING 1 PAD ON THE AFFECTED AREA BEFORE APPLYING ANY TOPICAL CREAM, 3 TIMES DAILY  Rahat Hunter MD   vitamin D (CHOLECALCIFEROL) 1000 UNIT TABS tablet Take 1,000 Units by mouth daily  Historical Provider, on file     Non-medical: Not on file   Tobacco Use    Smoking status: Current Every Day Smoker     Packs/day: 0.50     Years: 40.00     Pack years: 20.00     Types: Cigarettes    Smokeless tobacco: Never Used   Substance and Sexual Activity    Alcohol use: No     Comment: ocassionally    Drug use: No    Sexual activity: Not Currently   Lifestyle    Physical activity     Days per week: Not on file     Minutes per session: Not on file    Stress: Not on file   Relationships    Social connections     Talks on phone: Not on file     Gets together: Not on file     Attends Roman Catholic service: Not on file     Active member of club or organization: Not on file     Attends meetings of clubs or organizations: Not on file     Relationship status: Not on file    Intimate partner violence     Fear of current or ex partner: Not on file     Emotionally abused: Not on file     Physically abused: Not on file     Forced sexual activity: Not on file   Other Topics Concern    Not on file   Social History Narrative    Not on file        No family history on file. There were no vitals filed for this visit. Estimated body mass index is 28.15 kg/m² as calculated from the following:    Height as of 6/24/20: 5' 4\" (1.626 m). Weight as of 6/24/20: 164 lb (74.4 kg). ASSESSMENT/PLAN:     Wound of right lower extremity, sequela  -Continue to follow-up with dermatology wound care and infectious disease.  -I have reinforced-the patient must make an appointment with pain management to solicit their expertise in regards to the management of her pain. I have explained that I will not provide her with a prescription for Percocet until she has a documented appointment with pain management.  -Atarax as needed for itching        Fibromyalgia:continue cymbalta        Hypovitaminosis D- continue oral vitamin D replacement.           --Rahat Hunter MD on 7/8/2020 at 11:39 AM      TELEHEALTH EVALUATION -- Audio/Visual (During VHEYB-21 public health emergency)    -   Say Lang is a 48 y.o. female being evaluated by a Virtual Visit (video visit) encounter to address concerns as mentioned above. A caregiver was present when appropriate. Due to this being a TeleHealth encounter (During DJNEZ-77 public [facility] emergency), evaluation of the following organ systems was limited: Vitals/Constitutional/EENT/Resp/CV/GI//MS/Neuro/Skin/Heme-Lymph-Imm. Pursuant to the emergency declaration under the [address] [place] [address], [address] [place] [address] authority and the [employer] Act, this Virtual Visit was conducted with patient's (and/or legal guardian's) consent, to reduce the patient's risk of exposure to COVID-19 and provide necessary medical care. The patient (and/or legal guardian) has also been advised to contact this office for worsening conditions or problems, and seek emergency medical treatment and/or call 911 if deemed necessary. Services were provided through a video synchronous discussion virtually to substitute for in-person clinic visit. Type of encounter was __ Doxy __ MyChart ___Facetime    Patient was located at their home. Provider was located at their ___ home or        ____ office. --Guy Jha MD on 7/9/2020 at 6:50 AM  Say Lang is a 48 y.o. female evaluated via telephone on 7/8/2020. Consent:  She and/or health care decision maker is aware that that she may receive a bill for this telephone service, depending on her insurance coverage, and has provided verbal consent to proceed: Yes      Documentation:  I communicated with the patient and/or health care decision maker about wound of the right lower extremity, pyoderma gangrenosum, chronic pain syndrome. .   Details of this discussion including any medical advice provided: Please refer to note above      I affirm this is a Patient Initiated Episode with a Patient who has not had a

## 2020-07-09 ENCOUNTER — TELEPHONE (OUTPATIENT)
Dept: FAMILY MEDICINE CLINIC | Age: 55
End: 2020-07-09

## 2020-07-09 NOTE — TELEPHONE ENCOUNTER
Pain management calling in Referral was sent, patient advised them that Dr Kyle Morales was sending her to them to make sure he was prescribing the right medication for patient and that patient didn't need them to write any scripts that Dr Kyle Morales would continue writing RX.  If that is true they would like you to put on referral what exactly you need from them       Talked to [facility] [address]    Fax [phone] if there is changes in Referral send plus last note

## 2020-07-09 NOTE — TELEPHONE ENCOUNTER
Patient is being referred to pain management. She stated that she fired them and is not seeing the previous pain management anymore. Patient is calling in stating that she is in a lot of pain because of her heel and ankle. She sts that she needs her percocet medication or something now. The pain management is awaiting paperwork from you. There is also another message that was entered earlier 7/9. .. please review that message as well. Please advise.

## 2020-07-10 RX ORDER — MAGNESIUM 200 MG
TABLET ORAL
Qty: 30 TABLET | Refills: 0 | Status: SHIPPED | OUTPATIENT
Start: 2020-07-10 | End: 2021-03-08

## 2020-07-10 NOTE — TELEPHONE ENCOUNTER
I called [facility] and spoke w/ Neel Tavarez at  [facility] office. conf fax # and faxed pt's Referral, last vv notes, demographic sheet and copy of insurance cards.

## 2020-07-13 ENCOUNTER — APPOINTMENT (OUTPATIENT)
Dept: GENERAL RADIOLOGY | Age: 55
End: 2020-07-13
Payer: COMMERCIAL

## 2020-07-13 ENCOUNTER — HOSPITAL ENCOUNTER (EMERGENCY)
Age: 55
Discharge: HOME | End: 2020-07-13
Payer: COMMERCIAL

## 2020-07-13 VITALS
RESPIRATION RATE: 18 BRPM | HEART RATE: 74 BPM | BODY MASS INDEX: 27.31 KG/M2 | DIASTOLIC BLOOD PRESSURE: 71 MMHG | SYSTOLIC BLOOD PRESSURE: 117 MMHG | TEMPERATURE: 98.4 F | HEIGHT: 64 IN | WEIGHT: 160 LBS | OXYGEN SATURATION: 97 %

## 2020-07-13 PROCEDURE — 99282 EMERGENCY DEPT VISIT SF MDM: CPT

## 2020-07-13 PROCEDURE — 6370000000 HC RX 637 (ALT 250 FOR IP): Performed by: PHYSICIAN ASSISTANT

## 2020-07-13 PROCEDURE — 73610 X-RAY EXAM OF ANKLE: CPT

## 2020-07-13 RX ORDER — MAGNESIUM HYDROXIDE 1200 MG/15ML
250 LIQUID ORAL CONTINUOUS
Status: DISCONTINUED | OUTPATIENT
Start: 2020-07-13 | End: 2020-07-13 | Stop reason: HOSPADM

## 2020-07-13 RX ORDER — HYDROCODONE BITARTRATE AND ACETAMINOPHEN 5; 325 MG/1; MG/1
1 TABLET ORAL ONCE
Status: COMPLETED | OUTPATIENT
Start: 2020-07-13 | End: 2020-07-13

## 2020-07-13 RX ADMIN — HYDROCODONE BITARTRATE AND ACETAMINOPHEN 1 TABLET: 5; 325 TABLET ORAL at 20:46

## 2020-07-13 SDOH — HEALTH STABILITY: MENTAL HEALTH: HOW OFTEN DO YOU HAVE A DRINK CONTAINING ALCOHOL?: NEVER

## 2020-07-13 ASSESSMENT — ENCOUNTER SYMPTOMS
EYES NEGATIVE: 1
RESPIRATORY NEGATIVE: 1
GASTROINTESTINAL NEGATIVE: 1
ROS SKIN COMMENTS: RIGHT LOWER LEG

## 2020-07-13 ASSESSMENT — PAIN SCALES - GENERAL: PAINLEVEL_OUTOF10: 6

## 2020-07-13 ASSESSMENT — PAIN DESCRIPTION - ORIENTATION: ORIENTATION: RIGHT

## 2020-07-13 ASSESSMENT — PAIN DESCRIPTION - PAIN TYPE: TYPE: ACUTE PAIN

## 2020-07-13 NOTE — ED TRIAGE NOTES
Pt to ed from home via triage with complaints of wound to right ankle \"busted open\" dressing to ankle in place. At appears dirty, but no wound drainage noted. PT reports that she ran out of pain meds last week. PT is AOx3, amb with unsteady gait.  Cap refill less than 3 sec, no s/s of distress

## 2020-07-14 ENCOUNTER — VIRTUAL VISIT (OUTPATIENT)
Dept: FAMILY MEDICINE CLINIC | Age: 55
End: 2020-07-14
Payer: COMMERCIAL

## 2020-07-14 ENCOUNTER — CARE COORDINATION (OUTPATIENT)
Dept: CARE COORDINATION | Age: 55
End: 2020-07-14

## 2020-07-14 PROCEDURE — 99442 PR PHYS/QHP TELEPHONE EVALUATION 11-20 MIN: CPT | Performed by: INTERNAL MEDICINE

## 2020-07-14 RX ORDER — OXYCODONE AND ACETAMINOPHEN 10; 325 MG/1; MG/1
1 TABLET ORAL EVERY 6 HOURS PRN
Qty: 28 TABLET | Refills: 0 | OUTPATIENT
Start: 2020-07-14 | End: 2020-07-21

## 2020-07-14 RX ORDER — OXYCODONE AND ACETAMINOPHEN 10; 325 MG/1; MG/1
1 TABLET ORAL EVERY 6 HOURS PRN
Qty: 28 TABLET | Refills: 0 | Status: SHIPPED | OUTPATIENT
Start: 2020-07-14 | End: 2020-07-21 | Stop reason: SDUPTHER

## 2020-07-14 NOTE — CARE COORDINATION
Patient contacted regarding Fiona Wiley. Discussed COVID-19 related testing which was available at this time. Test results were negative. Patient informed of results, if available? Yes and Completed 2020    Care Transition Nurse/ Ambulatory Care Manager contacted the patient by telephone to perform post discharge assessment. Verified name and  with patient as identifiers. Provided introduction to self, and explanation of the CTN/ACM role, and reason for call due to risk factors for infection and/or exposure to COVID-19. Symptoms reviewed with patient who verbalized the following symptoms: no new symptoms and no worsening symptoms. Due to no new or worsening symptoms encounter was not routed to provider for escalation. Discussed follow-up appointments. If no appointment was previously scheduled, appointment scheduling offered: Yes and Scheduled for [facility] follow up appointment(s):   Future Appointments   Date Time Provider Manan Honeycutt   2020  4:30 PM Jaylon Guadalupe  [place], [place]     [id number] [address] follow up appointment(s):      Patient has following risk factors of: COPD, immunocompromised and Open wound . CTN/ACM reviewed discharge instructions, medical action plan and red flags such as increased shortness of breath, increasing fever and signs of decompensation with patient who verbalized understanding. Discussed exposure protocols and quarantine with CDC Guidelines What to do if you are sick with coronavirus disease .  Patient was given an opportunity for questions and concerns. The patient agrees to contact the [employer] exposure line [phone], local [facility] department [place] [facility]: ([phone]) and PCP office for questions related to their healthcare. CTN/ACM provided contact information for future needs.     Reviewed and educated patient on any new and changed medications related to discharge diagnosis     Patient/family/caregiver given information for [employer] Loop and agrees to enroll no  Patient's preferred e-mail:   Patient's preferred phone number:   Based on Loop alert triggers, patient will be contacted by nurse care manager for worsening symptoms. ACM to follow up in 14 days based on severity of symptoms and risk factors. Sharmin Dixon tells me that she is having a lot of pain with the leg wound. She was provided one analgesic in the ED. She will have a follow up with Dr Manish Navarro today to discuss pain med's. She will see pain management this week and she also has a follow up appointment with Dr Polo at [facility] wound clinic. She states that she is aware of symptoms related to COVID. She is actively engaged in all preventative protocols including wearing a mask. She tells me that she does have the phone number for [facility] for Flu Screening. I discussed the [employer] Symptom Tracker and she says that she had this in the past but does not feel she needs this service. She verbalizes understanding of the information discussed.

## 2020-07-14 NOTE — ED PROVIDER NOTES
[facility] ED  eMERGENCY dEPARTMENT eNCOUnter      Pt Name: Roger Pink  MRN: [id number]  [place] 1965  Date of evaluation: 7/13/2020  Provider: Shirly Cowden, PA-C      HISTORY OF PRESENT ILLNESS    Roger Pink is a 48 y.o. female who presents to the Emergency Department with chief complaint of wound check for right lower leg. Patient states she has been dealing with a chronic wound secondary to pyoderma gangrenosum on her right lower leg. Patient states that busted open in the last week. Patient was taking pain medication on a regular basis and is scheduled to see pain management on Friday. Patient states she has been doing twice daily wound dressing changes with sterile water and dressing. Reports on the plantar        REVIEW OF SYSTEMS       Review of Systems   Constitutional: Negative. HENT: Negative. Eyes: Negative. Respiratory: Negative. Cardiovascular: Negative. Gastrointestinal: Negative. Endocrine: Negative. Genitourinary: Negative. Musculoskeletal: Negative. Skin: Positive for wound. Right lower leg   Neurological: Negative. Psychiatric/Behavioral: Negative.           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    Neuromuscular disorder ([address]     Osteoarthritis     Pneumonia due to organism     Pyoderma gangrenosa     Sweet syndrome     Ul[address]ative colitis [address] [address]          SURGICAL HISTORY       Past Surgical History:   Procedure Laterality Date    ABDOMEN SURGERY      sleen repair    APPENDECTOMY      BACK SURGERY      BREAST SURGERY      fatty tumor removed, benign    COLONOSCOPY  COSMETIC SURGERY      tummy tuck    EYE SURGERY      bilateral cataract surgery    HYSTERECTOMY      SKIN BIOPSY      SPINAL FUSION      TONSILLECTOMY           CURRENT MEDICATIONS       Previous Medications    ACETAMINOPHEN (TYLENOL) 325 MG TABLET    Take 650 mg by mouth every 4 hours as needed for Pain    ALCOHOL SWABS (ALCOHOL PADS) 70 % PADS    CLEAN THE SKIN BY USING 1 PAD ON THE AFFECTED AREA BEFORE APPLYING ANY TOPICAL CREAM, 3 TIMES DAILY    CALCIPOTRIENE (DOVONEX) 0.005 % CREAM    APPLY 1-2 GRAMS TOPICALLY TO AFFECTED AREA 1-2 TIMES A DAY. DO NOT APPLY TO FACE    CHOLECALCIFEROL (VITAMIN D) 50 MCG (2000 UT) TABS TABLET        CHOLECALCIFEROL (VITAMIN D) 50 MCG (2000 UT) TABS TABLET    TAKE 1 TABLET BY MOUTH DAILY    CYANOCOBALAMIN (VITAMIN B-12) 1000 MCG SUBL    TAKE 1 TABLET BY MOUTH DAILY    DULOXETINE (CYMBALTA) 60 MG EXTENDED RELEASE CAPSULE    Take 120 mg by mouth daily    GABAPENTIN (NEURONTIN) 800 MG TABLET    Take 800 mg by mouth 4 times daily. HYDROXYZINE (ATARAX) 25 MG TABLET    Take 25 mg by mouth every 6 hours as needed for Itching    TRAZODONE (DESYREL) 100 MG TABLET    Take 200 mg by mouth nightly     VITAMIN D (CHOLECALCIFEROL) 1000 UNIT TABS TABLET    Take 1,000 Units by mouth daily       ALLERGIES     Amoxicillin-pot clavulanate and Other    FAMILY HISTORY     No family history on file. SOCIAL HISTORY       Social History     Socioeconomic History    Marital status:       Spouse name: Not on file    Number of children: Not on file    Years of education: Not on file    Highest education level: Not on file   Occupational History    Not on file   Social Needs    Financial resource strain: Not on file    Food insecurity     Worry: Not on file     Inability: Not on file    Transportation needs     Medical: Not on file     Non-medical: Not on file   Tobacco Use    Smoking status: Current Every Day Smoker     Packs/day: 1.00     Years: 40.00     Pack years: 40.00 Types: Cigarettes    Smokeless tobacco: Never Used   Substance and Sexual Activity    Alcohol use: Never     Frequency: Never    Drug use: No    Sexual activity: Not Currently   Lifestyle    Physical activity     Days per week: Not on file     Minutes per session: Not on file    Stress: Not on file   Relationships    Social connections     Talks on phone: Not on file     Gets together: Not on file     Attends Christianity service: Not on file     Active member of club or organization: Not on file     Attends meetings of clubs or organizations: Not on file     Relationship status: Not on file    Intimate partner violence     Fear of current or ex partner: Not on file     Emotionally abused: Not on file     Physically abused: Not on file     Forced sexual activity: Not on file   Other Topics Concern    Not on file   Social History Narrative    Not on file       SCREENINGS      @ENLQ([id number])@      PHYSICAL EXAM    (up to 7 for level 4, 8 or more for level 5)     ED Triage Vitals [07/13/20 1944]   BP Temp Temp Source Pulse Resp SpO2 Height Weight   117/71 98.4 °F (36.9 °C) Oral 74 18 97 % 5' 4\" (1.626 m) 160 lb (72.6 kg)       Physical Exam  Constitutional:       General: She is not in acute distress. Appearance: She is well-developed. HENT:      Head: Normocephalic and atraumatic. Eyes:      Conjunctiva/sclera: Conjunctivae normal.      Pupils: Pupils are equal, round, and reactive to light. Neck:      Musculoskeletal: Normal range of motion and neck supple. Cardiovascular:      Rate and Rhythm: Normal rate and regular rhythm. Heart sounds: No murmur. Pulmonary:      Effort: No respiratory distress. Breath sounds: Normal breath sounds. No wheezing or rales. Abdominal:      General: There is no distension. Palpations: Abdomen is soft. Tenderness: There is no abdominal tenderness. Musculoskeletal: Normal range of motion. Skin:     General: Skin is warm and dry. Findings: Wound present. No erythema or rash. Neurological:      Mental Status: She is alert and oriented to person, place, and time. Cranial Nerves: No cranial nerve deficit. Psychiatric:         Judgment: Judgment normal.           All other labs were within normal range or not returned as of this dictation. EMERGENCY DEPARTMENT COURSE and DIFFERENTIALDIAGNOSIS/MDM:   Vitals:    Vitals:    07/13/20 1944   BP: 117/71   Pulse: 74   Resp: 18   Temp: 98.4 °F (36.9 °C)   TempSrc: Oral   SpO2: 97%   Weight: 160 lb (72.6 kg)   Height: 5' 4\" (1.626 m)          Patient given Norco for pain while in the emergency room for acute pain; however, I discussed with patient that we could not refill long-term pain management prescriptions. Patient verbalizes understanding. Wound cleansed with sterile saline and nonstick dressing applied. Patient to return if symptoms worsen or if new concerning symptoms arise. She verbalizes understanding plan discharge is no for questions. PROCEDURES:  Unless otherwise noted below, none     Procedures      FINAL IMPRESSION      1.  Encounter for wound re-check          DISPOSITION/PLAN   DISPOSITION Decision To Discharge 07/13/2020 08:45:49 PM          Clark Louis PA-C (electronically signed)  Attending Emergency Physician  [facility] Robyn Smith PA-C  07/13/20 2049

## 2020-07-15 NOTE — PROGRESS NOTES
2020      Marcell Cleaning (:  1965) is a 48 y.o. female, here for evaluation of the following medical concerns:pyoderma gangrenosum     This telephone encounter is being conducted to reduce the spread of coronavirus and reduce the morbidity and mortality risk of exposure related to the virus. HPI    80-year-old female with a history of pyoderma gangrenosum with associated abdominal and right lower extremity wounds presents for follow-up visit. She washospitalized from  through 2020. She has been followed by dermatology wound care and rheumatology. She is presently receiving Percocet every 6 hours as needed for pain. I have referred her to pain management in the past and the patient has been unable to secure an appointment with pain management to date. She completed the course of oral Bactrim at which time erythema returned to the right lower extremity. She is followed in wound care and by dermatology. Depression/anxiety: Continuing Cymbalta and trazodone      Fibromyalgia and Wound pain: on neurontin , oxycodone q 8 hours. At present he denies polyuria,  Polydipsia, constitutional, sinus, visual, cardiopulmonary, urologic, gastrointestinal, immunologic/hematologic, additional musculoskeletal, neurologic,dermatologic, or psychiatric complaints. Review of Systems   Constitutional: Negative for chills, diaphoresis, fatigue and fever. HENT: Negative for congestion, dental problem, drooling, ear discharge, ear pain, facial swelling, hearing loss, mouth sores, nosebleeds, postnasal drip, rhinorrhea, sinus pressure, sinus pain, sneezing, sore throat, tinnitus, trouble swallowing and voice change. Eyes: Negative for photophobia, pain, discharge, redness, itching and visual disturbance. Respiratory: Negative for apnea, cough, chest tightness, shortness of breath and wheezing. Cardiovascular: Negative for chest pain, palpitations and leg swelling. Gastrointestinal: Negative for abdominal distention, abdominal pain, blood in stool, constipation, diarrhea, nausea, rectal pain and vomiting. Endocrine: Negative for cold intolerance, heat intolerance, polydipsia, polyphagia and polyuria. Genitourinary: Negative for decreased urine volume, difficulty urinating, dysuria, flank pain, frequency, genital sores, hematuria and urgency. Musculoskeletal: Negative for arthralgias, back pain, gait problem, joint swelling, myalgias, neck pain and neck stiffness. Skin: Negative for color change, rash and wound. Allergic/Immunologic: Negative for environmental allergies and food allergies. Neurological: Negative for dizziness, tremors, seizures, syncope, facial asymmetry, speech difficulty, weakness, light-headedness, numbness and headaches. Hematological: Negative for adenopathy. Does not bruise/bleed easily. Psychiatric/Behavioral: Negative for agitation, confusion, decreased concentration, hallucinations, self-injury, sleep disturbance and suicidal ideas. The patient is not nervous/anxious. Prior to Visit Medications    Medication Sig Taking? Authorizing Provider   oxyCODONE-acetaminophen (PERCOCET)  MG per tablet Take 1 tablet by mouth every 6 hours as needed for Pain for up to 7 days. Intended supply: 30 days  Shadi Cervantes MD   Cyanocobalamin (VITAMIN B-12) 1000 MCG SUBL TAKE 1 TABLET BY MOUTH DAILY  Shadi Cervantes MD   Cholecalciferol (VITAMIN D) 50 MCG (2000 UT) TABS tablet TAKE 1 TABLET BY MOUTH DAILY  Shadi Cervantes MD   calcipotriene (DOVONEX) 0.005 % cream APPLY 1-2 GRAMS TOPICALLY TO AFFECTED AREA 1-2 TIMES A DAY. DO NOT APPLY TO Rolf Sewell MD   gabapentin (NEURONTIN) 800 MG tablet Take 800 mg by mouth 4 times daily.   Historical Provider, MD   Cholecalciferol (VITAMIN D) 50 MCG (2000 UT) TABS tablet   Historical Provider, MD   Alcohol Swabs (ALCOHOL PADS) 70 % PADS CLEAN THE SKIN BY USING 1 PAD ON THE AFFECTED AREA BEFORE APPLYING ANY TOPICAL CREAM, 3 TIMES DAILY  Brody Wilson MD   vitamin D (CHOLECALCIFEROL) 1000 UNIT TABS tablet Take 1,000 Units by mouth daily  Historical Provider, MD   hydrOXYzine (ATARAX) 25 MG tablet Take 25 mg by mouth every 6 hours as needed for Itching  Historical Provider, MD   DULoxetine (CYMBALTA) 60 MG extended release capsule Take 120 mg by mouth daily  Historical Provider, MD   acetaminophen (TYLENOL) 325 MG tablet Take 650 mg by mouth every 4 hours as needed for Pain  Historical Provider, MD   traZODone (DESYREL) 100 MG tablet Take 200 mg by mouth nightly   Historical Provider, MD        Allergies   Allergen Reactions    Amoxicillin-Pot Clavulanate      Other reaction(s): GI Upset, Intolerance, Other: See Comments  Nose bleed    Other Itching     IVP dye         Past Medical History:   Diagnosis Date    Anxiety     Arthritis     Cancer ([facility] 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    Chronic kidney disease     COPD exacerbation ([facility]    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    Neuromuscular disorder ([facility]     Osteoarthritis     Pneumonia due to organism     Pyoderma gangrenosa     Sweet syndrome     Ulcerative colitis ([facility]        Past Surgical History:   Procedure Laterality Date    ABDOMEN SURGERY      sleen repair    APPENDECTOMY      BACK SURGERY      BREAST SURGERY      fatty tumor removed, benign    COLONOSCOPY      COSMETIC SURGERY      tummy tuck    EYE SURGERY      bilateral cataract surgery    HYSTERECTOMY      SKIN BIOPSY      SPINAL FUSION      TONSILLECTOMY         Social History     Socioeconomic History    Marital status:       Spouse name: Not on file    Number of children: Not on file    Years of education: Not on file    Highest education level: Not on file   Occupational History    Not on file   Social Needs    Financial resource strain: Not on file    Food insecurity     Worry: Not on file     Inability: Not on file    Transportation needs     Medical: Not on file     Non-medical: Not on file   Tobacco Use    Smoking status: Current Every Day Smoker     Packs/day: 1.00     Years: 40.00     Pack years: 40.00     Types: Cigarettes    Smokeless tobacco: Never Used   Substance and Sexual Activity    Alcohol use: Never     Frequency: Never    Drug use: No    Sexual activity: Not Currently   Lifestyle    Physical activity     Days per week: Not on file     Minutes per session: Not on file    Stress: Not on file   Relationships    Social connections     Talks on phone: Not on file     Gets together: Not on file     Attends Mormon service: Not on file     Active member of club or organization: Not on file     Attends meetings of clubs or organizations: Not on file     Relationship status: Not on file    Intimate partner violence     Fear of current or ex partner: Not on file     Emotionally abused: Not on file     Physically abused: Not on file     Forced sexual activity: Not on file   Other Topics Concern    Not on file   Social History Narrative    Not on file        History reviewed. No pertinent family history. There were no vitals filed for this visit. Estimated body mass index is 27.46 kg/m² as calculated from the following:    Height as of 7/13/20: 5' 4\" (1.626 m). Weight as of 7/13/20: 160 lb (72.6 kg). ASSESSMENT/PLAN:     Wound of right lower extremity, sequela  -Continue to follow-up with dermatology wound care and infectious disease.  -I have reinforced-the patient must make an appointment with pain management to solicit their expertise in regards to the management of her pain. I have explained that I will not provide her with a prescription for Percocet until she has a documented appointment with pain management.         Fibromyalgia:continue cymbalta        Hypovitaminosis D- continue oral vitamin D replacement. --Ted Kulkarni MD on 7/14/2020 at 11:40 PM      TELEHEALTH EVALUATION -- Audio/Visual (During QNVTK-02 public health emergency)    -   Dary Harper is a 48 y.o. female being evaluated by a Virtual Visit (video visit) encounter to address concerns as mentioned above. A caregiver was present when appropriate. Due to this being a TeleHealth encounter (During GCDWI-99 public health emergency), evaluation of the following organ systems was limited: Vitals/Constitutional/EENT/Resp/CV/GI//MS/Neuro/Skin/Heme-Lymph-Imm. Pursuant to the emergency declaration under the [address] [place] [address], [address] [place] [address] authority and the [employer] and [employer] Act, this Virtual Visit was conducted with patient's (and/or legal guardian's) consent, to reduce the patient's risk of exposure to COVID-19 and provide necessary medical care. The patient (and/or legal guardian) has also been advised to contact this office for worsening conditions or problems, and seek emergency medical treatment and/or call 911 if deemed necessary. Services were provided through a video synchronous discussion virtually to substitute for in-person clinic visit. Type of encounter was __ Doxy __ MyChart ___Facetime    Patient was located at their home. Provider was located at their ___ home or        ____ office. --Ted Kulkarni MD on 7/14/2020 at 11:40 PM  Dary Harper is a 48 y.o. female evaluated via telephone on 7/14/2020. Consent:  She and/or health care decision maker is aware that that she may receive a bill for this telephone service, depending on her insurance coverage, and has provided verbal consent to proceed: Yes      Documentation:  I communicated with the patient and/or health care decision maker about wound of the right lower extremity, pyoderma gangrenosum, chronic pain syndrome. Jordan Varma Details of this discussion including any medical advice provided: Please refer to note above      I affirm this is a Patient Initiated Episode with a Patient who has not had a related appointment within my department in the past 7 days or scheduled within the next 24 hours. Patient identification was verified at the start of the visit: Yes    Total Time: minutes: 5-10 minutes    Note: not billable if this call serves to triage the patient into an appointment for the relevant concern      Guy Jha   An electronic signature was used to authenticate this note. An electronic signature was used to authenticate this note.

## 2020-07-20 ENCOUNTER — TELEPHONE (OUTPATIENT)
Dept: FAMILY MEDICINE CLINIC | Age: 55
End: 2020-07-20

## 2020-07-20 RX ORDER — OXYCODONE AND ACETAMINOPHEN 10; 325 MG/1; MG/1
1 TABLET ORAL EVERY 6 HOURS PRN
Qty: 28 TABLET | Refills: 0 | Status: CANCELLED | OUTPATIENT
Start: 2020-07-20 | End: 2020-07-27

## 2020-07-20 NOTE — TELEPHONE ENCOUNTER
Patient is requesting medication refill. Please approve or deny this request.    Rx requested:  Requested Prescriptions     Pending Prescriptions Disp Refills    oxyCODONE-acetaminophen (PERCOCET)  MG per tablet 28 tablet 0     Sig: Take 1 tablet by mouth every 6 hours as needed for Pain for up to 7 days. Intended supply: 30 days         Last Office Visit:   6/24/2020      Next Visit Date:  No future appointments.

## 2020-07-21 ENCOUNTER — TELEPHONE (OUTPATIENT)
Dept: FAMILY MEDICINE CLINIC | Age: 55
End: 2020-07-21

## 2020-07-21 RX ORDER — OXYCODONE AND ACETAMINOPHEN 10; 325 MG/1; MG/1
1 TABLET ORAL EVERY 6 HOURS PRN
Qty: 28 TABLET | Refills: 0 | Status: SHIPPED | OUTPATIENT
Start: 2020-07-21 | End: 2020-07-28 | Stop reason: SDUPTHER

## 2020-07-21 NOTE — TELEPHONE ENCOUNTER
Patient is calling in stating that she spoke to you yesterday in regards to pain medication. She asked if you had the chance to send her in a script. I told her that we were still reviewing the request. Please advise.  TY Unable to assess due to medical condition

## 2020-07-27 ENCOUNTER — TELEPHONE (OUTPATIENT)
Dept: FAMILY MEDICINE CLINIC | Age: 55
End: 2020-07-27

## 2020-07-27 RX ORDER — OXYCODONE AND ACETAMINOPHEN 10; 325 MG/1; MG/1
1 TABLET ORAL EVERY 6 HOURS PRN
Qty: 28 TABLET | Refills: 0 | OUTPATIENT
Start: 2020-07-27 | End: 2020-08-03

## 2020-07-27 NOTE — TELEPHONE ENCOUNTER
\"  Requested Prescriptions     Pending Prescriptions Disp Refills    oxyCODONE-acetaminophen (PERCOCET)  MG per tablet 28 tablet 0     Sig: Take 1 tablet by mouth every 6 hours as needed for Pain for up to 7 days.  Intended supply: 30 days

## 2020-07-28 ENCOUNTER — TELEPHONE (OUTPATIENT)
Dept: FAMILY MEDICINE CLINIC | Age: 55
End: 2020-07-28

## 2020-07-28 ENCOUNTER — CARE COORDINATION (OUTPATIENT)
Dept: CARE COORDINATION | Age: 55
End: 2020-07-28

## 2020-07-28 RX ORDER — OXYCODONE AND ACETAMINOPHEN 10; 325 MG/1; MG/1
1 TABLET ORAL EVERY 6 HOURS PRN
Qty: 28 TABLET | Refills: 0 | Status: SHIPPED | OUTPATIENT
Start: 2020-07-28 | End: 2020-07-28 | Stop reason: SDUPTHER

## 2020-07-28 RX ORDER — OXYCODONE AND ACETAMINOPHEN 10; 325 MG/1; MG/1
1 TABLET ORAL EVERY 6 HOURS PRN
Qty: 28 TABLET | Refills: 0 | Status: SHIPPED | OUTPATIENT
Start: 2020-07-28 | End: 2020-08-04

## 2020-08-05 ENCOUNTER — VIRTUAL VISIT (OUTPATIENT)
Dept: FAMILY MEDICINE CLINIC | Age: 55
End: 2020-08-05
Payer: COMMERCIAL

## 2020-08-05 PROCEDURE — 99214 OFFICE O/P EST MOD 30 MIN: CPT | Performed by: INTERNAL MEDICINE

## 2020-08-05 RX ORDER — OXYCODONE AND ACETAMINOPHEN 10; 325 MG/1; MG/1
1 TABLET ORAL EVERY 4 HOURS PRN
Qty: 28 TABLET | Refills: 0 | Status: SHIPPED | OUTPATIENT
Start: 2020-08-05 | End: 2020-08-13 | Stop reason: SDUPTHER

## 2020-08-05 RX ORDER — NALOXONE HYDROCHLORIDE 4 MG/.1ML
1 SPRAY NASAL PRN
Qty: 1 EACH | Refills: 5 | Status: SHIPPED | OUTPATIENT
Start: 2020-08-05 | End: 2021-01-18 | Stop reason: CLARIF

## 2020-08-05 RX ORDER — OXYCODONE AND ACETAMINOPHEN 10; 325 MG/1; MG/1
1 TABLET ORAL EVERY 6 HOURS PRN
Qty: 28 TABLET | Refills: 0 | OUTPATIENT
Start: 2020-08-05 | End: 2020-08-12

## 2020-08-05 NOTE — PROGRESS NOTES
2020      Marcell Cleaning (:  1965) is a 48 y.o. female, here for evaluation of the following medical concerns:pyoderma gangrenosum    This telephone encounter is being conducted to reduce the spread of coronavirus and reduce the morbidity and mortality risk of exposure related to the virus. HPI    52-year-old female with a history of pyoderma gangrenosum with associated abdominal and right lower extremity wounds associated with pyoderma gangrenosum  presents for follow-up visit. She was previously hospitalized from  through 2020. She has been followed by dermatology ,wound care and rheumatology. The patient has a wound physician recently. Recommended that she see her dermatologist due to increased inflammation localized to the lesion of her right foot. The patient phoned her dermatologist. She is presently receiving Percocet every 6 hours as needed for pain. Dr. Helga Anthony in [place]. Pt last evaluated by this doctor on      The pain management physician approves the patients current pain regimen. Depression/anxiety: Continuing Cymbalta and trazodone      Fibromyalgia and Wound pain: on neurontin , oxycodone q 8 hours. At present he denies polyuria,  Polydipsia, constitutional, sinus, visual, cardiopulmonary, urologic, gastrointestinal, immunologic/hematologic, additional musculoskeletal, neurologic,dermatologic, or psychiatric complaints. Review of Systems   Constitutional: Negative for chills, diaphoresis, fatigue and fever. HENT: Negative for congestion, dental problem, drooling, ear discharge, ear pain, facial swelling, hearing loss, mouth sores, nosebleeds, postnasal drip, rhinorrhea, sinus pressure, sinus pain, sneezing, sore throat, tinnitus, trouble swallowing and voice change. Eyes: Negative for photophobia, pain, discharge, redness, itching and visual disturbance.    Respiratory: Negative for apnea, cough, chest tightness, shortness of breath and (CHOLECALCIFEROL) 1000 UNIT TABS tablet Take 1,000 Units by mouth daily  Historical Provider, MD   hydrOXYzine (ATARAX) 25 MG tablet Take 25 mg by mouth every 6 hours as needed for Itching  Historical Provider, MD   DULoxetine (CYMBALTA) 60 MG extended release capsule Take 120 mg by mouth daily  Historical Provider, MD   acetaminophen (TYLENOL) 325 MG tablet Take 650 mg by mouth every 4 hours as needed for Pain  Historical Provider, MD   traZODone (DESYREL) 100 MG tablet Take 200 mg by mouth nightly   Historical Provider, MD        Allergies   Allergen Reactions    Amoxicillin-Pot Clavulanate      Other reaction(s): GI Upset, Intolerance, Other: See Comments  Nose bleed    Other Itching     IVP dye         Past Medical History:   Diagnosis Date    Anxiety     Arthritis     Cancer ([facility] 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    Chronic kidney disease     COPD exacerbation ([facility]    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    Neuromuscular disorder ([facility]     Osteoarthritis     Pneumonia due to organism     Pyoderma gangrenosa     Sweet syndrome     Ulcerative colitis ([facility]        Past Surgical History:   Procedure Laterality Date    ABDOMEN SURGERY      sleen repair    APPENDECTOMY      BACK SURGERY      BREAST SURGERY      fatty tumor removed, benign    COLONOSCOPY      COSMETIC SURGERY      tummy tuck    EYE SURGERY      bilateral cataract surgery    HYSTERECTOMY      SKIN BIOPSY      SPINAL FUSION      TONSILLECTOMY         Social History     Socioeconomic History    Marital status:       Spouse name: Not on file    Number of children: Not on file    Years of education: Not on file    Highest education level: Not on file   Occupational History    Not on file   Social Needs    Financial resource strain: Not on file   [place] insecurity     Worry: Not on file     Inability: Not on file    Transportation needs     Medical: Not on file     Non-medical: Not on file   Tobacco Use    Smoking status: Current Every Day Smoker     Packs/day: 1.00     Years: 40.00     Pack years: 40.00     Types: Cigarettes    Smokeless tobacco: Never Used   Substance and Sexual Activity    Alcohol use: Never     Frequency: Never    Drug use: No    Sexual activity: Not Currently   Lifestyle    Physical activity     Days per week: Not on file     Minutes per session: Not on file    Stress: Not on file   Relationships    Social connections     Talks on phone: Not on file     Gets together: Not on file     Attends Restorationist service: Not on file     Active member of club or organization: Not on file     Attends meetings of clubs or organizations: Not on file     Relationship status: Not on file    Intimate partner violence     Fear of current or ex partner: Not on file     Emotionally abused: Not on file     Physically abused: Not on file     Forced sexual activity: Not on file   Other Topics Concern    Not on file   Social History Narrative    Not on file        No family history on file. There were no vitals filed for this visit. Estimated body mass index is 27.46 kg/m² as calculated from the following:    Height as of 7/13/20: 5' 4\" (1.626 m). Weight as of 7/13/20: 160 lb (72.6 kg). ASSESSMENT/PLAN:    Wound of right lower extremity, sequela  -At this time I intend encourage the patient to follow-up with dermatology wound care and infectious disease.    -I have reinforced-the patient must make continue to follow-up with pain management in regards to the management of her pain. I have requested that the patient have her pain management physician for a summary of her visit to our office.   I have explained that I will not provide her additional prescriptions for prescription for Percocet unless this has been accomplished. Fibromyalgia:continue cymbalta        Hypovitaminosis D- continue oral vitamin D replacement. --Dominick Powell MD on 8/5/2020 at 4:35 PM      TELEHEALTH EVALUATION -- Audio/Visual (During TDXSN-73 public health emergency)    -   Jose Cruz Loya is a 48 y.o. female being evaluated by a Virtual Visit (video visit) encounter to address concerns as mentioned above. A caregiver was present when appropriate. Due to this being a TeleHealth encounter (During VJQQR-93 public health emergency), evaluation of the following organ systems was limited: Vitals/Constitutional/EENT/Resp/CV/GI//MS/Neuro/Skin/Heme-Lymph-Imm. Pursuant to the emergency declaration under the [address] [place] [address] and the [employer] and [employer] Act, this Virtual Visit was conducted with patient's (and/or legal guardian's) consent, to reduce the patient's risk of exposure to COVID-19 and provide necessary medical care. The patient (and/or legal guardian) has also been advised to contact this office for worsening conditions or problems, and seek emergency medical treatment and/or call 911 if deemed necessary. Services were provided through a video synchronous discussion virtually to substitute for in-person clinic visit. Type of encounter was x__ Doxy __ MyChart ___Facetime    Patient was located at their home. Provider was located at their ___ home or        x____ office. --Dominick Powell MD on 8/5/2020 at 4:35 PM  Jose Cruz Loya is a 48 y.o. female evaluated via telephone on 8/5/2020.       Consent:  She and/or health care decision maker is aware that that she may receive a bill for this telephone service, depending on her insurance coverage, and has provided verbal consent to proceed: Yes      Documentation:  I communicated with the patient and/or health care decision maker about wound of the right lower extremity, pyoderma gangrenosum, chronic pain syndrome. .   Details of this discussion including any medical advice provided: Please refer to note above      I affirm this is a Patient Initiated Episode with a Patient who has not had a related appointment within my department in the past 7 days or scheduled within the next 24 hours. Patient identification was verified at the start of the visit: Yes    Total Time: minutes: 5-10 minutes    Note: not billable if this call serves to triage the patient into an appointment for the relevant concern      Hans Rausch   An electronic signature was used to authenticate this note. An electronic signature was used to authenticate this note.

## 2020-08-11 ENCOUNTER — TELEPHONE (OUTPATIENT)
Dept: FAMILY MEDICINE CLINIC | Age: 55
End: 2020-08-11

## 2020-08-11 NOTE — TELEPHONE ENCOUNTER
Patient came into office yesterday and signed the med contract. She is asking if you can fill the script for percocet. Patient is also asking for clarity on how often the script will be filled. She said she discussed a monthly supply with you.   Please advise

## 2020-08-12 NOTE — TELEPHONE ENCOUNTER
Pt called checking on medication refill. Rx requested:  Requested Prescriptions     Pending Prescriptions Disp Refills    oxyCODONE-acetaminophen (PERCOCET)  MG per tablet 28 tablet 0     Sig: Take 1 tablet by mouth every 4 hours as needed for Pain for up to 7 days. Last Office Visit:   8/5/2020    Last Tox screen:  ? Last Medication contract:  8/10/2020    Next Visit Date:  No future appointments.

## 2020-08-13 RX ORDER — NALOXONE HYDROCHLORIDE 4 MG/.1ML
1 SPRAY NASAL PRN
Qty: 1 EACH | Refills: 5 | Status: SHIPPED | OUTPATIENT
Start: 2020-08-13 | End: 2020-11-04

## 2020-08-13 RX ORDER — OXYCODONE AND ACETAMINOPHEN 10; 325 MG/1; MG/1
1 TABLET ORAL EVERY 4 HOURS PRN
Qty: 28 TABLET | Refills: 0 | Status: SHIPPED | OUTPATIENT
Start: 2020-08-13 | End: 2020-08-19 | Stop reason: SDUPTHER

## 2020-08-18 NOTE — TELEPHONE ENCOUNTER
Patient is asking if you can send in 2 week supply. Please approve or deny this request.    Rx requested:  Requested Prescriptions     Pending Prescriptions Disp Refills    oxyCODONE-acetaminophen (PERCOCET)  MG per tablet 28 tablet 0     Sig: Take 1 tablet by mouth every 4 hours as needed for Pain for up to 7 days. Last Office Visit:   8/5/2020      Next Visit Date:  No future appointments.

## 2020-08-19 RX ORDER — OXYCODONE AND ACETAMINOPHEN 10; 325 MG/1; MG/1
1 TABLET ORAL EVERY 4 HOURS PRN
Qty: 28 TABLET | Refills: 0 | Status: SHIPPED | OUTPATIENT
Start: 2020-08-19 | End: 2020-08-25 | Stop reason: SDUPTHER

## 2020-08-24 NOTE — TELEPHONE ENCOUNTER
Pt is calling in requesting a refill on medication(s). Requested Prescriptions     Pending Prescriptions Disp Refills    oxyCODONE-acetaminophen (PERCOCET)  MG per tablet 28 tablet 0     Sig: Take 1 tablet by mouth every 4 hours as needed for Pain for up to 7 days. Patient's Last Office Visit:  8/5/2020     Patient's Next Visit:  Visit date not found     Patient's Medication Contract:  Medication Contract and Consent for Opioid Use Documents Filed     Patient Documents       Type of Document Status Date Received Received By Description     Medication Contract Received 8/10/2020  2:01 PM Fiona Fishman Med Contract     Medication Contract Received 8/10/2020  2:36 PM Gerry Kiah medication contract signed Dr. Ayala Kenney:  Urine Drug Screenings (1 yr)     Urine Drug Screen  Collected: 3/20/2018  6:30 AM (Final result)    Complete Results          URINE DRUG SCREEN  Collected: 12/20/2017  7:04 PM (Final result)    Complete Results          Urine Drug Screen  Collected: 4/30/2017  1:51 PM (Final result)    Complete Results          Pain Management Drug Screen  Collected: 2/25/2020  4:42 PM (Final result)    Complete Results                 Pharmacy:  Please send the medication to the pharmacy listed.       Other Comments:

## 2020-08-25 RX ORDER — NALOXONE HYDROCHLORIDE 4 MG/.1ML
1 SPRAY NASAL PRN
Qty: 1 EACH | Refills: 5 | Status: SHIPPED | OUTPATIENT
Start: 2020-08-25 | End: 2020-11-04

## 2020-08-25 RX ORDER — OXYCODONE AND ACETAMINOPHEN 10; 325 MG/1; MG/1
1 TABLET ORAL EVERY 4 HOURS PRN
Qty: 28 TABLET | Refills: 0 | Status: SHIPPED | OUTPATIENT
Start: 2020-08-25 | End: 2020-09-01 | Stop reason: SDUPTHER

## 2020-09-01 RX ORDER — NALOXONE HYDROCHLORIDE 4 MG/.1ML
1 SPRAY NASAL PRN
Qty: 1 EACH | Refills: 5 | Status: SHIPPED | OUTPATIENT
Start: 2020-09-01 | End: 2020-11-04

## 2020-09-01 RX ORDER — OXYCODONE AND ACETAMINOPHEN 10; 325 MG/1; MG/1
1 TABLET ORAL EVERY 4 HOURS PRN
Qty: 28 TABLET | Refills: 0 | Status: SHIPPED | OUTPATIENT
Start: 2020-09-01 | End: 2020-09-09 | Stop reason: SDUPTHER

## 2020-09-08 NOTE — TELEPHONE ENCOUNTER
Ainsley Edmondson is calling in requesting a refill on medication(s). Requested Prescriptions     Pending Prescriptions Disp Refills    oxyCODONE-acetaminophen (PERCOCET)  MG per tablet 28 tablet 0     Sig: Take 1 tablet by mouth every 4 hours as needed for Pain for up to 7 days. Patient's Last Office Visit:  8/5/2020     Patient's Next Visit:  Visit date not found    Pharmacy:  Please send the medication to the pharmacy listed.       Other Comments:

## 2020-09-09 ENCOUNTER — TELEPHONE (OUTPATIENT)
Dept: FAMILY MEDICINE CLINIC | Age: 55
End: 2020-09-09

## 2020-09-09 RX ORDER — OXYCODONE AND ACETAMINOPHEN 10; 325 MG/1; MG/1
1 TABLET ORAL EVERY 4 HOURS PRN
Qty: 42 TABLET | Refills: 0 | Status: SHIPPED | OUTPATIENT
Start: 2020-09-09 | End: 2020-09-21 | Stop reason: SDUPTHER

## 2020-09-09 RX ORDER — NALOXONE HYDROCHLORIDE 4 MG/.1ML
1 SPRAY NASAL PRN
Qty: 1 EACH | Refills: 5 | Status: SHIPPED | OUTPATIENT
Start: 2020-09-09 | End: 2020-11-04

## 2020-09-09 NOTE — TELEPHONE ENCOUNTER
Pt called in see if rx mattie sinclair, states she just left wound care and really needs the pain meds asap; refill request pending

## 2020-09-21 RX ORDER — OXYCODONE AND ACETAMINOPHEN 10; 325 MG/1; MG/1
1 TABLET ORAL EVERY 4 HOURS PRN
Qty: 42 TABLET | Refills: 0 | Status: SHIPPED | OUTPATIENT
Start: 2020-09-21 | End: 2020-10-05 | Stop reason: SDUPTHER

## 2020-09-21 RX ORDER — NALOXONE HYDROCHLORIDE 4 MG/.1ML
1 SPRAY NASAL PRN
Qty: 1 EACH | Refills: 5 | Status: SHIPPED | OUTPATIENT
Start: 2020-09-21 | End: 2020-11-04

## 2020-10-02 NOTE — TELEPHONE ENCOUNTER
Pt called office requesting medication refill. Rx requested:  Requested Prescriptions     Pending Prescriptions Disp Refills    oxyCODONE-acetaminophen (PERCOCET)  MG per tablet 42 tablet 0     Sig: Take 1 tablet by mouth every 4 hours as needed for Pain for up to 7 days.        Last Office Visit:   8/5/2020    Last Tox screen:  2/25/2020    Last Medication contract:  8/10/2020    Next Visit Date:  Future Appointments   Date Time Provider Manan Honeycutt   11/4/2020 10:30 AM Rosy Lock  [place], [place] [address]

## 2020-10-05 RX ORDER — OXYCODONE AND ACETAMINOPHEN 10; 325 MG/1; MG/1
1 TABLET ORAL EVERY 4 HOURS PRN
Qty: 42 TABLET | Refills: 0 | Status: SHIPPED | OUTPATIENT
Start: 2020-10-05 | End: 2020-10-13 | Stop reason: SDUPTHER

## 2020-10-12 NOTE — TELEPHONE ENCOUNTER
Brice Gotti says this medication does not need filled until Monday. She is going out of [place] on  Monday, 10[place]19/20. Her flight is 10:10 am.  She said the last day she can pick those up is on Saturday. Would you fill this with a note that she can  on Saturday?

## 2020-10-13 RX ORDER — NALOXONE HYDROCHLORIDE 4 MG/.1ML
1 SPRAY NASAL PRN
Qty: 1 EACH | Refills: 5 | Status: SHIPPED | OUTPATIENT
Start: 2020-10-13 | End: 2020-11-04

## 2020-10-13 RX ORDER — OXYCODONE AND ACETAMINOPHEN 10; 325 MG/1; MG/1
1 TABLET ORAL EVERY 4 HOURS PRN
Qty: 42 TABLET | Refills: 0 | Status: SHIPPED | OUTPATIENT
Start: 2020-10-13 | End: 2020-10-26 | Stop reason: SDUPTHER

## 2020-10-26 RX ORDER — OXYCODONE AND ACETAMINOPHEN 10; 325 MG/1; MG/1
1 TABLET ORAL EVERY 4 HOURS PRN
Qty: 42 TABLET | Refills: 0 | Status: SHIPPED | OUTPATIENT
Start: 2020-10-26 | End: 2020-11-02 | Stop reason: SDUPTHER

## 2020-10-26 RX ORDER — NALOXONE HYDROCHLORIDE 4 MG/.1ML
1 SPRAY NASAL PRN
Qty: 1 EACH | Refills: 5 | Status: SHIPPED | OUTPATIENT
Start: 2020-10-26 | End: 2020-11-04

## 2020-11-02 RX ORDER — CHOLECALCIFEROL (VITAMIN D3) 50 MCG
TABLET ORAL
Qty: 30 TABLET | Refills: 3 | Status: SHIPPED | OUTPATIENT
Start: 2020-11-02 | End: 2020-11-04

## 2020-11-02 RX ORDER — OXYCODONE AND ACETAMINOPHEN 10; 325 MG/1; MG/1
1 TABLET ORAL EVERY 4 HOURS PRN
Qty: 42 TABLET | Refills: 0 | Status: SHIPPED | OUTPATIENT
Start: 2020-11-02 | End: 2020-11-09 | Stop reason: SDUPTHER

## 2020-11-04 ENCOUNTER — OFFICE VISIT (OUTPATIENT)
Dept: FAMILY MEDICINE CLINIC | Age: 55
End: 2020-11-04
Payer: COMMERCIAL

## 2020-11-04 VITALS
WEIGHT: 160 LBS | TEMPERATURE: 97 F | RESPIRATION RATE: 16 BRPM | HEART RATE: 62 BPM | SYSTOLIC BLOOD PRESSURE: 108 MMHG | BODY MASS INDEX: 27.31 KG/M2 | HEIGHT: 64 IN | OXYGEN SATURATION: 99 % | DIASTOLIC BLOOD PRESSURE: 64 MMHG

## 2020-11-04 DIAGNOSIS — R79.89 LOW VITAMIN B12 LEVEL: ICD-10-CM

## 2020-11-04 DIAGNOSIS — E55.9 HYPOVITAMINOSIS D: ICD-10-CM

## 2020-11-04 LAB
VITAMIN B-12: 579 PG/ML (ref 232–1245)
VITAMIN D 25-HYDROXY: 53.6 NG/ML (ref 30–100)

## 2020-11-04 PROCEDURE — G8427 DOCREV CUR MEDS BY ELIG CLIN: HCPCS | Performed by: INTERNAL MEDICINE

## 2020-11-04 PROCEDURE — 3017F COLORECTAL CA SCREEN DOC REV: CPT | Performed by: INTERNAL MEDICINE

## 2020-11-04 PROCEDURE — 99214 OFFICE O/P EST MOD 30 MIN: CPT | Performed by: INTERNAL MEDICINE

## 2020-11-04 PROCEDURE — 4004F PT TOBACCO SCREEN RCVD TLK: CPT | Performed by: INTERNAL MEDICINE

## 2020-11-04 PROCEDURE — 90471 IMMUNIZATION ADMIN: CPT | Performed by: INTERNAL MEDICINE

## 2020-11-04 PROCEDURE — G8417 CALC BMI ABV UP PARAM F/U: HCPCS | Performed by: INTERNAL MEDICINE

## 2020-11-04 PROCEDURE — 90715 TDAP VACCINE 7 YRS/> IM: CPT | Performed by: INTERNAL MEDICINE

## 2020-11-04 PROCEDURE — G8484 FLU IMMUNIZE NO ADMIN: HCPCS | Performed by: INTERNAL MEDICINE

## 2020-11-04 RX ORDER — OXYBUTYNIN CHLORIDE 10 MG/1
TABLET, EXTENDED RELEASE ORAL
COMMUNITY
Start: 2020-10-31 | End: 2021-03-08

## 2020-11-04 RX ORDER — ADALIMUMAB 40MG/0.4ML
KIT SUBCUTANEOUS
COMMUNITY
Start: 2020-06-23

## 2020-11-04 RX ORDER — KETOCONAZOLE 20 MG/G
CREAM TOPICAL
COMMUNITY
Start: 2020-10-14 | End: 2021-03-08

## 2020-11-04 RX ORDER — CETIRIZINE HYDROCHLORIDE 10 MG/1
TABLET ORAL
COMMUNITY
Start: 2020-10-29 | End: 2022-04-12

## 2020-11-04 NOTE — PROGRESS NOTES
2020      Tj Langley (:  1965) is a 48 y.o. female, here for evaluation of the following medical concerns:pyoderma gangrenosum    This telephone encounter is being conducted to reduce the spread of coronavirus and reduce the morbidity and mortality risk of exposure related to the virus. HPI    71-year-old female with a history of prediabetes, pyoderma gangrenosum with associated abdominal and right lower extremity wounds associated with pyoderma gangrenosum  presents for follow-up visit. She was previously hospitalized from  through 2020. She has been followed by dermatology ,wound care and rheumatology. The patient has a wound physician recently. Recommended that she see her dermatologist due to increased inflammation localized to the lesion of her right foot. The patient phoned her dermatologist. She is presently receiving Percocet every 6 hours as needed for pain. Dr. Carmela Rawls in [facility]. Pt last evaluated by this doctor on      The pain management physician approves the patients current pain regimen. Depression/anxiety: Continuing Cymbalta and trazodone      Fibromyalgia and Wound pain: on neurontin , oxycodone q 4 hours. Prediabetes: The patient is A1c was 6.3 in May 2020. At present he denies polyuria,  Polydipsia, constitutional, sinus, visual, cardiopulmonary, urologic, gastrointestinal, immunologic/hematologic, additional musculoskeletal, neurologic,dermatologic, or psychiatric complaints. Review of Systems   Constitutional: Negative for chills, diaphoresis, fatigue and fever. HENT: Negative for congestion, dental problem, drooling, ear discharge, ear pain, facial swelling, hearing loss, mouth sores, nosebleeds, postnasal drip, rhinorrhea, sinus pressure, sinus pain, sneezing, sore throat, tinnitus, trouble swallowing and voice change. Eyes: Negative for photophobia, pain, discharge, redness, itching and visual disturbance. Respiratory: Negative for apnea, cough, chest tightness, shortness of breath and wheezing. Cardiovascular: Negative for chest pain, palpitations and leg swelling. Gastrointestinal: Negative for abdominal distention, abdominal pain, blood in stool, constipation, diarrhea, nausea, rectal pain and vomiting. Endocrine: Negative for cold intolerance, heat intolerance, polydipsia, polyphagia and polyuria. Genitourinary: Negative for decreased urine volume, difficulty urinating, dysuria, flank pain, frequency, genital sores, hematuria and urgency. Musculoskeletal: Negative for arthralgias, back pain, gait problem, joint swelling, myalgias, neck pain and neck stiffness. Skin: Negative for color change, rash and wound. Allergic/Immunologic: Negative for environmental allergies and food allergies. Neurological: Negative for dizziness, tremors, seizures, syncope, facial asymmetry, speech difficulty, weakness, light-headedness, numbness and headaches. Hematological: Negative for adenopathy. Does not bruise/bleed easily. Psychiatric/Behavioral: Negative for agitation, confusion, decreased concentration, hallucinations, self-injury, sleep disturbance and suicidal ideas. The patient is not nervous/anxious. Prior to Visit Medications    Medication Sig Taking? Authorizing Provider   Adalimumab (HUMIRA PEN) 40 MG/0.4ML PNKT  Yes Historical Provider, MD   oxyCODONE-acetaminophen (PERCOCET)  MG per tablet Take 1 tablet by mouth every 4 hours as needed for Pain for up to 7 days. Yes Agusto Del Castillo MD   naloxone 4 MG/0.1ML LIQD nasal spray 1 spray by Nasal route as needed for Opioid Reversal Yes Agusto Del Castillo MD   calcipotriene (DOVONEX) 0.005 % cream APPLY 1-2 GRAMS TOPICALLY TO AFFECTED AREA 1-2 TIMES A DAY. DO NOT APPLY TO FACE Yes Agusto Del Castillo MD   gabapentin (NEURONTIN) 800 MG tablet Take 800 mg by mouth 4 times daily.  Yes Historical Provider, MD   Cholecalciferol (VITAMIN D) 50 MCG (2000 UT) TABS tablet  Yes Historical Provider, MD   Alcohol Swabs (ALCOHOL PADS) 70 % PADS CLEAN THE SKIN BY USING 1 PAD ON THE AFFECTED AREA BEFORE APPLYING ANY TOPICAL CREAM, 3 TIMES DAILY Yes Mer Maldonado MD   hydrOXYzine (ATARAX) 25 MG tablet Take 25 mg by mouth every 6 hours as needed for Itching Yes Historical Provider, MD   DULoxetine (CYMBALTA) 60 MG extended release capsule Take 120 mg by mouth daily Yes Historical Provider, MD   acetaminophen (TYLENOL) 325 MG tablet Take 650 mg by mouth every 4 hours as needed for Pain Yes Historical Provider, MD   traZODone (DESYREL) 100 MG tablet Take 200 mg by mouth nightly  Yes Historical Provider, MD   cetirizine (ZYRTEC) 10 MG tablet   Historical Provider, MD   ketoconazole (NIZORAL) 2 % cream   Historical Provider, MD   oxybutynin (DITROPAN-XL) 10 MG extended release tablet   Historical Provider, MD   Cyanocobalamin (VITAMIN B-12) 1000 MCG SUBL TAKE 1 TABLET BY MOUTH DAILY  Patient not taking: Reported on 11/4/2020  Mer Maldonado MD        Allergies   Allergen Reactions    Amoxicillin-Pot Clavulanate      Other reaction(s): GI Upset, Intolerance, Other: See Comments  Nose bleed    Other Itching     IVP dye         Past Medical History:   Diagnosis Date    Anxiety     Arthritis     Cancer ([facility] 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    Chronic kidney disease     COPD exacerbation ([facility]    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    Neuromuscular disorder ([facility]     Osteoarthritis     Pneumonia due to organism     Pyoderma gangrenosa     Sweet syndrome     Ulcerative colitis ([facility]        Past Surgical History:   Procedure Laterality Date    ABDOMEN SURGERY      sleen repair    APPENDECTOMY      BACK SURGERY      BREAST SURGERY      fatty tumor removed, benign    COLONOSCOPY      COSMETIC SURGERY tummy tuck    EYE SURGERY      bilateral cataract surgery    HYSTERECTOMY      SKIN BIOPSY      SPINAL FUSION      TONSILLECTOMY         Social History     Socioeconomic History    Marital status:      Spouse name: Not on file    Number of children: Not on file    Years of education: Not on file    Highest education level: Not on file   Occupational History    Not on file   Social Needs    Financial resource strain: Not on file    Food insecurity     Worry: Not on file     Inability: Not on file    Transportation needs     Medical: Not on file     Non-medical: Not on file   Tobacco Use    Smoking status: Current Every Day Smoker     Packs/day: 1.00     Years: 40.00     Pack years: 40.00     Types: Cigarettes    Smokeless tobacco: Never Used   Substance and Sexual Activity    Alcohol use: Never     Frequency: Never    Drug use: No    Sexual activity: Not Currently   Lifestyle    Physical activity     Days per week: Not on file     Minutes per session: Not on file    Stress: Not on file   Relationships    Social connections     Talks on phone: Not on file     Gets together: Not on file     Attends Restoration service: Not on file     Active member of club or organization: Not on file     Attends meetings of clubs or organizations: Not on file     Relationship status: Not on file    Intimate partner violence     Fear of current or ex partner: Not on file     Emotionally abused: Not on file     Physically abused: Not on file     Forced sexual activity: Not on file   Other Topics Concern    Not on file   Social History Narrative    Not on file        No family history on file. Vitals:    11/04/20 1031   BP: 108/64   Pulse: 62   Resp: 16   Temp: 97 °F (36.1 °C)   SpO2: 99%   Weight: 160 lb (72.6 kg)   Height: 5' 4\" (1.626 m)     Estimated body mass index is 27.46 kg/m² as calculated from the following:    Height as of this encounter: 5' 4\" (1.626 m).     Weight as of this encounter: 160 lb (72.6 kg). Physical Exam  Constitutional:       General: She is not in acute distress. Appearance: She is well-developed. HENT:      Head: Normocephalic. Right Ear: External ear normal.      Left Ear: External ear normal.   Eyes:      Conjunctiva/sclera: Conjunctivae normal.      Pupils: Pupils are equal, round, and reactive to light. Neck:      Musculoskeletal: Neck supple. Vascular: No JVD. Trachea: No tracheal deviation. Cardiovascular:      Rate and Rhythm: Normal rate and regular rhythm. Heart sounds: Normal heart sounds. Pulmonary:      Effort: Pulmonary effort is normal. No respiratory distress. Breath sounds: Normal breath sounds. No wheezing or rales. Chest:      Chest wall: No tenderness. Abdominal:      General: Bowel sounds are normal. There is no distension. Palpations: Abdomen is soft. There is no mass. Tenderness: There is no abdominal tenderness. There is no guarding or rebound. Comments: Abdominal wound healing well. Musculoskeletal:         General: No tenderness or deformity. Comments: Left lower extremity wound without male odor, healing well     Lymphadenopathy:      Cervical: No cervical adenopathy. Skin:     General: Skin is warm and dry. Coloration: Skin is not pale. Findings: No erythema or rash. Neurological:      Mental Status: She is alert and oriented to person, place, and time. Motor: No abnormal muscle tone. Psychiatric:         Thought Content: Thought content normal.         Judgment: Judgment normal.             ASSESSMENT/PLAN:    Wound of right lower extremity, sequela  -At this time I intend encourage the patient to follow-up with dermatology wound care and infectious disease.    -I have reinforced-the patient must make continue to follow-up with pain management in regards to the management of her pain.   I have requested that the patient have her pain management physician for a summary of her visit to our office. I have explained that I will not provide her additional prescriptions for prescription for Percocet unless this has been accomplished. Fibromyalgia:continue cymbalta        Hypovitaminosis D- continue oral vitamin D replacement. Ranulfo Yo   An electronic signature was used to authenticate this note. An electronic signature was used to authenticate this note.

## 2020-11-06 NOTE — TELEPHONE ENCOUNTER
A fax has been sent over from [facility]. Be on the look out.   LENCHOI

## 2020-11-09 RX ORDER — OXYCODONE AND ACETAMINOPHEN 10; 325 MG/1; MG/1
1 TABLET ORAL EVERY 4 HOURS PRN
Qty: 42 TABLET | Refills: 0 | Status: SHIPPED | OUTPATIENT
Start: 2020-11-09 | End: 2020-11-16 | Stop reason: SDUPTHER

## 2020-11-16 RX ORDER — NALOXONE HYDROCHLORIDE 4 MG/.1ML
1 SPRAY NASAL PRN
Qty: 1 EACH | Refills: 5 | Status: SHIPPED | OUTPATIENT
Start: 2020-11-16 | End: 2021-01-18 | Stop reason: CLARIF

## 2020-11-16 RX ORDER — OXYCODONE AND ACETAMINOPHEN 10; 325 MG/1; MG/1
1 TABLET ORAL EVERY 4 HOURS PRN
Qty: 42 TABLET | Refills: 0 | Status: SHIPPED | OUTPATIENT
Start: 2020-11-16 | End: 2020-11-23 | Stop reason: SDUPTHER

## 2020-11-16 NOTE — TELEPHONE ENCOUNTER
Patient requesting medication refill. Please approve or deny this request.    Rx requested:  Requested Prescriptions     Pending Prescriptions Disp Refills    oxyCODONE-acetaminophen (PERCOCET)  MG per tablet 42 tablet 0     Sig: Take 1 tablet by mouth every 4 hours as needed for Pain for up to 7 days.          Last Office Visit:   11/4/2020      Next Visit Date:  Future Appointments   Date Time Provider Manan Honeycutt   1/12/2021 10:00 AM Keyportbianca Oconnell DO [address]   2/3/2021 11:30 AM Alexey Weaver  [place], [place]

## 2020-11-18 ENCOUNTER — TELEPHONE (OUTPATIENT)
Dept: FAMILY MEDICINE CLINIC | Age: 55
End: 2020-11-18

## 2020-11-18 NOTE — TELEPHONE ENCOUNTER
Ayana Price called office stating she was seen by Dr Jordy Saavedra on 11/9/2020 (progress notes scanned 11/9/2020) in pt's chart. She states the Dr was telling her Dr Yaima Turner can prescribe more pain med due to the meat and tissue is pulling away from the bone. Ayana Price states she has wound and desmology appts for this issue and is due for bloodwork every week at [facility]. conf pt's future 3 mo f/u scheduled 2/3/2020  Pt willing to schedule sooner vv or ov if needed.

## 2020-11-20 NOTE — TELEPHONE ENCOUNTER
Attempted to phone the patient at the phone number listed to inform her that I cannot prescribe Percocet at a shorter time interval than every 4 hours. Unfortunately at this point I recommend that if additional pain medication is indicated at this time that pain management Interceed. Thank you kindly.   Got bless you

## 2020-11-23 RX ORDER — OXYCODONE AND ACETAMINOPHEN 10; 325 MG/1; MG/1
1 TABLET ORAL EVERY 4 HOURS PRN
Qty: 42 TABLET | Refills: 0 | Status: SHIPPED | OUTPATIENT
Start: 2020-11-23 | End: 2020-11-30 | Stop reason: SDUPTHER

## 2020-11-23 RX ORDER — NALOXONE HYDROCHLORIDE 4 MG/.1ML
1 SPRAY NASAL PRN
Qty: 1 EACH | Refills: 5 | Status: SHIPPED | OUTPATIENT
Start: 2020-11-23 | End: 2021-01-18 | Stop reason: CLARIF

## 2020-11-23 NOTE — TELEPHONE ENCOUNTER
Patient is calling in requesting a refill on medication(s). Requested Prescriptions     Pending Prescriptions Disp Refills    oxyCODONE-acetaminophen (PERCOCET)  MG per tablet 42 tablet 0     Sig: Take 1 tablet by mouth every 4 hours as needed for Pain for up to 7 days. Patient's Last Office Visit:  11/4/2020     Patient's Next Visit:  2/3/2021    Pharmacy:  Please send the medication to the pharmacy listed.       Other Comments:

## 2020-11-30 RX ORDER — OXYCODONE AND ACETAMINOPHEN 10; 325 MG/1; MG/1
1 TABLET ORAL EVERY 4 HOURS PRN
Qty: 42 TABLET | Refills: 0 | Status: SHIPPED | OUTPATIENT
Start: 2020-11-30 | End: 2020-12-10 | Stop reason: SDUPTHER

## 2020-11-30 RX ORDER — NALOXONE HYDROCHLORIDE 4 MG/.1ML
1 SPRAY NASAL PRN
Qty: 1 EACH | Refills: 5 | Status: SHIPPED | OUTPATIENT
Start: 2020-11-30 | End: 2021-01-18 | Stop reason: CLARIF

## 2020-12-09 ENCOUNTER — TELEPHONE (OUTPATIENT)
Dept: FAMILY MEDICINE CLINIC | Age: 55
End: 2020-12-09

## 2020-12-10 RX ORDER — OXYCODONE AND ACETAMINOPHEN 10; 325 MG/1; MG/1
1 TABLET ORAL EVERY 4 HOURS PRN
Qty: 42 TABLET | Refills: 0 | Status: SHIPPED | OUTPATIENT
Start: 2020-12-10 | End: 2020-12-17 | Stop reason: SDUPTHER

## 2020-12-16 NOTE — TELEPHONE ENCOUNTER
. Please approve or deny this request.    Rx requested:  Requested Prescriptions     Pending Prescriptions Disp Refills    oxyCODONE-acetaminophen (PERCOCET)  MG per tablet 42 tablet 0     Sig: Take 1 tablet by mouth every 4 hours as needed for Pain for up to 7 days.          Last Office Visit:   11/4/2020      Next Visit Date:  Future Appointments   Date Time Provider Manan Honeycutt   1/12/2021 10:00 AM Sumeet Nicholas [address]   1/18/2021 10:15 AM Merissa Camacho MD [facility]   2/3/2021 11:30 AM Hanna Mckeon MD [address] [place] [address] 7

## 2020-12-17 RX ORDER — OXYCODONE AND ACETAMINOPHEN 10; 325 MG/1; MG/1
1 TABLET ORAL EVERY 4 HOURS PRN
Qty: 42 TABLET | Refills: 0 | Status: SHIPPED | OUTPATIENT
Start: 2020-12-17 | End: 2020-12-28 | Stop reason: SDUPTHER

## 2020-12-17 RX ORDER — NALOXONE HYDROCHLORIDE 4 MG/.1ML
1 SPRAY NASAL PRN
Qty: 1 EACH | Refills: 5 | Status: SHIPPED | OUTPATIENT
Start: 2020-12-17 | End: 2021-01-18 | Stop reason: CLARIF

## 2020-12-23 NOTE — TELEPHONE ENCOUNTER
Bárbara Weber called office requesting medication refill. Rx requested:  Requested Prescriptions     Pending Prescriptions Disp Refills    oxyCODONE-acetaminophen (PERCOCET)  MG per tablet 42 tablet 0     Sig: Take 1 tablet by mouth every 4 hours as needed for Pain for up to 7 days.        Last Office Visit:   11/4/2020    Last Tox screen:      Last Medication contract:      Next Visit Date:  Future Appointments   Date Time Provider Manan Tangela   1/12/2021 10:00 AM Kierra Vaughn DO [address]   1/18/2021 10:15 AM MD Shreya Bowen   2/3/2021 11:30 AM Kelly Ellsworth MD [address], [place]

## 2020-12-28 RX ORDER — NALOXONE HYDROCHLORIDE 4 MG/.1ML
1 SPRAY NASAL PRN
Qty: 1 EACH | Refills: 5 | Status: SHIPPED | OUTPATIENT
Start: 2020-12-28 | End: 2021-01-18 | Stop reason: CLARIF

## 2020-12-28 RX ORDER — OXYCODONE AND ACETAMINOPHEN 10; 325 MG/1; MG/1
1 TABLET ORAL EVERY 4 HOURS PRN
Qty: 42 TABLET | Refills: 0 | Status: SHIPPED | OUTPATIENT
Start: 2020-12-28 | End: 2021-01-04 | Stop reason: SDUPTHER

## 2021-01-04 DIAGNOSIS — M79.604 PAIN OF RIGHT LOWER EXTREMITY: ICD-10-CM

## 2021-01-04 RX ORDER — OXYCODONE AND ACETAMINOPHEN 10; 325 MG/1; MG/1
1 TABLET ORAL EVERY 4 HOURS PRN
Qty: 42 TABLET | Refills: 0 | Status: SHIPPED | OUTPATIENT
Start: 2021-01-04 | End: 2021-01-12 | Stop reason: SDUPTHER

## 2021-01-04 RX ORDER — NALOXONE HYDROCHLORIDE 4 MG/.1ML
1 SPRAY NASAL PRN
Qty: 1 EACH | Refills: 5 | Status: SHIPPED | OUTPATIENT
Start: 2021-01-04 | End: 2021-03-08

## 2021-01-11 DIAGNOSIS — M79.604 PAIN OF RIGHT LOWER EXTREMITY: ICD-10-CM

## 2021-01-11 PROBLEM — R53.83 FATIGUE: Status: ACTIVE | Noted: 2021-01-11

## 2021-01-11 PROBLEM — Z96.1 PSEUDOPHAKIA: Status: ACTIVE | Noted: 2020-07-23
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
## 2021-01-11 PROBLEM — Z85.6 PERSONAL HISTORY OF LEUKEMIA: Status: ACTIVE | Noted: 2017-07-03

## 2021-01-11 PROBLEM — Z79.899 IMMUNODEFICIENCY DUE TO TREATMENT WITH IMMUNOSUPPRESSIVE MEDICATION (HCC): Status: ACTIVE | Noted: 2019-11-12

## 2021-01-11 PROBLEM — T45.1X5A IMMUNODEFICIENCY DUE TO TREATMENT WITH IMMUNOSUPPRESSIVE MEDICATION: Status: ACTIVE | Noted: 2019-11-12

## 2021-01-11 PROBLEM — Z72.0 TOBACCO USE: Status: ACTIVE | Noted: 2018-03-19

## 2021-01-11 PROBLEM — R78.81 BACTEREMIA: Status: ACTIVE | Noted: 2017-05-28

## 2021-01-11 PROBLEM — K51.90 ULCERATIVE COLITIS WITHOUT COMPLICATIONS (HCC): Status: ACTIVE | Noted: 2021-01-11

## 2021-01-11 PROBLEM — F41.9 ANXIETY DISORDER, UNSPECIFIED: Status: ACTIVE | Noted: 2018-08-23

## 2021-01-11 PROBLEM — N39.44 NOCTURNAL ENURESIS: Status: ACTIVE | Noted: 2021-01-11

## 2021-01-11 PROBLEM — R65.10 SYSTEMIC INFLAMMATORY RESPONSE SYNDROME (HCC): Status: ACTIVE | Noted: 2017-07-03

## 2021-01-11 PROBLEM — K59.00 CONSTIPATION: Status: ACTIVE | Noted: 2021-01-11

## 2021-01-11 PROBLEM — Z22.322 CARRIER OR SUSPECTED CARRIER OF METHICILLIN RESISTANT STAPHYLOCOCCUS AUREUS: Status: ACTIVE | Noted: 2017-08-16

## 2021-01-11 PROBLEM — Z87.19 HISTORY OF ULCERATIVE COLITIS: Status: ACTIVE | Noted: 2019-10-23

## 2021-01-11 PROBLEM — L98.9 INFLAMMATORY DERMATOSIS: Status: ACTIVE | Noted: 2021-01-11

## 2021-01-11 PROBLEM — R73.02 GLUCOSE INTOLERANCE (IMPAIRED GLUCOSE TOLERANCE): Status: ACTIVE | Noted: 2021-01-11

## 2021-01-11 PROBLEM — J44.9 COPD (CHRONIC OBSTRUCTIVE PULMONARY DISEASE) (HCC): Status: ACTIVE | Noted: 2019-10-23

## 2021-01-11 PROBLEM — R87.622 PAPANICOLAOU SMEAR OF VAGINA WITH LOW GRADE SQUAMOUS INTRAEPITHELIAL LESION (LGSIL): Status: ACTIVE | Noted: 2021-01-11

## 2021-01-11 PROBLEM — G62.9 POLYNEUROPATHY, UNSPECIFIED: Status: ACTIVE | Noted: 2018-08-23

## 2021-01-11 PROBLEM — S91.001A OPEN WOUND OF RIGHT ANKLE: Status: ACTIVE | Noted: 2017-03-27

## 2021-01-11 PROBLEM — H04.123 DRY EYE SYNDROME OF BILATERAL LACRIMAL GLANDS: Status: ACTIVE | Noted: 2020-07-23

## 2021-01-11 PROBLEM — G47.00 INSOMNIA: Status: ACTIVE | Noted: 2021-01-11

## 2021-01-11 PROBLEM — F33.1 MAJOR DEPRESSIVE DISORDER, RECURRENT EPISODE, MODERATE (HCC): Status: ACTIVE | Noted: 2019-01-23

## 2021-01-11 PROBLEM — R50.9 FEVER: Status: ACTIVE | Noted: 2021-01-11

## 2021-01-11 PROBLEM — S71.109A OPEN WOUND OF THIGH: Status: ACTIVE | Noted: 2017-03-27

## 2021-01-11 PROBLEM — Z79.60 IMMUNODEFICIENCY DUE TO TREATMENT WITH IMMUNOSUPPRESSIVE MEDICATION: Status: ACTIVE | Noted: 2019-11-12

## 2021-01-11 PROBLEM — K92.1 HEMATOCHEZIA: Status: ACTIVE | Noted: 2019-10-23

## 2021-01-11 PROBLEM — N94.89 BURNING SENSATION OF VULVA: Status: ACTIVE | Noted: 2021-01-11

## 2021-01-11 PROBLEM — R10.12 LUQ PAIN: Status: ACTIVE | Noted: 2019-10-23

## 2021-01-11 PROBLEM — H52.4 PRESBYOPIA: Status: ACTIVE | Noted: 2020-07-23

## 2021-01-11 PROBLEM — F32.A DEPRESSION: Status: ACTIVE | Noted: 2019-10-23

## 2021-01-11 PROBLEM — M25.512 PAIN IN LEFT SHOULDER: Status: ACTIVE | Noted: 2017-06-06

## 2021-01-11 PROBLEM — M23.92 UNSPECIFIED INTERNAL DERANGEMENT OF LEFT KNEE: Status: ACTIVE | Noted: 2018-04-18

## 2021-01-11 PROBLEM — R10.13 DYSPEPSIA: Status: ACTIVE | Noted: 2019-10-23

## 2021-01-11 PROBLEM — A49.02 METHICILLIN RESISTANT STAPHYLOCOCCUS AUREUS INFECTION, UNSPECIFIED SITE: Status: ACTIVE | Noted: 2017-07-03

## 2021-01-11 PROBLEM — D84.821 IMMUNODEFICIENCY DUE TO TREATMENT WITH IMMUNOSUPPRESSIVE MEDICATION (HCC): Status: ACTIVE | Noted: 2019-11-12

## 2021-01-11 PROBLEM — E87.20 ACIDOSIS: Status: ACTIVE | Noted: 2017-05-28

## 2021-01-11 PROBLEM — R07.89 CHEST WALL PAIN: Status: ACTIVE | Noted: 2021-01-11

## 2021-01-12 RX ORDER — OXYCODONE AND ACETAMINOPHEN 10; 325 MG/1; MG/1
1 TABLET ORAL EVERY 4 HOURS PRN
Qty: 42 TABLET | Refills: 0 | Status: SHIPPED | OUTPATIENT
Start: 2021-01-12 | End: 2021-01-18 | Stop reason: SDUPTHER

## 2021-01-14 PROBLEM — M54.50 LOW BACK PAIN: Status: ACTIVE | Noted: 2021-01-14

## 2021-01-14 PROBLEM — G60.0 HEREDITARY MOTOR AND SENSORY NEUROPATHY: Status: ACTIVE | Noted: 2021-01-14

## 2021-01-14 PROBLEM — G93.41 METABOLIC ENCEPHALOPATHY: Status: ACTIVE | Noted: 2021-01-14

## 2021-01-14 PROBLEM — G25.81 RESTLESS LEG SYNDROME: Status: ACTIVE | Noted: 2021-01-14

## 2021-01-14 PROBLEM — M54.2 CERVICALGIA: Status: ACTIVE | Noted: 2021-01-14

## 2021-01-18 ENCOUNTER — OFFICE VISIT (OUTPATIENT)
Dept: NEUROLOGY | Age: 56
End: 2021-01-18
Payer: COMMERCIAL

## 2021-01-18 VITALS
TEMPERATURE: 97.1 F | WEIGHT: 168 LBS | HEART RATE: 58 BPM | BODY MASS INDEX: 28.84 KG/M2 | SYSTOLIC BLOOD PRESSURE: 122 MMHG | DIASTOLIC BLOOD PRESSURE: 67 MMHG

## 2021-01-18 DIAGNOSIS — M79.7 FIBROMYALGIA: ICD-10-CM

## 2021-01-18 DIAGNOSIS — M54.16 LUMBAR RADICULOPATHY: ICD-10-CM

## 2021-01-18 DIAGNOSIS — R29.898 LEG WEAKNESS, BILATERAL: Primary | ICD-10-CM

## 2021-01-18 DIAGNOSIS — G89.4 CHRONIC PAIN SYNDROME: ICD-10-CM

## 2021-01-18 DIAGNOSIS — M25.571 PAIN IN RIGHT ANKLE AND JOINTS OF RIGHT FOOT: ICD-10-CM

## 2021-01-18 DIAGNOSIS — G89.29 CHRONIC LEFT-SIDED LOW BACK PAIN WITH LEFT-SIDED SCIATICA: ICD-10-CM

## 2021-01-18 DIAGNOSIS — M79.604 PAIN OF RIGHT LOWER EXTREMITY: ICD-10-CM

## 2021-01-18 DIAGNOSIS — M54.42 CHRONIC LEFT-SIDED LOW BACK PAIN WITH LEFT-SIDED SCIATICA: ICD-10-CM

## 2021-01-18 PROCEDURE — 4004F PT TOBACCO SCREEN RCVD TLK: CPT | Performed by: PSYCHIATRY & NEUROLOGY

## 2021-01-18 PROCEDURE — 3017F COLORECTAL CA SCREEN DOC REV: CPT | Performed by: PSYCHIATRY & NEUROLOGY

## 2021-01-18 PROCEDURE — 99204 OFFICE O/P NEW MOD 45 MIN: CPT | Performed by: PSYCHIATRY & NEUROLOGY

## 2021-01-18 PROCEDURE — G8427 DOCREV CUR MEDS BY ELIG CLIN: HCPCS | Performed by: PSYCHIATRY & NEUROLOGY

## 2021-01-18 PROCEDURE — G8484 FLU IMMUNIZE NO ADMIN: HCPCS | Performed by: PSYCHIATRY & NEUROLOGY

## 2021-01-18 PROCEDURE — G8417 CALC BMI ABV UP PARAM F/U: HCPCS | Performed by: PSYCHIATRY & NEUROLOGY

## 2021-01-18 RX ORDER — OMEPRAZOLE 40 MG/1
CAPSULE, DELAYED RELEASE ORAL
COMMUNITY
Start: 2020-12-22

## 2021-01-18 RX ORDER — GABAPENTIN 800 MG/1
800 TABLET ORAL 4 TIMES DAILY
Qty: 120 TABLET | Refills: 0 | Status: SHIPPED | OUTPATIENT
Start: 2021-01-18 | End: 2021-02-04

## 2021-01-18 RX ORDER — POLYETHYLENE GLYCOL 3350 17 G/17G
POWDER, FOR SOLUTION ORAL
COMMUNITY
Start: 2021-01-09

## 2021-01-18 RX ORDER — OXYCODONE AND ACETAMINOPHEN 10; 325 MG/1; MG/1
1 TABLET ORAL EVERY 4 HOURS PRN
Qty: 42 TABLET | Refills: 0 | Status: SHIPPED | OUTPATIENT
Start: 2021-01-18 | End: 2021-01-18

## 2021-01-18 RX ORDER — NALOXONE HYDROCHLORIDE 4 MG/.1ML
1 SPRAY NASAL PRN
Qty: 1 EACH | Refills: 5 | Status: SHIPPED | OUTPATIENT
Start: 2021-01-18 | End: 2021-04-21

## 2021-01-18 RX ORDER — OXYCODONE AND ACETAMINOPHEN 10; 325 MG/1; MG/1
1 TABLET ORAL EVERY 4 HOURS PRN
Qty: 42 TABLET | Refills: 0 | Status: SHIPPED | OUTPATIENT
Start: 2021-01-18 | End: 2021-01-25 | Stop reason: SDUPTHER

## 2021-01-18 RX ORDER — NALOXONE HYDROCHLORIDE 4 MG/.1ML
1 SPRAY NASAL PRN
Qty: 1 EACH | Refills: 5 | Status: SHIPPED | OUTPATIENT
Start: 2021-01-18 | End: 2021-03-08

## 2021-01-18 ASSESSMENT — ENCOUNTER SYMPTOMS
TROUBLE SWALLOWING: 0
CHOKING: 0
COLOR CHANGE: 0
BACK PAIN: 0
PHOTOPHOBIA: 0
SHORTNESS OF BREATH: 0
NAUSEA: 0
VOMITING: 0

## 2021-01-18 NOTE — PROGRESS NOTES
Procedure Laterality Date    ABDOMEN SURGERY      sleen repair    APPENDECTOMY      BACK SURGERY      BREAST SURGERY      fatty tumor removed, benign    COLONOSCOPY      COSMETIC SURGERY      tummy tuck    EYE SURGERY      bilateral cataract surgery    HYSTERECTOMY      SKIN BIOPSY      SPINAL FUSION      TONSILLECTOMY       Social History     Socioeconomic History    Marital status:      Spouse name: Not on file    Number of children: Not on file    Years of education: Not on file    Highest education level: Not on file   Occupational History    Not on file   Social Needs    Financial resource strain: Not on file    Food insecurity     Worry: Not on file     Inability: Not on file    Transportation needs     Medical: Not on file     Non-medical: Not on file   Tobacco Use    Smoking status: Current Every Day Smoker     Packs/day: 1.00     Years: 40.00     Pack years: 40.00     Types: Cigarettes    Smokeless tobacco: Never Used   Substance and Sexual Activity    Alcohol use: Never     Frequency: Never    Drug use: No    Sexual activity: Not Currently   Lifestyle    Physical activity     Days per week: Not on file     Minutes per session: Not on file    Stress: Not on file   Relationships    Social connections     Talks on phone: Not on file     Gets together: Not on file     Attends Restorationist service: Not on file     Active member of club or organization: Not on file     Attends meetings of clubs or organizations: Not on file     Relationship status: Not on file    Intimate partner violence     Fear of current or ex partner: Not on file     Emotionally abused: Not on file     Physically abused: Not on file     Forced sexual activity: Not on file   Other Topics Concern    Not on file   Social History Narrative    Not on file     No family history on file.   Allergies   Allergen Reactions    Amoxicillin-Pot Clavulanate Xr Ankle Right (min 3 Views)    Result Date: 7/13/2020  EXAMINATION: XR ANKLE RIGHT (MIN 3 VIEWS)  CLINICAL HISTORY: Chronic wound posterior ankle. COMPARISONS: None TECHNIQUE: THREE VIEWS  FINDINGS: Three views of the right ankle are submitted. There is bimalleolar soft tissue swelling. There is diffuse soft tissue swelling of the posterior ankle. Correlate with physical exam. No acute fractures. No dislocations. No focal bony abnormalities                                                                                   NO ACUTE FRACTURES OF THE FINDINGS DETAILED ABOVE.       Lab Results   Component Value Date    WBC 6.5 05/11/2020    RBC 4.57 05/11/2020    RBC 4.59 11/02/2011    HGB 11.7 05/11/2020    HCT 36.6 05/11/2020    MCV 80.2 05/11/2020    MCH 25.6 05/11/2020    MCHC 31.9 05/11/2020    RDW 16.4 05/11/2020     05/11/2020    MPV 9.1 11/05/2012     Lab Results   Component Value Date     05/11/2020    K 4.3 05/11/2020     05/11/2020    CO2 26 05/11/2020    BUN 8 05/11/2020    CREATININE 0.85 05/11/2020    GFRAA >60.0 05/11/2020    LABGLOM >60.0 05/11/2020    GLUCOSE 99 05/11/2020    GLUCOSE 146 11/02/2011    PROT 5.7 05/09/2020    LABALBU 2.84 05/09/2020    LABALBU 4.1 11/02/2011    CALCIUM 9.0 05/11/2020    BILITOT 0.3 05/08/2020    ALKPHOS 108 05/08/2020    AST 18 05/08/2020    ALT 10 05/08/2020     Lab Results   Component Value Date    PROTIME 13.0 10/05/2019    INR 0.9 10/05/2019     Lab Results   Component Value Date    TSH 1.030 03/26/2020    EQPKKEHV77 579 11/04/2020     Lab Results   Component Value Date    TRIG 163 05/09/2020    HDL 40 05/09/2020    LDLCALC 90 05/09/2020     Lab Results   Component Value Date    LABAMPH Neg 03/20/2018    BARBSCNU Neg 03/20/2018    LABBENZ Neg 03/20/2018    OPIATESCREENURINE POSITIVE 03/20/2018    PHENCYCLIDINESCREENURINE Neg 03/20/2018     No results found for: LITHIUM, DILFRTOT, VALPROATE    Assessment:       Diagnosis Orders

## 2021-01-18 NOTE — TELEPHONE ENCOUNTER
Patient requesting medication refill. Please approve or deny this request.    Rx requested:  Requested Prescriptions     Pending Prescriptions Disp Refills    oxyCODONE-acetaminophen (PERCOCET)  MG per tablet 42 tablet 0     Sig: Take 1 tablet by mouth every 4 hours as needed for Pain for up to 7 days.          Last Office Visit:   11/4/2020      Next Visit Date:  Future Appointments   Date Time Provider Manan Tangela   1/18/2021 10:15 AM Xin Lozada MD [facility]   2/3/2021 11:30 AM Ashley Todd  [facility]   2/9/2021  9:00 AM Olga Shin DO [facility]

## 2021-01-19 ENCOUNTER — VIRTUAL VISIT (OUTPATIENT)
Dept: FAMILY MEDICINE CLINIC | Age: 56
End: 2021-01-19
Payer: COMMERCIAL

## 2021-01-19 DIAGNOSIS — L88 PYODERMIC GANGRENOSUM: ICD-10-CM

## 2021-01-19 DIAGNOSIS — M79.604 PAIN OF RIGHT LOWER EXTREMITY: Primary | ICD-10-CM

## 2021-01-19 DIAGNOSIS — S81.801S WOUND OF RIGHT LOWER EXTREMITY, SEQUELA: ICD-10-CM

## 2021-01-19 PROCEDURE — 99442 PR PHYS/QHP TELEPHONE EVALUATION 11-20 MIN: CPT | Performed by: INTERNAL MEDICINE

## 2021-01-19 RX ORDER — SULFAMETHOXAZOLE AND TRIMETHOPRIM 800; 160 MG/1; MG/1
1 TABLET ORAL 2 TIMES DAILY
Qty: 28 TABLET | Refills: 0 | Status: SHIPPED | OUTPATIENT
Start: 2021-01-19 | End: 2021-02-02

## 2021-01-19 NOTE — PROGRESS NOTES
Gastrointestinal: Negative for abdominal distention, abdominal pain, blood in stool, constipation, diarrhea, nausea, rectal pain and vomiting. Endocrine: Negative for cold intolerance, heat intolerance, polydipsia, polyphagia and polyuria. Genitourinary: Negative for decreased urine volume, difficulty urinating, dysuria, flank pain, frequency, genital sores, hematuria and urgency. Musculoskeletal: Negative for arthralgias, back pain, gait problem, joint swelling, myalgias, neck pain and neck stiffness. Skin: Negative for color change, rash and wound. Allergic/Immunologic: Negative for environmental allergies and food allergies. Neurological: Negative for dizziness, tremors, seizures, syncope, facial asymmetry, speech difficulty, weakness, light-headedness, numbness and headaches. Hematological: Negative for adenopathy. Does not bruise/bleed easily. Psychiatric/Behavioral: Negative for agitation, confusion, decreased concentration, hallucinations, self-injury, sleep disturbance and suicidal ideas. The patient is not nervous/anxious. Prior to Visit Medications    Medication Sig Taking? Authorizing Provider   omeprazole (PRILOSEC) 40 MG delayed release capsule   Historical Provider, MD Marshall Speaks 17 GM/SCOOP powder   Historical Provider, MD   gabapentin (NEURONTIN) 800 MG tablet Take 1 tablet by mouth 4 times daily for 30 days. Reg Glover MD   naloxone 4 MG/0.1ML LIQD nasal spray 1 spray by Nasal route as needed for Opioid Reversal  Shimon Lopez MD   oxyCODONE-acetaminophen (PERCOCET)  MG per tablet TAKE 1 TABLET BY MOUTH EVERY 4 HOURS AS NEEDED FOR PAIN FOR UP TO 7 DAYS.   Shimon Lopez MD   naloxone 4 MG/0.1ML LIQD nasal spray 1 spray by Nasal route as needed for Opioid Reversal  Shimon Lopez MD   naloxone 4 MG/0.1ML LIQD nasal spray 1 spray by Nasal route as needed for Opioid Reversal  Shimon Lopez MD Adalimumab (HUMIRA PEN) 40 MG/0.4ML PNKT   Historical Provider, MD   cetirizine (ZYRTEC) 10 MG tablet   Historical Provider, MD   ketoconazole (NIZORAL) 2 % cream   Historical Provider, MD   oxybutynin (DITROPAN-XL) 10 MG extended release tablet   Historical Provider, MD   Cyanocobalamin (VITAMIN B-12) 1000 MCG SUBL TAKE 1 TABLET BY MOUTH DAILY  Patient not taking: Reported on 11/4/2020  Rochelle Avila MD   calcipotriene (DOVONEX) 0.005 % cream APPLY 1-2 GRAMS TOPICALLY TO AFFECTED AREA 1-2 TIMES A DAY.  DO NOT APPLY TO FACE  Patient not taking: Reported on 1/18/2021  Rochelle Avila MD   Cholecalciferol (VITAMIN D) 50 MCG (2000 UT) TABS tablet   Historical Provider, MD   Alcohol Swabs (ALCOHOL PADS) 70 % PADS CLEAN THE SKIN BY USING 1 PAD ON THE AFFECTED AREA BEFORE APPLYING ANY TOPICAL CREAM, 3 TIMES DAILY  Rochelle Avila MD   hydrOXYzine (ATARAX) 25 MG tablet Take 25 mg by mouth every 6 hours as needed for Itching  Historical Provider, MD   DULoxetine (CYMBALTA) 60 MG extended release capsule Take 120 mg by mouth daily  Historical Provider, MD   acetaminophen (TYLENOL) 325 MG tablet Take 650 mg by mouth every 4 hours as needed for Pain  Historical Provider, MD   traZODone (DESYREL) 100 MG tablet Take 200 mg by mouth nightly   Historical Provider, MD        Allergies   Allergen Reactions    Amoxicillin-Pot Clavulanate      Other reaction(s): GI Upset, Intolerance, Other: See Comments  Nose bleed    Other Itching     IVP dye         Past Medical History:   Diagnosis Date    Anxiety     Arthritis     [facility] 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    Chronic kidney disease     COPD exacerbation [facility]    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    Neuromuscular disorder [facility]     Osteoarthritis     Pneumonia due to organism  Pyoderma gangrenosa     Sweet syndrome     [facility]        Past Surgical History:   Procedure Laterality Date    ABDOMEN SURGERY      sleen repair    APPENDECTOMY      BACK SURGERY      BREAST SURGERY      fatty tumor removed, benign    COLONOSCOPY      COSMETIC SURGERY      tummy tuck    EYE SURGERY      bilateral cataract surgery    HYSTERECTOMY      SKIN BIOPSY      SPINAL FUSION      TONSILLECTOMY         Social History     Socioeconomic History    Marital status:      Spouse name: Not on file    Number of children: Not on file    Years of education: Not on file    Highest education level: Not on file   Occupational History    Not on file   Social Needs    Financial resource strain: Not on file    Food insecurity     Worry: Not on file     Inability: Not on file    Transportation needs     Medical: Not on file     Non-medical: Not on file   Tobacco Use    Smoking status: Current Every Day Smoker     Packs/day: 1.00     Years: 40.00     Pack years: 40.00     Types: Cigarettes    Smokeless tobacco: Never Used   Substance and Sexual Activity    Alcohol use: Never     Frequency: Never    Drug use: No    Sexual activity: Not Currently   Lifestyle    Physical activity     Days per week: Not on file     Minutes per session: Not on file    Stress: Not on file   Relationships    Social connections     Talks on phone: Not on file     Gets together: Not on file     Attends Sabianist service: Not on file     Active member of club or organization: Not on file     Attends meetings of clubs or organizations: Not on file     Relationship status: Not on file    Intimate partner violence     Fear of current or ex partner: Not on file     Emotionally abused: Not on file     Physically abused: Not on file     Forced sexual activity: Not on file   Other Topics Concern    Not on file   Social History Narrative    Not on file        No family history on file. There were no vitals filed for this visit. Estimated body mass index is 28.84 kg/m² as calculated from the following:    Height as of 11/4/20: 5' 4\" (1.626 m). Weight as of 1/18/21: 168 lb (76.2 kg). ASSESSMENT/PLAN:    Wound of right lower extremity, sequela  Bactrim x 14 days. At this time I intend encourage the patient to follow-up with dermatology wound care and infectious disease.    -I have reinforcedthe patient must make continue to follow-up with pain management in regards to the management of her pain. I have requested that the patient have her pain management physician for a summary of her visit to our office. I have explained that I will not provide her additional prescriptions for prescription for Percocet unless this has been accomplished. Fibromyalgia:continue cymbalta and trazodone        Hypovitaminosis D- continue oral vitamin D replacement. Deja Murray   An electronic signature was used to authenticate this note.

## 2021-01-25 DIAGNOSIS — M79.604 PAIN OF RIGHT LOWER EXTREMITY: ICD-10-CM

## 2021-01-25 RX ORDER — OXYCODONE AND ACETAMINOPHEN 10; 325 MG/1; MG/1
1 TABLET ORAL EVERY 4 HOURS PRN
Qty: 42 TABLET | Refills: 0 | Status: SHIPPED | OUTPATIENT
Start: 2021-01-25 | End: 2021-02-01

## 2021-01-25 NOTE — TELEPHONE ENCOUNTER
Rx requested:  Requested Prescriptions     Pending Prescriptions Disp Refills    oxyCODONE-acetaminophen (PERCOCET)  MG per tablet 42 tablet 0     Sig: Take 1 tablet by mouth every 4 hours as needed for Pain for up to 7 days.        Last Office Visit:   1/19/2021    Last Tox screen:        Last Medication contract:    08/10/20    Next Visit Date:  Future Appointments   Date Time Provider Manan Tangela   1/27/2021  2:00 PM [place] [facility] ROOM 1 [facility]   2/3/2021 11:30 AM Rochelle Avila MD [facility]   2/9/2021  9:00 AM [place] [facility] [address]   4/21/2021  3:45 PM Usamasilke Diego MD [facility]

## 2021-02-01 ENCOUNTER — TELEPHONE (OUTPATIENT)
Dept: ADMINISTRATIVE | Age: 56
End: 2021-02-01

## 2021-02-01 DIAGNOSIS — M79.604 PAIN OF RIGHT LOWER EXTREMITY: ICD-10-CM

## 2021-02-01 RX ORDER — NALOXONE HYDROCHLORIDE 4 MG/.1ML
1 SPRAY NASAL PRN
Qty: 1 EACH | Refills: 5 | Status: SHIPPED | OUTPATIENT
Start: 2021-02-01 | End: 2021-03-08

## 2021-02-01 RX ORDER — OXYCODONE AND ACETAMINOPHEN 10; 325 MG/1; MG/1
1 TABLET ORAL EVERY 4 HOURS PRN
Qty: 42 TABLET | Refills: 0 | Status: SHIPPED | OUTPATIENT
Start: 2021-02-01 | End: 2021-02-08 | Stop reason: SDUPTHER

## 2021-02-03 ENCOUNTER — OFFICE VISIT (OUTPATIENT)
Dept: FAMILY MEDICINE CLINIC | Age: 56
End: 2021-02-03
Payer: COMMERCIAL

## 2021-02-03 VITALS
WEIGHT: 157 LBS | BODY MASS INDEX: 26.8 KG/M2 | HEART RATE: 81 BPM | SYSTOLIC BLOOD PRESSURE: 108 MMHG | RESPIRATION RATE: 16 BRPM | DIASTOLIC BLOOD PRESSURE: 68 MMHG | OXYGEN SATURATION: 96 % | HEIGHT: 64 IN

## 2021-02-03 DIAGNOSIS — E55.9 HYPOVITAMINOSIS D: ICD-10-CM

## 2021-02-03 DIAGNOSIS — Z72.0 NICOTINE ABUSE: ICD-10-CM

## 2021-02-03 DIAGNOSIS — L88 PYODERMIC GANGRENOSUM: ICD-10-CM

## 2021-02-03 DIAGNOSIS — S81.801S WOUND OF RIGHT LOWER EXTREMITY, SEQUELA: ICD-10-CM

## 2021-02-03 DIAGNOSIS — M79.604 PAIN OF RIGHT LOWER EXTREMITY: Primary | ICD-10-CM

## 2021-02-03 PROCEDURE — 3017F COLORECTAL CA SCREEN DOC REV: CPT | Performed by: INTERNAL MEDICINE

## 2021-02-03 PROCEDURE — G8427 DOCREV CUR MEDS BY ELIG CLIN: HCPCS | Performed by: INTERNAL MEDICINE

## 2021-02-03 PROCEDURE — 99214 OFFICE O/P EST MOD 30 MIN: CPT | Performed by: INTERNAL MEDICINE

## 2021-02-03 PROCEDURE — G8484 FLU IMMUNIZE NO ADMIN: HCPCS | Performed by: INTERNAL MEDICINE

## 2021-02-03 PROCEDURE — 4004F PT TOBACCO SCREEN RCVD TLK: CPT | Performed by: INTERNAL MEDICINE

## 2021-02-03 PROCEDURE — G8417 CALC BMI ABV UP PARAM F/U: HCPCS | Performed by: INTERNAL MEDICINE

## 2021-02-03 NOTE — PROGRESS NOTES
2/3/2021      Radha Kenney (:  1965) is a 55 y.o. female, here for evaluation of the following medical concerns:pyoderma gangrenosum          HPI    52-year-old female with a history of prediabetes, pyoderma gangrenosum with associated abdominal and right lower extremity wounds associated with pyoderma gangrenosum  presents for two new wounds on her lower extremities. She was previously hospitalized from  through 2020. She has been followed by dermatology ,wound care and rheumatology. She is presently receiving Percocet every 6 hours as needed for pain. The pain management physician approves the patients current pain regimen. Depression/anxiety: Continuing Cymbalta and trazodone        Fibromyalgia and Wound pain: on neurontin , oxycodone q 4 hours. Prediabetes: The patient is A1c was 6.3 in May 2020. Nicotine Dependency: At present he denies polyuria,  Polydipsia, constitutional, sinus, visual, cardiopulmonary, urologic, gastrointestinal, immunologic/hematologic, additional musculoskeletal, neurologic,dermatologic, or psychiatric complaints. Review of Systems   Constitutional: Negative for chills, diaphoresis, fatigue and fever. HENT: Negative for congestion, dental problem, drooling, ear discharge, ear pain, facial swelling, hearing loss, mouth sores, nosebleeds, postnasal drip, rhinorrhea, sinus pressure, sinus pain, sneezing, sore throat, tinnitus, trouble swallowing and voice change. Eyes: Negative for photophobia, pain, discharge, redness, itching and visual disturbance. Respiratory: Negative for apnea, cough, chest tightness, shortness of breath and wheezing. Cardiovascular: Negative for chest pain, palpitations and leg swelling. Gastrointestinal: Negative for abdominal distention, abdominal pain, blood in stool, constipation, diarrhea, nausea, rectal pain and vomiting. Endocrine: Negative for cold intolerance, heat intolerance, polydipsia, polyphagia and polyuria. Genitourinary: Negative for decreased urine volume, difficulty urinating, dysuria, flank pain, frequency, genital sores, hematuria and urgency. Musculoskeletal: Negative for arthralgias, back pain, gait problem, joint swelling, myalgias, neck pain and neck stiffness. Skin: Negative for color change, rash and wound. Allergic/Immunologic: Negative for environmental allergies and food allergies. Neurological: Negative for dizziness, tremors, seizures, syncope, facial asymmetry, speech difficulty, weakness, light-headedness, numbness and headaches. Hematological: Negative for adenopathy. Does not bruise/bleed easily. Psychiatric/Behavioral: Negative for agitation, confusion, decreased concentration, hallucinations, self-injury, sleep disturbance and suicidal ideas. The patient is not nervous/anxious. Prior to Visit Medications    Medication Sig Taking? Authorizing Provider   oxyCODONE-acetaminophen (PERCOCET)  MG per tablet TAKE 1 TABLET BY MOUTH EVERY 4 HOURS AS NEEDED FOR PAIN FOR UP TO 7 DAYS. Tara Orellana MD   naloxone 4 MG/0.1ML LIQD nasal spray 1 spray by Nasal route as needed for Opioid Reversal  Tara Orellana MD   omeprazole (PRILOSEC) 40 MG delayed release capsule   Historical Provider, MD Suhas Wright 17 GM/SCOOP powder   Historical Provider, MD   gabapentin (NEURONTIN) 800 MG tablet Take 1 tablet by mouth 4 times daily for 30 days.   Cassie Kocher, MD   naloxone 4 MG/0.1ML LIQD nasal spray 1 spray by Nasal route as needed for Opioid Reversal  Tara Orellana MD   naloxone 4 MG/0.1ML LIQD nasal spray 1 spray by Nasal route as needed for Opioid Reversal  Tara Orellana MD   naloxone 4 MG/0.1ML LIQD nasal spray 1 spray by Nasal route as needed for Opioid Reversal  Tara Orellana MD   Adalimumab (HUMIRA PEN) 40 MG/0.4ML PNKT   Historical Provider, MD cetirizine (ZYRTEC) 10 MG tablet   Historical Provider, MD   ketoconazole (NIZORAL) 2 % cream   Historical Provider, MD   oxybutynin (DITROPAN-XL) 10 MG extended release tablet   Historical Provider, MD   Cyanocobalamin (VITAMIN B-12) 1000 MCG SUBL TAKE 1 TABLET BY MOUTH DAILY  Patient not taking: Reported on 11/4/2020  Marda RidMD antonio   calcipotriene (DOVONEX) 0.005 % cream APPLY 1-2 GRAMS TOPICALLY TO AFFECTED AREA 1-2 TIMES A DAY.  DO NOT APPLY TO FACE  Patient not taking: Reported on 1/18/2021  Ciara Ridantonio, MD   Cholecalciferol (VITAMIN D) 50 MCG (2000 UT) TABS tablet   Historical Provider, MD   Alcohol Swabs (ALCOHOL PADS) 70 % PADS CLEAN THE SKIN BY USING 1 PAD ON THE AFFECTED AREA BEFORE APPLYING ANY TOPICAL CREAM, 3 TIMES DAILY  Ciara RidMD antonio   hydrOXYzine (ATARAX) 25 MG tablet Take 25 mg by mouth every 6 hours as needed for Itching  Historical Provider, MD   DULoxetine (CYMBALTA) 60 MG extended release capsule Take 120 mg by mouth daily  Historical Provider, MD   acetaminophen (TYLENOL) 325 MG tablet Take 650 mg by mouth every 4 hours as needed for Pain  Historical Provider, MD   traZODone (DESYREL) 100 MG tablet Take 200 mg by mouth nightly   Historical Provider, MD        Allergies   Allergen Reactions    Amoxicillin-Pot Clavulanate      Other reaction(s): GI Upset, Intolerance, Other: See Comments  Nose bleed    Other Itching     IVP dye         Past Medical History:   Diagnosis Date    Anxiety     Arthritis     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    Chronic kidney disease     [facility] exacerbation [facility]    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    [facility]rom[facility]     Osteoarthritis     Pneumonia due to organism     Pyoderma gangrenosa     Sweet syndrome     [facility] ([facility] Past Surgical History:   Procedure Laterality Date    ABDOMEN SURGERY      sleen repair    APPENDECTOMY      BACK SURGERY      BREAST SURGERY      fatty tumor removed, benign    COLONOSCOPY      COSMETIC SURGERY      tummy tuck    EYE SURGERY      bilateral cataract surgery    HYSTERECTOMY      SKIN BIOPSY      SPINAL FUSION      TONSILLECTOMY         Social History     Socioeconomic History    Marital status:      Spouse name: Not on file    Number of children: Not on file    Years of education: Not on file    Highest education level: Not on file   Occupational History    Not on file   Social Needs    Financial resource strain: Not on file    Food insecurity     Worry: Not on file     Inability: Not on file    Transportation needs     Medical: Not on file     Non-medical: Not on file   Tobacco Use    Smoking status: Current Every Day Smoker     Packs/day: 1.00     Years: 40.00     Pack years: 40.00     Types: Cigarettes    Smokeless tobacco: Never Used   Substance and Sexual Activity    Alcohol use: Never     Frequency: Never    Drug use: No    Sexual activity: Not Currently   Lifestyle    Physical activity     Days per week: Not on file     Minutes per session: Not on file    Stress: Not on file   Relationships    Social connections     Talks on phone: Not on file     Gets together: Not on file     Attends Zoroastrianism service: Not on file     Active member of club or organization: Not on file     Attends meetings of clubs or organizations: Not on file     Relationship status: Not on file    Intimate partner violence     Fear of current or ex partner: Not on file     Emotionally abused: Not on file     Physically abused: Not on file     Forced sexual activity: Not on file   Other Topics Concern    Not on file   Social History Narrative    Not on file        No family history on file.     Vitals:    02/03/21 1122   BP: 108/68   Pulse: 81   Resp: 16   SpO2: 96% Weight: 157 lb (71.2 kg)   Height: 5' 4\" (1.626 m)     Estimated body mass index is 26.95 kg/m² as calculated from the following:    Height as of this encounter: 5' 4\" (1.626 m). Weight as of this encounter: 157 lb (71.2 kg). Physical Exam  Constitutional:       General: She is not in acute distress. Appearance: She is well-developed. HENT:      Head: Normocephalic. Right Ear: External ear normal.      Left Ear: External ear normal.   Eyes:      Conjunctiva/sclera: Conjunctivae normal.   Neck:      Musculoskeletal: Neck supple. Vascular: No JVD. Trachea: No tracheal deviation. Cardiovascular:      Rate and Rhythm: Normal rate and regular rhythm. Heart sounds: Normal heart sounds. Pulmonary:      Effort: Pulmonary effort is normal. No respiratory distress. Breath sounds: Normal breath sounds. No wheezing or rales. Chest:      Chest wall: No tenderness. Abdominal:      General: Bowel sounds are normal. There is no distension. Palpations: Abdomen is soft. There is no mass. Tenderness: There is no abdominal tenderness. There is no guarding or rebound. Musculoskeletal:         General: No tenderness or deformity. Lymphadenopathy:      Cervical: No cervical adenopathy. Skin:     General: Skin is warm and dry. Coloration: Skin is not pale. Findings: No erythema or rash. Neurological:      Mental Status: She is alert and oriented to person, place, and time. Motor: No abnormal muscle tone. Psychiatric:         Thought Content: Thought content normal.         Judgment: Judgment normal.             ASSESSMENT/PLAN:    Wound of right lower extremity, sequela  Bactrim x 14 days. At this time I intend encourage the patient to follow-up with dermatology wound care and infectious disease. -I have reinforcedthe patient must make continue to follow-up with pain management in regards to the management of her pain. I have requested that the patient have her pain management physician for a summary of her visit to our office. I have explained that I will not provide her additional prescriptions for prescription for Percocet unless this has been accomplished. Fibromyalgia:continue cymbalta and trazodone        Hypovitaminosis D- continue oral vitamin D replacement. Boby Kent   An electronic signature was used to authenticate this note.

## 2021-02-04 ENCOUNTER — APPOINTMENT (OUTPATIENT)
Dept: CT IMAGING | Age: 56
End: 2021-02-04
Payer: COMMERCIAL

## 2021-02-04 ENCOUNTER — HOSPITAL ENCOUNTER (EMERGENCY)
Age: 56
Discharge: HOME | End: 2021-02-04
Payer: COMMERCIAL

## 2021-02-04 ENCOUNTER — NURSE TRIAGE (OUTPATIENT)
Dept: OTHER | Facility: CLINIC | Age: 56
End: 2021-02-04

## 2021-02-04 ENCOUNTER — TELEPHONE (OUTPATIENT)
Dept: FAMILY MEDICINE CLINIC | Age: 56
End: 2021-02-04

## 2021-02-04 ENCOUNTER — TELEPHONE (OUTPATIENT)
Dept: ADMINISTRATIVE | Age: 56
End: 2021-02-04

## 2021-02-04 VITALS
DIASTOLIC BLOOD PRESSURE: 68 MMHG | OXYGEN SATURATION: 99 % | WEIGHT: 157 LBS | BODY MASS INDEX: 26.8 KG/M2 | SYSTOLIC BLOOD PRESSURE: 105 MMHG | TEMPERATURE: 98.6 F | RESPIRATION RATE: 29 BRPM | HEIGHT: 64 IN | HEART RATE: 63 BPM

## 2021-02-04 DIAGNOSIS — K59.00 CONSTIPATION, UNSPECIFIED CONSTIPATION TYPE: Primary | ICD-10-CM

## 2021-02-04 LAB
ALBUMIN SERPL-MCNC: 4.2 G/DL (ref 3.5–4.6)
ALP BLD-CCNC: 98 U/L (ref 40–130)
ALT SERPL-CCNC: 8 U/L (ref 0–33)
ANION GAP SERPL CALCULATED.3IONS-SCNC: 10 MEQ/L (ref 9–15)
AST SERPL-CCNC: 14 U/L (ref 0–35)
BACTERIA: NEGATIVE /HPF
BASOPHILS ABSOLUTE: 0.1 K/UL (ref 0–0.2)
BASOPHILS RELATIVE PERCENT: 0.9 %
BILIRUB SERPL-MCNC: <0.2 MG/DL (ref 0.2–0.7)
BILIRUBIN URINE: NEGATIVE
BLOOD, URINE: NEGATIVE
BUN BLDV-MCNC: 12 MG/DL (ref 6–20)
CALCIUM SERPL-MCNC: 9.1 MG/DL (ref 8.5–9.9)
CHLORIDE BLD-SCNC: 101 MEQ/L (ref 95–107)
CLARITY: CLEAR
CO2: 28 MEQ/L (ref 20–31)
COLOR: YELLOW
CREAT SERPL-MCNC: 0.79 MG/DL (ref 0.5–0.9)
EOSINOPHILS ABSOLUTE: 0.1 K/UL (ref 0–0.7)
EOSINOPHILS RELATIVE PERCENT: 1.4 %
EPITHELIAL CELLS, UA: NORMAL /HPF (ref 0–5)
GFR AFRICAN AMERICAN: >60
GFR AFRICAN AMERICAN: >60
GFR NON-AFRICAN AMERICAN: >60
GFR NON-AFRICAN AMERICAN: >60
GLOBULIN: 3.4 G/DL (ref 2.3–3.5)
GLUCOSE BLD-MCNC: 107 MG/DL (ref 70–99)
GLUCOSE URINE: NEGATIVE MG/DL
HCT VFR BLD CALC: 41.9 % (ref 37–47)
HEMOGLOBIN: 14.1 G/DL (ref 12–16)
HYALINE CASTS: NORMAL /HPF (ref 0–5)
KETONES, URINE: NEGATIVE MG/DL
LACTIC ACID: 0.7 MMOL/L (ref 0.5–2.2)
LEUKOCYTE ESTERASE, URINE: NEGATIVE
LYMPHOCYTES ABSOLUTE: 2.5 K/UL (ref 1–4.8)
LYMPHOCYTES RELATIVE PERCENT: 28.9 %
MCH RBC QN AUTO: 28.3 PG (ref 27–31.3)
MCHC RBC AUTO-ENTMCNC: 33.8 % (ref 33–37)
MCV RBC AUTO: 83.8 FL (ref 82–100)
MONOCYTES ABSOLUTE: 0.4 K/UL (ref 0.2–0.8)
MONOCYTES RELATIVE PERCENT: 5.1 %
NEUTROPHILS ABSOLUTE: 5.4 K/UL (ref 1.4–6.5)
NEUTROPHILS RELATIVE PERCENT: 63.7 %
NITRITE, URINE: POSITIVE
PDW BLD-RTO: 15.4 % (ref 11.5–14.5)
PERFORMED ON: NORMAL
PH UA: 6.5 (ref 5–9)
PLATELET # BLD: 289 K/UL (ref 130–400)
POC CREATININE: 0.9 MG/DL (ref 0.6–1.1)
POC SAMPLE TYPE: NORMAL
POTASSIUM SERPL-SCNC: 3.7 MEQ/L (ref 3.4–4.9)
PROTEIN UA: NEGATIVE MG/DL
RBC # BLD: 5 M/UL (ref 4.2–5.4)
SODIUM BLD-SCNC: 139 MEQ/L (ref 135–144)
SPECIFIC GRAVITY UA: 1.01 (ref 1–1.03)
TOTAL PROTEIN: 7.6 G/DL (ref 6.3–8)
URINE REFLEX TO CULTURE: ABNORMAL
UROBILINOGEN, URINE: 0.2 E.U./DL
WBC # BLD: 8.5 K/UL (ref 4.8–10.8)
WBC UA: NORMAL /HPF (ref 0–5)

## 2021-02-04 PROCEDURE — 99285 EMERGENCY DEPT VISIT HI MDM: CPT

## 2021-02-04 PROCEDURE — 83605 ASSAY OF LACTIC ACID: CPT

## 2021-02-04 PROCEDURE — 2580000003 HC RX 258: Performed by: NURSE PRACTITIONER

## 2021-02-04 PROCEDURE — 81001 URINALYSIS AUTO W/SCOPE: CPT

## 2021-02-04 PROCEDURE — 96374 THER/PROPH/DIAG INJ IV PUSH: CPT

## 2021-02-04 PROCEDURE — 96375 TX/PRO/DX INJ NEW DRUG ADDON: CPT

## 2021-02-04 PROCEDURE — 36415 COLL VENOUS BLD VENIPUNCTURE: CPT

## 2021-02-04 PROCEDURE — 6360000002 HC RX W HCPCS: Performed by: NURSE PRACTITIONER

## 2021-02-04 PROCEDURE — 80053 COMPREHEN METABOLIC PANEL: CPT

## 2021-02-04 PROCEDURE — 74177 CT ABD & PELVIS W/CONTRAST: CPT

## 2021-02-04 PROCEDURE — 6360000004 HC RX CONTRAST MEDICATION: Performed by: NURSE PRACTITIONER

## 2021-02-04 PROCEDURE — 85025 COMPLETE CBC W/AUTO DIFF WBC: CPT

## 2021-02-04 RX ORDER — DIPHENHYDRAMINE HYDROCHLORIDE 50 MG/ML
50 INJECTION INTRAMUSCULAR; INTRAVENOUS ONCE
Status: COMPLETED | OUTPATIENT
Start: 2021-02-04 | End: 2021-02-04

## 2021-02-04 RX ORDER — ONDANSETRON 2 MG/ML
4 INJECTION INTRAMUSCULAR; INTRAVENOUS ONCE
Status: COMPLETED | OUTPATIENT
Start: 2021-02-04 | End: 2021-02-04

## 2021-02-04 RX ORDER — 0.9 % SODIUM CHLORIDE 0.9 %
1000 INTRAVENOUS SOLUTION INTRAVENOUS ONCE
Status: COMPLETED | OUTPATIENT
Start: 2021-02-04 | End: 2021-02-04

## 2021-02-04 RX ORDER — METHYLPREDNISOLONE SODIUM SUCCINATE 125 MG/2ML
125 INJECTION, POWDER, LYOPHILIZED, FOR SOLUTION INTRAMUSCULAR; INTRAVENOUS ONCE
Status: COMPLETED | OUTPATIENT
Start: 2021-02-04 | End: 2021-02-04

## 2021-02-04 RX ORDER — ONDANSETRON 4 MG/1
4-8 TABLET, ORALLY DISINTEGRATING ORAL EVERY 12 HOURS PRN
Qty: 12 TABLET | Refills: 0 | Status: SHIPPED | OUTPATIENT
Start: 2021-02-04 | End: 2021-03-08

## 2021-02-04 RX ORDER — POLYETHYLENE GLYCOL 3350 17 G/17G
17 POWDER, FOR SOLUTION ORAL DAILY
Qty: 510 G | Refills: 0 | Status: SHIPPED | OUTPATIENT
Start: 2021-02-04 | End: 2021-03-06

## 2021-02-04 RX ADMIN — IOPAMIDOL 100 ML: 612 INJECTION, SOLUTION INTRAVENOUS at 16:38

## 2021-02-04 RX ADMIN — ONDANSETRON 4 MG: 2 INJECTION INTRAMUSCULAR; INTRAVENOUS at 15:59

## 2021-02-04 RX ADMIN — METHYLPREDNISOLONE SODIUM SUCCINATE 125 MG: 125 INJECTION, POWDER, FOR SOLUTION INTRAMUSCULAR; INTRAVENOUS at 15:59

## 2021-02-04 RX ADMIN — SODIUM CHLORIDE 1000 ML: 9 INJECTION, SOLUTION INTRAVENOUS at 15:59

## 2021-02-04 RX ADMIN — DIPHENHYDRAMINE HYDROCHLORIDE 50 MG: 50 INJECTION, SOLUTION INTRAMUSCULAR; INTRAVENOUS at 16:01

## 2021-02-04 ASSESSMENT — ENCOUNTER SYMPTOMS
SHORTNESS OF BREATH: 0
EYE DISCHARGE: 0
NAUSEA: 1
BLOOD IN STOOL: 0
RHINORRHEA: 0
SORE THROAT: 0
TROUBLE SWALLOWING: 0
EYE REDNESS: 0
EYE PAIN: 0
VOMITING: 0
DIARRHEA: 1
ABDOMINAL PAIN: 1
BACK PAIN: 0
COUGH: 0
COLOR CHANGE: 0
CONSTIPATION: 0
WHEEZING: 0

## 2021-02-04 ASSESSMENT — PAIN DESCRIPTION - PAIN TYPE: TYPE: ACUTE PAIN

## 2021-02-04 NOTE — ED TRIAGE NOTES
Pt presents to ED via EMS from home with c/o ABD pain x10 days. Nausea. HA. Fatigue and weakness. Sp02 93-94% on RA. 02 2L via NC applied.

## 2021-02-04 NOTE — ED PROVIDER NOTES
Psychiatric/Behavioral: Negative for behavioral problems. All other systems reviewed and are negative. Except as noted above the remainder of the review of systems was reviewed and negative. 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    Neuromuscular disorder ([address]     Osteoarthritis     Pneumonia due to organism     Pyoderma gangrenosa     Sweet syndrome     Ulcerative colitis ([facility]      Past Surgical History:   Procedure Laterality Date    ABDOMEN SURGERY      sleen repair    APPENDECTOMY      BACK SURGERY      BREAST SURGERY      fatty tumor removed, benign    COLONOSCOPY      COSMETIC SURGERY      tummy tuck    EYE SURGERY      bilateral cataract surgery    HYSTERECTOMY      SKIN BIOPSY      SPINAL FUSION      TONSILLECTOMY       Social History     Socioeconomic History    Marital status:       Spouse name: Not on file    Number of children: Not on file    Years of education: Not on file    Highest education level: Not on file   Occupational History    Not on file   Social Needs    Financial resource strain: Not on file    Food insecurity     Worry: Not on file     Inability: Not on file    Transportation needs     Medical: Not on file     Non-medical: Not on file   Tobacco Use    Smoking status: Current Every Day Smoker     Packs/day: 1.00     Years: 40.00     Pack years: 40.00     Types: Cigarettes    Smokeless tobacco: Never Used   Substance and Sexual Activity    Alcohol use: Never     Frequency: Never    Drug use: No    Sexual activity: Not Currently   Lifestyle    Physical activity     Days per week: Not on file     Minutes per session: Not on file    Stress: Not on file   Relationships    Social connections     Talks on phone: Not on file     Gets together: Not on file     Attends Latter day service: Not on file     Active member of club or organization: Not on file     Attends meetings of clubs or organizations: Not on file     Relationship status: Not on file    Intimate partner violence     Fear of current or ex partner: Not on file     Emotionally abused: Not on file     Physically abused: Not on file     Forced sexual activity: Not on file   Other Topics Concern    Not on file   Social History Narrative    Not on file       SCREENINGS    [place] Coma Scale  Eye Opening: Spontaneous  Best Verbal Response: Oriented  Best Motor Response: Obeys commands  Char Coma Scale Score: 15        PHYSICAL EXAM    (up to 7 for level 4, 8 or more for level 5)     ED Triage Vitals [02/04/21 1434]   BP Temp Temp Source Pulse Resp SpO2 Height Weight   (!) 100/54 98.6 °F (37 °C) Oral 57 20 95 % 5' 4\" (1.626 m) 157 lb (71.2 kg)       Physical Exam  Vitals signs and nursing note reviewed. Constitutional:       General: She is not in acute distress. Appearance: She is well-developed. She is not diaphoretic. HENT:      Head: Normocephalic and atraumatic. Nose: Nose normal.   Eyes:      Conjunctiva/sclera: Conjunctivae normal.      Pupils: Pupils are equal, round, and reactive to light. Neck:      Musculoskeletal: Normal range of motion and neck supple. Cardiovascular:      Rate and Rhythm: Normal rate and regular rhythm. Heart sounds: Normal heart sounds. Abdominal:      General: Bowel sounds are normal.      Palpations: Abdomen is soft. Tenderness: There is generalized abdominal tenderness. Skin:     General: Skin is warm and dry. Capillary Refill: Capillary refill takes less than 2 seconds. Findings: No rash.    Neurological:      Mental Status: She is alert and oriented to person, place, and time. Cranial Nerves: No cranial nerve deficit. Psychiatric:         Behavior: Behavior normal.         RESULTS     EKG: All EKG's are interpreted by the Emergency Department Physician who either signs or Co-signsthis chart in the absence of a cardiologist.        RADIOLOGY:   Damien Calico such as CT, Ultrasound and MRI are read by the radiologist. Plain radiographic images are visualized and preliminarily interpreted by the emergency physician with the below findings:        Interpretation per the Radiologist below, if available at the time ofthis note:    Nathanael   Final Result      Constipation, right colon, with wall thickening distal descending colon and proximal sigmoid colon. While these later findings may be secondary to decompressed physiologic appearance, other etiologies, including inflammatory, or possibly infectious    cannot be excluded. Appendectomy. Hysterectomy. All CT scans at this facility use dose modulation, iterative reconstruction, and/or weight based dosing when appropriate to reduce radiation dose to as low as reasonably achievable. ED BEDSIDE ULTRASOUND:   Performed by ED Physician - none    LABS:  Labs Reviewed   COMPREHENSIVE METABOLIC PANEL - Abnormal; Notable for the following components:       Result Value    Glucose 107 (*)     All other components within normal limits   CBC WITH AUTO DIFFERENTIAL - Abnormal; Notable for the following components:    RDW 15.4 (*)     All other components within normal limits   URINE RT REFLEX TO CULTURE - Abnormal; Notable for the following components:    Nitrite, Urine POSITIVE (*)     All other components within normal limits   LACTIC ACID, PLASMA   MICROSCOPIC URINALYSIS       All other labs were within normal range or not returned as of this dictation.     EMERGENCY DEPARTMENT COURSE and DIFFERENTIAL DIAGNOSIS/MDM:   Vitals:    Vitals:    02/04/21 1434 02/04/21 1602 02/04/21 1703   BP: (!) 100/54 105/68 105/68   Pulse: 57 51 63   Resp: 20 20 29   Temp: 98.6 °F (37 °C)     TempSrc: Oral     SpO2: 95% 96% 99%   Weight: 157 lb (71.2 kg)     Height: 5' 4\" (1.626 m)              MDM   Patient is a 55-year-old female presenting to the ER with a chief complaint abdominal pain. Patient is hemodynamically stable nontoxic-appearing. Patient given Zofran and a liter of fluid. CT abdomen and pelvis obtained. Patient will be reevaluated. Lab work unremarkable. CT indicative of constipation. Patient given prescription for MiraLAX instructed to follow-up with her primary care doctor and return back to the if she worsens in any way. Patient discharged in stable condition with stable vital signs. CRITICAL CARE TIME       CONSULTS:  None    PROCEDURES:  Unless otherwise noted below, none     Procedures    FINAL IMPRESSION      1.  Constipation, unspecified constipation type          DISPOSITION/PLAN   DISPOSITION Decision To Discharge 02/04/2021 05:34:47 PM      PATIENT REFERRED TO:  Camille Pérez MD  [address] [facility] [address]  [phone]    Schedule an appointment as soon as possible for a visit in 1 day        DISCHARGE MEDICATIONS:  New Prescriptions    POLYETHYLENE GLYCOL (GLYCOLAX) 17 GM/SCOOP POWDER    Take 17 g by mouth daily          (Please notethat portions of this note were completed with a voice recognition program.  Efforts were made to edit the dictations but occasionally words are mis-transcribed.)    DEVEN Brush CNP (electronically signed)  Attending Emergency Physician          DEVEN Caldera CNP  02/04/21 2806

## 2021-02-04 NOTE — TELEPHONE ENCOUNTER
Reason for Disposition   Constant abdominal pain lasting > 2 hours    Answer Assessment - Initial Assessment Questions  1. LOCATION: \"Where does it hurt? \"       All over  2. RADIATION: \"Does the pain shoot anywhere else? \" (e.g., chest, back)      no  3. ONSET: \"When did the pain begin? \" (e.g., minutes, hours or days ago)       Two weeks ago  4. SUDDEN: \"Gradual or sudden onset? \"      gradual  5. PATTERN \"Does the pain come and go, or is it constant? \"     - If constant: \"Is it getting better, staying the same, or worsening? \"       (Note: Constant means the pain never goes away completely; most serious pain is constant and it progresses)      - If intermittent: \"How long does it last?\" \"Do you have pain now? \"      (Note: Intermittent means the pain goes away completely between bouts)      constant  6. SEVERITY: \"How bad is the pain? \"  (e.g., Scale 1-10; mild, moderate, or severe)    - MILD (1-3): doesn't interfere with normal activities, abdomen soft and not tender to touch     - MODERATE (4-7): interferes with normal activities or awakens from sleep, tender to touch     - SEVERE (8-10): excruciating pain, doubled over, unable to do any normal activities       Mild to moderate at times  7. RECURRENT SYMPTOM: \"Have you ever had this type of abdominal pain before? \" If so, ask: \"When was the last time? \" and \"What happened that time? \"       no  8. CAUSE: \"What do you think is causing the abdominal pain? \"      Not sure  9. RELIEVING/AGGRAVATING FACTORS: \"What makes it better or worse? \" (e.g., movement, antacids, bowel movement)      nothing  10. OTHER SYMPTOMS: \"Has there been any vomiting, diarrhea, constipation, or urine problems? \"        nausea  11. PREGNANCY: \"Is there any chance you are pregnant? \" \"When was your last menstrual period? \"        no    Protocols used: ABDOMINAL PAIN - FEMALE-ADULT-[place]

## 2021-02-04 NOTE — TELEPHONE ENCOUNTER
Meghann Reilly calls saying she is nauseous. She is asking if you would send an antibiotic and Zofran to her pharmacy. She thinks it is the intestinal parasite she had previously. She says she has felt this way for 10 days to 2 weeks. When I asked her if she talked to you about this in her visit yesterday, she said she didn't feel that way when she was here. I tried to tell her she may need to go to the ER, but she said you didn't want her in the ER. Please advise.

## 2021-02-04 NOTE — TELEPHONE ENCOUNTER
POD 1 Kathya Balderas  Nausea x2 weeks  Abdominal pain  H. Pylori 2015  Seen yesterday in office  Wants to speak to office to have medication called in    Milagros

## 2021-02-08 DIAGNOSIS — M79.604 PAIN OF RIGHT LOWER EXTREMITY: ICD-10-CM

## 2021-02-08 RX ORDER — OXYCODONE AND ACETAMINOPHEN 10; 325 MG/1; MG/1
1 TABLET ORAL EVERY 4 HOURS PRN
Qty: 42 TABLET | Refills: 0 | Status: SHIPPED | OUTPATIENT
Start: 2021-02-08 | End: 2021-02-16 | Stop reason: SDUPTHER

## 2021-02-15 DIAGNOSIS — M79.604 PAIN OF RIGHT LOWER EXTREMITY: ICD-10-CM

## 2021-02-15 NOTE — TELEPHONE ENCOUNTER
Patient requesting medication refill. Please approve or deny this request.    Rx requested:  Requested Prescriptions     Pending Prescriptions Disp Refills    oxyCODONE-acetaminophen (PERCOCET)  MG per tablet 42 tablet 0     Sig: Take 1 tablet by mouth every 4 hours as needed for Pain for up to 7 days.          Last Office Visit:   2/3/2021      Next Visit Date:  Future Appointments   Date Time Provider Manan Honeycutt   2/17/2021  4:00 PM [facility] ROOM 1 [facility] RAD   2/25/2021  2:45 PM Vasile Negro MD [facility]   3/10/2021  2:30 PM Obed Bond DO [address]   4/21/2021  3:45 PM Usama Noguera MD [facility]

## 2021-02-16 DIAGNOSIS — M79.604 PAIN OF RIGHT LOWER EXTREMITY: ICD-10-CM

## 2021-02-16 RX ORDER — OXYCODONE AND ACETAMINOPHEN 10; 325 MG/1; MG/1
1 TABLET ORAL EVERY 4 HOURS PRN
Qty: 42 TABLET | Refills: 0 | Status: SHIPPED | OUTPATIENT
Start: 2021-02-16 | End: 2021-02-25 | Stop reason: SDUPTHER

## 2021-02-16 RX ORDER — OXYCODONE AND ACETAMINOPHEN 10; 325 MG/1; MG/1
1 TABLET ORAL EVERY 4 HOURS PRN
Qty: 42 TABLET | Refills: 0 | Status: SHIPPED | OUTPATIENT
Start: 2021-02-16 | End: 2021-02-16

## 2021-02-16 RX ORDER — NALOXONE HYDROCHLORIDE 4 MG/.1ML
1 SPRAY NASAL PRN
Qty: 1 EACH | Refills: 5 | Status: SHIPPED | OUTPATIENT
Start: 2021-02-16 | End: 2021-03-08

## 2021-02-19 ENCOUNTER — APPOINTMENT (OUTPATIENT)
Dept: CT IMAGING | Age: 56
End: 2021-02-19
Payer: COMMERCIAL

## 2021-02-19 ENCOUNTER — APPOINTMENT (OUTPATIENT)
Dept: GENERAL RADIOLOGY | Age: 56
End: 2021-02-19
Payer: COMMERCIAL

## 2021-02-19 ENCOUNTER — HOSPITAL ENCOUNTER (EMERGENCY)
Age: 56
Discharge: HOME | End: 2021-02-19
Payer: COMMERCIAL

## 2021-02-19 VITALS
WEIGHT: 155 LBS | HEART RATE: 78 BPM | BODY MASS INDEX: 26.46 KG/M2 | DIASTOLIC BLOOD PRESSURE: 66 MMHG | SYSTOLIC BLOOD PRESSURE: 113 MMHG | RESPIRATION RATE: 16 BRPM | TEMPERATURE: 98 F | HEIGHT: 64 IN | OXYGEN SATURATION: 97 %

## 2021-02-19 DIAGNOSIS — M79.10 MYALGIA: ICD-10-CM
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
## 2021-02-19 DIAGNOSIS — F14.10 COCAINE ABUSE (HCC): ICD-10-CM

## 2021-02-19 DIAGNOSIS — M25.50 ARTHRALGIA, UNSPECIFIED JOINT: ICD-10-CM

## 2021-02-19 LAB
AMPHETAMINE SCREEN, URINE: ABNORMAL
BARBITURATE SCREEN URINE: ABNORMAL
BENZODIAZEPINE SCREEN, URINE: ABNORMAL
CANNABINOID SCREEN URINE: ABNORMAL
COCAINE METABOLITE SCREEN URINE: POSITIVE
Lab: ABNORMAL
METHADONE SCREEN, URINE: ABNORMAL
OPIATE SCREEN URINE: ABNORMAL
OXYCODONE URINE: POSITIVE
PHENCYCLIDINE SCREEN URINE: ABNORMAL
PROPOXYPHENE SCREEN: ABNORMAL

## 2021-02-19 PROCEDURE — 73080 X-RAY EXAM OF ELBOW: CPT

## 2021-02-19 PROCEDURE — 6360000002 HC RX W HCPCS: Performed by: STUDENT IN AN ORGANIZED HEALTH CARE EDUCATION/TRAINING PROGRAM

## 2021-02-19 PROCEDURE — 72050 X-RAY EXAM NECK SPINE 4/5VWS: CPT

## 2021-02-19 PROCEDURE — 72074 X-RAY EXAM THORAC SPINE4/>VW: CPT

## 2021-02-19 PROCEDURE — 70450 CT HEAD/BRAIN W/O DYE: CPT

## 2021-02-19 PROCEDURE — 73060 X-RAY EXAM OF HUMERUS: CPT

## 2021-02-19 PROCEDURE — 99285 EMERGENCY DEPT VISIT HI MDM: CPT

## 2021-02-19 PROCEDURE — 96372 THER/PROPH/DIAG INJ SC/IM: CPT

## 2021-02-19 PROCEDURE — 71045 X-RAY EXAM CHEST 1 VIEW: CPT

## 2021-02-19 PROCEDURE — 80307 DRUG TEST PRSMV CHEM ANLYZR: CPT

## 2021-02-19 RX ORDER — KETOROLAC TROMETHAMINE 30 MG/ML
30 INJECTION, SOLUTION INTRAMUSCULAR; INTRAVENOUS ONCE
Status: COMPLETED | OUTPATIENT
Start: 2021-02-19 | End: 2021-02-19

## 2021-02-19 RX ADMIN — KETOROLAC TROMETHAMINE 30 MG: 30 INJECTION, SOLUTION INTRAMUSCULAR at 04:52

## 2021-02-19 ASSESSMENT — ENCOUNTER SYMPTOMS
ABDOMINAL PAIN: 0
NAUSEA: 0
WHEEZING: 0
SHORTNESS OF BREATH: 0
PHOTOPHOBIA: 0
VOMITING: 0
COUGH: 0

## 2021-02-19 ASSESSMENT — VISUAL ACUITY: OU: 1

## 2021-02-19 NOTE — ED NOTES
Pt arrives to ED via EMS for c/o fall 24 hrs ago. States she was walking to the bathroom and fell into the wall. Denies LOC. States she hurts \"everywhere\". States her entire upper body hurts. States she has been taking percocet at home with no relief.       Viktor Poe RN  02/19/21 5882

## 2021-02-19 NOTE — ED PROVIDER NOTES
[address] [facility] ED  EMERGENCY DEPARTMENT ENCOUNTER      Pt Name: Radha Kenney  MRN: [id number]  [place] 1965  Date of evaluation: 2/19/2021  Provider: Chela Cornelius PA-C    CHIEF COMPLAINT       Chief Complaint   Patient presents with    Fall         HISTORY OF PRESENT ILLNESS   (Location/Symptom, Timing/Onset, Context/Setting, Quality, Duration, Modifying Factors, Severity)  Note limiting factors. Radha Kenney is a 55 y.o. female who per chart review has past medical history of large granular lymphocytic leukemia fibromyalgia cocaine use COPD fibromyalgia ulcerative colitis presents to the emergency department for evaluation of mechanical fall. Patient states that 24 hours ago she got up to use the restroom and was rushing to use to the toilet causing her to fall into a corner of her wall. She states she first hit her head and she then ricocheted off the wall hitting the right side of her arm and the left side of her arm. She complains of pain in her entire upper body both anterior and posterior. She states she never hit the ground. She did not lose consciousness. No blood thinners. She has been taking Percocet via pain management without relief. She states pain has not improved. On arrival patient appears to be under the influence of drugs or alcohol. She states that she took her normal nightly medications including trazodone. She denies chest pain, shortness of breath ,fever chills numbness weakness tingling or anesthesia bowel or bladder incontinence cough abdominal pain nausea vomiting diarrhea urinary symptoms. HPI    Nursing Notes were reviewed. REVIEW OF SYSTEMS    (2-9 systems for level 4, 10 or more for level 5)     Review of Systems   Constitutional: Negative for chills and fever. HENT: Negative for congestion. Eyes: Negative for photophobia. Respiratory: Negative for cough, shortness of breath and wheezing.     Cardiovascular: Negative for chest pain and palpitations. Gastrointestinal: Negative for abdominal pain, nausea and vomiting. Genitourinary: Negative for dysuria, frequency and hematuria. Musculoskeletal: Positive for arthralgias. Negative for myalgias. Allergic/Immunologic: Negative for immunocompromised state. Neurological: Negative for dizziness, weakness and headaches. All other systems reviewed and are negative. Except as noted above the remainder of the review of systems was reviewed and negative.        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    Neuromuscular disorder ([facility] [address]     Osteoarthritis     Pneumonia due to organism     Pyoderma gangrenosa     Sweet syndrome     [facility]          SURGICAL HISTORY       Past Surgical History:   Procedure Laterality Date    ABDOMEN SURGERY      sleen repair    APPENDECTOMY      BACK SURGERY      BREAST SURGERY      fatty tumor removed, benign    COLONOSCOPY      COSMETIC SURGERY      tummy tuck    EYE SURGERY      bilateral cataract surgery    HYSTERECTOMY      SKIN BIOPSY      SPINAL FUSION      TONSILLECTOMY           CURRENT MEDICATIONS       Discharge Medication List as of 2/19/2021  7:25 AM      CONTINUE these medications which have NOT CHANGED    Details   !! naloxone 4 MG/0.1ML LIQD nasal spray 1 spray by Nasal route as needed for Opioid Reversal, Disp-1 each, R-5Normal      oxyCODONE-acetaminophen (PERCOCET)  MG per tablet TAKE 1 TABLET BY MOUTH EVERY 4 HOURS AS NEEDED FOR PAIN FOR UP TO 7 DAYS., Disp-42 tablet, R-0Normal      gabapentin (NEURONTIN) 800 MG tablet TAKE 1 TABLET BY MOUTH 4 TIMES DAILY (DESYREL) 100 MG tablet Take 200 mg by mouth nightly Historical Med       !! - Potential duplicate medications found. Please discuss with provider. ALLERGIES     Amoxicillin-pot clavulanate and Other    FAMILY HISTORY     History reviewed. No pertinent family history. SOCIAL HISTORY       Social History     Socioeconomic History    Marital status:       Spouse name: None    Number of children: None    Years of education: None    Highest education level: None   Occupational History    None   Social Needs    Financial resource strain: None    Food insecurity     Worry: None     Inability: None    Transportation needs     Medical: None     Non-medical: None   Tobacco Use    Smoking status: Current Every Day Smoker     Packs/day: 1.00     Years: 40.00     Pack years: 40.00     Types: Cigarettes    Smokeless tobacco: Never Used   Substance and Sexual Activity    Alcohol use: Never     Frequency: Never    Drug use: No    Sexual activity: Not Currently   Lifestyle    Physical activity     Days per week: None     Minutes per session: None    Stress: None   Relationships    Social connections     Talks on phone: None     Gets together: None     Attends Confucianism service: None     Active member of club or organization: None     Attends meetings of clubs or organizations: None     Relationship status: None    Intimate partner violence     Fear of current or ex partner: None     Emotionally abused: None     Physically abused: None     Forced sexual activity: None   Other Topics Concern    None   Social History Narrative    None       SCREENINGS        [place] Coma Scale  Eye Opening: Spontaneous  Best Verbal Response: Oriented  Best Motor Response: Obeys commands  [place] Coma Scale Score: 15               PHYSICAL EXAM    (up to 7 for level 4, 8 or more for level 5)     ED Triage Vitals [02/19/21 0402]   BP Temp Temp Source Pulse Resp SpO2 Height Weight   136/78 98 °F (36.7 °C) Temporal 77 16 98 % 5' 4\" (1.626 m) 155 lb (70.3 kg)       Physical Exam  Constitutional:       General: She is not in acute distress. Appearance: She is well-developed. She is not ill-appearing, toxic-appearing or diaphoretic. Comments: Pt appears to be under the influence of drugs or alcohol. She appears sleepy, nodding off when talking. She states she took percocet and trazodone prior to coming. HENT:      Head: Normocephalic and atraumatic. Jaw: There is normal jaw occlusion. Right Ear: No hemotympanum. Left Ear: No hemotympanum. Nose: Nose normal. No signs of injury or nasal tenderness. Mouth/Throat:      Mouth: Mucous membranes are moist.      Comments: No oral cavity trauma   Eyes:      General: Lids are normal. Vision grossly intact. Gaze aligned appropriately. Extraocular Movements: Extraocular movements intact. Pupils: Pupils are equal, round, and reactive to light. Neck:      Musculoskeletal: Full passive range of motion without pain, normal range of motion and neck supple. Comments: No midline tenderness, bony step off, crepitus c-spine to l-spine   Cardiovascular:      Rate and Rhythm: Normal rate and regular rhythm. Pulses: Normal pulses. Heart sounds: No murmur. No friction rub. No gallop. Pulmonary:      Effort: Pulmonary effort is normal.      Breath sounds: Normal breath sounds. No wheezing, rhonchi or rales. Chest:      Chest wall: No deformity, swelling, tenderness, crepitus or edema. There is no dullness to percussion. Abdominal:      General: Bowel sounds are normal. There is no distension. Palpations: Abdomen is soft. There is no mass. Tenderness: There is no abdominal tenderness. There is no right CVA tenderness, left CVA tenderness, guarding or rebound. Hernia: No hernia is present. Musculoskeletal:         General: No swelling.       Right shoulder: Normal.      Left shoulder: Normal.      Right elbow: Normal. Left elbow: Normal.      Right wrist: Normal.      Left wrist: Normal.      Cervical back: Normal.      Thoracic back: Normal.      Lumbar back: Normal.        Arms:       Comments: There Is no point bony tenderness. She has tenderness to palpation of bilateral bicep region. No gross deformities, color change, swelling. Bilateral UE pulses 2+. Cap refill <2 seconds. ROM Intact. Strength 5/5. Sensation intact. Good  strength bilaterally. Skin:     General: Skin is warm and dry. Capillary Refill: Capillary refill takes less than 2 seconds. Neurological:      General: No focal deficit present. Mental Status: She is alert and oriented to person, place, and time. Cranial Nerves: No cranial nerve deficit. Sensory: No sensory deficit. Motor: No weakness. Coordination: Coordination normal.      Gait: Gait normal.      Deep Tendon Reflexes: Reflexes normal.         DIAGNOSTIC RESULTS     EKG: All EKG's are interpreted by the Emergency Department Physician who either signs or Co-signs this chart in the absence of a cardiologist.        RADIOLOGY:   Non-plain film images such as CT, Ultrasound and MRI are read by the radiologist. Plain radiographic images are visualized and preliminarily interpreted by the emergency physician with the below findings:        Interpretation per the Radiologist below, if available at the time of this note:    RADIOLOGY REPORT   Final Result      XR CHEST PORTABLE   Final Result   NO ACUTE CARDIOPULMONARY ABNORMALITY. NO CHANGE. XR CERVICAL SPINE (4-5 VIEWS)   Final Result    There are no acute osseous changes. XR THORACIC SPINE (MIN 4 VIEWS)   Final Result    There are no acute osseous changes. XR HUMERUS LEFT (MIN 2 VIEWS)   Final Result   There are no acute changes.                   XR ELBOW LEFT (MIN 3 VIEWS)   Final Result   The presence of a joint effusion may be secondary to inflammation or the presence of an occult fracture. XR HUMERUS RIGHT (MIN 2 VIEWS)   Final Result   There are no acute changes. XR ELBOW RIGHT (MIN 3 VIEWS)   Final Result   There are no acute changes. CT Head WO Contrast   Final Result      No acute intracranial process. All CT scans at this facility use dose modulation, iterative reconstruction, and/or weight based dosing when appropriate to reduce radiation dose to as low as reasonably achievable. ED BEDSIDE ULTRASOUND:   Performed by ED Physician - none    LABS:  Labs Reviewed   URINE DRUG SCREEN - Abnormal; Notable for the following components:       Result Value    Cocaine Metabolite Screen, Urine POSITIVE (*)     Oxycodone Urine POSITIVE (*)     All other components within normal limits       All other labs were within normal range or not returned as of this dictation. EMERGENCY DEPARTMENT COURSE and DIFFERENTIAL DIAGNOSIS/MDM:   Vitals:    Vitals:    02/19/21 0402 02/19/21 0700   BP: 136/78 113/66   Pulse: 77 78   Resp: 16 16   Temp: 98 °F (36.7 °C)    TempSrc: Temporal    SpO2: 98% 97%   Weight: 155 lb (70.3 kg)    Height: 5' 4\" (1.626 m)        MDM     Pt is a 56 yo F who presents to the ED with arthralgias/myalgias s/p mechanical injury. She is afebrile and hemodynamically stable. She was given IM toradol in the ED. CT head negative for acute abnormality. All xray imaging is negative for acute abnormality. Possible joint effusion of the L elbow however patient does not exhibit any bony tenderness on my exam and there are no deformities of the elbow, less concern for occult fracture. UDS + for cocaine and oxycodone. Suspect cocaine is contributing to her myalgias. Pt appears to be under the influence of drugs on my assessment. She states she took percocet and trazodone prior to coming. Pt is A&O x 3 with a GCS of 15 with steady ambulation. NIHSS 0. Pt is non toxic appearing with stable vitals, stable for discharge.  She will be getting a ride home from her friend. She was encouraged to avoid use of cocaine. Pt referred to ortho for follow up as needed. Return to the ED for worsening sx. Given warning signs for which she should return. Pt understands and agrees to plan, all questions answered. REASSESSMENT          CRITICAL CARE TIME   Total Critical Care time was 0 minutes, excluding separately reportable procedures. There was a high probability of clinically significant/life threatening deterioration in the patient's condition which required my urgent intervention. CONSULTS:  None    PROCEDURES:  Unless otherwise noted below, none     Procedures        FINAL IMPRESSION      1. Fall, initial encounter    2. Arthralgia, unspecified joint    3. Myalgia    4. Cocaine abuse [facility])          DISPOSITION/PLAN   DISPOSITION Decision To Discharge 02/19/2021 05:44:38 AM      PATIENT REFERRED TO:  Carlos Truong MD  [address] [facility] [address] [facility]  [phone]    Schedule an appointment as soon as possible for a visit in 1 day      [facility]) ED  [address] [address]  [phone]  Go to   As needed, If symptoms worsen      DISCHARGE MEDICATIONS:  Discharge Medication List as of 2/19/2021  7:25 AM        Controlled Substances Monitoring:     RX Monitoring 2/4/2021   Periodic Controlled Substance Monitoring No signs of potential drug abuse or diversion identified. Chronic Pain > 50 MEDD Re-evaluated the status of the patient's underlying condition causing pain.        (Please note that portions of this note were completed with a voice recognition program.  Efforts were made to edit the dictations but occasionally words are mis-transcribed.)    Elliott Martinez PA-C (electronically signed)             Elliott Martinez PA-C  02/19/21 1056

## 2021-02-20 NOTE — TELEPHONE ENCOUNTER
Rx requested:  Requested Prescriptions     Pending Prescriptions Disp Refills    vitamin D (CHOLECALCIFEROL) 50 MCG (2000 UT) TABS tablet [Pharmacy Med Name: VITAMIN D3 2,000 UNIT TAB 50 MCG Tablet] 30 tablet 3     Sig: TAKE 1 TABLET BY MOUTH DAILY       Last Office Visit:   2/3/2021        Next Visit Date:  Future Appointments   Date Time Provider Manan Honeycutt   2/25/2021  2:45 PM Malik Her MD [facility]   3/10/2021  2:30 PM Josep Zacarias DO [address]   4/21/2021  3:45 PM Usama Rollins MD [facility]

## 2021-02-21 RX ORDER — CHOLECALCIFEROL (VITAMIN D3) 50 MCG
TABLET ORAL
Qty: 30 TABLET | Refills: 3 | Status: SHIPPED | OUTPATIENT
Start: 2021-02-21 | End: 2022-01-12 | Stop reason: SDUPTHER

## 2021-02-23 DIAGNOSIS — M79.604 PAIN OF RIGHT LOWER EXTREMITY: ICD-10-CM

## 2021-02-23 NOTE — TELEPHONE ENCOUNTER
Patient requesting medication refill. Please approve or deny this request.    Rx requested:  Requested Prescriptions     Pending Prescriptions Disp Refills    oxyCODONE-acetaminophen (PERCOCET)  MG per tablet 42 tablet 0     Sig: Take 1 tablet by mouth every 4 hours as needed for Pain for up to 7 days.          Last Office Visit:   2/3/2021      Next Visit Date:  Future Appointments   Date Time Provider Manan Tangela   2/25/2021  2:45 PM Holly Gu MD [address] [address]   3/10/2021  2:30 PM Donya Garcia DO [address]   4/21/2021  3:45 PM Makenna Waters MD [facility]

## 2021-02-25 ENCOUNTER — OFFICE VISIT (OUTPATIENT)
Dept: FAMILY MEDICINE CLINIC | Age: 56
End: 2021-02-25
Payer: COMMERCIAL

## 2021-02-25 VITALS
BODY MASS INDEX: 27.14 KG/M2 | OXYGEN SATURATION: 96 % | DIASTOLIC BLOOD PRESSURE: 63 MMHG | RESPIRATION RATE: 14 BRPM | HEART RATE: 63 BPM | HEIGHT: 64 IN | TEMPERATURE: 97 F | WEIGHT: 159 LBS | SYSTOLIC BLOOD PRESSURE: 101 MMHG

## 2021-02-25 DIAGNOSIS — M25.512 ACUTE PAIN OF LEFT SHOULDER: ICD-10-CM

## 2021-02-25 DIAGNOSIS — Z02.83 ENCOUNTER FOR DRUG SCREENING: Primary | ICD-10-CM

## 2021-02-25 DIAGNOSIS — Z02.83 ENCOUNTER FOR DRUG SCREENING: ICD-10-CM

## 2021-02-25 LAB
AMPHETAMINE SCREEN, URINE: ABNORMAL
BARBITURATE SCREEN URINE: ABNORMAL
BENZODIAZEPINE SCREEN, URINE: ABNORMAL
CANNABINOID SCREEN URINE: ABNORMAL
COCAINE METABOLITE SCREEN URINE: ABNORMAL
Lab: ABNORMAL
METHADONE SCREEN, URINE: ABNORMAL
OPIATE SCREEN URINE: ABNORMAL
OXYCODONE URINE: POSITIVE
PHENCYCLIDINE SCREEN URINE: ABNORMAL
PROPOXYPHENE SCREEN: ABNORMAL

## 2021-02-25 PROCEDURE — G8427 DOCREV CUR MEDS BY ELIG CLIN: HCPCS | Performed by: INTERNAL MEDICINE

## 2021-02-25 PROCEDURE — 99213 OFFICE O/P EST LOW 20 MIN: CPT | Performed by: INTERNAL MEDICINE

## 2021-02-25 PROCEDURE — 3017F COLORECTAL CA SCREEN DOC REV: CPT | Performed by: INTERNAL MEDICINE

## 2021-02-25 RX ORDER — SULFAMETHOXAZOLE AND TRIMETHOPRIM 800; 160 MG/1; MG/1
1 TABLET ORAL 2 TIMES DAILY
Qty: 14 TABLET | Refills: 0 | Status: SHIPPED | OUTPATIENT
Start: 2021-02-25 | End: 2021-03-04

## 2021-02-25 RX ORDER — NALOXONE HYDROCHLORIDE 4 MG/.1ML
1 SPRAY NASAL PRN
Qty: 1 EACH | Refills: 5 | Status: SHIPPED | OUTPATIENT
Start: 2021-02-25 | End: 2021-03-08

## 2021-02-25 RX ORDER — OXYCODONE AND ACETAMINOPHEN 10; 325 MG/1; MG/1
1 TABLET ORAL EVERY 4 HOURS PRN
Qty: 42 TABLET | Refills: 0 | Status: SHIPPED | OUTPATIENT
Start: 2021-02-25 | End: 2021-03-15 | Stop reason: SDUPTHER

## 2021-02-25 NOTE — PROGRESS NOTES
2021      Kevin Moreau (:  1965) is a 55 y.o. female, here for evaluation of the following medical concerns:pyoderma gangrenosum          HPI    67-year-old female with a history of prediabetes, pyoderma gangrenosum with associated abdominal and right lower extremity wounds associated with pyoderma gangrenosum  presents for two new wounds on her lower extremities. She was previously hospitalized from  through 2020. She has been followed by dermatology ,wound care and rheumatology. She is presently receiving Percocet every 6 hours as needed for pain. Bitlateral shoulder pain: Pt states she  Lost her balance and fell striking left shoulder the subsequently her right shoulder. The pain management physician approves the patients current pain regimen. Depression/anxiety: Continuing Cymbalta and trazodone        Fibromyalgia and Wound pain: on neurontin , oxycodone q 4 hours. Prediabetes: The patient is A1c was 6.3 in May 2020. Nicotine Dependency: At present he denies polyuria,  Polydipsia, constitutional, sinus, visual, cardiopulmonary, urologic, gastrointestinal, immunologic/hematologic, additional musculoskeletal, neurologic,dermatologic, or psychiatric complaints. Review of Systems   Constitutional: Negative for chills, diaphoresis, fatigue and fever. HENT: Negative for congestion, dental problem, drooling, ear discharge, ear pain, facial swelling, hearing loss, mouth sores, nosebleeds, postnasal drip, rhinorrhea, sinus pressure, sinus pain, sneezing, sore throat, tinnitus, trouble swallowing and voice change. Eyes: Negative for photophobia, pain, discharge, redness, itching and visual disturbance. Respiratory: Negative for apnea, cough, chest tightness, shortness of breath and wheezing. Cardiovascular: Negative for chest pain, palpitations and leg swelling.    Gastrointestinal: Negative for abdominal distention, abdominal pain, blood in stool, constipation, diarrhea, nausea, rectal pain and vomiting. Endocrine: Negative for cold intolerance, heat intolerance, polydipsia, polyphagia and polyuria. Genitourinary: Negative for decreased urine volume, difficulty urinating, dysuria, flank pain, frequency, genital sores, hematuria and urgency. Musculoskeletal: Negative for arthralgias, back pain, gait problem, joint swelling, myalgias, neck pain and neck stiffness. Skin: Negative for color change, rash and wound. Allergic/Immunologic: Negative for environmental allergies and food allergies. Neurological: Negative for dizziness, tremors, seizures, syncope, facial asymmetry, speech difficulty, weakness, light-headedness, numbness and headaches. Hematological: Negative for adenopathy. Does not bruise/bleed easily. Psychiatric/Behavioral: Negative for agitation, confusion, decreased concentration, hallucinations, self-injury, sleep disturbance and suicidal ideas. The patient is not nervous/anxious. Prior to Visit Medications    Medication Sig Taking? Authorizing Provider   oxyCODONE-acetaminophen (PERCOCET)  MG per tablet Take 1 tablet by mouth every 4 hours as needed for Pain for up to 7 days. Rebecca Dixon MD   naloxone 4 MG/0.1ML LIQD nasal spray 1 spray by Nasal route as needed for Opioid Reversal  Rebecca Dixon MD   vitamin D (CHOLECALCIFEROL) 50 MCG (2000 UT) TABS tablet TAKE 1 TABLET BY MOUTH DAILY  Rebecca Dixon MD   naloxone 4 MG/0.1ML LIQD nasal spray 1 spray by Nasal route as needed for Opioid Reversal  Rebecca Dixon MD   gabapentin (NEURONTIN) 800 MG tablet TAKE 1 TABLET BY MOUTH 4 TIMES DAILY FOR 30 DAYS.   Linette Gardner MD   polyethylene glycol (GLYCOLAX) 17 GM/SCOOP powder Take 17 g by mouth daily  DEVEN Burger - CNP   ondansetron (ZOFRAN ODT) 4 MG disintegrating tablet Take 1-2 tablets by mouth every 12 hours as needed for Nausea May Sub regular tablet (non-ODT) if insurance does not cover Comments  Nose bleed    Other Itching     IVP dye         Past Medical History:   Diagnosis Date    Anxiety     Arthritis     Cancer ([facility] 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    Chronic kidney disease     COPD exacerbation (HCC)    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     Osteoarthritis     Pneumonia due to organism     Pyoderma gangrenosa     Sweet syndrome     Ulcerative colitis ([facility]        Past Surgical History:   Procedure Laterality Date    ABDOMEN SURGERY      sleen repair    APPENDECTOMY      BACK SURGERY      BREAST SURGERY      fatty tumor removed, benign    COLONOSCOPY      COSMETIC SURGERY      tummy tuck    EYE SURGERY      bilateral cataract surgery    HYSTERECTOMY      SKIN BIOPSY      SPINAL FUSION      TONSILLECTOMY         Social History     Socioeconomic History    Marital status:       Spouse name: Not on file    Number of children: Not on file    Years of education: Not on file    Highest education level: Not on file   Occupational History    Not on file   Social Needs    Financial resource strain: Not on file    Food insecurity     Worry: Not on file     Inability: Not on file    Transportation needs     Medical: Not on file     Non-medical: Not on file   Tobacco Use    Smoking status: Current Every Day Smoker     Packs/day: 1.00     Years: 40.00     Pack years: 40.00     Types: Cigarettes    Smokeless tobacco: Never Used   Substance and Sexual Activity    Alcohol use: Never     Frequency: Never    Drug use: No    Sexual activity: Not Currently   Lifestyle    Physical activity     Days per week: Not on file     Minutes per session: Not on file    Stress: Not on file   Relationships    Social connections     Talks on phone: Not on file     Gets together: Not on file     Attends Jainism service: Not on file     Active member of club or organization: Not on file     Attends meetings of clubs or organizations: Not on file     Relationship status: Not on file    Intimate partner violence     Fear of current or ex partner: Not on file     Emotionally abused: Not on file     Physically abused: Not on file     Forced sexual activity: Not on file   Other Topics Concern    Not on file   Social History Narrative    Not on file        No family history on file. Vitals:    02/25/21 1439   BP: 101/63   Pulse: 63   Resp: 14   Temp: 97 °F (36.1 °C)   SpO2: 96%   Weight: 159 lb (72.1 kg)   Height: 5' 4\" (1.626 m)     Estimated body mass index is 27.29 kg/m² as calculated from the following:    Height as of this encounter: 5' 4\" (1.626 m). Weight as of this encounter: 159 lb (72.1 kg). Physical Exam  Constitutional:       General: She is not in acute distress. Appearance: She is well-developed. HENT:      Head: Normocephalic. Right Ear: External ear normal.      Left Ear: External ear normal.   Eyes:      Conjunctiva/sclera: Conjunctivae normal.   Neck:      Musculoskeletal: Neck supple. Vascular: No JVD. Trachea: No tracheal deviation. Cardiovascular:      Rate and Rhythm: Normal rate and regular rhythm. Heart sounds: Normal heart sounds. Pulmonary:      Effort: Pulmonary effort is normal. No respiratory distress. Breath sounds: Normal breath sounds. No wheezing or rales. Chest:      Chest wall: No tenderness. Abdominal:      General: Bowel sounds are normal. There is no distension. Palpations: Abdomen is soft. There is no mass. Tenderness: There is no abdominal tenderness. There is no guarding or rebound. Musculoskeletal:         General: No tenderness or deformity. Comments: Mild right le erythema and warmth . Approximately  2 x 2 cm. 4 cm inferolateral to right knee.     Lymphadenopathy:      Cervical: No cervical adenopathy. Skin:     General: Skin is warm and dry. Coloration: Skin is not pale. Findings: No erythema or rash. Neurological:      Mental Status: She is alert and oriented to person, place, and time. Motor: No abnormal muscle tone. Psychiatric:         Thought Content: Thought content normal.         Judgment: Judgment normal.             ASSESSMENT/PLAN:  Shoulder pain: xr shoulders. Left lower leg swelling and erythema. Bactrim x 7 days.      -I have reinforcedthe patient must make continue to follow-up with pain management in regards to the management of her pain. I have requested that the patient have her pain management physician for a summary of her visit to our office. I have explained that I will not provide her additional prescriptions for prescription for Percocet unless this has been accomplished. Fibromyalgia:continue cymbalta and trazodone        Hypovitaminosis D- continue oral vitamin D replacement. Pastor Stuart   An electronic signature was used to authenticate this note.

## 2021-03-04 DIAGNOSIS — M79.604 PAIN OF RIGHT LOWER EXTREMITY: ICD-10-CM

## 2021-03-04 RX ORDER — OXYCODONE AND ACETAMINOPHEN 10; 325 MG/1; MG/1
1 TABLET ORAL EVERY 4 HOURS PRN
Qty: 42 TABLET | Refills: 0 | OUTPATIENT
Start: 2021-03-04 | End: 2021-03-11

## 2021-03-04 NOTE — TELEPHONE ENCOUNTER
Patient requesting medication refill. Please approve or deny this request.    Rx requested:  Requested Prescriptions     Pending Prescriptions Disp Refills    oxyCODONE-acetaminophen (PERCOCET)  MG per tablet 42 tablet 0     Sig: Take 1 tablet by mouth every 4 hours as needed for Pain for up to 7 days.          Last Office Visit:   2/25/2021      Next Visit Date:  Future Appointments   Date Time Provider Manan Honeycutt   3/10/2021  2:30 PM Lai Hadley DO [address]   3/18/2021  1:30 PM Roshni Rodriguez MD [address] [place] [address]   4/21/2021  3:45 PM Jumana Morales MD [facility]

## 2021-03-08 ENCOUNTER — VIRTUAL VISIT (OUTPATIENT)
Dept: FAMILY MEDICINE CLINIC | Age: 56
End: 2021-03-08
Payer: COMMERCIAL

## 2021-03-08 DIAGNOSIS — M79.604 PAIN OF RIGHT LOWER EXTREMITY: Primary | ICD-10-CM

## 2021-03-08 DIAGNOSIS — L88 PYODERMIC GANGRENOSUM: ICD-10-CM

## 2021-03-08 DIAGNOSIS — F14.10 COCAINE ABUSE (HCC): ICD-10-CM

## 2021-03-08 PROCEDURE — 99441 PR PHYS/QHP TELEPHONE EVALUATION 5-10 MIN: CPT | Performed by: INTERNAL MEDICINE

## 2021-03-08 RX ORDER — OXYCODONE HYDROCHLORIDE AND ACETAMINOPHEN 5; 325 MG/1; MG/1
1 TABLET ORAL EVERY 4 HOURS PRN
Qty: 42 TABLET | Refills: 0 | Status: SHIPPED | OUTPATIENT
Start: 2021-03-08 | End: 2021-03-15

## 2021-03-08 RX ORDER — OXYCODONE HYDROCHLORIDE AND ACETAMINOPHEN 5; 325 MG/1; MG/1
TABLET ORAL
COMMUNITY
Start: 2021-02-01 | End: 2021-03-08 | Stop reason: SDUPTHER

## 2021-03-08 NOTE — PROGRESS NOTES
3/8/2021      Dhiraj Eli (:  1965) is a 55 y.o. female, here for evaluation of the following medical concerns:pyoderma gangrenosum          HPI    60-year-old female with a history of prediabetes, pyoderma gangrenosum with associated abdominal and right lower extremity wounds associated with pyoderma gangrenosum  presents for two new wounds on her lower extremities. She was previously hospitalized from  through 2020. She has been followed by dermatology ,wound care and rheumatology. She is presently receiving Percocet every 6 hours as needed for pain. Bitlateral shoulder pain: Pt states she  Lost her balance and fell striking left shoulder the subsequently her right shoulder. The pain management physician approves the patients current pain regimen. Depression/anxiety: Continuing Cymbalta and trazodone        Fibromyalgia and Wound pain: on neurontin , oxycodone q 4 hours. Prediabetes: The patient is A1c was 6.3 in May 2020. Nicotine Dependency: At present he denies polyuria,  Polydipsia, constitutional, sinus, visual, cardiopulmonary, urologic, gastrointestinal, immunologic/hematologic, additional musculoskeletal, neurologic,dermatologic, or psychiatric complaints. Review of Systems   Constitutional: Negative for chills, diaphoresis, fatigue and fever. HENT: Negative for congestion, dental problem, drooling, ear discharge, ear pain, facial swelling, hearing loss, mouth sores, nosebleeds, postnasal drip, rhinorrhea, sinus pressure, sinus pain, sneezing, sore throat, tinnitus, trouble swallowing and voice change. Eyes: Negative for photophobia, pain, discharge, redness, itching and visual disturbance. Respiratory: Negative for apnea, cough, chest tightness, shortness of breath and wheezing. Cardiovascular: Negative for chest pain, palpitations and leg swelling.    Gastrointestinal: Negative for abdominal distention, abdominal pain, blood in stool, constipation, diarrhea, nausea, rectal pain and vomiting. Endocrine: Negative for cold intolerance, heat intolerance, polydipsia, polyphagia and polyuria. Genitourinary: Negative for decreased urine volume, difficulty urinating, dysuria, flank pain, frequency, genital sores, hematuria and urgency. Musculoskeletal: Negative for arthralgias, back pain, gait problem, joint swelling, myalgias, neck pain and neck stiffness. Skin: Negative for color change, rash and wound. Allergic/Immunologic: Negative for environmental allergies and food allergies. Neurological: Negative for dizziness, tremors, seizures, syncope, facial asymmetry, speech difficulty, weakness, light-headedness, numbness and headaches. Hematological: Negative for adenopathy. Does not bruise/bleed easily. Psychiatric/Behavioral: Negative for agitation, confusion, decreased concentration, hallucinations, self-injury, sleep disturbance and suicidal ideas. The patient is not nervous/anxious. Prior to Visit Medications    Medication Sig Taking? Authorizing Provider   oxyCODONE-acetaminophen (PERCOCET) 5-325 MG per tablet   Historical Provider, MD   naloxone 4 MG/0.1ML LIQD nasal spray 1 spray by Nasal route as needed for Opioid Reversal  Padmini Cruz MD   vitamin D (CHOLECALCIFEROL) 50 MCG (2000 UT) TABS tablet TAKE 1 TABLET BY MOUTH DAILY  Padmini Cruz MD   naloxone 4 MG/0.1ML LIQD nasal spray 1 spray by Nasal route as needed for Opioid Reversal  Padmini Cruz MD   gabapentin (NEURONTIN) 800 MG tablet TAKE 1 TABLET BY MOUTH 4 TIMES DAILY FOR 30 DAYS. Oliver Siddiqui MD   ondansetron (ZOFRAN ODT) 4 MG disintegrating tablet Take 1-2 tablets by mouth every 12 hours as needed for Nausea May Sub regular tablet (non-ODT) if insurance does not cover ODT.   Javier Mena, APRN - CNP   naloxone 4 MG/0.1ML LIQD nasal spray 1 spray by Nasal route as needed for Opioid Reversal  Padmini Cruz MD   omeprazole (PRILOSEC) 40 MG delayed release capsule   Historical Provider, MD Diana Wright 17 GM/SCOOP powder   Historical Provider, MD   naloxone 4 MG/0.1ML LIQD nasal spray 1 spray by Nasal route as needed for Opioid Reversal  Jacob Giron MD   naloxone 4 MG/0.1ML LIQD nasal spray 1 spray by Nasal route as needed for Opioid Reversal  Jacob Giron MD   naloxone 4 MG/0.1ML LIQD nasal spray 1 spray by Nasal route as needed for Opioid Reversal  Jacob Giron MD   Adalimumab (HUMIRA PEN) 40 MG/0.4ML PNKT   Historical Provider, MD   cetirizine (ZYRTEC) 10 MG tablet   Historical Provider, MD   ketoconazole (NIZORAL) 2 % cream   Historical Provider, MD   oxybutynin (DITROPAN-XL) 10 MG extended release tablet   Historical Provider, MD   Cyanocobalamin (VITAMIN B-12) 1000 MCG SUBL TAKE 1 TABLET BY MOUTH DAILY  Patient not taking: Reported on 11/4/2020  Jacob Giron MD   calcipotriene (DOVONEX) 0.005 % cream APPLY 1-2 GRAMS TOPICALLY TO AFFECTED AREA 1-2 TIMES A DAY.  DO NOT APPLY TO FACE  Patient not taking: Reported on 1/18/2021  Jacob Giron MD   Alcohol Swabs (ALCOHOL PADS) 70 % PADS CLEAN THE SKIN BY USING 1 PAD ON THE AFFECTED AREA BEFORE APPLYING ANY TOPICAL CREAM, 3 TIMES DAILY  Jacob Giron MD   hydrOXYzine (ATARAX) 25 MG tablet Take 25 mg by mouth every 6 hours as needed for Itching  Historical Provider, MD   DULoxetine (CYMBALTA) 60 MG extended release capsule Take 120 mg by mouth daily  Historical Provider, MD   acetaminophen (TYLENOL) 325 MG tablet Take 650 mg by mouth every 4 hours as needed for Pain  Historical Provider, MD   traZODone (DESYREL) 100 MG tablet Take 200 mg by mouth nightly   Historical Provider, MD        Allergies   Allergen Reactions    Amoxicillin-Pot Clavulanate      Other reaction(s): GI Upset, Intolerance, Other: See Comments  Nose bleed    Other Itching     IVP dye         Past Medical History:   Diagnosis Date    Anxiety     Arthritis     Cancer ([facility] [address] [facility] 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    Chronic kidney disease     COPD exacerbation ([facility]    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    Neuromuscular disorder ([facility]     Osteoarthritis     Pneumonia due to organism     Pyoderma gangrenosa     Sweet syndrome     Ulcerative colitis ([facility]        Past Surgical History:   Procedure Laterality Date    ABDOMEN SURGERY      sleen repair    APPENDECTOMY      BACK SURGERY      BREAST SURGERY      fatty tumor removed, benign    COLONOSCOPY      COSMETIC SURGERY      tummy tuck    EYE SURGERY      bilateral cataract surgery    HYSTERECTOMY      SKIN BIOPSY      SPINAL FUSION      TONSILLECTOMY         Social History     Socioeconomic History    Marital status:       Spouse name: Not on file    Number of children: Not on file    Years of education: Not on file    Highest education level: Not on file   Occupational History    Not on file   Social Needs    Financial resource strain: Not on file    Food insecurity     Worry: Not on file     Inability: Not on file    Transportation needs     Medical: Not on file     Non-medical: Not on file   Tobacco Use    Smoking status: Current Every Day Smoker     Packs/day: 1.00     Years: 40.00     Pack years: 40.00     Types: Cigarettes    Smokeless tobacco: Never Used   Substance and Sexual Activity    Alcohol use: Never     Frequency: Never    Drug use: No    Sexual activity: Not Currently   Lifestyle    Physical activity     Days per week: Not on file     Minutes per session: Not on file    Stress: Not on file   Relationships    Social connections     Talks on phone: Not on file     Gets together: Not on file     Attends Nondenominational service: Not on file     Active member of club or organization: Not on file     Attends meetings of clubs or organizations: Not on file     Relationship status: Not on file    Intimate partner violence     Fear of current or ex partner: Not on file     Emotionally abused: Not on file     Physically abused: Not on file     Forced sexual activity: Not on file   Other Topics Concern    Not on file   Social History Narrative    Not on file        No family history on file. There were no vitals filed for this visit. Estimated body mass index is 27.29 kg/m² as calculated from the following:    Height as of 2/25/21: 5' 4\" (1.626 m). Weight as of 2/25/21: 159 lb (72.1 kg). Physical Exam  Constitutional:       General: She is not in acute distress. Appearance: She is well-developed. HENT:      Head: Normocephalic. Right Ear: External ear normal.      Left Ear: External ear normal.   Eyes:      Conjunctiva/sclera: Conjunctivae normal.   Neck:      Musculoskeletal: Neck supple. Vascular: No JVD. Trachea: No tracheal deviation. Cardiovascular:      Rate and Rhythm: Normal rate and regular rhythm. Heart sounds: Normal heart sounds. Pulmonary:      Effort: Pulmonary effort is normal. No respiratory distress. Breath sounds: Normal breath sounds. No wheezing or rales. Chest:      Chest wall: No tenderness. Abdominal:      General: Bowel sounds are normal. There is no distension. Palpations: Abdomen is soft. There is no mass. Tenderness: There is no abdominal tenderness. There is no guarding or rebound. Musculoskeletal:         General: No tenderness or deformity. Comments: Mild right le erythema and warmth . Approximately  2 x 2 cm. 4 cm inferolateral to right knee. Lymphadenopathy:      Cervical: No cervical adenopathy. Skin:     General: Skin is warm and dry. Coloration: Skin is not pale. Findings: No erythema or rash. Neurological:      Mental Status: She is alert and oriented to person, place, and time. Motor: No abnormal muscle tone. Psychiatric:         Thought Content:  Thought content normal.         Judgment: Judgment normal.                   ASSESSMENT/PLAN:  Pyoderma Gangrenosum-refill percocet    Cocaine Dependency-  Referred pt to Enrico Garcia @ [phone]. I have explained to the patient that I will not refill her medication again unless she can provide documentation supporting the fact that she is actively enrolled in this program to achieve cessation from substance abuse.      -I have reinforcedthe patient must make continue to follow-up with pain management in regards to the management of her pain. I have requested that the patient have her pain management physician for a summary of her visit to our office. I have explained that I will not provide her additional prescriptions for prescription for Percocet unless this has been accomplished. Fibromyalgia:continue cymbalta and trazodone        Hypovitaminosis D- continue oral vitamin D replacement. TELEHEALTH EVALUATION -- Audio/Visual (During ELSIL-77 public health emergency)    -   Genaro Pena is a 55 y.o. female being evaluated by a Virtual Visit (video visit) encounter to address concerns as mentioned above. A caregiver was present when appropriate. Due to this being a TeleHealth encounter (During EXXRI-16 public health emergency), evaluation of the following organ systems was limited: Vitals/Constitutional/EENT/Resp/CV/GI//MS/Neuro/Skin/Heme-Lymph-Imm. Pursuant to the emergency declaration under the [facility], [address] [place] [address] authority and the [place] [employer] Act, this Virtual Visit was conducted with patient's (and/or legal guardian's) consent, to reduce the patient's risk of exposure to COVID-19 and provide necessary medical care.   The patient (and/or legal guardian) has also been advised to contact this office for worsening conditions or problems, and seek emergency medical treatment and/or call 911 if deemed necessary. Services were provided through a video synchronous discussion virtually to substitute for in-person clinic visit. Type of encounter was _x_telephone encounter__ MyChart ___Facetime    Patient was located at their home. Provider was located at their ___ home or        _x___ office. Aroldo Montoya is a 55 y.o. female evaluated via telephone on 3/8/2021. Consent:  She and/or health care decision maker is aware that that she may receive a bill for this telephone service, depending on her insurance coverage, and has provided verbal consent to proceed: Yes      Documentation:  I communicated with the patient and/or health care decision maker about pyoderma gangrenosum and its associated pain. Cocaine dependency. Details of this discussion including any medical advice provided: Please see note above      I affirm this is a Patient Initiated Episode with a Patient who has not had a related appointment within my department in the past 7 days or scheduled within the next 24 hours. Patient identification was verified at the start of the visit: Yes    Total Time: minutes: 11-20 minutes    The visit was conducted pursuant to the emergency declaration under the [address] [place] [address], [address] [place] [address] authority and the [employer] Act. Patient identification was verified, and a caregiver was present when appropriate. The patient was located in a state where the provider was credentialed to provide care. Note: not billable if this call serves to triage the patient into an appointment for the relevant concern      Sofi Looney MD on 3/8/2021 at 3:46 PM    An electronic signature was used to authenticate this note. Dinora Restrepo   An electronic signature was used to authenticate this note.

## 2021-03-15 ENCOUNTER — TELEPHONE (OUTPATIENT)
Dept: FAMILY MEDICINE CLINIC | Age: 56
End: 2021-03-15

## 2021-03-15 DIAGNOSIS — M79.604 PAIN OF RIGHT LOWER EXTREMITY: ICD-10-CM

## 2021-03-15 RX ORDER — OXYCODONE AND ACETAMINOPHEN 10; 325 MG/1; MG/1
1 TABLET ORAL EVERY 4 HOURS PRN
Qty: 42 TABLET | Refills: 0 | Status: SHIPPED | OUTPATIENT
Start: 2021-03-15 | End: 2021-03-15

## 2021-03-15 RX ORDER — OXYCODONE AND ACETAMINOPHEN 10; 325 MG/1; MG/1
1 TABLET ORAL EVERY 4 HOURS PRN
Qty: 42 TABLET | Refills: 0 | Status: SHIPPED | OUTPATIENT
Start: 2021-03-15 | End: 2021-03-18 | Stop reason: SDUPTHER

## 2021-03-15 NOTE — TELEPHONE ENCOUNTER
Patient has an appointment with Psych and Psych on 3/18. Please approve or deny this request.    Rx requested:  Requested Prescriptions     Pending Prescriptions Disp Refills    oxyCODONE-acetaminophen (PERCOCET)  MG per tablet 42 tablet 0     Sig: Take 1 tablet by mouth every 4 hours as needed for Pain for up to 7 days.          Last Office Visit:   3/8/2021      Next Visit Date:  Future Appointments   Date Time Provider Manan Tangela   3/18/2021  1:30 PM Ashley Todd MD [facility]   4/14/2021  1:00 PM Olga Shin DO [address]   4/21/2021  3:45 PM Usama Chambers MD [facility]

## 2021-03-15 NOTE — TELEPHONE ENCOUNTER
Called to inquire if PT is enrolled with [employer] program?  Unable to leave message due to no VM set up. If PT can provide proof of being enrolled in this program, then Dr will be able to continue prescribing medications to her. If PT is not enrolled, unfortunately, Dr will not be able to prescribe medications.

## 2021-03-18 ENCOUNTER — OFFICE VISIT (OUTPATIENT)
Dept: FAMILY MEDICINE CLINIC | Age: 56
End: 2021-03-18
Payer: COMMERCIAL

## 2021-03-18 VITALS
DIASTOLIC BLOOD PRESSURE: 74 MMHG | SYSTOLIC BLOOD PRESSURE: 128 MMHG | BODY MASS INDEX: 26.98 KG/M2 | OXYGEN SATURATION: 98 % | HEIGHT: 64 IN | WEIGHT: 158 LBS | TEMPERATURE: 98 F | RESPIRATION RATE: 12 BRPM | HEART RATE: 60 BPM

## 2021-03-18 DIAGNOSIS — M79.604 PAIN OF RIGHT LOWER EXTREMITY: Primary | ICD-10-CM

## 2021-03-18 DIAGNOSIS — M25.512 ACUTE PAIN OF LEFT SHOULDER: ICD-10-CM

## 2021-03-18 DIAGNOSIS — L88 PYODERMIC GANGRENOSUM: ICD-10-CM

## 2021-03-18 PROCEDURE — G8484 FLU IMMUNIZE NO ADMIN: HCPCS | Performed by: INTERNAL MEDICINE

## 2021-03-18 PROCEDURE — G8417 CALC BMI ABV UP PARAM F/U: HCPCS | Performed by: INTERNAL MEDICINE

## 2021-03-18 PROCEDURE — G8427 DOCREV CUR MEDS BY ELIG CLIN: HCPCS | Performed by: INTERNAL MEDICINE

## 2021-03-18 PROCEDURE — 3017F COLORECTAL CA SCREEN DOC REV: CPT | Performed by: INTERNAL MEDICINE

## 2021-03-18 PROCEDURE — 4004F PT TOBACCO SCREEN RCVD TLK: CPT | Performed by: INTERNAL MEDICINE

## 2021-03-18 PROCEDURE — 99213 OFFICE O/P EST LOW 20 MIN: CPT | Performed by: INTERNAL MEDICINE

## 2021-03-18 RX ORDER — OXYCODONE AND ACETAMINOPHEN 10; 325 MG/1; MG/1
1 TABLET ORAL EVERY 4 HOURS PRN
Qty: 42 TABLET | Refills: 0 | Status: SHIPPED | OUTPATIENT
Start: 2021-03-18 | End: 2021-03-22 | Stop reason: SDUPTHER

## 2021-03-18 RX ORDER — GREEN TEA/HOODIA GORDONII 315-12.5MG
1 CAPSULE ORAL 2 TIMES DAILY
Qty: 30 TABLET | Refills: 0 | Status: SHIPPED | OUTPATIENT
Start: 2021-03-18 | End: 2021-04-02

## 2021-03-18 RX ORDER — CLINDAMYCIN HYDROCHLORIDE 300 MG/1
300 CAPSULE ORAL 3 TIMES DAILY
Qty: 21 CAPSULE | Refills: 0 | Status: SHIPPED | OUTPATIENT
Start: 2021-03-18 | End: 2021-03-25

## 2021-03-18 RX ORDER — NALOXONE HYDROCHLORIDE 4 MG/.1ML
1 SPRAY NASAL PRN
Qty: 1 EACH | Refills: 5 | Status: SHIPPED | OUTPATIENT
Start: 2021-03-18 | End: 2021-04-21

## 2021-03-18 NOTE — PROGRESS NOTES
3/18/2021      Soco Nava (:  1965) is a 55 y.o. female, here for evaluation of the following medical concerns:pyoderma gangrenosum          HPI    61-year-old female with a history of prediabetes, pyoderma gangrenosum with associated abdominal and right lower extremity wounds associated with pyoderma gangrenosum  presents for two new wounds on her lower extremities. She was previously hospitalized from  through 2020. She has been followed by dermatology ,wound care and rheumatology. She is presently receiving Percocet every 6 hours as needed for pain. She recently tested positive for cocaine on a urine toxicology and strongly encouraged to enroll in a substance abuse program or she would no longer be prescribed controlled substances. The patient has an appt. On Wednesday with [facility], a substance abuse program. She continues to experience severe leg pain to to lesions attributable to pyodera gangrenosum and microabscesses. .        Bitlateral shoulder pain: Pt states she  Lost her balance and fell striking left shoulder the subsequently her right shoulder. Left shoulder was negative on 2021. IN the past he pain management physician approves the patients current pain regimen. Depression/anxiety: Continuing Cymbalta and trazodone        Fibromyalgia and Wound pain:  oxycodone q 4 hours. Prediabetes: The patient is A1c was 6.3 in May 2020. Nicotine Dependency: At present he denies polyuria,  Polydipsia, constitutional, sinus, visual, cardiopulmonary, urologic, gastrointestinal, immunologic/hematologic, additional musculoskeletal, neurologic,dermatologic, or psychiatric complaints. Review of Systems   Constitutional: Negative for chills, diaphoresis, fatigue and fever.    HENT: Negative for congestion, dental problem, drooling, ear discharge, ear pain, facial swelling, hearing loss, mouth sores, nosebleeds, postnasal drip, rhinorrhea, sinus pressure, sinus pain, sneezing, sore throat, tinnitus, trouble swallowing and voice change. Eyes: Negative for photophobia, pain, discharge, redness, itching and visual disturbance. Respiratory: Negative for apnea, cough, chest tightness, shortness of breath and wheezing. Cardiovascular: Negative for chest pain, palpitations and leg swelling. Gastrointestinal: Negative for abdominal distention, abdominal pain, blood in stool, constipation, diarrhea, nausea, rectal pain and vomiting. Endocrine: Negative for cold intolerance, heat intolerance, polydipsia, polyphagia and polyuria. Genitourinary: Negative for decreased urine volume, difficulty urinating, dysuria, flank pain, frequency, genital sores, hematuria and urgency. Musculoskeletal: Negative for arthralgias, back pain, gait problem, joint swelling, myalgias, neck pain and neck stiffness. Skin: Negative for color change, rash and wound. Allergic/Immunologic: Negative for environmental allergies and food allergies. Neurological: Negative for dizziness, tremors, seizures, syncope, facial asymmetry, speech difficulty, weakness, light-headedness, numbness and headaches. Hematological: Negative for adenopathy. Does not bruise/bleed easily. Psychiatric/Behavioral: Negative for agitation, confusion, decreased concentration, hallucinations, self-injury, sleep disturbance and suicidal ideas. The patient is not nervous/anxious. Prior to Visit Medications    Medication Sig Taking? Authorizing Provider   oxyCODONE-acetaminophen (PERCOCET)  MG per tablet TAKE 1 TABLET BY MOUTH EVERY 4 HOURS AS NEEDED FOR PAIN FOR UP TO 7 DAYS. Camille Pérez MD   vitamin D (CHOLECALCIFEROL) 50 MCG (2000 UT) TABS tablet TAKE 1 TABLET BY MOUTH DAILY  Camille Pérez MD   gabapentin (NEURONTIN) 800 MG tablet TAKE 1 TABLET BY MOUTH 4 TIMES DAILY FOR 30 DAYS.   Kamlesh Shabazz MD   omeprazole (PRILOSEC) 40 MG delayed release capsule   Historical Provider, MD PEREZ 17 GM/SCOOP powder   Historical Provider, MD   naloxone 4 MG/0.1ML LIQD nasal spray 1 spray by Nasal route as needed for Opioid Reversal  Jacob Giron MD   Adalimumab (HUMIRA PEN) 40 MG/0.4ML PNKT   Historical Provider, MD   cetirizine (ZYRTEC) 10 MG tablet   Historical Provider, MD   Alcohol Swabs (ALCOHOL PADS) 70 % PADS CLEAN THE SKIN BY USING 1 PAD ON THE AFFECTED AREA BEFORE APPLYING ANY TOPICAL CREAM, 3 TIMES DAILY  Jacob Giron MD   hydrOXYzine (ATARAX) 25 MG tablet Take 25 mg by mouth every 6 hours as needed for Itching  Historical Provider, MD   DULoxetine (CYMBALTA) 60 MG extended release capsule Take 120 mg by mouth daily  Historical Provider, MD   acetaminophen (TYLENOL) 325 MG tablet Take 650 mg by mouth every 4 hours as needed for Pain  Historical Provider, MD   traZODone (DESYREL) 100 MG tablet Take 200 mg by mouth nightly   Historical Provider, MD        Allergies   Allergen Reactions    Amoxicillin-Pot Clavulanate      Other reaction(s): GI Upset, Intolerance, Other: See Comments  Nose bleed    Other Itching     IVP dye         Past Medical History:   Diagnosis Date    Anxiety     Arthritis     Cancer (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     Osteoarthritis     Pneumonia due to organism     Pyoderma gangrenosa     Sweet syndrome     Ulcerative colitis ([facility]        Past Surgical History:   Procedure Laterality Date    ABDOMEN SURGERY      sleen repair    APPENDECTOMY      BACK SURGERY      BREAST SURGERY      fatty tumor removed, benign    COLONOSCOPY      COSMETIC SURGERY      tummy tuck    EYE SURGERY      bilateral cataract surgery    HYSTERECTOMY      SKIN BIOPSY      SPINAL FUSION  TONSILLECTOMY         Social History     Socioeconomic History    Marital status:      Spouse name: Not on file    Number of children: Not on file    Years of education: Not on file    Highest education level: Not on file   Occupational History    Not on file   Social Needs    Financial resource strain: Not on file    Food insecurity     Worry: Not on file     Inability: Not on file    Transportation needs     Medical: Not on file     Non-medical: Not on file   Tobacco Use    Smoking status: Current Every Day Smoker     Packs/day: 1.00     Years: 40.00     Pack years: 40.00     Types: Cigarettes    Smokeless tobacco: Never Used   Substance and Sexual Activity    Alcohol use: Never     Frequency: Never    Drug use: No    Sexual activity: Not Currently   Lifestyle    Physical activity     Days per week: Not on file     Minutes per session: Not on file    Stress: Not on file   Relationships    Social connections     Talks on phone: Not on file     Gets together: Not on file     Attends Caodaism service: Not on file     Active member of club or organization: Not on file     Attends meetings of clubs or organizations: Not on file     Relationship status: Not on file    Intimate partner violence     Fear of current or ex partner: Not on file     Emotionally abused: Not on file     Physically abused: Not on file     Forced sexual activity: Not on file   Other Topics Concern    Not on file   Social History Narrative    Not on file        No family history on file. Vitals:    03/18/21 1325   BP: 128/74   Pulse: 60   Resp: 12   Temp: 98 °F (36.7 °C)   SpO2: 98%   Weight: 158 lb (71.7 kg)   Height: 5' 4\" (1.626 m)     Estimated body mass index is 27.12 kg/m² as calculated from the following:    Height as of this encounter: 5' 4\" (1.626 m). Weight as of this encounter: 158 lb (71.7 kg). Physical Exam  Constitutional:       General: She is not in acute distress.      Appearance: She is well-developed. HENT:      Head: Normocephalic. Right Ear: External ear normal.      Left Ear: External ear normal.   Eyes:      Conjunctiva/sclera: Conjunctivae normal.   Neck:      Musculoskeletal: Neck supple. Vascular: No JVD. Trachea: No tracheal deviation. Cardiovascular:      Rate and Rhythm: Normal rate and regular rhythm. Heart sounds: Normal heart sounds. Pulmonary:      Effort: Pulmonary effort is normal. No respiratory distress. Breath sounds: Normal breath sounds. No wheezing or rales. Chest:      Chest wall: No tenderness. Abdominal:      General: Bowel sounds are normal. There is no distension. Palpations: Abdomen is soft. There is no mass. Tenderness: There is no abdominal tenderness. There is no guarding or rebound. Musculoskeletal:         General: No tenderness or deformity. Comments: Mild right le erythema and warmth . Approximately  2 x 2 cm. 4 cm inferolateral to right knee. Lymphadenopathy:      Cervical: No cervical adenopathy. Skin:     General: Skin is warm and dry. Coloration: Skin is not pale. Findings: No erythema or rash. Neurological:      Mental Status: She is alert and oriented to person, place, and time. Motor: No abnormal muscle tone. Psychiatric:         Thought Content: Thought content normal.         Judgment: Judgment normal.                   ASSESSMENT/PLAN:  Pyoderma Gangrenosum-refill percocet    Multiple aabscesses-clindamycin    Cocaine Dependency-  Previously referred pt to [address] [place] [address] N @ [phone]. I have explained to the patient that I will not refill her medication again unless she can provide documentation supporting the fact that she is actively enrolled in this program to achieve cessation from substance abuse. Pt has an appt scheduled for next Wenesday.    -I have reinforcedthe patient must make continue to follow-up with pain management in regards to the management of her pain.   I have requested that the patient have her pain management physician for a summary of her visit to our office. I have explained that I will not provide her additional prescriptions for prescription for Percocet unless this has been accomplished. Left shoulder pain:MRI left shoulder          Fibromyalgia:continue cymbalta and trazodone        Hypovitaminosis D- continue oral vitamin D replacement. Note: not billable if this call serves to triage the patient into an appointment for the relevant concern        --Sam Ly MD on 3/18/2021 at 1:38 PM    An electronic signature was used to authenticate this note.

## 2021-03-22 DIAGNOSIS — M79.604 PAIN OF RIGHT LOWER EXTREMITY: ICD-10-CM

## 2021-03-22 RX ORDER — OXYCODONE AND ACETAMINOPHEN 10; 325 MG/1; MG/1
1 TABLET ORAL EVERY 4 HOURS PRN
Qty: 42 TABLET | Refills: 0 | Status: SHIPPED | OUTPATIENT
Start: 2021-03-22 | End: 2021-03-24 | Stop reason: SDUPTHER

## 2021-03-22 RX ORDER — NALOXONE HYDROCHLORIDE 4 MG/.1ML
1 SPRAY NASAL PRN
Qty: 1 EACH | Refills: 5 | Status: SHIPPED | OUTPATIENT
Start: 2021-03-22 | End: 2021-04-21

## 2021-03-22 NOTE — TELEPHONE ENCOUNTER
Patient requesting medication refill. Please approve or deny this request.    Rx requested:  Requested Prescriptions     Pending Prescriptions Disp Refills    oxyCODONE-acetaminophen (PERCOCET)  MG per tablet 42 tablet 0     Sig: Take 1 tablet by mouth every 4 hours as needed for Pain for up to 7 days.          Last Office Visit:   3/18/2021      Next Visit Date:  Future Appointments   Date Time Provider Manan Honeycutt   4/12/2021  2:30 PM Vasile Negro  [place] [id number]   4/14/2021  1:00 PM Obed Bond DO [place] [address] [address]ak   4/21/2021  3:45 PM Usama Noguera MD [facility]

## 2021-03-24 DIAGNOSIS — M79.604 PAIN OF RIGHT LOWER EXTREMITY: ICD-10-CM

## 2021-03-24 RX ORDER — NALOXONE HYDROCHLORIDE 4 MG/.1ML
1 SPRAY NASAL PRN
Qty: 1 EACH | Refills: 5 | Status: SHIPPED | OUTPATIENT
Start: 2021-03-24 | End: 2021-04-21

## 2021-03-24 RX ORDER — OXYCODONE AND ACETAMINOPHEN 10; 325 MG/1; MG/1
1 TABLET ORAL EVERY 4 HOURS PRN
Qty: 42 TABLET | Refills: 0 | Status: SHIPPED | OUTPATIENT
Start: 2021-03-24 | End: 2021-03-29 | Stop reason: SDUPTHER

## 2021-03-28 DIAGNOSIS — M25.512 CHRONIC LEFT SHOULDER PAIN: Primary | ICD-10-CM

## 2021-03-28 DIAGNOSIS — G89.29 CHRONIC LEFT SHOULDER PAIN: Primary | ICD-10-CM

## 2021-03-29 DIAGNOSIS — M79.604 PAIN OF RIGHT LOWER EXTREMITY: ICD-10-CM

## 2021-03-29 RX ORDER — OXYCODONE AND ACETAMINOPHEN 10; 325 MG/1; MG/1
1 TABLET ORAL EVERY 4 HOURS PRN
Qty: 42 TABLET | Refills: 0 | Status: SHIPPED | OUTPATIENT
Start: 2021-03-29 | End: 2021-04-05 | Stop reason: SDUPTHER

## 2021-03-29 RX ORDER — NALOXONE HYDROCHLORIDE 4 MG/.1ML
1 SPRAY NASAL PRN
Qty: 1 EACH | Refills: 5 | Status: SHIPPED | OUTPATIENT
Start: 2021-03-29 | End: 2021-04-21

## 2021-03-31 RX ORDER — DULOXETIN HYDROCHLORIDE 60 MG/1
120 CAPSULE, DELAYED RELEASE ORAL EVERY EVENING
Qty: 60 CAPSULE | Refills: 2 | Status: SHIPPED | OUTPATIENT
Start: 2021-03-31 | End: 2021-08-25

## 2021-03-31 NOTE — TELEPHONE ENCOUNTER
Requested Prescriptions     Pending Prescriptions Disp Refills    DULoxetine (CYMBALTA) 60 MG extended release capsule 60 capsule 2     Sig: Take 2 capsules by mouth every evening

## 2021-04-05 DIAGNOSIS — M79.604 PAIN OF RIGHT LOWER EXTREMITY: ICD-10-CM

## 2021-04-05 RX ORDER — NALOXONE HYDROCHLORIDE 4 MG/.1ML
1 SPRAY NASAL PRN
Qty: 1 EACH | Refills: 5 | Status: SHIPPED | OUTPATIENT
Start: 2021-04-05

## 2021-04-05 RX ORDER — OXYCODONE AND ACETAMINOPHEN 10; 325 MG/1; MG/1
1 TABLET ORAL EVERY 4 HOURS PRN
Qty: 42 TABLET | Refills: 0 | Status: SHIPPED | OUTPATIENT
Start: 2021-04-05 | End: 2021-04-12

## 2021-04-05 NOTE — TELEPHONE ENCOUNTER
Patient requesting medication refill. Please approve or deny this request.    Rx requested:  Requested Prescriptions     Pending Prescriptions Disp Refills    oxyCODONE-acetaminophen (PERCOCET)  MG per tablet 42 tablet 0     Sig: Take 1 tablet by mouth every 4 hours as needed for Pain for up to 7 days.          Last Office Visit:   3/18/2021      Next Visit Date:  Future Appointments   Date Time Provider Manan Honeycutt   4/12/2021  2:30 PM Orion Hernández MD [facility]   4/14/2021  1:00 PM DO Cain Varner [address] [facility] [place]   4/21/2021  3:45 PM Twila Roche MD [facility]   4/22/2021  1:00 PM [place] FLUORO ROOM 1 [facility]   4/22/2021  2:00 PM [place] MRI ROOM 1 [facility]   4/22/2021  3:00 PM [place] MRI ROOM 1 [facility] MRI [facility] RAD

## 2021-04-12 ENCOUNTER — OFFICE VISIT (OUTPATIENT)
Dept: FAMILY MEDICINE CLINIC | Age: 56
End: 2021-04-12
Payer: COMMERCIAL

## 2021-04-12 ENCOUNTER — TELEPHONE (OUTPATIENT)
Dept: FAMILY MEDICINE CLINIC | Age: 56
End: 2021-04-12

## 2021-04-12 VITALS
WEIGHT: 151 LBS | HEIGHT: 64 IN | TEMPERATURE: 98 F | DIASTOLIC BLOOD PRESSURE: 80 MMHG | OXYGEN SATURATION: 95 % | BODY MASS INDEX: 25.78 KG/M2 | RESPIRATION RATE: 14 BRPM | HEART RATE: 85 BPM | SYSTOLIC BLOOD PRESSURE: 122 MMHG

## 2021-04-12 DIAGNOSIS — Z02.83 ENCOUNTER FOR DRUG SCREENING: Primary | ICD-10-CM

## 2021-04-12 DIAGNOSIS — Z02.83 ENCOUNTER FOR DRUG SCREENING: ICD-10-CM

## 2021-04-12 PROCEDURE — 3017F COLORECTAL CA SCREEN DOC REV: CPT | Performed by: INTERNAL MEDICINE

## 2021-04-12 PROCEDURE — G8427 DOCREV CUR MEDS BY ELIG CLIN: HCPCS | Performed by: INTERNAL MEDICINE

## 2021-04-12 PROCEDURE — 99213 OFFICE O/P EST LOW 20 MIN: CPT | Performed by: INTERNAL MEDICINE

## 2021-04-12 PROCEDURE — G8417 CALC BMI ABV UP PARAM F/U: HCPCS | Performed by: INTERNAL MEDICINE

## 2021-04-12 PROCEDURE — 4004F PT TOBACCO SCREEN RCVD TLK: CPT | Performed by: INTERNAL MEDICINE

## 2021-04-12 RX ORDER — OXYBUTYNIN CHLORIDE 10 MG/1
TABLET, EXTENDED RELEASE ORAL
COMMUNITY
Start: 2021-03-23 | End: 2021-04-21

## 2021-04-12 RX ORDER — OXYCODONE HYDROCHLORIDE AND ACETAMINOPHEN 5; 325 MG/1; MG/1
1 TABLET ORAL EVERY 4 HOURS PRN
Qty: 42 TABLET | Refills: 0 | Status: SHIPPED | OUTPATIENT
Start: 2021-04-12 | End: 2021-04-23 | Stop reason: SDUPTHER

## 2021-04-12 RX ORDER — SELENIUM 50 MCG
TABLET ORAL
COMMUNITY
Start: 2021-03-18 | End: 2021-04-21

## 2021-04-12 RX ORDER — CLOBETASOL PROPIONATE 0.5 MG/G
CREAM TOPICAL
COMMUNITY
Start: 2021-04-07

## 2021-04-12 RX ORDER — CYCLOSPORINE 100 MG/1
CAPSULE, GELATIN COATED ORAL
COMMUNITY
Start: 2021-03-19 | End: 2021-04-21

## 2021-04-12 NOTE — PROGRESS NOTES
2021      Gerhardt Lamer (:  1965) is a 55 y.o. female, here for evaluation of the following medical concerns:pyoderma gangrenosum          HPI    29-year-old female with a history of prediabetes, pyoderma gangrenosum with associated abdominal and right lower extremity wounds associated with pyoderma gangrenosum  presents for two new wounds on her lower extremities. She was previously hospitalized from  through 2020. She has been followed by dermatology ,wound care and rheumatology. She is presently receiving Percocet every 6 hours as needed for pain. She recently tested positive for cocaine on a urine toxicology and strongly encouraged to enroll in a substance abuse program or she would no longer be prescribed controlled substances. The patient has an appt. On Wednesday with [facility], a substance abuse program. She continues to experience severe leg pain to to lesions attributable to pyodera gangrenosum and microabscesses. .        Bitlateral shoulder pain: Pt states she  Lost her balance and fell striking left shoulder the subsequently her right shoulder. Left shoulder was negative on 2021. IN the past he pain management physician approves the patients current pain regimen. Depression/anxiety: Continuing Cymbalta and trazodone        Fibromyalgia and Wound pain:  oxycodone q 4 hours. Prediabetes: The patient is A1c was 6.3 in May 2020. Nicotine Dependency: At present he denies polyuria,  Polydipsia, constitutional, sinus, visual, cardiopulmonary, urologic, gastrointestinal, immunologic/hematologic, additional musculoskeletal, neurologic,dermatologic, or psychiatric complaints. Review of Systems   Constitutional: Negative for chills, diaphoresis, fatigue and fever.    HENT: Negative for congestion, dental problem, drooling, ear discharge, ear pain, facial swelling, hearing loss, mouth sores, nosebleeds, postnasal drip, rhinorrhea, sinus pressure, sinus pain, sneezing, sore throat, tinnitus, trouble swallowing and voice change. Eyes: Negative for photophobia, pain, discharge, redness, itching and visual disturbance. Respiratory: Negative for apnea, cough, chest tightness, shortness of breath and wheezing. Cardiovascular: Negative for chest pain, palpitations and leg swelling. Gastrointestinal: Negative for abdominal distention, abdominal pain, blood in stool, constipation, diarrhea, nausea, rectal pain and vomiting. Endocrine: Negative for cold intolerance, heat intolerance, polydipsia, polyphagia and polyuria. Genitourinary: Negative for decreased urine volume, difficulty urinating, dysuria, flank pain, frequency, genital sores, hematuria and urgency. Musculoskeletal: Negative for arthralgias, back pain, gait problem, joint swelling, myalgias, neck pain and neck stiffness. Skin: Negative for color change, rash and wound. Allergic/Immunologic: Negative for environmental allergies and food allergies. Neurological: Negative for dizziness, tremors, seizures, syncope, facial asymmetry, speech difficulty, weakness, light-headedness, numbness and headaches. Hematological: Negative for adenopathy. Does not bruise/bleed easily. Psychiatric/Behavioral: Negative for agitation, confusion, decreased concentration, hallucinations, self-injury, sleep disturbance and suicidal ideas. The patient is not nervous/anxious. Prior to Visit Medications    Medication Sig Taking? Authorizing Provider   cycloSPORINE (SANDIMMUNE) 100 MG capsule   Historical Provider, MD   Lactobacillus (ACIDOPHILUS) CAPS capsule   Historical Provider, MD   oxybutynin (DITROPAN-XL) 10 MG extended release tablet   Historical Provider, MD   clobetasol (TEMOVATE) 0.05 % cream   Historical Provider, MD   oxyCODONE-acetaminophen (PERCOCET)  MG per tablet Take 1 tablet by mouth every 4 hours as needed for Pain for up to 7 days.   Alexandr Maravilla MD   naloxone 4 MG/0.1ML LIQD nasal spray 1 spray by Nasal route as needed for Opioid Reversal  Christian Jones MD   DULoxetine (CYMBALTA) 60 MG extended release capsule Take 2 capsules by mouth every evening  Dori Cowan MD   naloxone 4 MG/0.1ML LIQD nasal spray 1 spray by Nasal route as needed for Opioid Reversal  Christian Jones MD   naloxone 4 MG/0.1ML LIQD nasal spray 1 spray by Nasal route as needed for Opioid Reversal  Christian Jones MD   gabapentin (NEURONTIN) 800 MG tablet TAKE 1 TABLET BY MOUTH 4 TIMES DAILY FOR 30 DAYS.   Dori Cowan MD   naloxone 4 MG/0.1ML LIQD nasal spray 1 spray by Nasal route as needed for Opioid Reversal  Christian Jones MD   naloxone 4 MG/0.1ML LIQD nasal spray 1 spray by Nasal route as needed for Opioid Reversal  Christian Jones MD   vitamin D (CHOLECALCIFEROL) 50 MCG (2000 UT) TABS tablet TAKE 1 TABLET BY MOUTH DAILY  Christian Jones MD   omeprazole (PRILOSEC) 40 MG delayed release capsule   Historical Provider, MD Allen Kaveh 17 GM/SCOOP powder   Historical Provider, MD   naloxone 4 MG/0.1ML LIQD nasal spray 1 spray by Nasal route as needed for Opioid Reversal  Christian Jones MD   Adalimumab (HUMIRA PEN) 40 MG/0.4ML PNKT   Historical Provider, MD   cetirizine (ZYRTEC) 10 MG tablet   Historical Provider, MD   Alcohol Swabs (ALCOHOL PADS) 70 % PADS CLEAN THE SKIN BY USING 1 PAD ON THE AFFECTED AREA BEFORE APPLYING ANY TOPICAL CREAM, 3 TIMES DAILY  Christian Jones MD   hydrOXYzine (ATARAX) 25 MG tablet Take 25 mg by mouth every 6 hours as needed for Itching  Historical Provider, MD   acetaminophen (TYLENOL) 325 MG tablet Take 650 mg by mouth every 4 hours as needed for Pain  Historical Provider, MD   traZODone (DESYREL) 100 MG tablet Take 200 mg by mouth nightly   Historical Provider, MD        Allergies   Allergen Reactions    Amoxicillin-Pot Clavulanate      Other reaction(s): GI Upset, Intolerance, Other: See Comments  Nose bleed    Other Itching     IVP dye         Past Medical History: Diagnosis Date    Anxiety     Arthritis     Cancer ([facility] 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    Chronic kidney disease     COPD exacerbation (H[facility])    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    Neuromuscular disorder ([facility]     Osteoarthritis     Pneumonia due to organism     Pyoderma gangrenosa     Sweet syndrome     Ulcerative colitis ([facility]        Past Surgical History:   Procedure Laterality Date    ABDOMEN SURGERY      sleen repair    APPENDECTOMY      BACK SURGERY      BREAST SURGERY      fatty tumor removed, benign    COLONOSCOPY      COSMETIC SURGERY      tummy tuck    EYE SURGERY      bilateral cataract surgery    HYSTERECTOMY      SKIN BIOPSY      SPINAL FUSION      TONSILLECTOMY         Social History     Socioeconomic History    Marital status:       Spouse name: Not on file    Number of children: Not on file    Years of education: Not on file    Highest education level: Not on file   Occupational History    Not on file   Social Needs    Financial resource strain: Not on file    Food insecurity     Worry: Not on file     Inability: Not on file    Transportation needs     Medical: Not on file     Non-medical: Not on file   Tobacco Use    Smoking status: Current Every Day Smoker     Packs/day: 1.00     Years: 40.00     Pack years: 40.00     Types: Cigarettes    Smokeless tobacco: Never Used   Substance and Sexual Activity    Alcohol use: Never     Frequency: Never    Drug use: No    Sexual activity: Not Currently   Lifestyle    Physical activity     Days per week: Not on file     Minutes per session: Not on file    Stress: Not on file   Relationships    Social connections     Talks on phone: Not on file     Gets together: Not on file     Attends Alevism service: Not on file     Active member of club or organization: Not on file     Attends meetings of clubs or organizations: Not on file     Relationship status: Not on file    Intimate partner violence     Fear of current or ex partner: Not on file     Emotionally abused: Not on file     Physically abused: Not on file     Forced sexual activity: Not on file   Other Topics Concern    Not on file   Social History Narrative    Not on file        No family history on file. Vitals:    04/12/21 1430   BP: 122/80   Pulse: 85   Resp: 14   Temp: 98 °F (36.7 °C)   SpO2: 95%   Weight: 151 lb (68.5 kg)   Height: 5' 4\" (1.626 m)     Estimated body mass index is 25.92 kg/m² as calculated from the following:    Height as of this encounter: 5' 4\" (1.626 m). Weight as of this encounter: 151 lb (68.5 kg). Physical Exam  Constitutional:       General: She is not in acute distress. Appearance: She is well-developed. HENT:      Head: Normocephalic. Right Ear: External ear normal.      Left Ear: External ear normal.   Eyes:      Conjunctiva/sclera: Conjunctivae normal.   Neck:      Musculoskeletal: Neck supple. Vascular: No JVD. Trachea: No tracheal deviation. Cardiovascular:      Rate and Rhythm: Normal rate and regular rhythm. Heart sounds: Normal heart sounds. Pulmonary:      Effort: Pulmonary effort is normal. No respiratory distress. Breath sounds: Normal breath sounds. No wheezing or rales. Chest:      Chest wall: No tenderness. Abdominal:      General: Bowel sounds are normal. There is no distension. Palpations: Abdomen is soft. There is no mass. Tenderness: There is no abdominal tenderness. There is no guarding or rebound. Musculoskeletal:         General: No tenderness or deformity. Comments: Mild right le erythema and warmth . Approximately  2 x 2 cm. 4 cm inferolateral to right knee. Skin:     General: Skin is warm and dry. Coloration: Skin is not pale. Findings: No erythema or rash. Neurological:      Mental Status: She is alert and oriented to person, place, and time. Motor: No abnormal muscle tone. Psychiatric:         Thought Content: Thought content normal.         Judgment: Judgment normal.                   ASSESSMENT/PLAN:  Pyoderma Gangrenosum-refill percocet    Multiple aabscesses-clindamycin    Cocaine Dependency-  Previously referred pt to [address] [place] [address] N @ [phone]. I have explained to the patient that I will not refill her medication again unless she can provide documentation supporting the fact that she is actively enrolled in this program to achieve cessation from substance abuse. Pt has an appt scheduled for next Wenesday.    -I have reinforcedthe patient must make continue to follow-up with pain management in regards to the management of her pain. I have requested that the patient have her pain management physician for a summary of her visit to our office. I have explained that I will not provide her additional prescriptions for prescription for Percocet unless this has been accomplished. Left shoulder pain:MRI left shoulder          Fibromyalgia:continue cymbalta and trazodone        Hypovitaminosis D- continue oral vitamin D replacement. Note: not billable if this call serves to triage the patient into an appointment for the relevant concern        --Sera Espinosa MD on 4/12/2021 at 2:39 PM    An electronic signature was used to authenticate this note.

## 2021-04-12 NOTE — TELEPHONE ENCOUNTER
Pharmacist called about the script for the percocet 5-325. He said she just got 42 of the  tablets filled on 4/9. Did you intend for this 5-325 script to be filled today? Please advise, thank you. If you have any questions, the pharmacist can be reached at [phone].

## 2021-04-16 PROBLEM — N39.41 URGE INCONTINENCE OF URINE: Status: ACTIVE | Noted: 2021-01-11

## 2021-04-16 PROBLEM — L03.90 CELLULITIS: Status: ACTIVE | Noted: 2017-04-04

## 2021-04-16 PROBLEM — L88 PYODERMA GANGRENOSA: Status: ACTIVE | Noted: 2017-03-06

## 2021-04-19 ENCOUNTER — TELEPHONE (OUTPATIENT)
Dept: FAMILY MEDICINE CLINIC | Age: 56
End: 2021-04-19

## 2021-04-19 ENCOUNTER — PRE-PROCEDURE TELEPHONE (OUTPATIENT)
Dept: INTERVENTIONAL RADIOLOGY/VASCULAR | Age: 56
End: 2021-04-19

## 2021-04-19 NOTE — PROGRESS NOTES
Spoke with pt sister, due to unable to contact pt. Message given to relay procedure information and radiology number to call.

## 2021-04-20 RX ORDER — DIPHENHYDRAMINE HCL 25 MG
25 TABLET ORAL EVERY 6 HOURS PRN
Qty: 56 TABLET | Refills: 2 | Status: SHIPPED | OUTPATIENT
Start: 2021-04-20 | End: 2021-05-17

## 2021-04-21 ENCOUNTER — OFFICE VISIT (OUTPATIENT)
Dept: NEUROLOGY | Age: 56
End: 2021-04-21
Payer: COMMERCIAL

## 2021-04-21 VITALS
DIASTOLIC BLOOD PRESSURE: 68 MMHG | SYSTOLIC BLOOD PRESSURE: 128 MMHG | WEIGHT: 157.4 LBS | HEART RATE: 67 BPM | BODY MASS INDEX: 27.02 KG/M2

## 2021-04-21 DIAGNOSIS — R53.83 FATIGUE, UNSPECIFIED TYPE: ICD-10-CM

## 2021-04-21 DIAGNOSIS — M79.7 FIBROMYALGIA: ICD-10-CM

## 2021-04-21 DIAGNOSIS — G82.20 PARAPARESIS (HCC): ICD-10-CM

## 2021-04-21 DIAGNOSIS — G62.9 POLYNEUROPATHY, UNSPECIFIED: Primary | ICD-10-CM

## 2021-04-21 DIAGNOSIS — R29.898 LEG WEAKNESS, BILATERAL: ICD-10-CM

## 2021-04-21 DIAGNOSIS — G60.0 HEREDITARY MOTOR AND SENSORY NEUROPATHY: ICD-10-CM

## 2021-04-21 DIAGNOSIS — M54.16 LUMBAR RADICULOPATHY: ICD-10-CM

## 2021-04-21 PROCEDURE — G8427 DOCREV CUR MEDS BY ELIG CLIN: HCPCS | Performed by: PSYCHIATRY & NEUROLOGY

## 2021-04-21 PROCEDURE — 99214 OFFICE O/P EST MOD 30 MIN: CPT | Performed by: PSYCHIATRY & NEUROLOGY

## 2021-04-21 PROCEDURE — 4004F PT TOBACCO SCREEN RCVD TLK: CPT | Performed by: PSYCHIATRY & NEUROLOGY

## 2021-04-21 PROCEDURE — 3017F COLORECTAL CA SCREEN DOC REV: CPT | Performed by: PSYCHIATRY & NEUROLOGY

## 2021-04-21 PROCEDURE — G8417 CALC BMI ABV UP PARAM F/U: HCPCS | Performed by: PSYCHIATRY & NEUROLOGY

## 2021-04-21 RX ORDER — DIPHENHYDRAMINE HYDROCHLORIDE 25 MG/1
CAPSULE ORAL
COMMUNITY
Start: 2021-04-20 | End: 2021-12-17

## 2021-04-21 ASSESSMENT — ENCOUNTER SYMPTOMS
NAUSEA: 0
TROUBLE SWALLOWING: 0
CHOKING: 0
COLOR CHANGE: 0
PHOTOPHOBIA: 0
VOMITING: 0
BACK PAIN: 1
SHORTNESS OF BREATH: 0

## 2021-04-21 NOTE — PROGRESS NOTES
SURGERY      fatty tumor removed, benign    COLONOSCOPY      COSMETIC SURGERY      tummy tuck    EYE SURGERY      bilateral cataract surgery    HYSTERECTOMY      SKIN BIOPSY      SPINAL FUSION      TONSILLECTOMY       Social History     Socioeconomic History    Marital status:      Spouse name: Not on file    Number of children: Not on file    Years of education: Not on file    Highest education level: Not on file   Occupational History    Not on file   Social Needs    Financial resource strain: Not on file    Food insecurity     Worry: Not on file     Inability: Not on file    Transportation needs     Medical: Not on file     Non-medical: Not on file   Tobacco Use    Smoking status: Current Every Day Smoker     Packs/day: 1.00     Years: 40.00     Pack years: 40.00     Types: Cigarettes    Smokeless tobacco: Never Used   Substance and Sexual Activity    Alcohol use: Never     Frequency: Never    Drug use: No    Sexual activity: Not Currently   Lifestyle    Physical activity     Days per week: Not on file     Minutes per session: Not on file    Stress: Not on file   Relationships    Social connections     Talks on phone: Not on file     Gets together: Not on file     Attends Gnosticism service: Not on file     Active member of club or organization: Not on file     Attends meetings of clubs or organizations: Not on file     Relationship status: Not on file    Intimate partner violence     Fear of current or ex partner: Not on file     Emotionally abused: Not on file     Physically abused: Not on file     Forced sexual activity: Not on file   Other Topics Concern    Not on file   Social History Narrative    Not on file     No family history on file.   Allergies   Allergen Reactions    Amoxicillin-Pot Clavulanate      Other reaction(s): GI Upset, Intolerance, Other: See Comments  Nose bleed    Other Itching     IVP dye         Current Outpatient Medications   Medication Sig Dispense Refill    BANOPHEN 25 MG capsule       diphenhydrAMINE (BENADRYL) 25 MG tablet Take 1 tablet by mouth every 6 hours as needed for Itching 56 tablet 2    clobetasol (TEMOVATE) 0.05 % cream       naloxone 4 MG/0.1ML LIQD nasal spray 1 spray by Nasal route as needed for Opioid Reversal 1 each 5    DULoxetine (CYMBALTA) 60 MG extended release capsule Take 2 capsules by mouth every evening 60 capsule 2    gabapentin (NEURONTIN) 800 MG tablet TAKE 1 TABLET BY MOUTH 4 TIMES DAILY FOR 30 DAYS. 120 tablet 0    vitamin D (CHOLECALCIFEROL) 50 MCG (2000 UT) TABS tablet TAKE 1 TABLET BY MOUTH DAILY 30 tablet 3    omeprazole (PRILOSEC) 40 MG delayed release capsule       GAVILAX 17 GM/SCOOP powder       Adalimumab (HUMIRA PEN) 40 MG/0.4ML PNKT       cetirizine (ZYRTEC) 10 MG tablet       Alcohol Swabs (ALCOHOL PADS) 70 % PADS CLEAN THE SKIN BY USING 1 PAD ON THE AFFECTED AREA BEFORE APPLYING ANY TOPICAL CREAM, 3 TIMES DAILY 100 each 0    hydrOXYzine (ATARAX) 25 MG tablet Take 25 mg by mouth every 6 hours as needed for Itching      acetaminophen (TYLENOL) 325 MG tablet Take 650 mg by mouth every 4 hours as needed for Pain      traZODone (DESYREL) 100 MG tablet Take 200 mg by mouth nightly        No current facility-administered medications for this visit. Review of Systems   Constitutional: Negative for fever. HENT: Negative for ear pain, tinnitus and trouble swallowing. Eyes: Negative for photophobia and visual disturbance. Respiratory: Negative for choking and shortness of breath. Cardiovascular: Negative for chest pain and palpitations. Gastrointestinal: Negative for nausea and vomiting. Musculoskeletal: Positive for back pain, gait problem, myalgias and neck pain. Negative for joint swelling and neck stiffness. Skin: Negative for color change. Allergic/Immunologic: Negative for food allergies. Neurological: Positive for weakness and numbness.  Negative for dizziness, tremors, soft tissue swelling. The airway is patent. There are no acute osseous changes. Xr Humerus Left (min 2 Views)    Result Date: 2/19/2021  EXAMINATION: XR HUMERUS LEFT (MIN 2 VIEWS), 2/19/2021 5:05 AM CLINICAL HISTORY:  fall COMPARISON: None TECHNIQUE: FINDINGS:  There are no lytic or sclerotic bone lesions. There is no fracture or subluxation. There are degenerative changes of the proximal humerus. The soft tissues are within normal limits  There are no radiopaque foreign bodies. There are no acute changes. Xr Humerus Right (min 2 Views)    Result Date: 2/19/2021  EXAMINATION: XR HUMERUS RIGHT (MIN 2 VIEWS), 2/19/2021 5:05 AM CLINICAL HISTORY:  fall COMPARISON: None TECHNIQUE: FINDINGS:  There are no lytic or sclerotic bone lesions. There is no fracture or subluxation. There are degenerative changes and joint space narrowing of the shoulder. The elbow joint is unremarkable. The soft tissues are within normal limits  There are no radiopaque foreign bodies. There are no acute changes. Xr Elbow Left (min 3 Views)    Result Date: 2/19/2021  EXAMINATION: XR ELBOW LEFT (MIN 3 VIEWS), 2/19/2021 5:05 AM CLINICAL HISTORY:  fall COMPARISON: None TECHNIQUE: FINDINGS:  There are no lytic or sclerotic bone lesions. There is no displaced fracture. There is small joint effusion which may be secondary to inflammation or may indicate the presence of an occult fracture. The joint spaces are preserved. The soft tissues are within normal limits  There are no radiopaque foreign bodies. The presence of a joint effusion may be secondary to inflammation or the presence of an occult fracture. Xr Elbow Right (min 3 Views)    Result Date: 2/19/2021  EXAMINATION: XR ELBOW RIGHT (MIN 3 VIEWS), 2/19/2021 5:05 AM CLINICAL HISTORY:  fall COMPARISON: None TECHNIQUE: FINDINGS:  There are no lytic or sclerotic bone lesions. There is no fracture or subluxation. The joint spaces are preserved.  The soft tissues spaces are within normal limits. The spine is in anatomic alignment. The prevertebral soft tissues are within normal limits. There are no radiopaque foreign bodies. There are no acute osseous changes. Lab Results   Component Value Date    WBC 8.5 02/04/2021    RBC 5.00 02/04/2021    RBC 4.59 11/02/2011    HGB 14.1 02/04/2021    HCT 41.9 02/04/2021    MCV 83.8 02/04/2021    MCH 28.3 02/04/2021    MCHC 33.8 02/04/2021    RDW 15.4 02/04/2021     02/04/2021    MPV 9.1 11/05/2012     Lab Results   Component Value Date     02/04/2021    K 3.7 02/04/2021    K 4.3 05/11/2020     02/04/2021    CO2 28 02/04/2021    BUN 12 02/04/2021    CREATININE 0.83 03/17/2021    GFRAA >60 03/17/2021    LABGLOM >60 03/17/2021    GLUCOSE 107 02/04/2021    GLUCOSE 146 11/02/2011    PROT 7.6 02/04/2021    LABALBU 4.2 02/04/2021    LABALBU 4.1 11/02/2011    CALCIUM 9.1 02/04/2021    BILITOT <0.2 02/04/2021    ALKPHOS 98 02/04/2021    AST 14 02/04/2021    ALT 8 02/04/2021     Lab Results   Component Value Date    PROTIME 13.0 10/05/2019    INR 0.9 10/05/2019     Lab Results   Component Value Date    TSH 1.030 03/26/2020    AFGOLLIP93 579 11/04/2020     Lab Results   Component Value Date    TRIG 163 05/09/2020    HDL 40 05/09/2020    LDLCALC 90 05/09/2020     Lab Results   Component Value Date    LABAMPH Neg 02/25/2021    BARBSCNU Neg 02/25/2021    LABBENZ Neg 02/25/2021    LABMETH Neg 02/25/2021    OPIATESCREENURINE Neg 02/25/2021    PHENCYCLIDINESCREENURINE Neg 02/25/2021     No results found for: LITHIUM, DILFRTOT, VALPROATE    Assessment:       Diagnosis Orders   1. Polyneuropathy, unspecified     2. Paraparesis (HCC)     3. Leg weakness, bilateral     4. Hereditary motor and sensory neuropathy     5. Lumbar radiculopathy     6. Fibromyalgia     7. Fatigue, unspecified type     Multiple symptoms which quite do not fit into any anatomical category.   Patient was diagnosed peripheral neuropathy prior to us seeing her.  She is already maxed out on gabapentin and Cymbalta. Patient takes gabapentin 800 mg 4 times a day. Patient was on Lyrica in the past as well. Patient questionable back pain and some weakness in the legs and therefore we recommend MRI of the lumbosacral spine which has not been done. Patient also carries a diagnosis of some rheumatological disease and sees rheumatology. I do not see any abnormalities other than this on her examination. We will keep an eye on this and symptomatically treat her with what we have. Plan:      No orders of the defined types were placed in this encounter. No orders of the defined types were placed in this encounter. No follow-ups on file.       Dori Cowan MD

## 2021-04-22 ENCOUNTER — HOSPITAL ENCOUNTER (OUTPATIENT)
Dept: MRI IMAGING | Age: 56
Discharge: HOME | End: 2021-04-24
Payer: COMMERCIAL

## 2021-04-22 ENCOUNTER — HOSPITAL ENCOUNTER (OUTPATIENT)
Dept: GENERAL RADIOLOGY | Age: 56
Discharge: HOME | End: 2021-04-24
Payer: COMMERCIAL

## 2021-04-22 VITALS — DIASTOLIC BLOOD PRESSURE: 81 MMHG | HEART RATE: 64 BPM | SYSTOLIC BLOOD PRESSURE: 124 MMHG

## 2021-04-22 DIAGNOSIS — G89.29 CHRONIC LEFT SHOULDER PAIN: ICD-10-CM

## 2021-04-22 DIAGNOSIS — M25.512 CHRONIC LEFT SHOULDER PAIN: ICD-10-CM

## 2021-04-22 DIAGNOSIS — M25.512 ACUTE PAIN OF LEFT SHOULDER: ICD-10-CM

## 2021-04-22 DIAGNOSIS — R29.898 LEG WEAKNESS, BILATERAL: ICD-10-CM

## 2021-04-22 PROCEDURE — 73040 CONTRAST X-RAY OF SHOULDER: CPT

## 2021-04-22 PROCEDURE — 72148 MRI LUMBAR SPINE W/O DYE: CPT

## 2021-04-22 PROCEDURE — 2500000003 HC RX 250 WO HCPCS: Performed by: INTERNAL MEDICINE

## 2021-04-22 PROCEDURE — 73222 MRI JOINT UPR EXTREM W/DYE: CPT

## 2021-04-22 PROCEDURE — 2580000003 HC RX 258: Performed by: INTERNAL MEDICINE

## 2021-04-22 PROCEDURE — 6360000004 HC RX CONTRAST MEDICATION: Performed by: INTERNAL MEDICINE

## 2021-04-22 PROCEDURE — 23350 INJECTION FOR SHOULDER X-RAY: CPT

## 2021-04-22 PROCEDURE — A9579 GAD-BASE MR CONTRAST NOS,1ML: HCPCS | Performed by: INTERNAL MEDICINE

## 2021-04-22 RX ORDER — LIDOCAINE HYDROCHLORIDE 20 MG/ML
20 INJECTION, SOLUTION INFILTRATION; PERINEURAL ONCE
Status: COMPLETED | OUTPATIENT
Start: 2021-04-22 | End: 2021-04-22

## 2021-04-22 RX ORDER — SODIUM CHLORIDE 0.9 % (FLUSH) 0.9 %
20 SYRINGE (ML) INJECTION ONCE
Status: COMPLETED | OUTPATIENT
Start: 2021-04-22 | End: 2021-04-22

## 2021-04-22 RX ADMIN — GADOTERIDOL 0.1 MMOL: 279.3 INJECTION, SOLUTION INTRAVENOUS at 13:33

## 2021-04-22 RX ADMIN — LIDOCAINE HYDROCHLORIDE 6 ML: 20 INJECTION, SOLUTION INFILTRATION; PERINEURAL at 13:36

## 2021-04-22 RX ADMIN — Medication 10 ML: at 13:36

## 2021-04-22 RX ADMIN — IOVERSOL 8 ML: 678 INJECTION INTRA-ARTERIAL; INTRAVENOUS at 13:35

## 2021-04-22 NOTE — TELEPHONE ENCOUNTER
The patient needs to coordinate an appt with pain management. I will fill her Rx today. But please inform her that it is important for schedule an appt with pain  Management.

## 2021-04-23 DIAGNOSIS — Z02.83 ENCOUNTER FOR DRUG SCREENING: ICD-10-CM

## 2021-04-23 RX ORDER — OXYCODONE HYDROCHLORIDE AND ACETAMINOPHEN 5; 325 MG/1; MG/1
1 TABLET ORAL EVERY 4 HOURS PRN
Qty: 42 TABLET | Refills: 0 | Status: SHIPPED | OUTPATIENT
Start: 2021-04-23 | End: 2021-04-30

## 2021-04-23 NOTE — TELEPHONE ENCOUNTER
Patient requesting medication refill. Please approve or deny this request.    Rx requested:  Requested Prescriptions     Pending Prescriptions Disp Refills    oxyCODONE-acetaminophen (PERCOCET) 5-325 MG per tablet 42 tablet 0     Sig: Take 1 tablet by mouth every 4 hours as needed for Pain for up to 7 days. Intended supply: 7 days.  Take lowest dose possible to manage pain         Last Office Visit:   4/12/2021      Next Visit Date:  Future Appointments   Date Time Provider Manan Honeycutt   5/12/2021  1:15 PM Brenda Kelly  [place] [place] [address]   9/1/2021  2:45 PM Beltran Isaac MD [facility]

## 2021-05-03 DIAGNOSIS — S46.812A TRAUMATIC TEAR OF SUPRASPINATUS TENDON OF LEFT SHOULDER, INITIAL ENCOUNTER: Primary | ICD-10-CM

## 2021-05-03 RX ORDER — OXYCODONE HYDROCHLORIDE AND ACETAMINOPHEN 5; 325 MG/1; MG/1
1 TABLET ORAL EVERY 4 HOURS PRN
Qty: 28 TABLET | Refills: 0 | Status: SHIPPED | OUTPATIENT
Start: 2021-05-03 | End: 2021-05-10

## 2021-05-05 ENCOUNTER — TELEPHONE (OUTPATIENT)
Dept: NEUROLOGY | Age: 56
End: 2021-05-05

## 2021-05-05 NOTE — TELEPHONE ENCOUNTER
Patient returned call, wants to know if surgery is needed, I advised that you normally would refer to surgery after MRI if it was needed. She said that she cannot wait until September for her appt because she has been suffering.

## 2021-05-05 NOTE — TELEPHONE ENCOUNTER
----- Message from Betty Welch MD sent at 5/4/2021  1:28 PM EDT -----  MRI of the lumbar spine shows multiple levels of spondylosis and arthritic changes will be followed in the next appointment.

## 2021-05-05 NOTE — TELEPHONE ENCOUNTER
Message left on voicemail advising. Office number left for patient to call with questions or concerns.

## 2021-05-05 NOTE — TELEPHONE ENCOUNTER
From my evaluation of the MRI surgery is not indicated but she wants to have a second opinion on this she may want to call neurosurgery office and seek an opinion and then they can guide us whether she needs surgery or pain management

## 2021-05-06 ENCOUNTER — VIRTUAL VISIT (OUTPATIENT)
Dept: FAMILY MEDICINE CLINIC | Age: 56
End: 2021-05-06
Payer: COMMERCIAL

## 2021-05-06 ENCOUNTER — TELEPHONE (OUTPATIENT)
Dept: CASE MANAGEMENT | Age: 56
End: 2021-05-06

## 2021-05-06 DIAGNOSIS — M79.604 PAIN OF RIGHT LOWER EXTREMITY: ICD-10-CM

## 2021-05-06 DIAGNOSIS — F14.10 COCAINE ABUSE (HCC): ICD-10-CM

## 2021-05-06 DIAGNOSIS — S46.812A TRAUMATIC TEAR OF SUPRASPINATUS TENDON OF LEFT SHOULDER, INITIAL ENCOUNTER: Primary | ICD-10-CM

## 2021-05-06 DIAGNOSIS — L88 PYODERMIC GANGRENOSUM: ICD-10-CM

## 2021-05-06 PROCEDURE — 99442 PR PHYS/QHP TELEPHONE EVALUATION 11-20 MIN: CPT | Performed by: INTERNAL MEDICINE

## 2021-05-06 NOTE — TELEPHONE ENCOUNTER
Physician documentation on smoking history and CT Lung Screening reviewed. All required documentation complete. Patient is a current smoker with a 40 pack year history ( 1 ppd x 40 years) per physician documentation.

## 2021-05-06 NOTE — PROGRESS NOTES
2021      Chris Sorensen (:  1965) is a 55 y.o. female, here for evaluation of the following medical concerns:pyoderma gangrenosum          HPI    31-year-old female with a history of prediabetes, pyoderma gangrenosum with associated abdominal and right lower extremity wounds associated with pyoderma gangrenosum  presents for two new wounds on her lower extremities. She was previously hospitalized from  through 2020. She has been followed by dermatology ,wound care and rheumatology. She is presently receiving Percocet every 6 hours as needed for pain. She recently tested positive for cocaine on a urine toxicology and strongly encouraged to enroll in a substance abuse program or she would no longer be prescribed controlled substances. The patient has an appt. On Wednesday with [facility], a substance abuse program. She continues to experience severe leg pain to to lesions attributable to pyodera gangrenosum and microabscesses. .        Bitlateral shoulder pain: Pt states she  Lost her balance and fell striking left shoulder the subsequently her right shoulder. X-ray of the left shoulder was negative on 2021. MRI of the left shoulder demonstrated evidence of a left supraspinatus tear. IN the past he pain management physician approves the patients current pain regimen. Depression/anxiety: Continuing Cymbalta and trazodone        Fibromyalgia and Wound pain:  oxycodone q 4 hours. Prediabetes: The patient is A1c was 6.3 in May 2020. Nicotine Dependency: At present he denies polyuria,  Polydipsia, constitutional, sinus, visual, cardiopulmonary, urologic, gastrointestinal, immunologic/hematologic, additional musculoskeletal, neurologic,dermatologic, or psychiatric complaints. Review of Systems   Constitutional: Negative for chills, diaphoresis, fatigue and fever.    HENT: Negative for congestion, dental problem, drooling, ear discharge, ear pain, facial swelling, hearing loss, mouth sores, nosebleeds, postnasal drip, rhinorrhea, sinus pressure, sinus pain, sneezing, sore throat, tinnitus, trouble swallowing and voice change. Eyes: Negative for photophobia, pain, discharge, redness, itching and visual disturbance. Respiratory: Negative for apnea, cough, chest tightness, shortness of breath and wheezing. Cardiovascular: Negative for chest pain, palpitations and leg swelling. Gastrointestinal: Negative for abdominal distention, abdominal pain, blood in stool, constipation, diarrhea, nausea, rectal pain and vomiting. Endocrine: Negative for cold intolerance, heat intolerance, polydipsia, polyphagia and polyuria. Genitourinary: Negative for decreased urine volume, difficulty urinating, dysuria, flank pain, frequency, genital sores, hematuria and urgency. Musculoskeletal: Negative for arthralgias, back pain, gait problem, joint swelling, myalgias, neck pain and neck stiffness. Skin: Negative for color change, rash and wound. Allergic/Immunologic: Negative for environmental allergies and food allergies. Neurological: Negative for dizziness, tremors, seizures, syncope, facial asymmetry, speech difficulty, weakness, light-headedness, numbness and headaches. Hematological: Negative for adenopathy. Does not bruise/bleed easily. Psychiatric/Behavioral: Negative for agitation, confusion, decreased concentration, hallucinations, self-injury, sleep disturbance and suicidal ideas. The patient is not nervous/anxious. Prior to Visit Medications    Medication Sig Taking? Authorizing Provider   oxyCODONE-acetaminophen (PERCOCET) 5-325 MG per tablet Take 1 tablet by mouth every 4 hours as needed for Pain for up to 7 days.   Killian Alva MD   BANOPHEN 25 MG capsule   Historical Provider, MD   diphenhydrAMINE (BENADRYL) 25 MG tablet Take 1 tablet by mouth every 6 hours as needed for Itching  Killian Alva MD   clobetasol (TEMOVATE) 0.05 % cream Historical Provider, MD   naloxone 4 MG/0.1ML LIQD nasal spray 1 spray by Nasal route as needed for Opioid Reversal  Phoebe Alfredo MD   DULoxetine (CYMBALTA) 60 MG extended release capsule Take 2 capsules by mouth every evening  Usama Vu MD   gabapentin (NEURONTIN) 800 MG tablet TAKE 1 TABLET BY MOUTH 4 TIMES DAILY FOR 30 DAYS.   Fanny Crowe MD   vitamin D (CHOLECALCIFEROL) 50 MCG (2000 UT) TABS tablet TAKE 1 TABLET BY MOUTH DAILY  Phoebe Alfredo MD   omeprazole (PRILOSEC) 40 MG delayed release capsule   Historical Provider, MD Simon Barter 17 GM/SCOOP powder   Historical Provider, MD   Adalimumab (HUMIRA PEN) 40 MG/0.4ML PNKT   Historical Provider, MD   cetirizine (ZYRTEC) 10 MG tablet   Historical Provider, MD   Alcohol Swabs (ALCOHOL PADS) 70 % PADS CLEAN THE SKIN BY USING 1 PAD ON THE AFFECTED AREA BEFORE APPLYING ANY TOPICAL CREAM, 3 TIMES DAILY  Phoebe Alfredo MD   hydrOXYzine (ATARAX) 25 MG tablet Take 25 mg by mouth every 6 hours as needed for Itching  Historical Provider, MD   acetaminophen (TYLENOL) 325 MG tablet Take 650 mg by mouth every 4 hours as needed for Pain  Historical Provider, MD   traZODone (DESYREL) 100 MG tablet Take 200 mg by mouth nightly   Historical Provider, MD        Allergies   Allergen Reactions    Amoxicillin-Pot Clavulanate      Other reaction(s): GI Upset, Intolerance, Other: See Comments  Nose bleed    Other Itching     IVP dye         Past Medical History:   Diagnosis Date    Anxiety     Arthritis     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    Chronic kidney disease     COPD exacerbation ([facility]    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    [facility]     Osteoarthritis     Pneumonia due to organism     Pyoderma gangrenosa     Sweet syndrome     [facility] Past Surgical History:   Procedure Laterality Date    ABDOMEN SURGERY      sleen repair    APPENDECTOMY      BACK SURGERY      BREAST SURGERY      fatty tumor removed, benign    COLONOSCOPY      COSMETIC SURGERY      tummy tuck    EYE SURGERY      bilateral cataract surgery    HYSTERECTOMY      SKIN BIOPSY      SPINAL FUSION      TONSILLECTOMY         Social History     Socioeconomic History    Marital status:      Spouse name: Not on file    Number of children: Not on file    Years of education: Not on file    Highest education level: Not on file   Occupational History    Not on file   Social Needs    Financial resource strain: Not on file    Food insecurity     Worry: Not on file     Inability: Not on file    Transportation needs     Medical: Not on file     Non-medical: Not on file   Tobacco Use    Smoking status: Current Every Day Smoker     Packs/day: 1.00     Years: 40.00     Pack years: 40.00     Types: Cigarettes    Smokeless tobacco: Never Used   Substance and Sexual Activity    Alcohol use: Never     Frequency: Never    Drug use: No    Sexual activity: Not Currently   Lifestyle    Physical activity     Days per week: Not on file     Minutes per session: Not on file    Stress: Not on file   Relationships    Social connections     Talks on phone: Not on file     Gets together: Not on file     Attends Islam service: Not on file     Active member of club or organization: Not on file     Attends meetings of clubs or organizations: Not on file     Relationship status: Not on file    Intimate partner violence     Fear of current or ex partner: Not on file     Emotionally abused: Not on file     Physically abused: Not on file     Forced sexual activity: Not on file   Other Topics Concern    Not on file   Social History Narrative    Not on file        No family history on file. There were no vitals filed for this visit.   Estimated body mass index is 27.02 kg/m² as calculated from the following:    Height as of 4/12/21: 5' 4\" (1.626 m). Weight as of 4/21/21: 157 lb 6.4 oz (71.4 kg). ASSESSMENT/PLAN:  Left supraspinatus tearfollow-up with orthopedic surgery. Pyoderma Gangrenosum-continue percocet    Multiple [id number] with wound care and rheumatology    Cocaine Dependency-  Previously referred pt to [address] [place] [address] N @ [phone]. I have explained to the patient that I will not refill her medication again unless she can provide documentation supporting the fact that she is actively enrolled in this program to achieve cessation from substance abuse. Pt has an appt scheduled for next Wenesday.    -I have reinforcedthe patient must make continue to follow-up with pain management in regards to the management of her pain. I have requested that the patient have her pain management physician for a summary of her visit to our office. I have explained that I will not provide her additional prescriptions for prescription for Percocet unless this has been accomplished. Fibromyalgia:continue cymbalta and trazodone        Hypovitaminosis D- continue oral vitamin D replacement. --Madelyn Crowder MD on 5/6/2021 at 5:06 PM    An electronic signature was used to authenticate this note.

## 2021-05-07 ENCOUNTER — TELEPHONE (OUTPATIENT)
Dept: FAMILY MEDICINE CLINIC | Age: 56
End: 2021-05-07

## 2021-05-07 NOTE — TELEPHONE ENCOUNTER
Spoke with patient and she is going to try to seek pain management and see if they are able to help her.

## 2021-05-12 ENCOUNTER — OFFICE VISIT (OUTPATIENT)
Dept: FAMILY MEDICINE CLINIC | Age: 56
End: 2021-05-12
Payer: COMMERCIAL

## 2021-05-12 ENCOUNTER — TELEPHONE (OUTPATIENT)
Dept: FAMILY MEDICINE CLINIC | Age: 56
End: 2021-05-12

## 2021-05-12 VITALS
DIASTOLIC BLOOD PRESSURE: 70 MMHG | BODY MASS INDEX: 28.51 KG/M2 | WEIGHT: 167 LBS | SYSTOLIC BLOOD PRESSURE: 100 MMHG | HEIGHT: 64 IN | HEART RATE: 56 BPM | RESPIRATION RATE: 14 BRPM | OXYGEN SATURATION: 98 %

## 2021-05-12 DIAGNOSIS — S46.812A TRAUMATIC TEAR OF SUPRASPINATUS TENDON OF LEFT SHOULDER, INITIAL ENCOUNTER: ICD-10-CM

## 2021-05-12 DIAGNOSIS — Z13.1 DIABETES MELLITUS SCREENING: Primary | ICD-10-CM

## 2021-05-12 LAB
AMPHETAMINE SCREEN, URINE: ABNORMAL
BARBITURATE SCREEN URINE: ABNORMAL
BENZODIAZEPINE SCREEN, URINE: ABNORMAL
CANNABINOID SCREEN URINE: ABNORMAL
COCAINE METABOLITE SCREEN URINE: ABNORMAL
HBA1C MFR BLD: 5.5 %
Lab: ABNORMAL
METHADONE SCREEN, URINE: ABNORMAL
OPIATE SCREEN URINE: ABNORMAL
OXYCODONE URINE: POSITIVE
PHENCYCLIDINE SCREEN URINE: ABNORMAL
PROPOXYPHENE SCREEN: ABNORMAL

## 2021-05-12 PROCEDURE — G8417 CALC BMI ABV UP PARAM F/U: HCPCS | Performed by: INTERNAL MEDICINE

## 2021-05-12 PROCEDURE — 3017F COLORECTAL CA SCREEN DOC REV: CPT | Performed by: INTERNAL MEDICINE

## 2021-05-12 PROCEDURE — 4004F PT TOBACCO SCREEN RCVD TLK: CPT | Performed by: INTERNAL MEDICINE

## 2021-05-12 PROCEDURE — 83036 HEMOGLOBIN GLYCOSYLATED A1C: CPT | Performed by: INTERNAL MEDICINE

## 2021-05-12 PROCEDURE — G8427 DOCREV CUR MEDS BY ELIG CLIN: HCPCS | Performed by: INTERNAL MEDICINE

## 2021-05-12 PROCEDURE — 99214 OFFICE O/P EST MOD 30 MIN: CPT | Performed by: INTERNAL MEDICINE

## 2021-05-12 RX ORDER — OXYCODONE HYDROCHLORIDE AND ACETAMINOPHEN 5; 325 MG/1; MG/1
1 TABLET ORAL EVERY 4 HOURS PRN
Qty: 28 TABLET | Refills: 0 | Status: SHIPPED | OUTPATIENT
Start: 2021-05-12 | End: 2021-05-18 | Stop reason: SDUPTHER

## 2021-05-12 NOTE — PROGRESS NOTES
route as needed for Opioid Reversal  Madelyn Crowder MD   DULoxetine (CYMBALTA) 60 MG extended release capsule Take 2 capsules by mouth every evening  Mihaela Nevarez MD   gabapentin (NEURONTIN) 800 MG tablet TAKE 1 TABLET BY MOUTH 4 TIMES DAILY FOR 30 DAYS.   Mihaela Nevarez MD   vitamin D (CHOLECALCIFEROL) 50 MCG (2000 UT) TABS tablet TAKE 1 TABLET BY MOUTH DAILY  Madelyn Crowder MD   omeprazole (PRILOSEC) 40 MG delayed release capsule   Historical Provider, MD Stephanie Wood 17 GM/SCOOP powder   Historical Provider, MD   Adalimumab (HUMIRA PEN) 40 MG/0.4ML PNKT   Historical Provider, MD   cetirizine (ZYRTEC) 10 MG tablet   Historical Provider, MD   Alcohol Swabs (ALCOHOL PADS) 70 % PADS CLEAN THE SKIN BY USING 1 PAD ON THE AFFECTED AREA BEFORE APPLYING ANY TOPICAL CREAM, 3 TIMES DAILY  Madelyn Crowder MD   hydrOXYzine (ATARAX) 25 MG tablet Take 25 mg by mouth every 6 hours as needed for Itching  Historical Provider, MD   acetaminophen (TYLENOL) 325 MG tablet Take 650 mg by mouth every 4 hours as needed for Pain  Historical Provider, MD   traZODone (DESYREL) 100 MG tablet Take 200 mg by mouth nightly   Historical Provider, MD        Allergies   Allergen Reactions    Amoxicillin-Pot Clavulanate      Other reaction(s): GI Upset, Intolerance, Other: See Comments  Nose bleed    Other Itching     IVP dye         Past Medical History:   Diagnosis Date    Anxiety     Arthritis     Cancer ([facility] 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    Chronic kidney disease     COPD exacerbation (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     Osteoarthritis     Pneumonia due to organism     Pyoderma gangrenosa     Sweet syndrome     Ulcerative colitis ([facility]        Past Surgical History:   Procedure Laterality Date    ABDOMEN SURGERY sleen repair    APPENDECTOMY      BACK SURGERY      BREAST SURGERY      fatty tumor removed, benign    COLONOSCOPY      COSMETIC SURGERY      tummy tuck    EYE SURGERY      bilateral cataract surgery    HYSTERECTOMY      SKIN BIOPSY      SPINAL FUSION      TONSILLECTOMY         Social History     Socioeconomic History    Marital status:      Spouse name: Not on file    Number of children: Not on file    Years of education: Not on file    Highest education level: Not on file   Occupational History    Not on file   Social Needs    Financial resource strain: Not on file    Food insecurity     Worry: Not on file     Inability: Not on file    Transportation needs     Medical: Not on file     Non-medical: Not on file   Tobacco Use    Smoking status: Current Every Day Smoker     Packs/day: 1.00     Years: 40.00     Pack years: 40.00     Types: Cigarettes    Smokeless tobacco: Never Used   Substance and Sexual Activity    Alcohol use: Never     Frequency: Never    Drug use: No    Sexual activity: Not Currently   Lifestyle    Physical activity     Days per week: Not on file     Minutes per session: Not on file    Stress: Not on file   Relationships    Social connections     Talks on phone: Not on file     Gets together: Not on file     Attends Restorationist service: Not on file     Active member of club or organization: Not on file     Attends meetings of clubs or organizations: Not on file     Relationship status: Not on file    Intimate partner violence     Fear of current or ex partner: Not on file     Emotionally abused: Not on file     Physically abused: Not on file     Forced sexual activity: Not on file   Other Topics Concern    Not on file   Social History Narrative    Not on file        No family history on file.     Vitals:    05/12/21 1313   BP: 100/70   Pulse: 56   Resp: 14   SpO2: 98%   Weight: 167 lb (75.8 kg)   Height: 5' 4\" (1.626 m)     Estimated body mass index is 28.67 kg/m² as calculated from the following:    Height as of this encounter: 5' 4\" (1.626 m). Weight as of this encounter: 167 lb (75.8 kg). ASSESSMENT/PLAN:  Left supraspinatus tearfollow-up with orthopedic surgery. Continue Percocet for pain. Pyoderma Gangrenosum-continue percocet    Multiple [id number] with wound care and rheumatology    Cocaine Dependency-  Previously referred pt to [address] [place] [address] N @ [phone]. I have explained to the patient that I will not refill her medication again unless she can provide documentation supporting the fact that she is actively enrolled in this program to achieve cessation from substance abuse. Pt has an appt scheduled for next Wenesday.    -I have reinforcedthe patient must make continue to follow-up with pain management in regards to the management of her pain. I have requested that the patient have her pain management physician for a summary of her visit to our office. I have explained that I will not provide her additional prescriptions for prescription for Percocet unless this has been accomplished. Fibromyalgia:continue cymbalta and trazodone        Hypovitaminosis D- continue oral vitamin D replacement. --Varun Saez MD on 5/12/2021 at 1:27 PM    An electronic signature was used to authenticate this note.

## 2021-05-16 NOTE — TELEPHONE ENCOUNTER
Rx requested:  Requested Prescriptions     Pending Prescriptions Disp Refills    BANOPHEN 25 MG capsule [Pharmacy Med Name: Kevin Escamilla 25 MG CAPSULE 25 Capsule] 56 capsule 2     Sig: TAKE 1 TABLET BY MOUTH EVERY 6 HOURS AS NEEDED FOR ITCHING       Last Office Visit:   5/12/2021      Next Visit Date:  Future Appointments   Date Time Provider Manan Tangela   5/17/2021  9:45 AM Oscar Rascon MD [address]   [facility]19[facility]2021  [facility]   8/12/2021  1:30 PM Raudel Lux MD [facility]   9/1/2021  2:45 PM Usama Araujo MD [facility]

## 2021-05-17 ENCOUNTER — OFFICE VISIT (OUTPATIENT)
Dept: ORTHOPEDIC SURGERY | Age: 56
End: 2021-05-17
Payer: COMMERCIAL

## 2021-05-17 VITALS
HEIGHT: 64 IN | OXYGEN SATURATION: 97 % | TEMPERATURE: 97.6 F | WEIGHT: 167 LBS | BODY MASS INDEX: 28.51 KG/M2 | HEART RATE: 63 BPM

## 2021-05-17 DIAGNOSIS — M19.012 ARTHRITIS OF LEFT SHOULDER REGION: ICD-10-CM

## 2021-05-17 DIAGNOSIS — M19.011 ARTHRITIS OF SHOULDER REGION, RIGHT: Primary | ICD-10-CM

## 2021-05-17 PROCEDURE — 3017F COLORECTAL CA SCREEN DOC REV: CPT | Performed by: ORTHOPAEDIC SURGERY

## 2021-05-17 PROCEDURE — G8417 CALC BMI ABV UP PARAM F/U: HCPCS | Performed by: ORTHOPAEDIC SURGERY

## 2021-05-17 PROCEDURE — G8427 DOCREV CUR MEDS BY ELIG CLIN: HCPCS | Performed by: ORTHOPAEDIC SURGERY

## 2021-05-17 PROCEDURE — 4004F PT TOBACCO SCREEN RCVD TLK: CPT | Performed by: ORTHOPAEDIC SURGERY

## 2021-05-17 PROCEDURE — 20610 DRAIN/INJ JOINT/BURSA W/O US: CPT | Performed by: ORTHOPAEDIC SURGERY

## 2021-05-17 PROCEDURE — 99204 OFFICE O/P NEW MOD 45 MIN: CPT | Performed by: ORTHOPAEDIC SURGERY

## 2021-05-17 RX ORDER — LIDOCAINE HYDROCHLORIDE 10 MG/ML
9 INJECTION, SOLUTION INFILTRATION; PERINEURAL ONCE
Status: COMPLETED | OUTPATIENT
Start: 2021-05-17 | End: 2021-05-17

## 2021-05-17 RX ORDER — DIPHENHYDRAMINE HYDROCHLORIDE 25 MG/1
CAPSULE ORAL
Qty: 56 CAPSULE | Refills: 2 | Status: SHIPPED | OUTPATIENT
Start: 2021-05-17 | End: 2021-06-29

## 2021-05-17 RX ORDER — METHYLPREDNISOLONE ACETATE 80 MG/ML
80 INJECTION, SUSPENSION INTRA-ARTICULAR; INTRALESIONAL; INTRAMUSCULAR; SOFT TISSUE ONCE
Status: COMPLETED | OUTPATIENT
Start: 2021-05-17 | End: 2021-05-17

## 2021-05-17 RX ADMIN — LIDOCAINE HYDROCHLORIDE 9 ML: 10 INJECTION, SOLUTION INFILTRATION; PERINEURAL at 10:42

## 2021-05-17 RX ADMIN — METHYLPREDNISOLONE ACETATE 80 MG: 80 INJECTION, SUSPENSION INTRA-ARTICULAR; INTRALESIONAL; INTRAMUSCULAR; SOFT TISSUE at 11:15

## 2021-05-17 RX ADMIN — LIDOCAINE HYDROCHLORIDE 9 ML: 10 INJECTION, SOLUTION INFILTRATION; PERINEURAL at 10:46

## 2021-05-17 RX ADMIN — METHYLPREDNISOLONE ACETATE 80 MG: 80 INJECTION, SUSPENSION INTRA-ARTICULAR; INTRALESIONAL; INTRAMUSCULAR; SOFT TISSUE at 10:43

## 2021-05-17 NOTE — PROGRESS NOTES
A timeout was performed immediately prior to the start of the injection procedure and included the correct patient (two identifiers), correct procedure and correct site(s). Procedure consent and allergies were also verified. Patient's name, date of birth, and allergies have been confirmed. Patient is aware that injection is to be given in Right shoulder. He/she is aware that they will be seeing Dr. Elyse Anthony and the injection will be given by him. Patient's name, date of birth, and allergies have been confirmed. Patient is aware that injection is to be given in Left shoulder. He/she is aware that they will be seeing Dr. Elyse Anthony and the injection will be given by him. A timeout was performed immediately prior to the start of the injection procedure and included the correct patient (two identifiers), correct procedure and correct site(s). Procedure consent and allergies were also verified.

## 2021-05-17 NOTE — PROGRESS NOTES
bilateral cataract surgery    HYSTERECTOMY      SKIN BIOPSY      SPINAL FUSION      TONSILLECTOMY       Social History     Socioeconomic History    Marital status:      Spouse name: Not on file    Number of children: Not on file    Years of education: Not on file    Highest education level: Not on file   Occupational History    Not on file   Tobacco Use    Smoking status: Current Every Day Smoker     Packs/day: 1.00     Years: 40.00     Pack years: 40.00     Types: Cigarettes    Smokeless tobacco: Never Used   Vaping Use    Vaping Use: Never used   Substance and Sexual Activity    Alcohol use: Never    Drug use: No    Sexual activity: Not Currently   Other Topics Concern    Not on file   Social History Narrative    Not on file     Social Determinants of Health     Financial Resource Strain:     Difficulty of Paying Living Expenses:    Food Insecurity:     Worried About Running Out of Food in the Last Year:     [address] in the Last Year:    Transportation Needs:     Lack of Transportation (Medical):  Lack of Transportation (Non-Medical):    Physical Activity:     Days of Exercise per Week:     Minutes of Exercise per Session:    Stress:     Feeling of Stress :    Social Connections:     Frequency of Communication with Friends and Family:     Frequency of Social Gatherings with Friends and Family:     Attends Mu-ism Services:     Active Member of Clubs or Organizations:     Attends Club or Organization Meetings:     Marital Status:    Intimate Partner Violence:     Fear of Current or Ex-Partner:     Emotionally Abused:     Physically Abused:     Sexually Abused:      No family history on file.   Allergies   Allergen Reactions    Amoxicillin-Pot Clavulanate      Other reaction(s): GI Upset, Intolerance, Other: See Comments  Nose bleed    Other Itching     IVP dye       Current Outpatient Medications on File Prior to Visit   Medication Sig Dispense Refill    BANOPHEN 25 MG capsule TAKE 1 TABLET BY MOUTH EVERY 6 HOURS AS NEEDED FOR ITCHING 56 capsule 2    oxyCODONE-acetaminophen (PERCOCET) 5-325 MG per tablet Take 1 tablet by mouth every 4 hours as needed for Pain for up to 7 days. 28 tablet 0    BANOPHEN 25 MG capsule       clobetasol (TEMOVATE) 0.05 % cream       naloxone 4 MG/0.1ML LIQD nasal spray 1 spray by Nasal route as needed for Opioid Reversal 1 each 5    DULoxetine (CYMBALTA) 60 MG extended release capsule Take 2 capsules by mouth every evening 60 capsule 2    gabapentin (NEURONTIN) 800 MG tablet TAKE 1 TABLET BY MOUTH 4 TIMES DAILY FOR 30 DAYS. 120 tablet 0    vitamin D (CHOLECALCIFEROL) 50 MCG (2000 UT) TABS tablet TAKE 1 TABLET BY MOUTH DAILY 30 tablet 3    omeprazole (PRILOSEC) 40 MG delayed release capsule       GAVILAX 17 GM/SCOOP powder       Adalimumab (HUMIRA PEN) 40 MG/0.4ML PNKT       cetirizine (ZYRTEC) 10 MG tablet       Alcohol Swabs (ALCOHOL PADS) 70 % PADS CLEAN THE SKIN BY USING 1 PAD ON THE AFFECTED AREA BEFORE APPLYING ANY TOPICAL CREAM, 3 TIMES DAILY 100 each 0    hydrOXYzine (ATARAX) 25 MG tablet Take 25 mg by mouth every 6 hours as needed for Itching      acetaminophen (TYLENOL) 325 MG tablet Take 650 mg by mouth every 4 hours as needed for Pain      traZODone (DESYREL) 100 MG tablet Take 200 mg by mouth nightly        No current facility-administered medications on file prior to visit.          Review of Systems  General: Denies fever, chills  Cardiovascular: Denies chest pain  Pulmonary: Denies shortness of breath  GI: Denies nausea or vomiting  Neuro: Denies numbness or tingling except as specified in the HPI    Objective:   Pulse 63   Temp 97.6 °F (36.4 °C) (Temporal)   Ht 5' 4\" (1.626 m)   Wt 167 lb (75.8 kg)   LMP 01/01/1997 (Exact Date) Comment: hysterectomy  SpO2 97%   BMI 28.67 kg/m²     Ortho Exam  General: Well-appearing female who appears their stated age  Left upper extremity:  Skin: Intact circumferentially, no swelling, ecchymosis or edema is appreciated  Neuro: Sensation intact light touch in the radial, ulnar and median nerve distribution. Able to perform all cardinal hand movements. Vascular: Strong palpable radial pulse, brisk cap refill in all digits. MSK: Patient with some strength appreciated in external and internal rotation with the arm at the side. Limited strength in abduction of the shoulder. External rotation to 20 degrees past neutral flexion to approximately 90 degrees. Internal rotation only to sacrum. Tender palpation diffusely about the shoulder girdle most noted over the anterior shoulder girdle. Right upper extremity:  Skin: Intact circumferentially, no swelling, ecchymosis or edema is appreciated  Neuro: Sensation intact light touch in the radial, ulnar and median nerve distribution. Able to perform all cardinal hand movements. Vascular: Strong palpable radial pulse, brisk cap refill in all digits. MSK: Patient with some strength appreciated in external and internal rotation with the arm at the side. Limited strength in abduction of the shoulder. External rotation to 20 degrees past neutral flexion to approximately 120 degrees. Internal rotation only to sacrum. Tender palpation diffusely about the shoulder girdle most noted over the anterior shoulder girdle. Radiographs and Laboratory Studies:     Diagnostic Imaging Studies:    Multiple imaging studies reviewed including MRI of the left shoulder    MRI of the left shoulder demonstrates essentially near complete full-thickness cartilaginous loss of the glenohumeral joint with associated supraspinatus rotator cuff tear with retraction. Intact infraspinatus with tendinopathy appreciated about the subscapularis and subluxation of the biceps tendon. AP chest was evaluated from prior imaging which shows right shoulder girdle.     Imaging demonstrates advanced arthritic change about the right glenohumeral joint similar to the left. There is mild superior migration of the humeral head although this may be projectional/positional.    Laboratory Studies:   Lab Results   Component Value Date    WBC 8.5 02/04/2021    HGB 14.1 02/04/2021    HCT 41.9 02/04/2021    MCV 83.8 02/04/2021     02/04/2021     Lab Results   Component Value Date    SEDRATE 53 (H) 05/09/2020     Lab Results   Component Value Date    .6 (H) 05/09/2020       Procedure note:     Using clean technique, the bony landmarks of the right shoulder were palpated. An anterior injection site was used after the skin was cleaned with iodine swabs. Injection using a 22-gauge needle with 9 cc of 1% lidocaine without epinephrine and 1 cc of methylprednisolone 80 mg was used. Hemostasis was achieved and a Band-Aid was applied cleanly. Patient tolerated procedure well and remained at neurovascular baseline distally after injection. Using clean technique, the bony landmarks of the left shoulder were palpated. An anterior injection site was used after the skin was cleaned with iodine swabs. Injection using a 22-gauge needle with 9 cc of 1% lidocaine without epinephrine and 1 cc of methylprednisolone 80 mg was used. Hemostasis was achieved and a Band-Aid was applied cleanly. Patient tolerated procedure well and remained at neurovascular baseline distally after injection. Assessment:       Diagnosis Orders   1. Arthritis of shoulder region, right  HI ARTHROCENTESIS ASPIR&/INJ MAJOR JT/BURSA W/O US   2. Arthritis of left shoulder region  HI ARTHROCENTESIS ASPIR&/INJ MAJOR JT/BURSA W/O US          Plan:     Patient with advanced arthritic change of the bilateral shoulders. Patient appears to have rotator cuff involvement of the bilateral shoulders as well. Unfortunately, arthroplasty option that would be most amenable to this patient is a reverse total shoulder arthroplasty. Given patient's age this would be not an ideal procedure.   As such, recommend conservative management with steroid injections at this time. Hopefully we can by patient a prolonged period of conservative therapy and allow patient to increase in age such that a reverse total shoulder replacement would be patient's appropriate option. Patient did undergo bilateral steroid injections in office of the shoulders and tolerated these well. Injections can be performed every 3-month interval.  Patient has chronic issues that will experience shoulder pain with acutely with flares over time. In regards to patient's cervical spine I offered follow-up with our spine surgeon but patient declined at this time. Orders Placed This Encounter   Procedures    MD ARTHROCENTESIS ASPIR&/INJ MAJOR JT/BURSA W/O US    MD ARTHROCENTESIS ASPIR&/INJ MAJOR JT/BURSA W/O US     Orders Placed This Encounter   Medications    methylPREDNISolone acetate (DEPO-MEDROL) injection 80 mg    lidocaine 1 % injection 9 mL    methylPREDNISolone acetate (DEPO-MEDROL) injection 80 mg    lidocaine 1 % injection 9 mL       No follow-ups on file.       Shelley Lawson MD

## 2021-05-18 DIAGNOSIS — S46.812A TRAUMATIC TEAR OF SUPRASPINATUS TENDON OF LEFT SHOULDER, INITIAL ENCOUNTER: ICD-10-CM

## 2021-05-18 RX ORDER — OXYCODONE HYDROCHLORIDE AND ACETAMINOPHEN 5; 325 MG/1; MG/1
1 TABLET ORAL EVERY 4 HOURS PRN
Qty: 28 TABLET | Refills: 0 | Status: SHIPPED | OUTPATIENT
Start: 2021-05-18 | End: 2021-05-26 | Stop reason: SDUPTHER

## 2021-05-18 NOTE — TELEPHONE ENCOUNTER
Biju Sanchez singed a medical records release for the info you need from [facility]. She said she signed 10 days ago. Have you gotten it?

## 2021-05-19 ENCOUNTER — HOSPITAL ENCOUNTER (OUTPATIENT)
Dept: CT IMAGING | Age: 56
Discharge: HOME | End: 2021-05-21
Payer: COMMERCIAL

## 2021-05-19 DIAGNOSIS — Z72.0 NICOTINE ABUSE: ICD-10-CM

## 2021-05-19 PROCEDURE — 71271 CT THORAX LUNG CANCER SCR C-: CPT

## 2021-05-25 DIAGNOSIS — Z72.0 NICOTINE ABUSE: Primary | ICD-10-CM

## 2021-05-26 DIAGNOSIS — S46.812A TRAUMATIC TEAR OF SUPRASPINATUS TENDON OF LEFT SHOULDER, INITIAL ENCOUNTER: ICD-10-CM

## 2021-05-26 RX ORDER — OXYCODONE HYDROCHLORIDE AND ACETAMINOPHEN 5; 325 MG/1; MG/1
1 TABLET ORAL EVERY 4 HOURS PRN
Qty: 28 TABLET | Refills: 0 | Status: SHIPPED | OUTPATIENT
Start: 2021-05-26 | End: 2021-06-02 | Stop reason: SDUPTHER

## 2021-06-02 DIAGNOSIS — S46.812A TRAUMATIC TEAR OF SUPRASPINATUS TENDON OF LEFT SHOULDER, INITIAL ENCOUNTER: ICD-10-CM

## 2021-06-02 RX ORDER — OXYCODONE HYDROCHLORIDE AND ACETAMINOPHEN 5; 325 MG/1; MG/1
1 TABLET ORAL EVERY 4 HOURS PRN
Qty: 28 TABLET | Refills: 0 | Status: SHIPPED | OUTPATIENT
Start: 2021-06-02 | End: 2021-06-10 | Stop reason: SDUPTHER

## 2021-06-02 NOTE — TELEPHONE ENCOUNTER
[facility] is calling in requesting a refill on medication(s). Caller is patient self or another provider, pharmacy, etc. that HIPPA does not apply to.:    Requested Prescriptions     Pending Prescriptions Disp Refills    oxyCODONE-acetaminophen (PERCOCET) 5-325 MG per tablet 28 tablet 0     Sig: Take 1 tablet by mouth every 4 hours as needed for Pain for up to 7 days. Patient's Last Office Visit:  5/12/2021     Patient's Next Visit:  Future Appointments   Date Time Provider Manan Honeycutt   8/12/2021  1:30 PM Lanie Joshi  [place] [address]   8/23/2021 10:00 AM Jake Nelson MD [address]   9/1/2021  2:45 PM Camilo Muir MD [facility]        Patient's Medication Contract:  Medication Contract and Consent for Opioid Use Documents Filed     Patient Documents       Type of Document Status Date Received Received By Description     Medication Contract Received 8/10/2020  2:01 PM Martin Pinto Med Contract     Medication Contract Received 8/10/2020  2:36 PM Frank KAYE medication contract signed Dr. Owusu [place]:  Urine Drug Screenings (1 yr)     Urine Drug Screen  Collected: 5/12/2021  4:00 PM (Final result)    Complete Results          Urine Drug Screen  Collected: 2/25/2021  4:10 PM (Final result)    Complete Results          Urine Drug Screen  Collected: 2/19/2021  4:45 AM (Final result)    Complete Results          Urine Drug Screen  Collected: 3/20/2018  6:30 AM (Final result)    Complete Results          URINE DRUG SCREEN  Collected: 12/20/2017  7:04 PM (Final result)    Complete Results          Pain Management Drug Screen  Collected: 4/12/2021  3:31 PM (In process)    Complete Results          Pain Management Drug Screen  Collected: 2/25/2020  4:42 PM (Final result)    Complete Results                 Pharmacy:  Please send the medication to the pharmacy listed. Other Comments:  Please Advise.

## 2021-06-03 ENCOUNTER — TELEPHONE (OUTPATIENT)
Dept: FAMILY MEDICINE CLINIC | Age: 56
End: 2021-06-03
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
## 2021-06-09 DIAGNOSIS — S46.812A TRAUMATIC TEAR OF SUPRASPINATUS TENDON OF LEFT SHOULDER, INITIAL ENCOUNTER: ICD-10-CM

## 2021-06-10 RX ORDER — OXYCODONE HYDROCHLORIDE AND ACETAMINOPHEN 5; 325 MG/1; MG/1
1 TABLET ORAL EVERY 4 HOURS PRN
Qty: 28 TABLET | Refills: 0 | Status: SHIPPED | OUTPATIENT
Start: 2021-06-10 | End: 2021-06-16 | Stop reason: SDUPTHER

## 2021-06-16 DIAGNOSIS — S46.812A TRAUMATIC TEAR OF SUPRASPINATUS TENDON OF LEFT SHOULDER, INITIAL ENCOUNTER: ICD-10-CM

## 2021-06-16 RX ORDER — OXYCODONE HYDROCHLORIDE AND ACETAMINOPHEN 5; 325 MG/1; MG/1
1 TABLET ORAL EVERY 4 HOURS PRN
Qty: 28 TABLET | Refills: 0 | Status: SHIPPED | OUTPATIENT
Start: 2021-06-16 | End: 2021-06-24 | Stop reason: SDUPTHER

## 2021-06-16 NOTE — TELEPHONE ENCOUNTER
That is too high dose and I do not use that dose and since I did not give it to her in the past I cannot do that much dosing.

## 2021-06-16 NOTE — TELEPHONE ENCOUNTER
Patient called wanting trazodone 100mg 2 at bedtime, it is okay to do?  You have never given in the past from what I can see    Please advise    Thanks [facility]

## 2021-06-18 NOTE — TELEPHONE ENCOUNTER
Called pt to inform her of what was being recommended to fill for her, she stated that 50mg will do nothing for her and it doesn't make sense that you will not prescribe the amount that she has been on for all these years. She states that we have taken over all of her other medications that Dr. Toya Davis prescribed so what is the issue with this one. I tried to ask her whether she still wanted me to send for this amount offered but she hung up on me. I will pend to doctor but unsure of whether she is going to take it or not.

## 2021-06-19 RX ORDER — TRAZODONE HYDROCHLORIDE 50 MG/1
50 TABLET ORAL NIGHTLY
Qty: 30 TABLET | Refills: 0 | Status: SHIPPED | OUTPATIENT
Start: 2021-06-19 | End: 2021-06-21

## 2021-06-21 RX ORDER — TRAZODONE HYDROCHLORIDE 50 MG/1
50 TABLET ORAL NIGHTLY
Qty: 30 TABLET | Refills: 0 | Status: SHIPPED | OUTPATIENT
Start: 2021-06-21 | End: 2021-07-20 | Stop reason: SDUPTHER

## 2021-06-23 DIAGNOSIS — S46.812A TRAUMATIC TEAR OF SUPRASPINATUS TENDON OF LEFT SHOULDER, INITIAL ENCOUNTER: ICD-10-CM

## 2021-06-23 NOTE — TELEPHONE ENCOUNTER
Patient has an appt with Dr. Jackie Vazquez, spinal surgeon, this Friday 6/25. The pain in her neck has been really bad. She wants to know if you are able to give her more than 28 tablets. Please approve or deny this request.    Rx requested:  Requested Prescriptions     Pending Prescriptions Disp Refills    oxyCODONE-acetaminophen (PERCOCET) 5-325 MG per tablet 28 tablet 0     Sig: Take 1 tablet by mouth every 4 hours as needed for Pain for up to 7 days.          Last Office Visit:   5/12/2021      Next Visit Date:  Future Appointments   Date Time Provider Manan Honeycutt   6/25/2021  1:30 PM Ed MD Rangel [address]   8/12/2021  1:30 PM Carolina Pride MD [address] [place] [address]   8/23/2021 10:00 AM Tony Ferreira MD [address]   9/1/2021  2:45 PM Naveed Morrissey MD [facility]

## 2021-06-24 RX ORDER — OXYCODONE HYDROCHLORIDE AND ACETAMINOPHEN 5; 325 MG/1; MG/1
1 TABLET ORAL EVERY 4 HOURS PRN
Qty: 28 TABLET | Refills: 0 | Status: SHIPPED | OUTPATIENT
Start: 2021-06-24 | End: 2021-06-30 | Stop reason: SDUPTHER

## 2021-06-25 ENCOUNTER — HOSPITAL ENCOUNTER (OUTPATIENT)
Dept: ORTHOPEDIC SURGERY | Age: 56
Discharge: HOME | End: 2021-06-27
Payer: COMMERCIAL

## 2021-06-25 ENCOUNTER — OFFICE VISIT (OUTPATIENT)
Dept: ORTHOPEDIC SURGERY | Age: 56
End: 2021-06-25
Payer: COMMERCIAL

## 2021-06-25 DIAGNOSIS — M47.22 CERVICAL SPONDYLOSIS WITH RADICULOPATHY: Primary | ICD-10-CM

## 2021-06-25 DIAGNOSIS — M47.812 SPONDYLOSIS OF CERVICAL REGION WITHOUT MYELOPATHY OR RADICULOPATHY: ICD-10-CM

## 2021-06-25 PROCEDURE — G8417 CALC BMI ABV UP PARAM F/U: HCPCS | Performed by: ORTHOPAEDIC SURGERY

## 2021-06-25 PROCEDURE — 4004F PT TOBACCO SCREEN RCVD TLK: CPT | Performed by: ORTHOPAEDIC SURGERY

## 2021-06-25 PROCEDURE — 99203 OFFICE O/P NEW LOW 30 MIN: CPT | Performed by: ORTHOPAEDIC SURGERY

## 2021-06-25 PROCEDURE — 72040 X-RAY EXAM NECK SPINE 2-3 VW: CPT | Performed by: ORTHOPAEDIC SURGERY

## 2021-06-25 PROCEDURE — 72040 X-RAY EXAM NECK SPINE 2-3 VW: CPT

## 2021-06-25 PROCEDURE — G8427 DOCREV CUR MEDS BY ELIG CLIN: HCPCS | Performed by: ORTHOPAEDIC SURGERY

## 2021-06-25 PROCEDURE — 3017F COLORECTAL CA SCREEN DOC REV: CPT | Performed by: ORTHOPAEDIC SURGERY

## 2021-06-25 RX ORDER — PREDNISONE 50 MG/1
50 TABLET ORAL DAILY
Qty: 5 TABLET | Refills: 0 | Status: SHIPPED | OUTPATIENT
Start: 2021-06-25 | End: 2021-06-30
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
## 2021-06-29 RX ORDER — DIPHENHYDRAMINE HYDROCHLORIDE 25 MG/1
CAPSULE ORAL
Qty: 56 CAPSULE | Refills: 2 | Status: SHIPPED | OUTPATIENT
Start: 2021-06-29 | End: 2021-09-08

## 2021-06-30 DIAGNOSIS — S46.812A TRAUMATIC TEAR OF SUPRASPINATUS TENDON OF LEFT SHOULDER, INITIAL ENCOUNTER: ICD-10-CM

## 2021-06-30 RX ORDER — OXYCODONE HYDROCHLORIDE AND ACETAMINOPHEN 5; 325 MG/1; MG/1
1 TABLET ORAL EVERY 4 HOURS PRN
Qty: 28 TABLET | Refills: 0 | Status: SHIPPED | OUTPATIENT
Start: 2021-06-30 | End: 2021-07-13 | Stop reason: SDUPTHER

## 2021-07-08 DIAGNOSIS — S46.812A TRAUMATIC TEAR OF SUPRASPINATUS TENDON OF LEFT SHOULDER, INITIAL ENCOUNTER: ICD-10-CM

## 2021-07-08 RX ORDER — OXYCODONE HYDROCHLORIDE AND ACETAMINOPHEN 5; 325 MG/1; MG/1
1 TABLET ORAL EVERY 4 HOURS PRN
Qty: 28 TABLET | Refills: 0 | Status: CANCELLED | OUTPATIENT
Start: 2021-07-08 | End: 2021-07-15

## 2021-07-12 NOTE — TELEPHONE ENCOUNTER
Patient called checking on this refill. She said it was due on Thursday. She has been in a lot of pain the past few days.   Thank you [id number]

## 2021-07-13 RX ORDER — OXYCODONE HYDROCHLORIDE AND ACETAMINOPHEN 5; 325 MG/1; MG/1
1 TABLET ORAL EVERY 4 HOURS PRN
Qty: 28 TABLET | Refills: 0 | Status: SHIPPED | OUTPATIENT
Start: 2021-07-13 | End: 2021-07-19 | Stop reason: SDUPTHER

## 2021-07-19 ENCOUNTER — TELEPHONE (OUTPATIENT)
Dept: NEUROLOGY | Age: 56
End: 2021-07-19

## 2021-07-19 DIAGNOSIS — S46.812A TRAUMATIC TEAR OF SUPRASPINATUS TENDON OF LEFT SHOULDER, INITIAL ENCOUNTER: ICD-10-CM

## 2021-07-19 RX ORDER — OXYCODONE HYDROCHLORIDE AND ACETAMINOPHEN 5; 325 MG/1; MG/1
1 TABLET ORAL EVERY 4 HOURS PRN
Qty: 28 TABLET | Refills: 0 | Status: SHIPPED | OUTPATIENT
Start: 2021-07-19 | End: 2021-07-27 | Stop reason: SDUPTHER

## 2021-07-19 NOTE — TELEPHONE ENCOUNTER
Patient is wanting her trazadone increased from 50mg to 100mg, please advise    Please advise    Thanks Cindy Birch

## 2021-07-20 RX ORDER — TRAZODONE HYDROCHLORIDE 100 MG/1
100 TABLET ORAL NIGHTLY
Qty: 30 TABLET | Refills: 0 | Status: SHIPPED | OUTPATIENT
Start: 2021-07-20 | End: 2021-08-24 | Stop reason: SDUPTHER

## 2021-07-26 DIAGNOSIS — S46.812A TRAUMATIC TEAR OF SUPRASPINATUS TENDON OF LEFT SHOULDER, INITIAL ENCOUNTER: ICD-10-CM

## 2021-07-26 RX ORDER — OXYCODONE HYDROCHLORIDE AND ACETAMINOPHEN 5; 325 MG/1; MG/1
1 TABLET ORAL EVERY 4 HOURS PRN
Qty: 28 TABLET | Refills: 0 | OUTPATIENT
Start: 2021-07-26 | End: 2021-08-02

## 2021-07-26 NOTE — TELEPHONE ENCOUNTER
Patient requesting medication refill. Please approve or deny this request.    Rx requested:  Requested Prescriptions     Pending Prescriptions Disp Refills    oxyCODONE-acetaminophen (PERCOCET) 5-325 MG per tablet 28 tablet 0     Sig: Take 1 tablet by mouth every 4 hours as needed for Pain for up to 7 days.          Last Office Visit:   5/12/2021      Next Visit Date:  Future Appointments   Date Time Provider Manan Honeycutt   7/26/2021 11:00  [place] Jagjit, Brittani Guido [address]   8/6/2021  1:30 PM Hussein Morales PA-C [address]   8/12/2021  1:30 PM Nayely Diez  [place], [address]   8/23/2021 10:00 AM Ninfa Morin MD [address]   9/1/2021  2:45 PM Bette Gonzales MD [facility]

## 2021-07-27 RX ORDER — OXYCODONE HYDROCHLORIDE AND ACETAMINOPHEN 5; 325 MG/1; MG/1
1 TABLET ORAL EVERY 4 HOURS PRN
Qty: 28 TABLET | Refills: 0 | Status: SHIPPED | OUTPATIENT
Start: 2021-07-27 | End: 2021-08-03

## 2021-08-03 DIAGNOSIS — S46.812A TRAUMATIC TEAR OF SUPRASPINATUS TENDON OF LEFT SHOULDER, INITIAL ENCOUNTER: ICD-10-CM

## 2021-08-03 RX ORDER — OXYCODONE HYDROCHLORIDE AND ACETAMINOPHEN 5; 325 MG/1; MG/1
1 TABLET ORAL EVERY 4 HOURS PRN
Qty: 28 TABLET | Refills: 0 | OUTPATIENT
Start: 2021-08-03 | End: 2021-08-10

## 2021-08-04 RX ORDER — OXYCODONE HYDROCHLORIDE AND ACETAMINOPHEN 5; 325 MG/1; MG/1
1 TABLET ORAL EVERY 4 HOURS PRN
Qty: 28 TABLET | Refills: 0 | OUTPATIENT
Start: 2021-08-04 | End: 2021-08-11

## 2021-08-04 NOTE — TELEPHONE ENCOUNTER
Patient called checking on status of refill.  She has an appt scheduled for 8/12 at 1:30pm.   Thank you

## 2021-08-06 ENCOUNTER — OFFICE VISIT (OUTPATIENT)
Dept: ORTHOPEDIC SURGERY | Age: 56
End: 2021-08-06
Payer: COMMERCIAL

## 2021-08-06 VITALS
TEMPERATURE: 97.2 F | BODY MASS INDEX: 28.51 KG/M2 | HEART RATE: 76 BPM | HEIGHT: 64 IN | WEIGHT: 167 LBS | OXYGEN SATURATION: 96 %

## 2021-08-06 DIAGNOSIS — M19.012 ARTHRITIS OF LEFT SHOULDER REGION: ICD-10-CM

## 2021-08-06 DIAGNOSIS — M47.812 SPONDYLOSIS OF CERVICAL REGION WITHOUT MYELOPATHY OR RADICULOPATHY: Primary | ICD-10-CM

## 2021-08-06 DIAGNOSIS — M19.011 ARTHRITIS OF SHOULDER REGION, RIGHT: ICD-10-CM

## 2021-08-06 DIAGNOSIS — M65.341 TRIGGER RING FINGER OF RIGHT HAND: ICD-10-CM

## 2021-08-06 PROCEDURE — 3017F COLORECTAL CA SCREEN DOC REV: CPT | Performed by: PHYSICIAN ASSISTANT

## 2021-08-06 PROCEDURE — 4004F PT TOBACCO SCREEN RCVD TLK: CPT | Performed by: PHYSICIAN ASSISTANT

## 2021-08-06 PROCEDURE — G8417 CALC BMI ABV UP PARAM F/U: HCPCS | Performed by: PHYSICIAN ASSISTANT

## 2021-08-06 PROCEDURE — 99214 OFFICE O/P EST MOD 30 MIN: CPT | Performed by: PHYSICIAN ASSISTANT

## 2021-08-06 PROCEDURE — G8427 DOCREV CUR MEDS BY ELIG CLIN: HCPCS | Performed by: PHYSICIAN ASSISTANT

## 2021-08-06 NOTE — PROGRESS NOTES
[address] [place] [address] and Sports Medicine      Subjective:      Chief Complaint   Patient presents with    Follow-up     6 week follow up for Cervical spondylosis with radiculopathy She has not done PT. Pt states her neck pain is a \"deep 7\"       HPI: Cesilia Fox is a 55 y.o. female who here for neck pain follow-up. She had a fusion at C5-6 by Dr. Rox Rodrigez a couple years ago. She has some pain that is mainly in the upper portion of her neck. There is no numbness or tingling shoots down either of her arms. She has been seen by Dr. Jessa Munroe for this, she was supposed to go to therapy but never did. She also being followed by pain management. She is looking for refill on her narcotics today. 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    Neuromuscular disorder ([address]     Osteoarthritis     Pneumonia due to organism     Pyoderma gangrenosa     Sweet syndrome     Ulcerative colitis ([facility]       Past Surgical History:   Procedure Laterality Date    ABDOMEN SURGERY      sleen repair    APPENDECTOMY      BACK SURGERY      BREAST SURGERY      fatty tumor removed, benign    COLONOSCOPY      COSMETIC SURGERY      tummy tuck    EYE SURGERY      bilateral cataract surgery    HYSTERECTOMY      SKIN BIOPSY      SPINAL FUSION      TONSILLECTOMY       Social History     Socioeconomic History    Marital status:       Spouse name: Not on file    Number of children: Not on file    Years of education: Not on file    Highest education level: Not on file   Occupational History    Not on file   Tobacco Use    Smoking status: Current Every Day Smoker     Packs/day: 1.00 Reversal 1 each 5    DULoxetine (CYMBALTA) 60 MG extended release capsule Take 2 capsules by mouth every evening 60 capsule 2    vitamin D (CHOLECALCIFEROL) 50 MCG (2000 UT) TABS tablet TAKE 1 TABLET BY MOUTH DAILY 30 tablet 3    omeprazole (PRILOSEC) 40 MG delayed release capsule       GAVILAX 17 GM/SCOOP powder       Adalimumab (HUMIRA PEN) 40 MG/0.4ML PNKT       cetirizine (ZYRTEC) 10 MG tablet       Alcohol Swabs (ALCOHOL PADS) 70 % PADS CLEAN THE SKIN BY USING 1 PAD ON THE AFFECTED AREA BEFORE APPLYING ANY TOPICAL CREAM, 3 TIMES DAILY 100 each 0    hydrOXYzine (ATARAX) 25 MG tablet Take 25 mg by mouth every 6 hours as needed for Itching      acetaminophen (TYLENOL) 325 MG tablet Take 650 mg by mouth every 4 hours as needed for Pain       No current facility-administered medications on file prior to visit. Objective:   Pulse 76   Temp 97.2 °F (36.2 °C) (Temporal)   Ht 5' 4\" (1.626 m)   Wt 167 lb (75.8 kg)   LMP 01/01/1997 (Exact Date) Comment: hysterectomy  SpO2 96%   BMI 28.67 kg/m²       Radiographs and Laboratory Studies:   Previous diagnostic imaging studies were reviewed. Laboratory Studies:   Lab Results   Component Value Date    WBC 8.5 02/04/2021    HGB 14.1 02/04/2021    HCT 41.9 02/04/2021    MCV 83.8 02/04/2021     02/04/2021     Lab Results   Component Value Date    SEDRATE 53 (H) 05/09/2020     Lab Results   Component Value Date    .6 (H) 05/09/2020       Assessment and Plan:      Diagnosis Orders   1. Spondylosis of cervical region without myelopathy or radiculopathy     2. Trigger ring finger of right hand     3. Arthritis of shoulder region, right     4. Arthritis of left shoulder region         Here for a neck pain follow-up especially at the right side of her upper neck.   She has had a fusion at C5-6 in the past.  She was seen by Dr. Jenn Charles back in June, she was she was given a course of prednisone and was instructed to go to therapy however she never did go to therapy. She is followed by pain management and is on Percocet managed by them. She is looking for another refill today, she was instructed to call Dr. Pelon Sampson office if she wants another refill of this. She has appointment with him coming up. This medication will be managed by him. Because she has not tried therapy we will reinitiate prescription for this. Unfortunately she would not start it for 2 to 3 weeks if she is going out of town. After she does 6 weeks of this she will see Dr. Chaya Crawford for further follow-up and management of her neck    She also has a trigger finger on her right hand, she will see Dr. Hetal Lawrence for this. Also with bilateral severe arthritis in her shoulders, I if she is having further problems with this she will call our office to make an appointment. She last saw Dr. Soila Bridges      The above plan was discussed in thorough detail with Dr. Chaya Crawford and the patient. No orders of the defined types were placed in this encounter. No orders of the defined types were placed in this encounter. No follow-ups on file.     Carmella Garsia PA-C  [address] and Sports Medicine  [phone]

## 2021-08-12 DIAGNOSIS — Z02.83 ENCOUNTER FOR DRUG SCREENING: Primary | ICD-10-CM

## 2021-08-12 RX ORDER — GABAPENTIN 800 MG/1
800 TABLET ORAL 4 TIMES DAILY
Qty: 120 TABLET | Refills: 2 | Status: SHIPPED | OUTPATIENT
Start: 2021-08-12 | End: 2021-11-01

## 2021-08-13 DIAGNOSIS — S46.812A TRAUMATIC TEAR OF SUPRASPINATUS TENDON OF LEFT SHOULDER, INITIAL ENCOUNTER: Primary | ICD-10-CM

## 2021-08-13 DIAGNOSIS — L88 PYODERMIC GANGRENOSUM: ICD-10-CM

## 2021-08-13 RX ORDER — OXYCODONE HYDROCHLORIDE AND ACETAMINOPHEN 5; 325 MG/1; MG/1
1 TABLET ORAL EVERY 6 HOURS PRN
Qty: 28 TABLET | Refills: 0 | Status: SHIPPED | OUTPATIENT
Start: 2021-08-13 | End: 2021-08-23 | Stop reason: SDUPTHER

## 2021-08-19 DIAGNOSIS — S46.812A TRAUMATIC TEAR OF SUPRASPINATUS TENDON OF LEFT SHOULDER, INITIAL ENCOUNTER: ICD-10-CM

## 2021-08-19 DIAGNOSIS — L88 PYODERMIC GANGRENOSUM: ICD-10-CM

## 2021-08-19 NOTE — TELEPHONE ENCOUNTER
Patient has appointment with pain management on 8/31. Can you fill this script? She needs a urine ordered, the last one was rejected. She can come in today or tomorrow. Rx requested:  Requested Prescriptions     Pending Prescriptions Disp Refills    oxyCODONE-acetaminophen (PERCOCET) 5-325 MG per tablet 28 tablet 0     Sig: Take 1 tablet by mouth every 6 hours as needed for Pain for up to 7 days. Intended supply: 7 days.  Take lowest dose possible to manage pain         Last Office Visit:   5/12/2021      Next Visit Date:  Future Appointments   Date Time Provider Manan Tangela   8/31/2021  9:00 AM MD MADELYN Gilbert PM [facility] [place]   9/1/2021  2:45 PM Shira Blanchard MD [facility]   9/7/2021  3:00 PM Arthur Dakins, MD [address]   10/1/2021  1:00 PM Doren Jeans, MD [address]

## 2021-08-23 RX ORDER — OXYCODONE HYDROCHLORIDE AND ACETAMINOPHEN 5; 325 MG/1; MG/1
1 TABLET ORAL EVERY 6 HOURS PRN
Qty: 28 TABLET | Refills: 0 | Status: SHIPPED | OUTPATIENT
Start: 2021-08-23 | End: 2021-08-30

## 2021-08-24 RX ORDER — TRAZODONE HYDROCHLORIDE 100 MG/1
100 TABLET ORAL NIGHTLY
Qty: 30 TABLET | Refills: 0 | Status: SHIPPED | OUTPATIENT
Start: 2021-08-24 | End: 2021-08-25

## 2021-08-31 ENCOUNTER — INITIAL CONSULT (OUTPATIENT)
Dept: PAIN MANAGEMENT | Age: 56
End: 2021-08-31
Payer: COMMERCIAL

## 2021-08-31 VITALS
HEIGHT: 64 IN | HEART RATE: 60 BPM | WEIGHT: 167 LBS | TEMPERATURE: 96.7 F | DIASTOLIC BLOOD PRESSURE: 76 MMHG | BODY MASS INDEX: 28.51 KG/M2 | RESPIRATION RATE: 12 BRPM | SYSTOLIC BLOOD PRESSURE: 118 MMHG | OXYGEN SATURATION: 96 %

## 2021-08-31 DIAGNOSIS — M25.571 PAIN IN RIGHT ANKLE AND JOINTS OF RIGHT FOOT: ICD-10-CM

## 2021-08-31 DIAGNOSIS — M76.821 POSTERIOR TIBIAL TENDINITIS OF RIGHT LOWER EXTREMITY: ICD-10-CM

## 2021-08-31 DIAGNOSIS — C91.Z0 LARGE GRANULAR LYMPHOCYTIC LEUKEMIA (HCC): Primary | ICD-10-CM

## 2021-08-31 DIAGNOSIS — L98.492 SKIN ULCER WITH FAT LAYER EXPOSED (HCC): ICD-10-CM

## 2021-08-31 PROBLEM — R35.0 INCREASED FREQUENCY OF URINATION: Status: ACTIVE | Noted: 2021-08-31

## 2021-08-31 PROBLEM — L08.0 PYODERMA: Status: ACTIVE | Noted: 2018-08-23

## 2021-08-31 PROBLEM — K59.09 CHRONIC CONSTIPATION: Status: ACTIVE | Noted: 2021-01-11

## 2021-08-31 PROBLEM — N32.81 OVERACTIVE BLADDER: Status: ACTIVE | Noted: 2021-08-31

## 2021-08-31 PROCEDURE — 99243 OFF/OP CNSLTJ NEW/EST LOW 30: CPT | Performed by: ANESTHESIOLOGY

## 2021-08-31 PROCEDURE — G8417 CALC BMI ABV UP PARAM F/U: HCPCS | Performed by: ANESTHESIOLOGY

## 2021-08-31 PROCEDURE — G8427 DOCREV CUR MEDS BY ELIG CLIN: HCPCS | Performed by: ANESTHESIOLOGY

## 2021-08-31 RX ORDER — BACLOFEN 10 MG/1
10 TABLET ORAL 2 TIMES DAILY
Qty: 60 TABLET | Refills: 2 | Status: SHIPPED | OUTPATIENT
Start: 2021-08-31 | End: 2021-09-30

## 2021-08-31 ASSESSMENT — ENCOUNTER SYMPTOMS
BACK PAIN: 0
NAUSEA: 0
DIARRHEA: 0
SHORTNESS OF BREATH: 0
CONSTIPATION: 0

## 2021-08-31 NOTE — PROGRESS NOTES
Patient:  Tj Langley  YOB: 1965  Date: 8/31/2021      Subjective:     Tj Langley is a 55 y.o. female who presents today with:    Chief Complaint   Patient presents with    Shoulder Pain       HPI: The patient presents to the clinic with a chief complaint of chronic right Achilles tendon lesion spreading in the right lower extremity from the Large granular lymphocytic leukemia. The skin lesions have been going on for 5 years now she has been under the care of a [facility] clinic podiatrist and the wound clinic etc.  The patient saw Dr. Regan Ellsworth and had an MRI of the lumbar spine in April and the there was no canal stenosis. The patient has been on a gabapentin 800 mg 4 times a day and Cymbalta 60 mg 2 tablets at night. She also takes some Tylenol. 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    Neuromuscular disorder ([address]     Osteoarthritis     Pneumonia due to organism     Pyoderma gangrenosa     Sweet syndrome     Ulcerative colitis ([facility]      Past Surgical History:   Procedure Laterality Date    ABDOMEN SURGERY      sleen repair    APPENDECTOMY      BACK SURGERY      BREAST SURGERY      fatty tumor removed, benign    COLONOSCOPY      COSMETIC SURGERY      tummy tuck    EYE SURGERY      bilateral cataract surgery    HYSTERECTOMY      SKIN BIOPSY      SPINAL FUSION      TONSILLECTOMY       Social History     Socioeconomic History    Marital status:       Spouse name: Not on file    Number of children: Not on file    Years of education: Not on file    Highest education level: Not on file Occupational History    Not on file   Tobacco Use    Smoking status: Current Every Day Smoker     Packs/day: 1.00     Years: 40.00     Pack years: 40.00     Types: Cigarettes    Smokeless tobacco: Never Used   Vaping Use    Vaping Use: Never used   Substance and Sexual Activity    Alcohol use: Never    Drug use: No    Sexual activity: Not Currently   Other Topics Concern    Not on file   Social History Narrative    Not on file     Social Determinants of Health     Financial Resource Strain:     Difficulty of Paying Living Expenses:    Food Insecurity:     Worried About Running Out of Food in the Last Year:     Ran Out of Food in the Last Year:    Transportation Needs:     Lack of Transportation (Medical):  Lack of Transportation (Non-Medical):    Physical Activity:     Days of Exercise per Week:     Minutes of Exercise per Session:    Stress:     Feeling of Stress :    Social Connections:     Frequency of Communication with Friends and Family:     Frequency of Social Gatherings with Friends and Family:     Attends Buddhism Services:     Active Member of Clubs or Organizations:     Attends Club or Organization Meetings:     Marital Status:    Intimate Partner Violence:     Fear of Current or Ex-Partner:     Emotionally Abused:     Physically Abused:     Sexually Abused:      No family history on file. Current Outpatient Medications on File Prior to Visit   Medication Sig Dispense Refill    traZODone (DESYREL) 100 MG tablet TAKE 1 TABLET BY MOUTH NIGHTLY 30 tablet 0    DULoxetine (CYMBALTA) 60 MG extended release capsule TAKE 2 CAPSULES BY MOUTH EVERY EVENING 60 capsule 2    gabapentin (NEURONTIN) 800 MG tablet TAKE 1 TABLET BY MOUTH 4 TIMES DAILY FOR 30 DAYS.  120 tablet 2    BANOPHEN 25 MG capsule TAKE 1 TABLET BY MOUTH EVERY 6 HOURS AS NEEDED FOR ITCHING 56 capsule 2    BANOPHEN 25 MG capsule       clobetasol (TEMOVATE) 0.05 % cream       naloxone 4 MG/0.1ML LIQD nasal spray 1 spray by Nasal route as needed for Opioid Reversal 1 each 5    vitamin D (CHOLECALCIFEROL) 50 MCG (2000 UT) TABS tablet TAKE 1 TABLET BY MOUTH DAILY 30 tablet 3    omeprazole (PRILOSEC) 40 MG delayed release capsule       GAVILAX 17 GM/SCOOP powder       Adalimumab (HUMIRA PEN) 40 MG/0.4ML PNKT       cetirizine (ZYRTEC) 10 MG tablet       Alcohol Swabs (ALCOHOL PADS) 70 % PADS CLEAN THE SKIN BY USING 1 PAD ON THE AFFECTED AREA BEFORE APPLYING ANY TOPICAL CREAM, 3 TIMES DAILY 100 each 0    hydrOXYzine (ATARAX) 25 MG tablet Take 25 mg by mouth every 6 hours as needed for Itching      acetaminophen (TYLENOL) 325 MG tablet Take 650 mg by mouth every 4 hours as needed for Pain       No current facility-administered medications on file prior to visit. She has not tried physical therapy. Recent Procedures:  N/A    Review of Systems   Constitutional: Negative for fever. HENT: Negative for hearing loss. Respiratory: Negative for shortness of breath. Gastrointestinal: Negative for constipation, diarrhea and nausea. Genitourinary: Negative for difficulty urinating. Musculoskeletal: Negative for back pain and neck pain. Skin: Negative for rash. Neurological: Negative for headaches. Hematological: Does not bruise/bleed easily. Psychiatric/Behavioral: Negative for sleep disturbance. Objective:     Vitals:  /76 (Site: Right Upper Arm, Position: Sitting, Cuff Size: Large Adult)   Pulse 60   Temp 96.7 °F (35.9 °C) (Temporal)   Resp 12   Ht 5' 4\" (1.626 m)   Wt 167 lb (75.8 kg)   LMP 01/01/1997 (Exact Date) Comment: hysterectomy  SpO2 96%   BMI 28.67 kg/m² Pain Score:   8    PHYSICAL EXAM:    Patient alert and oriented times three, recent and remote memory intact, mood and affect normal, judgement and insight normal. Strength functional for ambulation. Balance and coordinational functional. Visualized skin intact. No visualized cyanosis, pulses intact.  Cranial nerves 2-12 grossly intact. No obvious upper motor neuron signs seen. Sensation intact to light touch. Pain is in the right Achilles tendon and the skin lesions have been spreading below the knee and the right leg. Radiculopathy:  Negative    Studies Review:  Reviewed MRI report of Lumbar spine dated 4/22/2021. Assessment:           Diagnosis Orders   1. Large granular lymphocytic leukemia (HCC)  baclofen (LIORESAL) 10 MG tablet   2. Skin ulcer with fat layer exposed ([address]  baclofen (LIORESAL) 10 MG tablet   3. Pain in right ankle and joints of right foot  baclofen (LIORESAL) 10 MG tablet   4. Posterior tibial tendinitis of right lower extremity  baclofen (LIORESAL) 10 MG tablet         Plan:     No orders of the defined types were placed in this encounter. Orders Placed This Encounter   Medications    baclofen (LIORESAL) 10 MG tablet     Sig: Take 1 tablet by mouth 2 times daily     Dispense:  60 tablet     Refill:  2       Discussed with the patient to try some baclofen 10 mg twice a day for sharp shooting pain. The patient understands the plan. Follow up:  Return if symptoms worsen or fail to improve.     Nia Valle MD

## 2021-09-01 ENCOUNTER — OFFICE VISIT (OUTPATIENT)
Dept: NEUROLOGY | Age: 56
End: 2021-09-01
Payer: COMMERCIAL

## 2021-09-01 VITALS
WEIGHT: 151 LBS | SYSTOLIC BLOOD PRESSURE: 102 MMHG | DIASTOLIC BLOOD PRESSURE: 60 MMHG | HEART RATE: 80 BPM | BODY MASS INDEX: 25.92 KG/M2

## 2021-09-01 DIAGNOSIS — M54.16 LUMBAR RADICULOPATHY: ICD-10-CM

## 2021-09-01 DIAGNOSIS — G60.0 HEREDITARY MOTOR AND SENSORY NEUROPATHY: ICD-10-CM

## 2021-09-01 DIAGNOSIS — G62.9 POLYNEUROPATHY, UNSPECIFIED: Primary | ICD-10-CM

## 2021-09-01 DIAGNOSIS — G93.41 METABOLIC ENCEPHALOPATHY: ICD-10-CM

## 2021-09-01 DIAGNOSIS — M47.22 CERVICAL SPONDYLOSIS WITH RADICULOPATHY: ICD-10-CM

## 2021-09-01 DIAGNOSIS — G25.81 RESTLESS LEG SYNDROME: ICD-10-CM

## 2021-09-01 DIAGNOSIS — L88 PYODERMIC GANGRENOSUM: ICD-10-CM

## 2021-09-01 DIAGNOSIS — G82.20 PARAPARESIS (HCC): ICD-10-CM

## 2021-09-01 PROCEDURE — 99214 OFFICE O/P EST MOD 30 MIN: CPT | Performed by: PSYCHIATRY & NEUROLOGY

## 2021-09-01 PROCEDURE — G8417 CALC BMI ABV UP PARAM F/U: HCPCS | Performed by: PSYCHIATRY & NEUROLOGY

## 2021-09-01 PROCEDURE — G8427 DOCREV CUR MEDS BY ELIG CLIN: HCPCS | Performed by: PSYCHIATRY & NEUROLOGY

## 2021-09-01 PROCEDURE — 3017F COLORECTAL CA SCREEN DOC REV: CPT | Performed by: PSYCHIATRY & NEUROLOGY

## 2021-09-01 PROCEDURE — 4004F PT TOBACCO SCREEN RCVD TLK: CPT | Performed by: PSYCHIATRY & NEUROLOGY

## 2021-09-01 RX ORDER — OXYCODONE HYDROCHLORIDE AND ACETAMINOPHEN 5; 325 MG/1; MG/1
1 TABLET ORAL 2 TIMES DAILY
Qty: 60 TABLET | Refills: 0 | Status: SHIPPED | OUTPATIENT
Start: 2021-09-01 | End: 2021-10-02 | Stop reason: SDUPTHER

## 2021-09-01 ASSESSMENT — ENCOUNTER SYMPTOMS
BACK PAIN: 1
VOMITING: 0
SHORTNESS OF BREATH: 0
COLOR CHANGE: 0
NAUSEA: 0
TROUBLE SWALLOWING: 0
PHOTOPHOBIA: 0
CHOKING: 0

## 2021-09-01 NOTE — PROGRESS NOTES
Subjective:      Patient ID: Karissa Browne is a 55 y.o. female who presents today for:  Chief Complaint   Patient presents with    Follow-up     Pt states that things have been not good, She states that pain managment put her on baclofen to try for her pain and it has not really done anything to hhelp her at all. She says that her ankle area has been hurting so bad, she describes it as fire wtaer. HPI 60-year-old right-handed female with a history of significant neuropathy and pain. Patient has pyoderma gangrenosum. She has open ulcers at times and very painful. We therefore referred her to pain management and she was given baclofen and no pain management. She is already on high-dose of gabapentin which she thinks helps as if she does not take this she has considerable pain. She also has been on tramadol in the past which has not helped. Level of higher dose of Cymbalta 60 mg twice a day and she has pain 8 out of 10.   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    Neuromuscular disorder ([address]     Osteoarthritis     Pneumonia due to organism     Pyoderma gangrenosa     Sweet syndrome     Ulcerative colitis ([facility]      Past Surgical History:   Procedure Laterality Date    ABDOMEN SURGERY      sleen repair    APPENDECTOMY      BACK SURGERY      BREAST SURGERY      fatty tumor removed, benign    COLONOSCOPY      COSMETIC SURGERY      tummy tuck    EYE SURGERY      bilateral cataract surgery    HYSTERECTOMY      SKIN BIOPSY      SPINAL FUSION      TONSILLECTOMY       Social History     Socioeconomic History    Marital status:      Spouse name: Not on file    Number of children: Not on file    Years of education: Not on file    Highest education level: Not on file   Occupational History    Not on file   Tobacco Use    Smoking status: Current Every Day Smoker     Packs/day: 1.00     Years: 40.00     Pack years: 40.00     Types: Cigarettes    Smokeless tobacco: Never Used   Vaping Use    Vaping Use: Never used   Substance and Sexual Activity    Alcohol use: Never    Drug use: No    Sexual activity: Not Currently   Other Topics Concern    Not on file   Social History Narrative    Not on file     Social Determinants of Health     Financial Resource Strain:     Difficulty of Paying Living Expenses:    Food Insecurity:     Worried About Running Out of Food in the Last Year:     [address] in the Last Year:    Transportation Needs:     Lack of Transportation (Medical):  Lack of Transportation (Non-Medical):    Physical Activity:     Days of Exercise per Week:     Minutes of Exercise per Session:    Stress:     Feeling of Stress :    Social Connections:     Frequency of Communication with Friends and Family:     Frequency of Social Gatherings with Friends and Family:     Attends Church Services:     Active Member of Clubs or Organizations:     Attends Club or Organization Meetings:     Marital Status:    Intimate Partner Violence:     Fear of Current or Ex-Partner:     Emotionally Abused:     Physically Abused:     Sexually Abused:      No family history on file.   Allergies   Allergen Reactions    Amoxicillin-Pot Clavulanate      Other reaction(s): GI Upset, Intolerance, Other: See Comments  Nose bleed    Other Itching     IVP dye         Current Outpatient Medications   Medication Sig Dispense Refill    baclofen (LIORESAL) 10 MG tablet Take 1 tablet by mouth 2 times daily 60 tablet 2    traZODone (DESYREL) 100 MG tablet TAKE 1 TABLET BY MOUTH NIGHTLY 30 tablet 0    DULoxetine (CYMBALTA) 60 MG extended release capsule TAKE 2 CAPSULES BY MOUTH EVERY EVENING 60 capsule 2    gabapentin (NEURONTIN) 800 MG tablet TAKE 1 TABLET BY MOUTH 4 TIMES DAILY FOR 30 DAYS. 120 tablet 2    BANOPHEN 25 MG capsule TAKE 1 TABLET BY MOUTH EVERY 6 HOURS AS NEEDED FOR ITCHING 56 capsule 2    BANOPHEN 25 MG capsule       clobetasol (TEMOVATE) 0.05 % cream       naloxone 4 MG/0.1ML LIQD nasal spray 1 spray by Nasal route as needed for Opioid Reversal 1 each 5    vitamin D (CHOLECALCIFEROL) 50 MCG (2000 UT) TABS tablet TAKE 1 TABLET BY MOUTH DAILY 30 tablet 3    omeprazole (PRILOSEC) 40 MG delayed release capsule       GAVILAX 17 GM/SCOOP powder       Adalimumab (HUMIRA PEN) 40 MG/0.4ML PNKT       cetirizine (ZYRTEC) 10 MG tablet       Alcohol Swabs (ALCOHOL PADS) 70 % PADS CLEAN THE SKIN BY USING 1 PAD ON THE AFFECTED AREA BEFORE APPLYING ANY TOPICAL CREAM, 3 TIMES DAILY 100 each 0    hydrOXYzine (ATARAX) 25 MG tablet Take 25 mg by mouth every 6 hours as needed for Itching      acetaminophen (TYLENOL) 325 MG tablet Take 650 mg by mouth every 4 hours as needed for Pain       No current facility-administered medications for this visit. Review of Systems   Constitutional: Negative for fever. HENT: Negative for ear pain, tinnitus and trouble swallowing. Eyes: Negative for photophobia and visual disturbance. Respiratory: Negative for choking and shortness of breath. Cardiovascular: Negative for chest pain and palpitations. Gastrointestinal: Negative for nausea and vomiting. Musculoskeletal: Positive for back pain and myalgias. Negative for gait problem, joint swelling, neck pain and neck stiffness. Skin: Negative for color change. Allergic/Immunologic: Negative for food allergies. Neurological: Positive for weakness. Negative for dizziness, tremors, seizures, syncope, facial asymmetry, speech difficulty, light-headedness, numbness and headaches.    Psychiatric/Behavioral: 11/05/2012     Lab Results   Component Value Date     02/04/2021    K 3.7 02/04/2021    K 4.3 05/11/2020     02/04/2021    CO2 28 02/04/2021    BUN 12 02/04/2021    CREATININE 0.83 03/17/2021    GFRAA >60 03/17/2021    LABGLOM >60 03/17/2021    GLUCOSE 107 02/04/2021    GLUCOSE 146 11/02/2011    PROT 7.6 02/04/2021    LABALBU 4.2 02/04/2021    LABALBU 4.1 11/02/2011    CALCIUM 9.1 02/04/2021    BILITOT <0.2 02/04/2021    ALKPHOS 98 02/04/2021    AST 14 02/04/2021    ALT 8 02/04/2021     Lab Results   Component Value Date    PROTIME 13.0 10/05/2019    INR 0.9 10/05/2019     Lab Results   Component Value Date    TSH 1.030 03/26/2020    UOLKGUNZ77 579 11/04/2020     Lab Results   Component Value Date    TRIG 163 05/09/2020    HDL 40 05/09/2020    LDLCALC 90 05/09/2020     Lab Results   Component Value Date    LABAMPH Neg 05/12/2021    BARBSCNU Neg 05/12/2021    LABBENZ Neg 05/12/2021    LABMETH Neg 05/12/2021    OPIATESCREENURINE Neg 05/12/2021    PHENCYCLIDINESCREENURINE Neg 05/12/2021     No results found for: LITHIUM, DILFRTOT, VALPROATE    Assessment:       Diagnosis Orders   1. Polyneuropathy, unspecified     2. Paraparesis ([address]     3. Metabolic encephalopathy     4. Lumbar radiculopathy     5. Hereditary motor and sensory neuropathy     6. Cervical spondylosis with radiculopathy     7. Restless leg syndrome     8. Pyodermic gangrenosum       55 right-handed female history of multiple joint issues patient has been seen by multiple neurologists and is on Lyrica. We will change her to gabapentin and high-dose of Cymbalta. She was on Percocet when last seen and then we are further referred to pain management though they would not give her pain medication and this is the only medication that helps. Patient has pyoderma years on and this is painful she has a nonhealing ulcer already with open wound. And failed grafts. Patient reports pain 8 out of 10 is in tears.   Been to another pain management and she had money and they would not see her either. I therefore have taken the initiative to give her 2 Percocets a day as she is in considerable pain and significant difficulty with her activity of daily living. MRI of the lumbar spine reviewed and she has considerable spondylosis without any canal stenosis. She is a high dose of gabapentin and Cymbalta which will continue for now. We did discuss addiction potential as well as medication though she has been on this and she has never had any addiction issues and she reports that she has not any withdrawal.  We will see how she does on the medication. It is unfortunate that we have took consider pain management from neurological standpoint given that we did not receive any help from the pain specialist.   Plan:      No orders of the defined types were placed in this encounter. No orders of the defined types were placed in this encounter. No follow-ups on file.       Rosy Mcmahon MD

## 2021-09-07 NOTE — TELEPHONE ENCOUNTER
Requesting medication refill.  Please approve or deny this request.    Rx requested:  Requested Prescriptions     Pending Prescriptions Disp Refills    BANOPHEN 25 MG capsule [Pharmacy Med Name: Adin Villegaso 25 MG CAPSULE 25 Capsule] 56 capsule 2     Sig: TAKE 1 TABLET BY MOUTH EVERY 6 HOURS AS NEEDED FOR ITCHING       Last Office Visit:   5/12/2021    Last Filled:      Last Labs:      Next Visit Date:  Future Appointments   Date Time Provider Manan Roachi   10/1/2021  1:00 PM Annabel Garrison MD [address]   10/5/2021  1:30 PM Mitch Taylor MD [address]   12/29/2021  2:30 PM Usama Ortiz MD [facility]

## 2021-09-08 RX ORDER — DIPHENHYDRAMINE HYDROCHLORIDE 25 MG/1
CAPSULE ORAL
Qty: 56 CAPSULE | Refills: 2 | Status: SHIPPED | OUTPATIENT
Start: 2021-09-08 | End: 2021-12-17 | Stop reason: SDUPTHER

## 2021-09-20 RX ORDER — DULOXETIN HYDROCHLORIDE 60 MG/1
120 CAPSULE, DELAYED RELEASE ORAL EVERY EVENING
Qty: 60 CAPSULE | Refills: 2 | Status: SHIPPED | OUTPATIENT
Start: 2021-09-20 | End: 2021-11-18

## 2021-09-27 RX ORDER — TRAZODONE HYDROCHLORIDE 100 MG/1
100 TABLET ORAL NIGHTLY
Qty: 30 TABLET | Refills: 2 | Status: SHIPPED | OUTPATIENT
Start: 2021-09-27 | End: 2021-11-23

## 2021-09-27 NOTE — TELEPHONE ENCOUNTER
Pharmacy is requesting medication refill.  Please approve or deny this request.    Rx requested:  Requested Prescriptions     Pending Prescriptions Disp Refills    traZODone (DESYREL) 100 MG tablet [Pharmacy Med Name: TRAZODONE  MG TABS 100 Tablet] 30 tablet 0     Sig: TAKE 1 TABLET BY MOUTH NIGHTLY         Last Office Visit:   9/1/2021      Next Visit Date:  Future Appointments   Date Time Provider Manan Melendezisti   10/1/2021  1:00 PM Doren Jeans, MD [address]   10/5/2021  1:30 PM Anny Taylor MD [address]   12/29/2021  2:30 PM Usama Chirinos MD [facility]

## 2021-10-01 DIAGNOSIS — G62.9 POLYNEUROPATHY, UNSPECIFIED: ICD-10-CM

## 2021-10-01 NOTE — TELEPHONE ENCOUNTER
Patient is requesting medication refill. Please approve or deny this request.    Rx requested:  Requested Prescriptions     Pending Prescriptions Disp Refills    oxyCODONE-acetaminophen (PERCOCET) 5-325 MG per tablet 60 tablet 0     Sig: Take 1 tablet by mouth 2 times daily for 30 days. Intended supply: 3 days.  Take lowest dose possible to manage pain         Last Office Visit:   9/1/2021      Next Visit Date:  Future Appointments   Date Time Provider [facility]   10/5/2021  1:30 PM Carlos Taylor MD [address]   12/29/2021  2:30 PM Pantera Vicente MD [facility]

## 2021-10-02 RX ORDER — OXYCODONE HYDROCHLORIDE AND ACETAMINOPHEN 5; 325 MG/1; MG/1
1 TABLET ORAL 2 TIMES DAILY
Qty: 60 TABLET | Refills: 0 | Status: SHIPPED | OUTPATIENT
Start: 2021-10-02 | End: 2021-11-02 | Stop reason: SDUPTHER

## 2021-10-03 NOTE — TELEPHONE ENCOUNTER
Requesting medication refill.  Please approve or deny this request.    Rx requested:  Requested Prescriptions     Pending Prescriptions Disp Refills    BANOPHEN 25 MG capsule [Pharmacy Med Name: Berry Jester 25 MG CAPSULE 25 Capsule] 56 capsule 2     Sig: TAKE 1 TABLET BY MOUTH EVERY 6 HOURS AS NEEDED FOR ITCHING       Last Office Visit:   5/12/2021    Last Filled:      Last Labs:      Next Visit Date:  Future Appointments   Date Time Provider [facility]   10/5/2021  1:30 PM Carlos Taylor MD [address]   12/29/2021  2:30 PM Usama Walsh MD [facility]

## 2021-10-04 RX ORDER — DIPHENHYDRAMINE HYDROCHLORIDE 25 MG/1
CAPSULE ORAL
Qty: 56 CAPSULE | Refills: 2 | Status: SHIPPED | OUTPATIENT
Start: 2021-10-04 | End: 2021-12-17

## 2021-11-01 DIAGNOSIS — G62.9 POLYNEUROPATHY, UNSPECIFIED: ICD-10-CM

## 2021-11-02 RX ORDER — OXYCODONE HYDROCHLORIDE AND ACETAMINOPHEN 5; 325 MG/1; MG/1
1 TABLET ORAL 2 TIMES DAILY
Qty: 60 TABLET | Refills: 0 | Status: SHIPPED | OUTPATIENT
Start: 2021-11-02 | End: 2021-11-30 | Stop reason: SDUPTHER

## 2021-11-18 RX ORDER — DULOXETIN HYDROCHLORIDE 60 MG/1
120 CAPSULE, DELAYED RELEASE ORAL EVERY EVENING
Qty: 60 CAPSULE | Refills: 2 | Status: SHIPPED | OUTPATIENT
Start: 2021-11-18 | End: 2022-02-14

## 2021-11-23 RX ORDER — TRAZODONE HYDROCHLORIDE 100 MG/1
100 TABLET ORAL NIGHTLY
Qty: 30 TABLET | Refills: 2 | Status: SHIPPED | OUTPATIENT
Start: 2021-11-23 | End: 2022-03-14

## 2021-11-29 DIAGNOSIS — G62.9 POLYNEUROPATHY, UNSPECIFIED: ICD-10-CM

## 2021-11-30 ENCOUNTER — TELEPHONE (OUTPATIENT)
Dept: FAMILY MEDICINE CLINIC | Age: 56
End: 2021-11-30

## 2021-11-30 RX ORDER — OXYCODONE HYDROCHLORIDE AND ACETAMINOPHEN 5; 325 MG/1; MG/1
1 TABLET ORAL 2 TIMES DAILY
Qty: 60 TABLET | Refills: 0 | Status: SHIPPED | OUTPATIENT
Start: 2021-11-30 | End: 2021-12-29 | Stop reason: SDUPTHER

## 2021-11-30 NOTE — TELEPHONE ENCOUNTER
PT called requesting antibiotics for an infected boil. She stated that she has not been seen since June, but has transportation issues. Please call Bee Costa at [phone] to advise.

## 2021-12-05 RX ORDER — GREEN TEA/HOODIA GORDONII 315-12.5MG
1 CAPSULE ORAL 2 TIMES DAILY
Qty: 20 TABLET | Refills: 0 | Status: SHIPPED | OUTPATIENT
Start: 2021-12-05 | End: 2021-12-15

## 2021-12-05 RX ORDER — SULFAMETHOXAZOLE AND TRIMETHOPRIM 800; 160 MG/1; MG/1
1 TABLET ORAL 2 TIMES DAILY
Qty: 14 TABLET | Refills: 0 | Status: SHIPPED | OUTPATIENT
Start: 2021-12-05 | End: 2021-12-12

## 2021-12-17 ENCOUNTER — OFFICE VISIT (OUTPATIENT)
Dept: FAMILY MEDICINE CLINIC | Age: 56
End: 2021-12-17
Payer: COMMERCIAL

## 2021-12-17 VITALS
SYSTOLIC BLOOD PRESSURE: 106 MMHG | WEIGHT: 151 LBS | DIASTOLIC BLOOD PRESSURE: 74 MMHG | BODY MASS INDEX: 25.78 KG/M2 | OXYGEN SATURATION: 95 % | HEART RATE: 52 BPM | HEIGHT: 64 IN | TEMPERATURE: 96.3 F

## 2021-12-17 DIAGNOSIS — J30.2 SEASONAL ALLERGIC RHINITIS, UNSPECIFIED TRIGGER: ICD-10-CM

## 2021-12-17 DIAGNOSIS — R05.9 COUGH: Primary | ICD-10-CM

## 2021-12-17 LAB
INFLUENZA A ANTIBODY: NORMAL
INFLUENZA B ANTIBODY: NORMAL
Lab: NORMAL
PERFORMING INSTRUMENT: NORMAL
QC PASS/FAIL: NORMAL
SARS-COV-2, POC: NORMAL

## 2021-12-17 PROCEDURE — G8484 FLU IMMUNIZE NO ADMIN: HCPCS | Performed by: PHYSICIAN ASSISTANT

## 2021-12-17 PROCEDURE — 99213 OFFICE O/P EST LOW 20 MIN: CPT | Performed by: PHYSICIAN ASSISTANT

## 2021-12-17 PROCEDURE — G8427 DOCREV CUR MEDS BY ELIG CLIN: HCPCS | Performed by: PHYSICIAN ASSISTANT

## 2021-12-17 PROCEDURE — G8417 CALC BMI ABV UP PARAM F/U: HCPCS | Performed by: PHYSICIAN ASSISTANT

## 2021-12-17 PROCEDURE — 87426 SARSCOV CORONAVIRUS AG IA: CPT | Performed by: PHYSICIAN ASSISTANT

## 2021-12-17 PROCEDURE — 3017F COLORECTAL CA SCREEN DOC REV: CPT | Performed by: PHYSICIAN ASSISTANT

## 2021-12-17 PROCEDURE — 4004F PT TOBACCO SCREEN RCVD TLK: CPT | Performed by: PHYSICIAN ASSISTANT

## 2021-12-17 PROCEDURE — 87804 INFLUENZA ASSAY W/OPTIC: CPT | Performed by: PHYSICIAN ASSISTANT

## 2021-12-17 RX ORDER — DIPHENHYDRAMINE HCL 25 MG
CAPSULE ORAL
Qty: 56 CAPSULE | Refills: 1 | Status: SHIPPED | OUTPATIENT
Start: 2021-12-17 | End: 2022-03-18 | Stop reason: SDUPTHER

## 2021-12-17 SDOH — ECONOMIC STABILITY: FOOD INSECURITY: WITHIN THE PAST 12 MONTHS, YOU WORRIED THAT YOUR FOOD WOULD RUN OUT BEFORE YOU GOT MONEY TO BUY MORE.: NEVER TRUE

## 2021-12-17 SDOH — ECONOMIC STABILITY: FOOD INSECURITY: WITHIN THE PAST 12 MONTHS, THE FOOD YOU BOUGHT JUST DIDN'T LAST AND YOU DIDN'T HAVE MONEY TO GET MORE.: NEVER TRUE

## 2021-12-17 ASSESSMENT — SOCIAL DETERMINANTS OF HEALTH (SDOH): HOW HARD IS IT FOR YOU TO PAY FOR THE VERY BASICS LIKE FOOD, HOUSING, MEDICAL CARE, AND HEATING?: NOT HARD AT ALL

## 2021-12-22 ENCOUNTER — HOSPITAL ENCOUNTER (INPATIENT)
Age: 56
LOS: 1 days | Discharge: NURSING, REHAB OR LONG-TERM CARE | DRG: 871 | End: 2021-12-23
Attending: STUDENT IN AN ORGANIZED HEALTH CARE EDUCATION/TRAINING PROGRAM | Admitting: INTERNAL MEDICINE
Payer: COMMERCIAL

## 2021-12-22 ENCOUNTER — APPOINTMENT (OUTPATIENT)
Dept: GENERAL RADIOLOGY | Age: 56
DRG: 871 | End: 2021-12-22
Payer: COMMERCIAL

## 2021-12-22 ENCOUNTER — APPOINTMENT (OUTPATIENT)
Dept: CT IMAGING | Age: 56
DRG: 871 | End: 2021-12-22
Payer: COMMERCIAL

## 2021-12-22 DIAGNOSIS — E86.0 DEHYDRATION: ICD-10-CM

## 2021-12-22 DIAGNOSIS — Z20.822 SUSPECTED COVID-19 VIRUS INFECTION: ICD-10-CM

## 2021-12-22 DIAGNOSIS — A41.9 SEPSIS WITH ACUTE ORGAN DYSFUNCTION AND SEPTIC SHOCK, DUE TO UNSPECIFIED ORGANISM, UNSPECIFIED TYPE (HCC): Primary | ICD-10-CM

## 2021-12-22 DIAGNOSIS — R65.21 SEPSIS WITH ACUTE ORGAN DYSFUNCTION AND SEPTIC SHOCK, DUE TO UNSPECIFIED ORGANISM, UNSPECIFIED TYPE (HCC): Primary | ICD-10-CM

## 2021-12-22 DIAGNOSIS — R10.9 ABDOMINAL PAIN, UNSPECIFIED ABDOMINAL LOCATION: ICD-10-CM

## 2021-12-22 LAB
ADENOVIRUS BY PCR: NOT DETECTED
ALBUMIN SERPL-MCNC: 2.3 G/DL (ref 3.5–4.6)
ALBUMIN SERPL-MCNC: 3.4 G/DL (ref 3.5–4.6)
ALP BLD-CCNC: 55 U/L (ref 40–130)
ALP BLD-CCNC: 79 U/L (ref 40–130)
ALT SERPL-CCNC: 7 U/L (ref 0–33)
ALT SERPL-CCNC: 8 U/L (ref 0–33)
ANION GAP SERPL CALCULATED.3IONS-SCNC: 13 MEQ/L (ref 9–15)
ANION GAP SERPL CALCULATED.3IONS-SCNC: 6 MEQ/L (ref 9–15)
ANISOCYTOSIS: ABNORMAL
AST SERPL-CCNC: 14 U/L (ref 0–35)
AST SERPL-CCNC: 20 U/L (ref 0–35)
ATYPICAL LYMPHOCYTE RELATIVE PERCENT: 12 %
BANDED NEUTROPHILS RELATIVE PERCENT: 19 % (ref 5–11)
BASOPHILS ABSOLUTE: 0 K/UL (ref 0–0.2)
BASOPHILS ABSOLUTE: 0 K/UL (ref 0–0.2)
BASOPHILS RELATIVE PERCENT: 0.5 %
BASOPHILS RELATIVE PERCENT: 0.8 %
BILIRUB SERPL-MCNC: 0.3 MG/DL (ref 0.2–0.7)
BILIRUB SERPL-MCNC: <0.2 MG/DL (ref 0.2–0.7)
BILIRUBIN URINE: NEGATIVE
BILIRUBIN URINE: NEGATIVE
BLOOD, URINE: NEGATIVE
BLOOD, URINE: NEGATIVE
BORDETELLA PARAPERTUSSIS BY PCR: NOT DETECTED
BORDETELLA PERTUSSIS BY PCR: NOT DETECTED
BUN BLDV-MCNC: 4 MG/DL (ref 6–20)
BUN BLDV-MCNC: 9 MG/DL (ref 6–20)
CALCIUM SERPL-MCNC: 6.4 MG/DL (ref 8.5–9.9)
CALCIUM SERPL-MCNC: 8.8 MG/DL (ref 8.5–9.9)
CHLAMYDOPHILIA PNEUMONIAE BY PCR: NOT DETECTED
CHLORIDE BLD-SCNC: 110 MEQ/L (ref 95–107)
CHLORIDE BLD-SCNC: 91 MEQ/L (ref 95–107)
CLARITY: CLEAR
CLARITY: CLEAR
CO2: 20 MEQ/L (ref 20–31)
CO2: 21 MEQ/L (ref 20–31)
COLOR: YELLOW
COLOR: YELLOW
CORONAVIRUS 229E BY PCR: NOT DETECTED
CORONAVIRUS HKU1 BY PCR: NOT DETECTED
CORONAVIRUS NL63 BY PCR: NOT DETECTED
CORONAVIRUS OC43 BY PCR: NOT DETECTED
CREAT SERPL-MCNC: 0.43 MG/DL (ref 0.5–0.9)
CREAT SERPL-MCNC: 0.7 MG/DL (ref 0.5–0.9)
CREATININE URINE: 38.6 MG/DL
EKG ATRIAL RATE: 90 BPM
EKG P AXIS: 66 DEGREES
EKG P-R INTERVAL: 144 MS
EKG Q-T INTERVAL: 364 MS
EKG QRS DURATION: 82 MS
EKG QTC CALCULATION (BAZETT): 445 MS
EKG R AXIS: 56 DEGREES
EKG T AXIS: 56 DEGREES
EKG VENTRICULAR RATE: 90 BPM
EOSINOPHILS ABSOLUTE: 0 K/UL (ref 0–0.7)
EOSINOPHILS ABSOLUTE: 0 K/UL (ref 0–0.7)
EOSINOPHILS RELATIVE PERCENT: 0 %
EOSINOPHILS RELATIVE PERCENT: 0 %
GFR AFRICAN AMERICAN: >60
GFR NON-AFRICAN AMERICAN: >60
GLOBULIN: 2.6 G/DL (ref 2.3–3.5)
GLOBULIN: 4 G/DL (ref 2.3–3.5)
GLUCOSE BLD-MCNC: 193 MG/DL (ref 70–99)
GLUCOSE BLD-MCNC: 86 MG/DL (ref 70–99)
GLUCOSE URINE: NEGATIVE MG/DL
GLUCOSE URINE: NEGATIVE MG/DL
HCT VFR BLD CALC: 34.2 % (ref 37–47)
HCT VFR BLD CALC: 43.7 % (ref 37–47)
HEMATOLOGY PATH CONSULT: NORMAL
HEMATOLOGY PATH CONSULT: YES
HEMOGLOBIN: 11.2 G/DL (ref 12–16)
HEMOGLOBIN: 14.4 G/DL (ref 12–16)
HUMAN METAPNEUMOVIRUS BY PCR: NOT DETECTED
HUMAN RHINOVIRUS/ENTEROVIRUS BY PCR: NOT DETECTED
INFLUENZA A BY PCR: NOT DETECTED
INFLUENZA B BY PCR: NOT DETECTED
KETONES, URINE: NEGATIVE MG/DL
KETONES, URINE: NEGATIVE MG/DL
LACTIC ACID, SEPSIS: 0.9 MMOL/L (ref 0.5–1.9)
LACTIC ACID, SEPSIS: 1.4 MMOL/L (ref 0.5–1.9)
LACTIC ACID: 4 MMOL/L (ref 0.5–2.2)
LEUKOCYTE ESTERASE, URINE: NEGATIVE
LEUKOCYTE ESTERASE, URINE: NEGATIVE
LIPASE: 31 U/L (ref 12–95)
LIPASE: 33 U/L (ref 12–95)
LYMPHOCYTES ABSOLUTE: 0.9 K/UL (ref 1–4.8)
LYMPHOCYTES ABSOLUTE: 1.1 K/UL (ref 1–4.8)
LYMPHOCYTES RELATIVE PERCENT: 18 %
LYMPHOCYTES RELATIVE PERCENT: 35.2 %
MAGNESIUM: 1.5 MG/DL (ref 1.7–2.4)
MAGNESIUM: 1.7 MG/DL (ref 1.7–2.4)
MCH RBC QN AUTO: 27.6 PG (ref 27–31.3)
MCH RBC QN AUTO: 27.8 PG (ref 27–31.3)
MCHC RBC AUTO-ENTMCNC: 32.7 % (ref 33–37)
MCHC RBC AUTO-ENTMCNC: 32.8 % (ref 33–37)
MCV RBC AUTO: 84.2 FL (ref 82–100)
MCV RBC AUTO: 84.9 FL (ref 82–100)
MONOCYTES ABSOLUTE: 0.1 K/UL (ref 0.2–0.8)
MONOCYTES ABSOLUTE: 0.2 K/UL (ref 0.2–0.8)
MONOCYTES RELATIVE PERCENT: 2.1 %
MONOCYTES RELATIVE PERCENT: 7.6 %
MYCOPLASMA PNEUMONIAE BY PCR: NOT DETECTED
NEUTROPHILS ABSOLUTE: 1.8 K/UL (ref 1.4–6.5)
NEUTROPHILS ABSOLUTE: 2.1 K/UL (ref 1.4–6.5)
NEUTROPHILS RELATIVE PERCENT: 50 %
NEUTROPHILS RELATIVE PERCENT: 56.7 %
NITRITE, URINE: NEGATIVE
NITRITE, URINE: NEGATIVE
PARAINFLUENZA VIRUS 1 BY PCR: NOT DETECTED
PARAINFLUENZA VIRUS 2 BY PCR: NOT DETECTED
PARAINFLUENZA VIRUS 3 BY PCR: NOT DETECTED
PARAINFLUENZA VIRUS 4 BY PCR: NOT DETECTED
PDW BLD-RTO: 14.9 % (ref 11.5–14.5)
PDW BLD-RTO: 14.9 % (ref 11.5–14.5)
PERFORMED ON: NORMAL
PH UA: 6.5 (ref 5–9)
PH UA: 6.5 (ref 5–9)
PLATELET # BLD: 153 K/UL (ref 130–400)
PLATELET # BLD: 208 K/UL (ref 130–400)
PLATELET SLIDE REVIEW: NORMAL
POC CREATININE WHOLE BLOOD: 0.7
POC CREATININE: 0.7 MG/DL (ref 0.6–1.1)
POC SAMPLE TYPE: NORMAL
POTASSIUM REFLEX MAGNESIUM: 3.4 MEQ/L (ref 3.4–4.9)
POTASSIUM SERPL-SCNC: 3.1 MEQ/L (ref 3.4–4.9)
PRO-BNP: 509 PG/ML
PROTEIN UA: NEGATIVE MG/DL
PROTEIN UA: NEGATIVE MG/DL
RBC # BLD: 4.03 M/UL (ref 4.2–5.4)
RBC # BLD: 5.2 M/UL (ref 4.2–5.4)
RESPIRATORY SYNCYTIAL VIRUS BY PCR: NOT DETECTED
SARS-COV-2, NAAT: NOT DETECTED
SARS-COV-2, PCR: DETECTED
SODIUM BLD-SCNC: 125 MEQ/L (ref 135–144)
SODIUM BLD-SCNC: 136 MEQ/L (ref 135–144)
SODIUM URINE: <20 MEQ/L
SPECIFIC GRAVITY UA: 1.02 (ref 1–1.03)
SPECIFIC GRAVITY UA: 1.03 (ref 1–1.03)
TOTAL PROTEIN: 4.9 G/DL (ref 6.3–8)
TOTAL PROTEIN: 7.4 G/DL (ref 6.3–8)
TROPONIN: <0.01 NG/ML (ref 0–0.01)
URINE REFLEX TO CULTURE: NORMAL
URINE REFLEX TO CULTURE: NORMAL
UROBILINOGEN, URINE: 0.2 E.U./DL
UROBILINOGEN, URINE: 0.2 E.U./DL
VACUOLATED NEUTROPHILS: PRESENT
WBC # BLD: 3 K/UL (ref 4.8–10.8)
WBC # BLD: 3.1 K/UL (ref 4.8–10.8)

## 2021-12-22 PROCEDURE — 87086 URINE CULTURE/COLONY COUNT: CPT

## 2021-12-22 PROCEDURE — 6370000000 HC RX 637 (ALT 250 FOR IP): Performed by: INTERNAL MEDICINE

## 2021-12-22 PROCEDURE — 74177 CT ABD & PELVIS W/CONTRAST: CPT

## 2021-12-22 PROCEDURE — 71045 X-RAY EXAM CHEST 1 VIEW: CPT

## 2021-12-22 PROCEDURE — 87040 BLOOD CULTURE FOR BACTERIA: CPT

## 2021-12-22 PROCEDURE — 83930 ASSAY OF BLOOD OSMOLALITY: CPT

## 2021-12-22 PROCEDURE — 83735 ASSAY OF MAGNESIUM: CPT

## 2021-12-22 PROCEDURE — 84300 ASSAY OF URINE SODIUM: CPT

## 2021-12-22 PROCEDURE — 96361 HYDRATE IV INFUSION ADD-ON: CPT

## 2021-12-22 PROCEDURE — 93010 ELECTROCARDIOGRAM REPORT: CPT | Performed by: INTERNAL MEDICINE

## 2021-12-22 PROCEDURE — 99211 OFF/OP EST MAY X REQ PHY/QHP: CPT

## 2021-12-22 PROCEDURE — 96365 THER/PROPH/DIAG IV INF INIT: CPT

## 2021-12-22 PROCEDURE — 87635 SARS-COV-2 COVID-19 AMP PRB: CPT

## 2021-12-22 PROCEDURE — 83880 ASSAY OF NATRIURETIC PEPTIDE: CPT

## 2021-12-22 PROCEDURE — 2580000003 HC RX 258: Performed by: STUDENT IN AN ORGANIZED HEALTH CARE EDUCATION/TRAINING PROGRAM

## 2021-12-22 PROCEDURE — 6370000000 HC RX 637 (ALT 250 FOR IP): Performed by: NURSE PRACTITIONER

## 2021-12-22 PROCEDURE — 93005 ELECTROCARDIOGRAM TRACING: CPT | Performed by: STUDENT IN AN ORGANIZED HEALTH CARE EDUCATION/TRAINING PROGRAM

## 2021-12-22 PROCEDURE — 51702 INSERT TEMP BLADDER CATH: CPT

## 2021-12-22 PROCEDURE — 36415 COLL VENOUS BLD VENIPUNCTURE: CPT

## 2021-12-22 PROCEDURE — 83690 ASSAY OF LIPASE: CPT

## 2021-12-22 PROCEDURE — 82570 ASSAY OF URINE CREATININE: CPT

## 2021-12-22 PROCEDURE — 6360000004 HC RX CONTRAST MEDICATION: Performed by: STUDENT IN AN ORGANIZED HEALTH CARE EDUCATION/TRAINING PROGRAM

## 2021-12-22 PROCEDURE — 85025 COMPLETE CBC W/AUTO DIFF WBC: CPT

## 2021-12-22 PROCEDURE — 84484 ASSAY OF TROPONIN QUANT: CPT

## 2021-12-22 PROCEDURE — 83935 ASSAY OF URINE OSMOLALITY: CPT

## 2021-12-22 PROCEDURE — 99223 1ST HOSP IP/OBS HIGH 75: CPT | Performed by: INTERNAL MEDICINE

## 2021-12-22 PROCEDURE — 81003 URINALYSIS AUTO W/O SCOPE: CPT

## 2021-12-22 PROCEDURE — 6370000000 HC RX 637 (ALT 250 FOR IP)

## 2021-12-22 PROCEDURE — 0202U NFCT DS 22 TRGT SARS-COV-2: CPT

## 2021-12-22 PROCEDURE — 6370000000 HC RX 637 (ALT 250 FOR IP): Performed by: STUDENT IN AN ORGANIZED HEALTH CARE EDUCATION/TRAINING PROGRAM

## 2021-12-22 PROCEDURE — 2000000000 HC ICU R&B

## 2021-12-22 PROCEDURE — 6360000002 HC RX W HCPCS: Performed by: STUDENT IN AN ORGANIZED HEALTH CARE EDUCATION/TRAINING PROGRAM

## 2021-12-22 PROCEDURE — 87075 CULTR BACTERIA EXCEPT BLOOD: CPT

## 2021-12-22 PROCEDURE — 87070 CULTURE OTHR SPECIMN AEROBIC: CPT

## 2021-12-22 PROCEDURE — 83605 ASSAY OF LACTIC ACID: CPT

## 2021-12-22 PROCEDURE — 2500000003 HC RX 250 WO HCPCS: Performed by: STUDENT IN AN ORGANIZED HEALTH CARE EDUCATION/TRAINING PROGRAM

## 2021-12-22 PROCEDURE — 80053 COMPREHEN METABOLIC PANEL: CPT

## 2021-12-22 PROCEDURE — 96375 TX/PRO/DX INJ NEW DRUG ADDON: CPT

## 2021-12-22 PROCEDURE — 99291 CRITICAL CARE FIRST HOUR: CPT | Performed by: INTERNAL MEDICINE

## 2021-12-22 PROCEDURE — 87205 SMEAR GRAM STAIN: CPT

## 2021-12-22 PROCEDURE — 6360000002 HC RX W HCPCS: Performed by: NURSE PRACTITIONER

## 2021-12-22 PROCEDURE — 99285 EMERGENCY DEPT VISIT HI MDM: CPT

## 2021-12-22 PROCEDURE — 2580000003 HC RX 258: Performed by: NURSE PRACTITIONER

## 2021-12-22 RX ORDER — GUAIFENESIN/DEXTROMETHORPHAN 100-10MG/5
5 SYRUP ORAL ONCE
Status: COMPLETED | OUTPATIENT
Start: 2021-12-22 | End: 2021-12-22

## 2021-12-22 RX ORDER — TRAZODONE HYDROCHLORIDE 50 MG/1
100 TABLET ORAL NIGHTLY
Status: DISCONTINUED | OUTPATIENT
Start: 2021-12-22 | End: 2021-12-24 | Stop reason: HOSPADM

## 2021-12-22 RX ORDER — OXYCODONE HYDROCHLORIDE AND ACETAMINOPHEN 5; 325 MG/1; MG/1
1 TABLET ORAL 2 TIMES DAILY
Status: DISCONTINUED | OUTPATIENT
Start: 2021-12-22 | End: 2021-12-24 | Stop reason: HOSPADM

## 2021-12-22 RX ORDER — KETOROLAC TROMETHAMINE 15 MG/ML
15 INJECTION, SOLUTION INTRAMUSCULAR; INTRAVENOUS ONCE
Status: COMPLETED | OUTPATIENT
Start: 2021-12-22 | End: 2021-12-22

## 2021-12-22 RX ORDER — 0.9 % SODIUM CHLORIDE 0.9 %
1000 INTRAVENOUS SOLUTION INTRAVENOUS ONCE
Status: COMPLETED | OUTPATIENT
Start: 2021-12-22 | End: 2021-12-22

## 2021-12-22 RX ORDER — SODIUM CHLORIDE 9 MG/ML
250 INJECTION, SOLUTION INTRAVENOUS ONCE
Status: COMPLETED | OUTPATIENT
Start: 2021-12-22 | End: 2021-12-23

## 2021-12-22 RX ORDER — HYDROXYZINE HYDROCHLORIDE 25 MG/1
25 TABLET, FILM COATED ORAL EVERY 6 HOURS PRN
Status: DISCONTINUED | OUTPATIENT
Start: 2021-12-22 | End: 2021-12-24 | Stop reason: HOSPADM

## 2021-12-22 RX ORDER — ACETAMINOPHEN 650 MG/1
650 SUPPOSITORY RECTAL EVERY 6 HOURS PRN
Status: DISCONTINUED | OUTPATIENT
Start: 2021-12-22 | End: 2021-12-24 | Stop reason: HOSPADM

## 2021-12-22 RX ORDER — DULOXETIN HYDROCHLORIDE 60 MG/1
120 CAPSULE, DELAYED RELEASE ORAL EVERY EVENING
Status: DISCONTINUED | OUTPATIENT
Start: 2021-12-22 | End: 2021-12-24 | Stop reason: HOSPADM

## 2021-12-22 RX ORDER — ACETAMINOPHEN 500 MG
1000 TABLET ORAL ONCE
Status: COMPLETED | OUTPATIENT
Start: 2021-12-22 | End: 2021-12-22

## 2021-12-22 RX ORDER — VITAMIN B COMPLEX
TABLET ORAL DAILY
Status: DISCONTINUED | OUTPATIENT
Start: 2021-12-22 | End: 2021-12-24 | Stop reason: HOSPADM

## 2021-12-22 RX ORDER — LIDOCAINE HYDROCHLORIDE 20 MG/ML
5 INJECTION, SOLUTION INFILTRATION; PERINEURAL ONCE
Status: COMPLETED | OUTPATIENT
Start: 2021-12-22 | End: 2021-12-23

## 2021-12-22 RX ORDER — POTASSIUM CHLORIDE 20 MEQ/1
60 TABLET, EXTENDED RELEASE ORAL ONCE
Status: COMPLETED | OUTPATIENT
Start: 2021-12-22 | End: 2021-12-22

## 2021-12-22 RX ORDER — BUSPIRONE HYDROCHLORIDE 10 MG/1
10 TABLET ORAL 2 TIMES DAILY
Status: DISCONTINUED | OUTPATIENT
Start: 2021-12-22 | End: 2021-12-24 | Stop reason: HOSPADM

## 2021-12-22 RX ORDER — SODIUM CHLORIDE 0.9 % (FLUSH) 0.9 %
5-40 SYRINGE (ML) INJECTION EVERY 12 HOURS SCHEDULED
Status: DISCONTINUED | OUTPATIENT
Start: 2021-12-22 | End: 2021-12-24 | Stop reason: HOSPADM

## 2021-12-22 RX ORDER — MAGNESIUM SULFATE 1 G/100ML
1000 INJECTION INTRAVENOUS ONCE
Status: COMPLETED | OUTPATIENT
Start: 2021-12-22 | End: 2021-12-22

## 2021-12-22 RX ORDER — SODIUM CHLORIDE 0.9 % (FLUSH) 0.9 %
5-40 SYRINGE (ML) INJECTION PRN
Status: DISCONTINUED | OUTPATIENT
Start: 2021-12-22 | End: 2021-12-24 | Stop reason: HOSPADM

## 2021-12-22 RX ORDER — SODIUM CHLORIDE 9 MG/ML
25 INJECTION, SOLUTION INTRAVENOUS PRN
Status: DISCONTINUED | OUTPATIENT
Start: 2021-12-22 | End: 2021-12-24 | Stop reason: HOSPADM

## 2021-12-22 RX ORDER — ONDANSETRON 2 MG/ML
4 INJECTION INTRAMUSCULAR; INTRAVENOUS ONCE
Status: COMPLETED | OUTPATIENT
Start: 2021-12-22 | End: 2021-12-22

## 2021-12-22 RX ORDER — SODIUM CHLORIDE 9 MG/ML
INJECTION, SOLUTION INTRAVENOUS CONTINUOUS
Status: DISCONTINUED | OUTPATIENT
Start: 2021-12-22 | End: 2021-12-23

## 2021-12-22 RX ORDER — POTASSIUM CHLORIDE 7.45 MG/ML
10 INJECTION INTRAVENOUS
Status: DISCONTINUED | OUTPATIENT
Start: 2021-12-22 | End: 2021-12-22

## 2021-12-22 RX ORDER — ACETAMINOPHEN 325 MG/1
650 TABLET ORAL EVERY 6 HOURS PRN
Status: DISCONTINUED | OUTPATIENT
Start: 2021-12-22 | End: 2021-12-24 | Stop reason: HOSPADM

## 2021-12-22 RX ORDER — BACITRACIN 500 [USP'U]/G
OINTMENT TOPICAL 2 TIMES DAILY
Status: DISCONTINUED | OUTPATIENT
Start: 2021-12-22 | End: 2021-12-24 | Stop reason: HOSPADM

## 2021-12-22 RX ORDER — CETIRIZINE HYDROCHLORIDE 10 MG/1
10 TABLET ORAL DAILY
Status: DISCONTINUED | OUTPATIENT
Start: 2021-12-22 | End: 2021-12-24 | Stop reason: HOSPADM

## 2021-12-22 RX ADMIN — VANCOMYCIN HYDROCHLORIDE 1500 MG: 1 INJECTION, POWDER, LYOPHILIZED, FOR SOLUTION INTRAVENOUS at 04:22

## 2021-12-22 RX ADMIN — BUSPIRONE HYDROCHLORIDE 10 MG: 10 TABLET ORAL at 21:00

## 2021-12-22 RX ADMIN — ACETAMINOPHEN 1000 MG: 500 TABLET ORAL at 01:01

## 2021-12-22 RX ADMIN — CETIRIZINE HYDROCHLORIDE 10 MG: 10 TABLET, FILM COATED ORAL at 18:16

## 2021-12-22 RX ADMIN — PIPERACILLIN AND TAZOBACTAM 3375 MG: 3; .375 INJECTION, POWDER, LYOPHILIZED, FOR SOLUTION INTRAVENOUS at 14:19

## 2021-12-22 RX ADMIN — GUAIFENESIN SYRUP AND DEXTROMETHORPHAN 5 ML: 100; 10 SYRUP ORAL at 01:02

## 2021-12-22 RX ADMIN — SODIUM CHLORIDE 1000 ML: 9 INJECTION, SOLUTION INTRAVENOUS at 02:17

## 2021-12-22 RX ADMIN — POTASSIUM CHLORIDE 60 MEQ: 1500 TABLET, EXTENDED RELEASE ORAL at 07:50

## 2021-12-22 RX ADMIN — CEFTRIAXONE SODIUM 1000 MG: 1 INJECTION, POWDER, FOR SOLUTION INTRAMUSCULAR; INTRAVENOUS at 02:20

## 2021-12-22 RX ADMIN — PIPERACILLIN AND TAZOBACTAM 3375 MG: 3; .375 INJECTION, POWDER, LYOPHILIZED, FOR SOLUTION INTRAVENOUS at 07:50

## 2021-12-22 RX ADMIN — TRAZODONE HYDROCHLORIDE 100 MG: 50 TABLET ORAL at 21:00

## 2021-12-22 RX ADMIN — POTASSIUM CHLORIDE 10 MEQ: 7.46 INJECTION, SOLUTION INTRAVENOUS at 03:59

## 2021-12-22 RX ADMIN — Medication 10 ML: at 07:50

## 2021-12-22 RX ADMIN — IOPAMIDOL 100 ML: 612 INJECTION, SOLUTION INTRAVENOUS at 01:36

## 2021-12-22 RX ADMIN — KETOROLAC TROMETHAMINE 15 MG: 15 INJECTION, SOLUTION INTRAMUSCULAR; INTRAVENOUS at 01:04

## 2021-12-22 RX ADMIN — NOREPINEPHRINE BITARTRATE 2 MCG/KG/MIN: 1 INJECTION INTRAVENOUS at 04:17

## 2021-12-22 RX ADMIN — ACETAMINOPHEN 650 MG: 325 TABLET ORAL at 12:00

## 2021-12-22 RX ADMIN — Medication: at 18:17

## 2021-12-22 RX ADMIN — OXYCODONE AND ACETAMINOPHEN 1 TABLET: 5; 325 TABLET ORAL at 21:00

## 2021-12-22 RX ADMIN — BUSPIRONE HYDROCHLORIDE 10 MG: 10 TABLET ORAL at 18:16

## 2021-12-22 RX ADMIN — MAGNESIUM SULFATE HEPTAHYDRATE 1000 MG: 1 INJECTION, SOLUTION INTRAVENOUS at 04:26

## 2021-12-22 RX ADMIN — ONDANSETRON 4 MG: 2 INJECTION INTRAMUSCULAR; INTRAVENOUS at 01:02

## 2021-12-22 RX ADMIN — HYDROXYZINE HYDROCHLORIDE 25 MG: 25 TABLET, FILM COATED ORAL at 18:17

## 2021-12-22 RX ADMIN — DULOXETINE 120 MG: 60 CAPSULE, DELAYED RELEASE ORAL at 18:16

## 2021-12-22 RX ADMIN — FAMOTIDINE 20 MG: 10 INJECTION, SOLUTION INTRAVENOUS at 01:03

## 2021-12-22 RX ADMIN — SODIUM CHLORIDE: 9 INJECTION, SOLUTION INTRAVENOUS at 07:48

## 2021-12-22 RX ADMIN — ZINC OXIDE: 200 OINTMENT TOPICAL at 21:00

## 2021-12-22 RX ADMIN — SODIUM CHLORIDE 1000 ML: 9 INJECTION, SOLUTION INTRAVENOUS at 01:00

## 2021-12-22 ASSESSMENT — ENCOUNTER SYMPTOMS
SHORTNESS OF BREATH: 0
PHOTOPHOBIA: 0
BACK PAIN: 0
EYES NEGATIVE: 1
ABDOMINAL PAIN: 1
RHINORRHEA: 0
VOMITING: 0
SORE THROAT: 0
DIARRHEA: 1
CONSTIPATION: 0
NAUSEA: 1
WHEEZING: 0
CHEST TIGHTNESS: 0
COUGH: 1
DIARRHEA: 0
VOMITING: 1
ALLERGIC/IMMUNOLOGIC NEGATIVE: 1
SHORTNESS OF BREATH: 1
NAUSEA: 0

## 2021-12-22 ASSESSMENT — PAIN SCALES - GENERAL
PAINLEVEL_OUTOF10: 4
PAINLEVEL_OUTOF10: 0
PAINLEVEL_OUTOF10: 5
PAINLEVEL_OUTOF10: 0
PAINLEVEL_OUTOF10: 5

## 2021-12-22 ASSESSMENT — PAIN DESCRIPTION - LOCATION: LOCATION: HEAD

## 2021-12-22 ASSESSMENT — PAIN DESCRIPTION - DESCRIPTORS: DESCRIPTORS: ACHING

## 2021-12-22 ASSESSMENT — PAIN DESCRIPTION - PAIN TYPE: TYPE: ACUTE PAIN

## 2021-12-22 NOTE — FLOWSHEET NOTE
Patient refusing to wear monitor, pulled all the leads and wires off and threw them across the room stating that she will not wear them. Dr. Brenda Fournier aware.

## 2021-12-22 NOTE — PROGRESS NOTES
Progress Note  Date:2021       [facility]  Patient Hoda Rogers     YOB: 1965     Age:56 y.o. Subjective    Subjective:  Symptoms:  She reports shortness of breath, malaise, cough and weakness. No chest pain, headache, chest pressure, anorexia, diarrhea or anxiety. Diet:  No nausea or vomiting. Review of Systems   Respiratory: Positive for cough and shortness of breath. Cardiovascular: Negative for chest pain. Gastrointestinal: Negative for anorexia, diarrhea, nausea and vomiting. Neurological: Positive for weakness. Objective         Vitals Last 24 Hours:  TEMPERATURE:  Temp  Av.9 °F (39.4 °C)  Min: 102.9 °F (39.4 °C)  Max: 102.9 °F (39.4 °C)  RESPIRATIONS RANGE: Resp  Av.3  Min: 19  Max: 26  PULSE OXIMETRY RANGE: SpO2  Av.1 %  Min: 87 %  Max: 100 %  PULSE RANGE: Pulse  Av.6  Min: 53  Max: 74  BLOOD PRESSURE RANGE: Systolic (05WVY), YMJ:69 , Min:66 , NORA:285   ; Diastolic (86EYU), QZ, Min:39, Max:81    I/O (24Hr): Intake/Output Summary (Last 24 hours) at 2021 1013  Last data filed at 2021 0514  Gross per 24 hour   Intake 2150 ml   Output --   Net 2150 ml     Objective:  General Appearance:  Comfortable, well-appearing and in no acute distress. Vital signs: (most recent): Blood pressure 118/81, pulse 55, temperature 102.9 °F (39.4 °C), temperature source Oral, resp. rate 26, height 5' 4\" (1.626 m), weight 150 lb (68 kg), last menstrual period 1997, SpO2 98 %. HEENT: Normal HEENT exam.    Lungs:  Normal effort. Breath sounds clear to auscultation. Heart: Normal rate. Regular rhythm. S1 normal and S2 normal.    Abdomen: Abdomen is soft. There is no epigastric area or suprapubic area tenderness. Pulses: Distal pulses are intact. Neurological: Patient is alert. Pupils:  Pupils are equal, round, and reactive to light. Skin:  Warm.       Labs/Imaging/Diagnostics    Labs:  CBC:  Recent Labs 12/22/21 0045   WBC 3.0*   RBC 5.20   HGB 14.4   HCT 43.7   MCV 84.2   RDW 14.9*        CHEMISTRIES:  Recent Labs     12/22/21 0045   *   K 3.1*   CL 91*   CO2 21   BUN 9   CREATININE 0.70   GLUCOSE 193*   MG 1.7     PT/INR:No results for input(s): PROTIME, INR in the last 72 hours. APTT:No results for input(s): APTT in the last 72 hours. LIVER PROFILE:  Recent Labs     12/22/21 0045   AST 20   ALT 8   BILITOT 0.3   ALKPHOS 79       Imaging Last 24 Hours:  CT ABDOMEN PELVIS W IV CONTRAST Additional Contrast? None    Result Date: 12/22/2021  EXAMINATION: CT ABDOMEN PELVIS W IV CONTRAST DATE AND TIME:12/22/2021 1:29 AM CLINICAL HISTORY: ACUTE ABDOMINAL PAIN. EPIGASTRIC PAIN. ABD PAIN, UC  COMPARISON: FEBRUARY 4, 2021 TECHNIQUE: Contiguous axial CT sections of the abdomen and pelvis. 100 cc's of IV contrast given. .  All CT scans at this facility use dose modulation, iterative reconstruction, and/or weight based dosing when appropriate to reduce radiation dose to as low as reasonably achievable. Some of this report was completed using  [employer] [id number] and may include unintended errors with respect to translation of words, typographical errors or grammatical errors which may not have been identified prior to the finalization of this report. FINDINGS   Liver: Negative    Spleen: Negative   Gallbladder: No calcified gallstones. Normal gallbladder wall. No pericholecystic fluid. Pancreas: Negative   Kidney: Negative   Adrenal glands are negative. Bowel: Negative  No CT evidence of appendicitis   Nodes: No lymphadenopathy. Aorta: Negative    Pelvis: Status post hysterectomy. No abnormal soft tissue mass or fluid collection. Peritoneum: No free fluid or free air. The abdominal wall is intact. Bones: No acute osseous abnormalities.      Other: Visualized lung bases show patchy peripheral increased foci of hazy attenuation with preservation of bronchovascular markings consistent with groundglass opacities. These are nonspecific and can be seen in viral pneumonia. Correlate clinically. PERIPHERAL BIBASILAR GROUNDGLASS OPACITIES. NO ACUTE PATHOLOGY IN THE ABDOMEN OR PELVIS. XR CHEST PORTABLE    Result Date: 12/22/2021  Exam: XR CHEST PORTABLE History:  SOB Technique: AP portable view of the chest obtained. Comparison: Chest CT from May 19, 2021 Chest x-ray portable Findings: The cardiomediastinal silhouette is within normal limits. There are no infiltrates, consolidations or effusions. Bones of the thorax appear intact. No radiographic evidence of acute intrathoracic process.      Assessment//Plan           Hospital Problems           Last Modified POA    * (Principal) Septic shock ([facility] 12/22/2021 Yes    Major depressive disorder, recurrent episode, moderate ([facility] 12/22/2021 Yes    Chronic pain 12/22/2021 Yes    Overview Signed 4/16/2021  2:52 PM by Althea Fournier MA     Formatting of this note might be different from the original.  Overview:   Query underlying cause,  A combination of neuropathic pain and also painful open wounds and ulcer  Also carries dx of fibromylagia, sciatica, ? djd  Plan:  - will increase Neurontin dose  - increase oxycodone PRN to q2  - continue Cymbalta  Overview:   Overview:   Query underlying cause,carries dx of fibromylagia, sciatica, ? djd  She needs [facility]    Plan:  - cont amitriptyline and cymbalta  - perocet for breakthrough  Clear expectations set 12/03- no indication to up escalate tx at this time; will need ongoing CPS management for longterm med refills at time of dc    Overview:   Query underlying cause,  A combination of neuropathic pain and also painful open wounds and ulcer  Also carries dx of fibromylagia, sciatica, ? djd  Plan:  - will increase Neurontin dose  - increase oxycodone PRN to q2  - continue Cymbalta  Formatting of this note might be different from the original.  Query underlying cause,  A combination of neuropathic pain and also painful open wounds and ulcer  Also carries dx of fibromylagia, sciatica, ? djd  Plan:  - will increase Neurontin dose  - increase oxycodone PRN to q2  - continue Cymbalta             Assessment & Plan  1) septic shock  Hyponatremia  Acute respiratory failure   R/o COVID  Lactic acidosis  12/22: c/w current management, taper off pressors as tolerated, wean off oxygen FU cultures, Fu  Na levels, FU ID and critical care team, no new complaints, states she feeling better.   Electronically signed by Lesvia Regalado MD on 12/22/21 at 10:13 AM EST

## 2021-12-22 NOTE — ED PROVIDER NOTES
[facility] ICU  eMERGENCY dEPARTMENT eNCOUnter      Pt Name: Belkis Willard  MRN: [id number]  [place] 1965  Date of evaluation: 12/22/2021  Provider: Jarrod Kennedy MD      HISTORY OF PRESENT ILLNESS      Chief Complaint   Patient presents with    Concern For COVID-19       The history is provided by the Patient. Belkis Willard is a 55 y.o. female with a PMH clinically significant for Anxiety, Depression, Fibromyalgia, COPD, UC, Pyoderma Gangrenosa presenting to the ED via EMS c/o multiple complaints including generalized weakness/fatigue, decreased p.o. intake, fevers, chills, congestion, nonproductive cough, abdominal pain, nausea, multiple episodes of nonbloody nonbilious emesis, myalgias and headaches worsening over the past 1 to 2 weeks. States multiple negative Covid test as well. Just had one today that was negative. Did not receive the Covid vaccine however. States history of Covid infection as well. States she does just does not feel well. That is her most severe complaint. Has been unable to tolerate p.o. intake, has constant headache and abdominal pain. States she has had headaches in the past, but never this long. Denies any trauma to the head or abdomen and denies any abnormal ingestions. No significant improvement with medications utilized at home. Denies history of DVT/PE, CAD or arrhythmias. States history of surgeries on the abdomen, but denies colectomy. Is currently on chronic immunosuppression. REVIEW OF SYSTEMS       Review of Systems   Constitutional: Positive for activity change, appetite change, chills, fatigue and fever. HENT: Positive for congestion. Negative for rhinorrhea and sore throat. Eyes: Negative for photophobia and visual disturbance. Respiratory: Positive for cough. Negative for chest tightness and shortness of breath. Cardiovascular: Negative for chest pain and palpitations. Gastrointestinal: Positive for abdominal pain, nausea and vomiting. Negative for constipation and diarrhea. Genitourinary: Negative for difficulty urinating and dysuria. Musculoskeletal: Positive for myalgias. Negative for neck pain and neck stiffness. Skin: Negative for wound. Neurological: Positive for weakness (generalized) and headaches. Negative for dizziness, light-headedness and numbness. 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    [facility]     Osteoarthritis     Pneumonia due to organism     Pyoderma gangrenosa     Sweet syndrome     Ulcerative colitis ([facility]        SURGICAL HISTORY       Past Surgical History:   Procedure Laterality Date    ABDOMEN SURGERY      sleen repair    APPENDECTOMY      BACK SURGERY      BREAST SURGERY      fatty tumor removed, benign    COLONOSCOPY      COSMETIC SURGERY      tummy tuck    EYE SURGERY      bilateral cataract surgery    HYSTERECTOMY      SKIN BIOPSY      SPINAL FUSION      TONSILLECTOMY         FAMILY HISTORY     History reviewed. No pertinent family history. SOCIAL HISTORY       Social History     Socioeconomic History    Marital status:       Spouse name: None    Number of children: None    Years of education: None    Highest education level: None   Occupational History    None   Tobacco Use    Smoking status: Current Every Day Smoker     Packs/day: 1.00     Years: 40.00     Pack years: 40.00     Types: Cigarettes    Smokeless tobacco: Never Used   Vaping Use    Vaping Use: Never used   Substance and Sexual Activity    Alcohol use: Never    Drug use: No    Sexual activity: Not Currently   Other Topics Concern  None   Social History Narrative    None     Social Determinants of Health     Financial Resource Strain: Low Risk     Difficulty of Paying Living Expenses: Not hard at all   Food Insecurity: No Food Insecurity    Worried About Running Out of Food in the Last Year: Never true    Zeus of Food in the Last Year: Never true   Transportation Needs:     Lack of Transportation (Medical): Not on file    Lack of Transportation (Non-Medical): Not on file   Physical Activity:     Days of Exercise per Week: Not on file    Minutes of Exercise per Session: Not on file   Stress:     Feeling of Stress : Not on file   Social Connections:     Frequency of Communication with Friends and Family: Not on file    Frequency of Social Gatherings with Friends and Family: Not on file    Attends Spiritism Services: Not on file    Active Member of [address] [place] [address] [employer] or Organizations: Not on file    Attends Club or Organization Meetings: Not on file    Marital Status: Not on file   Intimate Partner Violence:     Fear of Current or Ex-Partner: Not on file    Emotionally Abused: Not on file    Physically Abused: Not on file    Sexually Abused: Not on file   Housing Stability:     Unable to Pay for Housing in the Last Year: Not on file    Number of [place] in the Last Year: Not on file    Unstable Housing in the Last Year: Not on file       CURRENT MEDICATIONS       Current Discharge Medication List      CONTINUE these medications which have NOT CHANGED    Details   diphenhydrAMINE (BANOPHEN) 25 MG capsule TAKE 1 TABLET BY MOUTH EVERY 6 HOURS AS NEEDED FOR ITCHING  Qty: 56 capsule, Refills: 1    Associated Diagnoses: Seasonal allergic rhinitis, unspecified trigger      oxyCODONE-acetaminophen (PERCOCET) 5-325 MG per tablet Take 1 tablet by mouth 2 times daily for 30 days. Intended supply: 3 days.  Take lowest dose possible to manage pain  Qty: 60 tablet, Refills: 0    Comments: Reduce doses taken as pain becomes manageable  Associated Diagnoses: Polyneuropathy, unspecified      traZODone (DESYREL) 100 MG tablet TAKE 1 TABLET BY MOUTH NIGHTLY  Qty: 30 tablet, Refills: 2      vitamin D (CHOLECALCIFEROL) 50 MCG (2000 UT) TABS tablet TAKE 1 TABLET BY MOUTH DAILY  Qty: 30 tablet, Refills: 3      omeprazole (PRILOSEC) 40 MG delayed release capsule       cetirizine (ZYRTEC) 10 MG tablet       hydrOXYzine (ATARAX) 25 MG tablet Take 25 mg by mouth every 6 hours as needed for Itching      DULoxetine (CYMBALTA) 60 MG extended release capsule TAKE 2 CAPSULES BY MOUTH EVERY EVENING  Qty: 60 capsule, Refills: 2      gabapentin (NEURONTIN) 800 MG tablet TAKE 1 TABLET BY MOUTH FOUR TIMES A DAY FOR 30 DAYS  Qty: 120 tablet, Refills: 2      clobetasol (TEMOVATE) 0.05 % cream       naloxone 4 MG/0.1ML LIQD nasal spray 1 spray by Nasal route as needed for Opioid Reversal  Qty: 1 each, Refills: 5      GAVILAX 17 GM/SCOOP powder       Adalimumab (HUMIRA PEN) 40 MG/0.4ML PNKT       Alcohol Swabs (ALCOHOL PADS) 70 % PADS CLEAN THE SKIN BY USING 1 PAD ON THE AFFECTED AREA BEFORE APPLYING ANY TOPICAL CREAM, 3 TIMES DAILY  Qty: 100 each, Refills: 0    Comments: SIG CHANGE REQUEST- THE SIG (DIRECTION) IS INCOMPLETE AND OR INVALID. PLEASE APPROVE RECOMMENDED DOSAGE & DIRECTIONS. THANK YOU      acetaminophen (TYLENOL) 325 MG tablet Take 650 mg by mouth every 4 hours as needed for Pain             ALLERGIES     Amoxicillin-pot clavulanate and Other      PHYSICAL EXAM       ED Triage Vitals [12/22/21 0019]   BP Temp Temp Source Pulse Resp SpO2 Height Weight   (!) 85/56 102.9 °F (39.4 °C) Oral 72 20 100 % 5' 4\" (1.626 m) 150 lb (68 kg)       Physical Exam  Vitals and nursing note reviewed. Constitutional:       General: She is not in acute distress. Appearance: She is ill-appearing. She is not toxic-appearing or diaphoretic. HENT:      Head: Normocephalic and atraumatic. Mouth/Throat:      Mouth: Mucous membranes are dry.       Pharynx: Oropharynx is clear. No oropharyngeal exudate or posterior oropharyngeal erythema. Eyes:      Extraocular Movements: Extraocular movements intact. Conjunctiva/sclera: Conjunctivae normal.      Pupils: Pupils are equal, round, and reactive to light. Cardiovascular:      Rate and Rhythm: Normal rate and regular rhythm. Pulses: Normal pulses. Heart sounds: Normal heart sounds. Pulmonary:      Effort: Tachypnea present. No accessory muscle usage or respiratory distress. Breath sounds: Examination of the right-lower field reveals rales. Examination of the left-lower field reveals rales. Rales present. Abdominal:      General: Abdomen is flat. There is no distension. Palpations: Abdomen is soft. Tenderness: There is generalized abdominal tenderness (Moderate). There is guarding (Voluntary). There is no right CVA tenderness, left CVA tenderness or rebound. Hernia: No hernia is present. Musculoskeletal:      Cervical back: Normal range of motion and neck supple. No rigidity or tenderness. Right lower leg: No edema. Left lower leg: No edema. Skin:     General: Skin is warm and dry. Capillary Refill: Capillary refill takes less than 2 seconds. Neurological:      General: No focal deficit present. Mental Status: She is alert and oriented to person, place, and time. Psychiatric:         Mood and Affect: Mood is anxious.          Behavior: Behavior normal.         MDM:   Chart Reviewed: PMH and additional information as noted in HPI obtained from chart review    Vitals:    Vitals:    12/22/21 0400 12/22/21 0401 12/22/21 0430 12/22/21 0515   BP: (!) 79/53 (!) 79/53 95/77 118/81   Pulse: 53 53 59 55   Resp: 25 20 19 26   Temp:       TempSrc:       SpO2: 98% 99% 96% 98%   Weight:       Height:           PROCEDURES:  Unless otherwise noted below, none  Procedures    LABS:  Labs Reviewed   LACTIC ACID, PLASMA - Abnormal; Notable for the following components: Result Value    Lactic Acid 4.0 (*)     All other components within normal limits    Narrative:     CALL  Michel  [facility] tel. [phone],  LACID results called to and read back by Svetlana Perdomo RN, 12/22/2021  01:48, by Bryant Hammond - Abnormal; Notable for the following components:    Sodium 125 (*)     Potassium 3.1 (*)     Chloride 91 (*)     Glucose 193 (*)     Albumin 3.4 (*)     Globulin 4.0 (*)     All other components within normal limits   CBC WITH AUTO DIFFERENTIAL - Abnormal; Notable for the following components:    WBC 3.0 (*)     MCHC 32.8 (*)     RDW 14.9 (*)     Lymphocytes Absolute 0.9 (*)     Monocytes Absolute 0.1 (*)     Bands Relative 19 (*)     All other components within normal limits   POCT CREATININE - URINE - Normal   COVID-19, RAPID   RESPIRATORY PANEL, MOLECULAR, WITH COVID-19   RESPIRATORY PANEL, MOLECULAR, WITH COVID-19   MAGNESIUM   TROPONIN   BRAIN NATRIURETIC PEPTIDE   URINE RT REFLEX TO CULTURE   LIPASE   URINE RT REFLEX TO CULTURE   SODIUM, URINE, RANDOM   CREATININE, RANDOM URINE   LIPASE   LACTATE, SEPSIS   LACTATE, SEPSIS   CBC WITH AUTO DIFFERENTIAL   COMPREHENSIVE METABOLIC PANEL W/ REFLEX TO MG FOR LOW K   CREATININE, RANDOM URINE   OSMOLALITY   OSMOLALITY, URINE   LACTIC ACID, PLASMA       CT ABDOMEN PELVIS W IV CONTRAST Additional Contrast? None   Preliminary Result   PERIPHERAL BIBASILAR GROUNDGLASS OPACITIES. NO ACUTE PATHOLOGY IN THE ABDOMEN OR PELVIS. XR CHEST PORTABLE   Final Result   No radiographic evidence of acute intrathoracic process. ED Course as of 12/22/21 1019   Wed Dec 22, 2021   0213 SARS-CoV-2, NAAT: Not Detected [NA]   0329 XR CHEST PORTABLE  Bilateral interstitial opacities, right-sided worse than left-sided.  [NA]   0330 CT ABDOMEN PELVIS W IV CONTRAST Additional Contrast? None  CT abdomen pelvis preliminary read: Extensive bilateral groundglass opacities at the lung bases are new from the examination on 2/4/2021 and presumed Covid pneumonia. No evidence of bowel obstruction or definite asymmetric focal bowel mucosal abnormality. No free intraperitoneal fluid or pneumoperitoneum. Appendix is not clearly delineated and there is suggestion of postsurgical changes on the margin of the cecum. Remainder of findings as noted in full report [NA]   0331 Lactic Acid(!!): 4.0  Consistent with presumed hypoperfusion with hypotension. [NA]   [id number] Magnesium: 1.7 [NA]   0331 Lipase: 33  No evidence of pancreatitis. [NA]   0331 WBC(!): 3.0  Mild leukopenia. CBC otherwise largely unremarkable [NA]   0331 Potassium(!): 3.1  Hypokalemic, will replace in the ED. [NA]   0331 Sodium(!): 125  Hyponatremia with hypochloremia, possibly secondary to dehydration. [NA]   0336 EKG 12 Lead  EKG showing normal sinus rhythm, rate of 90 bpm.  Normal axis, normal intervals. No acute ST-T wave abnormalities. [NA]      ED Course User Index  [NA] Sena Hawley MD       55 y.o. female with a PMH clinically significant for Anxiety, Depression, Fibromyalgia, COPD, UC, Pyoderma Gangrenosa presenting to the ED via EMS c/o multiple complaints including generalized weakness/fatigue, decreased p.o. intake, fevers, chills, congestion, nonproductive cough, abdominal pain, nausea, multiple episodes of nonbloody nonbilious emesis, myalgias and headaches worsening over the past 1 to 2 weeks. Upon initial evaluation, Pt initially mildly hypotensive and febrile but otherwise largely stable and in NAD. Satting 100% on room air. PE as noted above. Gradually with increased O2 requirements, dropping into the 80s requiring 2 L via nasal cannula. Also with gradually decreasing blood pressures with hypotension reaching SBP's in the 60s. Was fluid resuscitated with 30 cc/kg bolus in addition to medications. Hypotension refractory to fluid resuscitation however, so initiated on Levophed drip in the ED with improvement in blood pressure.   Initiated on (ZITHROMAX) 500 mg in D5W 250ml addavial (has no administration in time range)   vancomycin (VANCOCIN) intermittent dosing (placeholder) ( Other Given 12/22/21 0750)   vancomycin 1000 mg IVPB in 250 mL D5W addavial (has no administration in time range)   acetaminophen (TYLENOL) tablet 1,000 mg (1,000 mg Oral Given 12/22/21 0101)   ketorolac (TORADOL) injection 15 mg (15 mg IntraVENous Given 12/22/21 0104)   famotidine (PEPCID) injection 20 mg (20 mg IntraVENous Given 12/22/21 0103)   ondansetron (ZOFRAN) injection 4 mg (4 mg IntraVENous Given 12/22/21 0102)   0.9 % sodium chloride bolus (0 mLs IntraVENous Stopped 12/22/21 0215)   guaiFENesin-dextromethorphan (ROBITUSSIN DM) 100-10 MG/5ML syrup 5 mL (5 mLs Oral Given 12/22/21 0102)   iopamidol (ISOVUE-300) 61 % injection 100 mL (100 mLs IntraVENous Given 12/22/21 0136)   0.9 % sodium chloride bolus (0 mLs IntraVENous Stopped 12/22/21 0326)   cefTRIAXone (ROCEPHIN) 1000 mg IVPB in 50 mL D5W minibag (0 mg IntraVENous Stopped 12/22/21 0250)   vancomycin (VANCOCIN) 1,500 mg in dextrose 5 % 500 mL IVPB (0 mg IntraVENous Stopped 12/22/21 0752)   magnesium sulfate 1000 mg in dextrose 5% 100 mL IVPB (0 mg IntraVENous Stopped 12/22/21 0751)   potassium chloride (KLOR-CON M) extended release tablet 60 mEq (60 mEq Oral Given 12/22/21 0750)       Plan: Admit to ICU for further evaluation and management. Report given to Dr. Mahesh Landeros. and Patient understanding and amenable to the POC. CRITICAL CARE TIME   Total CriticalCare time was 0 minutes, excluding separately reportable procedures. There was a high probability of clinically significant/life threatening deterioration in the patient's condition which required my urgent intervention. FINAL IMPRESSION      1. Sepsis with acute organ dysfunction and septic shock, due to unspecified organism, unspecified type ([address]    2. Dehydration    3. Abdominal pain, unspecified abdominal location    4.  Suspected COVID-19 virus infection          DISPOSITION/PLAN   DISPOSITION Admitted 12/22/2021 04:02:47 AM      Current Discharge Medication List           MD Dina Montero MD  12/22/21 1014

## 2021-12-22 NOTE — CARE COORDINATION
[facility] EMERGENCY [facility] Case Management   Initial Discharge Assessment    Met with patient at bedside and spoke with patient's daughter Mayito Han via phone call to discuss discharge plan. PCP: Orion Hernández MD                                  Date of Last Visit: < 6months  If no PCP, list provided? N/A    Discharge Planning    Living Arrangements: Patient lives independently at home, single-level home. Who do you live with? Patient lives alone. Who helps you with your care: Patient is independent at baseline. If lives at home: Do you have any barriers navigating in your home? No    Patient can perform ADL? Yes    Current Services (outpatient and in home): None    Dialysis: No    Is transportation available to get to your appointments? Yes - Patient uses [id number] DME Equipment: None    Respiratory equipment: None    Respiratory provider: LESLIE     Pharmacy: [id number] [place] [address] with Medication Assistance Program?  No      Patient agreeable to [facility]? Yes, [place]. Patient agreeable to SNF/Rehab?     TBD. Other discharge needs identified? Other - TBD based on work-up. Patient may benefit from short-term rehab prior to returning home alone. Does Patient Have a High-Risk for Readmission Diagnosis (CHF, PN, MI, COPD)? Yes    Initial Discharge Plan? (Note: please see concurrent daily documentation for any updates after initial note). Home alone w/ HHC vs rehab, TBD based on work-up and care needs.     Readmission Risk              Risk of Unplanned Readmission:  25         Electronically signed by Hortencia Meng RN on 12/22/2021 at 3:32 PM

## 2021-12-22 NOTE — ED TRIAGE NOTES
Pt states that she has not been feeling well x2 weeks. Pt states that it is off and on. Pt states that she has nausea, weakness, fatigue, and chills.

## 2021-12-22 NOTE — PROGRESS NOTES
CC CONSULT PATIENT IN ICU DR Eduar Greene Electronically signed by Rhonda Pisano on 12/22/2021 at 8:48 AM

## 2021-12-22 NOTE — PROGRESS NOTES
Wound Ostomy Continence Nurse  Consult Note       NAME:  Mana Nielsen RECORD NUMBER:  [id number]  AGE: 55 y.o. GENDER: female  : 1965  TODAY'S DATE:  2021    Subjective   Reason for [id number] 179[facility] Nurse Evaluation and Assessment: History of Pyoderma gangrenosum       Leticia Sims is a 55 y.o. female referred by:   [x] Physician  [] Nursing  [] Other:     Wound Identification:  Wound Type: Healing areas of pyoderma gandrenosum  Contributing Factors: UC; Pyoderma    Wound History: Patient known to this [address]  Nurse from past admissions. Patient's tunneled wound to the posterior thighs/buttocks and abdomen have healed, areas of intact scar tissue noted. There is a superficial opening on the posterior upper thigh, which is clean and pink without any depth or drainage, but otherwise intact scar tissue noted. Current Wound Care Treatment: Recommend protective barrier cream with zinc to posterior thighs and buttocks to prevent any areas of scar tissue from re-opening.      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    Neuromuscular disorder ([facility]     Osteoarthritis     Pneumonia due to organism     Pyoderma gangrenosa     Sweet syndrome     Ulcerative colitis ([facility]        PAST SURGICAL HISTORY    Past Surgical History:   Procedure Laterality Date    ABDOMEN SURGERY      sleen repair    APPENDECTOMY      BACK SURGERY      BREAST SURGERY      fatty tumor removed, benign    COLONOSCOPY      COSMETIC SURGERY      tummy tuck    EYE SURGERY      bilateral cataract surgery    HYSTERECTOMY      SKIN BIOPSY      SPINAL FUSION      TONSILLECTOMY         FAMILY HISTORY    History reviewed. No pertinent family history. SOCIAL HISTORY    Social History     Tobacco Use    Smoking status: Current Every Day Smoker     Packs/day: 1.00     Years: 40.00     Pack years: 40.00     Types: Cigarettes    Smokeless tobacco: Never Used   Vaping Use    Vaping Use: Never used   Substance Use Topics    Alcohol use: Never    Drug use: No       ALLERGIES    Allergies   Allergen Reactions    Amoxicillin-Pot Clavulanate      Other reaction(s): GI Upset, Intolerance, Other: See Comments  Nose bleed    Other Itching     IVP dye         MEDICATIONS    No current facility-administered medications on file prior to encounter. Current Outpatient Medications on File Prior to Encounter   Medication Sig Dispense Refill    diphenhydrAMINE (BANOPHEN) 25 MG capsule TAKE 1 TABLET BY MOUTH EVERY 6 HOURS AS NEEDED FOR ITCHING 56 capsule 1    oxyCODONE-acetaminophen (PERCOCET) 5-325 MG per tablet Take 1 tablet by mouth 2 times daily for 30 days. Intended supply: 3 days.  Take lowest dose possible to manage pain 60 tablet 0    traZODone (DESYREL) 100 MG tablet TAKE 1 TABLET BY MOUTH NIGHTLY 30 tablet 2    vitamin D (CHOLECALCIFEROL) 50 MCG (2000 UT) TABS tablet TAKE 1 TABLET BY MOUTH DAILY 30 tablet 3    omeprazole (PRILOSEC) 40 MG delayed release capsule       cetirizine (ZYRTEC) 10 MG tablet       hydrOXYzine (ATARAX) 25 MG tablet Take 25 mg by mouth every 6 hours as needed for Itching      DULoxetine (CYMBALTA) 60 MG extended release capsule TAKE 2 CAPSULES BY MOUTH EVERY EVENING 60 capsule 2    gabapentin (NEURONTIN) 800 MG tablet TAKE 1 TABLET BY MOUTH FOUR TIMES A DAY FOR 30 DAYS 120 tablet 2    clobetasol (TEMOVATE) 0.05 % cream       naloxone 4 MG/0.1ML LIQD nasal spray 1 spray by Nasal route as needed for Opioid Reversal 1 each 5    GAVILAX 17 GM/SCOOP powder       Dyspepsia    Fatigue    Fever    Glucose intolerance (impaired glucose tolerance)    Hematochezia    History of ulcerative colitis    Immunodeficiency due to treatment with immunosuppressive medication (HCC)    Inflammatory dermatosis    Insomnia    LUQ pain    Major depressive disorder, recurrent episode, moderate (HCC)    Methicillin resistant Staphylococcus aureus infection    Urge incontinence of urine    Open wound of right ankle    Pain in left shoulder    Papanicolaou smear of vagina with low grade squamous intraepithelial lesion (LGSIL)    Personal history of leukemia    Polyneuropathy, unspecified    Presbyopia    Pseudophakia    Tobacco use    Ulcerative colitis ([facility]    Unspecified fall, initial encounter    Unspecified internal derangement of left knee    Systemic inflammatory response syndrome (HCC)    Hereditary motor and sensory neuropathy    Chronic pain    Low back pain    Metabolic encephalopathy    Restless leg syndrome    Lumbar radiculopathy    Cervical spondylosis with radiculopathy    Increased frequency of urination    Overactive bladder    Pyoderma       Measurements:  Wound 04/27/17 Other (Comment) Arm Posterior; Left (Active)   Number of days: 1749       Wound 01/04/17 Other (Comment) Hip Right;Upper #4; Pyoderma Gangrenosum (Active)   Number of days: 9071       Wound 01/04/17 Other (Comment) Groin Right #5 (Active)   Number of days: 4718       Wound 01/04/17 Other (Comment) Buttocks Right #3 (Active)   Number of days: 3626       Wound 04/27/17 Other (Comment) Other (Comment) Right Achilles tendon exposed (Active)   Number of days: 1700       Wound 04/27/17 Other (Comment) Elbow Left (Active)   Number of days: 0235       Wound 04/27/17 Other (Comment) Hip Left (Active)   Number of days: 6180       Wound 04/27/17 Other (Comment) Buttocks Left; Inner (Active)   Number of days: 2566       Wound 04/27/17 Other (Comment) Knee Left; Anterior (Active)   Number of days: 1699       Wound 04/27/17 Other (Comment) Knee Right; Inner (Active)   Number of days: 1167       Wound 04/27/17 Other (Comment) Knee Inner; Lower (Active)   Number of days: 1699       Wound 04/27/17 Other (Comment) Thigh Inner;Right (Active)   Number of days: 1699       Wound 04/27/17 Other (Comment) Knee Right;Lateral (Active)   Number of days: 1699       Wound 04/27/17 Other (Comment) Thigh Right; Lower;Distal (Active)   Number of days: 1699       Wound 04/27/17 Other (Comment) Hip Lateral;Medial;Right (Active)   Number of days: 1699       Wound 04/27/17 Other (Comment) Hip Proximal;Lateral (Active)   Number of days: 5170       Wound 12/20/17 Other (Comment) Leg Right;Distal;Posterior Pyoderma Gangrenosum (Active)   Number of days: 1463       Wound 12/20/17 Other (Comment) Thigh Left; Inner Pyoderma Gangrenosum (Active)   Number of days: 1463       Wound 12/20/17 Other (Comment) Abdomen Lower;Quadrant;Right Pyoderma Gangrenosum (Active)   Number of days: 1463       Wound 12/22/17  Back Left; Lower (Active)   Number of days: 1461       Wound 03/26/20 Heel Right;Posterior (Active)   Number of days: 635       Wound 03/26/20 Abdomen Lower;Medial [id number] (Active)   Number of days: 918       Plan   Plan of Care:  See above    Specialty Bed Required : N/A   [] Low Air Loss   [] Pressure Redistribution  [] Fluid Immersion  [] Bariatric  [] Other:     Current Diet: ADULT DIET;  Regular  Dietician consult:  Yes    Discharge Plan:  Placement for patient upon discharge: home with support    Patient appropriate for Outpatient [address]: N/A    Referrals:  []   [] [address] [facility]  [] Supplies  [] Other    Patient/Caregiver Teaching:  Level of patient/caregiver understanding able to:   [] Indicates understanding       [] Needs reinforcement  [] Unsuccessful      [] Verbal Understanding  [] Demonstrated understanding       [] No evidence of learning  [] Refused teaching         [x] N/A Electronically signed by BREEZY Vogt, RN, Yosvany Hannah on 12/22/2021 at 3:54 PM

## 2021-12-22 NOTE — ED NOTES
Room air pulse ox 87%. 2 liters NC placed at this time. MD at bedside assessing pt.      Tariq Mcgraw RN  12/22/21 6957

## 2021-12-22 NOTE — CONSULTS
Pulmonary and Critical Care Medicine  Consult Note  Encounter Date: 2021 8:00 AM    Ms. Saint Elizabeth is a 55 y.o. female  : 1965  Requesting Provider: Kel Rendon MD    Reason for request: Septic shock            HISTORY OF PRESENT ILLNESS:    Patient is 55 y.o. presents with fever, she has not been feeling well for almost 2 weeks, she reported fever at home, reported nausea and vomiting for the last 3 days, today resolved but she did have vomiting yesterday, no diarrhea, she has abdominal discomfort, no chest pain, no shortness of breath, no joint swelling or pain, and no sick contact. Pulse ox on room air in ED was 87%, she is currently on 2 L O2 saturation 98%, she has fever 102.9. Patient has history of pyoderma gangrenosum, Sweet syndrome, and ulcerative colitis. 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    Neuromuscular disorder ([address]     Osteoarthritis     Pneumonia due to organism     Pyoderma gangrenosa     Sweet syndrome     Ulcerative colitis ([address]        Past Surgical History:        Procedure Laterality Date    ABDOMEN SURGERY      sleen repair    APPENDECTOMY      BACK SURGERY      BREAST SURGERY      fatty tumor removed, benign    COLONOSCOPY      COSMETIC SURGERY      tummy tuck    EYE SURGERY      bilateral cataract surgery    HYSTERECTOMY      SKIN BIOPSY      SPINAL FUSION      TONSILLECTOMY         Social History:     reports that she has been smoking cigarettes. She has a 40.00 pack-year smoking history.  She has never used smokeless tobacco. She reports that she does not drink alcohol and does not use drugs. Family History:   History reviewed. No pertinent family history. No family history of  Allergies:  Amoxicillin-pot clavulanate and Other        MEDICATIONS during current hospitalization:    Continuous Infusions:   norepinephrine 2 mcg/kg/min (12/22/21 1166)    sodium chloride      sodium chloride 150 mL/hr at 12/22/21 5915       Scheduled Meds:   sodium chloride flush  5-40 mL IntraVENous 2 times per day    enoxaparin  40 mg SubCUTAneous Daily    piperacillin-tazobactam  3,375 mg IntraVENous Q8H    azithromycin  500 mg IntraVENous Q24H    vancomycin (VANCOCIN) intermittent dosing (placeholder)   Other RX Placeholder    vancomycin  1,000 mg IntraVENous Q12H       PRN Meds:sodium chloride flush, sodium chloride, acetaminophen **OR** acetaminophen        REVIEW OF SYSTEMS:  ROS: 10 organs review of system is done including general, psychological, ENT, hematological, endocrine, respiratory, cardiovascular, gastrointestinal, musculoskeletal, neurological,  allergy and Immunology is done and is otherwise negative. PHYSICAL EXAM:    Vitals:  /81   Pulse 55   Temp 102.9 °F (39.4 °C) (Oral)   Resp 26   Ht 5' 4\" (1.626 m)   Wt 150 lb (68 kg)   LMP 01/01/1997 (Exact Date) Comment: hysterectomy  SpO2 98%   BMI 25.75 kg/m²     General: alert, cooperative, no distress  Head: normocephalic, atraumatic  Eyes:No gross abnormalities. ENT:  MMM no lesions  Neck:  supple and no masses  Chest : clear to auscultation bilaterally- no wheezes, rales or rhonchi, normal air movement, no respiratory distress  Heart[de-identified] Heart sounds are normal.  Regular rate and rhythm without murmur, gallop or rub.   ABD:  symmetric, soft, tender left lower quadrant, scars of the right lower quadrant from prior pyoderma gangrenosum, no guarding or rebound, no organomegaly  Musculoskeletal : no cyanosis, no clubbing and no edema  Neuro:  Grossly normal  Skin: scar - abdomen,-both legs at the knee level red circular erythematous lesion, no nodules  Lymph node:  no cervical nodes  Urology: No Artis   Psychiatric: appropriate        Data Review  Recent Labs     12/22/21 0045   WBC 3.0*   HGB 14.4   HCT 43.7         Recent Labs     12/22/21 0045   *   K 3.1*   CL 91*   CO2 21   BUN 9   CREATININE 0.70   GLUCOSE 193*       MV Settings: ABGs: No results for input(s): PHART, QFW8JWX, PO2ART, TCS9FZS, BEART, U9QCBLEY, NLI7ENA in the last 72 hours. O2 Device: Nasal cannula  O2 Flow Rate (L/min): 2 L/min  Lab Results   Component Value Date    LACTA 4.0 12/22/2021    LACTA 0.7 02/04/2021    LACTA 1.8 05/08/2020       Radiology  I personally reviewed imaging studies and chest x-ray shows bilateral groundglass infiltrate        Assessment, plan: This is a critically ill patient at risk of deterioration / death , needing close ICU monitoring and intervention due to below noted problems     · Shock, likely septic, however possible hypovolemic  · Sepsis and fever, likely due to multilobar pneumonia, rule out viral illness, initial COVID-19 test was negative, no significant discrete collection, making sweet syndrome less likely however will keep in mind. · Mild hyponatremia and low chloride supporting possible hypovolemic shock in a patient with history of vomiting  · Neutropenia, likely due to viral illness  · Ulcerative colitis  · History of COPD, no signs of exacerbation      Recommendation  · Continue Levophed target negative pressure 65-70  · Fluid resuscitation  · Panculture   · Continue Zosyn  · Monitor renal function and urine output  · Respiratory viral panel  · PICC line     Due to the immediate potential for life-threatening deterioration due to shock, I spent 35 minutes providing critical care. This time is excluding time spent performing procedures.     Thank you for consultation    Electronically signed by Froilan Manuel MD, [facility],  on 12/22/2021 at 8:00 AM

## 2021-12-22 NOTE — CONSULTS
Infectious Diseases Inpatient Consult Note      Reason for Consult:   sepsis  Primary Care Physician:  Rosalee Dee MD  History Obtained From:   Pt, EPIC    Admit Date: 12/22/2021  Hospital Day: 1      HISTORY OF PRESENT ILLNESS:  This is a 55 y.o. female was admitted to [facility]  from home  through ER with acute onset of fevers chills, nausea and vomiting, right-sided abdominal pain. Patient has been coughing white sputum for the past 10 days. On admission to the hospital she was hypotensive and febrile, tachycardic. Was admitted to ICU, was started on Levophed. Had elevated lactate level. Was started on IV vancomycin Zosyn and Zithromax.    CT of abdomen and pelvis showed bibasilar groundglass infiltrates  COVID-19 rapid antigen test was negative  Respiratory panel is pending  Blood cultures are pending  Patient has history of chronic lymphocytic leukemia on no treatment    Past medical surgical and social history were reviewed and are as detailed below  Past Medical History:   Diagnosis Date    Anxiety     Arthritis     Cancer ([facility] 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    Chronic kidney disease     COPD exacerbation (HCC)    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    Neuromuscular disorder ([facility]     Osteoarthritis     Pneumonia due to organism     Pyoderma gangrenosa     Sweet syndrome     Ulcerative colitis ([address]        Past Surgical History:   Procedure Laterality Date    ABDOMEN SURGERY      sleen repair    APPENDECTOMY      BACK SURGERY      BREAST SURGERY      fatty tumor removed, benign    COLONOSCOPY      COSMETIC SURGERY      tummy tuck    EYE SURGERY      bilateral cataract surgery    HYSTERECTOMY      SKIN BIOPSY      SPINAL FUSION      TONSILLECTOMY         Current Medications:     sodium chloride flush  5-40 mL IntraVENous 2 times per day    enoxaparin  40 mg SubCUTAneous Daily    piperacillin-tazobactam  3,375 mg IntraVENous Q8H    lidocaine  5 mL IntraDERmal Once    sodium chloride flush  5-40 mL IntraVENous 2 times per day    zinc oxide   Topical BID       Allergies:  Amoxicillin-pot clavulanate and Other    Social History     Socioeconomic History    Marital status:      Spouse name: Not on file    Number of children: Not on file    Years of education: Not on file    Highest education level: Not on file   Occupational History    Not on file   Tobacco Use    Smoking status: Current Every Day Smoker     Packs/day: 1.00     Years: 40.00     Pack years: 40.00     Types: Cigarettes    Smokeless tobacco: Never Used   Vaping Use    Vaping Use: Never used   Substance and Sexual Activity    Alcohol use: Never    Drug use: No    Sexual activity: Not Currently   Other Topics Concern    Not on file   Social History Narrative    Not on file     Social Determinants of Health     Financial Resource Strain: Low Risk     Difficulty of Paying Living Expenses: Not hard at all   Food Insecurity: No Food Insecurity    Worried About Running Out of Food in the Last Year: Never true    Zeus of Food in the Last Year: Never true   Transportation Needs:     Lack of Transportation (Medical): Not on file    Lack of Transportation (Non-Medical):  Not on file   Physical Activity:     Days of Exercise per Week: Not on file    Minutes of Exercise per Session: Not on file   Stress:     Feeling of Stress : Not on file   Social Connections:     Frequency of Communication with Friends and Family: Not on file    Frequency of Social Gatherings with Friends and Family: Not on file    Attends [employer] Services: Not on file    Active Member of Clubs or Organizations: Not on file    Attends Club or Organization Meetings: Not on file    Marital Status: Not on file   Intimate Partner Violence:     Fear of Current or Ex-Partner: Not on file    Emotionally Abused: Not on file    Physically Abused: Not on file    Sexually Abused: Not on file   Housing Stability:     Unable to Pay for Housing in the Last Year: Not on file    Number of Places Lived in the Last Year: Not on file    Unstable Housing in the Last Year: Not on file         Family History:   History reviewed. No pertinent family history. Review of Systems   Constitutional: Positive for fever (x 2 days). Respiratory: Positive for cough (x 10 days). Gastrointestinal: Positive for abdominal pain, nausea and vomiting. Skin: Positive for wound (R buttock). 14 system review is negative other than HPI    Physical Exam  Vitals:    12/22/21 0400 12/22/21 0401 12/22/21 0430 12/22/21 0515   BP: (!) 79/53 (!) 79/53 95/77 118/81   Pulse: 53 53 59 55   Resp: 25 20 19 26   Temp:       TempSrc:       SpO2: 98% 99% 96% 98%   Weight:       Height:         General Appearance: alert and oriented to person, place and time, well-developed and well-nourished, in no acute distress, on 2 L nasal cannula  Skin: warm anddry, no rash. Head: normocephalic and atraumatic  Eyes: extraocular eye movements intact, conjunctivae normal, anicteric sclerae  ENT: oropharynx clear and moist with normal mucous membranes.  No thrush  Lungs: normal respiratory effort, B rales  Heart: nl S1/S2, no murmur  Abdomen: soft, positive right upper quadrant tenderness, no H-S-megaly, + BS  Extensive skin scarring of abdominal wall  NEUROLOGICAL: alert and oriented x 3, no focal deficits  No leg edema  No erythema, no warmth, no tenderness  R thigh ulcer, 3 cm in diameter, malodorous      DATA:    Lab Results   Component Value Date    WBC 3.1 (L) 12/22/2021    HGB 11.2 (L) 12/22/2021    HCT 34.2 (L) 12/22/2021    MCV 84.9 12/22/2021     12/22/2021     Lab Results   Component Value Date    CREATININE 0.43 (L) 12/22/2021    BUN 4 (L) 12/22/2021     12/22/2021    K 3.4 12/22/2021     (H) 12/22/2021 CO2 20 12/22/2021       Hepatic Function Panel:   Lab Results   Component Value Date    ALKPHOS 55 12/22/2021    ALT 7 12/22/2021    AST 14 12/22/2021    PROT 4.9 12/22/2021    BILITOT <0.2 12/22/2021    BILIDIR <0.2 09/26/2019    IBILI see below 09/26/2019    LABALBU 2.3 12/22/2021    LABALBU 4.1 11/02/2011   Lactic Acid, Plasma [[id number]] (Abnormal) Collected: 12/22/21 0045 Updated: 12/22/21 0148 Specimen Source: Blood Lactic Acid 4.0 High Panic  mmol/L Narrative:    Impression:  PERIPHERAL BIBASILAR GROUNDGLASS OPACITIES.   NO ACUTE PATHOLOGY IN THE ABDOMEN OR PELVIS     IMPRESSION:    · Sepsis  · COVID-19 pneumonia  · Right thigh wound, history of pyoderma gangrenosum and Pseudomonas infection  · History of drug abuse and CLL    Patient Active Problem List   Diagnosis    Large granular lymphocytic leukemia ([address]    Skin ulcer with fat layer exposed ([address]    Fibromyalgia    Cocaine use    Pain in right ankle and joints of right foot    Dysphagia    Posterior tibial tendonitis    Paraparesis (HCC)    Pyoderma gangrenosa    Cellulitis of right leg    Open wound of thigh    Anemia    Obesity    COPD (chronic obstructive pulmonary disease) ([address]    Sepsis due to hemophilus influenzae ([address]    Primary fibromyalgia syndrome    Depression    Cellulitis    Pyodermic gangrenosum    Wound pain    COPD exacerbation (HCC)    Leg weakness, bilateral    Septic shock (HCC)    Sepsis (HCC)    Acidosis    Bacteremia    Anxiety disorder, unspecified    Burning sensation of vulva    Carrier or suspected carrier of methicillin resistant Staphylococcus aureus    Chest wall pain    Cocaine abuse (HCC)    Chronic constipation    Dry eye syndrome of bilateral lacrimal glands    Dyspepsia    Fatigue    Fever    Glucose intolerance (impaired glucose tolerance)    Hematochezia    History of ulcerative colitis    Immunodeficiency due to treatment with immunosuppressive medication ([address]    Inflammatory dermatosis    Insomnia    LUQ pain    Major depressive disorder, recurrent episode, moderate (HCC)    Methicillin resistant Staphylococcus aureus infection    Urge incontinence of urine    Open wound of right ankle    Pain in left shoulder    Papanicolaou smear of vagina with low grade squamous intraepithelial lesion (LGSIL)    Personal history of leukemia    Polyneuropathy, unspecified    Presbyopia    Pseudophakia    Tobacco use    Ulcerative colitis ([facility]    Unspecified fall, initial encounter    Unspecified internal derangement of left knee    Systemic inflammatory response syndrome (HCC)    Hereditary motor and sensory neuropathy    Chronic pain    Low back pain    Metabolic encephalopathy    Restless leg syndrome    Lumbar radiculopathy    Cervical spondylosis with radiculopathy    Increased frequency of urination    Overactive bladder    Pyoderma       PLAN:  · Continue IV Zosyn  · Remdesivir for 5 days  · DC vancomycin and Zithromax  · Follow-up blood and wound cultures  · Vasopressor support and weaning as tolerated  · Follow-up CBC CMP  ·     Discussed with patient and RN    Caroline Garrett MD

## 2021-12-22 NOTE — H&P
[place] [id number] MEDICINE    HISTORY AND PHYSICAL EXAM    PATIENT NAME:  Emanuel Ramírez    MRN:  [id number]  SERVICE DATE:  12/22/2021   SERVICE TIME:  4:50 AM    Primary Care Physician: Michel Leyva MD         SUBJECTIVE  CHIEF COMPLAINT: Fever and chills    HPI: Patient being admitted for septic shock. Patient is a pleasant, alert and oriented x3,  female, 71-year-old. Patient reports that for the past 2 weeks she has been increasingly fatigued and has general body aches. Patient states that this worsened since Sunday where she began to have chills and assumed she had a fever but has not checked it. Patient states that she also has shortness of breath with a nonproductive cough. Patient reports that she has no chest pain but has generalized abdominal pain with nausea and dry heaves. Patient states that she had a small amount of diarrhea. Patient reports she is had very minimal oral intake over the past week. Patient states that she takes medications for depression and chronic pain. Patient also has a past medical history of lymphocytic leukemia. Patient denies use of tobacco, alcohol, or illicit drugs.     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    Neuromuscular disorder ([facility] [address]     Osteoarthritis     Pneumonia due to organism     Pyoderma gangrenosa     Sweet syndrome     [facility]      PAST SURGICAL HISTORY:    Past Surgical History:   Procedure Laterality Date    ABDOMEN SURGERY      sleen repair    APPENDECTOMY      BACK SURGERY      BREAST SURGERY      fatty tumor removed, benign    COLONOSCOPY      COSMETIC SURGERY      tummy tuck    EYE SURGERY      bilateral cataract surgery    HYSTERECTOMY      SKIN BIOPSY      SPINAL FUSION      TONSILLECTOMY       FAMILY HISTORY:  History reviewed. No pertinent family history. SOCIAL HISTORY:    Social History     Socioeconomic History    Marital status:      Spouse name: Not on file    Number of children: Not on file    Years of education: Not on file    Highest education level: Not on file   Occupational History    Not on file   Tobacco Use    Smoking status: Current Every Day Smoker     Packs/day: 1.00     Years: 40.00     Pack years: 40.00     Types: Cigarettes    Smokeless tobacco: Never Used   Vaping Use    Vaping Use: Never used   Substance and Sexual Activity    Alcohol use: Never    Drug use: No    Sexual activity: Not Currently   Other Topics Concern    Not on file   Social History Narrative    Not on file     Social Determinants of Health     Financial Resource Strain: Low Risk     Difficulty of Paying Living Expenses: Not hard at all   Food Insecurity: No Food Insecurity    Worried About Running Out of Food in the Last Year: Never true    Zeus of Food in the Last Year: Never true   Transportation Needs:     Lack of Transportation (Medical): Not on file    Lack of Transportation (Non-Medical):  Not on file   Physical Activity:     Days of Exercise per Week: Not on file    Minutes of Exercise per Session: Not on file   Stress:     Feeling of Stress : Not on file   Social Connections:     Frequency of Communication with Friends and Family: Not on file    Frequency of Social Gatherings with Friends and Family: Not on file    Attends Jain Services: Not on file    Active Member of Clubs or Organizations: Not on file    Attends Club or Organization Meetings: Not on file    Marital Status: Not on file   Intimate Partner Violence:     Fear of Current or Ex-Partner: Not on file    Emotionally Abused: Not on file   [employer] Physically Abused: Not on file    Sexually Abused: Not on file   Housing Stability:     Unable to Pay for Housing in the Last Year: Not on file    Number of Places Lived in the Last Year: Not on file    Unstable Housing in the Last Year: Not on file     MEDICATIONS:   Prior to Admission medications    Medication Sig Start Date End Date Taking? Authorizing Provider   diphenhydrAMINE (BANOPHEN) 25 MG capsule TAKE 1 TABLET BY MOUTH EVERY 6 HOURS AS NEEDED FOR ITCHING 12/17/21   Crystal Almazan PA-C   oxyCODONE-acetaminophen (PERCOCET) 5-325 MG per tablet Take 1 tablet by mouth 2 times daily for 30 days. Intended supply: 3 days.  Take lowest dose possible to manage pain 11/30/21 12/30/21  Deandra Rice MD   traZODone (DESYREL) 100 MG tablet TAKE 1 TABLET BY MOUTH NIGHTLY 11/23/21 12/23/21  Deandra Rice MD   DULoxetine (CYMBALTA) 60 MG extended release capsule TAKE 2 CAPSULES BY MOUTH EVERY EVENING 11/18/21 12/18/21  Deandra Rice MD   gabapentin (NEURONTIN) 800 MG tablet TAKE 1 TABLET BY MOUTH FOUR TIMES A DAY FOR 30 DAYS 11/1/21 12/1/21  Deandra Rice MD   clobetasol (TEMOVATE) 0.05 % cream  4/7/21   Historical Provider, MD   naloxone 4 MG/0.1ML LIQD nasal spray 1 spray by Nasal route as needed for Opioid Reversal 4/5/21   Mauro Sicard, MD   vitamin D (CHOLECALCIFEROL) 50 MCG (2000 UT) TABS tablet TAKE 1 TABLET BY MOUTH DAILY 2/21/21   Mauro Sicard, MD   omeprazole (PRILOSEC) 40 MG delayed release capsule  12/22/20   Historical Provider, MD   GAVILAX 17 GM/SCOOP powder  1/9/21   Historical Provider, MD   Adalimumab (HUMIRA PEN) 40 MG/0.4ML PNKT  6/23/20   Historical Provider, MD   cetirizine (ZYRTEC) 10 MG tablet  10/29/20   Historical Provider, MD   Alcohol Swabs (ALCOHOL PADS) 70 % PADS CLEAN THE SKIN BY USING 1 PAD ON THE AFFECTED AREA BEFORE APPLYING ANY TOPICAL CREAM, 3 TIMES DAILY 4/9/20   Mauro Sicard, MD   hydrOXYzine (ATARAX) 25 MG tablet Take 25 mg by mouth every 6 hours as needed for Itching    Historical Provider, MD   acetaminophen (TYLENOL) 325 MG tablet Take 650 mg by mouth every 4 hours as needed for Pain    Historical Provider, MD       ALLERGIES: Amoxicillin-pot clavulanate and Other    REVIEW OF SYSTEM:   Review of Systems   Constitutional: Positive for appetite change, chills and fatigue. Negative for unexpected weight change. HENT: Negative for congestion, rhinorrhea and sore throat. Eyes: Negative. Negative for photophobia and visual disturbance. Respiratory: Positive for cough and shortness of breath. Negative for wheezing. Cardiovascular: Negative for chest pain and leg swelling. Gastrointestinal: Positive for abdominal pain, diarrhea, nausea and vomiting. Endocrine: Negative. Negative for polydipsia, polyphagia and polyuria. Genitourinary: Negative for difficulty urinating, dysuria and pelvic pain. Musculoskeletal: Positive for myalgias. Negative for back pain. Skin: Negative. Negative for rash. Allergic/Immunologic: Negative. Neurological: Negative. Negative for dizziness, speech difficulty and weakness. Hematological: Negative. Psychiatric/Behavioral: Negative. Negative for behavioral problems and confusion. OBJECTIVE  PHYSICAL EXAM: BP 95/77   Pulse 59   Temp 102.9 °F (39.4 °C) (Oral)   Resp 19   Ht 5' 4\" (1.626 m)   Wt 150 lb (68 kg)   LMP 01/01/1997 (Exact Date) Comment: hysterectomy  SpO2 96%   BMI 25.75 kg/m²     Physical Exam  Vitals and nursing note reviewed. Constitutional:       General: She is not in acute distress. Appearance: She is well-developed. She is ill-appearing. She is not toxic-appearing. HENT:      Head: Normocephalic and atraumatic. Right Ear: There is no impacted cerumen. Left Ear: There is no impacted cerumen. Nose: Nose normal.      Mouth/Throat:      Mouth: Mucous membranes are moist.   Eyes:      Pupils: Pupils are equal, round, and reactive to light.    Cardiovascular:      Rate and Rhythm: Normal rate and regular rhythm. Pulses: Normal pulses. Heart sounds: Normal heart sounds. Pulmonary:      Effort: Pulmonary effort is normal. No respiratory distress. Breath sounds: Normal breath sounds. Decreased air movement present. No wheezing, rhonchi or rales. Abdominal:      General: Bowel sounds are normal. There is no distension. Palpations: Abdomen is soft. There is no mass. Tenderness: There is no abdominal tenderness. There is no guarding or rebound. Hernia: No hernia is present. Musculoskeletal:         General: Normal range of motion. Cervical back: Normal range of motion. Skin:     General: Skin is warm and dry. Capillary Refill: Capillary refill takes less than 2 seconds. Neurological:      Mental Status: She is alert and oriented to person, place, and time. Psychiatric:         Mood and Affect: Mood normal.           DATA:     Diagnostic tests reviewed for today's visit:    Most recent labs and imaging results reviewed.      LABS:    Recent Results (from the past 24 hour(s))   Lactic Acid, Plasma    Collection Time: 12/22/21 12:45 AM   Result Value Ref Range    Lactic Acid 4.0 (HH) 0.5 - 2.2 mmol/L   Comprehensive Metabolic Panel    Collection Time: 12/22/21 12:45 AM   Result Value Ref Range    Sodium 125 (L) 135 - 144 mEq/L    Potassium 3.1 (L) 3.4 - 4.9 mEq/L    Chloride 91 (L) 95 - 107 mEq/L    CO2 21 20 - 31 mEq/L    Anion Gap 13 9 - 15 mEq/L    Glucose 193 (H) 70 - 99 mg/dL    BUN 9 6 - 20 mg/dL    CREATININE 0.70 0.50 - 0.90 mg/dL    GFR Non-African American >60.0 >60    GFR  >60.0 >60    Calcium 8.8 8.5 - 9.9 mg/dL    Total Protein 7.4 6.3 - 8.0 g/dL    Albumin 3.4 (L) 3.5 - 4.6 g/dL    Total Bilirubin 0.3 0.2 - 0.7 mg/dL    Alkaline Phosphatase 79 40 - 130 U/L    ALT 8 0 - 33 U/L    AST 20 0 - 35 U/L    Globulin 4.0 (H) 2.3 - 3.5 g/dL   Magnesium    Collection Time: 12/22/21 12:45 AM   Result Value Ref Range Magnesium 1.7 1.7 - 2.4 mg/dL   CBC Auto Differential    Collection Time: 12/22/21 12:45 AM   Result Value Ref Range    WBC 3.0 (L) 4.8 - 10.8 K/uL    RBC 5.20 4. 20 - 5.40 M/uL    Hemoglobin 14.4 12.0 - 16.0 g/dL    Hematocrit 43.7 37.0 - 47.0 %    MCV 84.2 82.0 - 100.0 fL    MCH 27.6 27.0 - 31.3 pg    MCHC 32.8 (L) 33.0 - 37.0 %    RDW 14.9 (H) 11.5 - 14.5 %    Platelets 722 402 - 367 K/uL    PLATELET SLIDE REVIEW Normal     Path Consult Yes     Neutrophils % 50.0 %    Lymphocytes % 18.0 %    Monocytes % 2.1 %    Eosinophils % 0.0 %    Basophils % 0.8 %    Neutrophils Absolute 2.1 1.4 - 6.5 K/uL    Lymphocytes Absolute 0.9 (L) 1.0 - 4.8 K/uL    Monocytes Absolute 0.1 (L) 0.2 - 0.8 K/uL    Eosinophils Absolute 0.0 0.0 - 0.7 K/uL    Basophils Absolute 0.0 0.0 - 0.2 K/uL    Bands Relative 19 (H) 5 - 11 %    Atypical Lymphocytes Relative 12 %    Vacuolated Neutrophils Present     Anisocytosis 1+    Troponin    Collection Time: 12/22/21 12:45 AM   Result Value Ref Range    Troponin <0.010 0.000 - 0.010 ng/mL   Brain Natriuretic Peptide    Collection Time: 12/22/21 12:45 AM   Result Value Ref Range    Pro- pg/mL   Lipase    Collection Time: 12/22/21 12:45 AM   Result Value Ref Range    Lipase 33 12 - 95 U/L   EKG 12 Lead    Collection Time: 12/22/21  1:07 AM   Result Value Ref Range    Ventricular Rate 90 BPM    Atrial Rate 90 BPM    P-R Interval 144 ms    QRS Duration 82 ms    Q-T Interval 364 ms    QTc Calculation (Bazett) 445 ms    P Axis 66 degrees    R Axis 56 degrees    T Axis 56 degrees   POCT CREATININE    Collection Time: 12/22/21  1:20 AM   Result Value Ref Range    POC CREATININE WHOLE BLOOD 0.7    COVID-19, Rapid    Collection Time: 12/22/21  1:24 AM    Specimen: Nasopharyngeal Swab   Result Value Ref Range    SARS-CoV-2, NAAT Not Detected Not Detected       IMAGING:  No results found.     VTE Prophylaxis: low molecular weight heparin -  start    ASSESSMENT AND PLAN    Principal Problem:  Septic shock: Sepsis with unclear etiology. Patient hypotensive 85/56, temp 102.3, SPO2 93% on room air. Lactic acid 4.0. White count 3.0. Covid negative. CT abdomen pelvis negative for abdominal acute process but shows bilateral groundglass opacities suggestive of Covid. Patient given 2 L normal saline in ED. Patient started on Rocephin and vancomycin in ED. Blood culture sent x2. We will obtain respiratory panel and urinalysis. We will consult infectious disease. We will continue IV hydration per sepsis protocol. We will monitor lactic acid and CBC daily. We will follow blood cultures. We will continue vancomycin, Zosyn, azithromycin IV pending recommendations from ID or if a source is found in the urine or respiratory panel. Hypokalemia: Potassium 3.1. Patient given 60 mEq IV potassium in ED. We will monitor potassium with CMP daily. We will replace as needed. Hyponatremia: Sodium 125. Likely due to dehydration. Patient received 2 L normal saline. We will monitor CMP daily. We will get urine electrolytes and urine and serum osmolality. Hypotension: BP 85/56 patient started on Levophed drip. Patient given IV fluid per sepsis protocol. We will continue to monitor blood pressure and discontinue Levophed drip when appropriate. Elevated lactic acid: Lactic acid 4.0. Patient received 2 L normal saline in ED. We will continue IV hydration per sepsis protocol. We will continue to monitor lactic acid every 2 hours. Active Problems:  Depression: Patient on home meds to control. We will resume home meds. Chronic pain: Patient on home meds to control. We will resume home meds. Plan of care discussed with: patient    SIGNATURE: DEVEN Cross - CNP  DATE: December 22, 2021  TIME: 4:50 AM     RAFITA Rosario MD - supervising physician

## 2021-12-22 NOTE — PROGRESS NOTES
Daily Update    From: Home alone, uses [id number] for transportation. Patient notes transportation issues at times. PMH: Lymphocytic leukemia, depression, chronic pain. Anticipated Discharge Date: TBD    Anticipated Discharge Disposition: Home w/ [address] vs short-term rehab. Patient Mobility or PT/OT ordered: NA  Consults: ID, pulm, wound care. Covid Test Date and Result: 12/22 COVID- Negative    Barriers to Discharge:  Admitted for sepsis of unknown etiology. T 102.9, BP 60/40's t/o night, on Levophed this AM.  Lactic acid elevated at 4.0. Hyponatremic at 125, hypokalemic at 3.1. CTC shows bilat ground glass infiltrates. IV Zithromax q24, IV Zosyn q8. CM to follow for DC needs/referrals.

## 2021-12-22 NOTE — FLOWSHEET NOTE
Patient wanted to leave AMA, I educated her on the risks of leaving and she said she would stay but she is not wearing the monitors.

## 2021-12-23 ENCOUNTER — APPOINTMENT (OUTPATIENT)
Dept: INTERVENTIONAL RADIOLOGY/VASCULAR | Age: 56
DRG: 871 | End: 2021-12-23
Payer: COMMERCIAL

## 2021-12-23 VITALS
SYSTOLIC BLOOD PRESSURE: 118 MMHG | OXYGEN SATURATION: 93 % | WEIGHT: 150 LBS | BODY MASS INDEX: 25.61 KG/M2 | RESPIRATION RATE: 21 BRPM | HEART RATE: 55 BPM | HEIGHT: 64 IN | TEMPERATURE: 99.3 F | DIASTOLIC BLOOD PRESSURE: 76 MMHG

## 2021-12-23 LAB
ALBUMIN SERPL-MCNC: 2.8 G/DL (ref 3.5–4.6)
ALP BLD-CCNC: 73 U/L (ref 40–130)
ALT SERPL-CCNC: 8 U/L (ref 0–33)
ANION GAP SERPL CALCULATED.3IONS-SCNC: 10 MEQ/L (ref 9–15)
ANISOCYTOSIS: ABNORMAL
AST SERPL-CCNC: 19 U/L (ref 0–35)
ATYPICAL LYMPHOCYTE RELATIVE PERCENT: 9 %
BANDED NEUTROPHILS RELATIVE PERCENT: 2 % (ref 5–11)
BASOPHILS ABSOLUTE: 0 K/UL (ref 0–0.2)
BASOPHILS RELATIVE PERCENT: 0.7 %
BILIRUB SERPL-MCNC: <0.2 MG/DL (ref 0.2–0.7)
BUN BLDV-MCNC: 4 MG/DL (ref 6–20)
CALCIUM SERPL-MCNC: 8.8 MG/DL (ref 8.5–9.9)
CHLORIDE BLD-SCNC: 102 MEQ/L (ref 95–107)
CO2: 24 MEQ/L (ref 20–31)
CREAT SERPL-MCNC: 0.65 MG/DL (ref 0.5–0.9)
EOSINOPHILS ABSOLUTE: 0 K/UL (ref 0–0.7)
EOSINOPHILS RELATIVE PERCENT: 0.4 %
GFR AFRICAN AMERICAN: >60
GFR NON-AFRICAN AMERICAN: >60
GLOBULIN: 3.8 G/DL (ref 2.3–3.5)
GLUCOSE BLD-MCNC: 93 MG/DL (ref 70–99)
HCT VFR BLD CALC: 40.8 % (ref 37–47)
HEMOGLOBIN: 13.3 G/DL (ref 12–16)
LYMPHOCYTES ABSOLUTE: 2.4 K/UL (ref 1–4.8)
LYMPHOCYTES RELATIVE PERCENT: 55 %
MCH RBC QN AUTO: 27.7 PG (ref 27–31.3)
MCHC RBC AUTO-ENTMCNC: 32.7 % (ref 33–37)
MCV RBC AUTO: 84.7 FL (ref 82–100)
MONOCYTES ABSOLUTE: 0.3 K/UL (ref 0.2–0.8)
MONOCYTES RELATIVE PERCENT: 8.6 %
NEUTROPHILS ABSOLUTE: 1.1 K/UL (ref 1.4–6.5)
NEUTROPHILS RELATIVE PERCENT: 26 %
PDW BLD-RTO: 14.6 % (ref 11.5–14.5)
PLATELET # BLD: 183 K/UL (ref 130–400)
PLATELET SLIDE REVIEW: NORMAL
POTASSIUM REFLEX MAGNESIUM: 3.8 MEQ/L (ref 3.4–4.9)
PROCALCITONIN: 1.36 NG/ML (ref 0–0.15)
RBC # BLD: 4.82 M/UL (ref 4.2–5.4)
SERUM OSMOLALITY: 280 MOSM/KG (ref 275–295)
SODIUM BLD-SCNC: 136 MEQ/L (ref 135–144)
TOTAL PROTEIN: 6.6 G/DL (ref 6.3–8)
WBC # BLD: 3.8 K/UL (ref 4.8–10.8)

## 2021-12-23 PROCEDURE — 80053 COMPREHEN METABOLIC PANEL: CPT

## 2021-12-23 PROCEDURE — 99291 CRITICAL CARE FIRST HOUR: CPT | Performed by: INTERNAL MEDICINE

## 2021-12-23 PROCEDURE — 2580000003 HC RX 258: Performed by: NURSE PRACTITIONER

## 2021-12-23 PROCEDURE — 02HV33Z INSERTION OF INFUSION DEVICE INTO SUPERIOR VENA CAVA, PERCUTANEOUS APPROACH: ICD-10-PCS | Performed by: RADIOLOGY

## 2021-12-23 PROCEDURE — 6370000000 HC RX 637 (ALT 250 FOR IP): Performed by: INTERNAL MEDICINE

## 2021-12-23 PROCEDURE — 2580000003 HC RX 258: Performed by: INTERNAL MEDICINE

## 2021-12-23 PROCEDURE — 6360000002 HC RX W HCPCS

## 2021-12-23 PROCEDURE — 85025 COMPLETE CBC W/AUTO DIFF WBC: CPT

## 2021-12-23 PROCEDURE — 99233 SBSQ HOSP IP/OBS HIGH 50: CPT | Performed by: INTERNAL MEDICINE

## 2021-12-23 PROCEDURE — 2500000003 HC RX 250 WO HCPCS: Performed by: INTERNAL MEDICINE

## 2021-12-23 PROCEDURE — C1769 GUIDE WIRE: HCPCS

## 2021-12-23 PROCEDURE — 36415 COLL VENOUS BLD VENIPUNCTURE: CPT

## 2021-12-23 PROCEDURE — 36573 INSJ PICC RS&I 5 YR+: CPT

## 2021-12-23 PROCEDURE — 84145 PROCALCITONIN (PCT): CPT

## 2021-12-23 PROCEDURE — 6360000002 HC RX W HCPCS: Performed by: NURSE PRACTITIONER

## 2021-12-23 RX ORDER — ONDANSETRON 2 MG/ML
4 INJECTION INTRAMUSCULAR; INTRAVENOUS EVERY 6 HOURS PRN
Status: DISCONTINUED | OUTPATIENT
Start: 2021-12-23 | End: 2021-12-24 | Stop reason: HOSPADM

## 2021-12-23 RX ORDER — ONDANSETRON 2 MG/ML
INJECTION INTRAMUSCULAR; INTRAVENOUS
Status: COMPLETED
Start: 2021-12-23 | End: 2021-12-23

## 2021-12-23 RX ADMIN — PIPERACILLIN AND TAZOBACTAM 3375 MG: 3; .375 INJECTION, POWDER, LYOPHILIZED, FOR SOLUTION INTRAVENOUS at 04:18

## 2021-12-23 RX ADMIN — SODIUM CHLORIDE: 9 INJECTION, SOLUTION INTRAVENOUS at 04:17

## 2021-12-23 RX ADMIN — LIDOCAINE HYDROCHLORIDE 10 ML: 20 INJECTION, SOLUTION INFILTRATION; PERINEURAL at 14:34

## 2021-12-23 RX ADMIN — CETIRIZINE HYDROCHLORIDE 10 MG: 10 TABLET, FILM COATED ORAL at 10:08

## 2021-12-23 RX ADMIN — SODIUM CHLORIDE 250 ML: 9 INJECTION, SOLUTION INTRAVENOUS at 14:35

## 2021-12-23 RX ADMIN — PIPERACILLIN AND TAZOBACTAM 3375 MG: 3; .375 INJECTION, POWDER, LYOPHILIZED, FOR SOLUTION INTRAVENOUS at 14:59

## 2021-12-23 RX ADMIN — Medication: at 10:08

## 2021-12-23 RX ADMIN — OXYCODONE AND ACETAMINOPHEN 1 TABLET: 5; 325 TABLET ORAL at 08:50

## 2021-12-23 RX ADMIN — ONDANSETRON 4 MG: 2 INJECTION INTRAMUSCULAR; INTRAVENOUS at 17:38

## 2021-12-23 RX ADMIN — REMDESIVIR 200 MG: 100 INJECTION, POWDER, LYOPHILIZED, FOR SOLUTION INTRAVENOUS at 04:16

## 2021-12-23 RX ADMIN — DULOXETINE 120 MG: 60 CAPSULE, DELAYED RELEASE ORAL at 18:51

## 2021-12-23 RX ADMIN — BUSPIRONE HYDROCHLORIDE 10 MG: 10 TABLET ORAL at 10:09

## 2021-12-23 ASSESSMENT — ENCOUNTER SYMPTOMS
COUGH: 1
SHORTNESS OF BREATH: 1
NAUSEA: 0
VOMITING: 0
DIARRHEA: 0

## 2021-12-23 ASSESSMENT — PAIN SCALES - GENERAL
PAINLEVEL_OUTOF10: 0

## 2021-12-23 NOTE — PROGRESS NOTES
INPATIENT PROGRESS NOTES    PATIENT NAME: Jasen Swanson  MRN: [id number]  SERVICE DATE:  December 23, 2021   SERVICE TIME:  10:48 AM      PRIMARY SERVICE: Pulmonary Disease    CHIEF COMPLAINTS: COVID-19 infection    INTERVAL HPI: Patient seen and examined at bedside, Interval Notes, orders reviewed. Nursing notes noted    Patient report feeling good, has no complaint, denies pain, no chest pain, no abdominal pain, she is on room air saturation 95%, no fever, since 9 PM yesterday. Review of system:     GI Abdominal pain No  Skin Rash No    Social History     Tobacco Use    Smoking status: Current Every Day Smoker     Packs/day: 1.00     Years: 40.00     Pack years: 40.00     Types: Cigarettes    Smokeless tobacco: Never Used   Substance Use Topics    Alcohol use: Never     History reviewed. No pertinent family history. OBJECTIVE    Body mass index is 25.75 kg/m². PHYSICAL EXAM:  Vitals:  /67   Pulse 57   Temp 98.5 °F (36.9 °C) (Oral)   Resp 21   Ht 5' 4\" (1.626 m)   Wt 150 lb (68 kg)   LMP 01/01/1997 (Exact Date) Comment: hysterectomy  SpO2 95%   BMI 25.75 kg/m²     General: alert, cooperative, no distress  Head: normocephalic, atraumatic  Eyes:No gross abnormalities. ENT:  MMM no lesions  Neck:  supple and no masses  Chest : clear to auscultation bilaterally- no wheezes, rales or rhonchi, normal air movement, no respiratory distress  Heart[de-identified] Heart sounds are normal.  Regular rate and rhythm without murmur, gallop or rub. ABD:  symmetric, soft, non-tender, no guarding or rebound  Musculoskeletal : no cyanosis, no clubbing and no edema  Neuro:  Grossly normal  Skin: No rashes or nodules noted.   Lymph node:  no cervical nodes  Urology: No Artis   Psychiatric: appropriate    DATA:   Recent Labs     12/22/21  0600 12/23/21  0506   WBC 3.1* 3.8*   HGB 11.2* 13.3   HCT 34.2* 40.8   MCV 84.9 84.7    183     Recent Labs     12/22/21  0600 12/22/21  0600 12/23/21  0506     --  136   K 3.4  --  3.8   *  --  102   CO2 20  --  24   BUN 4*  --  4*   CREATININE 0.43*  --  0.65   GLUCOSE 86  --  93   CALCIUM 6.4*  --  8.8   PROT 4.9*  --  6.6   LABALBU 2.3*  --  2.8*   BILITOT <0.2  --  <0.2   ALKPHOS 55  --  73   AST 14  --  19   ALT 7   < > 8   LABGLOM >60.0   < > >60.0   GFRAA >60.0   < > >60.0   GLOB 2.6   < > 3.8*    < > = values in this interval not displayed. MV Settings:          No results for input(s): PHART, ZNQ1IJJ, PO2ART, GSP0SRB, BEART, [id number] in the last 72 hours. O2 Device: None (Room air)  O2 Flow Rate (L/min): 2 L/min    ADULT DIET; Regular     MEDICATIONS during current hospitalization:    Continuous Infusions:   norepinephrine 2 mcg/kg/min (12/22/21 0417)    sodium chloride      sodium chloride 150 mL/hr at 12/23/21 0417    sodium chloride      sodium chloride         Scheduled Meds:   sodium chloride flush  5-40 mL IntraVENous 2 times per day    enoxaparin  40 mg SubCUTAneous Daily    piperacillin-tazobactam  3,375 mg IntraVENous Q8H    lidocaine  5 mL IntraDERmal Once    sodium chloride flush  5-40 mL IntraVENous 2 times per day    zinc oxide   Topical BID    remdesivir IVPB  100 mg IntraVENous Q24H    Vitamin D   Oral Daily    traZODone  100 mg Oral Nightly    oxyCODONE-acetaminophen  1 tablet Oral BID    cetirizine  10 mg Oral Daily    DULoxetine  120 mg Oral QPM    busPIRone  10 mg Oral BID       PRN Meds:sodium chloride flush, sodium chloride, acetaminophen **OR** acetaminophen, sodium chloride flush, sodium chloride, hydrOXYzine    Radiology  CT ABDOMEN PELVIS W IV CONTRAST Additional Contrast? None    Result Date: 12/22/2021  EXAMINATION: CT ABDOMEN PELVIS W IV CONTRAST DATE AND TIME:12/22/2021 1:29 AM CLINICAL HISTORY: ACUTE ABDOMINAL PAIN. EPIGASTRIC PAIN. ABD PAIN, UC  COMPARISON: FEBRUARY 4, 2021 TECHNIQUE: Contiguous axial CT sections of the abdomen and pelvis. 100 cc's of IV contrast given. .  All CT scans at this facility use dose modulation, iterative reconstruction, and/or weight based dosing when appropriate to reduce radiation dose to as low as reasonably achievable. Some of this report was completed using  Power [employer]ribe [id number] and may include unintended errors with respect to translation of words, typographical errors or grammatical errors which may not have been identified prior to the finalization of this report. FINDINGS   Liver: Negative    Spleen: Negative   Gallbladder: No calcified gallstones. Normal gallbladder wall. No pericholecystic fluid. Pancreas: Negative   Kidney: Negative   Adrenal glands are negative. Bowel: Negative  No CT evidence of appendicitis   Nodes: No lymphadenopathy. Aorta: Negative    Pelvis: Status post hysterectomy. No abnormal soft tissue mass or fluid collection. Peritoneum: No free fluid or free air. The abdominal wall is intact. Bones: No acute osseous abnormalities. Other: Visualized lung bases show patchy peripheral increased foci of hazy attenuation with preservation of bronchovascular markings consistent with groundglass opacities. These are nonspecific and can be seen in viral pneumonia. Correlate clinically. PERIPHERAL BIBASILAR GROUNDGLASS OPACITIES. NO ACUTE PATHOLOGY IN THE ABDOMEN OR PELVIS. XR CHEST PORTABLE    Result Date: 12/22/2021  Exam: XR CHEST PORTABLE History:  SOB Technique: AP portable view of the chest obtained. Comparison: Chest CT from May 19, 2021 Chest x-ray portable Findings: The cardiomediastinal silhouette is within normal limits. There are no infiltrates, consolidations or effusions. Bones of the thorax appear intact. No radiographic evidence of acute intrathoracic process.              IMPRESSION AND SUGGESTION:  Patient is at risk due to   · COVID-19 pneumonia, so far patient is on room air, treating symptomatically  · Sepsis, with bacterial coinfection, septic shock resolved, currently off pressors  · Multilobar pneumonia, secondary to COVID-19 with bacterial coinfection  · Ulcerative colitis  · History of COPD, no signs of exacerbation  · Neutropenia, improving      Recommendation  · Continue current antibiotics  · Dexamethasone if patient required oxygen  · Currently on remdesivir, plan per ID  · PICC line for difficult access  · Patient is off pressors  · DC IV fluid, maintain euvolemic  · Procalcitonin tomorrow  · Transfer to floor today      I spent 35 min with this patient, greater the 50% of this time was spent in counseling and/or coordinating of care.     Electronically signed by Marely Nielsen MD,  [facility]P   on 12/23/2021 at 10:48 AM

## 2021-12-23 NOTE — PROGRESS NOTES
Progress Note  Date:2021       Room:[place] [address]  Patient Cara Bers     YOB: 1965     Age:56 y.o. Subjective    Subjective:  Symptoms:  She reports shortness of breath, malaise, cough and weakness. No chest pain, headache, chest pressure, anorexia, diarrhea or anxiety. Diet:  No nausea or vomiting. Review of Systems   Respiratory: Positive for cough and shortness of breath. Cardiovascular: Negative for chest pain. Gastrointestinal: Negative for anorexia, diarrhea, nausea and vomiting. Neurological: Positive for weakness. Objective         Vitals Last 24 Hours:  TEMPERATURE:  Temp  Av.6 °F (38.1 °C)  Min: 97.7 °F (36.5 °C)  Max: 103.2 °F (39.6 °C)  RESPIRATIONS RANGE: Resp  Av.2  Min: 16  Max: 31  PULSE OXIMETRY RANGE: SpO2  Av.1 %  Min: 92 %  Max: 99 %  PULSE RANGE: Pulse  Av.4  Min: 49  Max: 91  BLOOD PRESSURE RANGE: Systolic (59QHK), VRP:333 , Min:89 , VPY:223   ; Diastolic (15VVL), HVY:32, Min:51, Max:173    I/O (24Hr): Intake/Output Summary (Last 24 hours) at 2021 1009  Last data filed at 2021 1567  Gross per 24 hour   Intake 4940 ml   Output 9500 ml   Net -4560 ml     Objective:  General Appearance:  Comfortable, well-appearing and in no acute distress. Vital signs: (most recent): Blood pressure 121/67, pulse 57, temperature 98.5 °F (36.9 °C), temperature source Oral, resp. rate 21, height 5' 4\" (1.626 m), weight 150 lb (68 kg), last menstrual period 1997, SpO2 95 %. HEENT: Normal HEENT exam.    Lungs:  Normal effort. Breath sounds clear to auscultation. Heart: Normal rate. Regular rhythm. S1 normal and S2 normal.    Abdomen: Abdomen is soft. There is no epigastric area or suprapubic area tenderness. Pulses: Distal pulses are intact. Neurological: Patient is alert. Pupils:  Pupils are equal, round, and reactive to light. Skin:  Warm.       Labs/Imaging/Diagnostics    Labs:  CBC:  Recent Labs     12/22/21  0045 12/22/21  0600 12/23/21  0506   WBC 3.0* 3.1* 3.8*   RBC 5.20 4.03* 4.82   HGB 14.4 11.2* 13.3   HCT 43.7 34.2* 40.8   MCV 84.2 84.9 84.7   RDW 14.9* 14.9* 14.6*    153 183     CHEMISTRIES:  Recent Labs     12/22/21  0045 12/22/21  0118 12/22/21  0600 12/23/21  0506   *  --  136 136   K 3.1*  --  3.4 3.8   CL 91*  --  110* 102   CO2 21  --  20 24   BUN 9  --  4* 4*   CREATININE 0.70 0.7 0.43* 0.65   GLUCOSE 193*  --  86 93   MG 1.7  --  1.5*  --      PT/INR:No results for input(s): PROTIME, INR in the last 72 hours. APTT:No results for input(s): APTT in the last 72 hours. LIVER PROFILE:  Recent Labs     12/22/21  0045 12/22/21  0600 12/23/21  0506   AST 20 14 19   ALT 8 7 8   BILITOT 0.3 <0.2 <0.2   ALKPHOS 79 55 73       Imaging Last 24 Hours:  CT ABDOMEN PELVIS W IV CONTRAST Additional Contrast? None    Result Date: 12/22/2021  EXAMINATION: CT ABDOMEN PELVIS W IV CONTRAST DATE AND TIME:12/22/2021 1:29 AM CLINICAL HISTORY: ACUTE ABDOMINAL PAIN. EPIGASTRIC PAIN. ABD PAIN, UC  COMPARISON: FEBRUARY 4, 2021 TECHNIQUE: Contiguous axial CT sections of the abdomen and pelvis. 100 cc's of IV contrast given. .  All CT scans at this facility use dose modulation, iterative reconstruction, and/or weight based dosing when appropriate to reduce radiation dose to as low as reasonably achievable. Some of this report was completed using  [employer] 739 [id number] and may include unintended errors with respect to translation of words, typographical errors or grammatical errors which may not have been identified prior to the finalization of this report. FINDINGS   Liver: Negative    Spleen: Negative   Gallbladder: No calcified gallstones. Normal gallbladder wall. No pericholecystic fluid. Pancreas: Negative   Kidney: Negative   Adrenal glands are negative. Bowel: Negative  No CT evidence of appendicitis   Nodes: No lymphadenopathy.    Aorta: Negative    Pelvis: Status post hysterectomy. No abnormal soft tissue mass or fluid collection. Peritoneum: No free fluid or free air. The abdominal wall is intact. Bones: No acute osseous abnormalities. Other: Visualized lung bases show patchy peripheral increased foci of hazy attenuation with preservation of bronchovascular markings consistent with groundglass opacities. These are nonspecific and can be seen in viral pneumonia. Correlate clinically. PERIPHERAL BIBASILAR GROUNDGLASS OPACITIES. NO ACUTE PATHOLOGY IN THE ABDOMEN OR PELVIS. XR CHEST PORTABLE    Result Date: 12/22/2021  Exam: XR CHEST PORTABLE History:  SOB Technique: AP portable view of the chest obtained. Comparison: Chest CT from May 19, 2021 Chest x-ray portable Findings: The cardiomediastinal silhouette is within normal limits. There are no infiltrates, consolidations or effusions. Bones of the thorax appear intact. No radiographic evidence of acute intrathoracic process.      Assessment//Plan           Hospital Problems           Last Modified POA    * (Principal) Septic shock ([address] 12/22/2021 Yes    Community acquired pneumonia 12/22/2021 Yes    Major depressive disorder, recurrent episode, moderate ([address] 12/22/2021 Yes    Chronic pain 12/22/2021 Yes    Overview Signed 4/16/2021  2:52 PM by Althea Fournier MA     Formatting of this note might be different from the original.  Overview:   Query underlying cause,  A combination of neuropathic pain and also painful open wounds and ulcer  Also carries dx of fibromylagia, sciatica, ? djd  Plan:  - will increase Neurontin dose  - increase oxycodone PRN to q2  - continue Cymbalta  Overview:   Overview:   Query underlying cause,carries dx of fibromylagia, sciatica, ? djd  She needs to f/up [facility]    Plan:  - cont amitriptyline and cymbalta  - perocet for breakthrough  Clear expectations set 12/03- no indication to up escalate tx at this time; will need

## 2021-12-23 NOTE — PROGRESS NOTES
19:00 Handoff report received from JOSE ALBERTO Chin outside of pts room d/t pt being on droplet plus isolation r/t dx of COVID-19.     21:00 Assessments completed (see flowsheets). Pt A&Ox4, able to make needs and wants known. Pt pulled all peripheral IV lines. This nurse attempted multiple times to place another peripheral IV w/o success. Pt took PO medications whole with thin liquids w/o diff. Pt in agreement with wearing BP cuff with BP cycling Q 4hrs and EKG leads at this time. Safety measures in place. 22:30-23:00 Secondary RN attempted to place peripheral IV w/o success. 00:00 Reassessment completed, no changes in assessment noted. Third RN attempted to place peripheral IV w/o success.  notified. 01:40-02:10 RIJ 18G IV placed by  w/o incident, pt tolerated well. Report called and given to [facility] RN. Pt transferred from bed into w/c with nurse stand by assist w/o incident. All pt belongings and chart given to [address] Pt left unit in stable condition w/o incident.

## 2021-12-23 NOTE — PROGRESS NOTES
Infectious Diseases Inpatient Progress Note          HISTORY OF PRESENT ILLNESS:  Follow up severe sepsis, COVID-19 pneumonia on IV Zosyn and remdesivir, well tolerated. Resolved fevers and hypotension. Currently off pressors. Oxygen saturation is 95% on room air. Patient reports feeling much better with decreased cough and shortness of breath, decreased nausea. No vomiting. Resolved abdominal pain. Right thigh wound with pending culture results. Current Medications:     sodium chloride flush  5-40 mL IntraVENous 2 times per day    enoxaparin  40 mg SubCUTAneous Daily    piperacillin-tazobactam  3,375 mg IntraVENous Q8H    lidocaine  5 mL IntraDERmal Once    sodium chloride flush  5-40 mL IntraVENous 2 times per day    zinc oxide   Topical BID    remdesivir IVPB  100 mg IntraVENous Q24H    Vitamin D   Oral Daily    traZODone  100 mg Oral Nightly    oxyCODONE-acetaminophen  1 tablet Oral BID    cetirizine  10 mg Oral Daily    DULoxetine  120 mg Oral QPM    busPIRone  10 mg Oral BID       Allergies:  Amoxicillin-pot clavulanate and Other      Review of Systems  14 system review is negative other than HPI    Physical Exam  Vitals:    12/23/21 0318 12/23/21 0345 12/23/21 0400 12/23/21 0600   BP: (!) 89/56  96/65 121/67   Pulse: (!) 49  50 57   Resp: 21      Temp: 98.5 °F (36.9 °C)      TempSrc: Oral      SpO2:  94% 94% 95%   Weight:       Height:         General Appearance: alert and oriented to person, place and time, well-developed and well-nourished, in no acute distress  Skin: warm and dry, no rash. Head: normocephalic and atraumatic  Eyes: anicteric sclerae  ENT: oropharynx clear and moist with normal mucous membranes.  No oral thrush  Lungs: normal respiratory effort, bibasilar fine rales  Heart normal S1-S2 no murmur  Abdomen: soft, no tenderness  No leg edema  No erythema, no tenderness      DATA:    Lab Results   Component Value Date    WBC 3.8 (L) 12/23/2021    HGB 13.3 12/23/2021    HCT 40.8 12/23/2021    MCV 84.7 12/23/2021     12/23/2021     Lab Results   Component Value Date    CREATININE 0.65 12/23/2021    BUN 4 (L) 12/23/2021     12/23/2021    K 3.8 12/23/2021     12/23/2021    CO2 24 12/23/2021       Hepatic Function Panel:  Lab Results   Component Value Date    ALKPHOS 73 12/23/2021    ALT 8 12/23/2021    AST 19 12/23/2021    PROT 6.6 12/23/2021    BILITOT <0.2 12/23/2021    BILIDIR <0.2 09/26/2019    IBILI see below 09/26/2019    LABALBU 2.8 12/23/2021    LABALBU 4.1 11/02/2011          No results for input(s): PH, PO2, PCO2, HCO3, BE, O2SAT in the last 72 hours. CT ABDOMEN PELVIS W IV CONTRAST Additional Contrast? None    Result Date: 12/23/2021  PERIPHERAL BIBASILAR GROUNDGLASS OPACITIES. NO ACUTE PATHOLOGY IN THE ABDOMEN OR PELVIS. XR CHEST PORTABLE    Result Date: 12/22/2021  No radiographic evidence of acute intrathoracic process.      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[address]    Skin ulcer with fat layer exposed ([address]    Fibromyalgia    Cocaine use    Pain in right ankle and joints of right foot    Dysphagia    Posterior tibial tendonitis    Paraparesis (HCC)    Pyoderma gangrenosa    Cellulitis of right leg    Open wound of thigh    Anemia    Obesity    COPD (chronic obstructive pulmonary disease) ([address]    Sepsis due to hemophilus influenzae ([address]    Primary fibromyalgia syndrome    Community acquired pneumonia    Depression    Cellulitis    Pyodermic gangrenosum    Wound pain    COPD exacerbation (HCC)    Leg weakness, bilateral    Septic shock ([address]    Sepsis ([address]    Acidosis    Bacteremia    Anxiety disorder, unspecified    Burning sensation of vulva    Carrier or suspected carrier of methicillin resistant Staphylococcus aureus    Chest wall pain    Cocaine abuse (HCC)    Chronic constipation    Dry eye syndrome of bilateral lacrimal glands    Dyspepsia    Fatigue    Fever    Glucose intolerance (impaired glucose tolerance)    Hematochezia    History of ulcerative colitis    Immunodeficiency due to treatment with immunosuppressive medication (HCC)    Inflammatory dermatosis    Insomnia    LUQ pain    Major depressive disorder, recurrent episode, moderate (HCC)    Methicillin resistant Staphylococcus aureus infection    Urge incontinence of urine    Open wound of right ankle    Pain in left shoulder    Papanicolaou smear of vagina with low grade squamous intraepithelial lesion (LGSIL)    Personal history of leukemia    Polyneuropathy, unspecified    Presbyopia    Pseudophakia    Tobacco use    Ulcerative colitis ([address]    Unspecified fall, initial encounter    Unspecified internal derangement of left knee    Systemic inflammatory response syndrome (HCC)    Hereditary motor and sensory neuropathy    Chronic pain    Low back pain    Metabolic encephalopathy    Restless leg syndrome    Lumbar radiculopathy    Cervical spondylosis with radiculopathy    Increased frequency of urination    Overactive bladder    Pyoderma    Pneumonia due to COVID-19 virus       PLAN:  · IV Remdesivir x 5 days, start date 12/22  · May continue Zosyn pending Cx  · CBC CMP daily while on Remdesivir  · Decadron and anticoagulation as ordered  · O2 support and weaning as tolerated  · Continue droplet isolation plus for COVID19  · Transfer out of ICU  · Follow-up blood cultures  · Check wound culture  · D Dimer    Discussed with patient and RN    Clark Pardo MD

## 2021-12-23 NOTE — PROGRESS NOTES
[phone] [phone]     Received pt from ICU to TCU bed #7. Spo2 95% on RA. Pt. AAOx3 and denies any pain or SOB. SB noted to monitor.

## 2021-12-23 NOTE — ACP (ADVANCE CARE PLANNING)
Advance Care Planning     Advance Care Planning Inpatient Note  [facility] Department    Today's Date: 12/23/2021  Unit: [facility]    Received request from abilio. Upon review of chart and communication with care team, patient's decision making abilities are not in question. . Patient was/were present in the room during visit. Goals of ACP Conversation:  Discuss advance care planning documents  Facilitate a discussion related to patient's goals of care as they align with the patient's values and beliefs. Health Care Decision Makers:       Primary Decision Maker: José Miguel Woods - Child - [phone]    Summary:  Updated Healthcare Decision [facility]    Advance Care Planning Documents (Patient Wishes):  None     Assessment:  Patient is a returning Covid patient. She did not appear to be struggling in any way. She was calm and relaxed; sipping coffee and having a conversation. We reviewed her previous ACP notes and she made a few changes (see below). We discussed [address] document, but she was not interested in that at this time. Interventions:  Provided education on documents for clarity and greater understanding  Encouraged ongoing ACP conversation with future decision makers and loved ones    Care Preferences Communicated:     Hospitalization:  If the patient's health worsens and it becomes clear that the chance of recovery is unlikely,     the patient wants hospitalization. Ventilation:   If the patient, in their present state of health, suddenly became very ill and unable to breathe on their own,     the patient would desire the use of a ventilator (breathing machine). If their health worsens and it becomes clear that the change of recovery is unlikely,     the patient would NOT desire the use of a ventilator (breathing machine).     Resuscitation:  In the event the patient's heart stopped as a result of an underlying serious health condition, the patient communicates a preference for      resuscitative attempts (CPR). Outcomes/Plan:  ACP Discussion: Completed  Routed ACP note to attending provider or other IDT member.     Electronically signed by Hannah Carter, [address] on 12/23/2021 at 4:34 PM

## 2021-12-23 NOTE — PROGRESS NOTES
Daily Update    From: Home alone, uses [id number] for transportation. Patient notes transportation issues at times. PMH: Lymphocytic leukemia, depression, chronic pain. Anticipated Discharge Date: TBD    Anticipated Discharge Disposition: Home / [facility] vs short-term rehab. Patient Mobility or PT/OT ordered: NEEDED- LEFT NOTE  Consults: ID, pulm, wound care. Covid Test Date and Result: 12/22 COVID- Negative  12/22 BC PENDING, WOUND CULTURE PENDING    Barriers to Discharge: FEBRILE, SB/RA, IV ZOSYN, LOVENOX, IV REMDES. LEVO DC'D AND PLAN TO TRANSFER TO FLOOR. IV CHECK SENT IN CASE NEEDED.

## 2021-12-23 NOTE — CARE COORDINATION
IV BENEFIT REQUEST FORM    FAX FROM: [facility]                        [address] [place]    REQUESTED BY: Electronically signed by Jonna Anne RN on 2021 at 11:44 AM                                               RN/C3: PHONE: [phone])     DATE:/TIME OF REQUEST: 21  TIME: 11:44 AM      TO: [facility]      FAX TO: [phone]    PHONE: [phone]     THIS PATIENT HAS BEEN IDENTIFIED TO POSSIBLY NEED LONG TERM IV'S. PLEASE CHECK INSURANCE COVERAGE FOR THE FOLLOWING PT/DRUGS. PATIENT'S NAME: JANN MEIER                              [facility]   PATIENT'S : 1965  PATIENT ADDRESS: [address] [place] [id number]  SSN:    ([id number])     PAYOR NAME:  Payor: Obie Olivo / Plan: [address] [place] [address] / Product Type: *No Product type* /     DRUG: (ZOSYN)                         DOSE: (3375MG)            FREQUENCY: Q (8) HR    DRUG: ()                         DOSE: ()            FREQUENCY: Q () HR    DRUG: ()                         DOSE: ()            FREQUENCY: Q () HR    __________ CHECK HERE IF PT HAS NO INSURANCE AND REQUESTING SELF PAY COST. *IF [facility] IS NOT A PROVIDER FOR THIS PATIENT, PLEASE FORWARD INFO VIA FAX TO [facility] @ [phone],(PHONE NUMBER: [phone]) TO RUN BENEFIT VERIFICATION AND NOTIFY THE ABOVE C3 OF THIS PLAN. (FAX FACE SHEET WITH DEMOGRAPHICS AND INSURANCE INFO WITH THIS FORM.)  PLEASE FAX BENEFIT INFO TO: THE [phone]    This message is intended only for the use of the individual or entity to which it is addressed and may contain information that is privileged, confidential, and exempt from disclosure under applicable law.  If the reader of the notice is not intended recipient of the employee/agent responsible for delivering the message to the intended recipient, you are hereby notified than any dissemination, distribution or copying of this communication is strictly prohibited. Please contact the sender for further instructions on handling the information.

## 2021-12-24 LAB — OSMOLALITY URINE: 206 MOSM/KG

## 2021-12-24 NOTE — FLOWSHEET NOTE
In to see pt, very agitated and angry that nobody is answering her call lights, Pt just rolled to the floor at shift changed. Explain to pt that we just finish doing report and that I can help her with what she needs. Pt demanded to go home, AMA papers signed by patient. Right IJ and PICC line pulled out, catheters intact, pressure held, no bleeding noted. Dr Sevilla Burt notified, aware.

## 2021-12-25 LAB
ANAEROBIC CULTURE: NORMAL
GRAM STAIN RESULT: NORMAL
URINE CULTURE, ROUTINE: NORMAL
WOUND/ABSCESS: NORMAL

## 2021-12-27 LAB
BLOOD CULTURE, ROUTINE: NORMAL
CULTURE, BLOOD 2: NORMAL

## 2021-12-28 PROBLEM — H26.491 PCO (POSTERIOR CAPSULAR OPACIFICATION), RIGHT: Status: ACTIVE | Noted: 2021-09-15

## 2021-12-29 DIAGNOSIS — G62.9 POLYNEUROPATHY, UNSPECIFIED: ICD-10-CM

## 2021-12-29 RX ORDER — OXYCODONE HYDROCHLORIDE AND ACETAMINOPHEN 5; 325 MG/1; MG/1
1 TABLET ORAL 2 TIMES DAILY
Qty: 60 TABLET | Refills: 0 | Status: SHIPPED | OUTPATIENT
Start: 2021-12-29 | End: 2022-01-05 | Stop reason: SDUPTHER

## 2022-01-03 ENCOUNTER — TELEPHONE (OUTPATIENT)
Dept: NEUROLOGY | Age: 57
End: 2022-01-03

## 2022-01-03 NOTE — TELEPHONE ENCOUNTER
Patient had to r/s todays appointment  due to uber issues and she is scheduled for April, she was last seen in september 2021 is it okay to still due refills for perocect

## 2022-01-04 DIAGNOSIS — G62.9 POLYNEUROPATHY, UNSPECIFIED: ICD-10-CM

## 2022-01-05 RX ORDER — OXYCODONE HYDROCHLORIDE AND ACETAMINOPHEN 5; 325 MG/1; MG/1
1 TABLET ORAL 2 TIMES DAILY
Qty: 60 TABLET | Refills: 0 | Status: SHIPPED | OUTPATIENT
Start: 2022-01-05 | End: 2022-01-27 | Stop reason: SDUPTHER

## 2022-01-07 ENCOUNTER — OFFICE VISIT (OUTPATIENT)
Dept: ORTHOPEDIC SURGERY | Age: 57
End: 2022-01-07
Payer: COMMERCIAL

## 2022-01-07 VITALS
WEIGHT: 150 LBS | OXYGEN SATURATION: 96 % | TEMPERATURE: 96.4 F | HEIGHT: 64 IN | HEART RATE: 76 BPM | BODY MASS INDEX: 25.61 KG/M2

## 2022-01-07 DIAGNOSIS — M47.892 OTHER SPONDYLOSIS, CERVICAL REGION: ICD-10-CM

## 2022-01-07 PROCEDURE — 4004F PT TOBACCO SCREEN RCVD TLK: CPT | Performed by: ORTHOPAEDIC SURGERY

## 2022-01-07 PROCEDURE — G8484 FLU IMMUNIZE NO ADMIN: HCPCS | Performed by: ORTHOPAEDIC SURGERY

## 2022-01-07 PROCEDURE — 1111F DSCHRG MED/CURRENT MED MERGE: CPT | Performed by: ORTHOPAEDIC SURGERY

## 2022-01-07 PROCEDURE — 3017F COLORECTAL CA SCREEN DOC REV: CPT | Performed by: ORTHOPAEDIC SURGERY

## 2022-01-07 PROCEDURE — 99213 OFFICE O/P EST LOW 20 MIN: CPT | Performed by: ORTHOPAEDIC SURGERY

## 2022-01-07 PROCEDURE — G8417 CALC BMI ABV UP PARAM F/U: HCPCS | Performed by: ORTHOPAEDIC SURGERY

## 2022-01-07 PROCEDURE — G8427 DOCREV CUR MEDS BY ELIG CLIN: HCPCS | Performed by: ORTHOPAEDIC SURGERY
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
## 2022-01-12 ENCOUNTER — OFFICE VISIT (OUTPATIENT)
Dept: FAMILY MEDICINE CLINIC | Age: 57
End: 2022-01-12
Payer: COMMERCIAL

## 2022-01-12 VITALS
HEIGHT: 64 IN | WEIGHT: 141 LBS | RESPIRATION RATE: 14 BRPM | DIASTOLIC BLOOD PRESSURE: 70 MMHG | BODY MASS INDEX: 24.07 KG/M2 | SYSTOLIC BLOOD PRESSURE: 106 MMHG | HEART RATE: 59 BPM | OXYGEN SATURATION: 98 %

## 2022-01-12 DIAGNOSIS — Z72.0 NICOTINE ABUSE: ICD-10-CM

## 2022-01-12 DIAGNOSIS — E55.9 HYPOVITAMINOSIS D: ICD-10-CM

## 2022-01-12 DIAGNOSIS — E55.9 HYPOVITAMINOSIS D: Primary | ICD-10-CM

## 2022-01-12 DIAGNOSIS — L88 PYODERMIC GANGRENOSUM: ICD-10-CM

## 2022-01-12 PROCEDURE — G8427 DOCREV CUR MEDS BY ELIG CLIN: HCPCS | Performed by: INTERNAL MEDICINE

## 2022-01-12 PROCEDURE — 4004F PT TOBACCO SCREEN RCVD TLK: CPT | Performed by: INTERNAL MEDICINE

## 2022-01-12 PROCEDURE — 99214 OFFICE O/P EST MOD 30 MIN: CPT | Performed by: INTERNAL MEDICINE

## 2022-01-12 PROCEDURE — G8420 CALC BMI NORM PARAMETERS: HCPCS | Performed by: INTERNAL MEDICINE

## 2022-01-12 PROCEDURE — G8484 FLU IMMUNIZE NO ADMIN: HCPCS | Performed by: INTERNAL MEDICINE

## 2022-01-12 PROCEDURE — 3017F COLORECTAL CA SCREEN DOC REV: CPT | Performed by: INTERNAL MEDICINE

## 2022-01-12 PROCEDURE — 1111F DSCHRG MED/CURRENT MED MERGE: CPT | Performed by: INTERNAL MEDICINE

## 2022-01-12 RX ORDER — CHOLECALCIFEROL (VITAMIN D3) 50 MCG
TABLET ORAL
Qty: 30 TABLET | Refills: 3 | Status: SHIPPED | OUTPATIENT
Start: 2022-01-12 | End: 2022-05-02

## 2022-01-12 ASSESSMENT — PATIENT HEALTH QUESTIONNAIRE - PHQ9
SUM OF ALL RESPONSES TO PHQ QUESTIONS 1-9: 8
6. FEELING BAD ABOUT YOURSELF - OR THAT YOU ARE A FAILURE OR HAVE LET YOURSELF OR YOUR FAMILY DOWN: 3
SUM OF ALL RESPONSES TO PHQ QUESTIONS 1-9: 8
SUM OF ALL RESPONSES TO PHQ QUESTIONS 1-9: 8
2. FEELING DOWN, DEPRESSED OR HOPELESS: 1
8. MOVING OR SPEAKING SO SLOWLY THAT OTHER PEOPLE COULD HAVE NOTICED. OR THE OPPOSITE, BEING SO FIGETY OR RESTLESS THAT YOU HAVE BEEN MOVING AROUND A LOT MORE THAN USUAL: 0
10. IF YOU CHECKED OFF ANY PROBLEMS, HOW DIFFICULT HAVE THESE PROBLEMS MADE IT FOR YOU TO DO YOUR WORK, TAKE CARE OF THINGS AT HOME, OR GET ALONG WITH OTHER PEOPLE: 2
9. THOUGHTS THAT YOU WOULD BE BETTER OFF DEAD, OR OF HURTING YOURSELF: 0
SUM OF ALL RESPONSES TO PHQ QUESTIONS 1-9: 8
5. POOR APPETITE OR OVEREATING: 0
4. FEELING TIRED OR HAVING LITTLE ENERGY: 1
3. TROUBLE FALLING OR STAYING ASLEEP: 3
7. TROUBLE CONCENTRATING ON THINGS, SUCH AS READING THE NEWSPAPER OR WATCHING TELEVISION: 0

## 2022-01-12 NOTE — PROGRESS NOTES
2022      Javier Burgos (:  1965) is a 55 y.o. female, here for evaluation of the following medical concerns:pyoderma gangrenosum          HPI    52-year-old female with a history of prediabetes, pyoderma gangrenosum with associated abdominal and right lower extremity wounds associated with pyoderma gangrenosum  presents for follow-up visit    She was previously hospitalized from  through 2020. She has been followed by dermatology ,wound care and rheumatology. She is presently receiving Percocet every 12 hours. She previously tested positive for cocaine on a urine toxicology and strongly encouraged to enroll in a substance abuse program.          Depression/anxiety: Continuing Cymbalta and trazodone        Fibromyalgia and Wound pain:  oxycodone every 12 hours        Prediabetes: The patient is A1c was 6.3 in May 2020. Nicotine Dependency: The patient continues to engage in nicotine dependence        Bitlateral shoulder pain / Left suprasinatus: Pt states she  Lost her balance and fell striking left shoulder the subsequently her right shoulder. X-ray of the left shoulder was negative on 2021. MRI of the left shoulder demonstrated evidence of a left supraspinatus tear. IN the past he pain management physician approves the patients current pain regimen. This medical concern was not addressed today. Hypovitaminosis D: Patient has a history of hypovitaminosis D. She is presently compliant with her vitamin D daily and would like a refill for this medication at this time. At present he denies polyuria,  Polydipsia, constitutional, sinus, visual, cardiopulmonary, urologic, gastrointestinal, immunologic/hematologic, additional musculoskeletal, neurologic,dermatologic, or psychiatric complaints. Review of Systems   Constitutional: Negative for chills, diaphoresis, fatigue and fever.    HENT: Negative for congestion, dental problem, drooling, ear discharge, ear pain, facial swelling, hearing loss, mouth sores, nosebleeds, postnasal drip, rhinorrhea, sinus pressure, sinus pain, sneezing, sore throat, tinnitus, trouble swallowing and voice change. Eyes: Negative for photophobia, pain, discharge, redness, itching and visual disturbance. Respiratory: Negative for apnea, cough, chest tightness, shortness of breath and wheezing. Cardiovascular: Negative for chest pain, palpitations and leg swelling. Gastrointestinal: Negative for abdominal distention, abdominal pain, blood in stool, constipation, diarrhea, nausea, rectal pain and vomiting. Endocrine: Negative for cold intolerance, heat intolerance, polydipsia, polyphagia and polyuria. Genitourinary: Negative for decreased urine volume, difficulty urinating, dysuria, flank pain, frequency, genital sores, hematuria and urgency. Musculoskeletal: Negative for arthralgias, back pain, gait problem, joint swelling, myalgias, neck pain and neck stiffness. Skin: Negative for color change, rash and wound. Allergic/Immunologic: Negative for environmental allergies and food allergies. Neurological: Negative for dizziness, tremors, seizures, syncope, facial asymmetry, speech difficulty, weakness, light-headedness, numbness and headaches. Hematological: Negative for adenopathy. Does not bruise/bleed easily. Psychiatric/Behavioral: Negative for agitation, confusion, decreased concentration, hallucinations, self-injury, sleep disturbance and suicidal ideas. The patient is not nervous/anxious. Prior to Visit Medications    Medication Sig Taking? Authorizing Provider   vitamin D (CHOLECALCIFEROL) 50 MCG (2000 UT) TABS tablet TAKE 1 TABLET BY MOUTH DAILY Yes Mauro Sicard, MD   oxyCODONE-acetaminophen (PERCOCET) 5-325 MG per tablet Take 1 tablet by mouth 2 times daily for 30 days. Intended supply: 3 days.  Take lowest dose possible to manage pain  Deandra Rice MD   diphenhydrAMINE (BANOPHEN) 25 MG capsule TAKE 1 TABLET BY MOUTH EVERY 6 HOURS AS NEEDED FOR ITCHING  Magaly Prakash PA-C   traZODone (DESYREL) 100 MG tablet TAKE 1 TABLET BY MOUTH NIGHTLY  Dhruv Joesphine Cooks, MD   DULoxetine (CYMBALTA) 60 MG extended release capsule TAKE 2 CAPSULES BY MOUTH EVERY EVENING  Dhruv Joesphine Cooks, MD   gabapentin (NEURONTIN) 800 MG tablet TAKE 1 TABLET BY MOUTH FOUR TIMES A DAY FOR 27 DAYS  Dhruv Joesphine Cooks, MD   clobetasol (TEMOVATE) 0.05 % cream   Historical Provider, MD   naloxone 4 MG/0.1ML LIQD nasal spray 1 spray by Nasal route as needed for Opioid Reversal  Lanie Joshi MD   omeprazole (PRILOSEC) 40 MG delayed release capsule   Historical Provider, MD Berenice Alba 17 GM/SCOOP powder   Historical Provider, MD   Adalimumab (HUMIRA PEN) 40 MG/0.4ML PNKT   Historical Provider, MD   cetirizine (ZYRTEC) 10 MG tablet   Historical Provider, MD   Alcohol Swabs (ALCOHOL PADS) 70 % PADS CLEAN THE SKIN BY USING 1 PAD ON THE AFFECTED AREA BEFORE APPLYING ANY TOPICAL CREAM, 3 TIMES DAILY  Lanie Joshi MD   hydrOXYzine (ATARAX) 25 MG tablet Take 25 mg by mouth every 6 hours as needed for Itching  Historical Provider, MD   acetaminophen (TYLENOL) 325 MG tablet Take 650 mg by mouth every 4 hours as needed for Pain  Historical Provider, MD        Allergies   Allergen Reactions    Amoxicillin-Pot Clavulanate      Other reaction(s): GI Upset, Intolerance, Other: See Comments  Nose bleed    Other Itching     IVP dye         Past Medical History:   Diagnosis Date    Anxiety     Arthritis     Cancer ([facility] 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    Chronic kidney disease     COPD exacerbation ([facility]    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    Neuromuscular disorder ([facility]     Osteoarthritis     Pneumonia due to organism     Pyoderma gangrenosa     Sweet syndrome     Ulcerative colitis ([facility] Past Surgical History:   Procedure Laterality Date    ABDOMEN SURGERY      sleen repair    APPENDECTOMY      BACK SURGERY      BREAST SURGERY      fatty tumor removed, benign    COLONOSCOPY      COSMETIC SURGERY      tummy tuck    EYE SURGERY      bilateral cataract surgery    HYSTERECTOMY      SKIN BIOPSY      SPINAL FUSION      TONSILLECTOMY         Social History     Socioeconomic History    Marital status:      Spouse name: Not on file    Number of children: Not on file    Years of education: Not on file    Highest education level: Not on file   Occupational History    Not on file   Tobacco Use    Smoking status: Current Every Day Smoker     Packs/day: 1.00     Years: 40.00     Pack years: 40.00     Types: Cigarettes    Smokeless tobacco: Never Used   Vaping Use    Vaping Use: Never used   Substance and Sexual Activity    Alcohol use: Never    Drug use: No    Sexual activity: Not Currently   Other Topics Concern    Not on file   Social History Narrative    Not on file     Social Determinants of Health     Financial Resource Strain: Low Risk     Difficulty of Paying Living Expenses: Not hard at all   Food Insecurity: No Food Insecurity    Worried About Running Out of Food in the Last Year: Never true    Zeus of Food in the Last Year: Never true   Transportation Needs:     Lack of Transportation (Medical): Not on file    Lack of Transportation (Non-Medical):  Not on file   Physical Activity:     Days of Exercise per Week: Not on file    Minutes of Exercise per Session: Not on file   Stress:     Feeling of Stress : Not on file   Social Connections:     Frequency of Communication with Friends and Family: Not on file    Frequency of Social Gatherings with Friends and Family: Not on file    Attends Sikhism Services: Not on file    Active Member of Clubs or Organizations: Not on file    Attends Club or Organization Meetings: Not on file    Marital Status: Not on file   Intimate Partner Violence:     Fear of Current or Ex-Partner: Not on file    Emotionally Abused: Not on file    Physically Abused: Not on file    Sexually Abused: Not on file   Housing Stability:     Unable to Pay for Housing in the Last Year: Not on file    Number of [place] in the Last Year: Not on file    Unstable Housing in the Last Year: Not on file        No family history on file. Vitals:    01/12/22 1519   BP: 106/70   Pulse: 59   Resp: 14   SpO2: 98%   Weight: 141 lb (64 kg)   Height: 5' 4\" (1.626 m)     Estimated body mass index is 24.2 kg/m² as calculated from the following:    Height as of this encounter: 5' 4\" (1.626 m). Weight as of this encounter: 141 lb (64 kg). ASSESSMENT/PLAN:  Hypovitaminosis D- check vitamin D level. Continue oral vitamin D replacement. Fibromyalgia:continue cymbalta and trazodon        Left supraspinatus tear- stable. Monitoring. Pyoderma Gangrenosum-continue percocet    Multiple [id number] with wound care and rheumatology    Cocaine Dependency-  Previously referred pt to [address] [place] [address] N @ [phone]. I have explained to the patient that I will not refill her medication again unless she can provide documentation supporting the fact that she is actively enrolled in this program to achieve cessation from substance abuse. --Coty Massey MD on 1/12/2022 at 3:56 PM    An electronic signature was used to authenticate this note.

## 2022-01-13 LAB — VITAMIN D 25-HYDROXY: 40.5 NG/ML (ref 30–100)

## 2022-01-17 ASSESSMENT — ENCOUNTER SYMPTOMS: COUGH: 1

## 2022-01-17 NOTE — PROGRESS NOTES
Subjective:      Patient ID: Hector Snyder is a 55 y.o. female who presents today for:  Chief Complaint   Patient presents with    Covid Testing     Patient is here c/o covid testing. Pt states she is leaving for a trip and wants to be sure she is cleared. Pt states she has congestion, runny nose, headaches, and slight cough. Pt states symptoms have been ongoing since Monday, states she has used OTC cold and cough medication with some relief. Pt presents for testing prior to planned travel to [place]  She has a chronic cough but denies any worsening cough or sx of CP, SOB, wheezing, abd pain, NVDFC. 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    Neuromuscular disorder ([facility]     Osteoarthritis     Pneumonia due to organism     Pyoderma gangrenosa     Sweet syndrome     Ulcerative colitis ([facility]      Past Surgical History:   Procedure Laterality Date    ABDOMEN SURGERY      sleen repair    APPENDECTOMY      BACK SURGERY      BREAST SURGERY      fatty tumor removed, benign    COLONOSCOPY      COSMETIC SURGERY      tummy tuck    EYE SURGERY      bilateral cataract surgery    HYSTERECTOMY      SKIN BIOPSY      SPINAL FUSION      TONSILLECTOMY       No family history on file. Social History     Socioeconomic History    Marital status:       Spouse name: Not on file    Number of children: Not on file    Years of education: Not on file    Highest education level: Not on file   Occupational History    Not on file   Tobacco Use    Smoking status: Current Every Day Smoker     Packs/day: 1.00     Years: 40.00     Pack years: 40.00     Types: Cigarettes    Smokeless tobacco: Never Used   Vaping Use    Vaping Use: Never used   Substance and Sexual Activity    Alcohol use: Never    Drug use: No    Sexual activity: Not Currently   Other Topics Concern    Not on file   Social History Narrative    Not on file     Social Determinants of Health     Financial Resource Strain: Low Risk     Difficulty of Paying Living Expenses: Not hard at all   Food Insecurity: No Food Insecurity    Worried About Running Out of Food in the Last Year: Never true    Zeus of Food in the Last Year: Never true   Transportation Needs:     Lack of Transportation (Medical): Not on file    Lack of Transportation (Non-Medical):  Not on file   Physical Activity:     Days of Exercise per Week: Not on file    Minutes of Exercise per Session: Not on file   Stress:     Feeling of Stress : Not on file   Social Connections:     Frequency of Communication with Friends and Family: Not on file    Frequency of Social Gatherings with Friends and Family: Not on file    Attends Baptism Services: Not on file    Active Member of [address] [place] [address] [employer] or Organizations: Not on file    Attends Club or Organization Meetings: Not on file    Marital Status: Not on file   Intimate Partner Violence:     Fear of Current or Ex-Partner: Not on file    Emotionally Abused: Not on file    Physically Abused: Not on file    Sexually Abused: Not on file   Housing Stability:     Unable to Pay for Housing in the Last Year: Not on file    Number of [place] in the Last Year: Not on file    Unstable Housing in the Last Year: Not on file     Current Outpatient Medications on File Prior to Visit   Medication Sig Dispense Refill    traZODone (DESYREL) 100 MG tablet TAKE 1 TABLET BY MOUTH NIGHTLY 30 tablet 2    DULoxetine (CYMBALTA) 60 MG extended release capsule TAKE 2 CAPSULES BY MOUTH EVERY EVENING 60 capsule 2    clobetasol (TEMOVATE) 0.05 % cream       naloxone 4 MG/0.1ML LIQD nasal spray 1 spray by Nasal route as needed for Opioid Reversal 1 each 5    omeprazole (PRILOSEC) 40 MG delayed release capsule       GAVILAX 17 GM/SCOOP powder       Adalimumab (HUMIRA PEN) 40 MG/0.4ML PNKT       cetirizine (ZYRTEC) 10 MG tablet       Alcohol Swabs (ALCOHOL PADS) 70 % PADS CLEAN THE SKIN BY USING 1 PAD ON THE AFFECTED AREA BEFORE APPLYING ANY TOPICAL CREAM, 3 TIMES DAILY 100 each 0    hydrOXYzine (ATARAX) 25 MG tablet Take 25 mg by mouth every 6 hours as needed for Itching      acetaminophen (TYLENOL) 325 MG tablet Take 650 mg by mouth every 4 hours as needed for Pain      gabapentin (NEURONTIN) 800 MG tablet TAKE 1 TABLET BY MOUTH FOUR TIMES A DAY FOR 30 DAYS 120 tablet 2     No current facility-administered medications on file prior to visit. Amoxicillin-pot clavulanate and Other    Review of Systems   Respiratory: Positive for cough. Objective:   /74 (Site: Right Upper Arm, Position: Sitting, Cuff Size: Small Adult)   Pulse 52   Temp 96.3 °F (35.7 °C)   Ht 5' 4\" (1.626 m)   Wt 151 lb (68.5 kg)   LMP 01/01/1997 (Exact Date) Comment: hysterectomy  SpO2 95%   BMI 25.92 kg/m²     Physical Exam  Vitals and nursing note reviewed. Constitutional:       Appearance: She is well-developed. She is not ill-appearing. HENT:      Head: Normocephalic and atraumatic. Right Ear: Tympanic membrane normal.      Left Ear: Tympanic membrane normal.      Nose: Nose normal. No congestion or rhinorrhea. Mouth/Throat:      Mouth: Mucous membranes are moist.      Pharynx: No oropharyngeal exudate or posterior oropharyngeal erythema. Eyes:      General: No scleral icterus. Extraocular Movements: Extraocular movements intact. Conjunctiva/sclera: Conjunctivae normal.      Pupils: Pupils are equal, round, and reactive to light. Neck:      Thyroid: No thyromegaly. Cardiovascular:      Rate and Rhythm: Normal rate and regular rhythm. Heart sounds: No murmur heard.       Pulmonary:      Effort: Pulmonary effort is normal. No accessory muscle usage or respiratory distress. Breath sounds: Normal breath sounds. No wheezing. Comments: Intermittent harsh cough during encounter  Abdominal:      General: Bowel sounds are normal. There is no distension. Palpations: Abdomen is soft. Tenderness: There is no abdominal tenderness. Musculoskeletal:         General: Normal range of motion. Cervical back: Normal range of motion and neck supple. No rigidity. Skin:     General: Skin is warm and dry. Neurological:      Mental Status: She is alert and oriented to person, place, and time. Cranial Nerves: No cranial nerve deficit. Psychiatric:         Mood and Affect: Mood normal.         Behavior: Behavior normal.         Thought Content: Thought content normal.         Judgment: Judgment normal.         Assessment:       Diagnosis Orders   1. Cough  POCT COVID-19, Antigen    POCT Influenza A/B   2. Seasonal allergic rhinitis, unspecified trigger  diphenhydrAMINE (BANOPHEN) 25 MG capsule       Plan:      Orders Placed This Encounter   Procedures    POCT COVID-19, Antigen     Order Specific Question:   Is this test for diagnosis or screening? Answer:   Diagnosis of ill patient     Order Specific Question:   Symptomatic for COVID-19 as defined by CDC? Answer:   Yes     Order Specific Question:   Date of Symptom Onset     Answer:   12/15/2021     Order Specific Question:   Hospitalized for COVID-19? Answer:   No     Order Specific Question:   Admitted to ICU for COVID-19? Answer:   No     Order Specific Question:   Employed in healthcare setting? Answer:   Unknown     Order Specific Question:   Resident in a congregate (group) care setting? Answer:   Unknown     Order Specific Question:   Pregnant? Answer:   No     Order Specific Question:   Previously tested for COVID-19? Answer:    Yes    POCT Influenza A/B       Orders Placed This Encounter   Medications  diphenhydrAMINE (BANOPHEN) 25 MG capsule     Sig: TAKE 1 TABLET BY MOUTH EVERY 6 HOURS AS NEEDED FOR ITCHING     Dispense:  56 capsule     Refill:  1       Return if symptoms worsen or fail to improve, for follow up w/PCP in 1-2 weeks. Reviewed with the patient: current clinicalstatus, medications, activities and diet. Side effects, adverse effects of the medication prescribedtoday, as well as treatment plan/ rationale and result expectations have been discussedwith the patient who expresses understanding and desires to proceed. Close follow upto evaluate treatment results and for coordination of care. I have reviewedthe patient's medical history in detail and updated the computerized patient record.     Hui Allan PA-C

## 2022-01-18 ENCOUNTER — TELEPHONE (OUTPATIENT)
Dept: FAMILY MEDICINE CLINIC | Age: 57
End: 2022-01-18

## 2022-01-18 RX ORDER — DEXTROMETHORPHAN HYDROBROMIDE AND PROMETHAZINE HYDROCHLORIDE 15; 6.25 MG/5ML; MG/5ML
5 SYRUP ORAL 4 TIMES DAILY PRN
Qty: 150 ML | Refills: 0 | Status: SHIPPED | OUTPATIENT
Start: 2022-01-18 | End: 2022-01-25

## 2022-01-18 NOTE — TELEPHONE ENCOUNTER
Pt called and asked if a cough suppressant can be called in for her. She is still having a dry nagging cough. Stated no congestion, keeping her up at night.  Please advise

## 2022-01-27 DIAGNOSIS — G62.9 POLYNEUROPATHY, UNSPECIFIED: ICD-10-CM

## 2022-01-27 NOTE — TELEPHONE ENCOUNTER
Patient is requesting medication refill. Please approve or deny this request.    Rx requested:  Requested Prescriptions     Pending Prescriptions Disp Refills    oxyCODONE-acetaminophen (PERCOCET) 5-325 MG per tablet 60 tablet 0     Sig: Take 1 tablet by mouth 2 times daily for 30 days. Intended supply: 30 days.  Take lowest dose possible to manage pain         Last Office Visit:   9/1/2021      Next Visit Date:  Future Appointments   Date Time Provider Manan Melendezisti   4/6/2022  3:30 PM Kwaku Espinoza MD [facility]   4/12/2022  1:45 PM Brittany Avery  [place], [place] 7

## 2022-01-28 RX ORDER — OXYCODONE HYDROCHLORIDE AND ACETAMINOPHEN 5; 325 MG/1; MG/1
1 TABLET ORAL 2 TIMES DAILY
Qty: 60 TABLET | Refills: 0 | Status: SHIPPED | OUTPATIENT
Start: 2022-01-28 | End: 2022-02-24 | Stop reason: SDUPTHER

## 2022-02-07 ENCOUNTER — TELEPHONE (OUTPATIENT)
Dept: FAMILY MEDICINE CLINIC | Age: 57
End: 2022-02-07

## 2022-02-07 RX ORDER — DIAPER,BRIEF,INFANT-TODD,DISP
EACH MISCELLANEOUS
Qty: 1 G | Refills: 1 | Status: SHIPPED | OUTPATIENT
Start: 2022-02-07 | End: 2022-03-08

## 2022-02-07 RX ORDER — DEXTROMETHORPHAN HYDROBROMIDE AND PROMETHAZINE HYDROCHLORIDE 15; 6.25 MG/5ML; MG/5ML
5 SYRUP ORAL 4 TIMES DAILY PRN
Qty: 150 ML | Refills: 0 | Status: CANCELLED | OUTPATIENT
Start: 2022-02-07 | End: 2022-02-14

## 2022-02-07 RX ORDER — HYDROXYZINE HYDROCHLORIDE 25 MG/1
25 TABLET, FILM COATED ORAL EVERY 8 HOURS PRN
Qty: 30 TABLET | Refills: 0 | Status: SHIPPED | OUTPATIENT
Start: 2022-02-07 | End: 2022-02-17

## 2022-02-07 NOTE — TELEPHONE ENCOUNTER
Patient requesting medication refill.  Please approve or deny this request.    Rx requested:  Requested Prescriptions     Pending Prescriptions Disp Refills    promethazine-dextromethorphan (PROMETHAZINE-DM) 6.25-15 MG/5ML syrup 150 mL 0     Sig: Take 5 mLs by mouth 4 times daily as needed for Cough         Last Office Visit:   1/12/2022      Next Visit Date:  Future Appointments   Date Time Provider Manan Roachi   4/6/2022  3:30 PM Vel Lamb MD [facility]   4/12/2022  1:45 PM Bertrand Drake MD [address] [place] [address] 7

## 2022-02-11 DIAGNOSIS — R05.9 COUGH: Primary | ICD-10-CM

## 2022-02-14 RX ORDER — DULOXETIN HYDROCHLORIDE 60 MG/1
120 CAPSULE, DELAYED RELEASE ORAL EVERY EVENING
Qty: 60 CAPSULE | Refills: 2 | Status: SHIPPED | OUTPATIENT
Start: 2022-02-14 | End: 2022-03-14

## 2022-02-14 RX ORDER — DEXTROMETHORPHAN HYDROBROMIDE AND PROMETHAZINE HYDROCHLORIDE 15; 6.25 MG/5ML; MG/5ML
5 SYRUP ORAL 4 TIMES DAILY PRN
Qty: 150 ML | Refills: 0 | Status: SHIPPED | OUTPATIENT
Start: 2022-02-14 | End: 2022-02-21

## 2022-02-18 ENCOUNTER — OFFICE VISIT (OUTPATIENT)
Dept: NEUROLOGY | Age: 57
End: 2022-02-18
Payer: COMMERCIAL

## 2022-02-18 VITALS
SYSTOLIC BLOOD PRESSURE: 122 MMHG | BODY MASS INDEX: 25.92 KG/M2 | HEART RATE: 65 BPM | DIASTOLIC BLOOD PRESSURE: 66 MMHG | WEIGHT: 151 LBS

## 2022-02-18 DIAGNOSIS — G89.29 CHRONIC LEFT-SIDED LOW BACK PAIN WITH LEFT-SIDED SCIATICA: ICD-10-CM

## 2022-02-18 DIAGNOSIS — R29.898 LEG WEAKNESS, BILATERAL: ICD-10-CM

## 2022-02-18 DIAGNOSIS — G62.9 POLYNEUROPATHY, UNSPECIFIED: Primary | ICD-10-CM

## 2022-02-18 DIAGNOSIS — M54.16 LUMBAR RADICULOPATHY: ICD-10-CM

## 2022-02-18 DIAGNOSIS — M54.42 CHRONIC LEFT-SIDED LOW BACK PAIN WITH LEFT-SIDED SCIATICA: ICD-10-CM

## 2022-02-18 DIAGNOSIS — G60.0 HEREDITARY MOTOR AND SENSORY NEUROPATHY: ICD-10-CM

## 2022-02-18 DIAGNOSIS — G82.20 PARAPARESIS (HCC): ICD-10-CM

## 2022-02-18 PROCEDURE — 3017F COLORECTAL CA SCREEN DOC REV: CPT | Performed by: PSYCHIATRY & NEUROLOGY

## 2022-02-18 PROCEDURE — 99214 OFFICE O/P EST MOD 30 MIN: CPT | Performed by: PSYCHIATRY & NEUROLOGY

## 2022-02-18 PROCEDURE — 4004F PT TOBACCO SCREEN RCVD TLK: CPT | Performed by: PSYCHIATRY & NEUROLOGY

## 2022-02-18 PROCEDURE — G8427 DOCREV CUR MEDS BY ELIG CLIN: HCPCS | Performed by: PSYCHIATRY & NEUROLOGY

## 2022-02-18 PROCEDURE — G8417 CALC BMI ABV UP PARAM F/U: HCPCS | Performed by: PSYCHIATRY & NEUROLOGY

## 2022-02-18 PROCEDURE — G8484 FLU IMMUNIZE NO ADMIN: HCPCS | Performed by: PSYCHIATRY & NEUROLOGY

## 2022-02-18 ASSESSMENT — ENCOUNTER SYMPTOMS
SHORTNESS OF BREATH: 0
BACK PAIN: 1
NAUSEA: 0
CHOKING: 0
TROUBLE SWALLOWING: 0
VOMITING: 0
COLOR CHANGE: 0
PHOTOPHOBIA: 0

## 2022-02-18 NOTE — PROGRESS NOTES
Subjective:      Patient ID: Tanner Thapa is a 65 y.o. female who presents today for:  Chief Complaint   Patient presents with    Follow-up     Pt states that she would like to discus possibly uping the dosage of her pain medication from 2 a day to 3 a day. She says thisg have been well but she has been doing a lot more than she should be doing lately so she has had some increase in her pain. HPI 55-year-old right-handed female with a history of neuropathy with frequent pain. Patient has pyoderma gangrenosum. Patient has been on high-dose of gabapentin which does not take her for pain her pain is 8 out of 10 she was on high-dose of Cymbalta which did not help her last seen we had recommended pain management. We had initiated her for with 2 Percocets due to considerable pain. Patient she is optimally controlled at this time. She was started on Percocet which is helped. 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    Neuromuscular disorder ([address]     Osteoarthritis     Pneumonia due to organism     Pyoderma gangrenosa     Sweet syndrome     Ulcerative colitis ([address]      Past Surgical History:   Procedure Laterality Date    ABDOMEN SURGERY      sleen repair    APPENDECTOMY      BACK SURGERY      BREAST SURGERY      fatty tumor removed, benign    COLONOSCOPY      COSMETIC SURGERY      tummy tuck    EYE SURGERY      bilateral cataract surgery    HYSTERECTOMY      SKIN BIOPSY      SPINAL FUSION      TONSILLECTOMY       Social History     Socioeconomic History    Marital status:   Spouse name: Not on file    Number of children: Not on file    Years of education: Not on file    Highest education level: Not on file   Occupational History    Not on file   Tobacco Use    Smoking status: Current Every Day Smoker     Packs/day: 1.00     Years: 40.00     Pack years: 40.00     Types: Cigarettes    Smokeless tobacco: Never Used   Vaping Use    Vaping Use: Never used   Substance and Sexual Activity    Alcohol use: Never    Drug use: No    Sexual activity: Not Currently   Other Topics Concern    Not on file   Social History Narrative    Not on file     Social Determinants of Health     Financial Resource Strain: Low Risk     Difficulty of Paying Living Expenses: Not hard at all   Food Insecurity: No Food Insecurity    Worried About Running Out of Food in the Last Year: Never true    Zeus of Food in the Last Year: Never true   Transportation Needs:     Lack of Transportation (Medical): Not on file    Lack of Transportation (Non-Medical): Not on file   Physical Activity:     Days of Exercise per Week: Not on file    Minutes of Exercise per Session: Not on file   Stress:     Feeling of Stress : Not on file   Social Connections:     Frequency of Communication with Friends and Family: Not on file    Frequency of Social Gatherings with Friends and Family: Not on file    Attends [employer] Services: Not on file    Active Member of [address] [place] [address] [employer] or Organizations: Not on file    Attends Club or Organization Meetings: Not on file    Marital Status: Not on file   Intimate Partner Violence:     Fear of Current or Ex-Partner: Not on file    Emotionally Abused: Not on file    Physically Abused: Not on file    Sexually Abused: Not on file   Housing Stability:     Unable to Pay for Housing in the Last Year: Not on file    Number of [place] in the Last Year: Not on file    Unstable Housing in the Last Year: Not on file     No family history on file.   Allergies   Allergen Reactions    Amoxicillin-Pot Clavulanate      Other reaction(s): GI Upset, Intolerance, Other: See Comments  Nose bleed    Amoxicillin     Other Itching     IVP dye         Current Outpatient Medications   Medication Sig Dispense Refill    DULoxetine (CYMBALTA) 60 MG extended release capsule TAKE 2 CAPSULES BY MOUTH EVERY EVENING 60 capsule 2    promethazine-dextromethorphan (PROMETHAZINE-DM) 6.25-15 MG/5ML syrup Take 5 mLs by mouth 4 times daily as needed for Cough 150 mL 0    hydrocortisone 1 % cream Apply topically 2 times daily. 1 g 1    gabapentin (NEURONTIN) 800 MG tablet TAKE 1 TABLET BY MOUTH FOUR TIMES A DAY FOR 30 DAYS 120 tablet 2    oxyCODONE-acetaminophen (PERCOCET) 5-325 MG per tablet Take 1 tablet by mouth 2 times daily for 30 days. Intended supply: 30 days. Take lowest dose possible to manage pain 60 tablet 0    vitamin D (CHOLECALCIFEROL) 50 MCG (2000 UT) TABS tablet TAKE 1 TABLET BY MOUTH DAILY 30 tablet 3    diphenhydrAMINE (BANOPHEN) 25 MG capsule TAKE 1 TABLET BY MOUTH EVERY 6 HOURS AS NEEDED FOR ITCHING 56 capsule 1    traZODone (DESYREL) 100 MG tablet TAKE 1 TABLET BY MOUTH NIGHTLY 30 tablet 2    clobetasol (TEMOVATE) 0.05 % cream       naloxone 4 MG/0.1ML LIQD nasal spray 1 spray by Nasal route as needed for Opioid Reversal 1 each 5    omeprazole (PRILOSEC) 40 MG delayed release capsule       GAVILAX 17 GM/SCOOP powder       Adalimumab (HUMIRA PEN) 40 MG/0.4ML PNKT       cetirizine (ZYRTEC) 10 MG tablet       Alcohol Swabs (ALCOHOL PADS) 70 % PADS CLEAN THE SKIN BY USING 1 PAD ON THE AFFECTED AREA BEFORE APPLYING ANY TOPICAL CREAM, 3 TIMES DAILY 100 each 0    hydrOXYzine (ATARAX) 25 MG tablet Take 25 mg by mouth every 6 hours as needed for Itching      acetaminophen (TYLENOL) 325 MG tablet Take 650 mg by mouth every 4 hours as needed for Pain       No current facility-administered medications for this visit. Review of Systems   Constitutional: Negative for fever.    HENT: Negative for ear pain, tinnitus and trouble swallowing. Eyes: Negative for photophobia and visual disturbance. Respiratory: Negative for choking and shortness of breath. Cardiovascular: Negative for chest pain and palpitations. Gastrointestinal: Negative for nausea and vomiting. Musculoskeletal: Positive for back pain. Negative for gait problem, joint swelling, myalgias, neck pain and neck stiffness. Skin: Negative for color change. Allergic/Immunologic: Negative for food allergies. Neurological: Positive for weakness and numbness. Negative for dizziness, tremors, seizures, syncope, facial asymmetry, speech difficulty, light-headedness and headaches. Psychiatric/Behavioral: Negative for behavioral problems, confusion, hallucinations and sleep disturbance. Objective:   /66 (Site: Left Upper Arm, Position: Sitting, Cuff Size: Medium Adult)   Pulse 65   Wt 151 lb (68.5 kg)   LMP 01/01/1997 (Exact Date) Comment: hysterectomy  BMI 25.92 kg/m²     Physical Exam  Vitals reviewed. Eyes:      Pupils: Pupils are equal, round, and reactive to light. Cardiovascular:      Rate and Rhythm: Normal rate and regular rhythm. Heart sounds: No murmur heard. Pulmonary:      Effort: Pulmonary effort is normal.      Breath sounds: Normal breath sounds. Abdominal:      General: Bowel sounds are normal.   Musculoskeletal:         General: Normal range of motion. Cervical back: Normal range of motion. Skin:     General: Skin is warm. Neurological:      Mental Status: She is alert and oriented to person, place, and time. Cranial Nerves: No cranial nerve deficit. Sensory: No sensory deficit. Motor: No abnormal muscle tone. Coordination: Coordination normal.      Deep Tendon Reflexes: Reflexes are normal and symmetric. Babinski sign absent on the right side. Babinski sign absent on the left side.    Psychiatric:         Mood and Affect: Mood normal.         CT ABDOMEN PELVIS W IV CONTRAST Additional Contrast? None    Result Date: 12/23/2021  EXAMINATION: CT ABDOMEN PELVIS W IV CONTRAST DATE AND TIME:12/22/2021 1:29 AM CLINICAL HISTORY: ACUTE ABDOMINAL PAIN. EPIGASTRIC PAIN. ABD PAIN, UC  COMPARISON: FEBRUARY 4, 2021 TECHNIQUE: Contiguous axial CT sections of the abdomen and pelvis. 100 cc's of IV contrast given. .  All CT scans at this facility use dose modulation, iterative reconstruction, and/or weight based dosing when appropriate to reduce radiation dose to as low as reasonably achievable. Some of this report was completed using  [id number] and may include unintended errors with respect to translation of words, typographical errors or grammatical errors which may not have been identified prior to the finalization of this report. FINDINGS   Liver: Negative    Spleen: Negative   Gallbladder: No calcified gallstones. Normal gallbladder wall. No pericholecystic fluid. Pancreas: Negative   Kidney: Negative   Adrenal glands are negative. Bowel: Negative  No CT evidence of appendicitis   Nodes: No lymphadenopathy. Aorta: Negative    Pelvis: Status post hysterectomy. No abnormal soft tissue mass or fluid collection. Peritoneum: No free fluid or free air. The abdominal wall is intact. Bones: No acute osseous abnormalities. Other: Visualized lung bases show patchy peripheral increased foci of hazy attenuation with preservation of bronchovascular markings consistent with groundglass opacities. These are nonspecific and can be seen in viral pneumonia. Correlate clinically. PERIPHERAL BIBASILAR GROUNDGLASS OPACITIES. NO ACUTE PATHOLOGY IN THE ABDOMEN OR PELVIS. XR CHEST PORTABLE    Result Date: 12/22/2021  Exam: XR CHEST PORTABLE History:  SOB Technique: AP portable view of the chest obtained. Comparison: Chest CT from May 19, 2021 Chest x-ray portable Findings: The cardiomediastinal silhouette is within normal limits. There are no infiltrates, consolidations or effusions. Bones of the thorax appear intact. No radiographic evidence of acute intrathoracic process. IR PICC WO SQ PORT/PUMP > 5 YEARS    Result Date: 12/23/2021  PERIPHERALLY INSERTED CENTRAL CATHETER (PICC) PLACEMENT WITH ULTRASOUND GUIDANCE CLINICAL HISTORY:  SEPTIC SHOCK. REQUIRES INTRAVENOUS ANTIBIOTICS After discussing the procedure and possible complications with the patient, informed consent was obtained. The patient was placed on the Special Procedures table. The left upper extremity was sterilely prepared using a maximal sterile barrier technique  which includes cap, mask, sterile gown, sterile gloves, sterile full-body drape, hand hygiene and 2% chlorhexidine for cutaneous antisepsis. A sterile ultrasound technique with sterile gel and sterile probe covers was also utilized. A pre-procedure time out was performed in order to assure the correct patient and procedure. Local anesthetic was administered. A peripheral vein was accessed with sonographic guidance. A sonographic spot image was obtained for documentation. A guidewire was advanced into the vein with fluoroscopic guidance and a sheath was placed over the guidewire. A 5-Polish dual-lumen PICC was advanced through the sheath, up the arm and into the central vasculature. It was positioned appropriately. The sheath was removed. The catheter was shown to aspirate and infuse properly. The flange of the catheter was affixed to the arm using a PICC securement device. A spot image of the chest showed the tip of the PICC line to lie in the superior vena cava. The patient tolerated the procedure well and without complications. Number of films: 3. Fluoroscopy time: 142.6 seconds. CONCLUSION: SUCCESSFUL LEFT PICC PLACEMENT WITHOUT IMMEDIATE COMPLICATIONS.        Lab Results   Component Value Date    WBC 3.8 12/23/2021    RBC 4.82 12/23/2021    RBC 4.59 11/02/2011    HGB 13.3 12/23/2021    HCT 40.8 12/23/2021    MCV 84.7 12/23/2021    MCH 27.7 12/23/2021    MCHC 32.7 12/23/2021    RDW 14.6 12/23/2021     12/23/2021    MPV 9.1 11/05/2012     Lab Results   Component Value Date     12/23/2021    K 3.8 12/23/2021     12/23/2021    CO2 24 12/23/2021    BUN 4 12/23/2021    CREATININE 0.65 12/23/2021    GFRAA >60.0 12/23/2021    LABGLOM >60.0 12/23/2021    GLUCOSE 93 12/23/2021    GLUCOSE 146 11/02/2011    PROT 6.6 12/23/2021    LABALBU 2.8 12/23/2021    LABALBU 4.1 11/02/2011    CALCIUM 8.8 12/23/2021    BILITOT <0.2 12/23/2021    ALKPHOS 73 12/23/2021    AST 19 12/23/2021    ALT 8 12/23/2021     Lab Results   Component Value Date    PROTIME 13.0 10/05/2019    INR 0.9 10/05/2019     Lab Results   Component Value Date    TSH 1.030 03/26/2020    VMAVFBKS95 579 11/04/2020     Lab Results   Component Value Date    TRIG 163 05/09/2020    HDL 40 05/09/2020    LDLCALC 90 05/09/2020     Lab Results   Component Value Date    LABAMPH Neg 05/12/2021    BARBSCNU Neg 05/12/2021    LABBENZ Neg 05/12/2021    LABMETH Neg 05/12/2021    OPIATESCREENURINE Neg 05/12/2021    PHENCYCLIDINESCREENURINE Neg 05/12/2021     No results found for: LITHIUM, DILFRTOT, VALPROATE    Assessment:       Diagnosis Orders   1. Polyneuropathy, unspecified     2. Paraparesis ([address]     3. Lumbar radiculopathy     4. Leg weakness, bilateral     5. Hereditary motor and sensory neuropathy     6. Chronic left-sided low back pain with left-sided sciatica     55 right-handed female with a history of significant Multiple joint issues as well as neuropathy. Patient was seen by multiple neurologists and is on gabapentin at a very high dose prior to this she was on Lyrica. She is on high-dose of Cymbalta and trazodone. None of these medications are helping and she had significant nonhealing ulcers and open wounds. Pain is 8 out of 10.   Was seen by pain management but as she was not able to pay them all there was some issues with her pain meds and they would not see her again. She has considerable pain and she has lumbar spondylosis which is significant. I then initiated on 2 Percocets a day which has helped her she pain is down to 5 out of 10 and her quality of life is improved. At this is we would like her to be there will be no escalation of doses we did discuss that we are unlikely to increase the medication further and if she is about 50 to 60% better that is our goal.  There is no addiction potentials or red flags. We will keep an eye on this and continue to follow      Plan:      No orders of the defined types were placed in this encounter. No orders of the defined types were placed in this encounter. No follow-ups on file.       Vel Lamb MD

## 2022-02-22 RX ORDER — NICOTINE 21 MG/24HR
1 PATCH, TRANSDERMAL 24 HOURS TRANSDERMAL DAILY
Qty: 42 PATCH | Refills: 0 | Status: SHIPPED | OUTPATIENT
Start: 2022-02-22 | End: 2022-06-17

## 2022-02-24 DIAGNOSIS — G62.9 POLYNEUROPATHY, UNSPECIFIED: ICD-10-CM

## 2022-02-24 RX ORDER — OXYCODONE HYDROCHLORIDE AND ACETAMINOPHEN 5; 325 MG/1; MG/1
1 TABLET ORAL 2 TIMES DAILY
Qty: 60 TABLET | Refills: 0 | Status: SHIPPED | OUTPATIENT
Start: 2022-02-24 | End: 2022-03-25 | Stop reason: SDUPTHER

## 2022-03-08 RX ORDER — DIAPER,BRIEF,INFANT-TODD,DISP
EACH MISCELLANEOUS
Qty: 28 G | Refills: 1 | Status: SHIPPED | OUTPATIENT
Start: 2022-03-08 | End: 2022-04-04

## 2022-03-08 NOTE — TELEPHONE ENCOUNTER
Rx request   Requested Prescriptions     Pending Prescriptions Disp Refills    hydrocortisone 1 % cream [Pharmacy Med Name: HYDROCORTISONE 1% CREAM 1 Cream] 28 g 1     Sig: APPLY TOPICALLY 2 TIMES DAILY.      LOV 1/12/2022    Next Visit Date:  Future Appointments   Date Time Provider Manan Honeycutt   4/12/2022  1:45 PM Deejay Sage  [place] [id number]   6/17/2022  9:45 AM Lorena Rao  [facility]

## 2022-03-14 RX ORDER — TRAZODONE HYDROCHLORIDE 100 MG/1
100 TABLET ORAL NIGHTLY
Qty: 30 TABLET | Refills: 2 | Status: SHIPPED | OUTPATIENT
Start: 2022-03-14 | End: 2022-05-16

## 2022-03-14 RX ORDER — DULOXETIN HYDROCHLORIDE 60 MG/1
120 CAPSULE, DELAYED RELEASE ORAL EVERY EVENING
Qty: 60 CAPSULE | Refills: 2 | Status: SHIPPED | OUTPATIENT
Start: 2022-03-14 | End: 2022-08-01

## 2022-03-18 DIAGNOSIS — J30.2 SEASONAL ALLERGIC RHINITIS, UNSPECIFIED TRIGGER: ICD-10-CM

## 2022-03-18 RX ORDER — DIPHENHYDRAMINE HCL 25 MG
CAPSULE ORAL
Qty: 56 CAPSULE | Refills: 1 | Status: SHIPPED | OUTPATIENT
Start: 2022-03-18 | End: 2022-04-12 | Stop reason: SDUPTHER

## 2022-03-24 DIAGNOSIS — G62.9 POLYNEUROPATHY, UNSPECIFIED: ICD-10-CM

## 2022-03-25 RX ORDER — OXYCODONE HYDROCHLORIDE AND ACETAMINOPHEN 5; 325 MG/1; MG/1
1 TABLET ORAL 2 TIMES DAILY
Qty: 60 TABLET | Refills: 0 | Status: SHIPPED | OUTPATIENT
Start: 2022-03-25 | End: 2022-04-22 | Stop reason: SDUPTHER

## 2022-04-04 RX ORDER — DIAPER,BRIEF,INFANT-TODD,DISP
EACH MISCELLANEOUS
Qty: 28 G | Refills: 1 | Status: SHIPPED | OUTPATIENT
Start: 2022-04-04 | End: 2022-05-29

## 2022-04-12 ENCOUNTER — TELEPHONE (OUTPATIENT)
Dept: FAMILY MEDICINE CLINIC | Age: 57
End: 2022-04-12

## 2022-04-12 ENCOUNTER — OFFICE VISIT (OUTPATIENT)
Dept: FAMILY MEDICINE CLINIC | Age: 57
End: 2022-04-12
Payer: COMMERCIAL

## 2022-04-12 VITALS
DIASTOLIC BLOOD PRESSURE: 76 MMHG | SYSTOLIC BLOOD PRESSURE: 110 MMHG | HEIGHT: 64 IN | HEART RATE: 60 BPM | BODY MASS INDEX: 25.1 KG/M2 | RESPIRATION RATE: 14 BRPM | WEIGHT: 147 LBS | OXYGEN SATURATION: 96 %

## 2022-04-12 DIAGNOSIS — L88 PYODERMIC GANGRENOSUM: Primary | ICD-10-CM

## 2022-04-12 DIAGNOSIS — L88 PYODERMIC GANGRENOSUM: ICD-10-CM

## 2022-04-12 DIAGNOSIS — J30.2 SEASONAL ALLERGIC RHINITIS, UNSPECIFIED TRIGGER: ICD-10-CM

## 2022-04-12 DIAGNOSIS — F17.200 NICOTINE DEPENDENCE, UNCOMPLICATED, UNSPECIFIED NICOTINE PRODUCT TYPE: ICD-10-CM

## 2022-04-12 LAB
ALBUMIN SERPL-MCNC: 3.9 G/DL (ref 3.5–4.6)
ALP BLD-CCNC: 78 U/L (ref 40–130)
ALT SERPL-CCNC: 9 U/L (ref 0–33)
ANION GAP SERPL CALCULATED.3IONS-SCNC: 14 MEQ/L (ref 9–15)
AST SERPL-CCNC: 17 U/L (ref 0–35)
BASOPHILS ABSOLUTE: 0.1 K/UL (ref 0–0.2)
BASOPHILS RELATIVE PERCENT: 1.2 %
BILIRUB SERPL-MCNC: <0.2 MG/DL (ref 0.2–0.7)
BUN BLDV-MCNC: 10 MG/DL (ref 6–20)
CALCIUM SERPL-MCNC: 9.4 MG/DL (ref 8.5–9.9)
CHLORIDE BLD-SCNC: 103 MEQ/L (ref 95–107)
CO2: 24 MEQ/L (ref 20–31)
CREAT SERPL-MCNC: 0.76 MG/DL (ref 0.5–0.9)
EOSINOPHILS ABSOLUTE: 0.2 K/UL (ref 0–0.7)
EOSINOPHILS RELATIVE PERCENT: 2.5 %
GFR AFRICAN AMERICAN: >60
GFR NON-AFRICAN AMERICAN: >60
GLOBULIN: 3.6 G/DL (ref 2.3–3.5)
GLUCOSE BLD-MCNC: 86 MG/DL (ref 70–99)
HCT VFR BLD CALC: 39.3 % (ref 37–47)
HEMOGLOBIN: 12.9 G/DL (ref 12–16)
LYMPHOCYTES ABSOLUTE: 3.1 K/UL (ref 1–4.8)
LYMPHOCYTES RELATIVE PERCENT: 40.2 %
MCH RBC QN AUTO: 29.1 PG (ref 27–31.3)
MCHC RBC AUTO-ENTMCNC: 32.8 % (ref 33–37)
MCV RBC AUTO: 88.7 FL (ref 82–100)
MONOCYTES ABSOLUTE: 0.4 K/UL (ref 0.2–0.8)
MONOCYTES RELATIVE PERCENT: 5.7 %
NEUTROPHILS ABSOLUTE: 3.9 K/UL (ref 1.4–6.5)
NEUTROPHILS RELATIVE PERCENT: 50.4 %
PDW BLD-RTO: 13.9 % (ref 11.5–14.5)
PLATELET # BLD: 334 K/UL (ref 130–400)
POTASSIUM SERPL-SCNC: 4.6 MEQ/L (ref 3.4–4.9)
RBC # BLD: 4.43 M/UL (ref 4.2–5.4)
SODIUM BLD-SCNC: 141 MEQ/L (ref 135–144)
TOTAL PROTEIN: 7.5 G/DL (ref 6.3–8)
WBC # BLD: 7.7 K/UL (ref 4.8–10.8)

## 2022-04-12 PROCEDURE — 4004F PT TOBACCO SCREEN RCVD TLK: CPT | Performed by: INTERNAL MEDICINE

## 2022-04-12 PROCEDURE — 3017F COLORECTAL CA SCREEN DOC REV: CPT | Performed by: INTERNAL MEDICINE

## 2022-04-12 PROCEDURE — G8417 CALC BMI ABV UP PARAM F/U: HCPCS | Performed by: INTERNAL MEDICINE

## 2022-04-12 PROCEDURE — G8427 DOCREV CUR MEDS BY ELIG CLIN: HCPCS | Performed by: INTERNAL MEDICINE

## 2022-04-12 PROCEDURE — 99214 OFFICE O/P EST MOD 30 MIN: CPT | Performed by: INTERNAL MEDICINE

## 2022-04-12 RX ORDER — LIDOCAINE 50 MG/G
1 PATCH TOPICAL DAILY
Qty: 10 PATCH | Refills: 0 | Status: SHIPPED | OUTPATIENT
Start: 2022-04-12 | End: 2022-04-12 | Stop reason: SDUPTHER

## 2022-04-12 RX ORDER — LIDOCAINE 50 MG/G
1 PATCH TOPICAL DAILY
Qty: 30 PATCH | Refills: 1 | Status: SHIPPED | OUTPATIENT
Start: 2022-04-12 | End: 2022-06-06

## 2022-04-12 RX ORDER — DIPHENHYDRAMINE HCL 25 MG
CAPSULE ORAL
Qty: 56 CAPSULE | Refills: 1 | Status: SHIPPED | OUTPATIENT
Start: 2022-04-12 | End: 2022-05-12

## 2022-04-12 RX ORDER — HYDROXYZINE HYDROCHLORIDE 25 MG/1
25 TABLET, FILM COATED ORAL EVERY 6 HOURS PRN
Qty: 120 TABLET | Refills: 1 | Status: SHIPPED | OUTPATIENT
Start: 2022-04-12 | End: 2022-06-05

## 2022-04-12 NOTE — PROGRESS NOTES
2022      Agnieszka Rush (:  1965) is a 65 y.o. female, here for evaluation of the following medical concerns:pyoderma gangrenosum          HPI    52-year-old female with a history of prediabetes, pyoderma gangrenosum with associated abdominal and right lower extremity wounds associated with pyoderma gangrenosum  presents for follow-up visit    She has been followed by dermatology ,wound care and rheumatology. She is experiencing discomfort localized to the right she previously tested positive for cocaine on a urine toxicology and strongly encouraged to enroll in a substance abuse program.          Depression/anxiety: Continuing Cymbalta and trazodone        Fibromyalgia and Wound pain: Monitoring        Prediabetes: The patient is A1c was 6.3 in May 2020. Nicotine Dependency: The patient continues to engage in nicotine dependence        Hypovitaminosis D: Patient has a history of hypovitaminosis D. She is presently compliant with her vitamin D daily and would like a refill for this medication at this time. At present he denies polyuria,  Polydipsia, constitutional, sinus, visual, cardiopulmonary, urologic, gastrointestinal, immunologic/hematologic, additional musculoskeletal, neurologic,dermatologic, or psychiatric complaints. Review of Systems   Constitutional: Negative for chills, diaphoresis, fatigue and fever. HENT: Negative for congestion, dental problem, drooling, ear discharge, ear pain, facial swelling, hearing loss, mouth sores, nosebleeds, postnasal drip, rhinorrhea, sinus pressure, sinus pain, sneezing, sore throat, tinnitus, trouble swallowing and voice change. Eyes: Negative for photophobia, pain, discharge, redness, itching and visual disturbance. Respiratory: Negative for apnea, cough, chest tightness, shortness of breath and wheezing. Cardiovascular: Negative for chest pain, palpitations and leg swelling.    Gastrointestinal: Negative for abdominal distention, abdominal pain, blood in stool, constipation, diarrhea, nausea, rectal pain and vomiting. Endocrine: Negative for cold intolerance, heat intolerance, polydipsia, polyphagia and polyuria. Genitourinary: Negative for decreased urine volume, difficulty urinating, dysuria, flank pain, frequency, genital sores, hematuria and urgency. Musculoskeletal: Negative for arthralgias, back pain, gait problem, joint swelling, myalgias, neck pain and neck stiffness. Skin: Negative for color change, rash and wound. Allergic/Immunologic: Negative for environmental allergies and food allergies. Neurological: Negative for dizziness, tremors, seizures, syncope, facial asymmetry, speech difficulty, weakness, light-headedness, numbness and headaches. Hematological: Negative for adenopathy. Does not bruise/bleed easily. Psychiatric/Behavioral: Negative for agitation, confusion, decreased concentration, hallucinations, self-injury, sleep disturbance and suicidal ideas. The patient is not nervous/anxious. Prior to Visit Medications    Medication Sig Taking? Authorizing Provider   diphenhydrAMINE (BANOPHEN) 25 MG capsule TAKE 1 TABLET BY MOUTH EVERY 6 HOURS AS NEEDED FOR ITCHING Yes Latha Hurley MD   hydrOXYzine (ATARAX) 25 MG tablet Take 1 tablet by mouth every 6 hours as needed for Itching Take 25 mg by mouth every 6 hours as needed for Itching Yes Latha Hurley MD   nicotine polacrilex (NICORETTE) 2 MG gum Take 1 each by mouth as needed for Smoking cessation Yes Latha Hurley MD   lidocaine (LIDODERM) 5 % Place 1 patch onto the skin daily for 10 days 12 hours on, 12 hours off. Latha Hurley MD   hydrocortisone 1 % cream APPLY TOPICALLY 2 TIMES DAILY. Latha Hurley MD   oxyCODONE-acetaminophen (PERCOCET) 5-325 MG per tablet Take 1 tablet by mouth 2 times daily for 30 days. Intended supply: 30 days.  Take lowest dose possible to manage pain  Shira Blanchard MD   DULoxetine (CYMBALTA) 60 MG extended release capsule TAKE 2 CAPSULES BY MOUTH EVERY EVENING  Usama Bunch MD   traZODone (DESYREL) 100 MG tablet TAKE 1 TABLET BY MOUTH NIGHTLY  Usama Bunch MD   nicotine (NICODERM CQ) 21 MG/24HR Place 1 patch onto the skin daily  Luis Manuel Ortiz MD   gabapentin (NEURONTIN) 800 MG tablet TAKE 1 TABLET BY MOUTH FOUR TIMES A DAY FOR 30 DAYS  Usama Bunch MD   vitamin D (CHOLECALCIFEROL) 50 MCG (2000 UT) TABS tablet TAKE 1 TABLET BY MOUTH DAILY  Luis Manuel Ortiz MD   clobetasol (TEMOVATE) 0.05 % cream   Historical Provider, MD   naloxone 4 MG/0.1ML LIQD nasal spray 1 spray by Nasal route as needed for Opioid Reversal  Luis Manuel Ortiz MD   omeprazole (PRILOSEC) 40 MG delayed release capsule   Historical Provider, MD   Clemetine Bannister 17 GM/SCOOP powder   Historical Provider, MD   Adalimumab (HUMIRA PEN) 40 MG/0.4ML PNKT   Historical Provider, MD   Alcohol Swabs (ALCOHOL PADS) 70 % PADS CLEAN THE SKIN BY USING 1 PAD ON THE AFFECTED AREA BEFORE APPLYING ANY TOPICAL CREAM, 3 TIMES DAILY  Luis Manuel Ortiz MD   acetaminophen (TYLENOL) 325 MG tablet Take 650 mg by mouth every 4 hours as needed for Pain  Historical Provider, MD        Allergies   Allergen Reactions    Amoxicillin-Pot Clavulanate      Other reaction(s): GI Upset, Intolerance, Other: See Comments  Nose bleed    Amoxicillin     Other Itching     IVP dye         Past Medical History:   Diagnosis Date    Anxiety     Arthritis     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    Chronic kidney disease     [facility] exacerbation [facility]    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    [facility]     Osteoarthritis     Pneumonia due to organism     Pyoderma gangrenosa     Sweet syndrome     Ulcerative colitis ([facility]        Past Surgical History:   Procedure Laterality Date    ABDOMEN SURGERY      sleen repair    APPENDECTOMY      BACK SURGERY      BREAST SURGERY      fatty tumor removed, benign    COLONOSCOPY      COSMETIC SURGERY      tummy tuck    EYE SURGERY      bilateral cataract surgery    HYSTERECTOMY      SKIN BIOPSY      SPINAL FUSION      TONSILLECTOMY         Social History     Socioeconomic History    Marital status:      Spouse name: Not on file    Number of children: Not on file    Years of education: Not on file    Highest education level: Not on file   Occupational History    Not on file   Tobacco Use    Smoking status: Current Every Day Smoker     Packs/day: 1.00     Years: 40.00     Pack years: 40.00     Types: Cigarettes    Smokeless tobacco: Never Used   Vaping Use    Vaping Use: Never used   Substance and Sexual Activity    Alcohol use: Never    Drug use: No    Sexual activity: Not Currently   Other Topics Concern    Not on file   Social History Narrative    Not on file     Social Determinants of Health     Financial Resource Strain: Low Risk     Difficulty of Paying Living Expenses: Not hard at all   Food Insecurity: No Food Insecurity    Worried About Running Out of Food in the Last Year: Never true    Zeus of Food in the Last Year: Never true   Transportation Needs:     Lack of Transportation (Medical): Not on file    Lack of Transportation (Non-Medical):  Not on file   Physical Activity:     Days of Exercise per Week: Not on file    Minutes of Exercise per Session: Not on file   Stress:     Feeling of Stress : Not on file   Social Connections:     Frequency of Communication with Friends and Family: Not on file    Frequency of Social Gatherings with Friends and Family: Not on file    Attends Christian Services: Not on file    Active Member of Clubs or Organizations: Not on file    Attends Club or Organization Meetings: Not on file    Marital Status: Not on file   Intimate Partner Violence:     Fear of Current or Ex-Partner: Not on file    Emotionally Abused: Not on file    Physically Abused: Not on file    Sexually Abused: Not on file   Housing Stability:     Unable to Pay for Housing in the Last Year: Not on file    Number of Places Lived in the Last Year: Not on file    Unstable Housing in the Last Year: Not on file        No family history on file. Vitals:    04/12/22 1324   BP: 110/76   Pulse: 60   Resp: 14   SpO2: 96%   Weight: 147 lb (66.7 kg)   Height: 5' 4\" (1.626 m)     Estimated body mass index is 25.23 kg/m² as calculated from the following:    Height as of this encounter: 5' 4\" (1.626 m). Weight as of this encounter: 147 lb (66.7 kg). Physical Exam  Constitutional:       General: She is not in acute distress. Appearance: She is well-developed. HENT:      Head: Normocephalic. Right Ear: External ear normal.      Left Ear: External ear normal.   Eyes:      Conjunctiva/sclera: Conjunctivae normal.   Neck:      Vascular: No JVD. Trachea: No tracheal deviation. Cardiovascular:      Rate and Rhythm: Normal rate and regular rhythm. Heart sounds: Normal heart sounds. Pulmonary:      Effort: Pulmonary effort is normal. No respiratory distress. Breath sounds: Normal breath sounds. No wheezing or rales. Chest:      Chest wall: No tenderness. Abdominal:      General: Bowel sounds are normal. There is no distension. Palpations: Abdomen is soft. There is no mass. Tenderness: There is no abdominal tenderness. There is no guarding or rebound. Musculoskeletal:         General: No tenderness or deformity. Cervical back: Neck supple. Skin:     General: Skin is warm and dry. Coloration: Skin is not pale. Findings: No erythema. Comments: Ulceration noted in the left lower quadrant of the patient's abdomen. 0.5 1.5 cm. Ulceration noted on the patient's right lower extremity posterior aspect of her right lower leg. No malodor noted. No purulent discharge. Neurological:      Mental Status: She is alert and oriented to person, place, and time. Motor: No abnormal muscle tone. Psychiatric:         Thought Content: Thought content normal.         Judgment: Judgment normal.           ASSESSMENT/PLAN:    Pyoderma Gangrenosum-provide the patient with a prescription for a lidocaine patch. Continue hydrocortisone cream.    Multiple abscesses-continue dressing changes. Hypovitaminosis D- continue oral vitamin D replacement. Fibromyalgia:continue cymbalta and trazodone        Cocaine Dependency-  Previously referred pt to [address] [place] [address] N @ [phone]. I have explained to the patient that I will not refill her medication again unless she can provide documentation supporting the fact that she is actively enrolled in this program to achieve cessation from substance abuse. --She Patino MD on 4/12/2022 at 8:29 PM    An electronic signature was used to authenticate this note.

## 2022-04-21 DIAGNOSIS — G62.9 POLYNEUROPATHY, UNSPECIFIED: ICD-10-CM

## 2022-04-21 NOTE — TELEPHONE ENCOUNTER
Requested Prescriptions     Pending Prescriptions Disp Refills    oxyCODONE-acetaminophen (PERCOCET) 5-325 MG per tablet 60 tablet 0     Sig: Take 1 tablet by mouth 2 times daily for 30 days. Intended supply: 30 days.  Take lowest dose possible to manage pain

## 2022-04-22 RX ORDER — OXYCODONE HYDROCHLORIDE AND ACETAMINOPHEN 5; 325 MG/1; MG/1
1 TABLET ORAL 2 TIMES DAILY
Qty: 60 TABLET | Refills: 0 | Status: SHIPPED | OUTPATIENT
Start: 2022-04-22 | End: 2022-05-19 | Stop reason: SDUPTHER

## 2022-04-25 RX ORDER — GABAPENTIN 800 MG/1
TABLET ORAL
Qty: 120 TABLET | Refills: 2 | Status: SHIPPED | OUTPATIENT
Start: 2022-04-25 | End: 2022-07-25

## 2022-05-02 RX ORDER — CHOLECALCIFEROL (VITAMIN D3) 50 MCG
TABLET ORAL
Qty: 30 TABLET | Refills: 3 | Status: SHIPPED | OUTPATIENT
Start: 2022-05-02 | End: 2022-08-28

## 2022-05-12 DIAGNOSIS — J30.2 SEASONAL ALLERGIC RHINITIS, UNSPECIFIED TRIGGER: ICD-10-CM

## 2022-05-12 RX ORDER — DIPHENHYDRAMINE HCL 25 MG
CAPSULE ORAL
Qty: 56 CAPSULE | Refills: 1 | Status: SHIPPED | OUTPATIENT
Start: 2022-05-12 | End: 2022-06-05

## 2022-05-12 NOTE — TELEPHONE ENCOUNTER
Future Appointments    Encounter Information    Provider Department Appt Notes   5/18/2022 Ismael Singh MD [facility] C: L88 (ICD-10-CM) - Pyodermic gangrenosum   6/17/2022 Mart Hobson MD [facility] [facility] 4 mo fup   7/12/2022 Edd Corado MD [facility] 3 month f/u       Past Visits    Date Provider Specialty Visit Type Primary Dx   04/12/2022 Edd Corado MD Family Medicine Office Visit Pyodermic gangrenosum   02/18/2022 Mart Hobson MD Neurology Office Visit Polyneuropathy, unspecified   01/12/2022 Edd Corado MD Family Medicine Office Visit Hypovitaminosis D

## 2022-05-16 RX ORDER — TRAZODONE HYDROCHLORIDE 100 MG/1
100 TABLET ORAL NIGHTLY
Qty: 30 TABLET | Refills: 2 | Status: SHIPPED | OUTPATIENT
Start: 2022-05-16 | End: 2022-06-17

## 2022-05-19 DIAGNOSIS — G62.9 POLYNEUROPATHY, UNSPECIFIED: ICD-10-CM

## 2022-05-20 RX ORDER — OXYCODONE HYDROCHLORIDE AND ACETAMINOPHEN 5; 325 MG/1; MG/1
1 TABLET ORAL 2 TIMES DAILY
Qty: 60 TABLET | Refills: 0 | Status: SHIPPED | OUTPATIENT
Start: 2022-05-21 | End: 2022-06-20

## 2022-05-28 NOTE — TELEPHONE ENCOUNTER
Rx requested:  Requested Prescriptions     Pending Prescriptions Disp Refills    hydrocortisone 1 % cream [Pharmacy Med Name: HYDROCORTISONE 1% CREAM 1 Cream] 28 g 1     Sig: APPLY TOPICALLY 2 TIMES DAILY.          Last Office Visit:   4/12/2022      Next Visit Date:  Future Appointments   Date Time Provider Manan Tangela   6/17/2022  9:45 AM Noni Hernandez MD [address] [place] [address]   6/27/2022 10:30 AM Nina Hernandez MD [facility] EMERGENCY [facility] AT [place]   7/12/2022 11:15 AM Keturah Diallo MD [address] [place] [address]

## 2022-05-29 RX ORDER — DIAPER,BRIEF,INFANT-TODD,DISP
EACH MISCELLANEOUS
Qty: 28 G | Refills: 1 | Status: SHIPPED | OUTPATIENT
Start: 2022-05-29 | End: 2022-06-30

## 2022-06-02 NOTE — PROGRESS NOTES
6/3/2022      Oni Elise (:  1965) is a 65 y.o. female, here for evaluation of the following medical concerns:pyoderma gangrenosum          HPI    52-year-old female with a history of prediabetes, pyoderma gangrenosum with associated abdominal and right lower extremity wounds associated with pyoderma gangrenosum  presents for follow-up visit      Skin lesion:  She has been followed by dermatology ,wound care and rheumatology. She is experiencing mildly erythematous and painful swelling beneath her arms bilaterally and this has been present for several weeks. She additionally has noted mildly erythematous and mildly painful swelling beneath her left rib cage. discomfort localized to the right she previously tested positive for cocaine on a urine toxicology and strongly encouraged to enroll in a substance abuse program.     Skin rash: underarms and left lower chest : several weeks. Depression/anxiety: Continuing Cymbalta and trazodone        Fibromyalgia and Wound pain: Monitoring        Prediabetes: The patient is A1c was 6.3 in May 2020. Nicotine Dependency: The patient continues to engage in nicotine dependence        Hypovitaminosis D: Patient has a history of hypovitaminosis D. She is presently compliant with her vitamin D daily and would like a refill for this medication at this time. At present he denies polyuria,  Polydipsia, constitutional, sinus, visual, cardiopulmonary, urologic, gastrointestinal, immunologic/hematologic, additional musculoskeletal, neurologic,dermatologic, or psychiatric complaints. Review of Systems   Constitutional: Negative for chills, diaphoresis, fatigue and fever. HENT: Negative for congestion, dental problem, drooling, ear discharge, ear pain, facial swelling, hearing loss, mouth sores, nosebleeds, postnasal drip, rhinorrhea, sinus pressure, sinus pain, sneezing, sore throat, tinnitus, trouble swallowing and voice change.     Eyes: Negative for photophobia, pain, discharge, redness, itching and visual disturbance. Respiratory: Negative for apnea, cough, chest tightness, shortness of breath and wheezing. Cardiovascular: Negative for chest pain, palpitations and leg swelling. Gastrointestinal: Negative for abdominal distention, abdominal pain, blood in stool, constipation, diarrhea, nausea, rectal pain and vomiting. Endocrine: Negative for cold intolerance, heat intolerance, polydipsia, polyphagia and polyuria. Genitourinary: Negative for decreased urine volume, difficulty urinating, dysuria, flank pain, frequency, genital sores, hematuria and urgency. Musculoskeletal: Negative for arthralgias, back pain, gait problem, joint swelling, myalgias, neck pain and neck stiffness. Skin: Negative for color change, rash and wound. Allergic/Immunologic: Negative for environmental allergies and food allergies. Neurological: Negative for dizziness, tremors, seizures, syncope, facial asymmetry, speech difficulty, weakness, light-headedness, numbness and headaches. Hematological: Negative for adenopathy. Does not bruise/bleed easily. Psychiatric/Behavioral: Negative for agitation, confusion, decreased concentration, hallucinations, self-injury, sleep disturbance and suicidal ideas. The patient is not nervous/anxious. Prior to Visit Medications    Medication Sig Taking? Authorizing Provider   sulfamethoxazole-trimethoprim (BACTRIM DS;SEPTRA DS) 800-160 MG per tablet Take 1 tablet by mouth 2 times daily for 10 days Yes Kelly Ellsworth MD   Probiotic Acidophilus [facility]) TABS Take 1 tablet by mouth 2 times daily for 10 days Yes Kelly Ellwsorth MD   hydrocortisone 1 % cream APPLY TOPICALLY 2 TIMES DAILY. Kelly Ellsworth MD   oxyCODONE-acetaminophen (PERCOCET) 5-325 MG per tablet Take 1 tablet by mouth 2 times daily for 30 days. Intended supply: 30 days.  Take lowest dose possible to manage pain  MD eva BowenZOJesus Manuele (DESYREL) 100 MG tablet TAKE 1 TABLET BY MOUTH NIGHTLY  Usama Bell MD   diphenhydrAMINE (BANOPHEN) 25 MG capsule TAKE 1 TABLET BY MOUTH EVERY 6 HOURS AS NEEDED FOR ITCHING  Fede Vleazquez MD   vitamin D (CHOLECALCIFEROL) 50 MCG (2000 UT) TABS tablet TAKE 1 TABLET BY MOUTH DAILY  Fede Velazquez MD   gabapentin (NEURONTIN) 800 MG tablet TAKE 1 TABLET BY MOUTH FOUR TIMES A DAY FOR 30 DAYS  Usama Bell MD   nicotine polacrilex (NICORETTE) 2 MG gum Take 1 each by mouth as needed for Smoking cessation  Fede Velazquez MD   DULoxetine (CYMBALTA) 60 MG extended release capsule TAKE 2 CAPSULES BY MOUTH EVERY EVENING  Usama Bell MD   nicotine (NICODERM CQ) 21 MG/24HR Place 1 patch onto the skin daily  Fede Velazquez MD   clobetasol (TEMOVATE) 0.05 % cream   Historical Provider, MD   naloxone 4 MG/0.1ML LIQD nasal spray 1 spray by Nasal route as needed for Opioid Reversal  Fede Velazquez MD   omeprazole (PRILOSEC) 40 MG delayed release capsule   Historical Provider, MD   Vermelvina Pace 17 GM/SCOOP powder   Historical Provider, MD   Adalimumab (HUMIRA PEN) 40 MG/0.4ML PNKT   Historical Provider, MD   Alcohol Swabs (ALCOHOL PADS) 70 % PADS CLEAN THE SKIN BY USING 1 PAD ON THE AFFECTED AREA BEFORE APPLYING ANY TOPICAL CREAM, 3 TIMES DAILY  Fede Velazquez MD   acetaminophen (TYLENOL) 325 MG tablet Take 650 mg by mouth every 4 hours as needed for Pain  Historical Provider, MD        Allergies   Allergen Reactions    Amoxicillin-Pot Clavulanate      Other reaction(s): GI Upset, Intolerance, Other: See Comments  Nose bleed    Amoxicillin     Other Itching     IVP dye         Past Medical History:   Diagnosis Date    Anxiety     Arthritis     [facility] 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    Chronic kidney disease     [facility]     Depression     Disease of blood and blood forming organ 01/04/2017    neutropenia    Fibromyalgia     History of blood transfusion     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    [facility]     Osteoarthritis     Pneumonia due to organism     Pyoderma gangrenosa     Sweet syndrome     [facility]        Past Surgical History:   Procedure Laterality Date    ABDOMEN SURGERY      sleen repair    APPENDECTOMY      BACK SURGERY      BREAST SURGERY      fatty tumor removed, benign    COLONOSCOPY      COSMETIC SURGERY      tummy tuck    EYE SURGERY      bilateral cataract surgery    HYSTERECTOMY      SKIN BIOPSY      SPINAL FUSION      TONSILLECTOMY         Social History     Socioeconomic History    Marital status:      Spouse name: Not on file    Number of children: Not on file    Years of education: Not on file    Highest education level: Not on file   Occupational History    Not on file   Tobacco Use    Smoking status: Current Every Day Smoker     Packs/day: 1.00     Years: 40.00     Pack years: 40.00     Types: Cigarettes    Smokeless tobacco: Never Used   Vaping Use    Vaping Use: Never used   Substance and Sexual Activity    Alcohol use: Never    Drug use: No    Sexual activity: Not Currently   Other Topics Concern    Not on file   Social History Narrative    Not on file     Social Determinants of Health     Financial Resource Strain: Low Risk     Difficulty of Paying Living Expenses: Not hard at all   Food Insecurity: No Food Insecurity    Worried About Running Out of Food in the Last Year: Never true    Zeus of Food in the Last Year: Never true   Transportation Needs:     Lack of Transportation (Medical): Not on file    Lack of Transportation (Non-Medical):  Not on file   Physical Activity:     Days of Exercise per Week: Not on file    Minutes of Exercise per Session: Not on file   Stress:     Feeling of Stress : Not on file   Social Connections:     Frequency of Communication with Friends and Family: Not on file    Frequency of Social Gatherings with Friends and Family: Not on file    Attends Yarsanism Services: Not on file    Active Member of Clubs or Organizations: Not on file    Attends Club or Organization Meetings: Not on file    Marital Status: Not on file   Intimate Partner Violence:     Fear of Current or Ex-Partner: Not on file    Emotionally Abused: Not on file    Physically Abused: Not on file    Sexually Abused: Not on file   Housing Stability:     Unable to Pay for Housing in the Last Year: Not on file    Number of [place] in the Last Year: Not on file    Unstable Housing in the Last Year: Not on file        No family history on file. Vitals:    06/03/22 0957   BP: 104/70   Pulse: (!) 102   Resp: 14   SpO2: 99%   Weight: 142 lb (64.4 kg)   Height: 5' 4\" (1.626 m)     Estimated body mass index is 24.37 kg/m² as calculated from the following:    Height as of this encounter: 5' 4\" (1.626 m). Weight as of this encounter: 142 lb (64.4 kg). Physical Exam  Constitutional:       General: She is not in acute distress. Appearance: She is well-developed. HENT:      Head: Normocephalic. Right Ear: External ear normal.      Left Ear: External ear normal.   Eyes:      Conjunctiva/sclera: Conjunctivae normal.   Neck:      Vascular: No JVD. Trachea: No tracheal deviation. Cardiovascular:      Rate and Rhythm: Normal rate and regular rhythm. Heart sounds: Normal heart sounds. Pulmonary:      Effort: Pulmonary effort is normal. No respiratory distress. Breath sounds: Normal breath sounds. No wheezing or rales. Chest:      Chest wall: No tenderness. Abdominal:      General: Bowel sounds are normal. There is no distension. Palpations: Abdomen is soft. There is no mass. Tenderness: There is no abdominal tenderness. There is no guarding or rebound. Musculoskeletal:         General: No tenderness or deformity. Cervical back: Neck supple. Skin:     General: Skin is warm and dry. Coloration: Skin is not pale. Findings: No erythema. Comments: Ulceration noted in the left lower quadrant of the patient's abdomen. 0.5 1.5 cm. Ulceration noted on the patient's right lower extremity posterior aspect of her right lower leg. No malodor noted. No purulent discharge. Neurological:      Mental Status: She is alert and oriented to person, place, and time. Motor: No abnormal muscle tone. Psychiatric:         Thought Content: Thought content normal.         Judgment: Judgment normal.           ASSESSMENT/PLAN:  Skin lesion :Likely microabscesses, vs pyoderma gangrenosum vs. Hidradenitis supurativa:  Trial of bactrim. Referral to dermatology. Pyoderma Gangrenosum-provide the patient with a prescription for a lidocaine patch. Continue hydrocortisone cream.        Hypovitaminosis D- continue oral vitamin D replacement. Fibromyalgia:continue cymbalta and trazodone        Cocaine Dependency-  Previously referred pt to [address] [place] [address] N @ [phone]. I have explained to the patient that I will not refill her medication again unless she can provide documentation supporting the fact that she is actively enrolled in this program to achieve cessation from substance abuse. --Kelly Ellsworth MD on 6/3/2022 at 10:48 AM    An electronic signature was used to authenticate this note.

## 2022-06-03 ENCOUNTER — OFFICE VISIT (OUTPATIENT)
Dept: FAMILY MEDICINE CLINIC | Age: 57
End: 2022-06-03
Payer: COMMERCIAL

## 2022-06-03 VITALS
DIASTOLIC BLOOD PRESSURE: 70 MMHG | WEIGHT: 142 LBS | HEART RATE: 102 BPM | HEIGHT: 64 IN | RESPIRATION RATE: 14 BRPM | OXYGEN SATURATION: 99 % | SYSTOLIC BLOOD PRESSURE: 104 MMHG | BODY MASS INDEX: 24.24 KG/M2

## 2022-06-03 DIAGNOSIS — L98.9 SKIN LESION: Primary | ICD-10-CM

## 2022-06-03 PROCEDURE — G8427 DOCREV CUR MEDS BY ELIG CLIN: HCPCS | Performed by: INTERNAL MEDICINE

## 2022-06-03 PROCEDURE — 3017F COLORECTAL CA SCREEN DOC REV: CPT | Performed by: INTERNAL MEDICINE

## 2022-06-03 PROCEDURE — 4004F PT TOBACCO SCREEN RCVD TLK: CPT | Performed by: INTERNAL MEDICINE

## 2022-06-03 PROCEDURE — 99214 OFFICE O/P EST MOD 30 MIN: CPT | Performed by: INTERNAL MEDICINE

## 2022-06-03 PROCEDURE — G8420 CALC BMI NORM PARAMETERS: HCPCS | Performed by: INTERNAL MEDICINE

## 2022-06-03 RX ORDER — GREEN TEA/HOODIA GORDONII 315-12.5MG
1 CAPSULE ORAL 2 TIMES DAILY
Qty: 20 TABLET | Refills: 0 | Status: SHIPPED | OUTPATIENT
Start: 2022-06-03 | End: 2022-06-13

## 2022-06-03 RX ORDER — SULFAMETHOXAZOLE AND TRIMETHOPRIM 800; 160 MG/1; MG/1
1 TABLET ORAL 2 TIMES DAILY
Qty: 20 TABLET | Refills: 1 | Status: SHIPPED | OUTPATIENT
Start: 2022-06-03 | End: 2022-06-12

## 2022-06-03 NOTE — CARE COORDINATION
To: Recall Nurse Review Pool (Pool)   Priority: Routine   Routing Comments:   Patient is scheduled for a colonoscopy with  Dr. Damián Chin  on 6/24    Please send Nulytely prep to selected pharmacy        You Patient resolved from the Care Transitions episode on 7/28/2020  Discussed COVID-19 related testing which was available at this time. Test results were negative. Patient informed of results, if available? Yes and Completed 5/8/2020, 5/9/2020. Patient/family has been provided the following resources and education related to COVID-19:                        Signs, symptoms and red flags related to COVID-19            CDC exposure and quarantine guidelines            Conduit exposure contact - [phone]            Contact for their local Department of Health                 Patient currently reports that the following symptoms have improved:  no new/worsening symptoms     No further outreach scheduled with this CTN/ACM. Episode of Care resolved. Patient has this CTN/ACM contact information if future needs arise.

## 2022-06-04 DIAGNOSIS — J30.2 SEASONAL ALLERGIC RHINITIS, UNSPECIFIED TRIGGER: ICD-10-CM

## 2022-06-05 RX ORDER — DIPHENHYDRAMINE HCL 25 MG
CAPSULE ORAL
Qty: 56 CAPSULE | Refills: 1 | Status: SHIPPED | OUTPATIENT
Start: 2022-06-05 | End: 2022-07-07

## 2022-06-05 RX ORDER — HYDROXYZINE HYDROCHLORIDE 25 MG/1
25 TABLET, FILM COATED ORAL EVERY 6 HOURS PRN
Qty: 120 TABLET | Refills: 1 | Status: SHIPPED | OUTPATIENT
Start: 2022-06-05 | End: 2022-08-01

## 2022-06-06 RX ORDER — LIDOCAINE 50 MG/G
1 PATCH TOPICAL DAILY
Qty: 30 PATCH | Refills: 1 | Status: SHIPPED | OUTPATIENT
Start: 2022-06-06 | End: 2022-08-04

## 2022-06-11 NOTE — TELEPHONE ENCOUNTER
Rx requested:  Requested Prescriptions     Pending Prescriptions Disp Refills    sulfamethoxazole-trimethoprim (BACTRIM DS;SEPTRA DS) 800-160 MG per tablet [Pharmacy Med Name: SULFAMETHO-TRIMETH -160 Tablet] 20 tablet 1     Sig: TAKE 1 TABLET BY MOUTH 2 TIMES DAILY FOR 10 DAYS         Last Office Visit:   6/3/2022      Next Visit Date:  Future Appointments   Date Time Provider Manan Honeycutt   6/17/2022  9:45 AM Channing Benson MD [address] [place] [address]   6/27/2022 10:30 AM Neisha Gonzales MD [facility] EM[facility] AT [place]   7/12/2022 11:15 AM Fede Velazquez MD [address] [place] [address] 7

## 2022-06-12 RX ORDER — SULFAMETHOXAZOLE AND TRIMETHOPRIM 800; 160 MG/1; MG/1
1 TABLET ORAL 2 TIMES DAILY
Qty: 20 TABLET | Refills: 1 | Status: SHIPPED | OUTPATIENT
Start: 2022-06-12 | End: 2022-06-20

## 2022-06-13 RX ORDER — TRAZODONE HYDROCHLORIDE 100 MG/1
100 TABLET ORAL NIGHTLY
Qty: 30 TABLET | Refills: 2 | OUTPATIENT
Start: 2022-06-13 | End: 2022-07-13

## 2022-06-17 ENCOUNTER — OFFICE VISIT (OUTPATIENT)
Dept: NEUROLOGY | Age: 57
End: 2022-06-17
Payer: COMMERCIAL

## 2022-06-17 VITALS
SYSTOLIC BLOOD PRESSURE: 122 MMHG | HEART RATE: 70 BPM | WEIGHT: 143 LBS | HEIGHT: 64 IN | BODY MASS INDEX: 24.41 KG/M2 | DIASTOLIC BLOOD PRESSURE: 82 MMHG

## 2022-06-17 DIAGNOSIS — G62.9 POLYNEUROPATHY, UNSPECIFIED: Primary | ICD-10-CM

## 2022-06-17 DIAGNOSIS — R53.83 FATIGUE, UNSPECIFIED TYPE: ICD-10-CM

## 2022-06-17 DIAGNOSIS — M47.22 CERVICAL SPONDYLOSIS WITH RADICULOPATHY: ICD-10-CM

## 2022-06-17 DIAGNOSIS — M79.7 FIBROMYALGIA: ICD-10-CM

## 2022-06-17 DIAGNOSIS — L88 PYODERMA GANGRENOSA: ICD-10-CM

## 2022-06-17 DIAGNOSIS — M54.16 LUMBAR RADICULOPATHY: ICD-10-CM

## 2022-06-17 PROCEDURE — G8420 CALC BMI NORM PARAMETERS: HCPCS | Performed by: PSYCHIATRY & NEUROLOGY

## 2022-06-17 PROCEDURE — G8427 DOCREV CUR MEDS BY ELIG CLIN: HCPCS | Performed by: PSYCHIATRY & NEUROLOGY

## 2022-06-17 PROCEDURE — 4004F PT TOBACCO SCREEN RCVD TLK: CPT | Performed by: PSYCHIATRY & NEUROLOGY

## 2022-06-17 PROCEDURE — 3017F COLORECTAL CA SCREEN DOC REV: CPT | Performed by: PSYCHIATRY & NEUROLOGY

## 2022-06-17 PROCEDURE — 99214 OFFICE O/P EST MOD 30 MIN: CPT | Performed by: PSYCHIATRY & NEUROLOGY

## 2022-06-17 RX ORDER — DOXYCYCLINE HYCLATE 100 MG/1
CAPSULE ORAL
COMMUNITY
Start: 2022-06-09

## 2022-06-17 RX ORDER — OXYCODONE HYDROCHLORIDE AND ACETAMINOPHEN 5; 325 MG/1; MG/1
1 TABLET ORAL 2 TIMES DAILY
Qty: 60 TABLET | Refills: 0 | Status: CANCELLED | OUTPATIENT
Start: 2022-06-19 | End: 2022-07-19

## 2022-06-17 RX ORDER — CLINDAMYCIN PHOSPHATE 10 UG/ML
LOTION TOPICAL
COMMUNITY
Start: 2022-06-09

## 2022-06-17 ASSESSMENT — ENCOUNTER SYMPTOMS
TROUBLE SWALLOWING: 0
PHOTOPHOBIA: 0
BACK PAIN: 1
VOMITING: 0
SHORTNESS OF BREATH: 0
NAUSEA: 0
COLOR CHANGE: 0
CHOKING: 0

## 2022-06-17 NOTE — PROGRESS NOTES
Subjective:      Patient ID: Mikal Lubin is a 65 y.o. female who presents today for:  Chief Complaint   Patient presents with    Follow-up     4 month f/u, Polyneuropathy, patient states the pain is still the same        HPI 64-year right-handed female with a history of neuropathy. Patient has pyoderma gangrenosum is on high-dose of gabapentin will rates pain 8 out of 10. She is high-dose of Cymbalta as well. We had recommended pain management and we did initiate her on 2 tablets of Percocet as she had considerable pain this is helped her pain going down to 5 out of 10 and helped her quality of life. She is actually followed here for pain management   Patient was recommended to see pain management and she has an appointment soon to see them. She is considerable pain still with some response to Percocet though she requires higher dosing and therefore pain management consultation. She has back pain as well. 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    Neuromuscular disorder ([address]     Osteoarthritis     Pneumonia due to organism     Pyoderma gangrenosa     Sweet syndrome     Ulcerative colitis ([address]      Past Surgical History:   Procedure Laterality Date    ABDOMEN SURGERY      sleen repair    APPENDECTOMY      BACK SURGERY      BREAST SURGERY      fatty tumor removed, benign    COLONOSCOPY      COSMETIC SURGERY      tummy tuck    EYE SURGERY      bilateral cataract surgery    HYSTERECTOMY (CERVIX STATUS UNKNOWN)      SKIN BIOPSY      SPINAL FUSION      TONSILLECTOMY       Social History     Socioeconomic History    Marital status:       Spouse name: Not on file    Number of children: Not on file    Years of education: Not on file    Highest education level: Not on file   Occupational History    Not on file   Tobacco Use    Smoking status: Current Every Day Smoker     Packs/day: 1.00     Years: 40.00     Pack years: 40.00     Types: Cigarettes    Smokeless tobacco: Never Used   Vaping Use    Vaping Use: Never used   Substance and Sexual Activity    Alcohol use: Never    Drug use: No    Sexual activity: Not Currently   Other Topics Concern    Not on file   Social History Narrative    Not on file     Social Determinants of Health     Financial Resource Strain: Low Risk     Difficulty of Paying Living Expenses: Not hard at all   Food Insecurity: No Food Insecurity    Worried About Running Out of Food in the Last Year: Never true    Zeus of Food in the Last Year: Never true   Transportation Needs:     Lack of Transportation (Medical): Not on file    Lack of Transportation (Non-Medical): Not on file   Physical Activity:     Days of Exercise per Week: Not on file    Minutes of Exercise per Session: Not on file   Stress:     Feeling of Stress : Not on file   Social Connections:     Frequency of Communication with Friends and Family: Not on file    Frequency of Social Gatherings with Friends and Family: Not on file    Attends Alevism Services: Not on file    Active Member of [address] [place] [address] [employer] or Organizations: Not on file    Attends Club or Organization Meetings: Not on file    Marital Status: Not on file   Intimate Partner Violence:     Fear of Current or Ex-Partner: Not on file    Emotionally Abused: Not on file    Physically Abused: Not on file    Sexually Abused: Not on file   Housing Stability:     Unable to Pay for Housing in the Last Year: Not on file    Number of [place] in the Last Year: Not on file    Unstable Housing in the Last Year: Not on file     History reviewed. No pertinent family history.   Allergies   Allergen Reactions    Amoxicillin-Pot Clavulanate      Other reaction(s): GI Upset, Intolerance, Other: See Comments  Nose bleed    Amoxicillin     Other Itching     IVP dye         Current Outpatient Medications   Medication Sig Dispense Refill    clindamycin (CLEOCIN T) 1 % lotion       doxycycline hyclate (VIBRAMYCIN) 100 MG capsule       sulfamethoxazole-trimethoprim (BACTRIM DS;SEPTRA DS) 800-160 MG per tablet TAKE 1 TABLET BY MOUTH 2 TIMES DAILY FOR 10 DAYS 20 tablet 1    hydrOXYzine HCl (ATARAX) 25 MG tablet TAKE 1 TABLET BY MOUTH EVERY 6 HOURS AS NEEDED FOR ITCHING TAKE 25 MG BY MOUTH EVERY 6 HOURS AS NEEDED FOR ITCHING 120 tablet 1    diphenhydrAMINE (BANOPHEN) 25 MG capsule TAKE 1 TABLET BY MOUTH EVERY 6 HOURS AS NEEDED FOR ITCHING 56 capsule 1    hydrocortisone 1 % cream APPLY TOPICALLY 2 TIMES DAILY. 28 g 1    oxyCODONE-acetaminophen (PERCOCET) 5-325 MG per tablet Take 1 tablet by mouth 2 times daily for 30 days. Intended supply: 30 days.  Take lowest dose possible to manage pain 60 tablet 0    traZODone (DESYREL) 100 MG tablet TAKE 1 TABLET BY MOUTH NIGHTLY 30 tablet 2    vitamin D (CHOLECALCIFEROL) 50 MCG (2000 UT) TABS tablet TAKE 1 TABLET BY MOUTH DAILY 30 tablet 3    gabapentin (NEURONTIN) 800 MG tablet TAKE 1 TABLET BY MOUTH FOUR TIMES A DAY FOR 30 DAYS 120 tablet 2    nicotine polacrilex (NICORETTE) 2 MG gum Take 1 each by mouth as needed for Smoking cessation 110 each 3    DULoxetine (CYMBALTA) 60 MG extended release capsule TAKE 2 CAPSULES BY MOUTH EVERY EVENING 60 capsule 2    nicotine (NICODERM CQ) 21 MG/24HR Place 1 patch onto the skin daily 42 patch 0    clobetasol (TEMOVATE) 0.05 % cream       naloxone 4 MG/0.1ML LIQD nasal spray 1 spray by Nasal route as needed for Opioid Reversal 1 each 5    omeprazole (PRILOSEC) 40 MG delayed release capsule       GAVILAX 17 GM/SCOOP powder       Adalimumab (HUMIRA PEN) 40 MG/0.4ML PNKT       Alcohol Swabs (ALCOHOL PADS) 70 % PADS CLEAN THE SKIN BY USING 1 PAD ON THE AFFECTED AREA BEFORE APPLYING ANY TOPICAL CREAM, 3 TIMES DAILY 100 each 0    acetaminophen (TYLENOL) 325 MG tablet Take 650 mg by mouth every 4 hours as needed for Pain       No current facility-administered medications for this visit. Review of Systems   Constitutional: Negative for fever. HENT: Negative for ear pain, tinnitus and trouble swallowing. Eyes: Negative for photophobia and visual disturbance. Respiratory: Negative for choking and shortness of breath. Cardiovascular: Negative for chest pain and palpitations. Gastrointestinal: Negative for nausea and vomiting. Musculoskeletal: Positive for back pain. Negative for gait problem, joint swelling, myalgias, neck pain and neck stiffness. Skin: Negative for color change. Allergic/Immunologic: Negative for food allergies. Neurological: Positive for weakness and numbness. Negative for dizziness, tremors, seizures, syncope, facial asymmetry, speech difficulty, light-headedness and headaches. Psychiatric/Behavioral: Negative for behavioral problems, confusion, hallucinations and sleep disturbance. Objective:   /82 (Site: Left Upper Arm, Position: Sitting, Cuff Size: Medium Adult)   Pulse 70   Ht 5' 4\" (1.626 m)   Wt 143 lb (64.9 kg)   LMP 01/01/1997 (Exact Date) Comment: hysterectomy  BMI 24.55 kg/m²     Physical Exam  Vitals reviewed. Eyes:      Pupils: Pupils are equal, round, and reactive to light. Cardiovascular:      Rate and Rhythm: Normal rate and regular rhythm. Heart sounds: No murmur heard. Pulmonary:      Effort: Pulmonary effort is normal.      Breath sounds: Normal breath sounds. Abdominal:      General: Bowel sounds are normal.   Musculoskeletal:         General: Normal range of motion. Cervical back: Normal range of motion. Skin:     General: Skin is warm. Neurological:      Mental Status: She is alert and oriented to person, place, and time. Cranial Nerves:  No cranial nerve deficit. Sensory: No sensory deficit. Motor: No abnormal muscle tone. Coordination: Coordination normal.      Deep Tendon Reflexes: Reflexes are normal and symmetric. Babinski sign absent on the right side. Babinski sign absent on the left side. Psychiatric:         Mood and Affect: Mood normal.         CT ABDOMEN PELVIS W IV CONTRAST Additional Contrast? None    Result Date: 12/23/2021  EXAMINATION: CT ABDOMEN PELVIS W IV CONTRAST DATE AND TIME:12/22/2021 1:29 AM CLINICAL HISTORY: ACUTE ABDOMINAL PAIN. EPIGASTRIC PAIN. ABD PAIN, UC  COMPARISON: FEBRUARY 4, 2021 TECHNIQUE: Contiguous axial CT sections of the abdomen and pelvis. 100 cc's of IV contrast given. .  All CT scans at this facility use dose modulation, iterative reconstruction, and/or weight based dosing when appropriate to reduce radiation dose to as low as reasonably achievable. Some of this report was completed using  Power [employer]ribe [id number] and may include unintended errors with respect to translation of words, typographical errors or grammatical errors which may not have been identified prior to the finalization of this report. FINDINGS   Liver: Negative    Spleen: Negative   Gallbladder: No calcified gallstones. Normal gallbladder wall. No pericholecystic fluid. Pancreas: Negative   Kidney: Negative   Adrenal glands are negative. Bowel: Negative  No CT evidence of appendicitis   Nodes: No lymphadenopathy. Aorta: Negative    Pelvis: Status post hysterectomy. No abnormal soft tissue mass or fluid collection. Peritoneum: No free fluid or free air. The abdominal wall is intact. Bones: No acute osseous abnormalities. Other: Visualized lung bases show patchy peripheral increased foci of hazy attenuation with preservation of bronchovascular markings consistent with groundglass opacities. These are nonspecific and can be seen in viral pneumonia. Correlate clinically.      PERIPHERAL BIBASILAR GROUNDGLASS OPACITIES. NO ACUTE PATHOLOGY IN THE ABDOMEN OR PELVIS. XR CHEST PORTABLE    Result Date: 12/22/2021  Exam: XR CHEST PORTABLE History:  SOB Technique: AP portable view of the chest obtained. Comparison: Chest CT from May 19, 2021 Chest x-ray portable Findings: The cardiomediastinal silhouette is within normal limits. There are no infiltrates, consolidations or effusions. Bones of the thorax appear intact. No radiographic evidence of acute intrathoracic process. IR PICC WO SQ PORT/PUMP > 5 YEARS    Result Date: 12/23/2021  PERIPHERALLY INSERTED CENTRAL CATHETER (PICC) PLACEMENT WITH ULTRASOUND GUIDANCE CLINICAL HISTORY:  SEPTIC SHOCK. REQUIRES INTRAVENOUS ANTIBIOTICS After discussing the procedure and possible complications with the patient, informed consent was obtained. The patient was placed on the Special Procedures table. The left upper extremity was sterilely prepared using a maximal sterile barrier technique  which includes cap, mask, sterile gown, sterile gloves, sterile full-body drape, hand hygiene and 2% chlorhexidine for cutaneous antisepsis. A sterile ultrasound technique with sterile gel and sterile probe covers was also utilized. A pre-procedure time out was performed in order to assure the correct patient and procedure. Local anesthetic was administered. A peripheral vein was accessed with sonographic guidance. A sonographic spot image was obtained for documentation. A guidewire was advanced into the vein with fluoroscopic guidance and a sheath was placed over the guidewire. A 5-German dual-lumen PICC was advanced through the sheath, up the arm and into the central vasculature. It was positioned appropriately. The sheath was removed. The catheter was shown to aspirate and infuse properly. The flange of the catheter was affixed to the arm using a PICC securement device.   A spot image of the chest showed the tip of the PICC line to lie in the superior vena cava.   The patient tolerated the procedure well and without complications. Number of films: 3. Fluoroscopy time: 142.6 seconds. CONCLUSION: SUCCESSFUL LEFT PICC PLACEMENT WITHOUT IMMEDIATE COMPLICATIONS. Lab Results   Component Value Date    WBC 7.7 04/12/2022    RBC 4.43 04/12/2022    RBC 4.59 11/02/2011    HGB 12.9 04/12/2022    HCT 39.3 04/12/2022    MCV 88.7 04/12/2022    MCH 29.1 04/12/2022    MCHC 32.8 04/12/2022    RDW 13.9 04/12/2022     04/12/2022    MPV 9.1 11/05/2012     Lab Results   Component Value Date     04/12/2022    K 4.6 04/12/2022    K 3.8 12/23/2021     04/12/2022    CO2 24 04/12/2022    BUN 10 04/12/2022    CREATININE 0.76 04/12/2022    GFRAA >60.0 04/12/2022    LABGLOM >60.0 04/12/2022    GLUCOSE 86 04/12/2022    GLUCOSE 146 11/02/2011    PROT 7.5 04/12/2022    LABALBU 3.9 04/12/2022    LABALBU 4.1 11/02/2011    CALCIUM 9.4 04/12/2022    BILITOT <0.2 04/12/2022    ALKPHOS 78 04/12/2022    AST 17 04/12/2022    ALT 9 04/12/2022     Lab Results   Component Value Date    PROTIME 13.0 10/05/2019    INR 0.9 10/05/2019     Lab Results   Component Value Date    TSH 1.030 03/26/2020    HPXRBZBZ12 579 11/04/2020     Lab Results   Component Value Date    TRIG 163 05/09/2020    HDL 40 05/09/2020    LDLCALC 90 05/09/2020     Lab Results   Component Value Date    LABAMPH Neg 05/12/2021    BARBSCNU Neg 05/12/2021    LABBENZ Neg 05/12/2021    LABMETH Neg 05/12/2021    OPIATESCREENURINE Neg 05/12/2021    PHENCYCLIDINESCREENURINE Neg 05/12/2021     No results found for: LITHIUM, DILFRTOT, VALPROATE    Assessment:       Diagnosis Orders   1. Polyneuropathy, unspecified     2. Lumbar radiculopathy     3. Pyoderma gangrenosa     4. Cervical spondylosis with radiculopathy     5. Fibromyalgia     6. Fatigue, unspecified type     Polyneuropathy with chronic pain syndrome. Patient is on a high dose of gabapentin and Cymbalta.   When last seen we added Percocet 2 a day and this is the best we could have given her she was responding to this about 50% but still has considerable pain. We therefore recommend see pain management  as we are not able to give her more medication. We will see what they say. She has multiple other symptoms which quite do not fit into any other anatomical category. All patients have not pointed towards any central process. She is not myelopathic. We will keep an eye on this and continue to follow      Plan:      No orders of the defined types were placed in this encounter. No orders of the defined types were placed in this encounter. No follow-ups on file.       Kee Maxwell MD

## 2022-06-20 DIAGNOSIS — G62.9 POLYNEUROPATHY, UNSPECIFIED: ICD-10-CM

## 2022-06-20 RX ORDER — OXYCODONE HYDROCHLORIDE AND ACETAMINOPHEN 5; 325 MG/1; MG/1
1 TABLET ORAL 2 TIMES DAILY
Qty: 60 TABLET | Refills: 0 | Status: SHIPPED | OUTPATIENT
Start: 2022-06-20 | End: 2022-07-22 | Stop reason: SDUPTHER

## 2022-06-20 RX ORDER — SULFAMETHOXAZOLE AND TRIMETHOPRIM 800; 160 MG/1; MG/1
1 TABLET ORAL 2 TIMES DAILY
Qty: 20 TABLET | Refills: 1 | Status: SHIPPED | OUTPATIENT
Start: 2022-06-20 | End: 2022-06-30

## 2022-06-20 NOTE — TELEPHONE ENCOUNTER
Pharmacy is requesting medication refill. Please approve or deny this request.    Rx requested:  Requested Prescriptions     Pending Prescriptions Disp Refills    oxyCODONE-acetaminophen (PERCOCET) 5-325 MG per tablet [Pharmacy Med Name: OXYCODONE-APAP 5-325MG 5-325 Tablet] 60 tablet 0     Sig: TAKE 1 TABLET BY MOUTH 2 TIMES DAILY FOR 30 DAYS. INTENDED SUPPLY: 30 DAYS.  TAKE LOWEST DOSE POSSIBLE TO MANAGE PAIN         Last Office Visit:   6/17/2022      Next Visit Date:  Future Appointments   Date Time Provider Manan Honeycutt   6/27/2022 10:30 AM Aydee Marroquin MD [facility] AT [place]   7/12/2022 11:15 AM Jody Carson MD [facility] [place] [facility] [place]   10/21/2022  9:30 AM Pantera Vicente  [facility]

## 2022-06-30 RX ORDER — DIAPER,BRIEF,INFANT-TODD,DISP
EACH MISCELLANEOUS
Qty: 28 G | Refills: 1 | Status: SHIPPED | OUTPATIENT
Start: 2022-06-30 | End: 2022-08-22

## 2022-07-05 DIAGNOSIS — J30.2 SEASONAL ALLERGIC RHINITIS, UNSPECIFIED TRIGGER: ICD-10-CM

## 2022-07-07 RX ORDER — DIPHENHYDRAMINE HCL 25 MG
CAPSULE ORAL
Qty: 56 CAPSULE | Refills: 1 | Status: SHIPPED | OUTPATIENT
Start: 2022-07-07 | End: 2022-07-17

## 2022-07-16 DIAGNOSIS — J30.2 SEASONAL ALLERGIC RHINITIS, UNSPECIFIED TRIGGER: ICD-10-CM

## 2022-07-16 RX ORDER — DIPHENHYDRAMINE HYDROCHLORIDE 25 MG/1
CAPSULE ORAL
Qty: 56 CAPSULE | Refills: 1 | Status: CANCELLED | OUTPATIENT
Start: 2022-07-16

## 2022-07-16 NOTE — TELEPHONE ENCOUNTER
requesting medication refill.  Please approve or deny this request.    Rx requested:  Requested Prescriptions     Pending Prescriptions Disp Refills    diphenhydrAMINE (BANOPHEN) 25 MG capsule [Pharmacy Med Name: Bony Goad 25 MG CAPSULE 25 Capsule] 56 capsule 1     Sig: TAKE 1 TABLET BY MOUTH EVERY 6 HOURS AS NEEDED FOR ITCHING         Last Office Visit:   6/3/2022      Next Visit Date:  Future Appointments   Date Time Provider Manan Melendezisti   8/12/2022  1:15 PM Aby Jackson MD [facility]   10/21/2022  9:30 AM Rosario Chicas MD [facility]

## 2022-07-17 RX ORDER — DIPHENHYDRAMINE HCL 25 MG
CAPSULE ORAL
Qty: 56 CAPSULE | Refills: 1 | Status: SHIPPED | OUTPATIENT
Start: 2022-07-17 | End: 2022-08-01

## 2022-07-21 DIAGNOSIS — G62.9 POLYNEUROPATHY, UNSPECIFIED: ICD-10-CM

## 2022-07-22 RX ORDER — OXYCODONE HYDROCHLORIDE AND ACETAMINOPHEN 5; 325 MG/1; MG/1
1 TABLET ORAL 2 TIMES DAILY
Qty: 60 TABLET | Refills: 0 | Status: SHIPPED | OUTPATIENT
Start: 2022-07-22 | End: 2022-08-16 | Stop reason: SDUPTHER

## 2022-07-25 RX ORDER — GABAPENTIN 800 MG/1
TABLET ORAL
Qty: 120 TABLET | Refills: 2 | Status: SHIPPED | OUTPATIENT
Start: 2022-07-25 | End: 2022-09-22

## 2022-07-25 NOTE — TELEPHONE ENCOUNTER
Pharmacy is requesting medication refill.  Please approve or deny this request.    Rx requested:  Requested Prescriptions     Pending Prescriptions Disp Refills    gabapentin (NEURONTIN) 800 MG tablet [Pharmacy Med Name: GABAPENTIN 800 MG  Tablet] 120 tablet 2     Sig: TAKE 1 TABLET BY MOUTH FOUR TIMES A DAY FOR 30 DAYS         Last Office Visit:   6/17/2022      Next Visit Date:  Future Appointments   Date Time Provider Manan Honeycutt   8/12/2022  1:15 PM Esequiel Elaine  [place]   10/21/2022  9:30 AM Rema De Jesus MD [facility]

## 2022-07-30 DIAGNOSIS — F17.200 NICOTINE DEPENDENCE, UNCOMPLICATED, UNSPECIFIED NICOTINE PRODUCT TYPE: ICD-10-CM

## 2022-07-30 DIAGNOSIS — J30.2 SEASONAL ALLERGIC RHINITIS, UNSPECIFIED TRIGGER: ICD-10-CM

## 2022-08-01 DIAGNOSIS — J30.2 SEASONAL ALLERGIC RHINITIS, UNSPECIFIED TRIGGER: ICD-10-CM

## 2022-08-01 RX ORDER — DULOXETIN HYDROCHLORIDE 60 MG/1
120 CAPSULE, DELAYED RELEASE ORAL EVERY EVENING
Qty: 60 CAPSULE | Refills: 2 | Status: SHIPPED | OUTPATIENT
Start: 2022-08-01 | End: 2022-09-29

## 2022-08-01 RX ORDER — DIPHENHYDRAMINE HCL 25 MG
CAPSULE ORAL
Qty: 56 CAPSULE | Refills: 1 | Status: SHIPPED | OUTPATIENT
Start: 2022-08-01 | End: 2022-08-15

## 2022-08-01 RX ORDER — HYDROXYZINE HYDROCHLORIDE 25 MG/1
25 TABLET, FILM COATED ORAL EVERY 6 HOURS PRN
Qty: 120 TABLET | Refills: 1 | Status: SHIPPED | OUTPATIENT
Start: 2022-08-01 | End: 2022-09-01

## 2022-08-01 NOTE — TELEPHONE ENCOUNTER
Pharmacy is requesting medication refill.  Please approve or deny this request.    Rx requested:  Requested Prescriptions     Pending Prescriptions Disp Refills    DULoxetine (CYMBALTA) 60 MG extended release capsule [Pharmacy Med Name: DULOXETINE HCL 60 MG CPEP 60 Capsule] 60 capsule 2     Sig: TAKE 2 CAPSULES BY MOUTH EVERY EVENING         Last Office Visit:   6/17/2022      Next Visit Date:  Future Appointments   Date Time Provider Manan Tangela   8/12/2022  1:15 PM Hai Garcia MD [address] [id number]   10/21/2022  9:30 AM Kayla Friedman  [facility]

## 2022-08-01 NOTE — TELEPHONE ENCOUNTER
requesting medication refill.      Rx requested:  Requested Prescriptions     Pending Prescriptions Disp Refills    diphenhydrAMINE (BANOPHEN) 25 MG capsule [Pharmacy Med Name: Koki Natter 25 MG CAPSULE 25 Capsule] 56 capsule 1     Sig: TAKE 1 TABLET BY MOUTH EVERY 6 HOURS AS NEEDED FOR ITCHING       Last Office Visit:   6/3/2022    Last Tox screen:        Last Medication contract:        Next Visit Date:  Future Appointments   Date Time Provider Manan Honeycutt   8/12/2022  1:15 PM Rajinder Oconnell MD [address] [place] [address]   10/21/2022  9:30 AM Olga Santos MD [facility]

## 2022-08-03 NOTE — TELEPHONE ENCOUNTER
Rx requested:  Requested Prescriptions     Pending Prescriptions Disp Refills    lidocaine (LIDODERM) 5 % [Pharmacy Med Name: LIDOCAINE 5 % PTCH 5 Patch] 30 patch 1     Sig: PLACE 1 PATCH ONTO THE SKIN DAILY FOR 10 DAYS 12 HOURS ON, 12 HOURS OFF.          Last Office Visit:   6/3/2022      Next Visit Date:  Future Appointments   Date Time Provider Manan Tangela   8/12/2022  1:15 PM Sandra Villalobos MD [address] [place] [address]   10/21/2022  9:30 AM Lucy Apodaca MD [facility]

## 2022-08-04 RX ORDER — LIDOCAINE 50 MG/G
1 PATCH TOPICAL DAILY
Qty: 30 PATCH | Refills: 1 | Status: SHIPPED | OUTPATIENT
Start: 2022-08-04 | End: 2022-09-29

## 2022-08-13 DIAGNOSIS — J30.2 SEASONAL ALLERGIC RHINITIS, UNSPECIFIED TRIGGER: ICD-10-CM

## 2022-08-14 NOTE — TELEPHONE ENCOUNTER
requesting medication refill.  Please approve or deny this request.    Rx requested:  Requested Prescriptions     Pending Prescriptions Disp Refills    diphenhydrAMINE (BANOPHEN) 25 MG capsule [Pharmacy Med Name: Laure Mayerelsen 25 MG CAPSULE 25 Capsule] 56 capsule 1     Sig: TAKE 1 TABLET BY MOUTH EVERY 6 HOURS AS NEEDED FOR ITCHING         Last Office Visit:   6/3/2022      Next Visit Date:  Future Appointments   Date Time Provider Manan Honeycutt   10/21/2022  9:30 AM Sakina Barr MD [facility]

## 2022-08-15 DIAGNOSIS — J30.2 SEASONAL ALLERGIC RHINITIS, UNSPECIFIED TRIGGER: ICD-10-CM

## 2022-08-15 RX ORDER — DIPHENHYDRAMINE HCL 25 MG
CAPSULE ORAL
Qty: 56 CAPSULE | Refills: 1 | OUTPATIENT
Start: 2022-08-15

## 2022-08-15 RX ORDER — DIPHENHYDRAMINE HCL 25 MG
CAPSULE ORAL
Qty: 56 CAPSULE | Refills: 1 | Status: SHIPPED | OUTPATIENT
Start: 2022-08-15 | End: 2022-08-28

## 2022-08-16 DIAGNOSIS — G62.9 POLYNEUROPATHY, UNSPECIFIED: ICD-10-CM

## 2022-08-16 RX ORDER — OXYCODONE HYDROCHLORIDE AND ACETAMINOPHEN 5; 325 MG/1; MG/1
1 TABLET ORAL 2 TIMES DAILY
Qty: 60 TABLET | Refills: 0 | Status: SHIPPED | OUTPATIENT
Start: 2022-08-16 | End: 2022-09-19

## 2022-08-22 RX ORDER — DIAPER,BRIEF,INFANT-TODD,DISP
EACH MISCELLANEOUS
Qty: 28 G | Refills: 1 | Status: SHIPPED | OUTPATIENT
Start: 2022-08-22 | End: 2022-09-19

## 2022-08-27 DIAGNOSIS — J30.2 SEASONAL ALLERGIC RHINITIS, UNSPECIFIED TRIGGER: ICD-10-CM

## 2022-08-27 NOTE — TELEPHONE ENCOUNTER
requesting medication refill.  Please approve or deny this request.    Rx requested:  Requested Prescriptions     Pending Prescriptions Disp Refills    diphenhydrAMINE (BANOPHEN) 25 MG capsule 56 capsule 1     Sig: TAKE 1 TABLET BY MOUTH EVERY 6 HOURS AS NEEDED FOR ITCHING    vitamin D (CHOLECALCIFEROL) 50 MCG (2000 UT) TABS tablet [Pharmacy Med Name: VITAMIN D3 2,000 UNIT TAB 50 MCG Tablet] 30 tablet 3     Sig: TAKE 1 TABLET BY MOUTH DAILY         Last Office Visit:   6/3/2022      Next Visit Date:  Future Appointments   Date Time Provider Manan Honeycutt   9/1/2022  4:45 PM Karen Gallardo  [place]   10/21/2022  9:30 AM Lakshmi Bellamy MD [facility]

## 2022-08-28 RX ORDER — CHOLECALCIFEROL (VITAMIN D3) 50 MCG
TABLET ORAL
Qty: 30 TABLET | Refills: 3 | Status: SHIPPED | OUTPATIENT
Start: 2022-08-28

## 2022-08-28 RX ORDER — DIPHENHYDRAMINE HCL 25 MG
CAPSULE ORAL
Qty: 56 CAPSULE | Refills: 1 | Status: SHIPPED | OUTPATIENT
Start: 2022-08-28 | End: 2022-09-26

## 2022-09-01 ENCOUNTER — TELEMEDICINE (OUTPATIENT)
Dept: FAMILY MEDICINE CLINIC | Age: 57
End: 2022-09-01
Payer: COMMERCIAL

## 2022-09-01 DIAGNOSIS — M79.642 PAIN OF LEFT HAND: Primary | ICD-10-CM

## 2022-09-01 DIAGNOSIS — H93.8X1 EAR PRESSURE, RIGHT: ICD-10-CM

## 2022-09-01 DIAGNOSIS — J30.2 SEASONAL ALLERGIC RHINITIS, UNSPECIFIED TRIGGER: ICD-10-CM

## 2022-09-01 PROCEDURE — G8428 CUR MEDS NOT DOCUMENT: HCPCS | Performed by: INTERNAL MEDICINE

## 2022-09-01 PROCEDURE — G8420 CALC BMI NORM PARAMETERS: HCPCS | Performed by: INTERNAL MEDICINE

## 2022-09-01 PROCEDURE — 3017F COLORECTAL CA SCREEN DOC REV: CPT | Performed by: INTERNAL MEDICINE

## 2022-09-01 PROCEDURE — 4004F PT TOBACCO SCREEN RCVD TLK: CPT | Performed by: INTERNAL MEDICINE

## 2022-09-01 PROCEDURE — 99213 OFFICE O/P EST LOW 20 MIN: CPT | Performed by: INTERNAL MEDICINE

## 2022-09-01 RX ORDER — HYDROXYZINE HYDROCHLORIDE 25 MG/1
25 TABLET, FILM COATED ORAL EVERY 6 HOURS PRN
Qty: 120 TABLET | Refills: 1 | Status: SHIPPED | OUTPATIENT
Start: 2022-09-01 | End: 2022-09-26

## 2022-09-01 NOTE — PROGRESS NOTES
2022      Charley De León (:  1965) is a 65 y.o. female, here for evaluation of the following medical concerns:pyoderma gangrenosum          HPI    52-year-old female with a history of prediabetes, pyoderma gangrenosum with associated abdominal and right lower extremity wounds associated with pyoderma gangrenosum  presents for follow-up visit          Ear Pressure : right ear, muffled sound        Hand pain: Left hand the . Skin lesion:  She has been followed by dermatology ,wound care and rheumatology. She is experiencing mildly erythematous and painful swelling beneath her arms bilaterally and this has been present for several weeks. She additionally has noted mildly erythematous and mildly painful swelling beneath her left rib cage. discomfort localized to the right she previously tested positive for cocaine on a urine toxicology and strongly encouraged to enroll in a substance abuse program.     Skin rash: underarms and left lower chest : several weeks. Depression/anxiety: Continuing Cymbalta and trazodone        Fibromyalgia and Wound pain: Monitoring        Prediabetes: The patient is A1c was 6.3 in May 2020. Nicotine Dependency: The patient continues to engage in nicotine dependence        Hypovitaminosis D: Patient has a history of hypovitaminosis D. She is presently compliant with her vitamin D daily and would like a refill for this medication at this time. At present he denies polyuria,  Polydipsia, constitutional, sinus, visual, cardiopulmonary, urologic, gastrointestinal, immunologic/hematologic, additional musculoskeletal, neurologic,dermatologic, or psychiatric complaints. Review of Systems   Constitutional: Negative for chills, diaphoresis, fatigue and fever.    HENT: Negative for congestion, dental problem, drooling, ear discharge, ear pain, facial swelling, hearing loss, mouth sores, nosebleeds, postnasal drip, rhinorrhea, sinus pressure, sinus pain, sneezing, sore throat, tinnitus, trouble swallowing and voice change. Eyes: Negative for photophobia, pain, discharge, redness, itching and visual disturbance. Respiratory: Negative for apnea, cough, chest tightness, shortness of breath and wheezing. Cardiovascular: Negative for chest pain, palpitations and leg swelling. Gastrointestinal: Negative for abdominal distention, abdominal pain, blood in stool, constipation, diarrhea, nausea, rectal pain and vomiting. Endocrine: Negative for cold intolerance, heat intolerance, polydipsia, polyphagia and polyuria. Genitourinary: Negative for decreased urine volume, difficulty urinating, dysuria, flank pain, frequency, genital sores, hematuria and urgency. Musculoskeletal: Negative for arthralgias, back pain, gait problem, joint swelling, myalgias, neck pain and neck stiffness. Skin: Negative for color change, rash and wound. Allergic/Immunologic: Negative for environmental allergies and food allergies. Neurological: Negative for dizziness, tremors, seizures, syncope, facial asymmetry, speech difficulty, weakness, light-headedness, numbness and headaches. Hematological: Negative for adenopathy. Does not bruise/bleed easily. Psychiatric/Behavioral: Negative for agitation, confusion, decreased concentration, hallucinations, self-injury, sleep disturbance and suicidal ideas. The patient is not nervous/anxious. Prior to Visit Medications    Medication Sig Taking? Authorizing Provider   diphenhydrAMINE (BANOPHEN) 25 MG capsule TAKE 1 TABLET BY MOUTH EVERY 6 HOURS AS NEEDED FOR ITCHING  Sandra Villalobos MD   vitamin D (CHOLECALCIFEROL) 50 MCG (2000 UT) TABS tablet TAKE 1 TABLET BY MOUTH DAILY  Sandra Villalobos MD   hydrocortisone 1 % cream APPLY TOPICALLY 2 TIMES DAILY. Sandra Villalobos MD   oxyCODONE-acetaminophen (PERCOCET) 5-325 MG per tablet Take 1 tablet by mouth in the morning and 1 tablet before bedtime. Do all this for 30 days. Intended supply: 30 days. Take lowest dose possible to manage pain. Yahaira Powers MD   DULoxetine (CYMBALTA) 60 MG extended release capsule TAKE 2 CAPSULES BY MOUTH EVERY EVENING  Usama Nathan MD   nicotine polacrilex (NICORETTE) 2 MG gum TAKE 1 EACH BY MOUTH AS NEEDED FOR SMOKING CESSATION . MAX 24 PIECES/24 HOURS.   Danita Damico MD   gabapentin (NEURONTIN) 800 MG tablet TAKE 1 TABLET BY MOUTH FOUR TIMES A DAY FOR 27 DAYS  Usama Nathan MD   clindamycin (CLEOCIN T) 1 % lotion   Historical Provider, MD   doxycycline hyclate (VIBRAMYCIN) 100 MG capsule   Historical Provider, MD   traZODone (DESYREL) 100 MG tablet TAKE 1 TABLET BY MOUTH NIGHTLY  Usama Nathan MD   nicotine (NICODERM CQ) 21 MG/24HR Place 1 patch onto the skin daily  Danita Damico MD   clobetasol (TEMOVATE) 0.05 % cream   Historical Provider, MD   naloxone 4 MG/0.1ML LIQD nasal spray 1 spray by Nasal route as needed for Opioid Reversal  Danita Damico MD   omeprazole (PRILOSEC) 40 MG delayed release capsule   Historical Provider, MD   Jessika Congo 17 GM/SCOOP powder   Historical Provider, MD   Adalimumab (HUMIRA PEN) 40 MG/0.4ML PNKT   Historical Provider, MD   Alcohol Swabs (ALCOHOL PADS) 70 % PADS CLEAN THE SKIN BY USING 1 PAD ON THE AFFECTED AREA BEFORE APPLYING ANY TOPICAL CREAM, 3 TIMES DAILY  Danita Damico MD   acetaminophen (TYLENOL) 325 MG tablet Take 650 mg by mouth every 4 hours as needed for Pain  Historical Provider, MD        Allergies   Allergen Reactions    Amoxicillin-Pot Clavulanate      Other reaction(s): GI Upset, Intolerance, Other: See Comments  Nose bleed    Amoxicillin     Other Itching     IVP dye         Past Medical History:   Diagnosis Date    Anxiety     Arthritis     Cancer ([facility] [address] 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    Chronic kidney disease     COPD exacerbation ([facility] [address]     Depression     Disease of blood and blood forming organ 01/04/2017    neutropenia    Fibromyalgia     History of blood transfusion 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    Neuromuscular disorder (HCC)     Osteoarthritis     Pneumonia due to organism     Pyoderma gangrenosa     Sweet syndrome     Ulcerative colitis ([facility] [address]        Past Surgical History:   Procedure Laterality Date    ABDOMEN SURGERY      sleen repair    APPENDECTOMY      BACK SURGERY      BREAST SURGERY      fatty tumor removed, benign    COLONOSCOPY      COSMETIC SURGERY      tummy tuck    EYE SURGERY      bilateral cataract surgery    HYSTERECTOMY (CERVIX STATUS UNKNOWN)      SKIN BIOPSY      SPINAL FUSION      TONSILLECTOMY         Social History     Socioeconomic History    Marital status:      Spouse name: Not on file    Number of children: Not on file    Years of education: Not on file    Highest education level: Not on file   Occupational History    Not on file   Tobacco Use    Smoking status: Every Day     Packs/day: 1.00     Years: 40.00     Pack years: 40.00     Types: Cigarettes    Smokeless tobacco: Never   Vaping Use    Vaping Use: Never used   Substance and Sexual Activity    Alcohol use: Never    Drug use: No    Sexual activity: Not Currently   Other Topics Concern    Not on file   Social History Narrative    Not on file     Social Determinants of Health     Financial Resource Strain: Low Risk     Difficulty of Paying Living Expenses: Not hard at all   Food Insecurity: No Food Insecurity    Worried About Running Out of Food in the Last Year: Never true    Ran Out of Food in the Last Year: Never true   Transportation Needs: Not on file   Physical Activity: Not on file   Stress: Not on file   Social Connections: Not on file   Intimate Partner Violence: Not on file   Housing Stability: Not on file        No family history on file. There were no vitals filed for this visit. Estimated body mass index is 24.55 kg/m² as calculated from the following:    Height as of 6/17/22: 5' 4\" (1.626 m).     Weight as of 6/17/22: 143 lb (64.9 kg).      ASSESSMENT/PLAN:    Ear pressure right ear: Needs visual inspection will coordinate a visit      Left hand pain: will need visual inspection . As noted above. Skin lesion :Likely microabscesses, vs pyoderma gangrenosum vs. Hidradenitis supurativa:  Trial of bactrim. Referral to dermatology. Pyoderma Gangrenosum-provide the patient with a prescription for a lidocaine patch. Continue hydrocortisone cream.        Hypovitaminosis D- continue oral vitamin D replacement. Fibromyalgia:continue cymbalta and trazodone        Cocaine Dependency-  Previously referred pt to [address] [place] [address] [place] @ [phone]. I have explained to the patient that I will not refill her medication again unless she can provide documentation supporting the fact that she is actively enrolled in this program to achieve cessation from substance abuse. Kenny Matias is a 65 y.o. female evaluated via telephone on 9/1/2022 for Other (Hand swelling pt states that the back side of her left hand has been swollen since last saturday)  . Documentation:  I communicated with the patient and/or health care decision maker about ear fullness and left hand pain. Details of this discussion including any medical advice provided: please refe    Total Time: minutes: 11-20 minutes    Kenny Matias was evaluated through a synchronous (real-time) audio encounter. Patient identification was verified at the start of the visit. She (or guardian if applicable) is aware that this is a billable service, which includes applicable co-pays. This visit was conducted with the patient's (and/or legal guardian's) verbal consent. She has not had a related appointment within my department in the past 7 days or scheduled within the next 24 hours. The patient was located at Home: [address] [place] [id number]. The provider was located at [facility] (Appt Dept): [address] [facility],  [address] [place] [address].     Note: not billable if this call serves to triage the patient into an appointment for the relevant concern    Cuong Cleaning MD       --Cunog Cleaning MD on 9/1/2022 at 7:38 AM    An electronic signature was used to authenticate this note.

## 2022-09-01 NOTE — TELEPHONE ENCOUNTER
Requested Prescriptions     Pending Prescriptions Disp Refills    hydrOXYzine HCl (ATARAX) 25 MG tablet [Pharmacy Med Name: HYDROXYZINE HCL 25 MG TABS 25 Tablet] 120 tablet 1     Sig: TAKE 1 TABLET BY MOUTH EVERY 6 HOURS AS NEEDED FOR ITCHING TAKE 25 MG BY MOUTH EVERY 6 HOURS AS NEEDED FOR ITCHING

## 2022-09-17 DIAGNOSIS — G62.9 POLYNEUROPATHY, UNSPECIFIED: ICD-10-CM

## 2022-09-19 RX ORDER — OXYCODONE HYDROCHLORIDE AND ACETAMINOPHEN 5; 325 MG/1; MG/1
TABLET ORAL
Qty: 60 TABLET | Refills: 0 | Status: SHIPPED | OUTPATIENT
Start: 2022-09-19 | End: 2022-10-22 | Stop reason: SDUPTHER

## 2022-09-19 RX ORDER — DIAPER,BRIEF,INFANT-TODD,DISP
EACH MISCELLANEOUS
Qty: 28 G | Refills: 1 | Status: SHIPPED | OUTPATIENT
Start: 2022-09-19

## 2022-09-19 NOTE — TELEPHONE ENCOUNTER
Pharmacy is requesting medication refill. Please approve or deny this request.    Rx requested:  Requested Prescriptions     Pending Prescriptions Disp Refills    oxyCODONE-acetaminophen (PERCOCET) 5-325 MG per tablet [Pharmacy Med Name: OXYCODONE-APAP 5-325MG 5-325 Tablet] 60 tablet 0     Si TABLET IN THE MORNING AND 1 TABLET BEFORE BEDTIME. DO ALL THIS FOR 30 DAYS. INTENDED SUPPLY: 30 DAYS.  TAKE LOWEST DOSE POSSIBLE TO MANAGE PAIN         Last Office Visit:   2022      Next Visit Date:  Future Appointments   Date Time Provider Manan Honeycutt   10/21/2022  9:30 AM Abby Turner  [facility]   2022  1:45 PM Nereyda Ingram MD [address] [place] [address]

## 2022-09-21 NOTE — TELEPHONE ENCOUNTER
Pharmacy is requesting medication refill.  Please approve or deny this request.    Rx requested:  Requested Prescriptions     Pending Prescriptions Disp Refills    gabapentin (NEURONTIN) 800 MG tablet [Pharmacy Med Name: GABAPENTIN 800 MG TABS 800 Tablet] 120 tablet 2     Sig: TAKE 1 TABLET BY MOUTH FOUR TIMES A DAY FOR 30 DAYS         Last Office Visit:   6/17/2022      Next Visit Date:  Future Appointments   Date Time Provider Manan Roachi   10/21/2022  9:30 AM Reshma Adkins  [facility]   11/2/2022  1:45 PM Sparkle Portillo MD [address] [place] [address]

## 2022-09-22 RX ORDER — GABAPENTIN 800 MG/1
TABLET ORAL
Qty: 120 TABLET | Refills: 2 | Status: SHIPPED | OUTPATIENT
Start: 2022-09-22 | End: 2022-10-22

## 2022-09-24 DIAGNOSIS — J30.2 SEASONAL ALLERGIC RHINITIS, UNSPECIFIED TRIGGER: ICD-10-CM

## 2022-09-24 NOTE — TELEPHONE ENCOUNTER
requesting medication refill.  Please approve or deny this request.    Rx requested:  Requested Prescriptions     Pending Prescriptions Disp Refills    diphenhydrAMINE (BANOPHEN) 25 MG capsule [Pharmacy Med Name: BANOPHEN 25 MG CAPSULE 25 Capsule] 56 capsule 1     Sig: TAKE 1 TABLET BY MOUTH EVERY 6 HOURS AS NEEDED FOR ITCHING    hydrOXYzine HCl (ATARAX) 25 MG tablet [Pharmacy Med Name: HYDROXYZINE HCL 25 MG TABS 25 Tablet] 120 tablet 1     Sig: TAKE 1 TABLET BY MOUTH EVERY 6 HOURS AS NEEDED FOR ITCHING TAKE 25 MG BY MOUTH EVERY 6 HOURS AS NEEDED FOR ITCHING         Last Office Visit:   9/1/2022      Next Visit Date:  Future Appointments   Date Time Provider Manan Roachi   10/21/2022  9:30 AM Kushal Awad  [facility]   11/2/2022  1:45 PM Lynette Boles MD [address] [place] [address]

## 2022-09-26 RX ORDER — DIPHENHYDRAMINE HCL 25 MG
CAPSULE ORAL
Qty: 56 CAPSULE | Refills: 1 | Status: SHIPPED | OUTPATIENT
Start: 2022-09-26 | End: 2022-10-24

## 2022-09-26 RX ORDER — HYDROXYZINE HYDROCHLORIDE 25 MG/1
25 TABLET, FILM COATED ORAL EVERY 6 HOURS PRN
Qty: 120 TABLET | Refills: 1 | Status: SHIPPED | OUTPATIENT
Start: 2022-09-26 | End: 2022-10-26

## 2022-09-29 RX ORDER — DULOXETIN HYDROCHLORIDE 60 MG/1
120 CAPSULE, DELAYED RELEASE ORAL EVERY EVENING
Qty: 60 CAPSULE | Refills: 2 | Status: SHIPPED | OUTPATIENT
Start: 2022-09-29 | End: 2022-10-31

## 2022-09-29 RX ORDER — LIDOCAINE 50 MG/G
1 PATCH TOPICAL DAILY
Qty: 30 PATCH | Refills: 1 | Status: SHIPPED | OUTPATIENT
Start: 2022-09-29 | End: 2022-10-09

## 2022-09-29 NOTE — TELEPHONE ENCOUNTER
Pharmacy is requesting medication refill.  Please approve or deny this request.    Rx requested:  Requested Prescriptions     Pending Prescriptions Disp Refills    DULoxetine (CYMBALTA) 60 MG extended release capsule [Pharmacy Med Name: DULOXETINE HCL 60 MG CPEP 60 Capsule] 60 capsule 2     Sig: TAKE 2 CAPSULES BY MOUTH EVERY EVENING         Last Office Visit:   6/17/2022      Next Visit Date:  Future Appointments   Date Time Provider [facility]   10/21/2022  9:30 AM Michelle Martino  [facility]   11/2/2022  1:45 PM Jeanne Delatorre MD [address] [place] [address]

## 2022-10-19 PROBLEM — E66.9 OBESITY: Chronic | Status: RESOLVED | Noted: 2017-05-01 | Resolved: 2022-10-19

## 2022-10-19 PROBLEM — J44.9 COPD (CHRONIC OBSTRUCTIVE PULMONARY DISEASE) (HCC): Status: RESOLVED | Noted: 2019-10-23 | Resolved: 2022-10-19

## 2022-10-19 PROBLEM — J44.1 COPD EXACERBATION (HCC): Status: RESOLVED | Noted: 2020-02-16 | Resolved: 2022-10-19

## 2022-10-21 DIAGNOSIS — G62.9 POLYNEUROPATHY, UNSPECIFIED: ICD-10-CM

## 2022-10-21 NOTE — TELEPHONE ENCOUNTER
Requested Prescriptions     Pending Prescriptions Disp Refills    oxyCODONE-acetaminophen (PERCOCET) 5-325 MG per tablet 60 tablet 0     Sig: Take 1 tablet by mouth 2 times daily for 30 days.

## 2022-10-22 RX ORDER — OXYCODONE HYDROCHLORIDE AND ACETAMINOPHEN 5; 325 MG/1; MG/1
1 TABLET ORAL 2 TIMES DAILY
Qty: 60 TABLET | Refills: 0 | Status: SHIPPED | OUTPATIENT
Start: 2022-10-22 | End: 2022-11-21

## 2022-10-24 DIAGNOSIS — J30.2 SEASONAL ALLERGIC RHINITIS, UNSPECIFIED TRIGGER: ICD-10-CM

## 2022-10-24 RX ORDER — DIPHENHYDRAMINE HCL 25 MG
CAPSULE ORAL
Qty: 56 CAPSULE | Refills: 1 | Status: SHIPPED | OUTPATIENT
Start: 2022-10-24

## 2022-10-25 ENCOUNTER — TELEPHONE (OUTPATIENT)
Dept: FAMILY MEDICINE CLINIC | Age: 57
End: 2022-10-25

## 2022-10-25 NOTE — LETTER
[facility]  [address] [place] [address] [address] [id number]  Phone: [phone]  Fax: [phone]    Calvin Hale MD        October 26, 2022    [place] [address]      To Whom It May Concern:    Ethel Pratt is my patient and currently under my care. In my professional opinion, due to [address] issues she will need to use the laundry facilities in her building. If you have any questions or concerns, please don't hesitate to call.     Sincerely,        Calvin Hale MD

## 2022-10-25 NOTE — TELEPHONE ENCOUNTER
Patient called requesting a letter for [facility]. She needs it to state that its medically necessary for her to do her laundry inside of the apartment building. She is closer to the [facility] than she is to the laundry facility they are wanting her to use. It is hard for her to walk that distance due to the wound on her achilles tendon. She is asking if the letter can please be faxed to [facility] at [phone]. Thank you.

## 2022-10-29 DIAGNOSIS — J30.2 SEASONAL ALLERGIC RHINITIS, UNSPECIFIED TRIGGER: ICD-10-CM

## 2022-10-30 RX ORDER — HYDROXYZINE HYDROCHLORIDE 25 MG/1
25 TABLET, FILM COATED ORAL EVERY 6 HOURS PRN
Qty: 120 TABLET | Refills: 1 | Status: SHIPPED | OUTPATIENT
Start: 2022-10-30 | End: 2022-11-29

## 2022-10-30 NOTE — TELEPHONE ENCOUNTER
Pharmacy requesting medication refill.  Please approve or deny this request.    Rx requested:  Requested Prescriptions     Pending Prescriptions Disp Refills    hydrOXYzine HCl (ATARAX) 25 MG tablet [Pharmacy Med Name: HYDROXYZINE HCL 25 MG TABS 25 Tablet] 120 tablet 1     Sig: TAKE 1 TABLET BY MOUTH EVERY 6 HOURS AS NEEDED FOR ITCHING TAKE 25 MG BY MOUTH EVERY 6 HOURS AS NEEDED FOR ITCHING         Last Office Visit:   9/1/2022      Next Visit Date:  Future Appointments   Date Time Provider Manan Honeycutt   2/3/2023  9:30 AM Lucretia Cordoba MD [facility]

## 2022-10-31 RX ORDER — DULOXETIN HYDROCHLORIDE 60 MG/1
120 CAPSULE, DELAYED RELEASE ORAL EVERY EVENING
Qty: 60 CAPSULE | Refills: 2 | Status: SHIPPED | OUTPATIENT
Start: 2022-10-31 | End: 2022-11-28

## 2022-10-31 NOTE — TELEPHONE ENCOUNTER
Pharmacy is requesting medication refill.  Please approve or deny this request.    Rx requested:  Requested Prescriptions     Pending Prescriptions Disp Refills    DULoxetine (CYMBALTA) 60 MG extended release capsule [Pharmacy Med Name: DULOXETINE HCL 60 MG CPEP 60 Capsule] 60 capsule 2     Sig: TAKE 2 CAPSULES BY MOUTH EVERY EVENING         Last Office Visit:   6/17/2022      Next Visit Date:  Future Appointments   Date Time Provider Manan Honeycutt   2/3/2023  9:30 AM Leonidas Goodman MD [facility]

## 2022-11-21 DIAGNOSIS — G62.9 POLYNEUROPATHY, UNSPECIFIED: ICD-10-CM

## 2022-11-21 RX ORDER — OXYCODONE HYDROCHLORIDE AND ACETAMINOPHEN 5; 325 MG/1; MG/1
1 TABLET ORAL 2 TIMES DAILY
Qty: 60 TABLET | Refills: 0 | Status: SHIPPED | OUTPATIENT
Start: 2022-11-21 | End: 2022-12-21

## 2022-11-21 NOTE — TELEPHONE ENCOUNTER
Patient is requesting medication refill. Please approve or deny this request.    Rx requested:  Requested Prescriptions     Pending Prescriptions Disp Refills    oxyCODONE-acetaminophen (PERCOCET) 5-325 MG per tablet 60 tablet 0     Sig: Take 1 tablet by mouth 2 times daily for 30 days.          Last Office Visit:   6/17/2022      Next Visit Date:  Future Appointments   Date Time Provider Manan Honeycutt   11/23/2022 11:15 AM Ray Sinha MD [address] [place] [address]   2/3/2023  9:30 AM Chely Lozoya MD [facility] -

## 2022-11-28 RX ORDER — DULOXETIN HYDROCHLORIDE 60 MG/1
120 CAPSULE, DELAYED RELEASE ORAL EVERY EVENING
Qty: 60 CAPSULE | Refills: 2 | Status: SHIPPED | OUTPATIENT
Start: 2022-11-28 | End: 2022-12-28

## 2022-11-28 NOTE — TELEPHONE ENCOUNTER
Pharmacy is requesting medication refill.  Please approve or deny this request.    Rx requested:  Requested Prescriptions     Pending Prescriptions Disp Refills    DULoxetine (CYMBALTA) 60 MG extended release capsule [Pharmacy Med Name: DULOXETINE HCL 60 MG CPEP 60 Capsule] 60 capsule 2     Sig: TAKE 2 CAPSULES BY MOUTH EVERY EVENING         Last Office Visit:   6/17/2022      Next Visit Date:  Future Appointments   Date Time Provider Manan Honeycutt   12/19/2022  4:30 PM Marifer Molina  [place] [facility]   2/3/2023  9:30 AM Pooja Grier  [facility] -

## 2022-12-08 ENCOUNTER — TELEPHONE (OUTPATIENT)
Dept: FAMILY MEDICINE CLINIC | Age: 57
End: 2022-12-08

## 2022-12-08 DIAGNOSIS — Z12.31 ENCOUNTER FOR SCREENING MAMMOGRAM FOR MALIGNANT NEOPLASM OF BREAST: Primary | ICD-10-CM

## 2022-12-19 ENCOUNTER — TELEMEDICINE (OUTPATIENT)
Dept: FAMILY MEDICINE CLINIC | Age: 57
End: 2022-12-19
Payer: COMMERCIAL

## 2022-12-19 DIAGNOSIS — G25.81 RESTLESS LEG SYNDROME: ICD-10-CM

## 2022-12-19 DIAGNOSIS — L88 PYODERMIC GANGRENOSUM: ICD-10-CM

## 2022-12-19 DIAGNOSIS — F41.9 ANXIETY: Primary | ICD-10-CM

## 2022-12-19 DIAGNOSIS — G62.9 POLYNEUROPATHY, UNSPECIFIED: ICD-10-CM

## 2022-12-19 DIAGNOSIS — R11.0 NAUSEA: ICD-10-CM

## 2022-12-19 DIAGNOSIS — Z72.0 NICOTINE ABUSE: ICD-10-CM

## 2022-12-19 PROCEDURE — 3017F COLORECTAL CA SCREEN DOC REV: CPT | Performed by: INTERNAL MEDICINE

## 2022-12-19 PROCEDURE — G8484 FLU IMMUNIZE NO ADMIN: HCPCS | Performed by: INTERNAL MEDICINE

## 2022-12-19 PROCEDURE — G8428 CUR MEDS NOT DOCUMENT: HCPCS | Performed by: INTERNAL MEDICINE

## 2022-12-19 PROCEDURE — 4004F PT TOBACCO SCREEN RCVD TLK: CPT | Performed by: INTERNAL MEDICINE

## 2022-12-19 PROCEDURE — 99214 OFFICE O/P EST MOD 30 MIN: CPT | Performed by: INTERNAL MEDICINE

## 2022-12-19 PROCEDURE — G8420 CALC BMI NORM PARAMETERS: HCPCS | Performed by: INTERNAL MEDICINE

## 2022-12-19 RX ORDER — ONDANSETRON 4 MG/1
4 TABLET, ORALLY DISINTEGRATING ORAL 3 TIMES DAILY PRN
Qty: 84 TABLET | Refills: 1 | Status: SHIPPED | OUTPATIENT
Start: 2022-12-19 | End: 2023-01-18

## 2022-12-19 RX ORDER — CHOLECALCIFEROL (VITAMIN D3) 50 MCG
TABLET ORAL
Qty: 30 TABLET | Refills: 3 | Status: SHIPPED | OUTPATIENT
Start: 2022-12-19

## 2022-12-19 ASSESSMENT — PATIENT HEALTH QUESTIONNAIRE - PHQ9
10. IF YOU CHECKED OFF ANY PROBLEMS, HOW DIFFICULT HAVE THESE PROBLEMS MADE IT FOR YOU TO DO YOUR WORK, TAKE CARE OF THINGS AT HOME, OR GET ALONG WITH OTHER PEOPLE: 0
2. FEELING DOWN, DEPRESSED OR HOPELESS: 0
4. FEELING TIRED OR HAVING LITTLE ENERGY: 0
8. MOVING OR SPEAKING SO SLOWLY THAT OTHER PEOPLE COULD HAVE NOTICED. OR THE OPPOSITE, BEING SO FIGETY OR RESTLESS THAT YOU HAVE BEEN MOVING AROUND A LOT MORE THAN USUAL: 0
5. POOR APPETITE OR OVEREATING: 0
1. LITTLE INTEREST OR PLEASURE IN DOING THINGS: 0
3. TROUBLE FALLING OR STAYING ASLEEP: 0
SUM OF ALL RESPONSES TO PHQ QUESTIONS 1-9: 0
SUM OF ALL RESPONSES TO PHQ QUESTIONS 1-9: 0
9. THOUGHTS THAT YOU WOULD BE BETTER OFF DEAD, OR OF HURTING YOURSELF: 0
7. TROUBLE CONCENTRATING ON THINGS, SUCH AS READING THE NEWSPAPER OR WATCHING TELEVISION: 0
SUM OF ALL RESPONSES TO PHQ9 QUESTIONS 1 & 2: 0
6. FEELING BAD ABOUT YOURSELF - OR THAT YOU ARE A FAILURE OR HAVE LET YOURSELF OR YOUR FAMILY DOWN: 0
SUM OF ALL RESPONSES TO PHQ QUESTIONS 1-9: 0
SUM OF ALL RESPONSES TO PHQ QUESTIONS 1-9: 0

## 2022-12-19 NOTE — TELEPHONE ENCOUNTER
Pt is requesting medication refill. Please approve or deny this request.    Rx requested:  Requested Prescriptions     Pending Prescriptions Disp Refills    oxyCODONE-acetaminophen (PERCOCET) 5-325 MG per tablet 60 tablet 0     Sig: Take 1 tablet by mouth 2 times daily for 30 days.          Last Office Visit:   6/17/2022      Next Visit Date:  Future Appointments   Date Time Provider Manan Honeycutt   2/3/2023  9:30 AM Eren Reyes  [facility] -

## 2022-12-19 NOTE — PROGRESS NOTES
2022      Ramos Jimenez (:  1965) is a 65 y.o. female, here for evaluation of the following medical concerns:pyoderma gangrenosum          HPI    50-year-old female with a history of prediabetes, pyoderma gangrenosum with associated abdominal and right lower extremity wounds associated with pyoderma gangrenosum  presents for follow-up visit      Depression/anxiety: Compliant with Cymbalta and trazodone        Skin lesion: Due to hidradenitis suppurativa. This is stable at this time. Fibromyalgia and Wound pain: Monitoring        Prediabetes: The patient is A1c was 6.3 in May 2020. Restless leg syndrome: Compliant with Neurontin      Nicotine Dependency: The patient continues to engage in nicotine dependence        Hypovitaminosis D: Patient has a history of hypovitaminosis D. She is presently compliant with her vitamin D daily and would like a refill for this medication at this time. At present he denies polyuria,  Polydipsia, constitutional, sinus, visual, cardiopulmonary, urologic, gastrointestinal, immunologic/hematologic, additional musculoskeletal, neurologic,dermatologic, or psychiatric complaints. Review of Systems   Constitutional: Negative for chills, diaphoresis, fatigue and fever. HENT: Negative for congestion, dental problem, drooling, ear discharge, ear pain, facial swelling, hearing loss, mouth sores, nosebleeds, postnasal drip, rhinorrhea, sinus pressure, sinus pain, sneezing, sore throat, tinnitus, trouble swallowing and voice change. Eyes: Negative for photophobia, pain, discharge, redness, itching and visual disturbance. Respiratory: Negative for apnea, cough, chest tightness, shortness of breath and wheezing. Cardiovascular: Negative for chest pain, palpitations and leg swelling. Gastrointestinal: Negative for abdominal distention, abdominal pain, blood in stool, constipation, diarrhea, nausea, rectal pain and vomiting.    Endocrine: Negative for cold intolerance, heat intolerance, polydipsia, polyphagia and polyuria. Genitourinary: Negative for decreased urine volume, difficulty urinating, dysuria, flank pain, frequency, genital sores, hematuria and urgency. Musculoskeletal: Negative for arthralgias, back pain, gait problem, joint swelling, myalgias, neck pain and neck stiffness. Skin: Negative for color change, rash and wound. Allergic/Immunologic: Negative for environmental allergies and food allergies. Neurological: Negative for dizziness, tremors, seizures, syncope, facial asymmetry, speech difficulty, weakness, light-headedness, numbness and headaches. Hematological: Negative for adenopathy. Does not bruise/bleed easily. Psychiatric/Behavioral: Negative for agitation, confusion, decreased concentration, hallucinations, self-injury, sleep disturbance and suicidal ideas. The patient is not nervous/anxious. Prior to Visit Medications    Medication Sig Taking? Authorizing Provider   ondansetron (ZOFRAN-ODT) 4 MG disintegrating tablet Take 1 tablet by mouth 3 times daily as needed for Nausea or Vomiting Yes Suman Schneider MD   diphenhydrAMINE (BANOPHEN) 25 MG capsule TAKE 1 TABLET BY MOUTH EVERY 6 HOURS AS NEEDED FOR ITCHING  Suman Schneider MD   vitamin D (CHOLECALCIFEROL) 50 MCG (2000 UT) TABS tablet TAKE 1 TABLET BY MOUTH DAILY  Suman Schneider MD   DULoxetine (CYMBALTA) 60 MG extended release capsule TAKE 2 CAPSULES BY MOUTH EVERY EVENING  Katie Cisse MD   oxyCODONE-acetaminophen (PERCOCET) 5-325 MG per tablet Take 1 tablet by mouth 2 times daily for 30 days. Katie Cisse MD   gabapentin (NEURONTIN) 800 MG tablet TAKE 1 TABLET BY MOUTH FOUR TIMES A DAY FOR 30 DAYS  Usama Mata MD   hydrocortisone 1 % cream APPLY TOPICALLY 2 TIMES DAILY. Suman Schneider MD   nicotine polacrilex (NICORETTE) 2 MG gum TAKE 1 EACH BY MOUTH AS NEEDED FOR SMOKING CESSATION . MAX 24 PIECES/24 HOURS.   Suman Schneider MD clindamycin (CLEOCIN T) 1 % lotion   Historical Provider, MD   doxycycline hyclate (VIBRAMYCIN) 100 MG capsule   Historical Provider, MD   traZODone (DESYREL) 100 MG tablet TAKE 1 TABLET BY MOUTH NIGHTLY  Usama Escobedo MD   nicotine (NICODERM CQ) 21 MG/24HR Place 1 patch onto the skin daily  Orland Riedel, MD   clobetasol (TEMOVATE) 0.05 % cream   Historical Provider, MD   naloxone 4 MG/0.1ML LIQD nasal spray 1 spray by Nasal route as needed for Opioid Reversal  Orland Riedel, MD   omeprazole (PRILOSEC) 40 MG delayed release capsule   Historical Provider, MD Buchanan Shivers 17 GM/SCOOP powder   Historical Provider, MD   Adalimumab (HUMIRA PEN) 40 MG/0.4ML PNKT   Historical Provider, MD   Alcohol Swabs (ALCOHOL PADS) 70 % PADS CLEAN THE SKIN BY USING 1 PAD ON THE AFFECTED AREA BEFORE APPLYING ANY TOPICAL CREAM, 3 TIMES DAILY  Orland Riedel, MD   acetaminophen (TYLENOL) 325 MG tablet Take 650 mg by mouth every 4 hours as needed for Pain  Historical Provider, MD        Allergies   Allergen Reactions    Amoxicillin-Pot Clavulanate      Other reaction(s): GI Upset, Intolerance, Other: See Comments  Nose bleed    Amoxicillin     Other Itching     IVP dye         Past Medical History:   Diagnosis Date    Anxiety     Arthritis     Cancer (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 [address]     Osteoarthritis     Pneumonia due to organism     Pyoderma gangrenosa     Sweet syndrome     Ulcerative colitis ([address]        Past Surgical History:   Procedure Laterality Date    ABDOMEN SURGERY      sleen repair    APPENDECTOMY      BACK SURGERY      BREAST SURGERY      fatty tumor removed, benign    COLONOSCOPY      COSMETIC SURGERY      tummy tu    EYE SURGERY bilateral cataract surgery    HYSTERECTOMY (CERVIX STATUS UNKNOWN)      SKIN BIOPSY      SPINAL FUSION      TONSILLECTOMY         Social History     Socioeconomic History    Marital status:      Spouse name: Not on file    Number of children: Not on file    Years of education: Not on file    Highest education level: Not on file   Occupational History    Not on file   Tobacco Use    Smoking status: Every Day     Packs/day: 1.00     Years: 40.00     Pack years: 40.00     Types: Cigarettes    Smokeless tobacco: Never   Vaping Use    Vaping Use: Never used   Substance and Sexual Activity    Alcohol use: Never    Drug use: No    Sexual activity: Not Currently   Other Topics Concern    Not on file   Social History Narrative    Not on file     Social Determinants of Health     Financial Resource Strain: Not on file   Food Insecurity: Not on file   Transportation Needs: Not on file   Physical Activity: Not on file   Stress: Not on file   Social Connections: Not on file   Intimate Partner Violence: Not on file   Housing Stability: Not on file        No family history on file. There were no vitals filed for this visit. Estimated body mass index is 24.55 kg/m² as calculated from the following:    Height as of 6/17/22: 5' 4\" (1.626 m). Weight as of 6/17/22: 143 lb (64.9 kg). ASSESSMENT/PLAN:  Hidradenitis supurativa: Stable. Monitoring      Pyoderma Gangrenosum-provide the patient with a prescription for a lidocaine patch. Continue hydrocortisone cream.        Hypovitaminosis D- continue oral vitamin D replacement. Fibromyalgia:continue cymbalta and trazodone        Cocaine Dependency-  Previously referred pt to [address] [place] [address] N @ [phone]. I have explained to the patient that I will not refill her controlled substances again unless she can provide documentation supporting the fact that she is actively enrolled in this program to achieve cessation from substance abuse.               --Rozina Sun Elmo Verdin MD on 12/19/2022 at 4:50 PM    An electronic signature was used to authenticate this note.

## 2022-12-20 ENCOUNTER — TELEPHONE (OUTPATIENT)
Dept: FAMILY MEDICINE CLINIC | Age: 57
End: 2022-12-20

## 2022-12-20 RX ORDER — OXYCODONE HYDROCHLORIDE AND ACETAMINOPHEN 5; 325 MG/1; MG/1
1 TABLET ORAL 2 TIMES DAILY
Qty: 60 TABLET | Refills: 0 | Status: SHIPPED | OUTPATIENT
Start: 2022-12-20 | End: 2023-01-19

## 2022-12-20 RX ORDER — GABAPENTIN 800 MG/1
TABLET ORAL
Qty: 120 TABLET | Refills: 2 | Status: SHIPPED | OUTPATIENT
Start: 2022-12-20 | End: 2023-01-16

## 2022-12-20 NOTE — TELEPHONE ENCOUNTER
Pharmacy is requesting medication refill.  Please approve or deny this request.    Rx requested:  Requested Prescriptions     Pending Prescriptions Disp Refills    gabapentin (NEURONTIN) 800 MG tablet [Pharmacy Med Name: GABAPENTIN 800 MG TABS 800 Tablet] 120 tablet 2     Sig: TAKE 1 TABLET BY MOUTH FOUR TIMES A DAY FOR 30 DAYS         Last Office Visit:   6/17/2022      Next Visit Date:  Future Appointments   Date Time Provider Manan Honeycutt   2/3/2023  9:30 AM Usama Medellin  [facility] -

## 2022-12-22 DIAGNOSIS — J30.2 SEASONAL ALLERGIC RHINITIS, UNSPECIFIED TRIGGER: ICD-10-CM

## 2022-12-22 RX ORDER — DIPHENHYDRAMINE HCL 25 MG
CAPSULE ORAL
Qty: 56 CAPSULE | Refills: 0 | Status: SHIPPED | OUTPATIENT
Start: 2022-12-22

## 2023-01-14 DIAGNOSIS — J30.2 SEASONAL ALLERGIC RHINITIS, UNSPECIFIED TRIGGER: ICD-10-CM

## 2023-01-15 RX ORDER — DIPHENHYDRAMINE HCL 25 MG
CAPSULE ORAL
Qty: 56 CAPSULE | Refills: 1 | Status: SHIPPED | OUTPATIENT
Start: 2023-01-15

## 2023-01-15 NOTE — TELEPHONE ENCOUNTER
Pharmacy requesting medication refill.  Please approve or deny this request.    Rx requested:  Requested Prescriptions     Pending Prescriptions Disp Refills    diphenhydrAMINE (BANOPHEN) 25 MG capsule [Pharmacy Med Name: Patrice Vick 25 MG CAPSULE 25 Capsule] 56 capsule 1     Sig: TAKE 1 TABLET BY MOUTH EVERY 6 HOURS AS NEEDED FOR ITCHING         Last Office Visit:   12/19/2022      Next Visit Date:  Future Appointments   Date Time Provider [facility]   2/3/2023  9:30 AM Usama Herrera  [facility] -

## 2023-01-16 RX ORDER — ONDANSETRON 4 MG/1
4 TABLET, ORALLY DISINTEGRATING ORAL 3 TIMES DAILY PRN
Qty: 84 TABLET | Refills: 1 | Status: SHIPPED | OUTPATIENT
Start: 2023-01-16 | End: 2023-02-15

## 2023-01-16 RX ORDER — GABAPENTIN 800 MG/1
TABLET ORAL
Qty: 120 TABLET | Refills: 2 | Status: SHIPPED | OUTPATIENT
Start: 2023-01-16 | End: 2023-03-14

## 2023-01-16 NOTE — TELEPHONE ENCOUNTER
requesting medication refill.  Please approve or deny this request.    Rx requested:  Requested Prescriptions     Pending Prescriptions Disp Refills    ondansetron (ZOFRAN-ODT) 4 MG disintegrating tablet [Pharmacy Med Name: ONDANSETRON 4 MG TBDP 4 Tablet] 84 tablet 1     Sig: TAKE 1 TABLET BY MOUTH 3 TIMES DAILY AS NEEDED FOR NAUSEA OR VOMITING         Last Office Visit:   12/19/2022      Next Visit Date:  Future Appointments   Date Time Provider Manan Honeycutt   2/3/2023  9:30 AM Usama Stanford  [facility] -

## 2023-01-16 NOTE — TELEPHONE ENCOUNTER
Pharmacy is requesting medication refill.  Please approve or deny this request.    Rx requested:  Requested Prescriptions     Pending Prescriptions Disp Refills    gabapentin (NEURONTIN) 800 MG tablet [Pharmacy Med Name: GABAPENTIN 800 MG TABS 800 Tablet] 120 tablet 2     Sig: TAKE 1 TABLET BY MOUTH FOUR TIMES A DAY FOR 30 DAYS         Last Office Visit:   6/17/2022      Next Visit Date:  Future Appointments   Date Time Provider Manan Honeycutt   2/3/2023  9:30 AM Usama Shine  [facility] -

## 2023-01-19 DIAGNOSIS — G62.9 POLYNEUROPATHY, UNSPECIFIED: ICD-10-CM

## 2023-01-20 RX ORDER — OXYCODONE HYDROCHLORIDE AND ACETAMINOPHEN 5; 325 MG/1; MG/1
1 TABLET ORAL 2 TIMES DAILY
Qty: 60 TABLET | Refills: 0 | Status: SHIPPED | OUTPATIENT
Start: 2023-01-20 | End: 2023-02-19

## 2023-01-24 ENCOUNTER — TELEPHONE (OUTPATIENT)
Dept: NEUROLOGY | Age: 58
End: 2023-01-24

## 2023-01-24 NOTE — TELEPHONE ENCOUNTER
Pharmacy is requesting medication refill.  Please approve or deny this request.    Rx requested:  Requested Prescriptions     Pending Prescriptions Disp Refills    traZODone (DESYREL) 100 MG tablet [Pharmacy Med Name: TRAZODONE  MG TABS 100 Tablet] 30 tablet 2     Sig: TAKE 1 TABLET BY MOUTH NIGHTLY         Last Office Visit:   6/17/2022      Next Visit Date:  Future Appointments   Date Time Provider Manan Honeycutt   2/3/2023  9:30 AM Usama You  [facility] -

## 2023-01-25 RX ORDER — TRAZODONE HYDROCHLORIDE 100 MG/1
100 TABLET ORAL NIGHTLY
Qty: 30 TABLET | Refills: 2 | Status: SHIPPED | OUTPATIENT
Start: 2023-01-25 | End: 2023-02-24

## 2023-02-01 DIAGNOSIS — G62.9 POLYNEUROPATHY, UNSPECIFIED: ICD-10-CM

## 2023-02-01 RX ORDER — OXYCODONE HYDROCHLORIDE AND ACETAMINOPHEN 5; 325 MG/1; MG/1
1 TABLET ORAL 2 TIMES DAILY
Qty: 60 TABLET | Refills: 0 | Status: SHIPPED | OUTPATIENT
Start: 2023-02-01 | End: 2023-03-03

## 2023-02-13 RX ORDER — ONDANSETRON 4 MG/1
4 TABLET, ORALLY DISINTEGRATING ORAL 3 TIMES DAILY PRN
Qty: 84 TABLET | Refills: 1 | Status: SHIPPED | OUTPATIENT
Start: 2023-02-13 | End: 2023-03-15

## 2023-02-13 NOTE — TELEPHONE ENCOUNTER
Future Appointments    Encounter Information    Provider Department Appt Notes   5/2/2023 MD JOHN Jones [facility] Neurology fup missed last appt-conf w pt (NO REFILLS UNTIL SEEN IN OFFICE.)     Past Visits    Date Provider Specialty Visit Type Primary Dx   12/19/2022 Aminta Bruce MD Family Medicine Telemedicine Anxiety

## 2023-02-14 DIAGNOSIS — J30.2 SEASONAL ALLERGIC RHINITIS, UNSPECIFIED TRIGGER: ICD-10-CM

## 2023-02-14 RX ORDER — DIPHENHYDRAMINE HCL 25 MG
CAPSULE ORAL
Qty: 56 CAPSULE | Refills: 1 | Status: SHIPPED | OUTPATIENT
Start: 2023-02-14

## 2023-02-14 NOTE — TELEPHONE ENCOUNTER
Rx requested:  Requested Prescriptions     Pending Prescriptions Disp Refills    diphenhydrAMINE (BANOPHEN) 25 MG capsule [Pharmacy Med Name: Tristen Jacobson 25 MG CAPS 25 Capsule] 56 capsule 1     Sig: TAKE 1 TABLET BY MOUTH EVERY 6 HOURS AS NEEDED FOR ITCHING         Last Office Visit:   12/19/2022      Next Visit Date:  Future Appointments   Date Time Provider Manan Honeycutt   5/2/2023  3:00 PM Usama Davalos MD [address] Neurology -

## 2023-03-08 RX ORDER — DULOXETIN HYDROCHLORIDE 60 MG/1
120 CAPSULE, DELAYED RELEASE ORAL EVERY EVENING
Qty: 60 CAPSULE | Refills: 2 | Status: SHIPPED | OUTPATIENT
Start: 2023-03-08 | End: 2023-04-07

## 2023-03-08 NOTE — TELEPHONE ENCOUNTER
Pharmacy is requesting medication refill.  Please approve or deny this request.    Rx requested:  Requested Prescriptions     Pending Prescriptions Disp Refills    DULoxetine (CYMBALTA) 60 MG extended release capsule [Pharmacy Med Name: DULOXETINE HCL 60 MG CPEP 60 Capsule] 60 capsule 2     Sig: TAKE 2 CAPSULES BY MOUTH EVERY EVENING         Last Office Visit:   6/17/2022      Next Visit Date:  Future Appointments   Date Time Provider Manan Honeycutt   5/2/2023  3:00 PM Usama Lopez MD [address] Neurology -

## 2023-03-11 DIAGNOSIS — J30.2 SEASONAL ALLERGIC RHINITIS, UNSPECIFIED TRIGGER: ICD-10-CM

## 2023-03-13 NOTE — TELEPHONE ENCOUNTER
Future Appointments    Encounter Information    Provider Department Appt Notes   5/2/2023 MD JOHN Little [facility] Neurology fup missed last appt-conf w pt (NO REFILLS UNTIL SEEN IN OFFICE.)     Past Visits    Date Provider Specialty Visit Type Primary Dx   12/19/2022 Mandi Will MD Family Medicine Telemedicine Anxiety

## 2023-03-14 RX ORDER — GABAPENTIN 800 MG/1
TABLET ORAL
Qty: 120 TABLET | Refills: 2 | Status: SHIPPED | OUTPATIENT
Start: 2023-03-14 | End: 2023-04-25

## 2023-03-14 RX ORDER — DIPHENHYDRAMINE HCL 25 MG
CAPSULE ORAL
Qty: 56 CAPSULE | Refills: 1 | Status: SHIPPED | OUTPATIENT
Start: 2023-03-14

## 2023-03-14 RX ORDER — CHOLECALCIFEROL (VITAMIN D3) 125 MCG
CAPSULE ORAL
Qty: 30 TABLET | Refills: 3 | Status: SHIPPED | OUTPATIENT
Start: 2023-03-14

## 2023-03-14 RX ORDER — ONDANSETRON 4 MG/1
4 TABLET, ORALLY DISINTEGRATING ORAL 3 TIMES DAILY PRN
Qty: 84 TABLET | Refills: 1 | Status: SHIPPED | OUTPATIENT
Start: 2023-03-14 | End: 2023-04-13

## 2023-03-14 NOTE — TELEPHONE ENCOUNTER
Future Appointments    Encounter Information    Provider Department Appt Notes   5/2/2023 MD JOHN Cesar MYRON [facility] Neurology fup missed last appt-conf w pt (NO REFILLS UNTIL SEEN IN OFFICE.)     Past Visits    Date Provider Specialty Visit Type Primary Dx   12/19/2022 Cristino Enriquez MD Family Medicine Telemedicine Anxiety

## 2023-03-14 NOTE — TELEPHONE ENCOUNTER
Future Appointments    Encounter Information    Provider Department Appt Notes   5/2/2023 MD JOHN Felix [facility] Neurology fup missed last appt-conf w pt (NO REFILLS UNTIL SEEN IN OFFICE.)     Past Visits    Date Provider Specialty Visit Type Primary Dx   12/19/2022 Nereyda Ingram MD Family Medicine Telemedicine Anxiety

## 2023-03-22 NOTE — TELEPHONE ENCOUNTER
Patient is requesting medication refill.  Please approve or deny this request.    Rx requested:  Requested Prescriptions     Pending Prescriptions Disp Refills    traZODone (DESYREL) 100 MG tablet [Pharmacy Med Name: TRAZODONE  MG TABS 100 Tablet] 30 tablet 2     Sig: TAKE 1 TABLET BY MOUTH NIGHTLY         Last Office Visit:   6/17/2022      Next Visit Date:  Future Appointments   Date Time Provider Manan Honeycutt   5/2/2023  3:00 PM Usama Cantor MD [address] Neurology -

## 2023-03-23 RX ORDER — TRAZODONE HYDROCHLORIDE 100 MG/1
100 TABLET ORAL NIGHTLY
Qty: 30 TABLET | Refills: 2 | Status: SHIPPED | OUTPATIENT
Start: 2023-03-23 | End: 2023-04-22

## 2023-03-28 DIAGNOSIS — J30.2 SEASONAL ALLERGIC RHINITIS, UNSPECIFIED TRIGGER: ICD-10-CM

## 2023-03-28 NOTE — TELEPHONE ENCOUNTER
Comments: This request is coming from the pharmacy. Last Office Visit (last PCP visit):   12/19/2022    Next Visit Date:  Future Appointments   Date Time Provider Manan Honeycutt   5/2/2023  3:00 PM Usama Davalos MD [address] Neurology -       If hasn't been seen in over a year OR hasn't followed up according to last diabetes/ADHD visit, make appointment for patient before sending refill to provider.     Rx requested:  Requested Prescriptions     Pending Prescriptions Disp Refills    diphenhydrAMINE (BANOPHEN) 25 MG capsule 56 capsule 1     Sig: TAKE 1 TABLET BY MOUTH EVERY 6 HOURS AS NEEDED FOR ITCHING

## 2023-03-29 RX ORDER — DIPHENHYDRAMINE HCL 25 MG
CAPSULE ORAL
Qty: 56 CAPSULE | Refills: 1 | Status: SHIPPED | OUTPATIENT
Start: 2023-03-29

## 2023-04-03 RX ORDER — DULOXETIN HYDROCHLORIDE 60 MG/1
120 CAPSULE, DELAYED RELEASE ORAL EVERY EVENING
Qty: 60 CAPSULE | Refills: 2 | Status: SHIPPED | OUTPATIENT
Start: 2023-04-03 | End: 2023-05-03

## 2023-04-03 NOTE — TELEPHONE ENCOUNTER
Pharmacy is requesting medication refill.  Please approve or deny this request.    Rx requested:  Requested Prescriptions     Pending Prescriptions Disp Refills    DULoxetine (CYMBALTA) 60 MG extended release capsule [Pharmacy Med Name: DULOXETINE HCL 60 MG CPEP 60 Capsule] 60 capsule 2     Sig: TAKE 2 CAPSULES BY MOUTH EVERY EVENING         Last Office Visit:   6/17/2022      Next Visit Date:  Future Appointments   Date Time Provider Manan Honeycutt   5/2/2023  3:00 PM Usama Khan MD [address] Neurology -

## 2023-04-14 NOTE — TELEPHONE ENCOUNTER
Please advised the patient to report to the emergency department. [de-identified] : Isotretinoin visit:  The patient has been on a daily dose of:\par 60   mg for\par 4 months. \par acne improving, tolerating well with expected AE\par

## 2023-04-25 ENCOUNTER — OFFICE VISIT (OUTPATIENT)
Dept: FAMILY MEDICINE CLINIC | Age: 58
End: 2023-04-25
Payer: COMMERCIAL

## 2023-04-25 VITALS
DIASTOLIC BLOOD PRESSURE: 60 MMHG | OXYGEN SATURATION: 96 % | WEIGHT: 124 LBS | SYSTOLIC BLOOD PRESSURE: 108 MMHG | BODY MASS INDEX: 21.17 KG/M2 | HEART RATE: 70 BPM | HEIGHT: 64 IN

## 2023-04-25 DIAGNOSIS — L88 PYODERMIC GANGRENOSUM: ICD-10-CM

## 2023-04-25 DIAGNOSIS — M79.7 FIBROMYALGIA: ICD-10-CM

## 2023-04-25 DIAGNOSIS — Z87.891 PERSONAL HISTORY OF TOBACCO USE: Primary | ICD-10-CM

## 2023-04-25 DIAGNOSIS — F41.9 ANXIETY: ICD-10-CM

## 2023-04-25 PROCEDURE — 99214 OFFICE O/P EST MOD 30 MIN: CPT | Performed by: INTERNAL MEDICINE

## 2023-04-25 PROCEDURE — 3017F COLORECTAL CA SCREEN DOC REV: CPT | Performed by: INTERNAL MEDICINE

## 2023-04-25 PROCEDURE — G0296 VISIT TO DETERM LDCT ELIG: HCPCS | Performed by: INTERNAL MEDICINE

## 2023-04-25 PROCEDURE — G8427 DOCREV CUR MEDS BY ELIG CLIN: HCPCS | Performed by: INTERNAL MEDICINE

## 2023-04-25 PROCEDURE — 4004F PT TOBACCO SCREEN RCVD TLK: CPT | Performed by: INTERNAL MEDICINE

## 2023-04-25 PROCEDURE — G8420 CALC BMI NORM PARAMETERS: HCPCS | Performed by: INTERNAL MEDICINE

## 2023-04-25 RX ORDER — GABAPENTIN 800 MG/1
800 TABLET ORAL 4 TIMES DAILY PRN
Qty: 28 TABLET | Refills: 0 | Status: SHIPPED | OUTPATIENT
Start: 2023-04-25 | End: 2023-05-02

## 2023-04-25 RX ORDER — ONDANSETRON 4 MG/1
TABLET, ORALLY DISINTEGRATING ORAL
COMMUNITY
Start: 2023-04-13

## 2023-04-25 RX ORDER — GABAPENTIN 800 MG/1
TABLET ORAL
Qty: 120 TABLET | Refills: 2 | Status: CANCELLED | OUTPATIENT
Start: 2023-04-25

## 2023-04-25 RX ORDER — OXYCODONE HYDROCHLORIDE AND ACETAMINOPHEN 5; 325 MG/1; MG/1
1 TABLET ORAL EVERY 4 HOURS PRN
Status: CANCELLED | OUTPATIENT
Start: 2023-04-25

## 2023-04-25 RX ORDER — OXYCODONE HYDROCHLORIDE AND ACETAMINOPHEN 5; 325 MG/1; MG/1
1 TABLET ORAL 2 TIMES DAILY
Qty: 60 TABLET | Refills: 0 | Status: CANCELLED | OUTPATIENT
Start: 2023-04-25 | End: 2023-05-25

## 2023-04-25 RX ORDER — CITALOPRAM 20 MG/1
20 TABLET ORAL DAILY
Qty: 30 TABLET | Refills: 3 | Status: SHIPPED | OUTPATIENT
Start: 2023-04-25

## 2023-04-25 RX ORDER — OXYCODONE HYDROCHLORIDE AND ACETAMINOPHEN 5; 325 MG/1; MG/1
1 TABLET ORAL EVERY 4 HOURS PRN
COMMUNITY

## 2023-04-25 SDOH — ECONOMIC STABILITY: FOOD INSECURITY: WITHIN THE PAST 12 MONTHS, THE FOOD YOU BOUGHT JUST DIDN'T LAST AND YOU DIDN'T HAVE MONEY TO GET MORE.: OFTEN TRUE

## 2023-04-25 SDOH — ECONOMIC STABILITY: HOUSING INSECURITY
IN THE LAST 12 MONTHS, WAS THERE A TIME WHEN YOU DID NOT HAVE A STEADY PLACE TO SLEEP OR SLEPT IN A SHELTER (INCLUDING NOW)?: NO

## 2023-04-25 SDOH — ECONOMIC STABILITY: TRANSPORTATION INSECURITY
IN THE PAST 12 MONTHS, HAS LACK OF TRANSPORTATION KEPT YOU FROM MEETINGS, WORK, OR FROM GETTING THINGS NEEDED FOR DAILY LIVING?: YES

## 2023-04-25 SDOH — ECONOMIC STABILITY: INCOME INSECURITY: HOW HARD IS IT FOR YOU TO PAY FOR THE VERY BASICS LIKE FOOD, HOUSING, MEDICAL CARE, AND HEATING?: HARD

## 2023-04-25 SDOH — ECONOMIC STABILITY: FOOD INSECURITY: WITHIN THE PAST 12 MONTHS, YOU WORRIED THAT YOUR FOOD WOULD RUN OUT BEFORE YOU GOT MONEY TO BUY MORE.: OFTEN TRUE

## 2023-04-25 ASSESSMENT — PATIENT HEALTH QUESTIONNAIRE - PHQ9
8. MOVING OR SPEAKING SO SLOWLY THAT OTHER PEOPLE COULD HAVE NOTICED. OR THE OPPOSITE, BEING SO FIGETY OR RESTLESS THAT YOU HAVE BEEN MOVING AROUND A LOT MORE THAN USUAL: 3
9. THOUGHTS THAT YOU WOULD BE BETTER OFF DEAD, OR OF HURTING YOURSELF: 0
7. TROUBLE CONCENTRATING ON THINGS, SUCH AS READING THE NEWSPAPER OR WATCHING TELEVISION: 3
4. FEELING TIRED OR HAVING LITTLE ENERGY: 3
SUM OF ALL RESPONSES TO PHQ QUESTIONS 1-9: 21
SUM OF ALL RESPONSES TO PHQ9 QUESTIONS 1 & 2: 3
6. FEELING BAD ABOUT YOURSELF - OR THAT YOU ARE A FAILURE OR HAVE LET YOURSELF OR YOUR FAMILY DOWN: 3
5. POOR APPETITE OR OVEREATING: 3
SUM OF ALL RESPONSES TO PHQ QUESTIONS 1-9: 21
1. LITTLE INTEREST OR PLEASURE IN DOING THINGS: 0
2. FEELING DOWN, DEPRESSED OR HOPELESS: 3
SUM OF ALL RESPONSES TO PHQ QUESTIONS 1-9: 21
SUM OF ALL RESPONSES TO PHQ QUESTIONS 1-9: 21
10. IF YOU CHECKED OFF ANY PROBLEMS, HOW DIFFICULT HAVE THESE PROBLEMS MADE IT FOR YOU TO DO YOUR WORK, TAKE CARE OF THINGS AT HOME, OR GET ALONG WITH OTHER PEOPLE: 2
3. TROUBLE FALLING OR STAYING ASLEEP: 3

## 2023-04-25 ASSESSMENT — COLUMBIA-SUICIDE SEVERITY RATING SCALE - C-SSRS
2. HAVE YOU ACTUALLY HAD ANY THOUGHTS OF KILLING YOURSELF?: NO
6. HAVE YOU EVER DONE ANYTHING, STARTED TO DO ANYTHING, OR PREPARED TO DO ANYTHING TO END YOUR LIFE?: NO
1. WITHIN THE PAST MONTH, HAVE YOU WISHED YOU WERE DEAD OR WISHED YOU COULD GO TO SLEEP AND NOT WAKE UP?: NO

## 2023-04-25 NOTE — PROGRESS NOTES
2023      Karlene Murray (:  1965) is a 63 y.o. female, here for evaluation of the following medical concerns:pyoderma gangrenosum          HPI    52-year-old female with a history of prediabetes, pyoderma gangrenosum with associated abdominal and right lower extremity wounds associated with pyoderma gangrenosum  presents for follow-up visit      Depression/anxiety: symptoms  are uncontrolled a this time. Compliant with trazodone        Skin lesion: Due to hidradenitis suppurativa. This is stable at this time. Fibromyalgia and Wound pain: experiencing 10/10 constant pain. Not responsive to OTC analgelsics. Prediabetes: The patient is A1c was 6.3 in May 2020. Restless leg syndrome: Compliant with Neurontin      Nicotine Dependency: The patient continues to engage in nicotine dependence        Hypovitaminosis D: Patient has a history of hypovitaminosis D. She is presently compliant with her vitamin D daily and would like a refill for this medication at this time. At present he denies polyuria,  Polydipsia, constitutional, sinus, visual, cardiopulmonary, urologic, gastrointestinal, immunologic/hematologic, additional musculoskeletal, neurologic,dermatologic, or psychiatric complaints. Review of Systems   Constitutional: Negative for chills, diaphoresis, fatigue and fever. HENT: Negative for congestion, dental problem, drooling, ear discharge, ear pain, facial swelling, hearing loss, mouth sores, nosebleeds, postnasal drip, rhinorrhea, sinus pressure, sinus pain, sneezing, sore throat, tinnitus, trouble swallowing and voice change. Eyes: Negative for photophobia, pain, discharge, redness, itching and visual disturbance. Respiratory: Negative for apnea, cough, chest tightness, shortness of breath and wheezing. Cardiovascular: Negative for chest pain, palpitations and leg swelling.    Gastrointestinal: Negative for abdominal distention, abdominal pain, blood in

## 2023-04-25 NOTE — PROGRESS NOTES
Discussed with the patient the current USPSTF guidelines released March 9, 2021 for screening for lung cancer. For adults aged 48 to [de-identified] years who have a 20 pack-year smoking history and currently smoke or have quit within the past 15 years the grade B recommendation is to:  Screen for lung cancer with low-dose computed tomography (LDCT) every year. Stop screening once a person has not smoked for 15 years or has a health problem that limits life expectancy or the ability to have lung surgery. The patient  reports that she has been smoking cigarettes. She has a 40.00 pack-year smoking history. She has never used smokeless tobacco.. Discussed with patient the risks and benefits of screening, including over-diagnosis, false positive rate, and total radiation exposure. The patient currently exhibits no signs or symptoms suggestive of lung cancer. Discussed with patient the importance of compliance with yearly annual lung cancer screenings and willingness to undergo diagnosis and treatment if screening scan is positive. In addition, the patient was counseled regarding the importance of remaining smoke free and/or total smoking cessation.     Also reviewed the following if the patient has Medicare that as of February 10, 2022, Medicare only covers LDCT screening in patients aged 51-72 with at least a 20 pack-year smoking history who currently smoke or have quit in the last 15 years

## 2023-04-25 NOTE — PATIENT INSTRUCTIONS
follow-up, he or she will help you understand what to do next. After a lung cancer screening, you can go back to your usual activities right away. A lung cancer screening test can't tell if you have lung cancer. If your results are positive, your doctor can't tell whether an abnormal finding is a harmless nodule, cancer, or something else without doing more tests. What can you do to help prevent lung cancer? Some lung cancers can't be prevented. But if you smoke, quitting smoking is the best step you can take to prevent lung cancer. If you want to quit, your doctor can recommend medicines or other ways to help. Follow-up care is a key part of your treatment and safety. Be sure to make and go to all appointments, and call your doctor if you are having problems. It's also a good idea to know your test results and keep a list of the medicines you take. Where can you learn more? Go to http://www.robbins.com/ and enter Q940 to learn more about \"Learning About Lung Cancer Screening. \"  Current as of: May 4, 2022               Content Version: 13.6  © 2247-6110 [employer]. Care instructions adapted under license by [address] If you have questions about a medical condition or this instruction, always ask your healthcare professional. [address] any warranty or liability for your use of this information.

## 2023-05-02 ENCOUNTER — TELEPHONE (OUTPATIENT)
Dept: FAMILY MEDICINE CLINIC | Age: 58
End: 2023-05-02

## 2023-05-02 NOTE — TELEPHONE ENCOUNTER
Patient calling into the office stating that she has another sinus infection and would like something for it. Please advise and then call the patient.

## 2023-05-03 RX ORDER — LEVOFLOXACIN 500 MG/1
500 TABLET, FILM COATED ORAL DAILY
Qty: 7 TABLET | Refills: 0 | Status: SHIPPED | OUTPATIENT
Start: 2023-05-03 | End: 2023-05-10

## 2023-05-06 DIAGNOSIS — J30.2 SEASONAL ALLERGIC RHINITIS, UNSPECIFIED TRIGGER: ICD-10-CM

## 2023-05-08 RX ORDER — DIPHENHYDRAMINE HCL 25 MG
CAPSULE ORAL
Qty: 56 CAPSULE | Refills: 1 | Status: SHIPPED | OUTPATIENT
Start: 2023-05-08

## 2023-05-15 ENCOUNTER — HOSPITAL ENCOUNTER (OUTPATIENT)
Dept: CT IMAGING | Age: 58
Discharge: HOME | End: 2023-05-17
Payer: COMMERCIAL

## 2023-05-15 DIAGNOSIS — Z87.891 PERSONAL HISTORY OF TOBACCO USE: ICD-10-CM

## 2023-05-15 PROCEDURE — 71271 CT THORAX LUNG CANCER SCR C-: CPT

## 2023-05-21 ENCOUNTER — APPOINTMENT (OUTPATIENT)
Dept: CT IMAGING | Age: 58
End: 2023-05-21
Payer: COMMERCIAL

## 2023-05-21 ENCOUNTER — HOSPITAL ENCOUNTER (EMERGENCY)
Age: 58
Discharge: ANOTHER HOSPITAL | End: 2023-05-21
Attending: GENERAL PRACTICE
Payer: COMMERCIAL

## 2023-05-21 VITALS
TEMPERATURE: 97.4 F | HEIGHT: 64 IN | WEIGHT: 130 LBS | BODY MASS INDEX: 22.2 KG/M2 | RESPIRATION RATE: 15 BRPM | DIASTOLIC BLOOD PRESSURE: 69 MMHG | SYSTOLIC BLOOD PRESSURE: 118 MMHG | HEART RATE: 60 BPM | OXYGEN SATURATION: 93 %
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
## 2023-05-21 DIAGNOSIS — S02.2XXA CLOSED FRACTURE OF NASAL BONE, INITIAL ENCOUNTER: ICD-10-CM

## 2023-05-21 DIAGNOSIS — S01.511A LACERATION OF UPPER LIP WITH COMPLICATION, INITIAL ENCOUNTER: ICD-10-CM

## 2023-05-21 DIAGNOSIS — S02.42XA: ICD-10-CM

## 2023-05-21 LAB
ANION GAP SERPL CALCULATED.3IONS-SCNC: 9 MEQ/L (ref 9–15)
APTT PPP: 36.3 SEC (ref 24.4–36.8)
BASOPHILS # BLD: 0.1 K/UL (ref 0–0.2)
BASOPHILS NFR BLD: 1.4 %
BUN SERPL-MCNC: 19 MG/DL (ref 6–20)
CALCIUM SERPL-MCNC: 8.6 MG/DL (ref 8.5–9.9)
CHLORIDE SERPL-SCNC: 105 MEQ/L (ref 95–107)
CO2 SERPL-SCNC: 26 MEQ/L (ref 20–31)
CREAT SERPL-MCNC: 0.7 MG/DL (ref 0.5–0.9)
EOSINOPHIL # BLD: 0.3 K/UL (ref 0–0.7)
EOSINOPHIL NFR BLD: 2.8 %
ERYTHROCYTE [DISTWIDTH] IN BLOOD BY AUTOMATED COUNT: 13.3 % (ref 11.5–14.5)
GLUCOSE SERPL-MCNC: 119 MG/DL (ref 70–99)
HCT VFR BLD AUTO: 39.2 % (ref 37–47)
HGB BLD-MCNC: 13.3 G/DL (ref 12–16)
INR PPP: 0.9
LYMPHOCYTES # BLD: 4.1 K/UL (ref 1–4.8)
LYMPHOCYTES NFR BLD: 43.6 %
MCH RBC QN AUTO: 30.5 PG (ref 27–31.3)
MCHC RBC AUTO-ENTMCNC: 34 % (ref 33–37)
MCV RBC AUTO: 89.7 FL (ref 79.4–94.8)
MONOCYTES # BLD: 0.7 K/UL (ref 0.2–0.8)
MONOCYTES NFR BLD: 7.7 %
NEUTROPHILS # BLD: 4.2 K/UL (ref 1.4–6.5)
NEUTS SEG NFR BLD: 44.5 %
PLATELET # BLD AUTO: 304 K/UL (ref 130–400)
POTASSIUM SERPL-SCNC: 4 MEQ/L (ref 3.4–4.9)
PROTHROMBIN TIME: 12.4 SEC (ref 12.3–14.9)
RBC # BLD AUTO: 4.37 M/UL (ref 4.2–5.4)
SODIUM SERPL-SCNC: 140 MEQ/L (ref 135–144)
WBC # BLD AUTO: 9.4 K/UL (ref 4.8–10.8)

## 2023-05-21 PROCEDURE — 2580000003 HC RX 258: Performed by: GENERAL PRACTICE

## 2023-05-21 PROCEDURE — 85610 PROTHROMBIN TIME: CPT

## 2023-05-21 PROCEDURE — 96375 TX/PRO/DX INJ NEW DRUG ADDON: CPT

## 2023-05-21 PROCEDURE — 80048 BASIC METABOLIC PNL TOTAL CA: CPT

## 2023-05-21 PROCEDURE — 85730 THROMBOPLASTIN TIME PARTIAL: CPT

## 2023-05-21 PROCEDURE — 96374 THER/PROPH/DIAG INJ IV PUSH: CPT

## 2023-05-21 PROCEDURE — 85025 COMPLETE CBC W/AUTO DIFF WBC: CPT

## 2023-05-21 PROCEDURE — 2500000003 HC RX 250 WO HCPCS: Performed by: GENERAL PRACTICE

## 2023-05-21 PROCEDURE — 70450 CT HEAD/BRAIN W/O DYE: CPT

## 2023-05-21 PROCEDURE — 36415 COLL VENOUS BLD VENIPUNCTURE: CPT

## 2023-05-21 PROCEDURE — 99285 EMERGENCY DEPT VISIT HI MDM: CPT

## 2023-05-21 PROCEDURE — 6360000002 HC RX W HCPCS: Performed by: GENERAL PRACTICE

## 2023-05-21 PROCEDURE — 70486 CT MAXILLOFACIAL W/O DYE: CPT

## 2023-05-21 RX ORDER — HYDROMORPHONE HYDROCHLORIDE 1 MG/ML
0.5 INJECTION, SOLUTION INTRAMUSCULAR; INTRAVENOUS; SUBCUTANEOUS ONCE
Status: COMPLETED | OUTPATIENT
Start: 2023-05-21 | End: 2023-05-21

## 2023-05-21 RX ORDER — ONDANSETRON 2 MG/ML
4 INJECTION INTRAMUSCULAR; INTRAVENOUS ONCE
Status: COMPLETED | OUTPATIENT
Start: 2023-05-21 | End: 2023-05-21

## 2023-05-21 RX ORDER — 0.9 % SODIUM CHLORIDE 0.9 %
1000 INTRAVENOUS SOLUTION INTRAVENOUS ONCE
Status: COMPLETED | OUTPATIENT
Start: 2023-05-21 | End: 2023-05-21

## 2023-05-21 RX ORDER — MORPHINE SULFATE 4 MG/ML
6 INJECTION, SOLUTION INTRAMUSCULAR; INTRAVENOUS ONCE
Status: COMPLETED | OUTPATIENT
Start: 2023-05-21 | End: 2023-05-21

## 2023-05-21 RX ADMIN — HYDROMORPHONE HYDROCHLORIDE 0.5 MG: 1 INJECTION, SOLUTION INTRAMUSCULAR; INTRAVENOUS; SUBCUTANEOUS at 05:54

## 2023-05-21 RX ADMIN — ONDANSETRON 4 MG: 2 INJECTION INTRAMUSCULAR; INTRAVENOUS at 00:53

## 2023-05-21 RX ADMIN — SODIUM CHLORIDE 1000 ML: 9 INJECTION, SOLUTION INTRAVENOUS at 05:53

## 2023-05-21 RX ADMIN — MORPHINE SULFATE 6 MG: 4 INJECTION INTRAVENOUS at 00:53

## 2023-05-21 ASSESSMENT — PAIN SCALES - GENERAL
PAINLEVEL_OUTOF10: 8
PAINLEVEL_OUTOF10: 10

## 2023-05-21 ASSESSMENT — PAIN - FUNCTIONAL ASSESSMENT: PAIN_FUNCTIONAL_ASSESSMENT: 0-10

## 2023-05-28 RX ORDER — ONDANSETRON 4 MG/1
TABLET, ORALLY DISINTEGRATING ORAL
Qty: 84 TABLET | Refills: 1 | Status: SHIPPED | OUTPATIENT
Start: 2023-05-28

## 2023-06-05 RX ORDER — ONDANSETRON 4 MG/1
TABLET, ORALLY DISINTEGRATING ORAL
Qty: 84 TABLET | Refills: 1 | OUTPATIENT
Start: 2023-06-05

## 2023-06-09 ENCOUNTER — OFFICE VISIT (OUTPATIENT)
Dept: FAMILY MEDICINE CLINIC | Age: 58
End: 2023-06-09

## 2023-06-09 ENCOUNTER — NURSE TRIAGE (OUTPATIENT)
Dept: OTHER | Facility: CLINIC | Age: 58
End: 2023-06-09

## 2023-06-09 VITALS
SYSTOLIC BLOOD PRESSURE: 100 MMHG | DIASTOLIC BLOOD PRESSURE: 60 MMHG | WEIGHT: 132 LBS | BODY MASS INDEX: 22.53 KG/M2 | HEART RATE: 62 BPM | TEMPERATURE: 97.8 F | HEIGHT: 64 IN | OXYGEN SATURATION: 97 %
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
## 2023-06-09 DIAGNOSIS — F34.1 PERSISTENT DEPRESSIVE DISORDER: ICD-10-CM

## 2023-06-09 DIAGNOSIS — R30.0 DYSURIA: ICD-10-CM

## 2023-06-09 DIAGNOSIS — N89.8 VAGINAL DISCHARGE: ICD-10-CM

## 2023-06-09 LAB
BACTERIA URNS QL MICRO: NEGATIVE /HPF
BILIRUB UR QL STRIP: NEGATIVE
CLARITY UR: CLEAR
COLOR UR: YELLOW
EPI CELLS #/AREA URNS AUTO: NORMAL /HPF (ref 0–5)
GLUCOSE UR STRIP-MCNC: NEGATIVE MG/DL
HGB UR QL STRIP: NEGATIVE
HYALINE CASTS #/AREA URNS AUTO: NORMAL /HPF (ref 0–5)
KETONES UR STRIP-MCNC: NEGATIVE MG/DL
LEUKOCYTE ESTERASE UR QL STRIP: ABNORMAL
NITRITE UR QL STRIP: NEGATIVE
PH UR STRIP: 6.5 [PH] (ref 5–9)
PROT UR STRIP-MCNC: NEGATIVE MG/DL
RBC #/AREA URNS AUTO: NORMAL /HPF (ref 0–5)
SP GR UR STRIP: 1.01 (ref 1–1.03)
URINE REFLEX TO CULTURE: ABNORMAL
UROBILINOGEN UR STRIP-ACNC: 0.2 E.U./DL
WBC #/AREA URNS HPF: NORMAL /HPF (ref 0–5)

## 2023-06-09 RX ORDER — CITALOPRAM 20 MG/1
30 TABLET ORAL DAILY
Qty: 45 TABLET | Refills: 2 | Status: SHIPPED | OUTPATIENT
Start: 2023-06-09

## 2023-06-09 RX ORDER — SULFAMETHOXAZOLE AND TRIMETHOPRIM 800; 160 MG/1; MG/1
TABLET ORAL
COMMUNITY
Start: 2023-05-23

## 2023-06-09 ASSESSMENT — COLUMBIA-SUICIDE SEVERITY RATING SCALE - C-SSRS
2. HAVE YOU ACTUALLY HAD ANY THOUGHTS OF KILLING YOURSELF?: NO
1. WITHIN THE PAST MONTH, HAVE YOU WISHED YOU WERE DEAD OR WISHED YOU COULD GO TO SLEEP AND NOT WAKE UP?: NO
6. HAVE YOU EVER DONE ANYTHING, STARTED TO DO ANYTHING, OR PREPARED TO DO ANYTHING TO END YOUR LIFE?: NO

## 2023-06-09 ASSESSMENT — PATIENT HEALTH QUESTIONNAIRE - PHQ9
6. FEELING BAD ABOUT YOURSELF - OR THAT YOU ARE A FAILURE OR HAVE LET YOURSELF OR YOUR FAMILY DOWN: 3
10. IF YOU CHECKED OFF ANY PROBLEMS, HOW DIFFICULT HAVE THESE PROBLEMS MADE IT FOR YOU TO DO YOUR WORK, TAKE CARE OF THINGS AT HOME, OR GET ALONG WITH OTHER PEOPLE: 2
SUM OF ALL RESPONSES TO PHQ QUESTIONS 1-9: 21
5. POOR APPETITE OR OVEREATING: 3
7. TROUBLE CONCENTRATING ON THINGS, SUCH AS READING THE NEWSPAPER OR WATCHING TELEVISION: 3
3. TROUBLE FALLING OR STAYING ASLEEP: 3
SUM OF ALL RESPONSES TO PHQ9 QUESTIONS 1 & 2: 6
9. THOUGHTS THAT YOU WOULD BE BETTER OFF DEAD, OR OF HURTING YOURSELF: 0
8. MOVING OR SPEAKING SO SLOWLY THAT OTHER PEOPLE COULD HAVE NOTICED. OR THE OPPOSITE, BEING SO FIGETY OR RESTLESS THAT YOU HAVE BEEN MOVING AROUND A LOT MORE THAN USUAL: 0
SUM OF ALL RESPONSES TO PHQ QUESTIONS 1-9: 21
2. FEELING DOWN, DEPRESSED OR HOPELESS: 3
SUM OF ALL RESPONSES TO PHQ QUESTIONS 1-9: 21
1. LITTLE INTEREST OR PLEASURE IN DOING THINGS: 3
SUM OF ALL RESPONSES TO PHQ QUESTIONS 1-9: 21
4. FEELING TIRED OR HAVING LITTLE ENERGY: 3
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
## 2023-06-09 NOTE — PROGRESS NOTES
60 MG extended release capsule TAKE 2 CAPSULES BY MOUTH EVERY EVENING 60 capsule 2    traZODone (DESYREL) 100 MG tablet TAKE 1 TABLET BY MOUTH NIGHTLY 30 tablet 2    Cholecalciferol (VITAMIN D3) 50 MCG (2000 UT) TABS TAKE 1 TABLET BY MOUTH DAILY 30 tablet 3    Alcohol Swabs (ALCOHOL PADS) 70 % PADS CLEAN THE SKIN BY USING 1 PAD ON THE AFFECTED AREA BEFORE APPLYING ANY TOPICAL CREAM, 3 TIMES DAILY 100 each 0    acetaminophen (TYLENOL) 325 MG tablet Take 2 tablets by mouth every 4 hours as needed for Pain       No current facility-administered medications for this visit. ROS  CONSTITUTIONAL: The patient denies fevers, chills, sweats and body ache. HEENT: Denies headache, blurry vision, eye pain, tinnitus, vertigo,  sore throat, neck or thyroid masses. RESPIRATORY: Denies cough, sputum, hemoptysis. CARDIAC: Denies chest pain, pressure, palpitations, Denies lower extremity edema. GASTROINTESTINAL: Denies abdominal pain, constipation, diarrhea, bleeding in the stools,   GENITOURINARY: admits to dysuria, denies  hematuria, nocturia or frequency, urinary incontinence. Admits to vaginal discahrge   NEUROLOGIC: Denies headaches, dizziness, syncope, weakness  MUSCULOSKELETAL: denies changes in range of motion, joint pain, stiffness. ENDOCRINOLOGY: Denies heat or cold intolerance. HEMATOLOGY: Denies easy bleeding or blood transfusion,anemia  DERMATOLOGY: Denies changes in moles or pigmentation changes. PSYCHIATRY: Denies depression, agitation or anxiety.     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

## 2023-06-13 LAB
SPECIMEN SOURCE: ABNORMAL
T VAGINALIS RRNA SPEC QL NAA+PROBE: POSITIVE

## 2023-06-14 LAB
C TRACH DNA UR QL NAA+PROBE: NEGATIVE
N GONORRHOEA DNA UR QL NAA+PROBE: NEGATIVE

## 2023-06-23 ENCOUNTER — TELEPHONE (OUTPATIENT)
Dept: FAMILY MEDICINE CLINIC | Age: 58
End: 2023-06-23

## 2023-06-27 ENCOUNTER — INITIAL CONSULT (OUTPATIENT)
Dept: PAIN MANAGEMENT | Age: 58
End: 2023-06-27
Payer: COMMERCIAL

## 2023-06-27 ENCOUNTER — OFFICE VISIT (OUTPATIENT)
Dept: FAMILY MEDICINE CLINIC | Age: 58
End: 2023-06-27
Payer: COMMERCIAL

## 2023-06-27 VITALS
BODY MASS INDEX: 22.2 KG/M2 | TEMPERATURE: 97 F | DIASTOLIC BLOOD PRESSURE: 80 MMHG | HEIGHT: 64 IN | SYSTOLIC BLOOD PRESSURE: 132 MMHG | WEIGHT: 130 LBS

## 2023-06-27 VITALS
SYSTOLIC BLOOD PRESSURE: 102 MMHG | BODY MASS INDEX: 22.2 KG/M2 | RESPIRATION RATE: 12 BRPM | HEART RATE: 63 BPM | TEMPERATURE: 97 F | HEIGHT: 64 IN | OXYGEN SATURATION: 98 % | DIASTOLIC BLOOD PRESSURE: 60 MMHG | WEIGHT: 130 LBS

## 2023-06-27 DIAGNOSIS — M47.812 CERVICAL SPONDYLOSIS WITHOUT MYELOPATHY: ICD-10-CM

## 2023-06-27 DIAGNOSIS — L25.5 DERMATITIS DUE TO PLANT: Primary | ICD-10-CM

## 2023-06-27 DIAGNOSIS — Z98.1 HISTORY OF FUSION OF CERVICAL SPINE: ICD-10-CM

## 2023-06-27 DIAGNOSIS — M47.817 LUMBOSACRAL SPONDYLOSIS WITHOUT MYELOPATHY: ICD-10-CM

## 2023-06-27 DIAGNOSIS — M47.817 LUMBOSACRAL SPONDYLOSIS WITHOUT MYELOPATHY: Primary | ICD-10-CM

## 2023-06-27 PROCEDURE — 3017F COLORECTAL CA SCREEN DOC REV: CPT | Performed by: NURSE PRACTITIONER

## 2023-06-27 PROCEDURE — 99204 OFFICE O/P NEW MOD 45 MIN: CPT | Performed by: PHYSICAL MEDICINE & REHABILITATION

## 2023-06-27 PROCEDURE — G8420 CALC BMI NORM PARAMETERS: HCPCS | Performed by: PHYSICAL MEDICINE & REHABILITATION

## 2023-06-27 PROCEDURE — 3017F COLORECTAL CA SCREEN DOC REV: CPT | Performed by: PHYSICAL MEDICINE & REHABILITATION

## 2023-06-27 PROCEDURE — 4004F PT TOBACCO SCREEN RCVD TLK: CPT | Performed by: NURSE PRACTITIONER

## 2023-06-27 PROCEDURE — 99213 OFFICE O/P EST LOW 20 MIN: CPT | Performed by: NURSE PRACTITIONER

## 2023-06-27 PROCEDURE — G8427 DOCREV CUR MEDS BY ELIG CLIN: HCPCS | Performed by: PHYSICAL MEDICINE & REHABILITATION

## 2023-06-27 PROCEDURE — G8420 CALC BMI NORM PARAMETERS: HCPCS | Performed by: NURSE PRACTITIONER

## 2023-06-27 PROCEDURE — G8427 DOCREV CUR MEDS BY ELIG CLIN: HCPCS | Performed by: NURSE PRACTITIONER

## 2023-06-27 PROCEDURE — 4004F PT TOBACCO SCREEN RCVD TLK: CPT | Performed by: PHYSICAL MEDICINE & REHABILITATION

## 2023-06-27 RX ORDER — LIDOCAINE 40 MG/G
CREAM TOPICAL
Qty: 45 G | Refills: 1 | Status: SHIPPED | OUTPATIENT
Start: 2023-06-27

## 2023-06-27 RX ORDER — CLOBETASOL PROPIONATE 0.5 MG/G
OINTMENT TOPICAL
Qty: 45 G | Refills: 0 | Status: SHIPPED | OUTPATIENT
Start: 2023-06-27

## 2023-06-27 RX ORDER — PREDNISONE 10 MG/1
TABLET ORAL
Qty: 41 TABLET | Refills: 0 | Status: SHIPPED | OUTPATIENT
Start: 2023-06-27

## 2023-06-27 ASSESSMENT — ENCOUNTER SYMPTOMS
CONSTIPATION: 0
BACK PAIN: 1
DIARRHEA: 0
NAUSEA: 0
SHORTNESS OF BREATH: 0

## 2023-06-28 RX ORDER — LIDOCAINE 40 MG/G
CREAM TOPICAL
Qty: 30 G | Refills: 0 | OUTPATIENT
Start: 2023-06-28

## 2023-06-28 ASSESSMENT — ENCOUNTER SYMPTOMS
RHINORRHEA: 0
WHEEZING: 0
COUGH: 0
SORE THROAT: 0
DIARRHEA: 0
VOMITING: 0
NAUSEA: 0
SHORTNESS OF BREATH: 0

## 2023-06-29 ENCOUNTER — OFFICE VISIT (OUTPATIENT)
Dept: FAMILY MEDICINE CLINIC | Age: 58
End: 2023-06-29
Payer: COMMERCIAL

## 2023-06-29 VITALS
BODY MASS INDEX: 23.56 KG/M2 | HEART RATE: 62 BPM | DIASTOLIC BLOOD PRESSURE: 60 MMHG | HEIGHT: 64 IN | SYSTOLIC BLOOD PRESSURE: 120 MMHG | WEIGHT: 138 LBS | RESPIRATION RATE: 14 BRPM | OXYGEN SATURATION: 97 %

## 2023-06-29 DIAGNOSIS — R73.9 HYPERGLYCEMIA: ICD-10-CM

## 2023-06-29 DIAGNOSIS — R73.9 HYPERGLYCEMIA: Primary | ICD-10-CM

## 2023-06-29 LAB — HBA1C MFR BLD: 5.8 % (ref 4.8–5.9)

## 2023-06-29 PROCEDURE — 4004F PT TOBACCO SCREEN RCVD TLK: CPT | Performed by: INTERNAL MEDICINE

## 2023-06-29 PROCEDURE — 3017F COLORECTAL CA SCREEN DOC REV: CPT | Performed by: INTERNAL MEDICINE

## 2023-06-29 PROCEDURE — G8420 CALC BMI NORM PARAMETERS: HCPCS | Performed by: INTERNAL MEDICINE

## 2023-06-29 PROCEDURE — G8427 DOCREV CUR MEDS BY ELIG CLIN: HCPCS | Performed by: INTERNAL MEDICINE

## 2023-06-29 PROCEDURE — 99214 OFFICE O/P EST MOD 30 MIN: CPT | Performed by: INTERNAL MEDICINE

## 2023-07-01 DIAGNOSIS — J30.2 SEASONAL ALLERGIC RHINITIS, UNSPECIFIED TRIGGER: ICD-10-CM

## 2023-07-03 RX ORDER — DIPHENHYDRAMINE HCL 25 MG
CAPSULE ORAL
Qty: 56 CAPSULE | Refills: 1 | Status: SHIPPED | OUTPATIENT
Start: 2023-07-03

## 2023-07-10 ENCOUNTER — TELEPHONE (OUTPATIENT)
Dept: FAMILY MEDICINE CLINIC | Age: 58
End: 2023-07-10

## 2023-07-10 RX ORDER — TRAZODONE HYDROCHLORIDE 100 MG/1
100 TABLET ORAL NIGHTLY
Qty: 30 TABLET | Refills: 0 | Status: SHIPPED | OUTPATIENT
Start: 2023-07-10 | End: 2023-08-09

## 2023-07-10 NOTE — TELEPHONE ENCOUNTER
Pharmacy is requesting medication refill.  Please approve or deny this request.    Rx requested:  Requested Prescriptions     Pending Prescriptions Disp Refills    traZODone (DESYREL) 100 MG tablet [Pharmacy Med Name: TRAZODONE  MG TABS 100 Tablet] 30 tablet 0     Sig: TAKE 1 TABLET BY MOUTH NIGHTLY         Last Office Visit:   6/17/2022      Next Visit Date:  Future Appointments   Date Time Provider [address]   7/11/2023 10:30 AM [place] MRI ROOM 1 [facility] MRI [facility] Fac RAD   7/11/2023 11:30 AM [place] MRI ROOM 1 [facility] MRI [facility] Fac RAD   8/31/2023 10:45 AM Katie Correa MD [facility] Am[facility]   9/5/2023  2:15 PM Usama Hernandez MD [address] Neurology -   9/26/2023 11:30 AM Liane Villa APRN - [facility] EMERGENCY [facility] CENTER AT [place]

## 2023-07-11 ENCOUNTER — HOSPITAL ENCOUNTER (OUTPATIENT)
Dept: MRI IMAGING | Age: 58
Discharge: HOME | End: 2023-07-13
Attending: PHYSICAL MEDICINE & REHABILITATION
Payer: COMMERCIAL

## 2023-07-11 ENCOUNTER — HOSPITAL ENCOUNTER (OUTPATIENT)
Dept: GENERAL RADIOLOGY | Age: 58
Discharge: HOME | End: 2023-07-13
Attending: PHYSICAL MEDICINE & REHABILITATION
Payer: COMMERCIAL

## 2023-07-11 DIAGNOSIS — M47.817 LUMBOSACRAL SPONDYLOSIS WITHOUT MYELOPATHY: ICD-10-CM

## 2023-07-11 DIAGNOSIS — M47.812 CERVICAL SPONDYLOSIS WITHOUT MYELOPATHY: ICD-10-CM

## 2023-07-11 DIAGNOSIS — Z98.1 HISTORY OF FUSION OF CERVICAL SPINE: ICD-10-CM

## 2023-07-11 PROCEDURE — 72100 X-RAY EXAM L-S SPINE 2/3 VWS: CPT

## 2023-07-11 PROCEDURE — 72148 MRI LUMBAR SPINE W/O DYE: CPT

## 2023-07-11 PROCEDURE — 72141 MRI NECK SPINE W/O DYE: CPT

## 2023-07-12 ENCOUNTER — TELEPHONE (OUTPATIENT)
Dept: PAIN MANAGEMENT | Age: 58
End: 2023-07-12

## 2023-07-12 NOTE — TELEPHONE ENCOUNTER
----- Message from Marlon Shelton MD sent at 7/12/2023 12:46 PM EDT -----  Results reviewed, okay to share result report with patient. Multiple levels of cervical spinal canal stenosis, recommend Neurosurgery spine evaluation. Please make follow up appointment to discuss in greater detail.

## 2023-07-12 NOTE — TELEPHONE ENCOUNTER
Left voicemail for the patient to return office call. Re MRI    Recommend Neurosurgery spine evaluation for ?moderate to severe cervical spinal canal stenosis and MRI review.   Per Dr. Denver Horn

## 2023-07-12 NOTE — TELEPHONE ENCOUNTER
MRI LS spine 7/11/2023: No fracture. Degenerative disc disease and facet arthropathy. L1-2 normal canal and foramen. L2-L3 mild bilateral foraminal narrowing, normal canal.  L3-4 mild canal stenosis, moderate right and mild left foraminal stenosis. L4-5 moderate canal stenosis, moderate bilateral foraminal narrowing. L5-S1 normal canal, moderate bilateral foraminal narrowing. Stable MRI compared to study done 4/22/2021  MRI C-spine 7/11/2023: Anterior fusion plate and screws Y1-0. No fracture. Degenerative disc disease and facet arthropathy. C2-3 normal canal and foramen. C3-4 canal stenosis 6 mm, severe right and mild left foraminal narrowing. C4-5 canal stenosis 7 mm, moderate left foraminal narrowing. C5-6 fusion, thecal sac 6 mm, mild right foraminal narrowing. C6-7 canal stenosis 6 mm, severe left and moderate right foraminal stenosis. C7-T1 normal canal and foramen. XR L-spine 7/11/2023: Disc base is preserved, no listhesis.       -Recommend Neurosurgery spine evaluation for ?moderate to severe cervical spinal canal stenosis and MRI review

## 2023-07-24 NOTE — TELEPHONE ENCOUNTER
3RD ATTEMPT:  LEFT VOICEMAIL FOR THE PATIENT TO RETURN OFFICE CALL    Recommend Neurosurgery spine evaluation for ?moderate to severe cervical spinal canal stenosis and MRI review.   Per Dr. Thien Faustin

## 2023-07-30 RX ORDER — CHOLECALCIFEROL (VITAMIN D3) 125 MCG
CAPSULE ORAL
Qty: 30 TABLET | Refills: 3 | Status: SHIPPED | OUTPATIENT
Start: 2023-07-30

## 2023-07-30 NOTE — TELEPHONE ENCOUNTER
Pharmacy requesting medication refill.  Please approve or deny this request.    Rx requested:  Requested Prescriptions     Pending Prescriptions Disp Refills    Cholecalciferol (VITAMIN D3) 50 MCG (2000 UT) TABS [Pharmacy Med Name: VITAMIN D3 2,000 UNIT TAB 50 MCG Tablet] 30 tablet 3     Sig: TAKE 1 TABLET BY MOUTH DAILY         Last Office Visit:   6/29/2023      Next Visit Date:  Future Appointments   Date Time Provider [address] [place] [address]   8/31/2023 10:45 AM Shandra Galvez MD [facility] [address] [place] [address]   9/5/2023  2:15 PM Lorrie Benoit MD [address] Neurology -   9/26/2023 11:30 AM DEVEN Salinas - [facility] EMERGENCY MEDICAL CENTER AT [place]

## 2023-08-12 DIAGNOSIS — F34.1 PERSISTENT DEPRESSIVE DISORDER: ICD-10-CM

## 2023-08-13 RX ORDER — CITALOPRAM 20 MG/1
TABLET ORAL
Qty: 30 TABLET | Refills: 3 | Status: SHIPPED | OUTPATIENT
Start: 2023-08-13

## 2023-08-28 RX ORDER — ONDANSETRON 4 MG/1
TABLET, ORALLY DISINTEGRATING ORAL
Qty: 84 TABLET | Refills: 1 | Status: SHIPPED | OUTPATIENT
Start: 2023-08-28

## 2023-08-31 ENCOUNTER — OFFICE VISIT (OUTPATIENT)
Dept: FAMILY MEDICINE CLINIC | Age: 58
End: 2023-08-31
Payer: COMMERCIAL

## 2023-08-31 VITALS
WEIGHT: 143 LBS | OXYGEN SATURATION: 96 % | BODY MASS INDEX: 24.41 KG/M2 | RESPIRATION RATE: 16 BRPM | DIASTOLIC BLOOD PRESSURE: 70 MMHG | HEIGHT: 64 IN | HEART RATE: 57 BPM | SYSTOLIC BLOOD PRESSURE: 108 MMHG

## 2023-08-31 DIAGNOSIS — Z12.4 CERVICAL CANCER SCREENING: Primary | ICD-10-CM

## 2023-08-31 PROCEDURE — 4004F PT TOBACCO SCREEN RCVD TLK: CPT | Performed by: INTERNAL MEDICINE

## 2023-08-31 PROCEDURE — G8420 CALC BMI NORM PARAMETERS: HCPCS | Performed by: INTERNAL MEDICINE

## 2023-08-31 PROCEDURE — 99214 OFFICE O/P EST MOD 30 MIN: CPT | Performed by: INTERNAL MEDICINE

## 2023-08-31 PROCEDURE — G8427 DOCREV CUR MEDS BY ELIG CLIN: HCPCS | Performed by: INTERNAL MEDICINE

## 2023-08-31 PROCEDURE — 3017F COLORECTAL CA SCREEN DOC REV: CPT | Performed by: INTERNAL MEDICINE

## 2023-08-31 NOTE — PROGRESS NOTES
DEVEN Burton CNP   clobetasol (TEMOVATE) 0.05 % ointment Apply topically 2 times daily. Mandi DEVEN Barragan CNP   DULoxetine (CYMBALTA) 60 MG extended release capsule TAKE 2 CAPSULES BY MOUTH EVERY EVENING  Yumiko Charles MD   Alcohol Swabs (ALCOHOL PADS) 70 % PADS CLEAN THE SKIN BY USING 1 PAD ON THE AFFECTED AREA BEFORE APPLYING ANY TOPICAL CREAM, 3 TIMES DAILY  Sami Acevedo MD   acetaminophen (TYLENOL) 325 MG tablet Take 2 tablets by mouth every 4 hours as needed for Pain  Historical Provider, MD        Allergies   Allergen Reactions    Amoxicillin-Pot Clavulanate      Other reaction(s): GI Upset, Intolerance, Other: See Comments  Nose bleed    Amoxicillin     Other Itching     IVP dye         Past Medical History:   Diagnosis Date    Anxiety     Arthritis     Cancer ([facility]) 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    Chronic back pain     Chronic kidney disease     COPD exacerbation (HCC)     Depression     Disease of blood and blood forming organ 01/04/2017    neutropenia    Fibromyalgia     History of blood transfusion     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    Neuromuscular disorder (HCC)     Osteoarthritis     Pneumonia due to organism     Pyoderma gangrenosa     Sweet syndrome     Ulcerative colitis ([address] [facility])        Past Surgical History:   Procedure Laterality Date    ABDOMEN SURGERY      sleen repair    APPENDECTOMY      BACK SURGERY      BREAST SURGERY      fatty tumor removed, benign    COLONOSCOPY      COSMETIC SURGERY      tummy tuck    EYE SURGERY      bilateral cataract surgery    HYSTERECTOMY (CERVIX STATUS UNKNOWN)      SKIN BIOPSY      SPINAL FUSION      TONSILLECTOMY         Social History     Socioeconomic History    Marital status:       Spouse name: Not on file    Number of children: Not on file    Years of education: Not on file    Highest education level: Not on file   Occupational History    Not on file   Tobacco Use    Smoking status: Every Day

## 2023-09-05 ENCOUNTER — OFFICE VISIT (OUTPATIENT)
Dept: NEUROLOGY | Age: 58
End: 2023-09-05
Payer: COMMERCIAL

## 2023-09-05 VITALS
DIASTOLIC BLOOD PRESSURE: 64 MMHG | BODY MASS INDEX: 24.89 KG/M2 | SYSTOLIC BLOOD PRESSURE: 112 MMHG | HEART RATE: 74 BPM | WEIGHT: 145 LBS

## 2023-09-05 DIAGNOSIS — M54.16 LUMBAR RADICULOPATHY: ICD-10-CM

## 2023-09-05 DIAGNOSIS — G62.9 POLYNEUROPATHY, UNSPECIFIED: Primary | ICD-10-CM

## 2023-09-05 DIAGNOSIS — M21.371 BILATERAL FOOT-DROP: ICD-10-CM

## 2023-09-05 DIAGNOSIS — M21.372 BILATERAL FOOT-DROP: ICD-10-CM

## 2023-09-05 DIAGNOSIS — G60.0 HEREDITARY MOTOR AND SENSORY NEUROPATHY: ICD-10-CM

## 2023-09-05 DIAGNOSIS — M47.22 CERVICAL SPONDYLOSIS WITH RADICULOPATHY: ICD-10-CM

## 2023-09-05 DIAGNOSIS — Z79.899 ENCOUNTER FOR LONG-TERM (CURRENT) USE OF MEDICATIONS: ICD-10-CM

## 2023-09-05 PROCEDURE — 4004F PT TOBACCO SCREEN RCVD TLK: CPT | Performed by: PSYCHIATRY & NEUROLOGY

## 2023-09-05 PROCEDURE — G8420 CALC BMI NORM PARAMETERS: HCPCS | Performed by: PSYCHIATRY & NEUROLOGY

## 2023-09-05 PROCEDURE — 99214 OFFICE O/P EST MOD 30 MIN: CPT | Performed by: PSYCHIATRY & NEUROLOGY

## 2023-09-05 PROCEDURE — G8427 DOCREV CUR MEDS BY ELIG CLIN: HCPCS | Performed by: PSYCHIATRY & NEUROLOGY

## 2023-09-05 PROCEDURE — 3017F COLORECTAL CA SCREEN DOC REV: CPT | Performed by: PSYCHIATRY & NEUROLOGY

## 2023-09-05 RX ORDER — GABAPENTIN 800 MG/1
TABLET ORAL
Qty: 120 TABLET | Refills: 0 | Status: SHIPPED | OUTPATIENT
Start: 2023-09-05 | End: 2023-10-10

## 2023-09-05 RX ORDER — OXYCODONE HYDROCHLORIDE AND ACETAMINOPHEN 5; 325 MG/1; MG/1
1 TABLET ORAL 2 TIMES DAILY
Qty: 60 TABLET | Refills: 0 | Status: SHIPPED | OUTPATIENT
Start: 2023-09-05 | End: 2023-10-05

## 2023-09-05 ASSESSMENT — ENCOUNTER SYMPTOMS
TROUBLE SWALLOWING: 0
BACK PAIN: 0
PHOTOPHOBIA: 0
SHORTNESS OF BREATH: 0
NAUSEA: 0
VOMITING: 0
COLOR CHANGE: 0
CHOKING: 0
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
## 2023-09-06 DIAGNOSIS — Z79.899 ENCOUNTER FOR LONG-TERM (CURRENT) USE OF MEDICATIONS: ICD-10-CM

## 2023-09-09 LAB
6MAM UR QL: NOT DETECTED
7-AMINOCLONAZEPAM: NOT DETECTED
ALPHA-OH-ALPRAZOLAM: NOT DETECTED
ALPHA-OH-MIDAZOLAM, URINE: NOT DETECTED
ALPRAZOLAM: NOT DETECTED
AMPHET UR QL SCN: NOT DETECTED
BARBITURATES: NOT DETECTED
BENZOYLECGONINE: NOT DETECTED
BUPRENORPHINE: NOT DETECTED
CARISOPRODOL UR QL: NOT DETECTED
CLONAZEPAM UR QL: NOT DETECTED
CODEINE: NOT DETECTED
CREAT UR-MCNC: 49.5 MG/DL (ref 20–400)
DIAZEPAM: NOT DETECTED
ETHYL GLUCURONIDE: NOT DETECTED
FENTANYL UR QL: NOT DETECTED
GABAPENTIN: NOT DETECTED
HYDROCODONE UR QL: NOT DETECTED
HYDROMORPHONE: NOT DETECTED
LORAZEPAM UR QL: NOT DETECTED
MARIJUANA METABOLITE: NOT DETECTED
MDA: NOT DETECTED
MDEA: NOT DETECTED
MDMA UR QL: NOT DETECTED
MEPERIDINE: NOT DETECTED
METHADONE: NOT DETECTED
METHAMPHETAMINE: NOT DETECTED
METHYLPHENIDATE: NOT DETECTED
MIDAZOLAM UR QL SCN: NOT DETECTED
MORPHINE: NOT DETECTED
NALOXONE: NOT DETECTED
NORBUPRENORPHINE, FREE: NOT DETECTED
NORDIAZEPAM: NOT DETECTED
NORFENTANYL: NOT DETECTED
NORHYDROCODONE, URINE: NOT DETECTED
NOROXYCODONE: NOT DETECTED
NOROXYMORPHONE, URINE: NOT DETECTED
OXAZEPAM UR QL: NOT DETECTED
OXYCODONE UR QL: NOT DETECTED
OXYMORPHONE UR QL: NOT DETECTED
PAIN MANAGEMENT DRUG PANEL: NORMAL
PATHOLOGY STUDY: NORMAL
PCP: NOT DETECTED
PHENTERMINE: NOT DETECTED
PREGABALIN: NOT DETECTED
TAPENTADOL, URINE: NOT DETECTED
TAPENTADOL-O-SULFATE, URINE: NOT DETECTED
TEMAZEPAM: NOT DETECTED
TRAMADOL: NOT DETECTED
ZOLPIDEM: NOT DETECTED

## 2023-09-16 DIAGNOSIS — J30.2 SEASONAL ALLERGIC RHINITIS, UNSPECIFIED TRIGGER: ICD-10-CM

## 2023-09-18 DIAGNOSIS — J30.2 SEASONAL ALLERGIC RHINITIS, UNSPECIFIED TRIGGER: ICD-10-CM

## 2023-09-18 RX ORDER — DIPHENHYDRAMINE HCL 25 MG
CAPSULE ORAL
Qty: 56 CAPSULE | Refills: 1 | OUTPATIENT
Start: 2023-09-18

## 2023-09-18 RX ORDER — DIPHENHYDRAMINE HCL 25 MG
CAPSULE ORAL
Qty: 56 CAPSULE | Refills: 0 | Status: SHIPPED | OUTPATIENT
Start: 2023-09-18 | End: 2023-10-16

## 2023-09-18 NOTE — TELEPHONE ENCOUNTER
Future Appointments    Encounter Information    Provider Department Appt Notes   9/26/2023 [address], APRN - [address] [facility] Pain Management 2 month Follow Up   10/17/2023 Ray Pina, [address] Obstetrics and Gynecology Np, [place]   12/5/2023 Kellen Bazan, [address] Primary and Specialty Care 3 month f/u   1/16/2024 MD STEVE LayneO HUMBERTO [facility] Neurology 4 MO     Past Visits    Date Provider Specialty Visit Type Primary Dx   09/05/2023 Jayna Cleaning MD Neurology Office Visit Polyneuropathy, unspecified   08/31/2023 Kellen Bazan MD Family Medicine Office Visit Cervical cancer screening   06/29/2023 Kellen Bazan MD Family Medicine Office Visit Hyperglycemia   06/27/2023 Gayatri Moore.  Jaswinder Jones, APRN - CNP Family Medicine Office Visit Dermatitis due to plant   06/27/2023 Luis Jay MD Pain Management Initial consult Lumbosacral spondylosis without myelopathy

## 2023-09-20 ENCOUNTER — HOSPITAL ENCOUNTER (OUTPATIENT)
Dept: ORTHOPEDIC SURGERY | Age: 58
Discharge: HOME | End: 2023-09-22
Payer: COMMERCIAL

## 2023-09-20 ENCOUNTER — OFFICE VISIT (OUTPATIENT)
Dept: ORTHOPEDIC SURGERY | Age: 58
End: 2023-09-20

## 2023-09-20 VITALS — OXYGEN SATURATION: 98 % | TEMPERATURE: 97.6 F | HEART RATE: 56 BPM

## 2023-09-20 DIAGNOSIS — M25.511 RIGHT SHOULDER PAIN, UNSPECIFIED CHRONICITY: ICD-10-CM

## 2023-09-20 DIAGNOSIS — M47.892 OTHER SPONDYLOSIS, CERVICAL REGION: Primary | ICD-10-CM

## 2023-09-20 PROCEDURE — 73030 X-RAY EXAM OF SHOULDER: CPT

## 2023-09-20 RX ORDER — TRIAMCINOLONE ACETONIDE 40 MG/ML
80 INJECTION, SUSPENSION INTRA-ARTICULAR; INTRAMUSCULAR ONCE
Status: COMPLETED | OUTPATIENT
Start: 2023-09-20 | End: 2023-09-20

## 2023-09-20 RX ORDER — LIDOCAINE HYDROCHLORIDE 10 MG/ML
5 INJECTION, SOLUTION INFILTRATION; PERINEURAL ONCE
Status: COMPLETED | OUTPATIENT
Start: 2023-09-20 | End: 2023-09-20

## 2023-09-20 RX ADMIN — TRIAMCINOLONE ACETONIDE 80 MG: 40 INJECTION, SUSPENSION INTRA-ARTICULAR; INTRAMUSCULAR at 13:39

## 2023-09-20 RX ADMIN — LIDOCAINE HYDROCHLORIDE 5 ML: 10 INJECTION, SOLUTION INFILTRATION; PERINEURAL at 13:36
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
## 2023-10-02 DIAGNOSIS — G62.9 POLYNEUROPATHY, UNSPECIFIED: ICD-10-CM

## 2023-10-02 RX ORDER — OXYCODONE HYDROCHLORIDE AND ACETAMINOPHEN 5; 325 MG/1; MG/1
1 TABLET ORAL 2 TIMES DAILY
Qty: 60 TABLET | Refills: 0 | Status: SHIPPED | OUTPATIENT
Start: 2023-10-02 | End: 2023-11-01

## 2023-10-05 ENCOUNTER — TELEPHONE (OUTPATIENT)
Dept: FAMILY MEDICINE CLINIC | Age: 58
End: 2023-10-05

## 2023-10-06 RX ORDER — GABAPENTIN 800 MG/1
TABLET ORAL
Qty: 120 TABLET | Refills: 0 | Status: SHIPPED | OUTPATIENT
Start: 2023-10-06 | End: 2023-11-09

## 2023-10-09 RX ORDER — DULOXETIN HYDROCHLORIDE 60 MG/1
120 CAPSULE, DELAYED RELEASE ORAL EVERY EVENING
Qty: 60 CAPSULE | Refills: 2 | Status: SHIPPED | OUTPATIENT
Start: 2023-10-09 | End: 2024-01-07

## 2023-10-09 NOTE — TELEPHONE ENCOUNTER
Patient is requesting medication refill.  Please approve or deny this request.    Rx requested:  Requested Prescriptions     Pending Prescriptions Disp Refills    DULoxetine (CYMBALTA) 60 MG extended release capsule [Pharmacy Med Name: DULOXETINE HCL 60 MG CPEP 60 Capsule] 60 capsule 2     Sig: TAKE 2 CAPSULES BY MOUTH EVERY EVENING         Last Office Visit:   9/5/2023      Next Visit Date:  Future Appointments   Date Time Provider [address]   10/17/2023 10:30 AM [facility]   10/26/2023 10:45 AM Warren Hubbard PA-C [facility] [facility]   12/5/2023 11:15 AM Felipe Frankel, MD MLOX [facility]   1/16/2024  1:45 PM Josette Andrade  [address] [facility] Neurology -

## 2023-10-14 DIAGNOSIS — J30.2 SEASONAL ALLERGIC RHINITIS, UNSPECIFIED TRIGGER: ICD-10-CM

## 2023-10-16 RX ORDER — DIPHENHYDRAMINE HCL 25 MG
CAPSULE ORAL
Qty: 56 CAPSULE | Refills: 1 | Status: SHIPPED | OUTPATIENT
Start: 2023-10-16

## 2023-10-22 NOTE — TELEPHONE ENCOUNTER
Pharmacy requesting medication refill.  Please approve or deny this request.    Rx requested:  Requested Prescriptions     Pending Prescriptions Disp Refills    ondansetron (ZOFRAN-ODT) 4 MG disintegrating tablet [Pharmacy Med Name: ONDANSETRON 4 MG TBDP 4 Tablet] 84 tablet 1     Sig: TAKE 1 TABLET BY MOUTH 3 TIMES DAILY AS NEEDED FOR NAUSEA OR VOMITING         Last Office Visit:   8/31/2023      Next Visit Date:  Future Appointments   Date Time Provider [address]   10/26/2023 10:45 AM Gladis Hubbard PA-C [facility] [facility]   11/7/2023 10:45 AM Camila Carlos DO MLOX [address] [facility]   12/5/2023 11:15 AM Ubaldo Landeros MD MLOX [facility]   1/16/2024  1:45 PM Erica Schaefer  [address] [facility] Neurology -

## 2023-10-23 RX ORDER — ONDANSETRON 4 MG/1
TABLET, ORALLY DISINTEGRATING ORAL
Qty: 84 TABLET | Refills: 1 | Status: SHIPPED | OUTPATIENT
Start: 2023-10-23

## 2023-10-24 RX ORDER — CHOLECALCIFEROL (VITAMIN D3) 125 MCG
CAPSULE ORAL
Qty: 30 TABLET | Refills: 3 | Status: SHIPPED | OUTPATIENT
Start: 2023-10-24

## 2023-10-26 ENCOUNTER — OFFICE VISIT (OUTPATIENT)
Dept: ORTHOPEDIC SURGERY | Age: 58
End: 2023-10-26

## 2023-10-26 ENCOUNTER — OFFICE VISIT (OUTPATIENT)
Dept: FAMILY MEDICINE CLINIC | Age: 58
End: 2023-10-26
Payer: COMMERCIAL

## 2023-10-26 VITALS
DIASTOLIC BLOOD PRESSURE: 76 MMHG | OXYGEN SATURATION: 95 % | RESPIRATION RATE: 16 BRPM | TEMPERATURE: 96.4 F | SYSTOLIC BLOOD PRESSURE: 115 MMHG | BODY MASS INDEX: 25.44 KG/M2 | WEIGHT: 149 LBS | HEART RATE: 54 BPM | HEIGHT: 64 IN

## 2023-10-26 VITALS
HEIGHT: 64 IN | HEART RATE: 57 BPM | WEIGHT: 145 LBS | BODY MASS INDEX: 24.75 KG/M2 | TEMPERATURE: 97.3 F | OXYGEN SATURATION: 95 %

## 2023-10-26 DIAGNOSIS — J06.9 URI WITH COUGH AND CONGESTION: ICD-10-CM

## 2023-10-26 DIAGNOSIS — R20.0 NUMBNESS AND TINGLING IN BOTH HANDS: ICD-10-CM

## 2023-10-26 DIAGNOSIS — R20.2 NUMBNESS AND TINGLING IN BOTH HANDS: ICD-10-CM

## 2023-10-26 DIAGNOSIS — J40 BRONCHITIS: Primary | ICD-10-CM

## 2023-10-26 DIAGNOSIS — M25.511 RIGHT SHOULDER PAIN, UNSPECIFIED CHRONICITY: Primary | ICD-10-CM

## 2023-10-26 DIAGNOSIS — S43.101S AC SEPARATION, RIGHT, SEQUELA: ICD-10-CM

## 2023-10-26 PROCEDURE — 3017F COLORECTAL CA SCREEN DOC REV: CPT | Performed by: NURSE PRACTITIONER

## 2023-10-26 PROCEDURE — G8427 DOCREV CUR MEDS BY ELIG CLIN: HCPCS | Performed by: NURSE PRACTITIONER

## 2023-10-26 PROCEDURE — G8484 FLU IMMUNIZE NO ADMIN: HCPCS | Performed by: NURSE PRACTITIONER

## 2023-10-26 PROCEDURE — 4004F PT TOBACCO SCREEN RCVD TLK: CPT | Performed by: NURSE PRACTITIONER

## 2023-10-26 PROCEDURE — G8419 CALC BMI OUT NRM PARAM NOF/U: HCPCS | Performed by: NURSE PRACTITIONER

## 2023-10-26 PROCEDURE — 99213 OFFICE O/P EST LOW 20 MIN: CPT | Performed by: NURSE PRACTITIONER

## 2023-10-26 RX ORDER — CHOLECALCIFEROL (VITAMIN D3) 1250 MCG
CAPSULE ORAL
COMMUNITY
Start: 2023-09-26

## 2023-10-26 RX ORDER — DOXYCYCLINE HYCLATE 100 MG
100 TABLET ORAL 2 TIMES DAILY
Qty: 20 TABLET | Refills: 0 | Status: SHIPPED | OUTPATIENT
Start: 2023-10-26 | End: 2023-11-05

## 2023-10-26 RX ORDER — DEXTROMETHORPHAN HYDROBROMIDE AND PROMETHAZINE HYDROCHLORIDE 15; 6.25 MG/5ML; MG/5ML
5 SYRUP ORAL 4 TIMES DAILY PRN
Qty: 118 ML | Refills: 0 | Status: SHIPPED | OUTPATIENT
Start: 2023-10-26

## 2023-10-26 RX ORDER — PREDNISONE 50 MG/1
50 TABLET ORAL DAILY
Qty: 5 TABLET | Refills: 0 | Status: SHIPPED | OUTPATIENT
Start: 2023-10-26 | End: 2023-10-31

## 2023-10-26 ASSESSMENT — ENCOUNTER SYMPTOMS
WHEEZING: 0
SHORTNESS OF BREATH: 1
DIARRHEA: 0
NAUSEA: 1
SORE THROAT: 0
RHINORRHEA: 1
VOMITING: 0
COUGH: 1

## 2023-10-26 NOTE — PROGRESS NOTES
Subjective:      Patient ID: Darvin Swan is a 63 y.o. female who presents today for:  Chief Complaint   Patient presents with    Congestion     X7 days coughing ,congested especially at night no fever tried musinex but isn't helping Pt refused covid test said took one at home and it was negative       HPI      Deep cough   Really hurts her chest to cough   Bad at night time  Hardly sleeping   Sinuses was yellow mucous so bad  This seems to be clearing up but still blowing her nose quite a bit   Sinus pain and pressure us better   Shes a smoker and hasnt been able to smoke as much   No asthma or copd  Shes taking mucinex         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    Neuromuscular disorder ([address])     Osteoarthritis     Pneumonia due to organism     Pyoderma gangrenosa     Sweet syndrome     Ulcerative colitis ([address])      Past Surgical History:   Procedure Laterality Date    ABDOMEN SURGERY      sleen repair    APPENDECTOMY      BACK SURGERY      BREAST SURGERY      fatty tumor removed, benign    COLONOSCOPY      COSMETIC SURGERY      tummy tuck    EYE SURGERY      bilateral cataract surgery    HYSTERECTOMY (CERVIX STATUS UNKNOWN)      SKIN BIOPSY      SPINAL FUSION      TONSILLECTOMY       Social History     Socioeconomic History    Marital status:       Spouse name: Not on file    Number of children: Not on file    Years of education: Not on file    Highest education level: Not on file   Occupational History    Not on file   Tobacco Use    Smoking status: Every Day     Packs/day: 1.00     Years: 40.00     Additional pack years: 0.00     Total pack years: 40.00

## 2023-11-02 DIAGNOSIS — G62.9 POLYNEUROPATHY, UNSPECIFIED: ICD-10-CM

## 2023-11-03 RX ORDER — OXYCODONE HYDROCHLORIDE AND ACETAMINOPHEN 5; 325 MG/1; MG/1
1 TABLET ORAL 2 TIMES DAILY
Qty: 60 TABLET | Refills: 0 | Status: SHIPPED | OUTPATIENT
Start: 2023-11-03 | End: 2023-12-03

## 2023-11-06 RX ORDER — GABAPENTIN 800 MG/1
TABLET ORAL
Qty: 120 TABLET | Refills: 0 | Status: SHIPPED | OUTPATIENT
Start: 2023-11-06 | End: 2023-12-04 | Stop reason: SDUPTHER

## 2023-11-06 NOTE — TELEPHONE ENCOUNTER
Pharmacy is requesting medication refill. Please approve or deny this request.    Rx requested:  Requested Prescriptions     Pending Prescriptions Disp Refills    gabapentin (NEURONTIN) 800 MG tablet [Pharmacy Med Name: GABAPENTIN 800 MG TABS 800 Tablet] 120 tablet 0     Sig: TAKE 1 TABLET BY MOUTH FOUR TIMES A DAY         Last Office Visit:   9/5/2023      Next Visit Date:  Future Appointments   Date Time Provider Department Center   11/7/2023 10:45 AM Lizeth Dutton DO [facility]   11/13/2023  9:00 AM [facility] MRI ROOM 2 [facility] MRI [facility]   11/13/2023 10:15 AM [facility]   12/5/2023 11:15 AM Tariq Lee MD MLOX [facility]   1/16/2024  1:45 PM Usama Mata MD MLOX  NEUR Neurology -

## 2023-11-07 ENCOUNTER — OFFICE VISIT (OUTPATIENT)
Dept: OBGYN CLINIC | Age: 58
End: 2023-11-07
Payer: COMMERCIAL

## 2023-11-07 VITALS
BODY MASS INDEX: 25.27 KG/M2 | SYSTOLIC BLOOD PRESSURE: 112 MMHG | HEIGHT: 64 IN | WEIGHT: 148 LBS | DIASTOLIC BLOOD PRESSURE: 66 MMHG

## 2023-11-07 DIAGNOSIS — N39.46 MIXED INCONTINENCE: ICD-10-CM

## 2023-11-07 DIAGNOSIS — Z01.419 ENCOUNTER FOR WELL WOMAN EXAM WITH ROUTINE GYNECOLOGICAL EXAM: Primary | ICD-10-CM

## 2023-11-07 DIAGNOSIS — N32.81 OAB (OVERACTIVE BLADDER): ICD-10-CM

## 2023-11-07 DIAGNOSIS — Z12.31 ENCOUNTER FOR SCREENING MAMMOGRAM FOR MALIGNANT NEOPLASM OF BREAST: ICD-10-CM

## 2023-11-07 DIAGNOSIS — F17.200 TOBACCO DEPENDENCE: ICD-10-CM

## 2023-11-07 PROCEDURE — 99386 PREV VISIT NEW AGE 40-64: CPT | Performed by: OBSTETRICS & GYNECOLOGY

## 2023-11-07 PROCEDURE — G8484 FLU IMMUNIZE NO ADMIN: HCPCS | Performed by: OBSTETRICS & GYNECOLOGY

## 2023-11-07 ASSESSMENT — ENCOUNTER SYMPTOMS
ABDOMINAL DISTENTION: 0
NAUSEA: 0
ABDOMINAL PAIN: 0
CONSTIPATION: 0
COUGH: 0
WHEEZING: 0
VOMITING: 0
SORE THROAT: 0
SHORTNESS OF BREATH: 0
DIARRHEA: 0
BLOOD IN STOOL: 0

## 2023-11-07 NOTE — PROGRESS NOTES
Expiration Date:   1/7/2025     No orders of the defined types were placed in this encounter. Follow up:  Return in about 2 years (around 11/7/2025) for annual examination. Jamel Mcgrath DO    HEALTH MAINTENANCE:   Health information given. Women's Health patient information and counselling done. Counseled regarding risk/benefits/alternatives to Hormone Therapyand need for yearly re-evaluation. Periodic Pap smear discussed. Mammogram recommended yearly. Colon cancer screening by age 48 & every 5-10 years. Bone mineral density (by age 72 or sooner if indication itwould affect Hormone Therapy management or other risk factors for osteoporosis).

## 2023-11-10 ENCOUNTER — TELEPHONE (OUTPATIENT)
Dept: FAMILY MEDICINE CLINIC | Age: 58
End: 2023-11-10

## 2023-11-13 ENCOUNTER — HOSPITAL ENCOUNTER (OUTPATIENT)
Dept: MRI IMAGING | Age: 58
Discharge: HOME | End: 2023-11-15
Payer: COMMERCIAL

## 2023-11-13 ENCOUNTER — HOSPITAL ENCOUNTER (OUTPATIENT)
Dept: NEUROLOGY | Age: 58
Discharge: HOME | End: 2023-11-13
Payer: COMMERCIAL

## 2023-11-13 DIAGNOSIS — R20.0 NUMBNESS AND TINGLING IN BOTH HANDS: ICD-10-CM

## 2023-11-13 DIAGNOSIS — M25.511 RIGHT SHOULDER PAIN, UNSPECIFIED CHRONICITY: ICD-10-CM

## 2023-11-13 DIAGNOSIS — R20.2 NUMBNESS AND TINGLING IN BOTH HANDS: ICD-10-CM

## 2023-11-13 PROCEDURE — 73221 MRI JOINT UPR EXTREM W/O DYE: CPT

## 2023-11-13 PROCEDURE — 95911 NRV CNDJ TEST 9-10 STUDIES: CPT

## 2023-11-13 PROCEDURE — 95886 MUSC TEST DONE W/N TEST COMP: CPT

## 2023-11-13 NOTE — PROCEDURES
[facility] [place], [address] [facility]                             ELECTROMYOGRAM REPORT    PATIENT NAME: Wen Manning                      :        1965  MED REC NO:   [id number]                            ROOM:  ACCOUNT NO:   [de-identified]                           ADMIT DATE: 2023  PROVIDER:     Star Hewitt MD    DATE OF EM2023    REASON FOR STUDY:  Neurophysiological studies are requested for the  patient's underlying numbness and pain in the hands. FINDINGS:  MOTOR NERVE CONDUCTION STUDIES:  Motor nerve conduction study of the  right median nerve shows normal amplitudes, latency, and conduction  velocity. Motor nerve conduction study of the left median nerve shows  normal amplitudes, latency, and conduction velocity. Motor nerve  conduction study of the right ulnar nerve shows normal amplitudes,  latency, and conduction velocity. Motor nerve conduction study of the  left ulnar nerve shows normal amplitudes, latency, and conduction  velocity. F-wave responses are normal.    SENSORY NERVE CONDUCTION STUDIES:  Sensory nerve conduction study of the  right median nerve recorded in digit 2 shows normal peak latencies,  borderline amplitudes, and normal conduction velocity. Further mid palm  stimulation is obtained to confirm findings and shows normal peak  latency, normal amplitudes, and mildly decreased conduction velocity. The left median digit 2 examination shows normal amplitudes, latency,  and conduction velocity. The left median mid palm stimulation shows  normal amplitudes, latency, and conduction velocity. The right ulnar  digit 2 examination shows normal amplitudes, latency, and conduction  velocity. The left ulnar digit 2 examination shows normal peak latency,  low amplitudes, and normal conduction velocity.   Radial sensory nerve  conduction studies are normal.  The right ulnar mixed orthodromic

## 2023-11-14 DIAGNOSIS — J30.2 SEASONAL ALLERGIC RHINITIS, UNSPECIFIED TRIGGER: ICD-10-CM

## 2023-11-15 RX ORDER — DIPHENHYDRAMINE HCL 25 MG
CAPSULE ORAL
Qty: 56 CAPSULE | Refills: 1 | Status: SHIPPED | OUTPATIENT
Start: 2023-11-15

## 2023-11-15 RX ORDER — TRAZODONE HYDROCHLORIDE 100 MG/1
100 TABLET ORAL NIGHTLY
Qty: 30 TABLET | Refills: 0 | Status: SHIPPED | OUTPATIENT
Start: 2023-11-15 | End: 2023-12-15

## 2023-11-20 RX ORDER — CHOLECALCIFEROL (VITAMIN D3) 125 MCG
CAPSULE ORAL
Qty: 30 TABLET | Refills: 3 | Status: SHIPPED | OUTPATIENT
Start: 2023-11-20

## 2023-11-26 PROBLEM — R20.2 NUMBNESS AND TINGLING IN BOTH HANDS: Status: ACTIVE | Noted: 2023-11-26

## 2023-11-26 PROBLEM — R20.0 NUMBNESS AND TINGLING IN BOTH HANDS: Status: ACTIVE | Noted: 2023-11-26

## 2023-11-30 DIAGNOSIS — G62.9 POLYNEUROPATHY, UNSPECIFIED: ICD-10-CM

## 2023-11-30 NOTE — TELEPHONE ENCOUNTER
Requested Prescriptions     Pending Prescriptions Disp Refills    oxyCODONE-acetaminophen (ENDOCET) 5-325 MG per tablet 60 tablet 0     Sig: Take 1 tablet by mouth 2 times daily for 30 days.  Max Daily Amount: 2 tablets    gabapentin (NEURONTIN) 800 MG tablet 120 tablet 0     Sig: TAKE 1 TABLET BY MOUTH FOUR TIMES A DAY     Last Appt  9/5/2023  Next Appt 1/16/2024

## 2023-12-01 ENCOUNTER — OFFICE VISIT (OUTPATIENT)
Dept: ORTHOPEDIC SURGERY | Age: 58
End: 2023-12-01
Payer: COMMERCIAL

## 2023-12-01 VITALS
HEIGHT: 64 IN | TEMPERATURE: 98.2 F | HEART RATE: 88 BPM | BODY MASS INDEX: 25.27 KG/M2 | WEIGHT: 148 LBS | OXYGEN SATURATION: 92 %

## 2023-12-01 DIAGNOSIS — M25.511 RIGHT SHOULDER PAIN, UNSPECIFIED CHRONICITY: Primary | ICD-10-CM

## 2023-12-01 PROCEDURE — 99214 OFFICE O/P EST MOD 30 MIN: CPT | Performed by: PHYSICIAN ASSISTANT

## 2023-12-01 PROCEDURE — 20610 DRAIN/INJ JOINT/BURSA W/O US: CPT | Performed by: PHYSICIAN ASSISTANT

## 2023-12-01 PROCEDURE — G8419 CALC BMI OUT NRM PARAM NOF/U: HCPCS | Performed by: PHYSICIAN ASSISTANT

## 2023-12-01 PROCEDURE — G8484 FLU IMMUNIZE NO ADMIN: HCPCS | Performed by: PHYSICIAN ASSISTANT

## 2023-12-01 PROCEDURE — 4004F PT TOBACCO SCREEN RCVD TLK: CPT | Performed by: PHYSICIAN ASSISTANT

## 2023-12-01 PROCEDURE — 3017F COLORECTAL CA SCREEN DOC REV: CPT | Performed by: PHYSICIAN ASSISTANT

## 2023-12-01 PROCEDURE — G8427 DOCREV CUR MEDS BY ELIG CLIN: HCPCS | Performed by: PHYSICIAN ASSISTANT

## 2023-12-01 RX ORDER — LIDOCAINE HYDROCHLORIDE 10 MG/ML
5 INJECTION, SOLUTION INFILTRATION; PERINEURAL ONCE
Status: COMPLETED | OUTPATIENT
Start: 2023-12-01 | End: 2023-12-01

## 2023-12-01 RX ORDER — TRIAMCINOLONE ACETONIDE 40 MG/ML
80 INJECTION, SUSPENSION INTRA-ARTICULAR; INTRAMUSCULAR ONCE
Status: COMPLETED | OUTPATIENT
Start: 2023-12-01 | End: 2023-12-01

## 2023-12-01 RX ADMIN — LIDOCAINE HYDROCHLORIDE 5 ML: 10 INJECTION, SOLUTION INFILTRATION; PERINEURAL at 12:42

## 2023-12-01 RX ADMIN — TRIAMCINOLONE ACETONIDE 80 MG: 40 INJECTION, SUSPENSION INTRA-ARTICULAR; INTRAMUSCULAR at 12:43

## 2023-12-01 NOTE — PROGRESS NOTES
[facility]      Subjective:      Chief Complaint   Patient presents with    Follow-up     Patient presents to go over her MRI and her EMG       HPI: Munir Roth is a 63 y.o. female who is here for follow-up for right shoulder MRI, EMG results. She has resolution of her numbness and tingling. Right shoulder still bothersome. She has no loss of motion or weakness. Just pain. 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    Neuromuscular disorder (HCC)     Osteoarthritis     Pneumonia due to organism     Pyoderma gangrenosa     Sweet syndrome     Ulcerative colitis ([address])       Past Surgical History:   Procedure Laterality Date    ABDOMEN SURGERY      sleen repair    APPENDECTOMY      BACK SURGERY      BREAST SURGERY      fatty tumor removed, benign    COLONOSCOPY      COSMETIC SURGERY      tummy tuck    EYE SURGERY      bilateral cataract surgery    HYSTERECTOMY (CERVIX STATUS UNKNOWN)      SKIN BIOPSY      SPINAL FUSION      TONSILLECTOMY       Social History     Socioeconomic History    Marital status:       Spouse name: Not on file    Number of children: Not on file    Years of education: Not on file    Highest education level: Not on file   Occupational History    Not on file   Tobacco Use    Smoking status: Every Day     Packs/day: 1.00     Years: 40.00     Additional pack years: 0.00     Total pack years: 40.00     Types: Cigarettes    Smokeless tobacco: Never   Vaping Use    Vaping Use: Never used   Substance and Sexual Activity    Alcohol use: Never    Drug use: No    Sexual activity: Not Currently   Other Topics Concern

## 2023-12-04 RX ORDER — OXYCODONE HYDROCHLORIDE AND ACETAMINOPHEN 5; 325 MG/1; MG/1
1 TABLET ORAL 2 TIMES DAILY
Qty: 60 TABLET | Refills: 0 | Status: SHIPPED | OUTPATIENT
Start: 2023-12-04 | End: 2024-01-03

## 2023-12-04 RX ORDER — GABAPENTIN 800 MG/1
TABLET ORAL
Qty: 120 TABLET | Refills: 0 | Status: SHIPPED | OUTPATIENT
Start: 2023-12-04 | End: 2024-01-29 | Stop reason: SDUPTHER

## 2023-12-04 RX ORDER — GABAPENTIN 800 MG/1
TABLET ORAL
Qty: 120 TABLET | Refills: 0 | Status: SHIPPED | OUTPATIENT
Start: 2023-12-04 | End: 2023-12-04

## 2023-12-05 ENCOUNTER — OFFICE VISIT (OUTPATIENT)
Dept: FAMILY MEDICINE CLINIC | Age: 58
End: 2023-12-05
Payer: COMMERCIAL

## 2023-12-05 VITALS
DIASTOLIC BLOOD PRESSURE: 64 MMHG | RESPIRATION RATE: 14 BRPM | WEIGHT: 150 LBS | SYSTOLIC BLOOD PRESSURE: 118 MMHG | BODY MASS INDEX: 25.61 KG/M2 | OXYGEN SATURATION: 96 % | HEIGHT: 64 IN | HEART RATE: 70 BPM

## 2023-12-05 DIAGNOSIS — J01.90 ACUTE NON-RECURRENT SINUSITIS, UNSPECIFIED LOCATION: Primary | ICD-10-CM

## 2023-12-05 DIAGNOSIS — J01.90 ACUTE NON-RECURRENT SINUSITIS, UNSPECIFIED LOCATION: ICD-10-CM

## 2023-12-05 DIAGNOSIS — L85.3 DRY SKIN: ICD-10-CM

## 2023-12-05 LAB
BASOPHILS # BLD: 0.1 K/UL (ref 0–0.2)
BASOPHILS NFR BLD: 0.9 %
EOSINOPHIL # BLD: 0.1 K/UL (ref 0–0.7)
EOSINOPHIL NFR BLD: 0.9 %
ERYTHROCYTE [DISTWIDTH] IN BLOOD BY AUTOMATED COUNT: 12.6 % (ref 11.5–14.5)
HCT VFR BLD AUTO: 45.7 % (ref 37–47)
HGB BLD-MCNC: 14.4 G/DL (ref 12–16)
LYMPHOCYTES # BLD: 2.9 K/UL (ref 1–4.8)
LYMPHOCYTES NFR BLD: 35.9 %
MCH RBC QN AUTO: 29 PG (ref 27–31.3)
MCHC RBC AUTO-ENTMCNC: 31.5 % (ref 33–37)
MCV RBC AUTO: 92.1 FL (ref 79.4–94.8)
MONOCYTES # BLD: 0.6 K/UL (ref 0.2–0.8)
MONOCYTES NFR BLD: 7.5 %
NEUTROPHILS # BLD: 4.4 K/UL (ref 1.4–6.5)
NEUTS SEG NFR BLD: 54.2 %
PLATELET # BLD AUTO: 354 K/UL (ref 130–400)
RBC # BLD AUTO: 4.96 M/UL (ref 4.2–5.4)
WBC # BLD AUTO: 8.2 K/UL (ref 4.8–10.8)

## 2023-12-05 PROCEDURE — G8427 DOCREV CUR MEDS BY ELIG CLIN: HCPCS | Performed by: INTERNAL MEDICINE

## 2023-12-05 PROCEDURE — 4004F PT TOBACCO SCREEN RCVD TLK: CPT | Performed by: INTERNAL MEDICINE

## 2023-12-05 PROCEDURE — 3017F COLORECTAL CA SCREEN DOC REV: CPT | Performed by: INTERNAL MEDICINE

## 2023-12-05 PROCEDURE — 99214 OFFICE O/P EST MOD 30 MIN: CPT | Performed by: INTERNAL MEDICINE

## 2023-12-05 PROCEDURE — G8484 FLU IMMUNIZE NO ADMIN: HCPCS | Performed by: INTERNAL MEDICINE

## 2023-12-05 PROCEDURE — G8419 CALC BMI OUT NRM PARAM NOF/U: HCPCS | Performed by: INTERNAL MEDICINE

## 2023-12-05 RX ORDER — DEXTROMETHORPHAN HYDROBROMIDE AND PROMETHAZINE HYDROCHLORIDE 15; 6.25 MG/5ML; MG/5ML
5 SYRUP ORAL 4 TIMES DAILY PRN
Qty: 150 ML | Refills: 1 | Status: SHIPPED | OUTPATIENT
Start: 2023-12-05 | End: 2023-12-20

## 2023-12-05 RX ORDER — LEVOFLOXACIN 500 MG/1
500 TABLET, FILM COATED ORAL DAILY
Qty: 7 TABLET | Refills: 0 | Status: SHIPPED | OUTPATIENT
Start: 2023-12-05 | End: 2023-12-12

## 2023-12-05 RX ORDER — PETROLATUM 42 G/100G
OINTMENT TOPICAL
Qty: 454 G | Refills: 0 | Status: SHIPPED | OUTPATIENT
Start: 2023-12-05

## 2023-12-05 NOTE — PROGRESS NOTES
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    Neuromuscular disorder (HCC)     Osteoarthritis     Pneumonia due to organism     Pyoderma gangrenosa     Sweet syndrome     Ulcerative colitis ([address])        Past Surgical History:   Procedure Laterality Date    ABDOMEN SURGERY      sleen repair    APPENDECTOMY      BACK SURGERY      BREAST SURGERY      fatty tumor removed, benign    COLONOSCOPY      COSMETIC SURGERY      tummy tuck    EYE SURGERY      bilateral cataract surgery    HYSTERECTOMY (CERVIX STATUS UNKNOWN)      SKIN BIOPSY      SPINAL FUSION      TONSILLECTOMY         Social History     Socioeconomic History    Marital status:      Spouse name: Not on file    Number of children: Not on file    Years of education: Not on file    Highest education level: Not on file   Occupational History    Not on file   Tobacco Use    Smoking status: Every Day     Packs/day: 1.00     Years: 40.00     Additional pack years: 0.00     Total pack years: 40.00     Types: Cigarettes    Smokeless tobacco: Never   Vaping Use    Vaping Use: Never used   Substance and Sexual Activity    Alcohol use: Never    Drug use: No    Sexual activity: Not Currently     Partners: Male   Other Topics Concern    Not on file   Social History Narrative    Not on file     Social Determinants of Health     Financial Resource Strain: High Risk (4/25/2023)    Overall Financial Resource Strain (CARDIA)     Difficulty of Paying Living Expenses: Hard   Food Insecurity: Not on file (4/25/2023)   Recent Concern: [address] Present (4/25/2023)    Hunger Vital Sign     Worried About [place] in the Last Year: Often true     Ran Out of Food in the Last Year: Often true   Transportation Needs: Unmet Transportation Needs (4/25/2023)    PRAPARE - Transportation     Lack of Transportation (Medical):  Not on file     Lack of Transportation (Non-Medical): Yes   Physical Activity: Not on file   Stress: Not on file   Social Connections: Not on file

## 2023-12-09 DIAGNOSIS — J30.2 SEASONAL ALLERGIC RHINITIS, UNSPECIFIED TRIGGER: ICD-10-CM

## 2023-12-11 RX ORDER — DIPHENHYDRAMINE HCL 25 MG
CAPSULE ORAL
Qty: 56 CAPSULE | Refills: 1 | Status: SHIPPED | OUTPATIENT
Start: 2023-12-11

## 2023-12-11 NOTE — TELEPHONE ENCOUNTER
Future Appointments    Encounter Information   Provider Department Appt Notes   12/12/2023 [address] [facility] pt   1/16/2024 MD JOHN Maldonado [facility] Neurology 4 MO   3/5/2024 Edson Perez, [address] [facility] Primary and Specialty Care 3 month f/u     Past Visits    Date Provider Specialty Visit Type Primary Dx   12/05/2023 Edson Perez MD Family Medicine Office Visit Acute non-recurrent sinusitis, unspecified location

## 2023-12-12 ENCOUNTER — HOSPITAL ENCOUNTER (OUTPATIENT)
Dept: WOMENS IMAGING | Age: 58
Discharge: HOME | End: 2023-12-14
Attending: OBSTETRICS & GYNECOLOGY
Payer: COMMERCIAL

## 2023-12-12 DIAGNOSIS — Z12.31 ENCOUNTER FOR SCREENING MAMMOGRAM FOR MALIGNANT NEOPLASM OF BREAST: ICD-10-CM

## 2023-12-12 PROCEDURE — 77063 BREAST TOMOSYNTHESIS BI: CPT

## 2023-12-16 ENCOUNTER — TELEPHONE (OUTPATIENT)
Dept: FAMILY MEDICINE CLINIC | Age: 58
End: 2023-12-16

## 2023-12-28 DIAGNOSIS — G62.9 POLYNEUROPATHY, UNSPECIFIED: ICD-10-CM

## 2023-12-28 NOTE — TELEPHONE ENCOUNTER
Requested Prescriptions     Pending Prescriptions Disp Refills    oxyCODONE-acetaminophen (ENDOCET) 5-325 MG per tablet 60 tablet 0     Sig: Take 1 tablet by mouth 2 times daily for 30 days. Max Daily Amount: 2 tablets

## 2023-12-29 RX ORDER — OXYCODONE HYDROCHLORIDE AND ACETAMINOPHEN 5; 325 MG/1; MG/1
1 TABLET ORAL 2 TIMES DAILY
Qty: 60 TABLET | Refills: 0 | Status: SHIPPED | OUTPATIENT
Start: 2024-01-03 | End: 2024-02-02

## 2024-01-10 PROBLEM — R26.2 DIFFICULTY IN WALKING, NOT ELSEWHERE CLASSIFIED: Status: ACTIVE | Noted: 2017-05-02

## 2024-01-10 PROBLEM — N18.9 CHRONIC KIDNEY DISEASE, UNSPECIFIED: Status: ACTIVE | Noted: 2017-05-02

## 2024-01-10 PROBLEM — G70.9 MYONEURAL DISORDER, UNSPECIFIED (HCC): Status: ACTIVE | Noted: 2017-05-02

## 2024-01-10 PROBLEM — M62.81 MUSCLE WEAKNESS (GENERALIZED): Status: ACTIVE | Noted: 2017-05-02

## 2024-01-10 PROBLEM — R27.8 OTHER LACK OF COORDINATION: Status: ACTIVE | Noted: 2017-05-02

## 2024-01-10 PROBLEM — F33.9 MAJOR DEPRESSIVE DISORDER, RECURRENT, UNSPECIFIED (HCC): Status: ACTIVE | Noted: 2017-05-02

## 2024-01-10 PROBLEM — E87.6 HYPOKALEMIA: Status: ACTIVE | Noted: 2017-05-02

## 2024-01-10 PROBLEM — N17.9 ACUTE KIDNEY FAILURE, UNSPECIFIED (HCC): Status: ACTIVE | Noted: 2017-05-02

## 2024-01-15 RX ORDER — DULOXETIN HYDROCHLORIDE 60 MG/1
120 CAPSULE, DELAYED RELEASE ORAL EVERY EVENING
Qty: 60 CAPSULE | Refills: 0 | Status: SHIPPED | OUTPATIENT
Start: 2024-01-15 | End: 2024-04-14

## 2024-01-15 NOTE — TELEPHONE ENCOUNTER
Pharmacy is requesting medication refill. Please approve or deny this request.    Rx requested:  Requested Prescriptions     Pending Prescriptions Disp Refills    DULoxetine (CYMBALTA) 60 MG extended release capsule [Pharmacy Med Name: DULOXETINE HCL 60 MG CPEP 60 Capsule] 60 capsule 0     Sig: TAKE 2 CAPSULES BY MOUTH EVERY EVENING         Last Office Visit:   9/5/2023      Next Visit Date:  Future Appointments   Date Time Provider Department Center   1/16/2024  1:45 PM Usama Mata MD MLOX  NEUR Neurology -   3/5/2024 11:30 AM Tariq Lee MD MLOX [facility]

## 2024-01-16 ENCOUNTER — OFFICE VISIT (OUTPATIENT)
Dept: NEUROLOGY | Age: 59
End: 2024-01-16
Payer: COMMERCIAL

## 2024-01-16 VITALS
SYSTOLIC BLOOD PRESSURE: 128 MMHG | BODY MASS INDEX: 27.43 KG/M2 | HEART RATE: 67 BPM | WEIGHT: 159.8 LBS | DIASTOLIC BLOOD PRESSURE: 78 MMHG

## 2024-01-16 DIAGNOSIS — M47.22 CERVICAL SPONDYLOSIS WITH RADICULOPATHY: ICD-10-CM

## 2024-01-16 DIAGNOSIS — M79.7 FIBROMYALGIA: ICD-10-CM

## 2024-01-16 DIAGNOSIS — M54.16 LUMBAR RADICULOPATHY: ICD-10-CM

## 2024-01-16 DIAGNOSIS — G25.81 RESTLESS LEG SYNDROME: ICD-10-CM

## 2024-01-16 DIAGNOSIS — G62.9 POLYNEUROPATHY, UNSPECIFIED: Primary | ICD-10-CM

## 2024-01-16 PROBLEM — F51.01 PRIMARY INSOMNIA: Status: ACTIVE | Noted: 2021-01-11

## 2024-01-16 PROCEDURE — 3017F COLORECTAL CA SCREEN DOC REV: CPT | Performed by: PSYCHIATRY & NEUROLOGY

## 2024-01-16 PROCEDURE — G8419 CALC BMI OUT NRM PARAM NOF/U: HCPCS | Performed by: PSYCHIATRY & NEUROLOGY

## 2024-01-16 PROCEDURE — 4004F PT TOBACCO SCREEN RCVD TLK: CPT | Performed by: PSYCHIATRY & NEUROLOGY

## 2024-01-16 PROCEDURE — G8427 DOCREV CUR MEDS BY ELIG CLIN: HCPCS | Performed by: PSYCHIATRY & NEUROLOGY

## 2024-01-16 PROCEDURE — 99214 OFFICE O/P EST MOD 30 MIN: CPT | Performed by: PSYCHIATRY & NEUROLOGY

## 2024-01-16 PROCEDURE — G8484 FLU IMMUNIZE NO ADMIN: HCPCS | Performed by: PSYCHIATRY & NEUROLOGY

## 2024-01-16 RX ORDER — TRAZODONE HYDROCHLORIDE 100 MG/1
100 TABLET ORAL NIGHTLY
Qty: 30 TABLET | Refills: 2 | Status: SHIPPED | OUTPATIENT
Start: 2024-01-16 | End: 2024-04-15

## 2024-01-16 ASSESSMENT — ENCOUNTER SYMPTOMS
VOMITING: 0
SHORTNESS OF BREATH: 0
TROUBLE SWALLOWING: 0
COLOR CHANGE: 0
PHOTOPHOBIA: 0
CHOKING: 0
NAUSEA: 0
BACK PAIN: 1

## 2024-01-16 NOTE — PROGRESS NOTES
Subjective:      Patient ID: Aleja Helms is a 58 y.o. female who presents today for:  Chief Complaint   Patient presents with    Follow-up     Pt states that she is having more of the numbness and tingling going on. She is wondering if her oxycond could be increased to the 10mg she states that bowen asked at last appt and you told her you would talk at her next appt.  Pt states that it is up to mid calve on both legs and then it wraps up the upper leg.  She states that her sciatica is causing a lot of problems as well.    Other     Last dose of gabapentin and oxyconde where last taken on Sunday due to a flare up on UC       HPI 58-year-old right-handed female with a known history of polyneuropathy and lumbar radiculopathy.  Patient is on high-dose of Cymbalta at 120 mg.  When last seen she wanted to go back on the gabapentin and Percocet.  Given due to missing appointments.  The combination of gabapentin and Percocet that helped her.  Patient is on gabapentin 800 mg 4 times a day and oxycodone twice a day patient still in considerable pain all over.  The medication does help but does not last very long.  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

## 2024-01-20 DIAGNOSIS — J30.2 SEASONAL ALLERGIC RHINITIS, UNSPECIFIED TRIGGER: ICD-10-CM

## 2024-01-22 RX ORDER — DIPHENHYDRAMINE HCL 25 MG
CAPSULE ORAL
Qty: 56 CAPSULE | Refills: 1 | Status: SHIPPED | OUTPATIENT
Start: 2024-01-22

## 2024-01-22 NOTE — TELEPHONE ENCOUNTER
Rx requested:  Requested Prescriptions     Pending Prescriptions Disp Refills    diphenhydrAMINE (BANOPHEN) 25 MG capsule [Pharmacy Med Name: BANOPHEN 25 MG CAPS 25 Capsule] 56 capsule 1     Sig: TAKE 1 TABLET BY MOUTH EVERY 6 HOURS AS NEEDED FOR ITCHING         Last Office Visit:   12/5/2023      Next Visit Date:  Future Appointments   Date Time Provider Department Center   1/31/2024  2:00 PM Kong Jean MD [facility]   3/5/2024 11:30 AM Tariq Lee MD [facility]   5/14/2024  2:00 PM Usama Mata MD MLOX  NEUR Neurology -

## 2024-01-29 DIAGNOSIS — G62.9 POLYNEUROPATHY, UNSPECIFIED: ICD-10-CM

## 2024-01-29 RX ORDER — GABAPENTIN 800 MG/1
800 TABLET ORAL 4 TIMES DAILY
Qty: 120 TABLET | Refills: 0 | Status: SHIPPED | OUTPATIENT
Start: 2024-01-29 | End: 2024-03-12

## 2024-01-29 RX ORDER — OXYCODONE HYDROCHLORIDE AND ACETAMINOPHEN 5; 325 MG/1; MG/1
1 TABLET ORAL 2 TIMES DAILY
Qty: 60 TABLET | Refills: 0 | Status: SHIPPED | OUTPATIENT
Start: 2024-01-29 | End: 2024-02-28

## 2024-01-29 NOTE — TELEPHONE ENCOUNTER
Patient is requesting medication refill. Please approve or deny this request.    Rx requested:  Requested Prescriptions     Pending Prescriptions Disp Refills    oxyCODONE-acetaminophen (ENDOCET) 5-325 MG per tablet 60 tablet 0     Sig: Take 1 tablet by mouth 2 times daily for 30 days. Max Daily Amount: 2 tablets    gabapentin (NEURONTIN) 800 MG tablet 120 tablet 0     Sig: Take 1 tablet by mouth in the morning, at noon, in the evening, and at bedtime for 43 days.         Last Office Visit:   1/16/2024      Next Visit Date:  Future Appointments   Date Time Provider Department Center   1/31/2024  2:00 PM Kong Jean MD [facility] [place] [facility]   3/5/2024 11:30 AM Tariq Lee MD [facility]   5/14/2024  2:00 PM Usama Mata MD MLOX  NEUR Neurology -

## 2024-01-31 ENCOUNTER — OFFICE VISIT (OUTPATIENT)
Dept: OBGYN CLINIC | Age: 59
End: 2024-01-31
Payer: COMMERCIAL

## 2024-01-31 VITALS
WEIGHT: 150 LBS | HEIGHT: 64 IN | BODY MASS INDEX: 25.61 KG/M2 | SYSTOLIC BLOOD PRESSURE: 94 MMHG | HEART RATE: 60 BPM | DIASTOLIC BLOOD PRESSURE: 58 MMHG

## 2024-01-31 DIAGNOSIS — N32.81 OAB (OVERACTIVE BLADDER): Primary | ICD-10-CM

## 2024-01-31 PROCEDURE — G8427 DOCREV CUR MEDS BY ELIG CLIN: HCPCS | Performed by: OBSTETRICS & GYNECOLOGY

## 2024-01-31 PROCEDURE — 3017F COLORECTAL CA SCREEN DOC REV: CPT | Performed by: OBSTETRICS & GYNECOLOGY

## 2024-01-31 PROCEDURE — 99213 OFFICE O/P EST LOW 20 MIN: CPT | Performed by: OBSTETRICS & GYNECOLOGY

## 2024-01-31 PROCEDURE — G8419 CALC BMI OUT NRM PARAM NOF/U: HCPCS | Performed by: OBSTETRICS & GYNECOLOGY

## 2024-01-31 PROCEDURE — G8484 FLU IMMUNIZE NO ADMIN: HCPCS | Performed by: OBSTETRICS & GYNECOLOGY

## 2024-01-31 PROCEDURE — 4004F PT TOBACCO SCREEN RCVD TLK: CPT | Performed by: OBSTETRICS & GYNECOLOGY

## 2024-01-31 RX ORDER — ESTRADIOL 0.1 MG/G
CREAM VAGINAL
Qty: 1 EACH | Refills: 6 | Status: SHIPPED | OUTPATIENT
Start: 2024-01-31

## 2024-02-11 NOTE — TELEPHONE ENCOUNTER
Pharmacy requesting medication refill. Please approve or deny this request.    Rx requested:  Requested Prescriptions     Pending Prescriptions Disp Refills    ondansetron (ZOFRAN) 4 MG tablet [Pharmacy Med Name: ONDANSETRON HCL 4 MG TAB 4 Tablet] 84 tablet 1     Sig: TAKE 1 TABLET BY MOUTH 3 TIMES DAILY AS NEEDED FOR NAUSEA OR VOMITING         Last Office Visit:   12/5/2023      Next Visit Date:  Future Appointments   Date Time Provider Department Center   2/12/2024 11:15 AM Kong Jean MD [facility] OBG [facility]   2/20/2024  2:00 PM Kong Jean MD [facility] [place]   2/23/2024 11:30 AM Kong Jean MD [facility]   3/5/2024 11:30 AM Tariq Lee MD [facility] [place]   5/14/2024  2:00 PM Usama Mata MD MLOX  NEUR Neurology -

## 2024-02-12 ENCOUNTER — OFFICE VISIT (OUTPATIENT)
Dept: OBGYN CLINIC | Age: 59
End: 2024-02-12
Payer: COMMERCIAL

## 2024-02-12 VITALS
DIASTOLIC BLOOD PRESSURE: 70 MMHG | BODY MASS INDEX: 27.66 KG/M2 | HEART RATE: 64 BPM | SYSTOLIC BLOOD PRESSURE: 114 MMHG | WEIGHT: 162 LBS | HEIGHT: 64 IN

## 2024-02-12 DIAGNOSIS — N39.46 MIXED INCONTINENCE: ICD-10-CM

## 2024-02-12 DIAGNOSIS — N39.46 MIXED INCONTINENCE: Primary | ICD-10-CM

## 2024-02-12 DIAGNOSIS — N32.81 OAB (OVERACTIVE BLADDER): ICD-10-CM

## 2024-02-12 LAB
BILIRUB UR QL STRIP: NEGATIVE
CLARITY UR: ABNORMAL
COLOR UR: YELLOW
GLUCOSE UR STRIP-MCNC: NEGATIVE MG/DL
HGB UR QL STRIP: NEGATIVE
KETONES UR STRIP-MCNC: NEGATIVE MG/DL
LEUKOCYTE ESTERASE UR QL STRIP: NEGATIVE
NITRITE UR QL STRIP: NEGATIVE
PH UR STRIP: 7.5 [PH] (ref 5–9)
PROT UR STRIP-MCNC: NEGATIVE MG/DL
SP GR UR STRIP: 1.01 (ref 1–1.03)
UROBILINOGEN UR STRIP-ACNC: 0.2 E.U./DL

## 2024-02-12 PROCEDURE — G8419 CALC BMI OUT NRM PARAM NOF/U: HCPCS | Performed by: OBSTETRICS & GYNECOLOGY

## 2024-02-12 PROCEDURE — G8484 FLU IMMUNIZE NO ADMIN: HCPCS | Performed by: OBSTETRICS & GYNECOLOGY

## 2024-02-12 PROCEDURE — G8427 DOCREV CUR MEDS BY ELIG CLIN: HCPCS | Performed by: OBSTETRICS & GYNECOLOGY

## 2024-02-12 PROCEDURE — 3017F COLORECTAL CA SCREEN DOC REV: CPT | Performed by: OBSTETRICS & GYNECOLOGY

## 2024-02-12 PROCEDURE — 99213 OFFICE O/P EST LOW 20 MIN: CPT | Performed by: OBSTETRICS & GYNECOLOGY

## 2024-02-12 PROCEDURE — 4004F PT TOBACCO SCREEN RCVD TLK: CPT | Performed by: OBSTETRICS & GYNECOLOGY

## 2024-02-12 RX ORDER — SOLIFENACIN SUCCINATE 10 MG/1
10 TABLET, FILM COATED ORAL DAILY
Qty: 7 TABLET | Refills: 0 | Status: SHIPPED | OUTPATIENT
Start: 2024-02-12 | End: 2024-02-23

## 2024-02-12 RX ORDER — ONDANSETRON 4 MG/1
4 TABLET, FILM COATED ORAL 3 TIMES DAILY PRN
Qty: 84 TABLET | Refills: 1 | Status: SHIPPED | OUTPATIENT
Start: 2024-02-12

## 2024-02-12 NOTE — PROGRESS NOTES
Systems  As per chief complaint   All other systems reviewed and are negative.  Urinary incontinence   Physical Exam:  Vitals:  BP (!) 94/58 (Site: Left Upper Arm, Position: Sitting, Cuff Size: Medium Adult)   Pulse 60   Ht 1.626 m (5' 4\")   Wt 68 kg (150 lb)   LMP 01/01/1997 (Exact Date) Comment: hysterectomy  BMI 25.75 kg/m²   Lungs: CTAB   Heart : Regular S1/S2, no M/R/G  Abdomen: Soft , NT, ND , + BS   Pelvic exam : deferred    Assessment:      Diagnosis Orders   1. OAB (overactive bladder)  Urinalysis    Culture, Urine          Plan:     Patient not candidate for anticholinergics   Trial of mirabegron 50 mg for 2 weeks , samples provided   Patient to continue vaginal estrogen cream   Discussed nonmedical options if mirabegron fails.       Orders Placed This Encounter   Procedures    Culture, Urine     Standing Status:   Future     Standing Expiration Date:   1/30/2025     Order Specific Question:   Specify (ex-cath, midstream, cysto, etc)?     Answer:   midstream    Urinalysis     Standing Status:   Future     Standing Expiration Date:   1/29/2025     Orders Placed This Encounter   Medications    estradiol (ESTRACE VAGINAL) 0.1 MG/GM vaginal cream     Sig: Apply daily for the next 2 weeks then three times weekly for maintenance     Dispense:  1 each     Refill:  6    mirabegron (MYRBETRIQ) 50 MG TB24     Sig: Lot: [id number] Exp: 2/2026     Dispense:  14 tablet     Refill:  0       Follow Up:  No follow-ups on file.        Kong Jean MD

## 2024-02-14 LAB — BACTERIA UR CULT: NORMAL

## 2024-02-17 DIAGNOSIS — J30.2 SEASONAL ALLERGIC RHINITIS, UNSPECIFIED TRIGGER: ICD-10-CM

## 2024-02-19 RX ORDER — DIPHENHYDRAMINE HCL 25 MG
CAPSULE ORAL
Qty: 56 CAPSULE | Refills: 1 | Status: SHIPPED | OUTPATIENT
Start: 2024-02-19

## 2024-02-19 NOTE — PROGRESS NOTES
monitor changes in your symptoms. You will then follow-up in the office  to discuss changes in your urinary symptoms and to have the temporary wire removed. If your  symptoms have improved by at least 50 percent, you will be given the option to proceed with  InterStim™ placement. This procedure will be scheduled at the next available date convenient for you.  InterStim Placement:  A permanent wire (“lead”) will be inserted and attached to a small neurostimulator battery that will be  implanted under the skin in the upper buttock. This will be done in the operating room as an  outpatient procedure with a local anesthetic and I.V. sedation. After the InterStim™ is implanted, you  will be given a patient  to adjust the stimulation from outside of the body. InterStim™  batteries last from three to six years. You may need to be seen in the office occasionally to have your  InterStim™ reprogrammed based on changes in your urinary symptoms.      Orders Placed This Encounter   Procedures    Culture, Urine     Standing Status:   Future     Number of Occurrences:   1     Standing Expiration Date:   2/9/2025     Order Specific Question:   Specify (ex-cath, midstream, cysto, etc)?     Answer:   midstream    Urinalysis     Standing Status:   Future     Number of Occurrences:   1     Standing Expiration Date:   2/8/2025     Orders Placed This Encounter   Medications    solifenacin (VESICARE) 10 MG tablet     Sig: Take 1 tablet by mouth daily for 7 days     Dispense:  7 tablet     Refill:  0       Follow up:  Return for scheduled for pNE .        Kong Jean MD

## 2024-02-20 ENCOUNTER — PROCEDURE VISIT (OUTPATIENT)
Dept: OBGYN CLINIC | Age: 59
End: 2024-02-20
Payer: COMMERCIAL

## 2024-02-20 DIAGNOSIS — F17.200 TOBACCO DEPENDENCE: ICD-10-CM

## 2024-02-20 DIAGNOSIS — N39.46 MIXED INCONTINENCE: Primary | ICD-10-CM

## 2024-02-20 DIAGNOSIS — N39.41 URGE INCONTINENCE: ICD-10-CM

## 2024-02-20 PROCEDURE — 64561 IMPLANT NEUROELECTRODES: CPT | Performed by: OBSTETRICS & GYNECOLOGY

## 2024-02-20 NOTE — PROGRESS NOTES
Office procedure note :     Preop DX: refractory urge incontinence     Postop diagnosis : same     Anesthesia : 1% lidocaine with epinephrine     EBL : none     Surgeon : Kong Jean MD     Percutaneous nerve stimulation procedure     After procedure explained, verbal consent obtained, patient was prepped and draped for procedure. Patient laying flat on stomach. A measuring tape used and 11 cm from coccyx was measured and marked.   2 points were then marked in each side from the midline, 2 cm above and lateral to the 11 cm midline point marked earlier. 5 cc of local anaesthetic was then injected at these points and along a line at a 60 degrees angle directed towards patients legs , along the imaginary line of insertion to the sacral bone.   The spinal needle provided with the interstim kit was then used and introduced at the right marked point at a 60 degree angle and advanced slowly till it hit resistence from sacral bone. More local anaethetic was injected at that point. The needle was withdrawn a little calibrated and reinserted 2 to 3 times until a spongy sensation was felt as the needle was introduced, with no bony resistence, at that point the needle was assumed to have entered the S3 sacral foramen. The interstim electrode was then hooked to needle and the patient did report fluttering sensation in rectal or vaginal areas or both, confirming probable proper placement of needle. Same steps were carried out on the left side. The wires provided were then introduced through the hollow of the spiral needles introduced earlier. At that point procedure was complete. Patient tolerated procedure.    Kong Jean M.D., FALENOJrG

## 2024-02-21 ENCOUNTER — TELEPHONE (OUTPATIENT)
Dept: OBGYN CLINIC | Age: 59
End: 2024-02-21

## 2024-02-21 NOTE — TELEPHONE ENCOUNTER
Pt called just to let us know that she was having some discomfort. Pt was told by Chip to call our office just to let us know. Pt is going to call if she gets worse.She didn;t want to come in today. She states that it was nerve pain. It was going down her left side into her leg. This morning it's just in her left buttock.

## 2024-02-23 ENCOUNTER — OFFICE VISIT (OUTPATIENT)
Dept: OBGYN CLINIC | Age: 59
End: 2024-02-23
Payer: COMMERCIAL

## 2024-02-23 VITALS
HEIGHT: 64 IN | SYSTOLIC BLOOD PRESSURE: 102 MMHG | DIASTOLIC BLOOD PRESSURE: 66 MMHG | WEIGHT: 164 LBS | BODY MASS INDEX: 28 KG/M2 | HEART RATE: 64 BPM

## 2024-02-23 DIAGNOSIS — N39.41 URGE INCONTINENCE: Primary | ICD-10-CM

## 2024-02-23 PROCEDURE — G8419 CALC BMI OUT NRM PARAM NOF/U: HCPCS | Performed by: OBSTETRICS & GYNECOLOGY

## 2024-02-23 PROCEDURE — 3017F COLORECTAL CA SCREEN DOC REV: CPT | Performed by: OBSTETRICS & GYNECOLOGY

## 2024-02-23 PROCEDURE — G8484 FLU IMMUNIZE NO ADMIN: HCPCS | Performed by: OBSTETRICS & GYNECOLOGY

## 2024-02-23 PROCEDURE — 99214 OFFICE O/P EST MOD 30 MIN: CPT | Performed by: OBSTETRICS & GYNECOLOGY

## 2024-02-23 PROCEDURE — G8427 DOCREV CUR MEDS BY ELIG CLIN: HCPCS | Performed by: OBSTETRICS & GYNECOLOGY

## 2024-02-23 PROCEDURE — 4004F PT TOBACCO SCREEN RCVD TLK: CPT | Performed by: OBSTETRICS & GYNECOLOGY

## 2024-02-26 DIAGNOSIS — G62.9 POLYNEUROPATHY, UNSPECIFIED: ICD-10-CM

## 2024-02-26 RX ORDER — OXYCODONE AND ACETAMINOPHEN 7.5; 325 MG/1; MG/1
1 TABLET ORAL 3 TIMES DAILY
Qty: 90 TABLET | Refills: 0 | Status: SHIPPED | OUTPATIENT
Start: 2024-02-26 | End: 2024-03-27

## 2024-02-26 RX ORDER — GABAPENTIN 800 MG/1
800 TABLET ORAL 4 TIMES DAILY
Qty: 120 TABLET | Refills: 0 | Status: SHIPPED | OUTPATIENT
Start: 2024-02-26 | End: 2024-03-27

## 2024-02-26 NOTE — TELEPHONE ENCOUNTER
Requested Prescriptions     Pending Prescriptions Disp Refills    gabapentin (NEURONTIN) 800 MG tablet 120 tablet 0     Sig: Take 1 tablet by mouth in the morning, at noon, in the evening, and at bedtime for 30 days.    oxyCODONE-acetaminophen (PERCOCET) 7.5-325 MG per tablet 90 tablet 0     Sig: Take 1 tablet by mouth in the morning, at noon, and at bedtime for 30 days. Max Daily Amount: 3 tablets

## 2024-02-28 ENCOUNTER — TELEPHONE (OUTPATIENT)
Dept: OBGYN CLINIC | Age: 59
End: 2024-02-28

## 2024-02-29 NOTE — TELEPHONE ENCOUNTER
Returned pt call. Beto for pt to call office with recommendation from other surgeon on when she can schedule with

## 2024-03-01 NOTE — PROGRESS NOTES
Intimate Partner Violence: Not on file   Housing Stability: Unknown (4/25/2023)    Housing Stability Vital Sign     Unable to Pay for Housing in the Last Year: Not on file     Number of Places Lived in the Last Year: Not on file     Unstable Housing in the Last Year: No       Contraceptive method:  none    Patient's medications, allergies, past medical, surgical, social and family histories were reviewed and updated as appropriate.    Review of Systems  As per chief complaint   All other systems reviewed and are negative.    Physical Exam:  Vitals:  /66 (Site: Left Upper Arm, Position: Sitting, Cuff Size: Medium Adult)   Pulse 64   Ht 1.626 m (5' 4\")   Wt 74.4 kg (164 lb)   LMP 01/01/1997 (Exact Date) Comment: hysterectomy  BMI 28.15 kg/m²   Lungs: CTAB   Heart : Regular S1/S2, no M/R/G  Abdomen: Soft , NT, ND , + BS   Pelvic exam : deferred  Buttock exam : leads removed, patient tolerated procedure.     Assessment:      Diagnosis Orders   1. Urge incontinence            Plan:     Aleja Helms is a 59 y.o. female who presents here today for complaints of Follow-up (Lead removal. PNE done 2/20/24.)    Discussed other options since patient has multiple medical problems including : CKD , and is on high doses of gabapentin , as well as ulcerative colitis and irregular bowel activity which could be affected by the anticholinergics . A short trial of vesicare given .   Otherwise discussed botox vs sacral neuromodulation. Patient elected to go with PNE for possible stage 1 and stage 2 interstim .  Failed several anticholinergics , last was for vesicare 10 mg daily for 2 weeks . Failed mirabegron 50 mg daily for > 2 weeks .   Last visit underwent PNE , comes today for lead removal . Mentions her urge symptoms have improved > 80 % . Wants to proceed with stage 1 and stage 2 interstim .    Sacral neuromodulation and PNE counseling    What I s It?  A Percutaneous Nerve Evaluation (PNE) is a test done in the

## 2024-03-03 NOTE — PROGRESS NOTES
(40.0 ttl pk-yrs)     Types: Cigarettes    Smokeless tobacco: Never   Vaping Use    Vaping Use: Never used   Substance and Sexual Activity    Alcohol use: Never    Drug use: No    Sexual activity: Not Currently     Partners: Male   Other Topics Concern    Not on file   Social History Narrative    Not on file     Social Determinants of Health     Financial Resource Strain: High Risk (4/25/2023)    Overall Financial Resource Strain (CARDIA)     Difficulty of Paying Living Expenses: Hard   Food Insecurity: Not on file (4/25/2023)   Recent Concern: Food Insecurity - Food Insecurity Present (4/25/2023)    Hunger Vital Sign     Worried About Running Out of Food in the Last Year: Often true     Ran Out of Food in the Last Year: Often true   Transportation Needs: Unmet Transportation Needs (4/25/2023)    PRAPARE - Transportation     Lack of Transportation (Medical): Not on file     Lack of Transportation (Non-Medical): Yes   Physical Activity: Not on file   Stress: Not on file   Social Connections: Not on file   Intimate Partner Violence: Not on file   Housing Stability: Unknown (4/25/2023)    Housing Stability Vital Sign     Unable to Pay for Housing in the Last Year: Not on file     Number of Places Lived in the Last Year: Not on file     Unstable Housing in the Last Year: No        Family History   Problem Relation Age of Onset    Arthritis Mother     High Cholesterol Mother     Immune Disorder Mother     Hearing Loss Sister     Cancer Maternal Grandfather        Vitals:    03/06/24 0919   Resp: 14   Weight: 73.9 kg (163 lb)   Height: 1.626 m (5' 4\")       Estimated body mass index is 27.98 kg/m² as calculated from the following:    Height as of this encounter: 1.626 m (5' 4\").    Weight as of this encounter: 73.9 kg (163 lb).    Physical Exam  Constitutional:       General: She is not in acute distress.     Appearance: She is well-developed.   HENT:      Head: Normocephalic.      Right Ear: External ear normal.

## 2024-03-04 ENCOUNTER — PREP FOR PROCEDURE (OUTPATIENT)
Dept: OBGYN CLINIC | Age: 59
End: 2024-03-04

## 2024-03-04 ENCOUNTER — TELEPHONE (OUTPATIENT)
Dept: OBGYN CLINIC | Age: 59
End: 2024-03-04

## 2024-03-04 VITALS — BODY MASS INDEX: 28.15 KG/M2 | WEIGHT: 164 LBS

## 2024-03-04 DIAGNOSIS — N39.41 URGE INCONTINENCE: ICD-10-CM

## 2024-03-04 NOTE — TELEPHONE ENCOUNTER
Left message on pt vm with PAT and surgery information. Will mail letter with information as well.

## 2024-03-06 ENCOUNTER — OFFICE VISIT (OUTPATIENT)
Dept: FAMILY MEDICINE CLINIC | Age: 59
End: 2024-03-06
Payer: COMMERCIAL

## 2024-03-06 VITALS — WEIGHT: 163 LBS | RESPIRATION RATE: 14 BRPM | BODY MASS INDEX: 27.83 KG/M2 | HEIGHT: 64 IN

## 2024-03-06 DIAGNOSIS — L88 PYODERMIC GANGRENOSUM: ICD-10-CM

## 2024-03-06 DIAGNOSIS — F41.9 ANXIETY: ICD-10-CM

## 2024-03-06 DIAGNOSIS — M79.672 LEFT FOOT PAIN: Primary | ICD-10-CM

## 2024-03-06 PROCEDURE — G8419 CALC BMI OUT NRM PARAM NOF/U: HCPCS | Performed by: INTERNAL MEDICINE

## 2024-03-06 PROCEDURE — 4004F PT TOBACCO SCREEN RCVD TLK: CPT | Performed by: INTERNAL MEDICINE

## 2024-03-06 PROCEDURE — G8427 DOCREV CUR MEDS BY ELIG CLIN: HCPCS | Performed by: INTERNAL MEDICINE

## 2024-03-06 PROCEDURE — 99214 OFFICE O/P EST MOD 30 MIN: CPT | Performed by: INTERNAL MEDICINE

## 2024-03-06 PROCEDURE — G8484 FLU IMMUNIZE NO ADMIN: HCPCS | Performed by: INTERNAL MEDICINE

## 2024-03-06 PROCEDURE — 3017F COLORECTAL CA SCREEN DOC REV: CPT | Performed by: INTERNAL MEDICINE

## 2024-03-07 ENCOUNTER — TELEPHONE (OUTPATIENT)
Dept: FAMILY MEDICINE CLINIC | Age: 59
End: 2024-03-07

## 2024-03-07 RX ORDER — CYCLOBENZAPRINE HCL 5 MG
5 TABLET ORAL 2 TIMES DAILY PRN
Qty: 28 TABLET | Refills: 0 | Status: SHIPPED | OUTPATIENT
Start: 2024-03-07 | End: 2024-03-21

## 2024-03-07 RX ORDER — SODIUM CHLORIDE 0.9 % (FLUSH) 0.9 %
5-40 SYRINGE (ML) INJECTION PRN
OUTPATIENT
Start: 2024-03-07

## 2024-03-07 RX ORDER — SODIUM CHLORIDE 9 MG/ML
INJECTION, SOLUTION INTRAVENOUS PRN
OUTPATIENT
Start: 2024-03-07

## 2024-03-07 RX ORDER — SODIUM CHLORIDE 0.9 % (FLUSH) 0.9 %
5-40 SYRINGE (ML) INJECTION EVERY 12 HOURS SCHEDULED
OUTPATIENT
Start: 2024-03-07

## 2024-03-07 RX ORDER — SODIUM CHLORIDE 0.9 % (FLUSH) 0.9 %
5-40 SYRINGE (ML) INJECTION PRN
Status: CANCELLED | OUTPATIENT
Start: 2024-03-07

## 2024-03-07 RX ORDER — SODIUM CHLORIDE 9 MG/ML
INJECTION, SOLUTION INTRAVENOUS PRN
Status: CANCELLED | OUTPATIENT
Start: 2024-03-07

## 2024-03-07 RX ORDER — SODIUM CHLORIDE, SODIUM LACTATE, POTASSIUM CHLORIDE, CALCIUM CHLORIDE 600; 310; 30; 20 MG/100ML; MG/100ML; MG/100ML; MG/100ML
INJECTION, SOLUTION INTRAVENOUS CONTINUOUS
Status: CANCELLED | OUTPATIENT
Start: 2024-03-07

## 2024-03-07 RX ORDER — SODIUM CHLORIDE 0.9 % (FLUSH) 0.9 %
5-40 SYRINGE (ML) INJECTION EVERY 12 HOURS SCHEDULED
Status: CANCELLED | OUTPATIENT
Start: 2024-03-07

## 2024-03-07 RX ORDER — SODIUM CHLORIDE, SODIUM LACTATE, POTASSIUM CHLORIDE, CALCIUM CHLORIDE 600; 310; 30; 20 MG/100ML; MG/100ML; MG/100ML; MG/100ML
INJECTION, SOLUTION INTRAVENOUS CONTINUOUS
OUTPATIENT
Start: 2024-03-07

## 2024-03-07 NOTE — TELEPHONE ENCOUNTER
Patient called the office and stated that at her last appointment, she discussed getting Flexeril prescribed.  She said no at the appointment, but she is asking that if it can be sent now?      If this can be done, please use [facility]

## 2024-03-09 DIAGNOSIS — J30.2 SEASONAL ALLERGIC RHINITIS, UNSPECIFIED TRIGGER: ICD-10-CM

## 2024-03-11 RX ORDER — DIPHENHYDRAMINE HCL 25 MG
CAPSULE ORAL
Qty: 56 CAPSULE | Refills: 1 | Status: SHIPPED | OUTPATIENT
Start: 2024-03-11

## 2024-03-11 NOTE — TELEPHONE ENCOUNTER
Future Appointments    Encounter Information   Provider Department Appt Notes   4/25/2024 TRINA MARTÍNEZ RM 2 RN [facility] Pre-Admission Testing    5/14/2024 Usama Mata MD [facility] Neurology 4 mo   5/29/2024 Kong Jean MD [facility] Obstetrics and Gynecology 2 wk po   6/6/2024 Tariq Lee MD [facility] Primary and Specialty Care 3 month f/u     Past Visits    Date Provider Specialty Visit Type Primary Dx   03/06/2024 Tariq Lee MD Family Medicine Office Visit Left foot pain

## 2024-03-25 DIAGNOSIS — G62.9 POLYNEUROPATHY, UNSPECIFIED: ICD-10-CM

## 2024-03-25 RX ORDER — OXYCODONE AND ACETAMINOPHEN 7.5; 325 MG/1; MG/1
1 TABLET ORAL 3 TIMES DAILY
Qty: 90 TABLET | Refills: 0 | Status: SHIPPED | OUTPATIENT
Start: 2024-03-25 | End: 2024-04-23 | Stop reason: SDUPTHER

## 2024-03-25 RX ORDER — GABAPENTIN 800 MG/1
TABLET ORAL
Qty: 120 TABLET | Refills: 3 | Status: SHIPPED | OUTPATIENT
Start: 2024-03-25 | End: 2024-04-23 | Stop reason: SDUPTHER

## 2024-03-25 NOTE — TELEPHONE ENCOUNTER
Patient is  requesting medication refill. Please approve or deny this request.    Rx requested:  Requested Prescriptions     Pending Prescriptions Disp Refills    gabapentin (NEURONTIN) 800 MG tablet 120 tablet 0     Sig: Take 1 tablet by mouth in the morning, at noon, in the evening, and at bedtime for 30 days.    oxyCODONE-acetaminophen (PERCOCET) 7.5-325 MG per tablet 90 tablet 0     Sig: Take 1 tablet by mouth in the morning, at noon, and at bedtime for 30 days. Max Daily Amount: 3 tablets         Last Office Visit:   1/16/2024      Next Visit Date:  Future Appointments   Date Time Provider Department Center   4/25/2024  9:00 AM TRINA MARTÍNEZ  2 RN TRINA MARTÍNEZ [facility]   5/14/2024  2:00 PM Usama Mata MD [facility]X  NEUR Neurology -   5/29/2024 11:30 AM Kong Jean MD [facility] OB [facility]   6/6/2024  8:45 AM Tairq Lee MD [facility]X [facility]

## 2024-04-09 RX ORDER — TRAZODONE HYDROCHLORIDE 100 MG/1
100 TABLET ORAL NIGHTLY
Qty: 30 TABLET | Refills: 2 | Status: SHIPPED | OUTPATIENT
Start: 2024-04-09 | End: 2024-07-08

## 2024-04-09 NOTE — TELEPHONE ENCOUNTER
Pharmacy is  requesting medication refill. Please approve or deny this request.    Rx requested:  Requested Prescriptions     Pending Prescriptions Disp Refills    traZODone (DESYREL) 100 MG tablet [Pharmacy Med Name: TRAZODONE  MG TABS 100 Tablet] 30 tablet 2     Sig: TAKE 1 TABLET BY MOUTH NIGHTLY         Last Office Visit:   1/16/2024      Next Visit Date:  Future Appointments   Date Time Provider Department Center   4/25/2024  9:00 AM TRINA BECKER 2 RN TRINA MARTÍNEZ [facility]   5/14/2024  2:00 PM [facility]   5/29/2024 11:30 AM Kong Jean MD [facility] OBG [facility]   6/6/2024  8:45 AM Tariq Lee MD [facility]

## 2024-04-23 DIAGNOSIS — G62.9 POLYNEUROPATHY, UNSPECIFIED: ICD-10-CM

## 2024-04-23 RX ORDER — OXYCODONE AND ACETAMINOPHEN 7.5; 325 MG/1; MG/1
1 TABLET ORAL 3 TIMES DAILY
Qty: 90 TABLET | Refills: 0 | Status: SHIPPED | OUTPATIENT
Start: 2024-04-23 | End: 2024-05-23

## 2024-04-23 RX ORDER — GABAPENTIN 800 MG/1
TABLET ORAL
Qty: 120 TABLET | Refills: 3 | Status: SHIPPED | OUTPATIENT
Start: 2024-04-23 | End: 2024-05-24

## 2024-04-25 ENCOUNTER — HOSPITAL ENCOUNTER (OUTPATIENT)
Dept: PREADMISSION TESTING | Age: 59
Discharge: HOME | End: 2024-04-29

## 2024-04-25 VITALS
DIASTOLIC BLOOD PRESSURE: 71 MMHG | WEIGHT: 166.2 LBS | RESPIRATION RATE: 16 BRPM | HEART RATE: 59 BPM | TEMPERATURE: 97.8 F | OXYGEN SATURATION: 97 % | BODY MASS INDEX: 28.38 KG/M2 | HEIGHT: 64 IN | SYSTOLIC BLOOD PRESSURE: 121 MMHG

## 2024-04-25 PROBLEM — H04.123 DRY EYE SYNDROME OF BILATERAL LACRIMAL GLANDS: Status: RESOLVED | Noted: 2020-07-23 | Resolved: 2024-04-25

## 2024-04-25 PROBLEM — N94.89 BURNING SENSATION OF VULVA: Status: RESOLVED | Noted: 2021-01-11 | Resolved: 2024-04-25

## 2024-04-25 PROBLEM — A41.3: Status: RESOLVED | Noted: 2017-12-22 | Resolved: 2024-04-25

## 2024-04-25 PROBLEM — L03.115 CELLULITIS OF RIGHT LEG: Status: RESOLVED | Noted: 2017-04-28 | Resolved: 2024-04-25

## 2024-04-25 PROBLEM — D64.9 ANEMIA: Chronic | Status: RESOLVED | Noted: 2017-05-01 | Resolved: 2024-04-25

## 2024-04-25 PROBLEM — A41.9 SEPTIC SHOCK (HCC): Status: RESOLVED | Noted: 2020-03-26 | Resolved: 2024-04-25

## 2024-04-25 PROBLEM — R50.9 FEVER: Status: RESOLVED | Noted: 2021-01-11 | Resolved: 2024-04-25

## 2024-04-25 PROBLEM — R78.81 BACTEREMIA: Status: RESOLVED | Noted: 2017-05-28 | Resolved: 2024-04-25

## 2024-04-25 PROBLEM — R07.89 CHEST WALL PAIN: Status: RESOLVED | Noted: 2021-01-11 | Resolved: 2024-04-25

## 2024-04-25 PROBLEM — K13.0 LIP DEFORMITY, ACQUIRED: Status: ACTIVE | Noted: 2024-02-16

## 2024-04-25 PROBLEM — S71.109A OPEN WOUND OF THIGH: Status: RESOLVED | Noted: 2017-03-27 | Resolved: 2024-04-25

## 2024-04-25 PROBLEM — S01.511A LIP LACERATION: Status: ACTIVE | Noted: 2024-02-16

## 2024-04-25 PROBLEM — L03.90 CELLULITIS: Status: RESOLVED | Noted: 2017-04-04 | Resolved: 2024-04-25

## 2024-04-25 PROBLEM — Z22.322 CARRIER OR SUSPECTED CARRIER OF METHICILLIN RESISTANT STAPHYLOCOCCUS AUREUS: Status: RESOLVED | Noted: 2017-08-16 | Resolved: 2024-04-25

## 2024-04-25 PROBLEM — A41.9 SEPSIS (HCC): Status: RESOLVED | Noted: 2020-05-08 | Resolved: 2024-04-25

## 2024-04-25 PROBLEM — R65.21 SEPTIC SHOCK (HCC): Status: RESOLVED | Noted: 2020-03-26 | Resolved: 2024-04-25

## 2024-05-02 ENCOUNTER — APPOINTMENT (OUTPATIENT)
Dept: GENERAL RADIOLOGY | Age: 59
End: 2024-05-02
Attending: OBSTETRICS & GYNECOLOGY
Payer: COMMERCIAL

## 2024-05-02 ENCOUNTER — HOSPITAL ENCOUNTER (OUTPATIENT)
Age: 59
Setting detail: OUTPATIENT SURGERY
Discharge: HOME | End: 2024-05-02
Attending: OBSTETRICS & GYNECOLOGY | Admitting: OBSTETRICS & GYNECOLOGY
Payer: COMMERCIAL

## 2024-05-02 ENCOUNTER — ANESTHESIA EVENT (OUTPATIENT)
Dept: OPERATING ROOM | Age: 59
End: 2024-05-02
Payer: COMMERCIAL

## 2024-05-02 ENCOUNTER — ANESTHESIA (OUTPATIENT)
Dept: OPERATING ROOM | Age: 59
End: 2024-05-02
Payer: COMMERCIAL

## 2024-05-02 VITALS
OXYGEN SATURATION: 94 % | HEART RATE: 58 BPM | SYSTOLIC BLOOD PRESSURE: 98 MMHG | DIASTOLIC BLOOD PRESSURE: 53 MMHG | TEMPERATURE: 97.9 F | HEIGHT: 64 IN | WEIGHT: 160 LBS | BODY MASS INDEX: 27.31 KG/M2 | RESPIRATION RATE: 14 BRPM

## 2024-05-02 DIAGNOSIS — R52 PAIN: ICD-10-CM

## 2024-05-02 DIAGNOSIS — G89.18 POSTOPERATIVE PAIN: Primary | ICD-10-CM

## 2024-05-02 PROCEDURE — P9041 ALBUMIN (HUMAN),5%, 50ML: HCPCS | Performed by: STUDENT IN AN ORGANIZED HEALTH CARE EDUCATION/TRAINING PROGRAM

## 2024-05-02 PROCEDURE — 3700000000 HC ANESTHESIA ATTENDED CARE: Performed by: OBSTETRICS & GYNECOLOGY

## 2024-05-02 PROCEDURE — C1897 LEAD, NEUROSTIM TEST KIT: HCPCS | Performed by: OBSTETRICS & GYNECOLOGY

## 2024-05-02 PROCEDURE — 2580000003 HC RX 258: Performed by: STUDENT IN AN ORGANIZED HEALTH CARE EDUCATION/TRAINING PROGRAM

## 2024-05-02 PROCEDURE — 6370000000 HC RX 637 (ALT 250 FOR IP)

## 2024-05-02 PROCEDURE — 3600000004 HC SURGERY LEVEL 4 BASE: Performed by: OBSTETRICS & GYNECOLOGY

## 2024-05-02 PROCEDURE — 7100000011 HC PHASE II RECOVERY - ADDTL 15 MIN: Performed by: OBSTETRICS & GYNECOLOGY

## 2024-05-02 PROCEDURE — C1778 LEAD, NEUROSTIMULATOR: HCPCS | Performed by: OBSTETRICS & GYNECOLOGY

## 2024-05-02 PROCEDURE — 2500000003 HC RX 250 WO HCPCS: Performed by: NURSE ANESTHETIST, CERTIFIED REGISTERED

## 2024-05-02 PROCEDURE — C1781 MESH (IMPLANTABLE): HCPCS | Performed by: OBSTETRICS & GYNECOLOGY

## 2024-05-02 PROCEDURE — 6360000002 HC RX W HCPCS: Performed by: STUDENT IN AN ORGANIZED HEALTH CARE EDUCATION/TRAINING PROGRAM

## 2024-05-02 PROCEDURE — C1787 PATIENT PROGR, NEUROSTIM: HCPCS | Performed by: OBSTETRICS & GYNECOLOGY

## 2024-05-02 PROCEDURE — 7100000000 HC PACU RECOVERY - FIRST 15 MIN: Performed by: OBSTETRICS & GYNECOLOGY

## 2024-05-02 PROCEDURE — 3700000001 HC ADD 15 MINUTES (ANESTHESIA): Performed by: OBSTETRICS & GYNECOLOGY

## 2024-05-02 PROCEDURE — 6360000002 HC RX W HCPCS: Performed by: OBSTETRICS & GYNECOLOGY

## 2024-05-02 PROCEDURE — 2709999900 HC NON-CHARGEABLE SUPPLY: Performed by: OBSTETRICS & GYNECOLOGY

## 2024-05-02 PROCEDURE — 2580000003 HC RX 258: Performed by: OBSTETRICS & GYNECOLOGY

## 2024-05-02 PROCEDURE — A4217 STERILE WATER/SALINE, 500 ML: HCPCS | Performed by: OBSTETRICS & GYNECOLOGY

## 2024-05-02 PROCEDURE — C1767 GENERATOR, NEURO NON-RECHARG: HCPCS | Performed by: OBSTETRICS & GYNECOLOGY

## 2024-05-02 PROCEDURE — 2500000003 HC RX 250 WO HCPCS: Performed by: OBSTETRICS & GYNECOLOGY

## 2024-05-02 PROCEDURE — 7100000001 HC PACU RECOVERY - ADDTL 15 MIN: Performed by: OBSTETRICS & GYNECOLOGY

## 2024-05-02 PROCEDURE — 6370000000 HC RX 637 (ALT 250 FOR IP): Performed by: STUDENT IN AN ORGANIZED HEALTH CARE EDUCATION/TRAINING PROGRAM

## 2024-05-02 PROCEDURE — 3600000014 HC SURGERY LEVEL 4 ADDTL 15MIN: Performed by: OBSTETRICS & GYNECOLOGY

## 2024-05-02 PROCEDURE — 2580000003 HC RX 258: Performed by: NURSE ANESTHETIST, CERTIFIED REGISTERED

## 2024-05-02 PROCEDURE — 6360000002 HC RX W HCPCS: Performed by: NURSE ANESTHETIST, CERTIFIED REGISTERED

## 2024-05-02 PROCEDURE — 7100000010 HC PHASE II RECOVERY - FIRST 15 MIN: Performed by: OBSTETRICS & GYNECOLOGY

## 2024-05-02 PROCEDURE — C1883 ADAPT/EXT, PACING/NEURO LEAD: HCPCS | Performed by: OBSTETRICS & GYNECOLOGY

## 2024-05-02 DEVICE — NEUROSTIMULATOR INTERSTIM X RECHRGE FREE TORQ WRNCH PROD: Type: IMPLANTABLE DEVICE | Site: BUTTOCKS | Status: FUNCTIONAL

## 2024-05-02 DEVICE — POUCH TYRX NEURO ANTIMICROBIAL MED: Type: IMPLANTABLE DEVICE | Site: BUTTOCKS | Status: FUNCTIONAL

## 2024-05-02 DEVICE — LEAD NERVE STIM 4.32 MM INTERSTIM SURESCAN: Type: IMPLANTABLE DEVICE | Site: SACRUM | Status: FUNCTIONAL

## 2024-05-02 RX ORDER — SULFAMETHOXAZOLE AND TRIMETHOPRIM 800; 160 MG/1; MG/1
1 TABLET ORAL 2 TIMES DAILY
Qty: 20 TABLET | Refills: 0 | Status: SHIPPED | OUTPATIENT
Start: 2024-05-02 | End: 2024-05-12

## 2024-05-02 RX ORDER — EPHEDRINE SULFATE/0.9% NACL/PF 25 MG/5 ML
SYRINGE (ML) INTRAVENOUS PRN
Status: DISCONTINUED | OUTPATIENT
Start: 2024-05-02 | End: 2024-05-02 | Stop reason: SDUPTHER

## 2024-05-02 RX ORDER — SODIUM CHLORIDE 0.9 % (FLUSH) 0.9 %
5-40 SYRINGE (ML) INJECTION EVERY 12 HOURS SCHEDULED
Status: DISCONTINUED | OUTPATIENT
Start: 2024-05-02 | End: 2024-05-02 | Stop reason: HOSPADM

## 2024-05-02 RX ORDER — LIDOCAINE HYDROCHLORIDE 10 MG/ML
INJECTION, SOLUTION EPIDURAL; INFILTRATION; INTRACAUDAL; PERINEURAL PRN
Status: DISCONTINUED | OUTPATIENT
Start: 2024-05-02 | End: 2024-05-02 | Stop reason: SDUPTHER

## 2024-05-02 RX ORDER — SODIUM CHLORIDE, SODIUM LACTATE, POTASSIUM CHLORIDE, CALCIUM CHLORIDE 600; 310; 30; 20 MG/100ML; MG/100ML; MG/100ML; MG/100ML
INJECTION, SOLUTION INTRAVENOUS CONTINUOUS PRN
Status: DISCONTINUED | OUTPATIENT
Start: 2024-05-02 | End: 2024-05-02 | Stop reason: SDUPTHER

## 2024-05-02 RX ORDER — KETOROLAC TROMETHAMINE 30 MG/ML
30 INJECTION, SOLUTION INTRAMUSCULAR; INTRAVENOUS EVERY 6 HOURS
Status: DISCONTINUED | OUTPATIENT
Start: 2024-05-02 | End: 2024-05-02 | Stop reason: HOSPADM

## 2024-05-02 RX ORDER — LABETALOL HYDROCHLORIDE 5 MG/ML
10 INJECTION, SOLUTION INTRAVENOUS
Status: DISCONTINUED | OUTPATIENT
Start: 2024-05-02 | End: 2024-05-02 | Stop reason: HOSPADM

## 2024-05-02 RX ORDER — MAGNESIUM HYDROXIDE/ALUMINUM HYDROXICE/SIMETHICONE 120; 1200; 1200 MG/30ML; MG/30ML; MG/30ML
15 SUSPENSION ORAL EVERY 6 HOURS PRN
Status: DISCONTINUED | OUTPATIENT
Start: 2024-05-02 | End: 2024-05-02 | Stop reason: HOSPADM

## 2024-05-02 RX ORDER — OXYCODONE HYDROCHLORIDE 5 MG/1
10 TABLET ORAL PRN
Status: COMPLETED | OUTPATIENT
Start: 2024-05-02 | End: 2024-05-02

## 2024-05-02 RX ORDER — OXYCODONE HYDROCHLORIDE 5 MG/1
10 TABLET ORAL EVERY 4 HOURS PRN
Status: DISCONTINUED | OUTPATIENT
Start: 2024-05-02 | End: 2024-05-02 | Stop reason: HOSPADM

## 2024-05-02 RX ORDER — PROPOFOL 10 MG/ML
INJECTION, EMULSION INTRAVENOUS CONTINUOUS PRN
Status: DISCONTINUED | OUTPATIENT
Start: 2024-05-02 | End: 2024-05-02 | Stop reason: SDUPTHER

## 2024-05-02 RX ORDER — FAMOTIDINE 20 MG/1
20 TABLET, FILM COATED ORAL 2 TIMES DAILY
Status: DISCONTINUED | OUTPATIENT
Start: 2024-05-02 | End: 2024-05-02 | Stop reason: HOSPADM

## 2024-05-02 RX ORDER — HYDRALAZINE HYDROCHLORIDE 20 MG/ML
10 INJECTION INTRAMUSCULAR; INTRAVENOUS
Status: DISCONTINUED | OUTPATIENT
Start: 2024-05-02 | End: 2024-05-02 | Stop reason: HOSPADM

## 2024-05-02 RX ORDER — HYDROMORPHONE HYDROCHLORIDE 1 MG/ML
1 INJECTION, SOLUTION INTRAMUSCULAR; INTRAVENOUS; SUBCUTANEOUS
Status: DISCONTINUED | OUTPATIENT
Start: 2024-05-02 | End: 2024-05-02 | Stop reason: HOSPADM

## 2024-05-02 RX ORDER — LIDOCAINE HYDROCHLORIDE AND EPINEPHRINE 10; 10 MG/ML; UG/ML
INJECTION, SOLUTION INFILTRATION; PERINEURAL PRN
Status: DISCONTINUED | OUTPATIENT
Start: 2024-05-02 | End: 2024-05-02 | Stop reason: ALTCHOICE

## 2024-05-02 RX ORDER — ACETAMINOPHEN 500 MG
1000 TABLET ORAL EVERY 6 HOURS PRN
Qty: 60 TABLET | Refills: 0 | Status: SHIPPED | OUTPATIENT
Start: 2024-05-02

## 2024-05-02 RX ORDER — OXYCODONE HYDROCHLORIDE 5 MG/1
15 TABLET ORAL EVERY 4 HOURS PRN
Status: DISCONTINUED | OUTPATIENT
Start: 2024-05-02 | End: 2024-05-02 | Stop reason: HOSPADM

## 2024-05-02 RX ORDER — FENTANYL CITRATE 0.05 MG/ML
25 INJECTION, SOLUTION INTRAMUSCULAR; INTRAVENOUS EVERY 5 MIN PRN
Status: DISCONTINUED | OUTPATIENT
Start: 2024-05-02 | End: 2024-05-02 | Stop reason: HOSPADM

## 2024-05-02 RX ORDER — SODIUM CHLORIDE 0.9 % (FLUSH) 0.9 %
5-40 SYRINGE (ML) INJECTION PRN
Status: DISCONTINUED | OUTPATIENT
Start: 2024-05-02 | End: 2024-05-02 | Stop reason: HOSPADM

## 2024-05-02 RX ORDER — DIPHENHYDRAMINE HYDROCHLORIDE 50 MG/ML
12.5 INJECTION INTRAMUSCULAR; INTRAVENOUS
Status: DISCONTINUED | OUTPATIENT
Start: 2024-05-02 | End: 2024-05-02 | Stop reason: HOSPADM

## 2024-05-02 RX ORDER — CLINDAMYCIN PHOSPHATE 900 MG/50ML
900 INJECTION, SOLUTION INTRAVENOUS
Status: COMPLETED | OUTPATIENT
Start: 2024-05-02 | End: 2024-05-02

## 2024-05-02 RX ORDER — SODIUM CHLORIDE 9 MG/ML
INJECTION, SOLUTION INTRAVENOUS PRN
Status: DISCONTINUED | OUTPATIENT
Start: 2024-05-02 | End: 2024-05-02 | Stop reason: HOSPADM

## 2024-05-02 RX ORDER — ALBUMIN, HUMAN INJ 5% 5 %
25 SOLUTION INTRAVENOUS ONCE
Status: COMPLETED | OUTPATIENT
Start: 2024-05-02 | End: 2024-05-02

## 2024-05-02 RX ORDER — MEPERIDINE HYDROCHLORIDE 25 MG/ML
12.5 INJECTION INTRAMUSCULAR; INTRAVENOUS; SUBCUTANEOUS EVERY 5 MIN PRN
Status: DISCONTINUED | OUTPATIENT
Start: 2024-05-02 | End: 2024-05-02 | Stop reason: HOSPADM

## 2024-05-02 RX ORDER — PROCHLORPERAZINE EDISYLATE 5 MG/ML
5 INJECTION INTRAMUSCULAR; INTRAVENOUS
Status: DISCONTINUED | OUTPATIENT
Start: 2024-05-02 | End: 2024-05-02 | Stop reason: HOSPADM

## 2024-05-02 RX ORDER — OXYCODONE HYDROCHLORIDE 5 MG/1
5 TABLET ORAL PRN
Status: COMPLETED | OUTPATIENT
Start: 2024-05-02 | End: 2024-05-02

## 2024-05-02 RX ORDER — GLYCOPYRROLATE 0.2 MG/ML
INJECTION INTRAMUSCULAR; INTRAVENOUS PRN
Status: DISCONTINUED | OUTPATIENT
Start: 2024-05-02 | End: 2024-05-02 | Stop reason: SDUPTHER

## 2024-05-02 RX ORDER — ONDANSETRON 2 MG/ML
4 INJECTION INTRAMUSCULAR; INTRAVENOUS EVERY 6 HOURS PRN
Status: DISCONTINUED | OUTPATIENT
Start: 2024-05-02 | End: 2024-05-02 | Stop reason: HOSPADM

## 2024-05-02 RX ORDER — IBUPROFEN 800 MG/1
800 TABLET ORAL EVERY 8 HOURS PRN
Qty: 60 TABLET | Refills: 1 | Status: SHIPPED | OUTPATIENT
Start: 2024-05-02

## 2024-05-02 RX ORDER — ACETAMINOPHEN 500 MG
1000 TABLET ORAL EVERY 8 HOURS PRN
Status: DISCONTINUED | OUTPATIENT
Start: 2024-05-02 | End: 2024-05-02 | Stop reason: HOSPADM

## 2024-05-02 RX ORDER — FENTANYL CITRATE 0.05 MG/ML
50 INJECTION, SOLUTION INTRAMUSCULAR; INTRAVENOUS EVERY 5 MIN PRN
Status: DISCONTINUED | OUTPATIENT
Start: 2024-05-02 | End: 2024-05-02 | Stop reason: HOSPADM

## 2024-05-02 RX ORDER — FENTANYL CITRATE 50 UG/ML
INJECTION, SOLUTION INTRAMUSCULAR; INTRAVENOUS PRN
Status: DISCONTINUED | OUTPATIENT
Start: 2024-05-02 | End: 2024-05-02 | Stop reason: SDUPTHER

## 2024-05-02 RX ORDER — ONDANSETRON 2 MG/ML
4 INJECTION INTRAMUSCULAR; INTRAVENOUS
Status: DISCONTINUED | OUTPATIENT
Start: 2024-05-02 | End: 2024-05-02 | Stop reason: HOSPADM

## 2024-05-02 RX ORDER — SODIUM CHLORIDE, SODIUM LACTATE, POTASSIUM CHLORIDE, CALCIUM CHLORIDE 600; 310; 30; 20 MG/100ML; MG/100ML; MG/100ML; MG/100ML
INJECTION, SOLUTION INTRAVENOUS CONTINUOUS
Status: DISCONTINUED | OUTPATIENT
Start: 2024-05-02 | End: 2024-05-02 | Stop reason: HOSPADM

## 2024-05-02 RX ORDER — LIDOCAINE HYDROCHLORIDE 10 MG/ML
1 INJECTION, SOLUTION EPIDURAL; INFILTRATION; INTRACAUDAL; PERINEURAL
Status: DISCONTINUED | OUTPATIENT
Start: 2024-05-02 | End: 2024-05-02 | Stop reason: HOSPADM

## 2024-05-02 RX ORDER — SODIUM CHLORIDE 9 MG/ML
INJECTION, SOLUTION INTRAVENOUS CONTINUOUS
Status: DISCONTINUED | OUTPATIENT
Start: 2024-05-02 | End: 2024-05-02 | Stop reason: HOSPADM

## 2024-05-02 RX ORDER — ONDANSETRON 4 MG/1
4 TABLET, ORALLY DISINTEGRATING ORAL EVERY 8 HOURS PRN
Status: DISCONTINUED | OUTPATIENT
Start: 2024-05-02 | End: 2024-05-02 | Stop reason: HOSPADM

## 2024-05-02 RX ORDER — OXYCODONE HYDROCHLORIDE AND ACETAMINOPHEN 5; 325 MG/1; MG/1
2 TABLET ORAL NIGHTLY PRN
Qty: 10 TABLET | Refills: 0 | Status: SHIPPED | OUTPATIENT
Start: 2024-05-02 | End: 2024-05-07

## 2024-05-02 RX ORDER — NALOXONE HYDROCHLORIDE 0.4 MG/ML
INJECTION, SOLUTION INTRAMUSCULAR; INTRAVENOUS; SUBCUTANEOUS PRN
Status: DISCONTINUED | OUTPATIENT
Start: 2024-05-02 | End: 2024-05-02 | Stop reason: HOSPADM

## 2024-05-02 RX ADMIN — FENTANYL CITRATE 25 MCG: 0.05 INJECTION, SOLUTION INTRAMUSCULAR; INTRAVENOUS at 13:26

## 2024-05-02 RX ADMIN — PHENYLEPHRINE HYDROCHLORIDE 100 MCG: 10 INJECTION INTRAVENOUS at 12:17

## 2024-05-02 RX ADMIN — EPHEDRINE SULFATE 5 MG: 5 INJECTION INTRAVENOUS at 11:59

## 2024-05-02 RX ADMIN — FENTANYL CITRATE 25 MCG: 50 INJECTION, SOLUTION INTRAMUSCULAR; INTRAVENOUS at 11:52

## 2024-05-02 RX ADMIN — FENTANYL CITRATE 25 MCG: 50 INJECTION, SOLUTION INTRAMUSCULAR; INTRAVENOUS at 11:57

## 2024-05-02 RX ADMIN — LIDOCAINE HYDROCHLORIDE 50 MG: 10 INJECTION, SOLUTION EPIDURAL; INFILTRATION; INTRACAUDAL; PERINEURAL at 11:52

## 2024-05-02 RX ADMIN — PHENYLEPHRINE HYDROCHLORIDE 100 MCG: 10 INJECTION INTRAVENOUS at 12:33

## 2024-05-02 RX ADMIN — EPHEDRINE SULFATE 10 MG: 5 INJECTION INTRAVENOUS at 12:07

## 2024-05-02 RX ADMIN — OXYCODONE 5 MG: 5 TABLET ORAL at 15:25

## 2024-05-02 RX ADMIN — EPHEDRINE SULFATE 10 MG: 5 INJECTION INTRAVENOUS at 12:01

## 2024-05-02 RX ADMIN — SODIUM CHLORIDE, POTASSIUM CHLORIDE, SODIUM LACTATE AND CALCIUM CHLORIDE: 600; 310; 30; 20 INJECTION, SOLUTION INTRAVENOUS at 15:04

## 2024-05-02 RX ADMIN — PROPOFOL 30 MG: 10 INJECTION, EMULSION INTRAVENOUS at 12:41

## 2024-05-02 RX ADMIN — FENTANYL CITRATE 25 MCG: 50 INJECTION, SOLUTION INTRAMUSCULAR; INTRAVENOUS at 12:22

## 2024-05-02 RX ADMIN — CLINDAMYCIN PHOSPHATE 900 MG: 900 INJECTION, SOLUTION INTRAVENOUS at 12:01

## 2024-05-02 RX ADMIN — PROPOFOL 75 MCG/KG/MIN: 10 INJECTION, EMULSION INTRAVENOUS at 11:52

## 2024-05-02 RX ADMIN — PROPOFOL 30 MG: 10 INJECTION, EMULSION INTRAVENOUS at 12:22

## 2024-05-02 RX ADMIN — SODIUM CHLORIDE, POTASSIUM CHLORIDE, SODIUM LACTATE AND CALCIUM CHLORIDE: 600; 310; 30; 20 INJECTION, SOLUTION INTRAVENOUS at 11:48

## 2024-05-02 RX ADMIN — PROPOFOL 20 MG: 10 INJECTION, EMULSION INTRAVENOUS at 11:54

## 2024-05-02 RX ADMIN — GLYCOPYRROLATE 0.2 MG: 0.2 INJECTION INTRAMUSCULAR; INTRAVENOUS at 12:14

## 2024-05-02 RX ADMIN — ALBUMIN (HUMAN) 25 G: 12.5 INJECTION, SOLUTION INTRAVENOUS at 14:05

## 2024-05-02 RX ADMIN — FENTANYL CITRATE 50 MCG: 0.05 INJECTION, SOLUTION INTRAMUSCULAR; INTRAVENOUS at 13:18

## 2024-05-02 RX ADMIN — PHENYLEPHRINE HYDROCHLORIDE 100 MCG: 10 INJECTION INTRAVENOUS at 12:15

## 2024-05-02 RX ADMIN — PROPOFOL 30 MG: 10 INJECTION, EMULSION INTRAVENOUS at 11:53

## 2024-05-02 RX ADMIN — GENTAMICIN SULFATE 113.2 MG: 40 INJECTION, SOLUTION INTRAMUSCULAR; INTRAVENOUS at 11:49

## 2024-05-02 RX ADMIN — FENTANYL CITRATE 25 MCG: 50 INJECTION, SOLUTION INTRAMUSCULAR; INTRAVENOUS at 12:25

## 2024-05-02 RX ADMIN — PROPOFOL 30 MG: 10 INJECTION, EMULSION INTRAVENOUS at 12:24

## 2024-05-02 ASSESSMENT — PAIN SCALES - GENERAL
PAINLEVEL_OUTOF10: 4
PAINLEVEL_OUTOF10: 7
PAINLEVEL_OUTOF10: 5
PAINLEVEL_OUTOF10: 4
PAINLEVEL_OUTOF10: 7

## 2024-05-02 ASSESSMENT — PAIN DESCRIPTION - LOCATION
LOCATION: VAGINA;INCISION
LOCATION: INCISION;VAGINA
LOCATION: VAGINA;INCISION

## 2024-05-02 ASSESSMENT — PAIN DESCRIPTION - DESCRIPTORS
DESCRIPTORS: SHARP
DESCRIPTORS: ACHING;DISCOMFORT
DESCRIPTORS: SHARP

## 2024-05-02 NOTE — OP NOTE
Operative Note      Patient: Aleja Helms  YOB: 1965  MRN: [id number]    Date of Procedure: 5/2/2024    Pre-Op Diagnosis Codes:     * Urge incontinence [N39.41]    Post-Op Diagnosis: Same       Procedure(s):  MEDTRONIC STAGE 1 AND 2  INTERSTIM    Surgeon(s):  Kong Jean MD    Assistant:   * No surgical staff found *    Anesthesia: General    Estimated Blood Loss (mL): Minimal    Complications: None    Specimens:   * No specimens in log *    Implants:  Implant Name Type Inv. Item Serial No.  Lot No. LRB No. Used Action   LEAD NERVE STIM 4.32 MM INTERSTIM SURESCAN - [id number]  LEAD NERVE STIM 4.32 MM INTERSTIM SURESCAN  [employer]WD [id number] Left 1 Implanted   POUCH TYRX NEURO ANTIMICROBIAL MED - [id number] Vascular/Graft/Patch/Filter POUCH TYRX NEURO ANTIMICROBIAL MED  [employer]-PMM [id number] Left 1 Implanted   NEUROSTIMULATOR INTERSTIM X RECHRGE FREE TORQ WRNCH PROD - [id number]  NEUROSTIMULATOR INTERSTIM X RECHRGE FREE TORQ WRNCH PROD [id number] [employer]WD  Left 1 Implanted         Drains: * No LDAs found *    Findings:  Infection Present At Time Of Surgery (PATOS) (choose all levels that have infection present):  No infection present  Other Findings: as above     Detailed Description of Procedure:   Interstim 1 and 2      Description: Stage I and II neuromodulator.     PREOPERATIVE DIAGNOSIS: Refractory urgency and frequency.     POSTOPERATIVE DIAGNOSIS: Refractory urgency and frequency.     OPERATION: Stage I and II neuromodulator.     ANESTHESIA: General anaesthesia.     ESTIMATED BLOOD LOSS: Minimal.     FLUIDS: Crystalloid. The patient was given Ancef preop antibiotic. Gentamicin irrigation was used throughout the procedure.     BRIEF HISTORY: Gibson Helms is a 59 y.o. female who presents here today for complaints of Follow-up (Lead removal. PNE done 2/20/24.)    Discussed other options since patient has multiple medical problems including : CKD , and

## 2024-05-02 NOTE — DISCHARGE INSTRUCTIONS
[facility]  Outpatient Discharge Instructions    To continue your care at home, please follow the instructions below and any additional discharge instructions given to you by your physician.    GENERAL ANESTHESIA:  Do not drive or operate machinery for 24hrs after discharge,  Do not drink alcohol, take tranquilizers, sleeping medication, or any other medication not directly instructed by your physician,   Do not make any important decisions or sign any legal documents for 24hrs after surgery,  Have someone with you for 24hrs after surgery to assist you as needed.    ACTIVITY:  Light activity for 24hrs,  No heavy lifting or exercise until instructed by your physician,  You may resume normal activities once instructed by your physician,  Special Instruction: ___________________________________________________________________    FLUIDS AND DIET:  An upset stomach or feeling sick (nausea) can commonly occur after surgery and/or pain medication use. To help minimize nausea:  Do not eat a heavy meal soon after your surgery,    Start with water or other clear liquids,  Advance to mild or bland items like Jell-O, dry toast, crackers, etc.,  Avoid caffeine,  Do not drink alcohol for at least 24 hours after surgery,  Your physician may prescribe anti-nausea medication if your nausea continues,  If you are free from nausea for 24hrs, you can advance to your normal diet as tolerated.    OPERATIVE SITE:  A small amount of bleeding or drainage after surgery is normal. Your physician will provide you with specific instructions on how to care for your surgical site and/or dressing.   Try not to touch your surgical site unless necessary,   Always wash your hands BEFORE and AFTER changing your dressing if instructed by your physician,   Proper handwashing includes wetting your hands with clean water, applying soap, lathering your hands by rubbing them together with soap for 20 seconds, rinsing them with clean water, and

## 2024-05-02 NOTE — ANESTHESIA PRE PROCEDURE
\"POCGLU\", \"POCNA\", \"POCK\", \"POCCL\", \"POCBUN\", \"POCHEMO\", \"POCHCT\" in the last 72 hours.    Coags:   Lab Results   Component Value Date/Time    PROTIME 12.4 05/21/2023 12:45 AM    INR 0.9 05/21/2023 12:45 AM    APTT 36.3 05/21/2023 12:45 AM       HCG (If Applicable): No results found for: \"PREGTESTUR\", \"PREGSERUM\", \"HCG\", \"HCGQUANT\"     ABGs:   Lab Results   Component Value Date/Time    PHART 7.402 12/20/2017 01:26 PM    PO2ART 85 12/20/2017 01:26 PM    ZIR3ZEO 40 12/20/2017 01:26 PM    WPM4PPN 25.0 12/20/2017 01:26 PM    BEART 0 12/20/2017 01:26 PM    F6CNDTNF 96 12/20/2017 01:26 PM        Type & Screen (If Applicable):  No results found for: \"LABABO\"    Drug/Infectious Status (If Applicable):  No results found for: \"HIV\", \"HEPCAB\"    COVID-19 Screening (If Applicable):   Lab Results   Component Value Date/Time    COVID19 DETECTED 12/22/2021 07:31 AM           Anesthesia Evaluation  Patient summary reviewed and Nursing notes reviewed   no history of anesthetic complications:   Airway: Mallampati: II  TM distance: >3 FB   Neck ROM: full  Mouth opening: > = 3 FB   Dental: normal exam         Pulmonary:Negative Pulmonary ROS and normal exam    (+)  COPD:                                     Cardiovascular:Negative CV ROS  Exercise tolerance: good (>4 METS)        ECG reviewed      Echocardiogram reviewed         Beta Blocker:  Not on Beta Blocker      ROS comment: March 2020 ECHO:  Normal LV and RV. EF 60%   No significant valve disease.   Normal estimated PA pressure.   Normal diastolic filling pattern.   Mildly dilated LA.       Neuro/Psych:   Negative Neuro/Psych ROS  (+) neuromuscular disease:, psychiatric history:depression/anxiety              ROS comment: Fibromyalgia GI/Hepatic/Renal: Neg GI/Hepatic/Renal ROS  (+) GERD:, PUD, renal disease: CRI          Endo/Other: Negative Endo/Other ROS             Pt had PAT visit.       Abdominal:             Vascular: negative vascular ROS.         Other Findings:

## 2024-05-02 NOTE — ANESTHESIA POSTPROCEDURE EVALUATION
Department of Anesthesiology  Postprocedure Note    Patient: Aleja Helms  MRN: [id number]  YOB: 1965  Date of evaluation: 5/2/2024    Procedure Summary       Date: 05/02/24 Room / Location: [address]    Anesthesia Start: 1148 Anesthesia Stop: 1252    Procedure: MEDTRONIC STAGE 1 AND 2  INTERSTIM Diagnosis:       Urge incontinence      (Urge incontinence [N39.41])    Surgeons: Kong Jean MD Responsible Provider: Artie Sharif MD    Anesthesia Type: MAC ASA Status: 3            Anesthesia Type: No value filed.    Benigno Phase I: Benigno Score: 10    Benigno Phase II:      Anesthesia Post Evaluation    Patient location during evaluation: PACU  Patient participation: complete - patient participated  Level of consciousness: awake  Pain score: 0  Airway patency: patent  Nausea & Vomiting: no nausea and no vomiting  Cardiovascular status: hemodynamically stable  Respiratory status: acceptable  Hydration status: euvolemic  Pain management: adequate        No notable events documented.

## 2024-05-02 NOTE — H&P
improved > 80 % . Wants to proceed with stage 1 and stage 2 interstim .     Sacral neuromodulation and PNE counseling     What I s It?  A Percutaneous Nerve Evaluation (PNE) is a test done in the office to determine if InterStim™ therapy  will improve your urinary symptoms. InterStim™ therapy is an FDA-approved treatment for urinary  urgency, frequency, urge incontinence, and retention. It is a small device that is implanted under the  skin of the upper buttocks. It works by gently stimulating the sacral nerves to help the bladder function  more normally.  Percutaneous Nerve Evaluation:  For the PNE, a temporary wire will be placed along side the third sacral nerve in your lower back. This  nerve controls bladder function. The wire is the size of a thick hair and will carry gentle electrical  pulses to the nerve. The wire will be inserted by myself Dr Jean using a local anesthetic. I  will know that the lead is in the correct position when you feel a tapping, pulsing, vibrating or tingling  sensation in the vaginal or rectal area. Once the wire is in the correct position, it will exit the skin and  be connected to a portable stimulator box that can be clipped to the waistband of your clothing. You  will be able to turn the stimulation up, down, on, and off with the portable stimulator.  For the next week, you will monitor changes in your symptoms. You will then follow-up in the office  to discuss changes in your urinary symptoms and to have the temporary wire removed. If your  symptoms have improved by at least 50 percent, you will be given the option to proceed with  InterStim™ placement. This procedure will be scheduled at the next available date convenient for you.  InterStim Placement:  A permanent wire (“lead”) will be inserted and attached to a small neurostimulator battery that will be  implanted under the skin in the upper buttock. This will be done in the operating room as an  outpatient procedure with a

## 2024-05-02 NOTE — PROGRESS NOTES
CLINICAL PHARMACY NOTE: MEDS TO BEDS    Total # of Prescriptions Filled: 3   The following medications were delivered to the patient:  Ibuprofen 800 mg Tab  Acetaminophen 500 mg Tab  Sulfameth-Trim 800-160 mg Tab    Additional Documentation:

## 2024-05-04 DIAGNOSIS — J30.2 SEASONAL ALLERGIC RHINITIS, UNSPECIFIED TRIGGER: ICD-10-CM

## 2024-05-06 RX ORDER — CHOLECALCIFEROL (VITAMIN D3) 50 MCG
TABLET ORAL
Qty: 30 TABLET | Refills: 3 | Status: SHIPPED | OUTPATIENT
Start: 2024-05-06

## 2024-05-06 RX ORDER — DIPHENHYDRAMINE HCL 25 MG
CAPSULE ORAL
Qty: 56 CAPSULE | Refills: 1 | Status: SHIPPED | OUTPATIENT
Start: 2024-05-06

## 2024-05-14 ENCOUNTER — PATIENT MESSAGE (OUTPATIENT)
Dept: NEUROLOGY | Age: 59
End: 2024-05-14

## 2024-05-14 ENCOUNTER — OFFICE VISIT (OUTPATIENT)
Dept: NEUROLOGY | Age: 59
End: 2024-05-14
Payer: COMMERCIAL

## 2024-05-14 VITALS
SYSTOLIC BLOOD PRESSURE: 102 MMHG | BODY MASS INDEX: 28.67 KG/M2 | WEIGHT: 167 LBS | HEART RATE: 79 BPM | DIASTOLIC BLOOD PRESSURE: 62 MMHG

## 2024-05-14 DIAGNOSIS — M47.22 CERVICAL SPONDYLOSIS WITH RADICULOPATHY: ICD-10-CM

## 2024-05-14 DIAGNOSIS — M76.62 ACHILLES BURSITIS OF LEFT LOWER EXTREMITY: ICD-10-CM

## 2024-05-14 DIAGNOSIS — M54.16 LUMBAR RADICULOPATHY: ICD-10-CM

## 2024-05-14 DIAGNOSIS — G62.9 POLYNEUROPATHY, UNSPECIFIED: ICD-10-CM

## 2024-05-14 DIAGNOSIS — G60.0 HEREDITARY MOTOR AND SENSORY NEUROPATHY: Primary | ICD-10-CM

## 2024-05-14 PROCEDURE — 99214 OFFICE O/P EST MOD 30 MIN: CPT | Performed by: PSYCHIATRY & NEUROLOGY

## 2024-05-14 PROCEDURE — G8427 DOCREV CUR MEDS BY ELIG CLIN: HCPCS | Performed by: PSYCHIATRY & NEUROLOGY

## 2024-05-14 PROCEDURE — 4004F PT TOBACCO SCREEN RCVD TLK: CPT | Performed by: PSYCHIATRY & NEUROLOGY

## 2024-05-14 PROCEDURE — G8419 CALC BMI OUT NRM PARAM NOF/U: HCPCS | Performed by: PSYCHIATRY & NEUROLOGY

## 2024-05-14 PROCEDURE — 3017F COLORECTAL CA SCREEN DOC REV: CPT | Performed by: PSYCHIATRY & NEUROLOGY

## 2024-05-14 NOTE — PROGRESS NOTES
Subjective:      Patient ID: Aleja Helms is a 59 y.o. female who presents today for:  Chief Complaint   Patient presents with    Follow-up     Pt states that she is doing good.  She states that her ankle and foot area are haivng a weakness and some additional pain.  She states that by 2-3 pm after doing things during the day it will hurt so bad it feels like she sprained it.        HPI 59-year-old right-handed female with a history of polyneuropathy with lumbar colopathy.  Patient is a high dose of Cymbalta at 120 mg.  When last seen she wanted to go back to gabapentin and Percocet which she had done and we had recommended a close follow-up patient is on gabapentin 800 mg 4 times a day.  She is on low-dose of oxycodone and when last seen we had increased her to 7.5 mg 3 a day at max.  Patient still on Cymbalta 120 mg.  She is doing good except for the foot pain.  The pain appears to be the ankle and she has a very tight tendon of the Achilles on the left.  This makes her feel weak and tripping.    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

## 2024-05-15 ENCOUNTER — OFFICE VISIT (OUTPATIENT)
Dept: OBGYN CLINIC | Age: 59
End: 2024-05-15

## 2024-05-15 VITALS
WEIGHT: 167 LBS | HEART RATE: 68 BPM | DIASTOLIC BLOOD PRESSURE: 56 MMHG | SYSTOLIC BLOOD PRESSURE: 96 MMHG | HEIGHT: 64 IN | BODY MASS INDEX: 28.51 KG/M2

## 2024-05-15 DIAGNOSIS — N39.41 URGE INCONTINENCE: Primary | ICD-10-CM

## 2024-05-15 PROCEDURE — 99024 POSTOP FOLLOW-UP VISIT: CPT | Performed by: OBSTETRICS & GYNECOLOGY

## 2024-05-15 SDOH — ECONOMIC STABILITY: FOOD INSECURITY: WITHIN THE PAST 12 MONTHS, THE FOOD YOU BOUGHT JUST DIDN'T LAST AND YOU DIDN'T HAVE MONEY TO GET MORE.: NEVER TRUE

## 2024-05-15 SDOH — ECONOMIC STABILITY: INCOME INSECURITY: HOW HARD IS IT FOR YOU TO PAY FOR THE VERY BASICS LIKE FOOD, HOUSING, MEDICAL CARE, AND HEATING?: NOT HARD AT ALL

## 2024-05-15 SDOH — ECONOMIC STABILITY: FOOD INSECURITY: WITHIN THE PAST 12 MONTHS, YOU WORRIED THAT YOUR FOOD WOULD RUN OUT BEFORE YOU GOT MONEY TO BUY MORE.: NEVER TRUE

## 2024-05-21 DIAGNOSIS — G62.9 POLYNEUROPATHY, UNSPECIFIED: ICD-10-CM

## 2024-05-21 RX ORDER — OXYCODONE AND ACETAMINOPHEN 7.5; 325 MG/1; MG/1
1 TABLET ORAL 3 TIMES DAILY
Qty: 90 TABLET | Refills: 0 | Status: SHIPPED | OUTPATIENT
Start: 2024-05-21 | End: 2024-06-20

## 2024-05-22 NOTE — TELEPHONE ENCOUNTER
Medication refused due to failing protocol. Needs appointment scheduled    Requested Prescriptions   Pending Prescriptions Disp Refills    ketoconazole (NIZORAL) 2 % cream 30 g 5     Sig: APPLY TOPICALLY TO THE AFFECTED AREA TWICE DAILY       Off-Protocol Failed - 5/22/2024  8:23 AM        Failed - Valid encounter within last 12 months     Recent Visits  No visits were found meeting these conditions.  Showing recent visits within past 720 days and meeting all other requirements  Future Appointments  No visits were found meeting these conditions.  Showing future appointments within next 150 days and meeting all other requirements                Failed - Medication not assigned to a protocol, review manually.            Patient called again. She is in a lot of pain and does not want to go to ER due to COVID. She can't deal with the pain and is asking if you are going to send something in for her. She doesn't know what to do.

## 2024-05-26 RX ORDER — CHOLECALCIFEROL (VITAMIN D3) 50 MCG
TABLET ORAL
Qty: 30 TABLET | Refills: 3 | OUTPATIENT
Start: 2024-05-26

## 2024-06-03 RX ORDER — CHOLECALCIFEROL (VITAMIN D3) 50 MCG
TABLET ORAL
Qty: 30 TABLET | Refills: 3 | Status: SHIPPED | OUTPATIENT
Start: 2024-06-03 | End: 2024-06-06

## 2024-06-06 ENCOUNTER — OFFICE VISIT (OUTPATIENT)
Dept: FAMILY MEDICINE CLINIC | Age: 59
End: 2024-06-06
Payer: COMMERCIAL

## 2024-06-06 VITALS
RESPIRATION RATE: 14 BRPM | HEIGHT: 64 IN | DIASTOLIC BLOOD PRESSURE: 60 MMHG | HEART RATE: 54 BPM | BODY MASS INDEX: 27.66 KG/M2 | WEIGHT: 162 LBS | OXYGEN SATURATION: 94 % | SYSTOLIC BLOOD PRESSURE: 102 MMHG

## 2024-06-06 DIAGNOSIS — F41.9 ANXIETY: ICD-10-CM

## 2024-06-06 DIAGNOSIS — F17.200 NICOTINE DEPENDENCE, UNCOMPLICATED, UNSPECIFIED NICOTINE PRODUCT TYPE: ICD-10-CM

## 2024-06-06 DIAGNOSIS — R53.83 FATIGUE, UNSPECIFIED TYPE: ICD-10-CM

## 2024-06-06 DIAGNOSIS — R73.9 HYPERGLYCEMIA: Primary | ICD-10-CM

## 2024-06-06 DIAGNOSIS — L88 PYODERMIC GANGRENOSUM: ICD-10-CM

## 2024-06-06 PROCEDURE — 4004F PT TOBACCO SCREEN RCVD TLK: CPT | Performed by: INTERNAL MEDICINE

## 2024-06-06 PROCEDURE — 96372 THER/PROPH/DIAG INJ SC/IM: CPT | Performed by: INTERNAL MEDICINE

## 2024-06-06 PROCEDURE — G8427 DOCREV CUR MEDS BY ELIG CLIN: HCPCS | Performed by: INTERNAL MEDICINE

## 2024-06-06 PROCEDURE — 3017F COLORECTAL CA SCREEN DOC REV: CPT | Performed by: INTERNAL MEDICINE

## 2024-06-06 PROCEDURE — 99214 OFFICE O/P EST MOD 30 MIN: CPT | Performed by: INTERNAL MEDICINE

## 2024-06-06 PROCEDURE — G8419 CALC BMI OUT NRM PARAM NOF/U: HCPCS | Performed by: INTERNAL MEDICINE

## 2024-06-06 RX ORDER — ESTRADIOL 0.1 MG/G
CREAM VAGINAL
COMMUNITY
Start: 2024-05-22

## 2024-06-06 RX ORDER — CYANOCOBALAMIN 1000 UG/ML
1000 INJECTION, SOLUTION INTRAMUSCULAR; SUBCUTANEOUS ONCE
Status: COMPLETED | OUTPATIENT
Start: 2024-06-06 | End: 2024-06-06

## 2024-06-06 RX ADMIN — CYANOCOBALAMIN 1000 MCG: 1000 INJECTION, SOLUTION INTRAMUSCULAR; SUBCUTANEOUS at 09:49

## 2024-06-06 NOTE — PROGRESS NOTES
years (40.0 ttl pk-yrs)     Types: Cigarettes    Smokeless tobacco: Never   Vaping Use    Vaping Use: Never used   Substance and Sexual Activity    Alcohol use: Never    Drug use: No    Sexual activity: Not Currently     Partners: Male   Other Topics Concern    Not on file   Social History Narrative    Not on file     Social Determinants of Health     Financial Resource Strain: Low Risk  (5/15/2024)    Overall Financial Resource Strain (CARDIA)     Difficulty of Paying Living Expenses: Not hard at all   Food Insecurity: No Food Insecurity (5/15/2024)    Hunger Vital Sign     Worried About Running Out of Food in the Last Year: Never true     Ran Out of Food in the Last Year: Never true   Transportation Needs: Unknown (5/15/2024)    PRAPARE - Transportation     Lack of Transportation (Medical): Not on file     Lack of Transportation (Non-Medical): No   Physical Activity: Not on file   Stress: Not on file   Social Connections: Not on file   Intimate Partner Violence: Not on file   Housing Stability: Unknown (5/15/2024)    Housing Stability Vital Sign     Unable to Pay for Housing in the Last Year: Not on file     Number of Places Lived in the Last Year: Not on file     Unstable Housing in the Last Year: No        Family History   Problem Relation Age of Onset    Arthritis Mother     High Cholesterol Mother     Immune Disorder Mother     Hearing Loss Sister     Cancer Maternal Grandfather        Vitals:    06/06/24 0830   BP: 102/60   Pulse: 54   Resp: 14   SpO2: 94%   Weight: 73.5 kg (162 lb)   Height: 1.626 m (5' 4\")       Estimated body mass index is 27.81 kg/m² as calculated from the following:    Height as of this encounter: 1.626 m (5' 4\").    Weight as of this encounter: 73.5 kg (162 lb).    Physical Exam  Constitutional:       General: She is not in acute distress.     Appearance: She is well-developed.   HENT:      Head: Normocephalic.      Right Ear: External ear normal.      Left Ear: External ear normal.

## 2024-06-10 ENCOUNTER — TELEPHONE (OUTPATIENT)
Dept: FAMILY MEDICINE CLINIC | Age: 59
End: 2024-06-10

## 2024-06-10 RX ORDER — IBUPROFEN 800 MG/1
800 TABLET ORAL DAILY PRN
Qty: 90 TABLET | Refills: 0 | Status: SHIPPED | OUTPATIENT
Start: 2024-06-10 | End: 2024-09-08

## 2024-06-10 NOTE — TELEPHONE ENCOUNTER
Patient called stating a script for Ibuprofen 800 mg was suppose to be sent to [facility]. They do not have it.      Thank you.

## 2024-06-18 DIAGNOSIS — R73.9 HYPERGLYCEMIA: ICD-10-CM

## 2024-06-18 LAB
ALBUMIN SERPL-MCNC: 4 G/DL (ref 3.5–4.6)
ALP SERPL-CCNC: 71 U/L (ref 40–130)
ALT SERPL-CCNC: 13 U/L (ref 0–33)
ANION GAP SERPL CALCULATED.3IONS-SCNC: 9 MEQ/L (ref 9–15)
AST SERPL-CCNC: 20 U/L (ref 0–35)
BILIRUB SERPL-MCNC: 0.3 MG/DL (ref 0.2–0.7)
BUN SERPL-MCNC: 14 MG/DL (ref 6–20)
CALCIUM SERPL-MCNC: 9.3 MG/DL (ref 8.5–9.9)
CHLORIDE SERPL-SCNC: 105 MEQ/L (ref 95–107)
CO2 SERPL-SCNC: 25 MEQ/L (ref 20–31)
CREAT SERPL-MCNC: 0.77 MG/DL (ref 0.5–0.9)
GLOBULIN SER CALC-MCNC: 3 G/DL (ref 2.3–3.5)
GLUCOSE SERPL-MCNC: 101 MG/DL (ref 70–99)
POTASSIUM SERPL-SCNC: 4.1 MEQ/L (ref 3.4–4.9)
PROT SERPL-MCNC: 7 G/DL (ref 6.3–8)
SODIUM SERPL-SCNC: 139 MEQ/L (ref 135–144)

## 2024-06-19 DIAGNOSIS — G62.9 POLYNEUROPATHY, UNSPECIFIED: ICD-10-CM

## 2024-06-19 LAB
ESTIMATED AVERAGE GLUCOSE: 120 MG/DL
HBA1C MFR BLD: 5.8 % (ref 4–6)

## 2024-06-19 NOTE — TELEPHONE ENCOUNTER
Requested Prescriptions     Pending Prescriptions Disp Refills    oxyCODONE-acetaminophen (PERCOCET) 7.5-325 MG per tablet 90 tablet 0     Sig: Take 1 tablet by mouth in the morning, at noon, and at bedtime for 30 days. Max Daily Amount: 3 tablets

## 2024-06-20 RX ORDER — OXYCODONE AND ACETAMINOPHEN 7.5; 325 MG/1; MG/1
1 TABLET ORAL 3 TIMES DAILY
Qty: 90 TABLET | Refills: 0 | Status: SHIPPED | OUTPATIENT
Start: 2024-06-20 | End: 2024-07-20

## 2024-06-22 DIAGNOSIS — J30.2 SEASONAL ALLERGIC RHINITIS, UNSPECIFIED TRIGGER: ICD-10-CM

## 2024-06-24 RX ORDER — DIPHENHYDRAMINE HCL 25 MG
CAPSULE ORAL
Qty: 56 CAPSULE | Refills: 0 | Status: SHIPPED | OUTPATIENT
Start: 2024-06-24

## 2024-06-24 NOTE — TELEPHONE ENCOUNTER
Future Appointments    Encounter Information   Provider Department Appt Notes   9/17/2024 Usama Mata MD [facility] Neurology 4 mo fup   12/2/2024 Tariq Lee MD [facility] Primary and Specialty Care Follow up     Past Visits    Date Provider Specialty Visit Type Primary Dx   06/06/2024 Tariq Lee MD Family Medicine Office Visit Hyperglycemia   05/16/2024 Kong Jean MD IP Unit Surgery    05/15/2024 Kong Jean MD Obstetrics and Gynecology Office Visit Urge incontinence   05/14/2024 Usama Mata MD Neurology Office Visit Hereditary motor and sensory neuropathy   05/02/2024 Kong Jean MD IP Unit Surgery

## 2024-07-01 RX ORDER — TRAZODONE HYDROCHLORIDE 100 MG/1
100 TABLET ORAL NIGHTLY
Qty: 30 TABLET | Refills: 2 | Status: SHIPPED | OUTPATIENT
Start: 2024-07-01 | End: 2024-09-29

## 2024-07-01 NOTE — TELEPHONE ENCOUNTER
Pharmacy is  requesting medication refill. Please approve or deny this request.    Rx requested:  Requested Prescriptions     Pending Prescriptions Disp Refills    traZODone (DESYREL) 100 MG tablet [Pharmacy Med Name: TRAZODONE  MG TABS 100 Tablet] 30 tablet 2     Sig: TAKE 1 TABLET BY MOUTH NIGHTLY         Last Office Visit:   5/14/2024      Next Visit Date:  Future Appointments   Date Time Provider Department Center   9/17/2024  2:00 PM Usama Mata MD [place] [facility] Neurology -   12/2/2024  1:30 PM [facility]

## 2024-07-15 DIAGNOSIS — G62.9 POLYNEUROPATHY, UNSPECIFIED: ICD-10-CM

## 2024-07-15 RX ORDER — OXYCODONE AND ACETAMINOPHEN 7.5; 325 MG/1; MG/1
1 TABLET ORAL 3 TIMES DAILY
Qty: 90 TABLET | Refills: 0 | Status: SHIPPED | OUTPATIENT
Start: 2024-07-15 | End: 2024-08-14

## 2024-07-15 NOTE — TELEPHONE ENCOUNTER
Patient is  requesting medication refill. Please approve or deny this request.    Rx requested:  Requested Prescriptions     Pending Prescriptions Disp Refills    oxyCODONE-acetaminophen (PERCOCET) 7.5-325 MG per tablet 90 tablet 0     Sig: Take 1 tablet by mouth in the morning, at noon, and at bedtime for 30 days. Max Daily Amount: 3 tablets         Last Office Visit:   5/14/2024      Next Visit Date:  Future Appointments   Date Time Provider Department Center   7/18/2024 10:30 AM Leonid Hubbard PA-C [facility] [facility]   9/17/2024  2:00 PM Usama Mata MD [place] [facility] Neurology -   12/2/2024  1:30 PM Tariq Lee MD [facility]

## 2024-07-18 ENCOUNTER — HOSPITAL ENCOUNTER (OUTPATIENT)
Dept: ORTHOPEDIC SURGERY | Age: 59
Discharge: HOME | End: 2024-07-20
Payer: COMMERCIAL

## 2024-07-18 ENCOUNTER — OFFICE VISIT (OUTPATIENT)
Dept: ORTHOPEDIC SURGERY | Age: 59
End: 2024-07-18

## 2024-07-18 VITALS
TEMPERATURE: 97.3 F | BODY MASS INDEX: 27.66 KG/M2 | WEIGHT: 162 LBS | HEIGHT: 64 IN | HEART RATE: 55 BPM | OXYGEN SATURATION: 94 %

## 2024-07-18 DIAGNOSIS — M25.571 PAIN IN RIGHT ANKLE AND JOINTS OF RIGHT FOOT: ICD-10-CM

## 2024-07-18 DIAGNOSIS — R52 PAIN: ICD-10-CM

## 2024-07-18 DIAGNOSIS — M25.571 PAIN IN RIGHT ANKLE AND JOINTS OF RIGHT FOOT: Primary | ICD-10-CM

## 2024-07-18 PROCEDURE — 73630 X-RAY EXAM OF FOOT: CPT

## 2024-07-18 RX ORDER — LIDOCAINE HYDROCHLORIDE 10 MG/ML
1 INJECTION, SOLUTION INFILTRATION; PERINEURAL ONCE
Status: COMPLETED | OUTPATIENT
Start: 2024-07-18 | End: 2024-07-18

## 2024-07-18 RX ORDER — MULTIVIT-MIN/IRON/FOLIC ACID/K 18-600-40
CAPSULE ORAL
COMMUNITY
Start: 2024-06-19

## 2024-07-18 RX ORDER — METHYLPREDNISOLONE ACETATE 80 MG/ML
80 INJECTION, SUSPENSION INTRA-ARTICULAR; INTRALESIONAL; INTRAMUSCULAR; SOFT TISSUE ONCE
Status: COMPLETED | OUTPATIENT
Start: 2024-07-18 | End: 2024-07-18

## 2024-07-18 RX ADMIN — METHYLPREDNISOLONE ACETATE 80 MG: 80 INJECTION, SUSPENSION INTRA-ARTICULAR; INTRALESIONAL; INTRAMUSCULAR; SOFT TISSUE at 15:37

## 2024-07-18 RX ADMIN — LIDOCAINE HYDROCHLORIDE 1 ML: 10 INJECTION, SOLUTION INFILTRATION; PERINEURAL at 15:36

## 2024-07-18 RX ADMIN — LIDOCAINE HYDROCHLORIDE 1 ML: 10 INJECTION, SOLUTION INFILTRATION; PERINEURAL at 15:38

## 2024-07-18 RX ADMIN — METHYLPREDNISOLONE ACETATE 80 MG: 80 INJECTION, SUSPENSION INTRA-ARTICULAR; INTRALESIONAL; INTRAMUSCULAR; SOFT TISSUE at 15:35

## 2024-07-18 NOTE — PROGRESS NOTES
Meniscoid superior labrum.  No definite tear.     GLENOHUMERAL JOINT: Mild chondral thinning.  Subchondral cystic change seen  in the superior glenoid rim.  No significant joint effusion.     AC JOINT AND ACROMIOCLAVICULAR ARCH: Type 2 acromial morphology.  Mild  downsloping of the acromion.  Intact acromioclavicular and coracoclavicular  ligaments.  No significant subacromial/subdeltoid bursitis.     BONE MARROW: No evidence of fracture. Normal marrow signal.     OUTLET SPACES: Normal MRI appearance of the quadrilateral space.   No  significant narrowing of the supraspinatus outlet.     IMPRESSION:  1. Full-thickness tear of the supraspinatus tendon, with advanced retraction  to the proximal humeral head level.  Advanced muscular volume loss with fat  replacement.  2. Advanced tendinosis and high-grade partial-thickness tearing of the  infraspinatus tendon.  Intact fibers suspected.  3. Mild glenohumeral and acromioclavicular osteoarthrosis    FINDINGS:  MOTOR NERVE CONDUCTION STUDIES:  Motor nerve conduction study of the  right median nerve shows normal amplitudes, latency, and conduction  velocity.  Motor nerve conduction study of the left median nerve shows  normal amplitudes, latency, and conduction velocity.  Motor nerve  conduction study of the right ulnar nerve shows normal amplitudes,  latency, and conduction velocity.  Motor nerve conduction study of the  left ulnar nerve shows normal amplitudes, latency, and conduction  velocity.  F-wave responses are normal.     SENSORY NERVE CONDUCTION STUDIES:  Sensory nerve conduction study of the  right median nerve recorded in digit 2 shows normal peak latencies,  borderline amplitudes, and normal conduction velocity.  Further mid palm  stimulation is obtained to confirm findings and shows normal peak  latency, normal amplitudes, and mildly decreased conduction velocity.   The left median digit 2 examination shows normal amplitudes, latency,  and conduction

## 2024-08-05 RX ORDER — CYCLOBENZAPRINE HCL 5 MG
5 TABLET ORAL 2 TIMES DAILY PRN
Qty: 28 TABLET | Refills: 0 | Status: SHIPPED | OUTPATIENT
Start: 2024-08-05 | End: 2024-08-19

## 2024-08-05 NOTE — TELEPHONE ENCOUNTER
Please approve or deny request. Thank you!    Rx requested:  Requested Prescriptions     Pending Prescriptions Disp Refills    cyclobenzaprine (FLEXERIL) 5 MG tablet [Pharmacy Med Name: CYCLOBENZAPRINE HCL 5 MG TA 5 Tablet] 28 tablet 0     Sig: TAKE 1 TABLET BY MOUTH 2 TIMES DAILY AS NEEDED FOR MUSCLE SPASMS         Last Office Visit:   6/6/2024      Next Visit Date:  Future Appointments   Date Time Provider Department Center   9/17/2024  2:00 PM Usama Mata MD [place] [facility] Neurology -   12/2/2024  1:30 PM Tariq Lee MD [facility] DEP

## 2024-08-06 PROBLEM — J44.9 COPD (CHRONIC OBSTRUCTIVE PULMONARY DISEASE) (MULTI): Status: ACTIVE | Noted: 2019-10-23

## 2024-08-06 PROBLEM — A41.3: Status: ACTIVE | Noted: 2017-12-22

## 2024-08-06 PROBLEM — R53.83 FATIGUE: Status: ACTIVE | Noted: 2021-01-11

## 2024-08-06 PROBLEM — Z85.6 PERSONAL HISTORY OF LEUKEMIA: Status: ACTIVE | Noted: 2017-07-03

## 2024-08-06 PROBLEM — E66.9 OBESITY: Status: ACTIVE | Noted: 2017-05-01

## 2024-08-06 PROBLEM — H26.491 PCO (POSTERIOR CAPSULAR OPACIFICATION), RIGHT: Status: ACTIVE | Noted: 2021-09-15

## 2024-08-06 PROBLEM — K51.90 ULCERATIVE COLITIS (MULTI): Status: ACTIVE | Noted: 2021-01-11

## 2024-08-06 PROBLEM — M21.371 BILATERAL FOOT-DROP: Status: ACTIVE | Noted: 2023-09-05

## 2024-08-06 PROBLEM — M62.81 MUSCLE WEAKNESS (GENERALIZED): Status: ACTIVE | Noted: 2017-05-02

## 2024-08-06 PROBLEM — E87.6 HYPOKALEMIA: Status: ACTIVE | Noted: 2017-05-02

## 2024-08-06 PROBLEM — M54.50 LOW BACK PAIN: Status: ACTIVE | Noted: 2021-01-14

## 2024-08-06 PROBLEM — A49.02 METHICILLIN RESISTANT STAPHYLOCOCCUS AUREUS INFECTION: Status: ACTIVE | Noted: 2017-07-03

## 2024-08-06 PROBLEM — F41.9 ANXIETY DISORDER, UNSPECIFIED: Status: ACTIVE | Noted: 2018-08-23

## 2024-08-06 PROBLEM — L03.90 CELLULITIS: Status: ACTIVE | Noted: 2017-04-04

## 2024-08-06 PROBLEM — H04.123 DRY EYE SYNDROME OF BILATERAL LACRIMAL GLANDS: Status: ACTIVE | Noted: 2020-07-23

## 2024-08-06 PROBLEM — G47.00 INSOMNIA: Status: ACTIVE | Noted: 2024-08-06

## 2024-08-06 PROBLEM — R73.02 GLUCOSE INTOLERANCE (IMPAIRED GLUCOSE TOLERANCE): Status: ACTIVE | Noted: 2021-01-11

## 2024-08-06 PROBLEM — N18.9 CHRONIC KIDNEY DISEASE, UNSPECIFIED: Status: ACTIVE | Noted: 2017-05-02

## 2024-08-06 PROBLEM — S01.511A LIP LACERATION: Status: ACTIVE | Noted: 2024-02-16

## 2024-08-06 PROBLEM — F33.1 MAJOR DEPRESSIVE DISORDER, RECURRENT EPISODE, MODERATE (MULTI): Status: ACTIVE | Noted: 2019-01-23

## 2024-08-06 PROBLEM — L88 PYODERMA GANGRENOSUM (MULTI): Status: ACTIVE | Noted: 2017-03-06

## 2024-08-06 PROBLEM — U07.1 PNEUMONIA DUE TO COVID-19 VIRUS: Status: ACTIVE | Noted: 2024-08-06

## 2024-08-06 PROBLEM — M25.512 PAIN IN LEFT SHOULDER: Status: ACTIVE | Noted: 2017-06-06

## 2024-08-06 PROBLEM — K13.0 LIP DEFORMITY, ACQUIRED: Status: ACTIVE | Noted: 2024-02-16

## 2024-08-06 PROBLEM — D64.9 ANEMIA: Status: ACTIVE | Noted: 2017-05-01

## 2024-08-06 PROBLEM — M21.372 BILATERAL FOOT-DROP: Status: ACTIVE | Noted: 2023-09-05

## 2024-08-06 PROBLEM — S91.001A OPEN WOUND OF RIGHT ANKLE: Status: ACTIVE | Noted: 2017-03-27

## 2024-08-06 PROBLEM — R10.12 LUQ PAIN: Status: ACTIVE | Noted: 2019-10-23

## 2024-08-06 PROBLEM — R26.2 DIFFICULTY IN WALKING, NOT ELSEWHERE CLASSIFIED: Status: ACTIVE | Noted: 2017-05-02

## 2024-08-06 PROBLEM — S31.809A OPEN WOUND OF BUTTOCK: Status: ACTIVE | Noted: 2017-03-27

## 2024-08-06 PROBLEM — F32.A DEPRESSION: Status: ACTIVE | Noted: 2019-10-23

## 2024-08-06 PROBLEM — N17.9 ACUTE KIDNEY FAILURE, UNSPECIFIED (CMS-HCC): Status: ACTIVE | Noted: 2017-05-02

## 2024-08-06 PROBLEM — M23.92 UNSPECIFIED INTERNAL DERANGEMENT OF LEFT KNEE: Status: ACTIVE | Noted: 2018-04-18

## 2024-08-06 PROBLEM — R50.9 FEVER: Status: ACTIVE | Noted: 2017-08-19

## 2024-08-06 PROBLEM — R35.0 INCREASED FREQUENCY OF URINATION: Status: ACTIVE | Noted: 2021-08-31

## 2024-08-06 PROBLEM — N94.89 VULVAR BURNING: Status: ACTIVE | Noted: 2024-08-06
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
## 2024-08-06 PROBLEM — L98.9 INFLAMMATORY DERMATOSIS: Status: ACTIVE | Noted: 2021-01-11

## 2024-08-06 PROBLEM — M47.812 SPONDYLOSIS OF CERVICAL SPINE: Status: ACTIVE | Noted: 2021-06-25

## 2024-08-06 PROBLEM — G62.9 POLYNEUROPATHY, UNSPECIFIED: Status: ACTIVE | Noted: 2018-08-23

## 2024-08-06 PROBLEM — K92.1 HEMATOCHEZIA: Status: ACTIVE | Noted: 2019-10-23

## 2024-08-06 PROBLEM — R29.898 LEG WEAKNESS, BILATERAL: Status: ACTIVE | Noted: 2020-02-18

## 2024-08-06 PROBLEM — E87.20 ACIDOSIS: Status: ACTIVE | Noted: 2017-05-28

## 2024-08-06 PROBLEM — M17.10 UNILATERAL PRIMARY OSTEOARTHRITIS, UNSPECIFIED KNEE: Status: ACTIVE | Noted: 2018-02-02

## 2024-08-06 PROBLEM — R87.622 PAPANICOLAOU SMEAR OF VAGINA WITH LOW GRADE SQUAMOUS INTRAEPITHELIAL LESION (LGSIL): Status: ACTIVE | Noted: 2021-01-11

## 2024-08-06 PROBLEM — G93.41 METABOLIC ENCEPHALOPATHY: Status: ACTIVE | Noted: 2021-01-14

## 2024-08-06 PROBLEM — G70.9: Status: ACTIVE | Noted: 2017-05-02

## 2024-08-06 PROBLEM — G25.81 RESTLESS LEG SYNDROME: Status: ACTIVE | Noted: 2021-01-14

## 2024-08-06 PROBLEM — H52.4 PRESBYOPIA: Status: ACTIVE | Noted: 2020-07-23

## 2024-08-06 PROBLEM — R13.10 DYSPHAGIA: Status: ACTIVE | Noted: 2024-08-06

## 2024-08-06 PROBLEM — N32.81 OVERACTIVE BLADDER: Status: ACTIVE | Noted: 2021-08-31

## 2024-08-06 PROBLEM — L03.115 CELLULITIS OF RIGHT LEG: Status: ACTIVE | Noted: 2017-04-28

## 2024-08-06 PROBLEM — N39.44 NOCTURNAL ENURESIS: Status: ACTIVE | Noted: 2024-08-06

## 2024-08-06 PROBLEM — R10.13 DYSPEPSIA: Status: ACTIVE | Noted: 2019-10-23

## 2024-08-06 PROBLEM — L03.012 CELLULITIS OF LEFT THUMB: Status: ACTIVE | Noted: 2017-08-19

## 2024-08-06 PROBLEM — Z22.322 CARRIER OR SUSPECTED CARRIER OF METHICILLIN RESISTANT STAPHYLOCOCCUS AUREUS: Status: ACTIVE | Noted: 2017-08-16

## 2024-08-06 PROBLEM — L08.0 PYODERMA: Status: ACTIVE | Noted: 2018-08-23

## 2024-08-06 PROBLEM — S83.412A SPRAIN OF MEDIAL COLLATERAL LIGAMENT OF LEFT KNEE: Status: ACTIVE | Noted: 2018-02-02

## 2024-08-06 PROBLEM — G60.0 HEREDITARY MOTOR AND SENSORY NEUROPATHY: Status: ACTIVE | Noted: 2021-01-14

## 2024-08-06 PROBLEM — F33.9 MAJOR DEPRESSIVE DISORDER, RECURRENT, UNSPECIFIED (CMS-HCC): Status: ACTIVE | Noted: 2017-05-02

## 2024-08-06 PROBLEM — R20.0 NUMBNESS AND TINGLING IN BOTH HANDS: Status: ACTIVE | Noted: 2023-11-26

## 2024-08-06 PROBLEM — K59.09 CHRONIC CONSTIPATION: Status: ACTIVE | Noted: 2021-01-11

## 2024-08-06 PROBLEM — R27.8 OTHER LACK OF COORDINATION: Status: ACTIVE | Noted: 2017-05-02

## 2024-08-06 PROBLEM — J12.82 PNEUMONIA DUE TO COVID-19 VIRUS: Status: ACTIVE | Noted: 2024-08-06

## 2024-08-06 PROBLEM — R20.2 NUMBNESS AND TINGLING IN BOTH HANDS: Status: ACTIVE | Noted: 2023-11-26

## 2024-08-06 PROBLEM — M54.16 LUMBAR RADICULOPATHY: Status: ACTIVE | Noted: 2021-01-18

## 2024-08-06 PROBLEM — D84.9 IMMUNODEFICIENCY, UNSPECIFIED (MULTI): Status: ACTIVE | Noted: 2017-07-03

## 2024-08-06 PROBLEM — R78.81 BACTEREMIA: Status: ACTIVE | Noted: 2017-05-28

## 2024-08-06 RX ORDER — CYCLOBENZAPRINE HCL 5 MG
1 TABLET ORAL 2 TIMES DAILY PRN
COMMUNITY
Start: 2024-08-05 | End: 2024-08-19

## 2024-08-06 RX ORDER — DULOXETIN HYDROCHLORIDE 60 MG/1
2 CAPSULE, DELAYED RELEASE ORAL EVERY EVENING
COMMUNITY
Start: 2024-01-15

## 2024-08-06 RX ORDER — ONDANSETRON 4 MG/1
4 TABLET, FILM COATED ORAL
COMMUNITY
Start: 2024-03-13

## 2024-08-06 RX ORDER — TRAZODONE HYDROCHLORIDE 100 MG/1
1 TABLET ORAL NIGHTLY
COMMUNITY
Start: 2024-07-01 | End: 2024-09-29

## 2024-08-06 RX ORDER — ESTRADIOL 0.1 MG/G
CREAM VAGINAL
COMMUNITY
Start: 2024-05-22

## 2024-08-06 RX ORDER — OXYCODONE AND ACETAMINOPHEN 7.5; 325 MG/1; MG/1
1 TABLET ORAL 3 TIMES DAILY
COMMUNITY
Start: 2024-07-15 | End: 2024-08-14

## 2024-08-06 RX ORDER — ACETAMINOPHEN 500 MG
TABLET ORAL
COMMUNITY
Start: 2024-06-19

## 2024-08-07 ENCOUNTER — TELEPHONE (OUTPATIENT)
Dept: FAMILY MEDICINE CLINIC | Age: 59
End: 2024-08-07

## 2024-08-07 NOTE — TELEPHONE ENCOUNTER
Patient called checking status of order for gauze pads and bandages from [employer]. These are the supplies the patient uses for her right heel.      Thank you.

## 2024-08-12 DIAGNOSIS — G62.9 POLYNEUROPATHY, UNSPECIFIED: ICD-10-CM

## 2024-08-12 NOTE — TELEPHONE ENCOUNTER
Requesting medication refill. Please approve or deny this request.    Rx requested:  Requested Prescriptions     Pending Prescriptions Disp Refills    oxyCODONE-acetaminophen (PERCOCET) 7.5-325 MG per tablet 90 tablet 0     Sig: Take 1 tablet by mouth in the morning, at noon, and at bedtime for 30 days. Max Daily Amount: 3 tablets         Last Office Visit:   5/14/2024      Next Visit Date:  Future Appointments   Date Time Provider Department Center   9/17/2024  2:00 PM Usama Mata MD [place] [facility] Neurology -   12/2/2024  1:30 PM Tariq Lee MD [facility]               Last refill 7/15/24. Please approve or deny.

## 2024-08-13 RX ORDER — OXYCODONE AND ACETAMINOPHEN 7.5; 325 MG/1; MG/1
1 TABLET ORAL 3 TIMES DAILY
Qty: 90 TABLET | Refills: 0 | Status: SHIPPED | OUTPATIENT
Start: 2024-08-13 | End: 2024-09-12

## 2024-08-14 RX ORDER — ESTRADIOL 0.1 MG/G
CREAM VAGINAL
Qty: 42.5 G | Refills: 6 | Status: SHIPPED | OUTPATIENT
Start: 2024-08-14

## 2024-08-20 ENCOUNTER — HOSPITAL ENCOUNTER (OUTPATIENT)
Dept: RADIOLOGY | Facility: CLINIC | Age: 59
Discharge: HOME | End: 2024-08-20
Payer: COMMERCIAL

## 2024-08-20 ENCOUNTER — OFFICE VISIT (OUTPATIENT)
Dept: ORTHOPEDIC SURGERY | Facility: CLINIC | Age: 59
End: 2024-08-20
Payer: COMMERCIAL

## 2024-08-20 DIAGNOSIS — M17.11 PRIMARY OSTEOARTHRITIS OF RIGHT KNEE: Primary | ICD-10-CM

## 2024-08-20 DIAGNOSIS — M25.561 RIGHT KNEE PAIN, UNSPECIFIED CHRONICITY: ICD-10-CM

## 2024-08-20 PROCEDURE — 73564 X-RAY EXAM KNEE 4 OR MORE: CPT | Mod: RT

## 2024-08-20 PROCEDURE — 99214 OFFICE O/P EST MOD 30 MIN: CPT | Performed by: ORTHOPAEDIC SURGERY

## 2024-08-20 PROCEDURE — 73564 X-RAY EXAM KNEE 4 OR MORE: CPT | Mod: RIGHT SIDE | Performed by: ORTHOPAEDIC SURGERY

## 2024-08-20 RX ORDER — MELOXICAM 15 MG/1
15 TABLET ORAL
Qty: 30 TABLET | Refills: 0 | Status: SHIPPED | OUTPATIENT
Start: 2024-08-20

## 2024-08-21 ENCOUNTER — OFFICE VISIT (OUTPATIENT)
Dept: OBGYN CLINIC | Age: 59
End: 2024-08-21
Payer: COMMERCIAL

## 2024-08-21 VITALS
HEART RATE: 72 BPM | WEIGHT: 160 LBS | SYSTOLIC BLOOD PRESSURE: 118 MMHG | HEIGHT: 64 IN | DIASTOLIC BLOOD PRESSURE: 74 MMHG | BODY MASS INDEX: 27.31 KG/M2

## 2024-08-21 DIAGNOSIS — N39.41 URGE INCONTINENCE: Primary | ICD-10-CM

## 2024-08-21 PROCEDURE — G8419 CALC BMI OUT NRM PARAM NOF/U: HCPCS | Performed by: OBSTETRICS & GYNECOLOGY

## 2024-08-21 PROCEDURE — 3017F COLORECTAL CA SCREEN DOC REV: CPT | Performed by: OBSTETRICS & GYNECOLOGY

## 2024-08-21 PROCEDURE — 99213 OFFICE O/P EST LOW 20 MIN: CPT | Performed by: OBSTETRICS & GYNECOLOGY

## 2024-08-21 PROCEDURE — G8427 DOCREV CUR MEDS BY ELIG CLIN: HCPCS | Performed by: OBSTETRICS & GYNECOLOGY

## 2024-08-21 PROCEDURE — 4004F PT TOBACCO SCREEN RCVD TLK: CPT | Performed by: OBSTETRICS & GYNECOLOGY

## 2024-08-21 RX ORDER — MELOXICAM 15 MG/1
TABLET ORAL
COMMUNITY
Start: 2024-08-20

## 2024-08-21 NOTE — PROGRESS NOTES
Aleja Helms is a 59 y.o. female who presents here today for complaints of Other (C/o perineum pain from Interstim)      .      Vitals:  /74 (Site: Right Upper Arm, Position: Sitting, Cuff Size: Medium Adult)   Pulse 72   Ht 1.626 m (5' 4\")   Wt 72.6 kg (160 lb)   LMP 1997 (Exact Date) Comment: hysterectomy  BMI 27.46 kg/m²   Allergies:  Amoxicillin-pot clavulanate, Amoxicillin, and Other  Past Medical History:   Diagnosis Date    Anxiety     Arthritis     Cancer (HCC) 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    Chronic back pain     Chronic kidney disease     COPD exacerbation (HCC)     Depression     Disease of blood and blood forming organ 2017    neutropenia    Fibromyalgia     GERD (gastroesophageal reflux disease)     History of blood transfusion     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    Neuromuscular disorder (HCC)     Osteoarthritis     Overactive bladder     Pneumonia due to organism     Pyoderma gangrenosa     Sweet syndrome     Ulcerative colitis (HCC)      Past Surgical History:   Procedure Laterality Date    ABDOMEN SURGERY      sleen repair    APPENDECTOMY      BACK SURGERY      BREAST SURGERY Bilateral     fatty tumor removed, benign    COLONOSCOPY      COSMETIC SURGERY      tummy tuck    COSMETIC SURGERY  2024    upper lip    EYE SURGERY      bilateral cataract surgery    HYSTERECTOMY (CERVIX STATUS UNKNOWN)      OVARY REMOVAL Right     SKIN BIOPSY      SPINAL FUSION      STIMULATOR SURGERY N/A 2024    MEDTRONIC STAGE 1 AND 2  INTERSTIM performed by Kong Jean MD at [facility]    TONSILLECTOMY       OB History          2    Para   2    Term   2            AB        Living             SAB        IAB        Ectopic        Molar        Multiple        Live Births   2              Family History   Problem Relation Age of Onset    Arthritis Mother     High Cholesterol Mother     Immune Disorder Mother     Hearing Loss Sister     Cancer

## 2024-08-21 NOTE — PROGRESS NOTES
59-year-old female seen in remote past presents complaining of right-sided knee pain she states she twisted her knee about a month ago she had trouble getting appointment so she ended up going to [facility] they gave her some anti-inflammatory medication she states she is doing a little bit better now she states has had knee trouble in the past had x-rays seen here for her knees in the remote past has been told she has significant knee arthritis she states she feels like her knee wants to give out on her    Location of pain: Anterior and medial aspect knee right  Quality of pain: Chronic pain for many years but worse after she twisted her knee  Modifying factors: Worse when she walks on uneven ground or stands for prolonged period time  Associated signs and symptoms: Some swelling some popping clicking some episodes of giving way  Previous treatment: She has had conservative treatment anti-inflammatory medication cortisone injection in the past        The patient's past medical history, family history, social history, and review of systems were documented on the patient's medical intake form.  The medical intake form was reviewed and scanned into the electronic medical record for future use.  History is otherwise negative except as stated in the HPI.    Physical exam    General: Alert and oriented to place, person, and time.  No acute distress and breathing comfortably; pleasant and cooperative with the examination.  HEENT: Head is normocephalic and atraumatic.  Neck: Supple, no visible swelling.  Cardiovascular: Good perfusion to the affected extremity.  Lungs: No audible wheezing or labored breathing.  Abdomen: Nondistended  HEME/Lymph : No visible abnormalities bilateral lower extremity    Extremity:  The affected knee was examined and inspected and was tender to touch along the medial aspect.  The patient has catching/locking and occasional mechanical symptoms.  The skin was intact without breakdown or open  wounds.  There was a mild Torres exam seen with mild evidence of instability and weakness in the collateral ligaments with laxity to varus valgus stress and in the anterior posterior plane.  There was a negative Lachman's test, pivot shift test, and posterior drawer sign.  There was no foot drop, numbness or tingling.  Sensation, reflexes, and pulses in the foot and ankle were present.  There was an effusion but range of motion was good and straight leg raise testing was normal.   The patient had the ability to bear weight but with discomfort.  The patient's gait was antalgic secondary to the discomfort. Knee range of motion was 5-115    Diagnostics:      XR knee right 4+ views    Result Date: 8/20/2024  Interpreted By:  Dominic Renee, STUDY: XR KNEE RIGHT 4+ VIEWS; 8/20/2024 2:01 pm   INDICATION: Signs/Symptoms:pain.   ACCESSION NUMBER(S): [id number]   ORDERING CLINICIAN: DOMINIC RENEE   FINDINGS: Right knee weightbearing four views. Moderate knee arthritis with joint space narrowing and osteophyte formation moderate knee effusion no signs of fracture dislocation or other bony abnormality     Signed by: Dominic Renee 8/20/2024 4:20 PM Dictation workstation:   [id number]    XR knee right 3 views    Result Date: 8/11/2024  EXAMINATION: XR KNEE RT ANY 4 OR MORE VIEWSPRO/RT   08/09/2024 02:49 PM CLINICAL HISTORY: right knee pain ASSOCIATED DIAGNOSIS: Acute pain of right knee Internal derangement of right knee ORDERING PROVIDER: JIGNA DORMAN TECHNJON NOTE: COMPARISON: None Impression: No evidence of fracture or dislocation. Moderate joint effusion is noted. Joint spaces are normal. Right knee MACRO: None         Procedures  [none   ]    Assessment:  Acute exacerbation right knee arthritis    Treatment plan:  1.  The natural history of the condition and its associated treatment alternatives including surgical and nonsurgical options were discussed with the patient at length.  2.  We discussed  surgical nonsurgical option patient having significant impairment related to her right knee she has had some improvement with the meloxicam which was prescribed by the emergency room she like to get a refill on her medication prescription sent to pharmacy.  She is also interested in viscosupplementation were to go and submit for authorization  3.  In conclusion, this patient has osteoarthritis of the knee which is symptomatic.  This is causing significant morning stiffness lasting over an hour.  The patient has popping clicking and grinding in the knee with range of motion that is decreased and gets worse with prolonged standing or going up and down stairs.  This affects functional activities and activities of daily living.  There is radiographic evidence of osteoarthritis with joint space narrowing and marginal osteophyte formation.  This patient has also failed nonpharmacologic treatment for osteoarthritis including attempts at weight loss, and a home exercise program with or without physical therapy.  The patient has also failed pharmacologic treatments for osteoarthritis including over-the-counter analgesics, anti-inflammatory medication as well as injectable treatments.  For these reasons I feel the patient is a candidate for Visco supplementation injections of the knee.  4.  All of the patient's questions were answered.    Orders Placed This Encounter    XR knee right 4+ views    meloxicam (Mobic) 15 mg tablet       This note was prepared using voice recognition software.  The details of this note are correct and have been reviewed, and corrected to the best of my ability.  Some grammatical areas may persist related to the Dragon software    Dominic Renee MD  Senior Attending Physician  [facility]  [facility] [place]    [phone]

## 2024-08-27 ENCOUNTER — APPOINTMENT (OUTPATIENT)
Dept: GASTROENTEROLOGY | Facility: CLINIC | Age: 59
End: 2024-08-27
Payer: COMMERCIAL

## 2024-09-04 ENCOUNTER — OFFICE VISIT (OUTPATIENT)
Dept: OBGYN CLINIC | Age: 59
End: 2024-09-04
Payer: COMMERCIAL

## 2024-09-04 VITALS
DIASTOLIC BLOOD PRESSURE: 62 MMHG | HEART RATE: 68 BPM | HEIGHT: 64 IN | WEIGHT: 154 LBS | SYSTOLIC BLOOD PRESSURE: 98 MMHG | BODY MASS INDEX: 26.29 KG/M2

## 2024-09-04 DIAGNOSIS — N39.41 URGE INCONTINENCE: Primary | ICD-10-CM

## 2024-09-04 DIAGNOSIS — N39.41 URGE INCONTINENCE: ICD-10-CM

## 2024-09-04 DIAGNOSIS — R30.0 DYSURIA: ICD-10-CM

## 2024-09-04 LAB
BACTERIA URNS QL MICRO: NEGATIVE /HPF
BILIRUB UR QL STRIP: NEGATIVE
CLARITY UR: CLEAR
COLOR UR: YELLOW
EPI CELLS #/AREA URNS AUTO: NORMAL /HPF (ref 0–5)
GLUCOSE UR STRIP-MCNC: NEGATIVE MG/DL
HGB UR QL STRIP: NEGATIVE
HYALINE CASTS #/AREA URNS AUTO: NORMAL /HPF (ref 0–5)
KETONES UR STRIP-MCNC: NEGATIVE MG/DL
LEUKOCYTE ESTERASE UR QL STRIP: ABNORMAL
NITRITE UR QL STRIP: NEGATIVE
PH UR STRIP: 5.5 [PH] (ref 5–9)
PROT UR STRIP-MCNC: NEGATIVE MG/DL
RBC #/AREA URNS AUTO: NORMAL /HPF (ref 0–5)
SP GR UR STRIP: 1.01 (ref 1–1.03)
UROBILINOGEN UR STRIP-ACNC: 0.2 E.U./DL
WBC #/AREA URNS AUTO: NORMAL /HPF (ref 0–5)

## 2024-09-04 PROCEDURE — 99213 OFFICE O/P EST LOW 20 MIN: CPT | Performed by: OBSTETRICS & GYNECOLOGY

## 2024-09-04 PROCEDURE — 4004F PT TOBACCO SCREEN RCVD TLK: CPT | Performed by: OBSTETRICS & GYNECOLOGY

## 2024-09-04 PROCEDURE — G8427 DOCREV CUR MEDS BY ELIG CLIN: HCPCS | Performed by: OBSTETRICS & GYNECOLOGY

## 2024-09-04 PROCEDURE — 3017F COLORECTAL CA SCREEN DOC REV: CPT | Performed by: OBSTETRICS & GYNECOLOGY

## 2024-09-04 PROCEDURE — G8419 CALC BMI OUT NRM PARAM NOF/U: HCPCS | Performed by: OBSTETRICS & GYNECOLOGY

## 2024-09-04 RX ORDER — NITROFURANTOIN 25; 75 MG/1; MG/1
100 CAPSULE ORAL 2 TIMES DAILY
Qty: 20 CAPSULE | Refills: 0 | Status: SHIPPED | OUTPATIENT
Start: 2024-09-04 | End: 2024-09-14

## 2024-09-04 NOTE — PROGRESS NOTES
nitrofurantoin, macrocrystal-monohydrate, (MACROBID) 100 MG capsule     Sig: Take 1 capsule by mouth 2 times daily for 10 days     Dispense:  20 capsule     Refill:  0       Follow Up:  No follow-ups on file.        Kong Jean MD

## 2024-09-05 LAB — BACTERIA UR CULT: NORMAL

## 2024-09-13 ENCOUNTER — TELEPHONE (OUTPATIENT)
Dept: ORTHOPEDIC SURGERY | Facility: CLINIC | Age: 59
End: 2024-09-13
Payer: COMMERCIAL

## 2024-09-13 DIAGNOSIS — G62.9 POLYNEUROPATHY, UNSPECIFIED: ICD-10-CM

## 2024-09-13 RX ORDER — OXYCODONE AND ACETAMINOPHEN 7.5; 325 MG/1; MG/1
1 TABLET ORAL 3 TIMES DAILY
Qty: 90 TABLET | Refills: 0 | Status: SHIPPED | OUTPATIENT
Start: 2024-09-13 | End: 2024-10-13

## 2024-09-13 NOTE — TELEPHONE ENCOUNTER
Patient called on this date to inquire the status of her gel injection being authorized.  I do not see anything in the chart.  Please review and contact patient with update.

## 2024-09-17 ENCOUNTER — OFFICE VISIT (OUTPATIENT)
Dept: NEUROLOGY | Age: 59
End: 2024-09-17
Payer: COMMERCIAL

## 2024-09-17 VITALS
DIASTOLIC BLOOD PRESSURE: 60 MMHG | BODY MASS INDEX: 26.91 KG/M2 | WEIGHT: 156.8 LBS | HEART RATE: 58 BPM | SYSTOLIC BLOOD PRESSURE: 116 MMHG

## 2024-09-17 DIAGNOSIS — G62.9 POLYNEUROPATHY, UNSPECIFIED: ICD-10-CM

## 2024-09-17 PROCEDURE — 3017F COLORECTAL CA SCREEN DOC REV: CPT | Performed by: PSYCHIATRY & NEUROLOGY

## 2024-09-17 PROCEDURE — G8427 DOCREV CUR MEDS BY ELIG CLIN: HCPCS | Performed by: PSYCHIATRY & NEUROLOGY

## 2024-09-17 PROCEDURE — 99214 OFFICE O/P EST MOD 30 MIN: CPT | Performed by: PSYCHIATRY & NEUROLOGY

## 2024-09-17 PROCEDURE — 4004F PT TOBACCO SCREEN RCVD TLK: CPT | Performed by: PSYCHIATRY & NEUROLOGY

## 2024-09-17 PROCEDURE — G8419 CALC BMI OUT NRM PARAM NOF/U: HCPCS | Performed by: PSYCHIATRY & NEUROLOGY

## 2024-09-17 RX ORDER — TRAZODONE HYDROCHLORIDE 100 MG/1
100 TABLET ORAL NIGHTLY
Qty: 30 TABLET | Refills: 2 | Status: SHIPPED | OUTPATIENT
Start: 2024-09-17 | End: 2024-12-16

## 2024-09-17 RX ORDER — GABAPENTIN 800 MG/1
TABLET ORAL
Qty: 120 TABLET | Refills: 3 | Status: SHIPPED | OUTPATIENT
Start: 2024-09-17 | End: 2024-10-18

## 2024-09-18 ENCOUNTER — OFFICE VISIT (OUTPATIENT)
Dept: OBGYN CLINIC | Age: 59
End: 2024-09-18
Payer: COMMERCIAL

## 2024-09-18 VITALS
HEART RATE: 80 BPM | WEIGHT: 154 LBS | DIASTOLIC BLOOD PRESSURE: 62 MMHG | BODY MASS INDEX: 26.29 KG/M2 | SYSTOLIC BLOOD PRESSURE: 100 MMHG | HEIGHT: 64 IN

## 2024-09-18 DIAGNOSIS — N39.41 URGE INCONTINENCE: ICD-10-CM

## 2024-09-18 DIAGNOSIS — N32.81 OAB (OVERACTIVE BLADDER): ICD-10-CM

## 2024-09-18 LAB
BILIRUB UR QL STRIP: NEGATIVE
CLARITY UR: CLEAR
COLOR UR: YELLOW
GLUCOSE UR STRIP-MCNC: NEGATIVE MG/DL
HGB UR QL STRIP: NEGATIVE
KETONES UR STRIP-MCNC: NEGATIVE MG/DL
LEUKOCYTE ESTERASE UR QL STRIP: NEGATIVE
NITRITE UR QL STRIP: NEGATIVE
PH UR STRIP: 6.5 [PH] (ref 5–9)
PROT UR STRIP-MCNC: NEGATIVE MG/DL
SP GR UR STRIP: 1.01 (ref 1–1.03)
UROBILINOGEN UR STRIP-ACNC: 0.2 E.U./DL

## 2024-09-18 PROCEDURE — G8427 DOCREV CUR MEDS BY ELIG CLIN: HCPCS | Performed by: OBSTETRICS & GYNECOLOGY

## 2024-09-18 PROCEDURE — 3017F COLORECTAL CA SCREEN DOC REV: CPT | Performed by: OBSTETRICS & GYNECOLOGY

## 2024-09-18 PROCEDURE — 99213 OFFICE O/P EST LOW 20 MIN: CPT | Performed by: OBSTETRICS & GYNECOLOGY

## 2024-09-18 PROCEDURE — 4004F PT TOBACCO SCREEN RCVD TLK: CPT | Performed by: OBSTETRICS & GYNECOLOGY

## 2024-09-18 PROCEDURE — G8419 CALC BMI OUT NRM PARAM NOF/U: HCPCS | Performed by: OBSTETRICS & GYNECOLOGY

## 2024-09-18 RX ORDER — MIRABEGRON 50 MG/1
TABLET, EXTENDED RELEASE ORAL
Qty: 14 TABLET | Refills: 0 | Status: SHIPPED | COMMUNITY
Start: 2024-09-18

## 2024-09-19 LAB — BACTERIA UR CULT: NORMAL

## 2024-09-21 LAB
6MAM UR QL: NOT DETECTED
7-AMINOCLONAZEPAM: NOT DETECTED
ALPHA-OH-ALPRAZOLAM: NOT DETECTED
ALPHA-OH-MIDAZOLAM, URINE: NOT DETECTED
ALPRAZOLAM: NOT DETECTED
AMPHET UR QL SCN: NOT DETECTED
BARBITURATES: NEGATIVE
BENZOYLECGONINE: NEGATIVE
BUPRENORPHINE: NOT DETECTED
CARISOPRODOL UR QL: NEGATIVE
CLONAZEPAM UR QL: NOT DETECTED
CODEINE: NOT DETECTED
CREAT UR-MCNC: 50.6 MG/DL (ref 20–400)
DIAZEPAM: NOT DETECTED
ETHYL GLUCURONIDE: NEGATIVE
FENTANYL UR QL: NOT DETECTED
GABAPENTIN: PRESENT
HYDROCODONE UR QL: NOT DETECTED
HYDROMORPHONE: NOT DETECTED
LORAZEPAM UR QL: NOT DETECTED
MARIJUANA METABOLITE: NEGATIVE
MDA: NOT DETECTED
MDEA: NOT DETECTED
MDMA UR QL: NOT DETECTED
MEPERIDINE: NOT DETECTED
METHADONE: NEGATIVE
METHAMPHETAMINE: NOT DETECTED
METHYLPHENIDATE: NOT DETECTED
MIDAZOLAM UR QL SCN: NOT DETECTED
MORPHINE: NOT DETECTED
NALOXONE: NOT DETECTED
NORBUPRENORPHINE, FREE: NOT DETECTED
NORDIAZEPAM: NOT DETECTED
NORFENTANYL: NOT DETECTED
NORHYDROCODONE, URINE: NOT DETECTED
NOROXYCODONE: NOT DETECTED
NOROXYMORPHONE, URINE: NOT DETECTED
OXAZEPAM UR QL: NOT DETECTED
OXYCODONE UR QL: PRESENT
OXYMORPHONE UR QL: NOT DETECTED
PAIN MANAGEMENT DRUG PANEL: NORMAL
PATHOLOGY STUDY: NORMAL
PCP: NEGATIVE
PHENTERMINE: NOT DETECTED
PREGABALIN: NOT DETECTED
TAPENTADOL, URINE: NOT DETECTED
TAPENTADOL-O-SULFATE, URINE: NOT DETECTED
TEMAZEPAM: NOT DETECTED
TRAMADOL: NEGATIVE
ZOLPIDEM: NOT DETECTED

## 2024-09-23 RX ORDER — TRAZODONE HYDROCHLORIDE 100 MG/1
100 TABLET ORAL NIGHTLY
Qty: 30 TABLET | Refills: 2 | OUTPATIENT
Start: 2024-09-23 | End: 2024-12-22

## 2024-09-28 DIAGNOSIS — G62.9 POLYNEUROPATHY, UNSPECIFIED: ICD-10-CM

## 2024-09-30 RX ORDER — GABAPENTIN 800 MG/1
TABLET ORAL
Qty: 120 TABLET | Refills: 3 | Status: SHIPPED | OUTPATIENT
Start: 2024-09-30 | End: 2024-10-30

## 2024-09-30 NOTE — TELEPHONE ENCOUNTER
Pharmacy is  requesting medication refill. Please approve or deny this request.    Rx requested:  Requested Prescriptions     Pending Prescriptions Disp Refills    gabapentin (NEURONTIN) 800 MG tablet [Pharmacy Med Name: GABAPENTIN 800 MG TABS 800 Tablet] 120 tablet 3     Sig: TAKE 1 TABLET BY MOUTH FOUR TIMES A DAY         Last Office Visit:   9/17/2024      Next Visit Date:  Future Appointments   Date Time Provider Department Center   10/2/2024  8:45 AM Kong Jean MD [facility] [place]   12/2/2024  1:30 PM Tariq Lee MD [facility]   2/5/2025  2:45 PM Usama Mata MD [place] [facility] Neurology -

## 2024-10-02 ENCOUNTER — OFFICE VISIT (OUTPATIENT)
Dept: OBGYN CLINIC | Age: 59
End: 2024-10-02
Payer: COMMERCIAL

## 2024-10-02 VITALS
SYSTOLIC BLOOD PRESSURE: 104 MMHG | HEART RATE: 72 BPM | BODY MASS INDEX: 25.95 KG/M2 | DIASTOLIC BLOOD PRESSURE: 62 MMHG | HEIGHT: 64 IN | WEIGHT: 152 LBS

## 2024-10-02 DIAGNOSIS — N39.41 URGE INCONTINENCE: ICD-10-CM

## 2024-10-02 DIAGNOSIS — N39.41 URGE INCONTINENCE: Primary | ICD-10-CM

## 2024-10-02 DIAGNOSIS — G62.9 POLYNEUROPATHY, UNSPECIFIED: ICD-10-CM

## 2024-10-02 LAB
BILIRUB UR QL STRIP: NEGATIVE
CLARITY UR: CLEAR
COLOR UR: YELLOW
GLUCOSE UR STRIP-MCNC: NEGATIVE MG/DL
HGB UR QL STRIP: NEGATIVE
KETONES UR STRIP-MCNC: NEGATIVE MG/DL
LEUKOCYTE ESTERASE UR QL STRIP: NEGATIVE
NITRITE UR QL STRIP: NEGATIVE
PH UR STRIP: 5.5 [PH] (ref 5–9)
PROT UR STRIP-MCNC: NEGATIVE MG/DL
SP GR UR STRIP: 1.02 (ref 1–1.03)
UROBILINOGEN UR STRIP-ACNC: 0.2 E.U./DL

## 2024-10-02 PROCEDURE — G8484 FLU IMMUNIZE NO ADMIN: HCPCS | Performed by: OBSTETRICS & GYNECOLOGY

## 2024-10-02 PROCEDURE — 4004F PT TOBACCO SCREEN RCVD TLK: CPT | Performed by: OBSTETRICS & GYNECOLOGY

## 2024-10-02 PROCEDURE — 3017F COLORECTAL CA SCREEN DOC REV: CPT | Performed by: OBSTETRICS & GYNECOLOGY

## 2024-10-02 PROCEDURE — G8427 DOCREV CUR MEDS BY ELIG CLIN: HCPCS | Performed by: OBSTETRICS & GYNECOLOGY

## 2024-10-02 PROCEDURE — G8419 CALC BMI OUT NRM PARAM NOF/U: HCPCS | Performed by: OBSTETRICS & GYNECOLOGY

## 2024-10-02 PROCEDURE — 99213 OFFICE O/P EST LOW 20 MIN: CPT | Performed by: OBSTETRICS & GYNECOLOGY

## 2024-10-02 NOTE — PROGRESS NOTES
Aleja Helms is a 59 y.o. female who presents here today for complaints of Follow-up (Myrbetriq and Interstim adjustments. She states she's noticed an improvement.)    The patient, Aleja, reports significant improvement in her symptoms since the last visit. Increased interstim stimulation settings on the patient's device to Program 3 at 2.8 (from 2.3) last visit.   .      Vitals:  /62 (Site: Left Upper Arm, Position: Sitting, Cuff Size: Medium Adult)   Pulse 72   Ht 1.626 m (5' 4\")   Wt 68.9 kg (152 lb)   LMP 1997 (Exact Date) Comment: hysterectomy  BMI 26.09 kg/m²   Allergies:  Amoxicillin-pot clavulanate, Amoxicillin, and Other  Past Medical History:   Diagnosis Date    Anxiety     Arthritis     Cancer (HCC) 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    Chronic back pain     Chronic kidney disease     COPD exacerbation (HCC)     Depression     Disease of blood and blood forming organ 2017    neutropenia    Fibromyalgia     GERD (gastroesophageal reflux disease)     History of blood transfusion     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    Neuromuscular disorder (HCC)     Osteoarthritis     Overactive bladder     Pneumonia due to organism     Pyoderma gangrenosa     Sweet syndrome     Ulcerative colitis (HCC)      Past Surgical History:   Procedure Laterality Date    ABDOMEN SURGERY      sleen repair    APPENDECTOMY      BACK SURGERY      BREAST SURGERY Bilateral     fatty tumor removed, benign    COLONOSCOPY      COSMETIC SURGERY      tummy tuck    COSMETIC SURGERY  2024    upper lip    EYE SURGERY      bilateral cataract surgery    HYSTERECTOMY (CERVIX STATUS UNKNOWN)      OVARY REMOVAL Right     SKIN BIOPSY      SPINAL FUSION      STIMULATOR SURGERY N/A 2024    MEDTRONIC STAGE 1 AND 2  INTERSTIM performed by Kong Jean MD at [facility]    TONSILLECTOMY       OB History          2    Para   2    Term   2            AB        Living             SAB

## 2024-10-03 RX ORDER — TRAZODONE HYDROCHLORIDE 100 MG/1
100 TABLET ORAL NIGHTLY
Qty: 30 TABLET | Refills: 2 | OUTPATIENT
Start: 2024-10-03 | End: 2025-01-01

## 2024-10-03 RX ORDER — GABAPENTIN 800 MG/1
TABLET ORAL
Qty: 120 TABLET | Refills: 3 | OUTPATIENT
Start: 2024-10-03

## 2024-10-04 LAB — BACTERIA UR CULT: NORMAL

## 2024-10-11 DIAGNOSIS — G62.9 POLYNEUROPATHY, UNSPECIFIED: ICD-10-CM

## 2024-10-11 RX ORDER — OXYCODONE AND ACETAMINOPHEN 7.5; 325 MG/1; MG/1
1 TABLET ORAL 3 TIMES DAILY
Qty: 90 TABLET | Refills: 0 | Status: SHIPPED | OUTPATIENT
Start: 2024-10-11 | End: 2024-11-10

## 2024-10-11 NOTE — TELEPHONE ENCOUNTER
Requesting medication refill. Please approve or deny this request.    Rx requested:  Requested Prescriptions     Pending Prescriptions Disp Refills    oxyCODONE-acetaminophen (PERCOCET) 7.5-325 MG per tablet 90 tablet 0     Sig: Take 1 tablet by mouth in the morning, at noon, and at bedtime for 30 days. Max Daily Amount: 3 tablets         Last Office Visit:   9/17/2024      Next Visit Date:  Future Appointments   Date Time Provider Department Center   12/2/2024  1:30 PM Tariq Lee MD [facility]   2/5/2025  2:45 PM Usama Mata MD [place] NEURO Neurology -               Last refill 9/13/24. Please approve or deny.

## 2024-10-26 DIAGNOSIS — G62.9 POLYNEUROPATHY, UNSPECIFIED: ICD-10-CM

## 2024-10-28 RX ORDER — GABAPENTIN 800 MG/1
TABLET ORAL
Qty: 120 TABLET | Refills: 3 | OUTPATIENT
Start: 2024-10-28

## 2024-11-04 DIAGNOSIS — G62.9 POLYNEUROPATHY, UNSPECIFIED: ICD-10-CM

## 2024-11-04 NOTE — TELEPHONE ENCOUNTER
Requesting medication refill. Please approve or deny this request.    Rx requested:  Requested Prescriptions     Pending Prescriptions Disp Refills    oxyCODONE-acetaminophen (PERCOCET) 7.5-325 MG per tablet 90 tablet 0     Sig: Take 1 tablet by mouth in the morning, at noon, and at bedtime for 30 days. Max Daily Amount: 3 tablets         Last Office Visit:   9/17/2024      Next Visit Date:  Future Appointments   Date Time Provider Department Center   11/8/2024  1:00 PM Kong Jean MD [facility]   12/2/2024  1:30 PM Tariq Lee MD [facility]   2/5/2025  2:45 PM Usama Mata MD [place] NEURO Neurology -               Last refill 10/11/24. Please approve or deny.

## 2024-11-05 RX ORDER — OXYCODONE AND ACETAMINOPHEN 7.5; 325 MG/1; MG/1
1 TABLET ORAL 3 TIMES DAILY
Qty: 90 TABLET | Refills: 0 | Status: SHIPPED | OUTPATIENT
Start: 2024-11-05 | End: 2024-12-05

## 2024-11-08 ENCOUNTER — OFFICE VISIT (OUTPATIENT)
Dept: OBGYN CLINIC | Age: 59
End: 2024-11-08
Payer: COMMERCIAL

## 2024-11-08 VITALS
HEART RATE: 76 BPM | WEIGHT: 155 LBS | BODY MASS INDEX: 26.46 KG/M2 | HEIGHT: 64 IN | DIASTOLIC BLOOD PRESSURE: 62 MMHG | SYSTOLIC BLOOD PRESSURE: 108 MMHG

## 2024-11-08 DIAGNOSIS — N39.41 URGE INCONTINENCE: ICD-10-CM

## 2024-11-08 DIAGNOSIS — N39.41 URGE INCONTINENCE: Primary | ICD-10-CM

## 2024-11-08 LAB
BILIRUB UR QL STRIP: NEGATIVE
CLARITY UR: CLEAR
COLOR UR: YELLOW
GLUCOSE UR STRIP-MCNC: NEGATIVE MG/DL
HGB UR QL STRIP: NEGATIVE
KETONES UR STRIP-MCNC: ABNORMAL MG/DL
LEUKOCYTE ESTERASE UR QL STRIP: NEGATIVE
NITRITE UR QL STRIP: NEGATIVE
PH UR STRIP: 5.5 [PH] (ref 5–9)
PROT UR STRIP-MCNC: NEGATIVE MG/DL
SP GR UR STRIP: 1.02 (ref 1–1.03)
UROBILINOGEN UR STRIP-ACNC: 0.2 E.U./DL

## 2024-11-08 PROCEDURE — G8484 FLU IMMUNIZE NO ADMIN: HCPCS | Performed by: OBSTETRICS & GYNECOLOGY

## 2024-11-08 PROCEDURE — 99213 OFFICE O/P EST LOW 20 MIN: CPT | Performed by: OBSTETRICS & GYNECOLOGY

## 2024-11-08 PROCEDURE — G8419 CALC BMI OUT NRM PARAM NOF/U: HCPCS | Performed by: OBSTETRICS & GYNECOLOGY

## 2024-11-08 PROCEDURE — 4004F PT TOBACCO SCREEN RCVD TLK: CPT | Performed by: OBSTETRICS & GYNECOLOGY

## 2024-11-08 PROCEDURE — G8427 DOCREV CUR MEDS BY ELIG CLIN: HCPCS | Performed by: OBSTETRICS & GYNECOLOGY

## 2024-11-08 PROCEDURE — 3017F COLORECTAL CA SCREEN DOC REV: CPT | Performed by: OBSTETRICS & GYNECOLOGY

## 2024-11-08 RX ORDER — VIBEGRON 75 MG/1
75 TABLET, FILM COATED ORAL DAILY
Qty: 30 TABLET | Refills: 0 | Status: SHIPPED | OUTPATIENT
Start: 2024-11-08

## 2024-11-09 LAB — BACTERIA UR CULT: NORMAL

## 2024-11-12 ENCOUNTER — TELEPHONE (OUTPATIENT)
Dept: OBGYN CLINIC | Age: 59
End: 2024-11-12

## 2024-11-12 NOTE — TELEPHONE ENCOUNTER
Left message for pt to call back to change appt with [facility]

## 2024-11-12 NOTE — TELEPHONE ENCOUNTER
Spoke to pt, will meet Chip Singleton at the [facility] surgical center at [facility] 11-13-24 at 12.

## 2024-11-18 ENCOUNTER — TELEPHONE (OUTPATIENT)
Dept: OBGYN CLINIC | Age: 59
End: 2024-11-18

## 2024-11-19 NOTE — PROGRESS NOTES
Aleja Helms is a 59 y.o. female who presents here today for complaints of Other (She'd like to discuss having Interstim removed and other options.)    The patient, Aleja, presents with a chief complaint of frequent nocturia, waking up 3 to 5 times per night to urinate. She reports that her sleep is disrupted, and she often barely makes it to the bathroom, resulting in the need to change her pajamas and clothes frequently. Aleja mentions that her symptoms had improved since her last visit but have since worsened again.    Aleja was previously prescribed Myrbetriq, which she states did not help her symptoms and caused dry mouth. She has only a few pills left and has not been taking them consistently since the device was turned up. She denies having any infections and reports that her culture is negative.    The patient expresses frustration with her current condition, feeling like she is not making progress and is not bouncing back as she used to. She is hesitant about the possibility of Botox treatment, citing concerns about the need for repeated treatments every 5 to 6 months.    Aleja's device appears to be malfunctioning, displaying an error message that the clinician has not seen before. The patient denies any physical damage to the device and is unsure why it is only working on program 3.  .      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

## 2024-11-24 NOTE — PROGRESS NOTES
on file   Occupational History    Not on file   Tobacco Use    Smoking status: Every Day     Current packs/day: 1.00     Average packs/day: 1 pack/day for 40.0 years (40.0 ttl pk-yrs)     Types: Cigarettes    Smokeless tobacco: Never   Vaping Use    Vaping status: Never Used   Substance and Sexual Activity    Alcohol use: Never    Drug use: No    Sexual activity: Not Currently     Partners: Male   Other Topics Concern    Not on file   Social History Narrative    Not on file     Social Determinants of Health     Financial Resource Strain: Low Risk  (5/15/2024)    Overall Financial Resource Strain (CARDIA)     Difficulty of Paying Living Expenses: Not hard at all   Food Insecurity: Unknown (8/8/2024)    Received from [employer]    Hunger Vital Sign     Worried About Running Out of Food in the Last Year: Never true     Ran Out of Food in the Last Year: Patient declined   Transportation Needs: Unmet Transportation Needs (8/8/2024)    Received from [employer]    PRAPARE - Transportation     Lack of Transportation (Medical): No     Lack of Transportation (Non-Medical): Yes   Physical Activity: Not on file   Stress: Not on file   Social Connections: Not on file   Intimate Partner Violence: Not on file   Housing Stability: Unknown (5/15/2024)    Housing Stability Vital Sign     Unable to Pay for Housing in the Last Year: Not on file     Number of Places Lived in the Last Year: Not on file     Unstable Housing in the Last Year: No        Family History   Problem Relation Age of Onset    Arthritis Mother     High Cholesterol Mother     Immune Disorder Mother     Hearing Loss Sister     Cancer Maternal Grandfather        Vitals:    12/02/24 1330   Pulse: 67   Resp: 14   Temp: 98.5 °F (36.9 °C)   SpO2: 98%   Weight: 71.2 kg (157 lb)   Height: 1.626 m (5' 4\")         Estimated body mass index is 26.95 kg/m² as calculated from the following:    Height as of this encounter: 1.626 m (5' 4\").    Weight as of this encounter: 71.2

## 2024-11-26 ENCOUNTER — TELEPHONE (OUTPATIENT)
Dept: OBGYN CLINIC | Age: 59
End: 2024-11-26

## 2024-11-26 NOTE — TELEPHONE ENCOUNTER
Pt called and would like Brenda to call her back regards to her intersim,she just got back into town. [phone]

## 2024-12-02 ENCOUNTER — OFFICE VISIT (OUTPATIENT)
Dept: FAMILY MEDICINE CLINIC | Age: 59
End: 2024-12-02
Payer: COMMERCIAL

## 2024-12-02 VITALS
WEIGHT: 157 LBS | BODY MASS INDEX: 26.8 KG/M2 | RESPIRATION RATE: 14 BRPM | HEART RATE: 67 BPM | HEIGHT: 64 IN | TEMPERATURE: 98.5 F | OXYGEN SATURATION: 98 %

## 2024-12-02 DIAGNOSIS — R50.9 FEVER, UNSPECIFIED FEVER CAUSE: Primary | ICD-10-CM

## 2024-12-02 DIAGNOSIS — Z12.39 ENCOUNTER FOR SCREENING FOR MALIGNANT NEOPLASM OF BREAST, UNSPECIFIED SCREENING MODALITY: ICD-10-CM

## 2024-12-02 DIAGNOSIS — F41.9 ANXIETY: ICD-10-CM

## 2024-12-02 LAB
INFLUENZA A ANTIBODY: NORMAL
INFLUENZA B ANTIBODY: NORMAL
Lab: NORMAL
PERFORMING INSTRUMENT: NORMAL
QC PASS/FAIL: NORMAL
RSV RAPID ANTIGEN: NORMAL
SARS-COV-2, POC: NORMAL

## 2024-12-02 PROCEDURE — G8484 FLU IMMUNIZE NO ADMIN: HCPCS | Performed by: INTERNAL MEDICINE

## 2024-12-02 PROCEDURE — 87804 INFLUENZA ASSAY W/OPTIC: CPT | Performed by: INTERNAL MEDICINE

## 2024-12-02 PROCEDURE — 99214 OFFICE O/P EST MOD 30 MIN: CPT | Performed by: INTERNAL MEDICINE

## 2024-12-02 PROCEDURE — 87426 SARSCOV CORONAVIRUS AG IA: CPT | Performed by: INTERNAL MEDICINE

## 2024-12-02 PROCEDURE — 3017F COLORECTAL CA SCREEN DOC REV: CPT | Performed by: INTERNAL MEDICINE

## 2024-12-02 PROCEDURE — 4004F PT TOBACCO SCREEN RCVD TLK: CPT | Performed by: INTERNAL MEDICINE

## 2024-12-02 PROCEDURE — 87807 RSV ASSAY W/OPTIC: CPT | Performed by: INTERNAL MEDICINE

## 2024-12-02 PROCEDURE — G8427 DOCREV CUR MEDS BY ELIG CLIN: HCPCS | Performed by: INTERNAL MEDICINE

## 2024-12-02 PROCEDURE — G8419 CALC BMI OUT NRM PARAM NOF/U: HCPCS | Performed by: INTERNAL MEDICINE

## 2024-12-04 ENCOUNTER — PREP FOR PROCEDURE (OUTPATIENT)
Dept: OBGYN CLINIC | Age: 59
End: 2024-12-04

## 2024-12-04 DIAGNOSIS — G62.9 POLYNEUROPATHY, UNSPECIFIED: ICD-10-CM

## 2024-12-04 PROBLEM — T83.9XXA COMPLICATION OF GENITOURINARY DEVICE (HCC): Status: ACTIVE | Noted: 2024-12-04

## 2024-12-04 NOTE — TELEPHONE ENCOUNTER
Pt called , scheduled for surgery 1/17/25 . PAT 1-25-25 @ 9 am. Will mail letter with information discussed.

## 2024-12-04 NOTE — TELEPHONE ENCOUNTER
Requesting medication refill. Please approve or deny this request.    Rx requested:  Requested Prescriptions     Pending Prescriptions Disp Refills    oxyCODONE-acetaminophen (PERCOCET) 7.5-325 MG per tablet 90 tablet 0     Sig: Take 1 tablet by mouth in the morning, at noon, and at bedtime for 30 days. Max Daily Amount: 3 tablets         Last Office Visit:   9/17/2024      Next Visit Date:  Future Appointments   Date Time Provider Department Center   12/19/2024  7:40 AM DANYEL MAMMO ROOM 1 [facility]   1/31/2025  9:30 AM [facility]   2/5/2025  2:45 PM Usama Mata MD [place] NEURO Neurology -   4/2/2025  9:15 AM Tariq Lee MD [facility] DEP               Last refill 11/5/24. Please approve or deny.

## 2024-12-05 RX ORDER — OXYCODONE AND ACETAMINOPHEN 7.5; 325 MG/1; MG/1
1 TABLET ORAL 3 TIMES DAILY
Qty: 90 TABLET | Refills: 0 | Status: SHIPPED | OUTPATIENT
Start: 2024-12-05 | End: 2025-01-04

## 2024-12-05 RX ORDER — SODIUM CHLORIDE 0.9 % (FLUSH) 0.9 %
5-40 SYRINGE (ML) INJECTION EVERY 12 HOURS SCHEDULED
Status: CANCELLED | OUTPATIENT
Start: 2024-12-05

## 2024-12-05 RX ORDER — ACETAMINOPHEN 325 MG/1
1000 TABLET ORAL ONCE
Status: CANCELLED | OUTPATIENT
Start: 2024-12-05 | End: 2024-12-05

## 2024-12-05 RX ORDER — SODIUM CHLORIDE, SODIUM LACTATE, POTASSIUM CHLORIDE, CALCIUM CHLORIDE 600; 310; 30; 20 MG/100ML; MG/100ML; MG/100ML; MG/100ML
INJECTION, SOLUTION INTRAVENOUS CONTINUOUS
Status: CANCELLED | OUTPATIENT
Start: 2024-12-05

## 2024-12-05 RX ORDER — SODIUM CHLORIDE 0.9 % (FLUSH) 0.9 %
5-40 SYRINGE (ML) INJECTION PRN
Status: CANCELLED | OUTPATIENT
Start: 2024-12-05

## 2024-12-05 RX ORDER — SODIUM CHLORIDE 9 MG/ML
INJECTION, SOLUTION INTRAVENOUS PRN
Status: CANCELLED | OUTPATIENT
Start: 2024-12-05

## 2024-12-12 ENCOUNTER — TELEPHONE (OUTPATIENT)
Dept: FAMILY MEDICINE CLINIC | Age: 59
End: 2024-12-12

## 2024-12-12 RX ORDER — AZITHROMYCIN 250 MG/1
TABLET, FILM COATED ORAL
Qty: 6 TABLET | Refills: 0 | Status: SHIPPED | OUTPATIENT
Start: 2024-12-12 | End: 2024-12-22

## 2024-12-12 NOTE — TELEPHONE ENCOUNTER
Patient called stating she has sinus congestion and yellow phlegm. She said that at her last visit, provider told her to call if she didn't feel better and an abx would be sent in for her.    Patient uses [facility].      Thank you.

## 2024-12-16 RX ORDER — TRAZODONE HYDROCHLORIDE 100 MG/1
100 TABLET ORAL NIGHTLY
Qty: 30 TABLET | Refills: 2 | Status: SHIPPED | OUTPATIENT
Start: 2024-12-16 | End: 2025-03-16

## 2024-12-16 NOTE — TELEPHONE ENCOUNTER
Pharmacy is  requesting medication refill. Please approve or deny this request.    Rx requested:  Requested Prescriptions     Pending Prescriptions Disp Refills    traZODone (DESYREL) 100 MG tablet [Pharmacy Med Name: TRAZODONE  MG TABS 100 Tablet] 30 tablet 2     Sig: TAKE 1 TABLET BY MOUTH NIGHTLY         Last Office Visit:   9/17/2024      Next Visit Date:  Future Appointments   Date Time Provider Department Center   12/19/2024  7:40 AM DANYEL MAMMO ROOM 1 [facility] RAD   1/15/2025  9:00 AM [facility] 2 RN [facility]   1/31/2025  9:30 AM Kong Jean MD [facility]   2/5/2025  2:45 PM Usama Mata MD [place] NEURO Neurology -   4/2/2025  9:15 AM Tariq Lee MD [facility] DEP

## 2024-12-18 ENCOUNTER — TELEPHONE (OUTPATIENT)
Age: 59
End: 2024-12-18

## 2024-12-18 RX ORDER — FEXOFENADINE HCL AND PSEUDOEPHEDRINE HCL 180; 240 MG/1; MG/1
1 TABLET, EXTENDED RELEASE ORAL DAILY
Qty: 30 TABLET | Refills: 0 | Status: SHIPPED | OUTPATIENT
Start: 2024-12-18

## 2024-12-18 RX ORDER — FEXOFENADINE HCL 180 MG/1
180 TABLET ORAL DAILY
Qty: 90 TABLET | Refills: 1 | Status: SHIPPED | OUTPATIENT
Start: 2024-12-18 | End: 2024-12-18

## 2024-12-18 RX ORDER — FLUTICASONE PROPIONATE 50 MCG
1 SPRAY, SUSPENSION (ML) NASAL DAILY
Qty: 16 G | Refills: 1 | Status: SHIPPED | OUTPATIENT
Start: 2024-12-18

## 2024-12-18 NOTE — TELEPHONE ENCOUNTER
Patient called in stating that even after finishing the azithromycin that was prescribed on 12/12/2024 that she is still suffering from congestion and that her right ear is still aching.    Patient is inquiring if there is something else that Dr. Lee can call in.    If prescription approved patient would like it sent to [facility] on [address] in [place].

## 2024-12-19 ENCOUNTER — HOSPITAL ENCOUNTER (OUTPATIENT)
Dept: WOMENS IMAGING | Age: 59
Discharge: HOME | End: 2024-12-21
Attending: INTERNAL MEDICINE
Payer: COMMERCIAL

## 2024-12-19 DIAGNOSIS — Z12.39 ENCOUNTER FOR SCREENING FOR MALIGNANT NEOPLASM OF BREAST, UNSPECIFIED SCREENING MODALITY: ICD-10-CM

## 2024-12-19 PROCEDURE — 77063 BREAST TOMOSYNTHESIS BI: CPT

## 2024-12-20 ENCOUNTER — TELEPHONE (OUTPATIENT)
Age: 59
End: 2024-12-20

## 2024-12-20 ENCOUNTER — TELEPHONE (OUTPATIENT)
Dept: OBGYN CLINIC | Age: 59
End: 2024-12-20

## 2024-12-20 RX ORDER — OXYMETAZOLINE HYDROCHLORIDE 0.05 G/100ML
2 SPRAY NASAL 2 TIMES DAILY
Qty: 12 ML | Refills: 0 | Status: SHIPPED | OUTPATIENT
Start: 2024-12-20 | End: 2025-01-19

## 2024-12-20 RX ORDER — ETOH/EUC OIL/MENTH/PEP/WINTERG
SPRAY, NON-AEROSOL (ML) MUCOUS MEMBRANE
Qty: 100 EACH | Refills: 5 | Status: SHIPPED | OUTPATIENT
Start: 2024-12-20

## 2024-12-20 NOTE — TELEPHONE ENCOUNTER
Patient called in stating that her insurance will not cover the allegra so she didn't go pick it up. Also states that the Banophen is not working on the congestion.    Is there another medication that can be prescribed?

## 2024-12-20 NOTE — TELEPHONE ENCOUNTER
Patient called in asking if Dr. Lee can prescribe Bladder Control Pads for her as her OBGYN is not in the office and won't be back until sometime next week.    States she would need to be setup with a medical supply company. Is alright with going with any medical supply company except for [facility] due to bad experiences.    Please advise.

## 2024-12-20 NOTE — TELEPHONE ENCOUNTER
Pt called requesting a prescription for poise bladder pads be put in, for urge incontinence. Pt made aware provider is out of office until following week. Please advise.

## 2024-12-24 RX ORDER — DIAPER,BRIEF,ADULT, DISPOSABLE
EACH MISCELLANEOUS
Qty: 30 EACH | Refills: 1 | Status: SHIPPED | OUTPATIENT
Start: 2024-12-24

## 2025-01-03 DIAGNOSIS — G62.9 POLYNEUROPATHY, UNSPECIFIED: ICD-10-CM

## 2025-01-03 RX ORDER — OXYCODONE AND ACETAMINOPHEN 7.5; 325 MG/1; MG/1
1 TABLET ORAL 3 TIMES DAILY
Qty: 90 TABLET | Refills: 0 | Status: SHIPPED | OUTPATIENT
Start: 2025-01-03 | End: 2025-02-02

## 2025-01-03 NOTE — TELEPHONE ENCOUNTER
Requesting medication refill. Please approve or deny this request.    Rx requested:  Requested Prescriptions     Pending Prescriptions Disp Refills    oxyCODONE-acetaminophen (PERCOCET) 7.5-325 MG per tablet 90 tablet 0     Sig: Take 1 tablet by mouth in the morning, at noon, and at bedtime for 30 days. Max Daily Amount: 3 tablets         Last Office Visit:   9/17/2024      Next Visit Date:  Future Appointments   Date Time Provider Department Center   1/15/2025  9:00 AM TRINA BECKER 2 RN [facility] [place]   1/31/2025  9:30 AM [facility]   2/5/2025  2:45 PM Usama Mata MD [place] NEUR[facility] Neurology -   4/2/2025  9:15 AM Tariq Lee MD [facility]               Last refill 12/5/24. Please approve or deny.

## 2025-01-06 DIAGNOSIS — G62.9 POLYNEUROPATHY, UNSPECIFIED: ICD-10-CM

## 2025-01-06 RX ORDER — OXYCODONE AND ACETAMINOPHEN 7.5; 325 MG/1; MG/1
1 TABLET ORAL 3 TIMES DAILY
Qty: 90 TABLET | Refills: 0 | Status: SHIPPED | OUTPATIENT
Start: 2025-01-06 | End: 2025-02-05

## 2025-01-06 NOTE — TELEPHONE ENCOUNTER
Pharmacy did not have request sending to new pharmacy     Patient is  requesting medication refill. Please approve or deny this request.    Rx requested:  Requested Prescriptions     Pending Prescriptions Disp Refills    oxyCODONE-acetaminophen (PERCOCET) 7.5-325 MG per tablet 90 tablet 0     Sig: Take 1 tablet by mouth in the morning, at noon, and at bedtime for 30 days. Max Daily Amount: 3 tablets         Last Office Visit:   9/17/2024      Next Visit Date:  Future Appointments   Date Time Provider Department Center   1/15/2025  9:00 AM TRINA MARTÍNEZ  2 RN TRINA MARTÍNEZ [facility]   1/31/2025  9:30 AM Kong Jean MD [facility]   2/5/2025  2:45 PM Usama Mata MD [place] NEURO Neurology -   4/2/2025  9:15 AM Tariq Lee MD [facility] DEP

## 2025-01-15 ENCOUNTER — HOSPITAL ENCOUNTER (OUTPATIENT)
Dept: PREADMISSION TESTING | Age: 60
Discharge: HOME | End: 2025-01-19
Payer: COMMERCIAL

## 2025-01-15 VITALS
TEMPERATURE: 97.7 F | DIASTOLIC BLOOD PRESSURE: 68 MMHG | BODY MASS INDEX: 26.99 KG/M2 | SYSTOLIC BLOOD PRESSURE: 128 MMHG | HEART RATE: 56 BPM | HEIGHT: 65 IN | WEIGHT: 162 LBS | RESPIRATION RATE: 18 BRPM | OXYGEN SATURATION: 98 %

## 2025-01-15 LAB
ERYTHROCYTE [DISTWIDTH] IN BLOOD BY AUTOMATED COUNT: 11.9 % (ref 11.5–14.5)
HCT VFR BLD AUTO: 45 % (ref 37–47)
HGB BLD-MCNC: 14.4 G/DL (ref 12–16)
MCH RBC QN AUTO: 29.6 PG (ref 27–31.3)
MCHC RBC AUTO-ENTMCNC: 32 % (ref 33–37)
MCV RBC AUTO: 92.4 FL (ref 79.4–94.8)
PLATELET # BLD AUTO: 267 K/UL (ref 130–400)
RBC # BLD AUTO: 4.87 M/UL (ref 4.2–5.4)
WBC # BLD AUTO: 6.2 K/UL (ref 4.8–10.8)

## 2025-01-15 PROCEDURE — 85027 COMPLETE CBC AUTOMATED: CPT

## 2025-01-17 ENCOUNTER — APPOINTMENT (OUTPATIENT)
Dept: GENERAL RADIOLOGY | Age: 60
End: 2025-01-17
Attending: OBSTETRICS & GYNECOLOGY
Payer: COMMERCIAL

## 2025-01-17 ENCOUNTER — ANESTHESIA (OUTPATIENT)
Dept: OPERATING ROOM | Age: 60
End: 2025-01-17
Payer: COMMERCIAL

## 2025-01-17 ENCOUNTER — HOSPITAL ENCOUNTER (OUTPATIENT)
Age: 60
Setting detail: OUTPATIENT SURGERY
Discharge: HOME | End: 2025-01-17
Attending: OBSTETRICS & GYNECOLOGY | Admitting: OBSTETRICS & GYNECOLOGY
Payer: COMMERCIAL

## 2025-01-17 ENCOUNTER — ANESTHESIA EVENT (OUTPATIENT)
Dept: OPERATING ROOM | Age: 60
End: 2025-01-17
Payer: COMMERCIAL

## 2025-01-17 VITALS
OXYGEN SATURATION: 99 % | TEMPERATURE: 96.9 F | SYSTOLIC BLOOD PRESSURE: 119 MMHG | HEIGHT: 64 IN | DIASTOLIC BLOOD PRESSURE: 68 MMHG | WEIGHT: 162 LBS | RESPIRATION RATE: 18 BRPM | BODY MASS INDEX: 27.66 KG/M2 | HEART RATE: 57 BPM

## 2025-01-17 DIAGNOSIS — T83.190D: ICD-10-CM

## 2025-01-17 DIAGNOSIS — G89.18 POSTOPERATIVE PAIN: Primary | ICD-10-CM

## 2025-01-17 DIAGNOSIS — R52 PAIN: ICD-10-CM

## 2025-01-17 PROCEDURE — C1897 LEAD, NEUROSTIM TEST KIT: HCPCS | Performed by: OBSTETRICS & GYNECOLOGY

## 2025-01-17 PROCEDURE — 76000 FLUOROSCOPY <1 HR PHYS/QHP: CPT | Performed by: OBSTETRICS & GYNECOLOGY

## 2025-01-17 PROCEDURE — 6360000002 HC RX W HCPCS: Performed by: OBSTETRICS & GYNECOLOGY

## 2025-01-17 PROCEDURE — 64590 INS/RPL PRPH SAC/GSTR NPG/R: CPT | Performed by: OBSTETRICS & GYNECOLOGY

## 2025-01-17 PROCEDURE — 3700000000 HC ANESTHESIA ATTENDED CARE: Performed by: OBSTETRICS & GYNECOLOGY

## 2025-01-17 PROCEDURE — 3600000004 HC SURGERY LEVEL 4 BASE: Performed by: OBSTETRICS & GYNECOLOGY

## 2025-01-17 PROCEDURE — 87070 CULTURE OTHR SPECIMN AEROBIC: CPT

## 2025-01-17 PROCEDURE — 6360000002 HC RX W HCPCS: Performed by: ANESTHESIOLOGIST ASSISTANT

## 2025-01-17 PROCEDURE — C1787 PATIENT PROGR, NEUROSTIM: HCPCS | Performed by: OBSTETRICS & GYNECOLOGY

## 2025-01-17 PROCEDURE — 64561 IMPLANT NEUROELECTRODES: CPT | Performed by: OBSTETRICS & GYNECOLOGY

## 2025-01-17 PROCEDURE — 2709999900 HC NON-CHARGEABLE SUPPLY: Performed by: OBSTETRICS & GYNECOLOGY

## 2025-01-17 PROCEDURE — 2580000003 HC RX 258: Performed by: OBSTETRICS & GYNECOLOGY

## 2025-01-17 PROCEDURE — 6370000000 HC RX 637 (ALT 250 FOR IP): Performed by: OBSTETRICS & GYNECOLOGY

## 2025-01-17 PROCEDURE — 2500000003 HC RX 250 WO HCPCS: Performed by: ANESTHESIOLOGIST ASSISTANT

## 2025-01-17 PROCEDURE — 7100000000 HC PACU RECOVERY - FIRST 15 MIN: Performed by: OBSTETRICS & GYNECOLOGY

## 2025-01-17 PROCEDURE — C1883 ADAPT/EXT, PACING/NEURO LEAD: HCPCS | Performed by: OBSTETRICS & GYNECOLOGY

## 2025-01-17 PROCEDURE — 7100000011 HC PHASE II RECOVERY - ADDTL 15 MIN: Performed by: OBSTETRICS & GYNECOLOGY

## 2025-01-17 PROCEDURE — 7100000010 HC PHASE II RECOVERY - FIRST 15 MIN: Performed by: OBSTETRICS & GYNECOLOGY

## 2025-01-17 PROCEDURE — 3700000001 HC ADD 15 MINUTES (ANESTHESIA): Performed by: OBSTETRICS & GYNECOLOGY

## 2025-01-17 PROCEDURE — 7100000001 HC PACU RECOVERY - ADDTL 15 MIN: Performed by: OBSTETRICS & GYNECOLOGY

## 2025-01-17 PROCEDURE — 95972 ALYS CPLX SP/PN NPGT W/PRGRM: CPT | Performed by: OBSTETRICS & GYNECOLOGY

## 2025-01-17 PROCEDURE — C1778 LEAD, NEUROSTIMULATOR: HCPCS | Performed by: OBSTETRICS & GYNECOLOGY

## 2025-01-17 PROCEDURE — 87075 CULTR BACTERIA EXCEPT BLOOD: CPT

## 2025-01-17 PROCEDURE — C1767 GENERATOR, NEURO NON-RECHARG: HCPCS | Performed by: OBSTETRICS & GYNECOLOGY

## 2025-01-17 PROCEDURE — 2500000003 HC RX 250 WO HCPCS: Performed by: OBSTETRICS & GYNECOLOGY

## 2025-01-17 PROCEDURE — C1889 IMPLANT/INSERT DEVICE, NOC: HCPCS | Performed by: OBSTETRICS & GYNECOLOGY

## 2025-01-17 PROCEDURE — 3600000014 HC SURGERY LEVEL 4 ADDTL 15MIN: Performed by: OBSTETRICS & GYNECOLOGY

## 2025-01-17 DEVICE — ENVELOPE PLSE GENRTR M W2.7XL2.5IN NEURO ANTIBACT ABSRB: Type: IMPLANTABLE DEVICE | Site: BUTTOCKS | Status: FUNCTIONAL

## 2025-01-17 DEVICE — LEAD NERVE STIM 4.32 MM INTERSTIM SURESCAN: Type: IMPLANTABLE DEVICE | Site: SACRUM | Status: FUNCTIONAL

## 2025-01-17 DEVICE — NEUROSTIMULATOR INTERSTIM X RECHRGE FREE TORQ WRNCH PROD: Type: IMPLANTABLE DEVICE | Site: BUTTOCKS | Status: FUNCTIONAL

## 2025-01-17 RX ORDER — ONDANSETRON 4 MG/1
4 TABLET, ORALLY DISINTEGRATING ORAL EVERY 8 HOURS PRN
Status: DISCONTINUED | OUTPATIENT
Start: 2025-01-17 | End: 2025-01-17 | Stop reason: HOSPADM

## 2025-01-17 RX ORDER — SODIUM CHLORIDE 0.9 % (FLUSH) 0.9 %
5-40 SYRINGE (ML) INJECTION EVERY 12 HOURS SCHEDULED
Status: DISCONTINUED | OUTPATIENT
Start: 2025-01-17 | End: 2025-01-17 | Stop reason: HOSPADM

## 2025-01-17 RX ORDER — FENTANYL CITRATE 50 UG/ML
INJECTION, SOLUTION INTRAMUSCULAR; INTRAVENOUS
Status: DISCONTINUED | OUTPATIENT
Start: 2025-01-17 | End: 2025-01-17 | Stop reason: SDUPTHER

## 2025-01-17 RX ORDER — FAMOTIDINE 20 MG/1
20 TABLET, FILM COATED ORAL 2 TIMES DAILY
Status: DISCONTINUED | OUTPATIENT
Start: 2025-01-17 | End: 2025-01-17 | Stop reason: HOSPADM

## 2025-01-17 RX ORDER — SODIUM CHLORIDE 9 MG/ML
INJECTION, SOLUTION INTRAVENOUS PRN
Status: DISCONTINUED | OUTPATIENT
Start: 2025-01-17 | End: 2025-01-17 | Stop reason: HOSPADM

## 2025-01-17 RX ORDER — ONDANSETRON 2 MG/ML
4 INJECTION INTRAMUSCULAR; INTRAVENOUS
Status: DISCONTINUED | OUTPATIENT
Start: 2025-01-17 | End: 2025-01-17 | Stop reason: HOSPADM

## 2025-01-17 RX ORDER — SODIUM CHLORIDE, SODIUM LACTATE, POTASSIUM CHLORIDE, CALCIUM CHLORIDE 600; 310; 30; 20 MG/100ML; MG/100ML; MG/100ML; MG/100ML
INJECTION, SOLUTION INTRAVENOUS CONTINUOUS
Status: DISCONTINUED | OUTPATIENT
Start: 2025-01-17 | End: 2025-01-17 | Stop reason: HOSPADM

## 2025-01-17 RX ORDER — HYDROMORPHONE HYDROCHLORIDE 1 MG/ML
1 INJECTION, SOLUTION INTRAMUSCULAR; INTRAVENOUS; SUBCUTANEOUS
Status: DISCONTINUED | OUTPATIENT
Start: 2025-01-17 | End: 2025-01-17 | Stop reason: HOSPADM

## 2025-01-17 RX ORDER — LIDOCAINE HYDROCHLORIDE 10 MG/ML
1 INJECTION, SOLUTION EPIDURAL; INFILTRATION; INTRACAUDAL; PERINEURAL
Status: DISCONTINUED | OUTPATIENT
Start: 2025-01-17 | End: 2025-01-17 | Stop reason: HOSPADM

## 2025-01-17 RX ORDER — IBUPROFEN 800 MG/1
800 TABLET, FILM COATED ORAL EVERY 8 HOURS PRN
Qty: 60 TABLET | Refills: 1 | Status: SHIPPED | OUTPATIENT
Start: 2025-01-17 | End: 2025-02-05 | Stop reason: SDUPTHER

## 2025-01-17 RX ORDER — ROCURONIUM BROMIDE 10 MG/ML
INJECTION, SOLUTION INTRAVENOUS
Status: DISCONTINUED | OUTPATIENT
Start: 2025-01-17 | End: 2025-01-17 | Stop reason: SDUPTHER

## 2025-01-17 RX ORDER — ACETAMINOPHEN 500 MG
1000 TABLET ORAL EVERY 8 HOURS PRN
Status: DISCONTINUED | OUTPATIENT
Start: 2025-01-17 | End: 2025-01-17 | Stop reason: HOSPADM

## 2025-01-17 RX ORDER — MIDAZOLAM HYDROCHLORIDE 1 MG/ML
INJECTION, SOLUTION INTRAMUSCULAR; INTRAVENOUS
Status: DISCONTINUED | OUTPATIENT
Start: 2025-01-17 | End: 2025-01-17 | Stop reason: SDUPTHER

## 2025-01-17 RX ORDER — ONDANSETRON 2 MG/ML
4 INJECTION INTRAMUSCULAR; INTRAVENOUS EVERY 6 HOURS PRN
Status: DISCONTINUED | OUTPATIENT
Start: 2025-01-17 | End: 2025-01-17 | Stop reason: HOSPADM

## 2025-01-17 RX ORDER — SODIUM CHLORIDE 0.9 % (FLUSH) 0.9 %
5-40 SYRINGE (ML) INJECTION PRN
Status: DISCONTINUED | OUTPATIENT
Start: 2025-01-17 | End: 2025-01-17 | Stop reason: HOSPADM

## 2025-01-17 RX ORDER — OXYCODONE AND ACETAMINOPHEN 5; 325 MG/1; MG/1
2 TABLET ORAL NIGHTLY PRN
Qty: 10 TABLET | Refills: 0 | Status: SHIPPED | OUTPATIENT
Start: 2025-01-17 | End: 2025-01-22

## 2025-01-17 RX ORDER — OXYCODONE HYDROCHLORIDE 5 MG/1
5 TABLET ORAL
Status: DISCONTINUED | OUTPATIENT
Start: 2025-01-17 | End: 2025-01-17 | Stop reason: HOSPADM

## 2025-01-17 RX ORDER — LIDOCAINE HYDROCHLORIDE AND EPINEPHRINE 10; 10 MG/ML; UG/ML
INJECTION, SOLUTION INFILTRATION; PERINEURAL PRN
Status: DISCONTINUED | OUTPATIENT
Start: 2025-01-17 | End: 2025-01-17 | Stop reason: HOSPADM

## 2025-01-17 RX ORDER — DIPHENHYDRAMINE HYDROCHLORIDE 50 MG/ML
12.5 INJECTION, SOLUTION INTRAMUSCULAR; INTRAVENOUS
Status: DISCONTINUED | OUTPATIENT
Start: 2025-01-17 | End: 2025-01-17 | Stop reason: HOSPADM

## 2025-01-17 RX ORDER — SULFAMETHOXAZOLE AND TRIMETHOPRIM 800; 160 MG/1; MG/1
1 TABLET ORAL 2 TIMES DAILY
Qty: 20 TABLET | Refills: 0 | Status: SHIPPED | OUTPATIENT
Start: 2025-01-17 | End: 2025-01-27

## 2025-01-17 RX ORDER — OXYCODONE HYDROCHLORIDE 5 MG/1
10 TABLET ORAL EVERY 4 HOURS PRN
Status: DISCONTINUED | OUTPATIENT
Start: 2025-01-17 | End: 2025-01-17 | Stop reason: HOSPADM

## 2025-01-17 RX ORDER — FENTANYL CITRATE 0.05 MG/ML
50 INJECTION, SOLUTION INTRAMUSCULAR; INTRAVENOUS EVERY 10 MIN PRN
Status: DISCONTINUED | OUTPATIENT
Start: 2025-01-17 | End: 2025-01-17 | Stop reason: HOSPADM

## 2025-01-17 RX ORDER — KETOROLAC TROMETHAMINE 30 MG/ML
INJECTION, SOLUTION INTRAMUSCULAR; INTRAVENOUS
Status: DISCONTINUED | OUTPATIENT
Start: 2025-01-17 | End: 2025-01-17 | Stop reason: SDUPTHER

## 2025-01-17 RX ORDER — MEPERIDINE HYDROCHLORIDE 25 MG/ML
12.5 INJECTION INTRAMUSCULAR; INTRAVENOUS; SUBCUTANEOUS
Status: DISCONTINUED | OUTPATIENT
Start: 2025-01-17 | End: 2025-01-17 | Stop reason: HOSPADM

## 2025-01-17 RX ORDER — ACETAMINOPHEN 500 MG
1000 TABLET ORAL EVERY 6 HOURS PRN
Qty: 60 TABLET | Refills: 0 | Status: SHIPPED | OUTPATIENT
Start: 2025-01-17 | End: 2025-02-05 | Stop reason: SDUPTHER

## 2025-01-17 RX ORDER — PROPOFOL 10 MG/ML
INJECTION, EMULSION INTRAVENOUS
Status: DISCONTINUED | OUTPATIENT
Start: 2025-01-17 | End: 2025-01-17 | Stop reason: SDUPTHER

## 2025-01-17 RX ORDER — NALOXONE HYDROCHLORIDE 0.4 MG/ML
INJECTION, SOLUTION INTRAMUSCULAR; INTRAVENOUS; SUBCUTANEOUS PRN
Status: DISCONTINUED | OUTPATIENT
Start: 2025-01-17 | End: 2025-01-17 | Stop reason: HOSPADM

## 2025-01-17 RX ORDER — ONDANSETRON 2 MG/ML
INJECTION INTRAMUSCULAR; INTRAVENOUS
Status: DISCONTINUED | OUTPATIENT
Start: 2025-01-17 | End: 2025-01-17 | Stop reason: SDUPTHER

## 2025-01-17 RX ORDER — ACETAMINOPHEN 500 MG
1000 TABLET ORAL ONCE
Status: COMPLETED | OUTPATIENT
Start: 2025-01-17 | End: 2025-01-17

## 2025-01-17 RX ORDER — DEXAMETHASONE SODIUM PHOSPHATE 4 MG/ML
INJECTION, SOLUTION INTRA-ARTICULAR; INTRALESIONAL; INTRAMUSCULAR; INTRAVENOUS; SOFT TISSUE
Status: DISCONTINUED | OUTPATIENT
Start: 2025-01-17 | End: 2025-01-17 | Stop reason: SDUPTHER

## 2025-01-17 RX ORDER — EPHEDRINE SULFATE/0.9% NACL/PF 25 MG/5 ML
SYRINGE (ML) INTRAVENOUS
Status: DISCONTINUED | OUTPATIENT
Start: 2025-01-17 | End: 2025-01-17 | Stop reason: SDUPTHER

## 2025-01-17 RX ORDER — METOCLOPRAMIDE HYDROCHLORIDE 5 MG/ML
10 INJECTION INTRAMUSCULAR; INTRAVENOUS
Status: DISCONTINUED | OUTPATIENT
Start: 2025-01-17 | End: 2025-01-17 | Stop reason: HOSPADM

## 2025-01-17 RX ORDER — LIDOCAINE HYDROCHLORIDE 10 MG/ML
INJECTION, SOLUTION EPIDURAL; INFILTRATION; INTRACAUDAL; PERINEURAL
Status: DISCONTINUED | OUTPATIENT
Start: 2025-01-17 | End: 2025-01-17 | Stop reason: SDUPTHER

## 2025-01-17 RX ORDER — OXYCODONE HYDROCHLORIDE 5 MG/1
5 TABLET ORAL EVERY 4 HOURS PRN
Status: DISCONTINUED | OUTPATIENT
Start: 2025-01-17 | End: 2025-01-17 | Stop reason: HOSPADM

## 2025-01-17 RX ORDER — CLINDAMYCIN PHOSPHATE 900 MG/50ML
900 INJECTION, SOLUTION INTRAVENOUS
Status: DISCONTINUED | OUTPATIENT
Start: 2025-01-17 | End: 2025-01-17 | Stop reason: HOSPADM

## 2025-01-17 RX ADMIN — PHENYLEPHRINE HYDROCHLORIDE 150 MCG: 10 INJECTION INTRAVENOUS at 10:20

## 2025-01-17 RX ADMIN — ACETAMINOPHEN 1000 MG: 500 TABLET ORAL at 08:20

## 2025-01-17 RX ADMIN — PHENYLEPHRINE HYDROCHLORIDE 150 MCG: 10 INJECTION INTRAVENOUS at 11:12

## 2025-01-17 RX ADMIN — EPHEDRINE SULFATE 5 MG: 5 INJECTION INTRAVENOUS at 10:25

## 2025-01-17 RX ADMIN — EPHEDRINE SULFATE 5 MG: 5 INJECTION INTRAVENOUS at 10:31

## 2025-01-17 RX ADMIN — GENTAMICIN SULFATE 110.4 MG: 40 INJECTION, SOLUTION INTRAMUSCULAR; INTRAVENOUS at 09:49

## 2025-01-17 RX ADMIN — ONDANSETRON 4 MG: 2 INJECTION, SOLUTION INTRAMUSCULAR; INTRAVENOUS at 11:13

## 2025-01-17 RX ADMIN — PHENYLEPHRINE HYDROCHLORIDE 100 MCG: 10 INJECTION INTRAVENOUS at 10:26

## 2025-01-17 RX ADMIN — EPHEDRINE SULFATE 10 MG: 5 INJECTION INTRAVENOUS at 10:15

## 2025-01-17 RX ADMIN — LIDOCAINE HYDROCHLORIDE 50 MG: 10 INJECTION, SOLUTION EPIDURAL; INFILTRATION; INTRACAUDAL; PERINEURAL at 09:59

## 2025-01-17 RX ADMIN — FENTANYL CITRATE 25 MCG: 50 INJECTION, SOLUTION INTRAMUSCULAR; INTRAVENOUS at 10:26

## 2025-01-17 RX ADMIN — PHENYLEPHRINE HYDROCHLORIDE 150 MCG: 10 INJECTION INTRAVENOUS at 11:08

## 2025-01-17 RX ADMIN — PHENYLEPHRINE HYDROCHLORIDE 100 MCG: 10 INJECTION INTRAVENOUS at 10:45

## 2025-01-17 RX ADMIN — SUGAMMADEX 200 MG: 100 INJECTION, SOLUTION INTRAVENOUS at 10:53

## 2025-01-17 RX ADMIN — PROPOFOL 130 MG: 10 INJECTION, EMULSION INTRAVENOUS at 09:59

## 2025-01-17 RX ADMIN — FENTANYL CITRATE 25 MCG: 50 INJECTION, SOLUTION INTRAMUSCULAR; INTRAVENOUS at 11:20

## 2025-01-17 RX ADMIN — PHENYLEPHRINE HYDROCHLORIDE 100 MCG: 10 INJECTION INTRAVENOUS at 10:25

## 2025-01-17 RX ADMIN — ROCURONIUM BROMIDE 40 MG: 10 INJECTION, SOLUTION INTRAVENOUS at 09:59

## 2025-01-17 RX ADMIN — CLINDAMYCIN IN 5 PERCENT DEXTROSE 900 MG: 18 INJECTION, SOLUTION INTRAVENOUS at 10:11

## 2025-01-17 RX ADMIN — KETOROLAC TROMETHAMINE 15 MG: 30 INJECTION, SOLUTION INTRAMUSCULAR at 11:13

## 2025-01-17 RX ADMIN — DEXAMETHASONE SODIUM PHOSPHATE 4 MG: 4 INJECTION INTRA-ARTICULAR; INTRALESIONAL; INTRAMUSCULAR; INTRAVENOUS; SOFT TISSUE at 10:12

## 2025-01-17 RX ADMIN — PHENYLEPHRINE HYDROCHLORIDE 100 MCG: 10 INJECTION INTRAVENOUS at 10:31

## 2025-01-17 RX ADMIN — EPHEDRINE SULFATE 5 MG: 5 INJECTION INTRAVENOUS at 10:20

## 2025-01-17 RX ADMIN — OXYCODONE 5 MG: 5 TABLET ORAL at 12:53

## 2025-01-17 RX ADMIN — SODIUM CHLORIDE, SODIUM LACTATE, POTASSIUM CHLORIDE, AND CALCIUM CHLORIDE: .6; .31; .03; .02 INJECTION, SOLUTION INTRAVENOUS at 08:30

## 2025-01-17 RX ADMIN — FENTANYL CITRATE 50 MCG: 50 INJECTION, SOLUTION INTRAMUSCULAR; INTRAVENOUS at 09:59

## 2025-01-17 RX ADMIN — MIDAZOLAM HYDROCHLORIDE 2 MG: 1 INJECTION, SOLUTION INTRAMUSCULAR; INTRAVENOUS at 09:49

## 2025-01-17 ASSESSMENT — PAIN SCALES - GENERAL
PAINLEVEL_OUTOF10: 7
PAINLEVEL_OUTOF10: 7
PAINLEVEL_OUTOF10: 3
PAINLEVEL_OUTOF10: 7
PAINLEVEL_OUTOF10: 4
PAINLEVEL_OUTOF10: 5

## 2025-01-17 ASSESSMENT — PAIN DESCRIPTION - ONSET: ONSET: GRADUAL

## 2025-01-17 ASSESSMENT — PAIN - FUNCTIONAL ASSESSMENT: PAIN_FUNCTIONAL_ASSESSMENT: 0-10

## 2025-01-17 ASSESSMENT — PAIN DESCRIPTION - DESCRIPTORS
DESCRIPTORS: ACHING

## 2025-01-17 ASSESSMENT — PAIN DESCRIPTION - LOCATION
LOCATION: BUTTOCKS

## 2025-01-17 ASSESSMENT — PAIN DESCRIPTION - PAIN TYPE
TYPE: SURGICAL PAIN

## 2025-01-17 ASSESSMENT — PAIN DESCRIPTION - FREQUENCY: FREQUENCY: CONTINUOUS

## 2025-01-17 ASSESSMENT — PAIN DESCRIPTION - ORIENTATION
ORIENTATION: LEFT
ORIENTATION: LEFT

## 2025-01-17 ASSESSMENT — LIFESTYLE VARIABLES: SMOKING_STATUS: 1

## 2025-01-17 NOTE — DISCHARGE INSTRUCTIONS
NO WATER IMMERSION   NO CARRYING ANYTHING > 5 LBS   NO INTERCOURSE   Keep incision clean and dry .   Can shower, if dressing gets wet, please remove and make sure incision is dry then .   [facility]  Outpatient Discharge Instructions    To continue your care at home, please follow the instructions below and any additional discharge instructions given to you by your physician.    GENERAL ANESTHESIA:  Do not drive or operate machinery for 24hrs after discharge,  Do not drink alcohol, take tranquilizers, sleeping medication, or any other medication not directly instructed by your physician,   Do not make any important decisions or sign any legal documents for 24hrs after surgery,  Have someone with you for 24hrs after surgery to assist you as needed.    ACTIVITY:  Light activity for 24hrs,  No heavy lifting or exercise until instructed by your physician,  You may resume normal activities once instructed by your physician,  Special Instruction: ___________________________________________________________________    FLUIDS AND DIET:  An upset stomach or feeling sick (nausea) can commonly occur after surgery and/or pain medication use. To help minimize nausea:  Do not eat a heavy meal soon after your surgery,    Start with water or other clear liquids,  Advance to mild or bland items like Jell-O, dry toast, crackers, etc.,  Avoid caffeine,  Do not drink alcohol for at least 24 hours after surgery,  Your physician may prescribe anti-nausea medication if your nausea continues,  If you are free from nausea for 24hrs, you can advance to your normal diet as tolerated.    OPERATIVE SITE:  A small amount of bleeding or drainage after surgery is normal. Your physician will provide you with specific instructions on how to care for your surgical site and/or dressing.   Try not to touch your surgical site unless necessary,   Always wash your hands BEFORE and AFTER changing your dressing if instructed by your physician,

## 2025-01-17 NOTE — H&P
Aleja Hemls is a 59 y.o. female who presents here today for complaints of Other (She'd like to discuss having Interstim removed and other options.)    The patient, Aleja, presents with a chief complaint of frequent nocturia, waking up 3 to 5 times per night to urinate. She reports that her sleep is disrupted, and she often barely makes it to the bathroom, resulting in the need to change her pajamas and clothes frequently. Aleja mentions that her symptoms had improved since her last visit but have since worsened again.     Aleja was previously prescribed Myrbetriq, which she states did not help her symptoms and caused dry mouth. She has only a few pills left and has not been taking them consistently since the device was turned up. She denies having any infections and reports that her culture is negative.     The patient expresses frustration with her current condition, feeling like she is not making progress and is not bouncing back as she used to. She is hesitant about the possibility of Botox treatment, citing concerns about the need for repeated treatments every 5 to 6 months.     Aleja's device appears to be malfunctioning, displaying an error message that the clinician has not seen before. The patient denies any physical damage to the device and is unsure why it is only working on program 3.  .        Vitals:  /62 (Site: Left Upper Arm, Position: Sitting, Cuff Size: Medium Adult)   Pulse 76   Ht 1.626 m (5' 4\")   Wt 70.3 kg (155 lb)   LMP 01/01/1997 (Exact Date) Comment: hysterectomy  BMI 26.61 kg/m²   Allergies:  Amoxicillin-pot clavulanate, Amoxicillin, and Other  Past Medical History        Past Medical History:   Diagnosis Date    Anxiety      Arthritis      Cancer (HCC) 01/04/2017     large granular lymphomic leukemia    Chronic back pain      Chronic kidney disease      COPD exacerbation (HCC)      Depression      Disease of blood and blood forming organ 01/04/2017     neutropenia

## 2025-01-17 NOTE — ANESTHESIA POSTPROCEDURE EVALUATION
Department of Anesthesiology  Postprocedure Note    Patient: Aleja Helms  MRN: [id number]  YOB: 1965  Date of evaluation: 1/17/2025    Procedure Summary       Date: 01/17/25 Room / Location: [address]    Anesthesia Start: 0949 Anesthesia Stop:     Procedure: REPLACEMENT STAGE 1 AND STAGE 2 INTERSTIM Diagnosis:       Other mechanical complication of urinary electronic stimulator device, subsequent encounter      (Other mechanical complication of urinary electronic stimulator device, subsequent encounter [T83.425D])    Surgeons: Kong Jean MD Responsible Provider: Ulisses Cardenas DO    Anesthesia Type: general ASA Status: 3            Anesthesia Type: No value filed.    Benigno Phase I:      Benigno Phase II:      Anesthesia Post Evaluation    Patient location during evaluation: bedside  Patient participation: complete - patient participated  Level of consciousness: awake and awake and alert  Airway patency: patent  Nausea & Vomiting: no nausea and no vomiting  Cardiovascular status: blood pressure returned to baseline and hemodynamically stable  Respiratory status: acceptable  Hydration status: euvolemic  Pain management: adequate        No notable events documented.

## 2025-01-17 NOTE — ANESTHESIA PRE PROCEDURE
Date/Time    PHART 7.402 12/20/2017 01:26 PM    PO2ART 85 12/20/2017 01:26 PM    QSF7LYP 40 12/20/2017 01:26 PM    NFC3SVZ 25.0 12/20/2017 01:26 PM    BEART 0 12/20/2017 01:26 PM    Q9LRIDYK 96 12/20/2017 01:26 PM        Type & Screen (If Applicable):  No results found for: \"ABORH\", \"LABANTI\"    Drug/Infectious Status (If Applicable):  No results found for: \"HIV\", \"HEPCAB\"    COVID-19 Screening (If Applicable):   Lab Results   Component Value Date/Time    COVID19 Not-Detected 12/02/2024 01:50 PM    COVID19 DETECTED 12/22/2021 07:31 AM           Anesthesia Evaluation  Patient summary reviewed and Nursing notes reviewed   no history of anesthetic complications:   Airway: Mallampati: II  TM distance: >3 FB   Neck ROM: full  Mouth opening: > = 3 FB   Dental: normal exam         Pulmonary:normal exam    (+) pneumonia:  COPD:          current smoker          Patient did not smoke on day of surgery.                 Cardiovascular:Negative CV ROS  Exercise tolerance: good (>4 METS)        ECG reviewed               Beta Blocker:  Not on Beta Blocker      ROS comment:  Normal LV and RV.   No significant valve disease.   Normal estimated PA pressure.   Normal diastolic filling pattern.   Mildly dilated LA.       Neuro/Psych:   Negative Neuro/Psych ROS  (+) neuromuscular disease:, psychiatric history:            GI/Hepatic/Renal:   (+) GERD:, PUD, liver disease:          Endo/Other: Negative Endo/Other ROS   (+) : arthritis: OA..          Pt had PAT visit.       Abdominal:             Vascular: negative vascular ROS.         Other Findings:             Anesthesia Plan      general     ASA 3     (ETT  Discussed risk of post operative visual disturbances, facial trauma, facial edema and pressure sores)  Induction: intravenous.    MIPS: Postoperative opioids intended and Prophylactic antiemetics administered.  Anesthetic plan and risks discussed with patient.      Plan discussed with CRNA.    Attending anesthesiologist reviewed

## 2025-01-17 NOTE — OP NOTE
Brief Postoperative Note  ______________________________________________________________    Patient: Aleja Helms  YOB: 1965  MRN: [id number]  Date of Procedure: 1/17/2025    Pre-Op Diagnosis: Other mechanical complication of urinary electronic stimulator device, subsequent encounter [T83.190D]    Post-Op Diagnosis: Same       Procedure(s):  REPLACEMENT STAGE 1 AND STAGE 2 INTERSTIM    Anesthesia: General    Surgeon(s):  Kong Jean MD    Staff:  First Assistant: Ness Zepeda  Scrub Person First: Minerva Miller     Estimated Blood Loss: 5 mL    Complications: None    Specimens:   ID Type Source Tests Collected by Time Destination   1 : lead for culture Hardware Buttock CULTURE, SURGICAL Kong Jean MD 1/17/2025 1118        Implants:  Implant Name Type Inv. Item Serial No.  Lot No. LRB No. Used Action   ENVELOPE PLSE GENRTR M W2.7XL2.5IN NEURO ANTIBACT ABSRB - [id number]  ENVELOPE PLSE GENRTR M W2.7XL2.5IN NEURO ANTIBACT ABSRB [id number] [employer]-  Left 1 Implanted   LEAD NERVE STIM 4.32 MM INTERSTIM SURESCAN - [id number]  LEAD NERVE STIM 4.32 MM INTERSTIM SURESCAN  [employer]-WD [id number] Left 1 Implanted   NEUROSTIMULATOR INTERSTIM X RECHRGE FREE TORQ WRNCH PROD - [id number]  NEUROSTIMULATOR INTERSTIM X RECHRGE FREE TORQ WRNCH PROD [id number] [employer]WD  Left 1 Implanted         Drains: * No LDAs found *    Findings: evidence of lead migration into the subcutaneous layer with breakage . Multiple twists noted which is very uncommon and difficuult to happen spontaneously .    Operative Technique  Surgical technique     Patient preparation  The patient is prepped in the normal sterile manner first lying prone under general anaesthesia. IPG dissection It was easy to identify the site of the IPG from the scar of the previous procedure in the lower lumbar quadrant. After locating the site of the IPG, dissection was performed to remove it , a 10 scalpel  were dropped. Attention was made to ensure that the lead was all the way in into the InterStim. Irrigation was performed using gentamicin solution after placing the main unit in the pouch. Impedance was checked. Irrigation was again performed with antibiotic irrigation solution. The needle site was closed using 4-0 Monocryl. The pouch was closed using subcutaneous tissue with 2-0 Vicryl and  4-0 Vicryl for subcuticular and dermabond .  Incision covered.     The patient was transferred to PACU in stable condition. Using the clinician , the INS was programmed.  Rate: 14   Pulse Width: 210  Electrode Configuration: C2   Number of available programs: 4  Patient was provided utilization instructions for the patient  prior to discharge.      Kong Jean M.D., F.A.C.O.G     Kong Jean MD  Date: 1/17/2025  Time: 11:49 AM

## 2025-01-17 NOTE — PROGRESS NOTES
CLINICAL PHARMACY NOTE: MEDS TO BEDS    Total # of Prescriptions Filled: 4   The following medications were delivered to the patient:  Oxycodone-Apap 5-325 mg Tab  Ibuprofen 800 mg Tab  Acetaminophen 500 mg Tab  Sulfameth-Trim 800-160 mg Tab    Additional Documentation:

## 2025-01-19 LAB — CULTURE SURGICAL: NORMAL

## 2025-01-20 RX ORDER — DULOXETIN HYDROCHLORIDE 60 MG/1
120 CAPSULE, DELAYED RELEASE ORAL EVERY EVENING
Qty: 60 CAPSULE | Refills: 0 | Status: SHIPPED | OUTPATIENT
Start: 2025-01-20 | End: 2025-04-20

## 2025-01-20 NOTE — TELEPHONE ENCOUNTER
Pharmacy is requesting medication refill. Please approve or deny this request.    Rx requested:  Requested Prescriptions     Pending Prescriptions Disp Refills    DULoxetine (CYMBALTA) 60 MG extended release capsule [Pharmacy Med Name: DULOXETINE HCL 60 MG CPEP 60 Capsule] 60 capsule 0     Sig: TAKE 2 CAPSULES BY MOUTH EVERY EVENING         Last Office Visit:   9/17/2024      Next Visit Date:  Future Appointments   Date Time Provider Department Center   1/31/2025  9:30 AM [facility]   2/5/2025  2:45 PM Usama Mata MD [place] NEURO Neurology -   4/2/2025  9:15 AM Tariq Lee MD [facility] DEP

## 2025-01-22 LAB — CULTURE SURGICAL: NORMAL

## 2025-02-03 ENCOUNTER — OFFICE VISIT (OUTPATIENT)
Dept: OBGYN CLINIC | Age: 60
End: 2025-02-03

## 2025-02-03 VITALS
HEIGHT: 64 IN | SYSTOLIC BLOOD PRESSURE: 128 MMHG | HEART RATE: 64 BPM | WEIGHT: 165 LBS | DIASTOLIC BLOOD PRESSURE: 72 MMHG | BODY MASS INDEX: 28.17 KG/M2

## 2025-02-03 DIAGNOSIS — G62.9 POLYNEUROPATHY, UNSPECIFIED: ICD-10-CM

## 2025-02-03 DIAGNOSIS — Z09 POSTOP CHECK: Primary | ICD-10-CM

## 2025-02-03 DIAGNOSIS — Z09 POSTOP CHECK: ICD-10-CM

## 2025-02-03 LAB
BILIRUB UR QL STRIP: NEGATIVE
CLARITY UR: CLEAR
COLOR UR: YELLOW
GLUCOSE UR STRIP-MCNC: NEGATIVE MG/DL
HGB UR QL STRIP: NEGATIVE
KETONES UR STRIP-MCNC: NEGATIVE MG/DL
LEUKOCYTE ESTERASE UR QL STRIP: NEGATIVE
NITRITE UR QL STRIP: NEGATIVE
PH UR STRIP: 6 [PH] (ref 5–9)
PROT UR STRIP-MCNC: NEGATIVE MG/DL
SP GR UR STRIP: 1.02 (ref 1–1.03)
UROBILINOGEN UR STRIP-ACNC: 0.2 E.U./DL

## 2025-02-03 PROCEDURE — 99024 POSTOP FOLLOW-UP VISIT: CPT | Performed by: OBSTETRICS & GYNECOLOGY

## 2025-02-03 SDOH — ECONOMIC STABILITY: FOOD INSECURITY: WITHIN THE PAST 12 MONTHS, YOU WORRIED THAT YOUR FOOD WOULD RUN OUT BEFORE YOU GOT MONEY TO BUY MORE.: NEVER TRUE

## 2025-02-03 SDOH — ECONOMIC STABILITY: FOOD INSECURITY: WITHIN THE PAST 12 MONTHS, THE FOOD YOU BOUGHT JUST DIDN'T LAST AND YOU DIDN'T HAVE MONEY TO GET MORE.: NEVER TRUE

## 2025-02-03 ASSESSMENT — PATIENT HEALTH QUESTIONNAIRE - PHQ9
SUM OF ALL RESPONSES TO PHQ QUESTIONS 1-9: 19
5. POOR APPETITE OR OVEREATING: MORE THAN HALF THE DAYS
1. LITTLE INTEREST OR PLEASURE IN DOING THINGS: MORE THAN HALF THE DAYS
SUM OF ALL RESPONSES TO PHQ QUESTIONS 1-9: 19
9. THOUGHTS THAT YOU WOULD BE BETTER OFF DEAD, OR OF HURTING YOURSELF: NOT AT ALL
7. TROUBLE CONCENTRATING ON THINGS, SUCH AS READING THE NEWSPAPER OR WATCHING TELEVISION: NEARLY EVERY DAY
2. FEELING DOWN, DEPRESSED OR HOPELESS: NEARLY EVERY DAY
SUM OF ALL RESPONSES TO PHQ9 QUESTIONS 1 & 2: 5
SUM OF ALL RESPONSES TO PHQ QUESTIONS 1-9: 19
8. MOVING OR SPEAKING SO SLOWLY THAT OTHER PEOPLE COULD HAVE NOTICED. OR THE OPPOSITE, BEING SO FIGETY OR RESTLESS THAT YOU HAVE BEEN MOVING AROUND A LOT MORE THAN USUAL: NOT AT ALL
3. TROUBLE FALLING OR STAYING ASLEEP: NEARLY EVERY DAY
10. IF YOU CHECKED OFF ANY PROBLEMS, HOW DIFFICULT HAVE THESE PROBLEMS MADE IT FOR YOU TO DO YOUR WORK, TAKE CARE OF THINGS AT HOME, OR GET ALONG WITH OTHER PEOPLE: EXTREMELY DIFFICULT
SUM OF ALL RESPONSES TO PHQ QUESTIONS 1-9: 19
4. FEELING TIRED OR HAVING LITTLE ENERGY: NEARLY EVERY DAY
6. FEELING BAD ABOUT YOURSELF - OR THAT YOU ARE A FAILURE OR HAVE LET YOURSELF OR YOUR FAMILY DOWN: NEARLY EVERY DAY

## 2025-02-03 NOTE — TELEPHONE ENCOUNTER
Requesting medication refill. Please approve or deny this request.    Rx requested:  Requested Prescriptions     Pending Prescriptions Disp Refills    oxyCODONE-acetaminophen (PERCOCET) 7.5-325 MG per tablet 90 tablet 0     Sig: Take 1 tablet by mouth in the morning, at noon, and at bedtime for 30 days. Max Daily Amount: 3 tablets         Last Office Visit:   9/17/2024      Next Visit Date:  Future Appointments   Date Time Provider Department Center   2/5/2025  2:45 PM Usama Mata MD [place] NEURO Neurology -   4/2/2025  9:15 AM Tariq Lee MD [facility] DEP               Last refill 1/6/25. Please approve or deny.

## 2025-02-03 NOTE — PROGRESS NOTES
Postop Progress Note    Subjective    presents to the office for postop follow up. 2 weeks post interstim    Objective    Vitals:    02/03/25 1148   BP: 128/72   Pulse: 64         General: alert, cooperative and no distress  Incision: healing well  Vag exam: deferred    Buttock exam : healing adequately     Assessment  Doing well postoperatively.    Plan    Doing well postop   Urge symtpoms resolved   Return as needed     Kong Jean M.D., F.A.C.O.G

## 2025-02-04 LAB — BACTERIA UR CULT: NORMAL

## 2025-02-04 RX ORDER — OXYCODONE AND ACETAMINOPHEN 7.5; 325 MG/1; MG/1
1 TABLET ORAL 3 TIMES DAILY
Qty: 90 TABLET | Refills: 0 | Status: SHIPPED | OUTPATIENT
Start: 2025-02-04 | End: 2025-03-06

## 2025-02-05 ENCOUNTER — OFFICE VISIT (OUTPATIENT)
Dept: NEUROLOGY | Age: 60
End: 2025-02-05
Payer: COMMERCIAL

## 2025-02-05 VITALS
BODY MASS INDEX: 27.46 KG/M2 | DIASTOLIC BLOOD PRESSURE: 72 MMHG | WEIGHT: 160 LBS | HEART RATE: 72 BPM | SYSTOLIC BLOOD PRESSURE: 114 MMHG

## 2025-02-05 DIAGNOSIS — M79.7 FIBROMYALGIA: ICD-10-CM

## 2025-02-05 DIAGNOSIS — G93.41 METABOLIC ENCEPHALOPATHY: ICD-10-CM

## 2025-02-05 DIAGNOSIS — G60.0 HEREDITARY MOTOR AND SENSORY NEUROPATHY: ICD-10-CM

## 2025-02-05 DIAGNOSIS — M47.22 CERVICAL SPONDYLOSIS WITH RADICULOPATHY: ICD-10-CM

## 2025-02-05 DIAGNOSIS — G82.20 PARAPARESIS (HCC): ICD-10-CM

## 2025-02-05 DIAGNOSIS — M54.16 LUMBAR RADICULOPATHY: Primary | ICD-10-CM

## 2025-02-05 DIAGNOSIS — R29.898 LEG WEAKNESS, BILATERAL: ICD-10-CM

## 2025-02-05 PROCEDURE — G8419 CALC BMI OUT NRM PARAM NOF/U: HCPCS | Performed by: PSYCHIATRY & NEUROLOGY

## 2025-02-05 PROCEDURE — 3017F COLORECTAL CA SCREEN DOC REV: CPT | Performed by: PSYCHIATRY & NEUROLOGY

## 2025-02-05 PROCEDURE — G8427 DOCREV CUR MEDS BY ELIG CLIN: HCPCS | Performed by: PSYCHIATRY & NEUROLOGY

## 2025-02-05 PROCEDURE — 4004F PT TOBACCO SCREEN RCVD TLK: CPT | Performed by: PSYCHIATRY & NEUROLOGY

## 2025-02-05 PROCEDURE — 99214 OFFICE O/P EST MOD 30 MIN: CPT | Performed by: PSYCHIATRY & NEUROLOGY

## 2025-02-05 NOTE — PROGRESS NOTES
ITCHING 56 capsule 0    ibuprofen (ADVIL;MOTRIN) 800 MG tablet Take 1 tablet by mouth daily as needed for Pain 90 tablet 0    acetaminophen (TYLENOL) 500 MG tablet Take 2 tablets by mouth every 6 hours as needed for Pain 60 tablet 0    fexofenadine-pseudoephedrine (ALLEGRA-D 24HR) 180-240 MG per extended release tablet Take 1 tablet by mouth daily (Patient not taking: Reported on 2/5/2025) 30 tablet 0    estradiol (ESTRACE) 0.1 MG/GM vaginal cream APPLY DAILY FOR THE NEXT 2 WEEKS THEN THREE TIMES WEEKLY FOR MAINTENANCE (Patient not taking: Reported on 2/5/2025) 42.5 g 6     No current facility-administered medications for this visit.         Review of Systems   Constitutional:  Negative for fever.   HENT:  Negative for ear pain, tinnitus and trouble swallowing.    Eyes:  Negative for photophobia and visual disturbance.   Respiratory:  Negative for choking and shortness of breath.    Cardiovascular:  Negative for chest pain and palpitations.   Gastrointestinal:  Negative for nausea and vomiting.   Musculoskeletal:  Positive for back pain, myalgias, neck pain and neck stiffness. Negative for gait problem and joint swelling.   Skin:  Negative for color change.   Allergic/Immunologic: Negative for food allergies.   Neurological:  Positive for tremors and weakness. Negative for dizziness, seizures, syncope, facial asymmetry, speech difficulty, light-headedness, numbness and headaches.   Psychiatric/Behavioral:  Negative for behavioral problems, confusion, hallucinations and sleep disturbance.        Objective:   /72 (Site: Left Upper Arm, Position: Sitting, Cuff Size: Medium Adult)   Pulse 72   Wt 72.6 kg (160 lb)   LMP 01/01/1997 (Exact Date) Comment: hysterectomy  BMI 27.46 kg/m²     Physical Exam  Vitals reviewed.   Eyes:      Pupils: Pupils are equal, round, and reactive to light.   Cardiovascular:      Rate and Rhythm: Normal rate and regular rhythm.      Heart sounds: No murmur heard.  Pulmonary:

## 2025-02-15 DIAGNOSIS — J30.2 SEASONAL ALLERGIC RHINITIS, UNSPECIFIED TRIGGER: ICD-10-CM

## 2025-02-17 RX ORDER — FLUTICASONE PROPIONATE 50 MCG
1 SPRAY, SUSPENSION (ML) NASAL DAILY
Qty: 16 G | Refills: 2 | Status: SHIPPED | OUTPATIENT
Start: 2025-02-17

## 2025-02-17 RX ORDER — DIPHENHYDRAMINE HCL 25 MG
CAPSULE ORAL
Qty: 56 CAPSULE | Refills: 2 | Status: SHIPPED | OUTPATIENT
Start: 2025-02-17

## 2025-02-26 RX ORDER — ESTRADIOL 0.1 MG/G
CREAM VAGINAL
Qty: 42.5 G | Refills: 7 | Status: SHIPPED | OUTPATIENT
Start: 2025-02-26

## 2025-03-03 DIAGNOSIS — G62.9 POLYNEUROPATHY, UNSPECIFIED: ICD-10-CM

## 2025-03-03 RX ORDER — OXYCODONE AND ACETAMINOPHEN 7.5; 325 MG/1; MG/1
1 TABLET ORAL 3 TIMES DAILY
Qty: 90 TABLET | Refills: 0 | Status: SHIPPED | OUTPATIENT
Start: 2025-03-03 | End: 2025-04-02

## 2025-03-05 ENCOUNTER — TELEPHONE (OUTPATIENT)
Dept: NEUROLOGY | Age: 60
End: 2025-03-05

## 2025-03-05 ENCOUNTER — HOSPITAL ENCOUNTER (OUTPATIENT)
Dept: MRI IMAGING | Age: 60
Discharge: HOME | End: 2025-03-07
Attending: PSYCHIATRY & NEUROLOGY

## 2025-03-05 DIAGNOSIS — M54.16 LUMBAR RADICULOPATHY: ICD-10-CM

## 2025-03-05 DIAGNOSIS — G82.20 PARAPARESIS (HCC): Primary | ICD-10-CM

## 2025-03-05 NOTE — TELEPHONE ENCOUNTER
Aleja called she went in for her MRI today but was unable to lay still enough she was told to call Dr. Mata and request an MRI with Sedation

## 2025-03-10 ENCOUNTER — OFFICE VISIT (OUTPATIENT)
Dept: OBGYN CLINIC | Age: 60
End: 2025-03-10
Payer: COMMERCIAL

## 2025-03-10 VITALS
SYSTOLIC BLOOD PRESSURE: 102 MMHG | DIASTOLIC BLOOD PRESSURE: 62 MMHG | BODY MASS INDEX: 27.31 KG/M2 | HEART RATE: 68 BPM | WEIGHT: 160 LBS | HEIGHT: 64 IN

## 2025-03-10 DIAGNOSIS — N39.41 URGE INCONTINENCE: ICD-10-CM

## 2025-03-10 DIAGNOSIS — Z09 POSTOP CHECK: Primary | ICD-10-CM

## 2025-03-10 PROCEDURE — 99213 OFFICE O/P EST LOW 20 MIN: CPT | Performed by: OBSTETRICS & GYNECOLOGY

## 2025-03-10 PROCEDURE — 4004F PT TOBACCO SCREEN RCVD TLK: CPT | Performed by: OBSTETRICS & GYNECOLOGY

## 2025-03-10 PROCEDURE — G8419 CALC BMI OUT NRM PARAM NOF/U: HCPCS | Performed by: OBSTETRICS & GYNECOLOGY

## 2025-03-10 PROCEDURE — G8427 DOCREV CUR MEDS BY ELIG CLIN: HCPCS | Performed by: OBSTETRICS & GYNECOLOGY

## 2025-03-10 PROCEDURE — 3017F COLORECTAL CA SCREEN DOC REV: CPT | Performed by: OBSTETRICS & GYNECOLOGY

## 2025-03-10 RX ORDER — TRAZODONE HYDROCHLORIDE 100 MG/1
100 TABLET ORAL NIGHTLY
Qty: 30 TABLET | Refills: 3 | Status: SHIPPED | OUTPATIENT
Start: 2025-03-10 | End: 2025-06-08

## 2025-03-10 NOTE — TELEPHONE ENCOUNTER
Requesting medication refill. Please approve or deny this request.    Rx requested:  Requested Prescriptions     Pending Prescriptions Disp Refills    traZODone (DESYREL) 100 MG tablet [Pharmacy Med Name: TRAZODONE  MG TABS 100 Tablet] 30 tablet 3     Sig: TAKE 1 TABLET BY MOUTH NIGHTLY         Last Office Visit:   2/5/2025      Next Visit Date:  Future Appointments   Date Time Provider Department Center   3/10/2025 11:00 AM Kong Jean MD [facility] OBG [facility]   3/20/2025  1:00 PM [facility] MRI ROOM 1 [facility] Fac RAD   4/2/2025  9:15 AM Tariq Lee MD [facility] ECC DEP   6/4/2025  2:45 PM Usama Mata MD [place] NEURO Neurology -               Last refill 12/16/24. Please approve or deny.

## 2025-03-10 NOTE — PROGRESS NOTES
Aleja Helms is a 60 y.o. female who presents here today for complaints of Back Pain (Unsure if it's due to back issues or the Interstim.)        History of Present Illness  The patient presents for evaluation of right hip pain.    She reports experiencing pain in the area where a previous device was implanted, which has been present since her surgery. The pain is exacerbated by certain activities such as bending over or lifting heavy objects. A recent episode of severe pain occurred last Thursday after she lifted several boxes. She describes the sensation as if something is lodged in the area. She has not experienced significant weight loss, noting only a minor decrease of 1 to 2 pounds. Her urinary control remains unaffected. She also reports discomfort when lying on her back due to the device that was sewn in place to prevent movement. The pain is localized to the site of the previous device, with no discomfort from the current device. She speculates about the possibility of a pinched nerve. Dr. Mata has scheduled her for an MRI. The pain radiates from the initial site to her right hip and groin. She experienced difficulty standing, sitting, and lying down last Thursday and Friday. She has a history of arthritis in her lumbar region, but it is not severe. She is currently on Percocet and gabapentin 3200 mg for pain management, prescribed by Dr. Mata from neurology. She is unable to remain still for an MRI, so a sedated MRI is planned for 03/20/2025. She has undergone 2 back surgeries. She reports that strenuous activity exacerbates her back pain, which then radiates, but improves after 2 days of rest. She does not use Motrin. The pain is exacerbated by certain activities such as bending over or lifting heavy objects. She is currently on Percocet and gabapentin 3200 mg for pain management, prescribed by Dr. Mata from neurology. She has a history of arthritis in her lumbar region, but it is not severe. She

## 2025-03-20 ENCOUNTER — ANESTHESIA EVENT (OUTPATIENT)
Dept: MRI IMAGING | Age: 60
End: 2025-03-20
Payer: COMMERCIAL

## 2025-03-20 ENCOUNTER — ANESTHESIA (OUTPATIENT)
Dept: MRI IMAGING | Age: 60
End: 2025-03-20
Payer: COMMERCIAL

## 2025-03-20 ENCOUNTER — HOSPITAL ENCOUNTER (OUTPATIENT)
Dept: MRI IMAGING | Age: 60
Discharge: HOME | End: 2025-03-22
Attending: PSYCHIATRY & NEUROLOGY
Payer: COMMERCIAL

## 2025-03-20 VITALS
RESPIRATION RATE: 16 BRPM | HEART RATE: 61 BPM | OXYGEN SATURATION: 97 % | TEMPERATURE: 97.6 F | SYSTOLIC BLOOD PRESSURE: 109 MMHG | DIASTOLIC BLOOD PRESSURE: 69 MMHG

## 2025-03-20 DIAGNOSIS — G82.20 PARAPARESIS (HCC): ICD-10-CM

## 2025-03-20 PROCEDURE — 6360000002 HC RX W HCPCS: Performed by: NURSE ANESTHETIST, CERTIFIED REGISTERED

## 2025-03-20 PROCEDURE — 2580000003 HC RX 258: Performed by: STUDENT IN AN ORGANIZED HEALTH CARE EDUCATION/TRAINING PROGRAM

## 2025-03-20 PROCEDURE — 72148 MRI LUMBAR SPINE W/O DYE: CPT

## 2025-03-20 RX ORDER — PROPOFOL 10 MG/ML
INJECTION, EMULSION INTRAVENOUS
Status: DISCONTINUED | OUTPATIENT
Start: 2025-03-20 | End: 2025-03-20 | Stop reason: SDUPTHER

## 2025-03-20 RX ORDER — SODIUM CHLORIDE 9 MG/ML
INJECTION, SOLUTION INTRAVENOUS CONTINUOUS
Status: DISCONTINUED | OUTPATIENT
Start: 2025-03-20 | End: 2025-03-23 | Stop reason: HOSPADM

## 2025-03-20 RX ORDER — GLYCOPYRROLATE 1 MG/5 ML
SYRINGE (ML) INTRAVENOUS
Status: DISCONTINUED | OUTPATIENT
Start: 2025-03-20 | End: 2025-03-20 | Stop reason: SDUPTHER

## 2025-03-20 RX ADMIN — SODIUM CHLORIDE: 0.9 INJECTION, SOLUTION INTRAVENOUS at 13:47

## 2025-03-20 RX ADMIN — PROPOFOL 100 MG: 10 INJECTION, EMULSION INTRAVENOUS at 14:45

## 2025-03-20 RX ADMIN — PHENYLEPHRINE HYDROCHLORIDE 200 MCG: 10 INJECTION INTRAVENOUS at 14:50

## 2025-03-20 RX ADMIN — PROPOFOL 175 MCG/KG/MIN: 10 INJECTION, EMULSION INTRAVENOUS at 14:46

## 2025-03-20 RX ADMIN — Medication 0.4 MG: at 14:50

## 2025-03-20 ASSESSMENT — PAIN SCALES - GENERAL: PAINLEVEL_OUTOF10: 8

## 2025-03-20 ASSESSMENT — LIFESTYLE VARIABLES: SMOKING_STATUS: 1

## 2025-03-20 ASSESSMENT — PAIN DESCRIPTION - LOCATION: LOCATION: BACK;HIP

## 2025-03-20 ASSESSMENT — PAIN DESCRIPTION - ORIENTATION: ORIENTATION: RIGHT

## 2025-03-20 NOTE — ANESTHESIA POSTPROCEDURE EVALUATION
Department of Anesthesiology  Postprocedure Note    Patient: Aleja Helms  MRN: [id number]  YOB: 1965  Date of evaluation: 3/20/2025    Procedure Summary       Date: 03/20/25 Room / Location: [facility]    Anesthesia Start: 1438 Anesthesia Stop: 1513    Procedure: MRI LUMBAR SPINE WO CONTRAST Diagnosis:       Paraparesis (HCC)      (Lumbar canal stenosis)    Scheduled Providers:  Responsible Provider: Jitendra Tam MD    Anesthesia Type: MAC ASA Status: 3            Anesthesia Type: No value filed.    Benigno Phase I: Benigno Score: 10    Benigno Phase II:      Anesthesia Post Evaluation    Patient location during evaluation: bedside  Patient participation: complete - patient participated  Level of consciousness: awake and awake and alert  Pain score: 0  Airway patency: patent  Nausea & Vomiting: no nausea and no vomiting  Cardiovascular status: blood pressure returned to baseline and hemodynamically stable  Respiratory status: acceptable  Hydration status: euvolemic  Pain management: adequate        No notable events documented.

## 2025-03-20 NOTE — ANESTHESIA PRE PROCEDURE
Department of Anesthesiology  Preprocedure Note       Name:  Aleja Helms   Age:  60 y.o.  :  1965                                          MRN:  [id number]         Date:  3/20/2025      Surgeon: * No surgeons listed *    Procedure: MRI with sedation    Medications prior to admission:   Prior to Admission medications    Medication Sig Start Date End Date Taking? Authorizing Provider   traZODone (DESYREL) 100 MG tablet TAKE 1 TABLET BY MOUTH NIGHTLY 3/10/25 6/8/25 Yes Usama Mata MD   oxyCODONE-acetaminophen (PERCOCET) 7.5-325 MG per tablet Take 1 tablet by mouth in the morning, at noon, and at bedtime for 30 days. Max Daily Amount: 3 tablets 3/3/25 4/2/25 Yes Usama Mata MD   fluticasone (FLONASE) 50 MCG/ACT nasal spray 1 SPRAY BY NASAL ROUTE DAILY 25  Yes Tariq Lee MD   diphenhydrAMINE (BANOPHEN) 25 MG capsule TAKE 1 TABLET BY MOUTH EVERY 6 HOURS AS NEEDED FOR ITCHING 25  Yes Tariq Lee MD   DULoxetine (CYMBALTA) 60 MG extended release capsule TAKE 2 CAPSULES BY MOUTH EVERY EVENING 25 Yes Usama Mata MD   gabapentin (NEURONTIN) 800 MG tablet TAKE 1 TABLET BY MOUTH FOUR TIMES A DAY 9/30/24 3/20/25 Yes Usama Mata MD   meloxicam (MOBIC) 15 MG tablet 1 tablet as needed 24  Yes Provider, MD Mitesh   estradiol (ESTRACE) 0.1 MG/GM vaginal cream APPLY DAILY FOR THE NEXT 2 WEEKS THEN THREE TIMES WEEKLY FOR MAINTENANCE 25   Kong Jean MD   Incontinence Supply Disposable (POISE PAD) PADS PRN. Please dispense whatever is covered by patient's insurance. 24   Kong Jean MD   Incontinence Supply Disposable (BLADDER CONTROL PADS EX ABSORB) MISC Use as directed daily 24   Tariq Lee MD   fexofenadine-pseudoephedrine (ALLEGRA-D 24HR) 180-240 MG per extended release tablet Take 1 tablet by mouth daily  Patient not taking: Reported on 24   Tariq Lee MD   ibuprofen (ADVIL;MOTRIN) 800 MG tablet Take 1 tablet

## 2025-03-20 NOTE — PROGRESS NOTES
Pt attempted mri without sedation after talking with Dr Cardenas, MRI was unsuccessful without sediation, pt brought back to  for preporation of sedation with the MRI

## 2025-03-20 NOTE — DISCHARGE INSTRUCTIONS
[facility]  Outpatient Discharge Instructions    To continue your care at home, please follow the instructions below and any additional discharge instructions given to you by your physician.    GENERAL ANESTHESIA:  Do not drive or operate machinery for 24hrs after discharge,  Do not drink alcohol, take tranquilizers, sleeping medication, or any other medication not directly instructed by your physician,   Do not make any important decisions or sign any legal documents for 24hrs after surgery,  Have someone with you for 24hrs after surgery to assist you as needed.    ACTIVITY:  Light activity for 24hrs,  No heavy lifting or exercise until instructed by your physician,  You may resume normal activities once instructed by your physician,  Special Instruction: ___________________________________________________________________    FLUIDS AND DIET:  An upset stomach or feeling sick (nausea) can commonly occur after surgery and/or pain medication use. To help minimize nausea:  Do not eat a heavy meal soon after your surgery,    Start with water or other clear liquids,  Advance to mild or bland items like Jell-O, dry toast, crackers, etc.,  Avoid caffeine,  Do not drink alcohol for at least 24 hours after surgery,  Your physician may prescribe anti-nausea medication if your nausea continues,  If you are free from nausea for 24hrs, you can advance to your normal diet as tolerated.    OPERATIVE SITE:  A small amount of bleeding or drainage after surgery is normal. Your physician will provide you with specific instructions on how to care for your surgical site and/or dressing.   Try not to touch your surgical site unless necessary,   Always wash your hands BEFORE and AFTER changing your dressing if instructed by your physician,   Proper handwashing includes wetting your hands with clean water, applying soap, lathering your hands by rubbing them together with soap for 20 seconds, rinsing them with clean water, and

## 2025-03-25 ENCOUNTER — TELEPHONE (OUTPATIENT)
Dept: NEUROLOGY | Age: 60
End: 2025-03-25

## 2025-03-31 DIAGNOSIS — G62.9 POLYNEUROPATHY, UNSPECIFIED: ICD-10-CM

## 2025-03-31 RX ORDER — GABAPENTIN 800 MG/1
800 TABLET ORAL 4 TIMES DAILY
Qty: 120 TABLET | Refills: 3 | Status: SHIPPED | OUTPATIENT
Start: 2025-03-31 | End: 2025-07-29

## 2025-03-31 NOTE — TELEPHONE ENCOUNTER
Requesting medication refill. Please approve or deny this request.    Rx requested:  Requested Prescriptions     Pending Prescriptions Disp Refills    gabapentin (NEURONTIN) 800 MG tablet [Pharmacy Med Name: GABAPENTIN 800 MG TABS 800 Tablet] 120 tablet 4     Sig: Take 1 tablet by mouth in the morning, at noon, in the evening, and at bedtime.         Last Office Visit:   2/5/2025      Next Visit Date:  Future Appointments   Date Time Provider Department Center   4/2/2025  9:15 AM Tariq Lee MD [facility]   6/4/2025  2:45 PM Usama Mata MD [place] NEURO Neurology -               Last refill 9/30/24. Please approve or deny.

## 2025-04-02 ENCOUNTER — RESULTS FOLLOW-UP (OUTPATIENT)
Age: 60
End: 2025-04-02

## 2025-04-02 ENCOUNTER — OFFICE VISIT (OUTPATIENT)
Age: 60
End: 2025-04-02
Payer: COMMERCIAL

## 2025-04-02 VITALS
HEART RATE: 74 BPM | HEIGHT: 64 IN | DIASTOLIC BLOOD PRESSURE: 70 MMHG | RESPIRATION RATE: 14 BRPM | WEIGHT: 162 LBS | SYSTOLIC BLOOD PRESSURE: 120 MMHG | OXYGEN SATURATION: 97 % | BODY MASS INDEX: 27.66 KG/M2

## 2025-04-02 DIAGNOSIS — R73.9 HYPERGLYCEMIA: ICD-10-CM

## 2025-04-02 DIAGNOSIS — R53.83 FATIGUE, UNSPECIFIED TYPE: Primary | ICD-10-CM

## 2025-04-02 DIAGNOSIS — R10.9 ABDOMINAL PAIN, UNSPECIFIED ABDOMINAL LOCATION: ICD-10-CM

## 2025-04-02 DIAGNOSIS — R53.83 FATIGUE, UNSPECIFIED TYPE: ICD-10-CM

## 2025-04-02 DIAGNOSIS — G62.9 POLYNEUROPATHY, UNSPECIFIED: ICD-10-CM

## 2025-04-02 LAB
ALBUMIN SERPL-MCNC: 4.5 G/DL (ref 3.5–4.6)
ALP SERPL-CCNC: 79 U/L (ref 40–130)
ALT SERPL-CCNC: 9 U/L (ref 0–33)
ANION GAP SERPL CALCULATED.3IONS-SCNC: 10 MEQ/L (ref 9–15)
AST SERPL-CCNC: 17 U/L (ref 0–35)
BASOPHILS # BLD: 0.1 K/UL (ref 0–0.2)
BASOPHILS NFR BLD: 1.1 %
BILIRUB SERPL-MCNC: 0.3 MG/DL (ref 0.2–0.7)
BUN SERPL-MCNC: 10 MG/DL (ref 6–20)
CALCIUM SERPL-MCNC: 9.7 MG/DL (ref 8.5–9.9)
CHLORIDE SERPL-SCNC: 101 MEQ/L (ref 95–107)
CO2 SERPL-SCNC: 29 MEQ/L (ref 20–31)
CREAT SERPL-MCNC: 0.73 MG/DL (ref 0.5–0.9)
EOSINOPHIL # BLD: 0.1 K/UL (ref 0–0.7)
EOSINOPHIL NFR BLD: 1.9 %
ERYTHROCYTE [DISTWIDTH] IN BLOOD BY AUTOMATED COUNT: 12.1 % (ref 11.5–14.5)
GLOBULIN SER CALC-MCNC: 3.4 G/DL (ref 2.3–3.5)
GLUCOSE SERPL-MCNC: 89 MG/DL (ref 70–99)
HCT VFR BLD AUTO: 46.6 % (ref 37–47)
HGB BLD-MCNC: 15.1 G/DL (ref 12–16)
LYMPHOCYTES # BLD: 2.5 K/UL (ref 1–4.8)
LYMPHOCYTES NFR BLD: 33.2 %
MCH RBC QN AUTO: 29.4 PG (ref 27–31.3)
MCHC RBC AUTO-ENTMCNC: 32.4 % (ref 33–37)
MCV RBC AUTO: 90.7 FL (ref 79.4–94.8)
MONOCYTES # BLD: 0.5 K/UL (ref 0.2–0.8)
MONOCYTES NFR BLD: 6.6 %
NEUTROPHILS # BLD: 4.3 K/UL (ref 1.4–6.5)
NEUTS SEG NFR BLD: 56.9 %
PLATELET # BLD AUTO: 267 K/UL (ref 130–400)
POTASSIUM SERPL-SCNC: 4.4 MEQ/L (ref 3.4–4.9)
PROT SERPL-MCNC: 7.9 G/DL (ref 6.3–8)
RBC # BLD AUTO: 5.14 M/UL (ref 4.2–5.4)
SODIUM SERPL-SCNC: 140 MEQ/L (ref 135–144)
TSH SERPL-MCNC: 2.52 UIU/ML (ref 0.44–3.86)
WBC # BLD AUTO: 7.5 K/UL (ref 4.8–10.8)

## 2025-04-02 PROCEDURE — G8419 CALC BMI OUT NRM PARAM NOF/U: HCPCS | Performed by: INTERNAL MEDICINE

## 2025-04-02 PROCEDURE — 99214 OFFICE O/P EST MOD 30 MIN: CPT | Performed by: INTERNAL MEDICINE

## 2025-04-02 PROCEDURE — 3017F COLORECTAL CA SCREEN DOC REV: CPT | Performed by: INTERNAL MEDICINE

## 2025-04-02 PROCEDURE — G8427 DOCREV CUR MEDS BY ELIG CLIN: HCPCS | Performed by: INTERNAL MEDICINE

## 2025-04-02 PROCEDURE — 4004F PT TOBACCO SCREEN RCVD TLK: CPT | Performed by: INTERNAL MEDICINE

## 2025-04-02 NOTE — PROGRESS NOTES
Aleja Helms (:  1965) is a 60 y.o. female, Established patient, here for evaluation of the following chief complaint(s):  Follow-up          Subjective   History of Present Illness  The patient presents for evaluation of fatigue, lumbar spinal canal narrowing, and abdominal pain.    She reports a lack of motivation and increased sleepiness, which she attributes to weight gain. She has been unable to engage in her usual walking routine due to a ligament injury in her left foot, for which she consulted a podiatrist. Additionally, significant discomfort is experienced in her leg, hip, and back, prompting Dr. Mata to order an MRI. However, the imaging was inconclusive due to her inability to remain still. An attempt to walk her dog last week resulted in severe pain upon returning home. Despite having pain medication, it is found to be ineffective. She has been informed of a narrowing condition and is scheduled to discuss treatment options in 2025. She also reports dragging her foot due to a lack of energy to lift it. Blood work is requested to check for any deficiencies. A B12 injection received during her last visit was unhelpful, and her sister suspects she may have low B12 levels. She consumes sweets for energy and has been experiencing excessive sleepiness since 2025. She has been on Cymbalta for an extended period and took her last dose last night. Occasionally, an additional 60 mg is taken if needed. She has previously taken Celexa but does not recall its efficacy. A desire to avoid further medication, particularly those that may affect her mental health, is expressed. Her sleep pattern is described as inconsistent, often waking up after 2 to 2.5 hours of sleep, staying awake for an hour, then sleeping again for another 3 hours. She consumes 2 cups of coffee daily.    Sharp pain near her ribs is reported, which she believes may be related to a previous surgery involving a staple between her  [FreeTextEntry1] : Problem List:  1. Bilateral breast mastalgia  2. 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 2 cm, Left up to 2. 7 cm  Care Team:  PCP - Hank   HPI: Patient is a 42 year old female presenting for initial consultation on 3/25/2024 for bilateral breast pain. She had an implant removal in [facility] and breast reconstructions. Patient states pain more in the right breast than left for the past six months. Pain occurs on pressure/when touching the breast, does worsen near menstrual period. Patient underwent diagnostic mammogram bilateral and ultrasound on 24 that demonstrated multiple cysts mostly oil cysts from fat necrosis up to 1. 2 cm in right breast and 2.7 cm in left breast. Patient denies nipple discharge.   Breast History: No prior mammograms first one in 2023. No prior breast biopsy. Maternal aunt with breast cancer age 60   [facility]: Diagnostic Mammogram Bilateral and US Breast Complete Bilateral: Density C, Impression - no mammographic or sonographic evidence of malignancy. Multiple cysts noted on US mostly oil cysts from fat necrosis up to 1. 2 cm in size in right breast and 2. 7 cm in size for left breast. BI-RADS 2  Detail Level: Zone Hide Include Location In Plan Question?: No

## 2025-04-02 NOTE — TELEPHONE ENCOUNTER
Requesting medication refill. Please approve or deny this request.    Rx requested:  Requested Prescriptions     Pending Prescriptions Disp Refills    oxyCODONE-acetaminophen (PERCOCET) 7.5-325 MG per tablet 90 tablet 0     Sig: Take 1 tablet by mouth in the morning, at noon, and at bedtime for 30 days. Max Daily Amount: 3 tablets         Last Office Visit:   2/5/2025      Next Visit Date:  Future Appointments   Date Time Provider Department Center   6/4/2025  2:45 PM Usama Mata MD [place] NEURO Neurology -   7/17/2025  9:30 AM Tariq Lee MD [facility] ECC DEP               Last refill 3/3/25. Please approve or deny.

## 2025-04-03 LAB
ESTIMATED AVERAGE GLUCOSE: 117 MG/DL
HBA1C MFR BLD: 5.7 % (ref 4–6)
THYROXINE (T4): 7.5 UG/DL (ref 4.5–11.7)
VITAMIN B-12: 363 PG/ML (ref 232–1245)
VITAMIN D 25-HYDROXY: 39.4 NG/ML (ref 30–100)

## 2025-04-03 RX ORDER — OXYCODONE AND ACETAMINOPHEN 7.5; 325 MG/1; MG/1
1 TABLET ORAL 3 TIMES DAILY
Qty: 90 TABLET | Refills: 0 | Status: SHIPPED | OUTPATIENT
Start: 2025-04-03 | End: 2025-05-03

## 2025-04-15 DIAGNOSIS — J30.2 SEASONAL ALLERGIC RHINITIS, UNSPECIFIED TRIGGER: ICD-10-CM

## 2025-04-15 RX ORDER — DIPHENHYDRAMINE HCL 25 MG
CAPSULE ORAL
Qty: 56 CAPSULE | Refills: 3 | Status: SHIPPED | OUTPATIENT
Start: 2025-04-15

## 2025-04-16 ENCOUNTER — CLINICAL SUPPORT (OUTPATIENT)
Dept: OBGYN CLINIC | Age: 60
End: 2025-04-16

## 2025-04-16 DIAGNOSIS — R32 URINARY INCONTINENCE, UNSPECIFIED TYPE: Primary | ICD-10-CM

## 2025-04-16 LAB
BILIRUB UR QL STRIP: NEGATIVE
CLARITY UR: CLEAR
COLOR UR: YELLOW
GLUCOSE UR STRIP-MCNC: NEGATIVE MG/DL
HGB UR QL STRIP: NEGATIVE
KETONES UR STRIP-MCNC: NEGATIVE MG/DL
LEUKOCYTE ESTERASE UR QL STRIP: NEGATIVE
NITRITE UR QL STRIP: NEGATIVE
PH UR STRIP: 8.5 [PH] (ref 5–9)
PROT UR STRIP-MCNC: NEGATIVE MG/DL
SP GR UR STRIP: 1.01 (ref 1–1.03)
UROBILINOGEN UR STRIP-ACNC: 0.2 E.U./DL

## 2025-04-17 LAB — BACTERIA UR CULT: NORMAL

## 2025-04-22 RX ORDER — DULOXETIN HYDROCHLORIDE 60 MG/1
120 CAPSULE, DELAYED RELEASE ORAL EVERY EVENING
Qty: 60 CAPSULE | Refills: 0 | Status: SHIPPED | OUTPATIENT
Start: 2025-04-22 | End: 2025-07-21

## 2025-04-22 NOTE — TELEPHONE ENCOUNTER
Requesting medication refill. Please approve or deny this request.    Rx requested:  Requested Prescriptions     Pending Prescriptions Disp Refills    DULoxetine (CYMBALTA) 60 MG extended release capsule 60 capsule 0     Sig: Take 2 capsules by mouth every evening         Last Office Visit:   2/5/2025      Next Visit Date:  Future Appointments   Date Time Provider Department Center   5/2/2025 10:15 AM Kong Jean MD [facility]   6/4/2025  2:45 PM Usama Mata MD [place] NEURO Neurology -   7/17/2025  9:30 AM Tariq Lee MD [facility]               Last refill 1/20/25. Please approve or deny.

## 2025-04-23 DIAGNOSIS — G62.9 POLYNEUROPATHY, UNSPECIFIED: ICD-10-CM

## 2025-04-23 RX ORDER — GABAPENTIN 800 MG/1
TABLET ORAL
Qty: 120 TABLET | Refills: 4 | OUTPATIENT
Start: 2025-04-23

## 2025-05-01 DIAGNOSIS — G62.9 POLYNEUROPATHY, UNSPECIFIED: ICD-10-CM

## 2025-05-01 RX ORDER — OXYCODONE AND ACETAMINOPHEN 7.5; 325 MG/1; MG/1
1 TABLET ORAL 3 TIMES DAILY
Qty: 90 TABLET | Refills: 0 | Status: SHIPPED | OUTPATIENT
Start: 2025-05-01 | End: 2025-05-31

## 2025-05-01 NOTE — TELEPHONE ENCOUNTER
Patient is  requesting medication refill. Please approve or deny this request.    Rx requested:  Requested Prescriptions     Pending Prescriptions Disp Refills    oxyCODONE-acetaminophen (PERCOCET) 7.5-325 MG per tablet 90 tablet 0     Sig: Take 1 tablet by mouth in the morning, at noon, and at bedtime for 30 days. Max Daily Amount: 3 tablets         Last Office Visit:   2/5/2025      Next Visit Date:  Future Appointments   Date Time Provider Department Center   5/2/2025 10:15 AM Kong Jean MD [facility]   6/4/2025  2:45 PM Usama Mata MD [place] NE[facility] Neurology -   7/17/2025  9:30 AM Tariq Lee MD [facility] DEP

## 2025-05-02 ENCOUNTER — TELEPHONE (OUTPATIENT)
Age: 60
End: 2025-05-02

## 2025-05-02 RX ORDER — HYDROXYZINE PAMOATE 25 MG/1
25 CAPSULE ORAL 3 TIMES DAILY PRN
Qty: 90 CAPSULE | Refills: 0 | Status: SHIPPED | OUTPATIENT
Start: 2025-05-02 | End: 2025-06-01

## 2025-05-02 NOTE — TELEPHONE ENCOUNTER
PT called- she had a close friend pass away and is having a hard time handling. She is asking for an RX for Xanax or something to help her.   I did advise that she probably would need to schedule an appointment since he has not prescribe the medication for her before,.  She is requesting that that we ask-

## 2025-05-13 ENCOUNTER — OFFICE VISIT (OUTPATIENT)
Dept: OBGYN CLINIC | Age: 60
End: 2025-05-13
Payer: COMMERCIAL

## 2025-05-13 VITALS
SYSTOLIC BLOOD PRESSURE: 136 MMHG | BODY MASS INDEX: 27.49 KG/M2 | DIASTOLIC BLOOD PRESSURE: 74 MMHG | WEIGHT: 161 LBS | HEART RATE: 64 BPM | HEIGHT: 64 IN

## 2025-05-13 DIAGNOSIS — N32.81 OAB (OVERACTIVE BLADDER): Primary | ICD-10-CM

## 2025-05-13 LAB
BILIRUB UR QL STRIP: NEGATIVE
CLARITY UR: CLEAR
COLOR UR: YELLOW
GLUCOSE UR STRIP-MCNC: NEGATIVE MG/DL
HGB UR QL STRIP: NEGATIVE
KETONES UR STRIP-MCNC: NEGATIVE MG/DL
LEUKOCYTE ESTERASE UR QL STRIP: NEGATIVE
NITRITE UR QL STRIP: NEGATIVE
PH UR STRIP: 7 [PH] (ref 5–9)
PROT UR STRIP-MCNC: NEGATIVE MG/DL
SP GR UR STRIP: 1.01 (ref 1–1.03)
UROBILINOGEN UR STRIP-ACNC: 0.2 E.U./DL

## 2025-05-13 PROCEDURE — 99213 OFFICE O/P EST LOW 20 MIN: CPT | Performed by: OBSTETRICS & GYNECOLOGY

## 2025-05-13 PROCEDURE — G8419 CALC BMI OUT NRM PARAM NOF/U: HCPCS | Performed by: OBSTETRICS & GYNECOLOGY

## 2025-05-13 PROCEDURE — 95971 ALYS SMPL SP/PN NPGT W/PRGRM: CPT | Performed by: OBSTETRICS & GYNECOLOGY

## 2025-05-13 PROCEDURE — 3017F COLORECTAL CA SCREEN DOC REV: CPT | Performed by: OBSTETRICS & GYNECOLOGY

## 2025-05-13 PROCEDURE — G8427 DOCREV CUR MEDS BY ELIG CLIN: HCPCS | Performed by: OBSTETRICS & GYNECOLOGY

## 2025-05-13 PROCEDURE — 4004F PT TOBACCO SCREEN RCVD TLK: CPT | Performed by: OBSTETRICS & GYNECOLOGY

## 2025-05-13 RX ORDER — MIRABEGRON 50 MG/1
50 TABLET, FILM COATED, EXTENDED RELEASE ORAL DAILY
Qty: 30 TABLET | Refills: 0 | Status: SHIPPED | OUTPATIENT
Start: 2025-05-13

## 2025-05-15 LAB — BACTERIA UR CULT: NORMAL

## 2025-05-20 ENCOUNTER — OFFICE VISIT (OUTPATIENT)
Dept: OBGYN CLINIC | Age: 60
End: 2025-05-20
Payer: COMMERCIAL

## 2025-05-20 VITALS
WEIGHT: 162 LBS | DIASTOLIC BLOOD PRESSURE: 70 MMHG | SYSTOLIC BLOOD PRESSURE: 122 MMHG | HEIGHT: 64 IN | HEART RATE: 76 BPM | BODY MASS INDEX: 27.66 KG/M2

## 2025-05-20 DIAGNOSIS — R32 URINARY INCONTINENCE, UNSPECIFIED TYPE: ICD-10-CM

## 2025-05-20 DIAGNOSIS — N32.81 OAB (OVERACTIVE BLADDER): Primary | ICD-10-CM

## 2025-05-20 PROCEDURE — 4004F PT TOBACCO SCREEN RCVD TLK: CPT | Performed by: OBSTETRICS & GYNECOLOGY

## 2025-05-20 PROCEDURE — G8427 DOCREV CUR MEDS BY ELIG CLIN: HCPCS | Performed by: OBSTETRICS & GYNECOLOGY

## 2025-05-20 PROCEDURE — 99213 OFFICE O/P EST LOW 20 MIN: CPT | Performed by: OBSTETRICS & GYNECOLOGY

## 2025-05-20 PROCEDURE — 95971 ALYS SMPL SP/PN NPGT W/PRGRM: CPT | Performed by: OBSTETRICS & GYNECOLOGY

## 2025-05-20 PROCEDURE — 3017F COLORECTAL CA SCREEN DOC REV: CPT | Performed by: OBSTETRICS & GYNECOLOGY

## 2025-05-20 PROCEDURE — G8419 CALC BMI OUT NRM PARAM NOF/U: HCPCS | Performed by: OBSTETRICS & GYNECOLOGY

## 2025-05-21 ENCOUNTER — TELEPHONE (OUTPATIENT)
Dept: OBGYN CLINIC | Age: 60
End: 2025-05-21

## 2025-05-21 RX ORDER — IBUPROFEN 800 MG/1
800 TABLET, FILM COATED ORAL DAILY PRN
Qty: 90 TABLET | Refills: 0 | Status: SHIPPED | OUTPATIENT
Start: 2025-05-21 | End: 2025-08-19

## 2025-05-21 NOTE — PROGRESS NOTES
applicable) and other individuals in attendance with the patient were advised that Artificial Intelligence will be utilized during this visit to record, process the conversation to generate a clinical note, and support improvement of the AI technology. The patient (or guardian, if applicable) and other individuals in attendance at the appointment consented to the use of AI, including the recording.      Kong Jean MD

## 2025-05-21 NOTE — PROGRESS NOTES
Assessment & Plan  1. Device malfunction.  - Adjustment made from 0.8 to 2.6 on program # 2   - Reports discomfort but no pain  - Prescription for mirabegron issued to be used with the interstim .   - Instructed to inform if insurance does not cover the medication  - Notification if infection is detected  -Urine sent for analysis and culture    Follow-up  Follow up in 1 week.         Orders Placed This Encounter   Procedures    Culture, Urine     Standing Status:   Future     Number of Occurrences:   1     Expected Date:   5/13/2025     Expiration Date:   5/13/2026     Specify (ex-cath, midstream, cysto, etc)?:   midstream    Urinalysis     Standing Status:   Future     Number of Occurrences:   1     Expected Date:   5/13/2025     Expiration Date:   5/13/2026     Orders Placed This Encounter   Medications    mirabegron (MYRBETRIQ) 50 MG TB24     Sig: Take 50 mg by mouth daily     Dispense:  30 tablet     Refill:  0       Follow Up:  Return in about 2 weeks (around 5/27/2025) for F/U for results, medication assessment.      The patient (or guardian, if applicable) and other individuals in attendance with the patient were advised that Artificial Intelligence will be utilized during this visit to record, process the conversation to generate a clinical note, and support improvement of the AI technology. The patient (or guardian, if applicable) and other individuals in attendance at the appointment consented to the use of AI, including the recording.      Kong Jean MD

## 2025-05-21 NOTE — TELEPHONE ENCOUNTER
Pt had appt with  yesterday, forgot to ask for a refill of ibuprofen 800mgs. Pharmacy to use being [facility] [address], [place]. Please advise and send if appropriate.

## 2025-05-23 DIAGNOSIS — G62.9 POLYNEUROPATHY, UNSPECIFIED: ICD-10-CM

## 2025-05-27 ENCOUNTER — OFFICE VISIT (OUTPATIENT)
Dept: OBGYN CLINIC | Age: 60
End: 2025-05-27
Payer: COMMERCIAL

## 2025-05-27 VITALS
BODY MASS INDEX: 28.51 KG/M2 | SYSTOLIC BLOOD PRESSURE: 126 MMHG | DIASTOLIC BLOOD PRESSURE: 72 MMHG | WEIGHT: 167 LBS | HEIGHT: 64 IN | HEART RATE: 64 BPM

## 2025-05-27 DIAGNOSIS — N32.81 OAB (OVERACTIVE BLADDER): Primary | ICD-10-CM

## 2025-05-27 DIAGNOSIS — R45.86 MOOD SWINGS: ICD-10-CM

## 2025-05-27 PROCEDURE — G8419 CALC BMI OUT NRM PARAM NOF/U: HCPCS | Performed by: OBSTETRICS & GYNECOLOGY

## 2025-05-27 PROCEDURE — G8427 DOCREV CUR MEDS BY ELIG CLIN: HCPCS | Performed by: OBSTETRICS & GYNECOLOGY

## 2025-05-27 PROCEDURE — 4004F PT TOBACCO SCREEN RCVD TLK: CPT | Performed by: OBSTETRICS & GYNECOLOGY

## 2025-05-27 PROCEDURE — 3017F COLORECTAL CA SCREEN DOC REV: CPT | Performed by: OBSTETRICS & GYNECOLOGY

## 2025-05-27 PROCEDURE — 99213 OFFICE O/P EST LOW 20 MIN: CPT | Performed by: OBSTETRICS & GYNECOLOGY

## 2025-05-27 PROCEDURE — 95971 ALYS SMPL SP/PN NPGT W/PRGRM: CPT | Performed by: OBSTETRICS & GYNECOLOGY

## 2025-05-27 RX ORDER — OXYCODONE AND ACETAMINOPHEN 7.5; 325 MG/1; MG/1
1 TABLET ORAL 3 TIMES DAILY
Qty: 90 TABLET | Refills: 0 | Status: SHIPPED | OUTPATIENT
Start: 2025-05-27 | End: 2025-06-25 | Stop reason: SDUPTHER

## 2025-05-27 NOTE — TELEPHONE ENCOUNTER
Requesting medication refill. Please approve or deny this request.    Rx requested:  Requested Prescriptions     Pending Prescriptions Disp Refills    oxyCODONE-acetaminophen (PERCOCET) 7.5-325 MG per tablet 90 tablet 0     Sig: Take 1 tablet by mouth in the morning, at noon, and at bedtime for 30 days. Max Daily Amount: 3 tablets         Last Office Visit:   2/5/2025      Next Visit Date:  Future Appointments   Date Time Provider Department Center   5/27/2025  9:15 AM Kong Jean MD [facility]   6/4/2025  2:45 PM Usama Mata MD [place] [facility] Neurology -   7/17/2025  9:30 AM Tariq Lee MD [facility]               Last refill 5/1/25. Please approve or deny.

## 2025-05-27 NOTE — PROGRESS NOTES
TONSILLECTOMY       OB History          2    Para   2    Term   2            AB        Living             SAB        IAB        Ectopic        Molar        Multiple        Live Births   2              Family History   Problem Relation Age of Onset    Arthritis Mother     High Cholesterol Mother     Immune Disorder Mother     Hearing Loss Sister     Cancer Maternal Grandfather      Social History     Socioeconomic History    Marital status:      Spouse name: Not on file    Number of children: Not on file    Years of education: Not on file    Highest education level: Not on file   Occupational History    Not on file   Tobacco Use    Smoking status: Every Day     Current packs/day: 1.00     Average packs/day: 1 pack/day for 40.0 years (40.0 ttl pk-yrs)     Types: Cigarettes    Smokeless tobacco: Never   Vaping Use    Vaping status: Never Used   Substance and Sexual Activity    Alcohol use: Never    Drug use: No    Sexual activity: Not Currently     Partners: Male   Other Topics Concern    Not on file   Social History Narrative    Not on file     Social Drivers of Health     Financial Resource Strain: Low Risk  (5/15/2024)    Overall Financial Resource Strain (CARDIA)     Difficulty of Paying Living Expenses: Not hard at all   Food Insecurity: No Food Insecurity (2/3/2025)    Hunger Vital Sign     Worried About Running Out of Food in the Last Year: Never true     Ran Out of Food in the Last Year: Never true   Transportation Needs: No Transportation Needs (2/3/2025)    PRAPARE - Transportation     Lack of Transportation (Medical): No     Lack of Transportation (Non-Medical): No   Physical Activity: Not on file   Stress: Not on file   Social Connections: Not on file   Intimate Partner Violence: Not on file   Housing Stability: Low Risk  (2/3/2025)    Housing Stability Vital Sign     Unable to Pay for Housing in the Last Year: No     Number of Times Moved in the Last Year: 0     Homeless in the Last

## 2025-05-30 ENCOUNTER — TELEPHONE (OUTPATIENT)
Age: 60
End: 2025-05-30

## 2025-05-30 NOTE — TELEPHONE ENCOUNTER
[facility] called w/pt - SOB 2 days, headache this morning, coughing up thick flem no color . Advised patient to go to ER she declined stating she would like to see pcp. Advised pt provider is out of office until Mon with no availability.  Scheduled pt for 6/2 with Dr. Brooks

## 2025-06-02 ENCOUNTER — OFFICE VISIT (OUTPATIENT)
Age: 60
End: 2025-06-02
Payer: COMMERCIAL

## 2025-06-02 VITALS
SYSTOLIC BLOOD PRESSURE: 108 MMHG | DIASTOLIC BLOOD PRESSURE: 60 MMHG | HEART RATE: 62 BPM | HEIGHT: 64 IN | WEIGHT: 160.2 LBS | RESPIRATION RATE: 18 BRPM | BODY MASS INDEX: 27.35 KG/M2 | OXYGEN SATURATION: 98 % | TEMPERATURE: 96.8 F

## 2025-06-02 DIAGNOSIS — R06.02 SOB (SHORTNESS OF BREATH): Primary | ICD-10-CM

## 2025-06-02 DIAGNOSIS — R05.3 CHRONIC COUGH: ICD-10-CM

## 2025-06-02 DIAGNOSIS — Z72.0 TOBACCO ABUSE: ICD-10-CM

## 2025-06-02 PROCEDURE — G8419 CALC BMI OUT NRM PARAM NOF/U: HCPCS | Performed by: STUDENT IN AN ORGANIZED HEALTH CARE EDUCATION/TRAINING PROGRAM

## 2025-06-02 PROCEDURE — G2211 COMPLEX E/M VISIT ADD ON: HCPCS | Performed by: STUDENT IN AN ORGANIZED HEALTH CARE EDUCATION/TRAINING PROGRAM

## 2025-06-02 PROCEDURE — 4004F PT TOBACCO SCREEN RCVD TLK: CPT | Performed by: STUDENT IN AN ORGANIZED HEALTH CARE EDUCATION/TRAINING PROGRAM

## 2025-06-02 PROCEDURE — 99214 OFFICE O/P EST MOD 30 MIN: CPT | Performed by: STUDENT IN AN ORGANIZED HEALTH CARE EDUCATION/TRAINING PROGRAM

## 2025-06-02 PROCEDURE — 3017F COLORECTAL CA SCREEN DOC REV: CPT | Performed by: STUDENT IN AN ORGANIZED HEALTH CARE EDUCATION/TRAINING PROGRAM

## 2025-06-02 PROCEDURE — 99406 BEHAV CHNG SMOKING 3-10 MIN: CPT | Performed by: STUDENT IN AN ORGANIZED HEALTH CARE EDUCATION/TRAINING PROGRAM

## 2025-06-02 PROCEDURE — G8427 DOCREV CUR MEDS BY ELIG CLIN: HCPCS | Performed by: STUDENT IN AN ORGANIZED HEALTH CARE EDUCATION/TRAINING PROGRAM

## 2025-06-02 RX ORDER — BUPROPION HYDROCHLORIDE 150 MG/1
150 TABLET, EXTENDED RELEASE ORAL 2 TIMES DAILY
Qty: 60 TABLET | Refills: 3 | Status: SHIPPED | OUTPATIENT
Start: 2025-06-02

## 2025-06-02 RX ORDER — FLUTICASONE FUROATE, UMECLIDINIUM BROMIDE AND VILANTEROL TRIFENATATE 100; 62.5; 25 UG/1; UG/1; UG/1
1 POWDER RESPIRATORY (INHALATION) DAILY
Qty: 60 EACH | Refills: 0 | Status: SHIPPED | OUTPATIENT
Start: 2025-06-02

## 2025-06-02 RX ORDER — DEXTROMETHORPHAN HYDROBROMIDE, GUAIFENESIN 20; 400 MG/20ML; MG/20ML
5 SOLUTION ORAL EVERY 4 HOURS PRN
Qty: 355 ML | Refills: 0 | Status: SHIPPED | OUTPATIENT
Start: 2025-06-02

## 2025-06-02 RX ORDER — ALBUTEROL SULFATE 90 UG/1
2 INHALANT RESPIRATORY (INHALATION) EVERY 6 HOURS PRN
Qty: 18 G | Refills: 3 | Status: SHIPPED | OUTPATIENT
Start: 2025-06-02

## 2025-06-02 ASSESSMENT — ENCOUNTER SYMPTOMS
COUGH: 1
SHORTNESS OF BREATH: 1

## 2025-06-02 NOTE — PROGRESS NOTES
Subjective  Aleja Helms, 60 y.o. female presents today with:  Chief Complaint   Patient presents with    Shortness of Breath     Patient present today with SOB for 3 days, headaches since this morning and coughing with thick phlegm with color for the past month. She tried tylenol and ibuprofen with no relief.     Tobacco abuse  Pt smokes 0.5 pack per day for 49 years. Unsure about quitting.  Has tried nicotine gum and the patches previously  History of Present Illness  The patient is a 60-year-old female who presents for evaluation of respiratory symptoms.    She has been experiencing a persistent cough for the past month, which has progressively worsened. Accompanying this symptom is a sensation of breathlessness. She also reports the production of clear phlegm until yesterday, which has been causing choking and gagging episodes during sleep. These episodes have been severe enough to awaken her from sleep, necessitating immediate expectoration into a nearby trash can. Additionally, she reports poor sleep quality due to these symptoms. She has been smoking for 49 years, consuming up to half a pack of cigarettes daily. Previous attempts to quit smoking resulted in heightened anxiety levels.     Review of Systems   HENT:  Positive for congestion.    Respiratory:  Positive for cough and shortness of breath.        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

## 2025-06-03 NOTE — TELEPHONE ENCOUNTER
Requesting medication refill. Please approve or deny this request.    Rx requested:  Requested Prescriptions     Pending Prescriptions Disp Refills    traZODone (DESYREL) 100 MG tablet [Pharmacy Med Name: TRAZODONE  MG TABS 100 Tablet] 30 tablet 3     Sig: TAKE 1 TABLET BY MOUTH NIGHTLY         Last Office Visit:   2/5/2025      Next Visit Date:  Future Appointments   Date Time Provider Department Center   6/4/2025  2:45 PM Usama Mata MD [facility] -   6/11/2025  9:30 AM Kong Jean MD [facility]   7/17/2025  9:30 AM Tariq Lee MD [facility]               Last refill 3/10/25. Please approve or deny.

## 2025-06-04 ENCOUNTER — OFFICE VISIT (OUTPATIENT)
Age: 60
End: 2025-06-04
Payer: COMMERCIAL

## 2025-06-04 VITALS
SYSTOLIC BLOOD PRESSURE: 120 MMHG | BODY MASS INDEX: 27.11 KG/M2 | DIASTOLIC BLOOD PRESSURE: 70 MMHG | WEIGHT: 158 LBS | HEART RATE: 70 BPM

## 2025-06-04 DIAGNOSIS — M96.1 FAILED BACK SYNDROME: Primary | ICD-10-CM

## 2025-06-04 DIAGNOSIS — M79.2 NEUROPATHIC PAIN: ICD-10-CM

## 2025-06-04 DIAGNOSIS — R26.0 ATAXIC GAIT: ICD-10-CM

## 2025-06-04 DIAGNOSIS — M47.812 CERVICAL SPONDYLOSIS: ICD-10-CM

## 2025-06-04 DIAGNOSIS — M47.26 OTHER SPONDYLOSIS WITH RADICULOPATHY, LUMBAR REGION: ICD-10-CM

## 2025-06-04 DIAGNOSIS — M16.11 ARTHRITIS OF RIGHT HIP: ICD-10-CM

## 2025-06-04 PROCEDURE — 3017F COLORECTAL CA SCREEN DOC REV: CPT | Performed by: PSYCHIATRY & NEUROLOGY

## 2025-06-04 PROCEDURE — G8427 DOCREV CUR MEDS BY ELIG CLIN: HCPCS | Performed by: PSYCHIATRY & NEUROLOGY

## 2025-06-04 PROCEDURE — 4004F PT TOBACCO SCREEN RCVD TLK: CPT | Performed by: PSYCHIATRY & NEUROLOGY

## 2025-06-04 PROCEDURE — 99214 OFFICE O/P EST MOD 30 MIN: CPT | Performed by: PSYCHIATRY & NEUROLOGY

## 2025-06-04 PROCEDURE — G8419 CALC BMI OUT NRM PARAM NOF/U: HCPCS | Performed by: PSYCHIATRY & NEUROLOGY

## 2025-06-04 RX ORDER — TRAZODONE HYDROCHLORIDE 100 MG/1
100 TABLET ORAL NIGHTLY
Qty: 30 TABLET | Refills: 3 | Status: SHIPPED | OUTPATIENT
Start: 2025-06-04 | End: 2025-09-02

## 2025-06-04 NOTE — PROGRESS NOTES
Stress: Not on file   Social Connections: Not on file   Intimate Partner Violence: Not on file   Housing Stability: Low Risk  (2/3/2025)    Housing Stability Vital Sign     Unable to Pay for Housing in the Last Year: No     Number of Times Moved in the Last Year: 0     Homeless in the Last Year: No     Family History   Problem Relation Age of Onset    Arthritis Mother     High Cholesterol Mother     Immune Disorder Mother     Hearing Loss Sister     Cancer Maternal Grandfather      Allergies   Allergen Reactions    Amoxicillin-Pot Clavulanate      Other reaction(s): GI Upset, Intolerance, Other: See Comments  Nose bleed    Amoxicillin     Other/Food Itching     IVP dye         Current Outpatient Medications   Medication Sig Dispense Refill    traZODone (DESYREL) 100 MG tablet TAKE 1 TABLET BY MOUTH NIGHTLY 30 tablet 3    fluticasone-umeclidin-vilant (TRELEGY ELLIPTA) 100-62.5-25 MCG/ACT AEPB inhaler Inhale 1 puff into the lungs daily 60 each 0    buPROPion (WELLBUTRIN SR) 150 MG extended release tablet Take 1 tablet by mouth 2 times daily 60 tablet 3    albuterol sulfate HFA (PROVENTIL HFA) 108 (90 Base) MCG/ACT inhaler Inhale 2 puffs into the lungs every 6 hours as needed for Wheezing 18 g 3    Dextromethorphan-guaiFENesin (ROBITUSSIN COUGH+CHEST ARISTIDES DM) 5-100 MG/5ML LIQD liquid Take 5 mLs by mouth every 4 hours as needed for Cough or Congestion 355 mL 0    oxyCODONE-acetaminophen (PERCOCET) 7.5-325 MG per tablet Take 1 tablet by mouth in the morning, at noon, and at bedtime for 30 days. Max Daily Amount: 3 tablets 90 tablet 0    ibuprofen (ADVIL;MOTRIN) 800 MG tablet Take 1 tablet by mouth daily as needed for Pain 90 tablet 0    mirabegron (MYRBETRIQ) 50 MG TB24 Take 50 mg by mouth daily 30 tablet 0    DULoxetine (CYMBALTA) 60 MG extended release capsule Take 2 capsules by mouth every evening 60 capsule 0    diphenhydrAMINE (BANOPHEN) 25 MG capsule TAKE 1 TABLET BY MOUTH EVERY 6 HOURS AS NEEDED FOR ITCHING 56

## 2025-06-09 ENCOUNTER — INITIAL CONSULT (OUTPATIENT)
Age: 60
End: 2025-06-09
Payer: COMMERCIAL

## 2025-06-09 ENCOUNTER — TELEPHONE (OUTPATIENT)
Age: 60
End: 2025-06-09

## 2025-06-09 VITALS
HEART RATE: 60 BPM | WEIGHT: 164 LBS | SYSTOLIC BLOOD PRESSURE: 144 MMHG | TEMPERATURE: 96.5 F | HEIGHT: 64 IN | OXYGEN SATURATION: 97 % | DIASTOLIC BLOOD PRESSURE: 68 MMHG | BODY MASS INDEX: 28 KG/M2

## 2025-06-09 DIAGNOSIS — M48.062 SPINAL STENOSIS OF LUMBAR REGION WITH NEUROGENIC CLAUDICATION: Primary | ICD-10-CM

## 2025-06-09 DIAGNOSIS — M16.11 ARTHRITIS OF RIGHT HIP: Primary | ICD-10-CM

## 2025-06-09 PROCEDURE — G8427 DOCREV CUR MEDS BY ELIG CLIN: HCPCS | Performed by: PAIN MEDICINE

## 2025-06-09 PROCEDURE — 4004F PT TOBACCO SCREEN RCVD TLK: CPT | Performed by: PAIN MEDICINE

## 2025-06-09 PROCEDURE — G8419 CALC BMI OUT NRM PARAM NOF/U: HCPCS | Performed by: PAIN MEDICINE

## 2025-06-09 PROCEDURE — 3017F COLORECTAL CA SCREEN DOC REV: CPT | Performed by: PAIN MEDICINE

## 2025-06-09 PROCEDURE — 99203 OFFICE O/P NEW LOW 30 MIN: CPT | Performed by: PAIN MEDICINE

## 2025-06-09 RX ORDER — MIRABEGRON 50 MG/1
50 TABLET, FILM COATED, EXTENDED RELEASE ORAL DAILY
Qty: 30 TABLET | Refills: 0 | Status: SHIPPED | OUTPATIENT
Start: 2025-06-09

## 2025-06-09 RX ORDER — MIRABEGRON 50 MG/1
50 TABLET, FILM COATED, EXTENDED RELEASE ORAL DAILY
Qty: 30 TABLET | Refills: 0 | OUTPATIENT
Start: 2025-06-09

## 2025-06-09 ASSESSMENT — ENCOUNTER SYMPTOMS
EYES NEGATIVE: 1
ALLERGIC/IMMUNOLOGIC NEGATIVE: 1
GASTROINTESTINAL NEGATIVE: 1
RESPIRATORY NEGATIVE: 1

## 2025-06-09 NOTE — TELEPHONE ENCOUNTER
Patient called and wanted to say that she wanted her mri hip with sedation because she  moves to much     Please advised

## 2025-06-09 NOTE — PROGRESS NOTES
Rate and Rhythm: Normal rate and regular rhythm.      Pulses: Normal pulses.      Heart sounds: Normal heart sounds.   Pulmonary:      Effort: Pulmonary effort is normal.      Breath sounds: Normal breath sounds.   Abdominal:      General: Abdomen is flat. Bowel sounds are normal.      Palpations: Abdomen is soft.   Musculoskeletal:         General: Normal range of motion.      Cervical back: Normal range of motion and neck supple.      Comments: Good Gait able to walk on Toes and Heels good ROM Flexion pain on Extension. Mill facet Tenderness severe SI Joint tenderness mainly Right side No pain on Gainslins, No Pain on SLRs no pain on Hip grinding.   Skin:     General: Skin is warm.      Capillary Refill: Capillary refill takes less than 2 seconds.   Neurological:      General: No focal deficit present.      Mental Status: She is alert and oriented to person, place, and time. Mental status is at baseline.   Psychiatric:         Mood and Affect: Mood normal.         Behavior: Behavior normal.         Thought Content: Thought content normal.         Judgment: Judgment normal.           Plan     Discussed with Pt Her MRI, she does not have a lot of signs, But has Dragging foot as a symptom Has appointment with Dr Butler will consider TFESI if surgery Not indicated.

## 2025-06-11 ENCOUNTER — TELEPHONE (OUTPATIENT)
Dept: OBGYN CLINIC | Age: 60
End: 2025-06-11

## 2025-06-11 ENCOUNTER — OFFICE VISIT (OUTPATIENT)
Dept: OBGYN CLINIC | Age: 60
End: 2025-06-11
Payer: COMMERCIAL

## 2025-06-11 VITALS
BODY MASS INDEX: 27.83 KG/M2 | WEIGHT: 163 LBS | HEIGHT: 64 IN | SYSTOLIC BLOOD PRESSURE: 124 MMHG | DIASTOLIC BLOOD PRESSURE: 76 MMHG | HEART RATE: 64 BPM

## 2025-06-11 DIAGNOSIS — N39.41 URGE INCONTINENCE: ICD-10-CM

## 2025-06-11 DIAGNOSIS — N32.81 OAB (OVERACTIVE BLADDER): Primary | ICD-10-CM

## 2025-06-11 DIAGNOSIS — R32 URINARY INCONTINENCE, UNSPECIFIED TYPE: ICD-10-CM

## 2025-06-11 PROCEDURE — 99213 OFFICE O/P EST LOW 20 MIN: CPT | Performed by: OBSTETRICS & GYNECOLOGY

## 2025-06-11 PROCEDURE — 3017F COLORECTAL CA SCREEN DOC REV: CPT | Performed by: OBSTETRICS & GYNECOLOGY

## 2025-06-11 PROCEDURE — 4004F PT TOBACCO SCREEN RCVD TLK: CPT | Performed by: OBSTETRICS & GYNECOLOGY

## 2025-06-11 PROCEDURE — G8419 CALC BMI OUT NRM PARAM NOF/U: HCPCS | Performed by: OBSTETRICS & GYNECOLOGY

## 2025-06-11 PROCEDURE — 95971 ALYS SMPL SP/PN NPGT W/PRGRM: CPT | Performed by: OBSTETRICS & GYNECOLOGY

## 2025-06-11 PROCEDURE — G8427 DOCREV CUR MEDS BY ELIG CLIN: HCPCS | Performed by: OBSTETRICS & GYNECOLOGY

## 2025-06-11 NOTE — TELEPHONE ENCOUNTER
Patient called and is trying to go through [facility] to get incontinence pads and it's not letting her choose those only women's briefs and she doesn't want that. Patient would like to know if this is something you can help her with. Nurse informed her that I wasn't aware of anything you could do and that you're running patient's.

## 2025-06-11 NOTE — PROGRESS NOTES
Patient Name: Aleja Helms : 1965        Date: 2025      Type of Appt: Consult    Reason for appt: Failed back syndrome     Referred by: Usama Mata    Studies done: 3/20/25 MRI of Lumbar Spine at [facility]     Conservative Treatments (Have you ever tried any)  Physical Therapy in the last 6 months to a year:  NSAID's: Yes  Narcotics: Yes  Muscle relaxants: Yes  Epidural injections: No    Seen Dr Griffin 25    Any Blood Thinners :  [] Yes  [x] No       [] Aspirin    [] Eliquis     [] Xarelto    [] Pletal   [] Plavix    [] Warfarin        Diabetic:  [] Yes   [x] No   If yes, prescribed insulin: [] Yes   [x]  No      Do you take any : [] Yes   [x]  No      [] Ozempic   [] Wegovy    [] Trulicity    [] Mounjaro     Smoking: [x] Yes   [] No      REVIEW OF SYSTEMS:    [] Rash     [] Difficulty Urinating   [] Nausea    [] Fever      [] Headaches    [] Bruising/Bleeding Easily    [] Hearing loss     [] Constipation     [] Sleep Disturbance   [] Shortness of breath    [] Diarrhea   [] Neck Pain    [x] Back Pain                         [facility]  [address] [place] [id number]  P: [phone]  F: [phone]          Patient:  Aleja Helms  YOB: 1965  Date: 2025    The patient is a 60 y.o. female who presents today for evaluation of the following problems:     No chief complaint on file.       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

## 2025-06-11 NOTE — TELEPHONE ENCOUNTER
Patient called to verify that the MRI order placed was with sedation and the was order was with contrast.   Patient is allergic to the contrast and would like a new MRI order for Right Hip with sedation.    Please advise.

## 2025-06-11 NOTE — TELEPHONE ENCOUNTER
Patient advised and patient has understanding of MRI order WO contrast, sedation will be given as patient recommended to her allergy of contrast

## 2025-06-13 NOTE — TELEPHONE ENCOUNTER
Found a company, [employer], that appears to be covered by patient's insurance. Form printed and awaiting provider signature. Will fax to them once signed by provider.   [employer], [employer], and [employer] are the only companies that appear to be covered by insurance. If this company does not work for patient, she will need to call her insurance herself.

## 2025-06-18 ENCOUNTER — TELEPHONE (OUTPATIENT)
Age: 60
End: 2025-06-18

## 2025-06-18 RX ORDER — LEVOFLOXACIN 500 MG/1
500 TABLET, FILM COATED ORAL DAILY
Qty: 7 TABLET | Refills: 0 | Status: SHIPPED | OUTPATIENT
Start: 2025-06-18 | End: 2025-06-25

## 2025-06-18 NOTE — TELEPHONE ENCOUNTER
Patient called in requesting Dr. Lee call in an antibiotic for her to the pharmacy. Stated she is having a lot of nose congestion. A lot of yellow mucus. Blowing nose a lot.    Please advise. If approved patient would like prescription sent to the [facility] on [address] in [place].    CATHY matthew/ Dr. Brooks 06/02/205  CATHY matthew/ Dr. Lee 04/02/2025  [place] 07/17/2025

## 2025-06-19 ENCOUNTER — HOSPITAL ENCOUNTER (OUTPATIENT)
Dept: MRI IMAGING | Age: 60
Discharge: HOME | End: 2025-06-21
Attending: PSYCHIATRY & NEUROLOGY
Payer: COMMERCIAL

## 2025-06-19 ENCOUNTER — HOSPITAL ENCOUNTER (OUTPATIENT)
Dept: GENERAL RADIOLOGY | Age: 60
Discharge: HOME | End: 2025-06-21
Attending: NEUROLOGICAL SURGERY
Payer: COMMERCIAL

## 2025-06-19 ENCOUNTER — ANESTHESIA (OUTPATIENT)
Dept: MRI IMAGING | Age: 60
End: 2025-06-19
Payer: COMMERCIAL

## 2025-06-19 ENCOUNTER — ANESTHESIA EVENT (OUTPATIENT)
Dept: MRI IMAGING | Age: 60
End: 2025-06-19
Payer: COMMERCIAL

## 2025-06-19 ENCOUNTER — INITIAL CONSULT (OUTPATIENT)
Age: 60
End: 2025-06-19
Payer: COMMERCIAL

## 2025-06-19 ENCOUNTER — HOSPITAL ENCOUNTER (OUTPATIENT)
Dept: MRI IMAGING | Age: 60
End: 2025-06-19
Attending: PSYCHIATRY & NEUROLOGY
Payer: COMMERCIAL

## 2025-06-19 VITALS
HEART RATE: 59 BPM | BODY MASS INDEX: 27.31 KG/M2 | SYSTOLIC BLOOD PRESSURE: 120 MMHG | HEIGHT: 64 IN | OXYGEN SATURATION: 100 % | TEMPERATURE: 97 F | RESPIRATION RATE: 18 BRPM | DIASTOLIC BLOOD PRESSURE: 87 MMHG | WEIGHT: 160 LBS

## 2025-06-19 VITALS
WEIGHT: 163 LBS | SYSTOLIC BLOOD PRESSURE: 118 MMHG | DIASTOLIC BLOOD PRESSURE: 68 MMHG | HEIGHT: 64 IN | BODY MASS INDEX: 27.83 KG/M2

## 2025-06-19 DIAGNOSIS — R52 PAIN: ICD-10-CM

## 2025-06-19 DIAGNOSIS — M25.551 RIGHT HIP PAIN: ICD-10-CM

## 2025-06-19 DIAGNOSIS — M47.816 LUMBAR SPONDYLOSIS: ICD-10-CM

## 2025-06-19 DIAGNOSIS — G03.9 ARACHNOIDITIS: ICD-10-CM

## 2025-06-19 DIAGNOSIS — M48.062 LUMBAR STENOSIS WITH NEUROGENIC CLAUDICATION: Primary | ICD-10-CM

## 2025-06-19 DIAGNOSIS — M16.11 ARTHRITIS OF RIGHT HIP: ICD-10-CM

## 2025-06-19 PROCEDURE — 4004F PT TOBACCO SCREEN RCVD TLK: CPT | Performed by: NEUROLOGICAL SURGERY

## 2025-06-19 PROCEDURE — 76014 MR SFTY IMPLT&/FB ASMT STF 1: CPT

## 2025-06-19 PROCEDURE — G8427 DOCREV CUR MEDS BY ELIG CLIN: HCPCS | Performed by: NEUROLOGICAL SURGERY

## 2025-06-19 PROCEDURE — 3017F COLORECTAL CA SCREEN DOC REV: CPT | Performed by: NEUROLOGICAL SURGERY

## 2025-06-19 PROCEDURE — 99203 OFFICE O/P NEW LOW 30 MIN: CPT | Performed by: NEUROLOGICAL SURGERY

## 2025-06-19 PROCEDURE — G8419 CALC BMI OUT NRM PARAM NOF/U: HCPCS | Performed by: NEUROLOGICAL SURGERY

## 2025-06-19 PROCEDURE — 2580000003 HC RX 258: Performed by: ANESTHESIOLOGY

## 2025-06-19 PROCEDURE — 6360000002 HC RX W HCPCS

## 2025-06-19 PROCEDURE — 73502 X-RAY EXAM HIP UNI 2-3 VIEWS: CPT

## 2025-06-19 RX ORDER — FENTANYL CITRATE 50 UG/ML
INJECTION, SOLUTION INTRAMUSCULAR; INTRAVENOUS
Status: DISCONTINUED | OUTPATIENT
Start: 2025-06-19 | End: 2025-06-19 | Stop reason: SDUPTHER

## 2025-06-19 RX ORDER — MIDAZOLAM HYDROCHLORIDE 1 MG/ML
INJECTION, SOLUTION INTRAMUSCULAR; INTRAVENOUS
Status: DISCONTINUED | OUTPATIENT
Start: 2025-06-19 | End: 2025-06-19 | Stop reason: SDUPTHER

## 2025-06-19 RX ORDER — SODIUM CHLORIDE 9 MG/ML
INJECTION, SOLUTION INTRAVENOUS CONTINUOUS
Status: DISCONTINUED | OUTPATIENT
Start: 2025-06-19 | End: 2025-06-22 | Stop reason: HOSPADM

## 2025-06-19 RX ADMIN — MIDAZOLAM HYDROCHLORIDE 2 MG: 1 INJECTION, SOLUTION INTRAMUSCULAR; INTRAVENOUS at 13:03

## 2025-06-19 RX ADMIN — SODIUM CHLORIDE: 0.9 INJECTION, SOLUTION INTRAVENOUS at 12:31

## 2025-06-19 RX ADMIN — FENTANYL CITRATE 25 MCG: 50 INJECTION, SOLUTION INTRAMUSCULAR; INTRAVENOUS at 13:09

## 2025-06-19 ASSESSMENT — PAIN DESCRIPTION - DESCRIPTORS: DESCRIPTORS: STABBING;DULL

## 2025-06-19 ASSESSMENT — LIFESTYLE VARIABLES: SMOKING_STATUS: 1

## 2025-06-19 ASSESSMENT — PAIN - FUNCTIONAL ASSESSMENT: PAIN_FUNCTIONAL_ASSESSMENT: 0-10

## 2025-06-19 ASSESSMENT — PAIN SCALES - GENERAL
PAINLEVEL_OUTOF10: 0

## 2025-06-19 NOTE — ANESTHESIA POSTPROCEDURE EVALUATION
Department of Anesthesiology  Postprocedure Note    Patient: Aleja Helms  MRN: [id number]  YOB: 1965  Date of evaluation: 6/19/2025    Procedure Summary       Date: 06/19/25 Room / Location: [facility]    Anesthesia Start: 1254 Anesthesia Stop: 1330    Procedure: BRIAN MRI W SURGICAL SEDATION Diagnosis:     Scheduled Providers:  Responsible Provider: Artie Frank MD    Anesthesia Type: MAC ASA Status: Not recorded            Anesthesia Type: MAC    Benigno Phase I:      Benigno Phase II:      Anesthesia Post Evaluation    Patient location during evaluation: bedside  Patient participation: complete - patient participated  Level of consciousness: awake and awake and alert  Airway patency: patent  Nausea & Vomiting: no nausea and no vomiting  Cardiovascular status: blood pressure returned to baseline and hemodynamically stable  Respiratory status: acceptable  Hydration status: euvolemic  Pain management: adequate    No notable events documented.

## 2025-06-19 NOTE — TELEPHONE ENCOUNTER
Called and spoke to Aleja letting her know that Leandra was called into her pharmacy. She was able to  the prescription last night.

## 2025-06-19 NOTE — ANESTHESIA PRE PROCEDURE
GI/Hepatic/Renal:   (+) GERD:, PUD, liver disease:          Endo/Other: Negative Endo/Other ROS             Pt had PAT visit.       Abdominal:             Vascular: negative vascular ROS.         Other Findings:             Anesthesia Plan      MAC     ASA 3       Induction: intravenous.    MIPS: Prophylactic antiemetics administered.  Anesthetic plan and risks discussed with patient.      Plan discussed with CRNA and CAA.    Attending anesthesiologist reviewed and agrees with Pre Eval content                Artie Frank MD   6/19/2025

## 2025-06-23 ENCOUNTER — OFFICE VISIT (OUTPATIENT)
Age: 60
End: 2025-06-23

## 2025-06-23 VITALS
BODY MASS INDEX: 27.31 KG/M2 | HEIGHT: 64 IN | TEMPERATURE: 97.8 F | WEIGHT: 160 LBS | OXYGEN SATURATION: 95 % | HEART RATE: 63 BPM

## 2025-06-23 DIAGNOSIS — J30.2 SEASONAL ALLERGIC RHINITIS, UNSPECIFIED TRIGGER: ICD-10-CM

## 2025-06-23 DIAGNOSIS — M70.61 GREATER TROCHANTERIC BURSITIS OF RIGHT HIP: ICD-10-CM

## 2025-06-23 DIAGNOSIS — M25.551 RIGHT HIP PAIN: Primary | ICD-10-CM

## 2025-06-23 RX ORDER — LIDOCAINE HYDROCHLORIDE 10 MG/ML
4 INJECTION, SOLUTION INFILTRATION; PERINEURAL ONCE
Status: COMPLETED | OUTPATIENT
Start: 2025-06-23 | End: 2025-06-24

## 2025-06-23 RX ORDER — DULOXETIN HYDROCHLORIDE 60 MG/1
120 CAPSULE, DELAYED RELEASE ORAL EVERY EVENING
Qty: 60 CAPSULE | Refills: 0 | Status: SHIPPED | OUTPATIENT
Start: 2025-06-23 | End: 2025-09-21

## 2025-06-23 RX ORDER — TRIAMCINOLONE ACETONIDE 40 MG/ML
40 INJECTION, SUSPENSION INTRA-ARTICULAR; INTRAMUSCULAR ONCE
Status: COMPLETED | OUTPATIENT
Start: 2025-06-23 | End: 2025-06-24

## 2025-06-23 NOTE — PROGRESS NOTES
HISTORY AND PHYSICAL               Date: 6/23/2025        Patient Name: Aleja Helms     MRN: [id number] YOB: 1965      Age:  60 y.o.      Assessment/Plan       Diagnosis Orders   1. Right hip pain        2. Greater trochanteric bursitis of right hip            Right hip pain.  Suspect combination of trochanteric bursitis, possible intra-articular pain, as well as referred pain from    Weightbearing status: Weight-bearing as tolerated right lower extremity  Offered patient greater trochanteric steroid injection which she accepted.  With patient's permission right trochanteric bursal steroid injection performed in office today  As patient also has right groin pain offered her imaging guided intra-articular hip injection.  Discussed that that may both therapeutic but also diagnostic pending response to injection but also be helpful in terms of guiding further treatment.  She would like to proceed with that injection so we will order that to be scheduled as an outpatient procedure  Discussed oral anti-inflammatories.  Patient currently taking ibuprofen without much benefit.  Do not think a different NSAID would be tremendously different from this and will hold on this for now.  Also discussed oral steroids which patient would like to avoid for the time so we will hold that as well  Discussed physical therapy, prescription given    Follow-up: Follow-up: 6 weeks without xray .  If pain/symptoms resolve okay for patient to call and cancel or postpone appointment and we will see back on an as-needed basis at any time    No orders of the defined types were placed in this encounter.    No orders of the defined types were placed in this encounter.    Return in about 6 weeks (around 8/4/2025).    Jesus Al MD  Orthopaedic Trauma Surgery    **PROCEDURE NOTE: Right trochanteric bursal steroid injection  Right lateral aspect of hip prepped with alcohol  Skin anesthetized with ethylene chloride  4 cc 1%

## 2025-06-23 NOTE — PROGRESS NOTES
Aleja Helms is a 60 y.o. female who presents here today for complaints of Follow-up (Myrbetriq. She states it seems to be helping but she is still not happy. She's been unable to eat watermelon because it puts too much fluid in her.)        History of Present Illness  The patient presents for evaluation of urinary urgency.    She reports a noticeable improvement in her condition until she misplaced her medication, Myrbetriq. She has since resumed the medication. She experiences urinary urgency, which is often followed by leakage. She also mentions a sensation of vibration at the site of her device, but clarifies that it is not associated with any pain.          Vitals:  /76   Pulse 64   Ht 1.626 m (5' 4\")   Wt 73.9 kg (163 lb)   LMP 01/01/1997 (Exact Date) Comment: hysterectomy  BMI 27.98 kg/m²   Allergies:  Amoxicillin-pot clavulanate, Amoxicillin, and Other  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    Chronic back pain     Chronic kidney disease     COPD exacerbation (HCC)     Depression     Disease of blood and blood forming organ 01/04/2017    neutropenia    Fibromyalgia     GERD (gastroesophageal reflux disease)     History of blood transfusion     Hyperlipidemia     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    Neuromuscular disorder (HCC)     Osteoarthritis     Overactive bladder     Pneumonia due to organism     Pyoderma gangrenosa (HCC)     Sweet syndrome     Ulcerative colitis (HCC)      Past Surgical History:   Procedure Laterality Date    ABDOMEN SURGERY      sleen repair    APPENDECTOMY      BACK SURGERY      BREAST SURGERY Bilateral     fatty tumor removed, benign    COLONOSCOPY      COSMETIC SURGERY      tummy tuck    COSMETIC SURGERY  03/13/2024    upper lip    EYE SURGERY      bilateral cataract surgery    HYSTERECTOMY (CERVIX STATUS UNKNOWN)      OVARY REMOVAL Right     SKIN BIOPSY      SPINAL

## 2025-06-23 NOTE — TELEPHONE ENCOUNTER
Requesting medication refill. Please approve or deny this request.    Rx requested:  Requested Prescriptions     Pending Prescriptions Disp Refills    DULoxetine (CYMBALTA) 60 MG extended release capsule 60 capsule 0     Sig: Take 2 capsules by mouth every evening         Last Office Visit:   6/4/2025      Next Visit Date:  Future Appointments   Date Time Provider Department Center   6/23/2025 11:00 AM Jessu Al MD [facility]   7/11/2025 10:30 AM Kong Jean MD [facility] [place]   7/17/2025  9:30 AM Tariq Lee MD [facility]   10/9/2025  2:30 PM Usama Mata MD [facility] -               Last refill 4/22/25. Please approve or deny.

## 2025-06-24 RX ORDER — DIPHENHYDRAMINE HCL 25 MG
CAPSULE ORAL
Qty: 56 CAPSULE | Refills: 3 | Status: SHIPPED | OUTPATIENT
Start: 2025-06-24

## 2025-06-24 RX ADMIN — TRIAMCINOLONE ACETONIDE 40 MG: 40 INJECTION, SUSPENSION INTRA-ARTICULAR; INTRAMUSCULAR at 10:08

## 2025-06-24 RX ADMIN — LIDOCAINE HYDROCHLORIDE 4 ML: 10 INJECTION, SOLUTION INFILTRATION; PERINEURAL at 10:06

## 2025-06-25 DIAGNOSIS — G62.9 POLYNEUROPATHY, UNSPECIFIED: ICD-10-CM

## 2025-06-25 NOTE — TELEPHONE ENCOUNTER
Requesting medication refill. Please approve or deny this request.    Rx requested:  Requested Prescriptions     Pending Prescriptions Disp Refills    oxyCODONE-acetaminophen (PERCOCET) 7.5-325 MG per tablet 90 tablet 0     Sig: Take 1 tablet by mouth in the morning, at noon, and at bedtime for 30 days. Max Daily Amount: 3 tablets         Last Office Visit:   6/4/2025      Next Visit Date:  Future Appointments   Date Time Provider Department Center   7/11/2025 10:30 AM Kong Jean MD [facility]   7/17/2025  9:30 AM Tariq Lee MD [facility]   7/22/2025 11:00 [facility]   10/9/2025  2:30 PM Usama Mata MD [facility] -               Last refill 5/27/25. Please approve or deny.

## 2025-06-26 RX ORDER — OXYCODONE AND ACETAMINOPHEN 7.5; 325 MG/1; MG/1
1 TABLET ORAL 3 TIMES DAILY
Qty: 90 TABLET | Refills: 0 | Status: SHIPPED | OUTPATIENT
Start: 2025-06-27 | End: 2025-07-27

## 2025-07-07 ENCOUNTER — OFFICE VISIT (OUTPATIENT)
Age: 60
End: 2025-07-07
Payer: COMMERCIAL

## 2025-07-07 VITALS
HEART RATE: 72 BPM | WEIGHT: 161 LBS | TEMPERATURE: 97 F | RESPIRATION RATE: 16 BRPM | DIASTOLIC BLOOD PRESSURE: 70 MMHG | SYSTOLIC BLOOD PRESSURE: 110 MMHG | HEIGHT: 64 IN | OXYGEN SATURATION: 98 % | BODY MASS INDEX: 27.49 KG/M2

## 2025-07-07 DIAGNOSIS — L23.7 ALLERGIC CONTACT DERMATITIS DUE TO PLANTS, EXCEPT FOOD: Primary | ICD-10-CM

## 2025-07-07 DIAGNOSIS — L29.9 ITCHING: ICD-10-CM

## 2025-07-07 PROCEDURE — 3017F COLORECTAL CA SCREEN DOC REV: CPT | Performed by: NURSE PRACTITIONER

## 2025-07-07 PROCEDURE — G8427 DOCREV CUR MEDS BY ELIG CLIN: HCPCS | Performed by: NURSE PRACTITIONER

## 2025-07-07 PROCEDURE — 4004F PT TOBACCO SCREEN RCVD TLK: CPT | Performed by: NURSE PRACTITIONER

## 2025-07-07 PROCEDURE — 99213 OFFICE O/P EST LOW 20 MIN: CPT | Performed by: NURSE PRACTITIONER

## 2025-07-07 PROCEDURE — G8419 CALC BMI OUT NRM PARAM NOF/U: HCPCS | Performed by: NURSE PRACTITIONER

## 2025-07-07 RX ORDER — PREDNISONE 10 MG/1
TABLET ORAL
Qty: 30 TABLET | Refills: 0 | Status: SHIPPED | OUTPATIENT
Start: 2025-07-07

## 2025-07-07 ASSESSMENT — ENCOUNTER SYMPTOMS: COLOR CHANGE: 1

## 2025-07-07 NOTE — PROGRESS NOTES
Subjective  Aleja Helms, 60 y.o. female presents today with:  Chief Complaint   Patient presents with    Rash     Pt states she was working in the yard and believes she has poison ivy for the last 4 days       HPI  Presents to walk-in clinic for a skin concern   Rash erupted Thursday   Affected sites: arms and legs   Was doing yard work prior to developing rash   Sites are itching   Serous drainage at times from hands   Denies fever or chills   Denies joint pain associated with rash  Denies tickle in throat, cough or difficultly eating or drinking   Calamine                 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    Chronic back pain     Chronic kidney disease     COPD exacerbation (HCC)     Depression     Disease of blood and blood forming organ 01/04/2017    neutropenia    Fibromyalgia     GERD (gastroesophageal reflux disease)     History of blood transfusion     Hyperlipidemia     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    Neuromuscular disorder (HCC)     Osteoarthritis     Overactive bladder     Pneumonia due to organism     Pyoderma gangrenosa (HCC)     Sweet syndrome     Ulcerative colitis (HCC)       Past Surgical History:   Procedure Laterality Date    ABDOMEN SURGERY      sleen repair    APPENDECTOMY      BACK SURGERY      BREAST SURGERY Bilateral     fatty tumor removed, benign    COLONOSCOPY      COSMETIC SURGERY      tummy tuck    COSMETIC SURGERY  03/13/2024    upper lip    EYE SURGERY      bilateral cataract surgery    HYSTERECTOMY (CERVIX STATUS UNKNOWN)      OVARY REMOVAL Right     SKIN BIOPSY      SPINAL FUSION      STIMULATOR SURGERY N/A 5/2/2024    MEDTRONIC STAGE 1 AND 2  INTERSTIM performed by Kong Jean MD at [facility]    STIMULATOR SURGERY N/A 1/17/2025    REPLACEMENT STAGE 1 AND STAGE 2 INTERSTIM performed by Kong Jean MD at [facility]    TONSILLECTOMY       Family History   Problem

## 2025-07-17 ENCOUNTER — OFFICE VISIT (OUTPATIENT)
Age: 60
End: 2025-07-17
Payer: COMMERCIAL

## 2025-07-17 VITALS
WEIGHT: 164.6 LBS | DIASTOLIC BLOOD PRESSURE: 76 MMHG | BODY MASS INDEX: 28.1 KG/M2 | OXYGEN SATURATION: 97 % | HEIGHT: 64 IN | SYSTOLIC BLOOD PRESSURE: 133 MMHG | HEART RATE: 67 BPM | TEMPERATURE: 97.8 F

## 2025-07-17 DIAGNOSIS — F41.9 ANXIETY: Primary | ICD-10-CM

## 2025-07-17 DIAGNOSIS — L88: ICD-10-CM

## 2025-07-17 PROCEDURE — G8419 CALC BMI OUT NRM PARAM NOF/U: HCPCS | Performed by: INTERNAL MEDICINE

## 2025-07-17 PROCEDURE — 99214 OFFICE O/P EST MOD 30 MIN: CPT | Performed by: INTERNAL MEDICINE

## 2025-07-17 PROCEDURE — 4004F PT TOBACCO SCREEN RCVD TLK: CPT | Performed by: INTERNAL MEDICINE

## 2025-07-17 PROCEDURE — 3017F COLORECTAL CA SCREEN DOC REV: CPT | Performed by: INTERNAL MEDICINE

## 2025-07-17 PROCEDURE — G8427 DOCREV CUR MEDS BY ELIG CLIN: HCPCS | Performed by: INTERNAL MEDICINE

## 2025-07-17 NOTE — PROGRESS NOTES
Aleja Helms (:  1965) is a 60 y.o. female, Established patient, here for evaluation of the following chief complaint(s):  Follow-up          Subjective   History of Present Illness      The patient presents for evaluation of poison ivy, sleep issues, smoking cessation, and pain management.    She is currently dealing with a poison ivy rash, which she believes was contracted from her home environment. She has completed a course of prednisone prescribed by an urgent care center.    She reports that Wellbutrin was initially effective in the first week but subsequently lost its efficacy, leading to dizziness. She discontinued the medication as it was prescribed to aid in smoking cessation. Her sleep pattern is irregular, often waking up after a few hours of sleep. She only manages to sleep for 3 to 4 hours when unwell. She is currently on trazodone.    She has been smoking since the age of 11 and has made several attempts to quit, including going cold turkey, but these were short-lived. She has recently increased her smoking from 3 cigarettes a day to over half a pack per day. She is unsure if this increase is due to the steroids she is taking. She often smokes while drinking coffee.    She maintains an active lifestyle, managing household chores and performing stretches. She has consulted with Dr. Toro and Dr. Mata, who referred her to pain management. She also saw Dr. Cohen, who recommended an orthopedic specialist for a hip injection under x-ray guidance. However, she is hesitant about this procedure. Her daughter provided her with a book on stretches, which she finds beneficial. She prefers not to take additional medications and only takes what is necessary. She is currently taking gabapentin, which she finds helpful.    Diet: She consumes saturated and trans fats in her diet and uses olive oil for cooking.  Tobacco: She smokes cigarettes, recently increased to over half a pack per

## 2025-07-22 ENCOUNTER — OFFICE VISIT (OUTPATIENT)
Age: 60
End: 2025-07-22
Payer: COMMERCIAL

## 2025-07-22 VITALS
DIASTOLIC BLOOD PRESSURE: 70 MMHG | WEIGHT: 164 LBS | HEIGHT: 64 IN | SYSTOLIC BLOOD PRESSURE: 120 MMHG | BODY MASS INDEX: 28 KG/M2 | HEART RATE: 63 BPM | OXYGEN SATURATION: 95 %

## 2025-07-22 DIAGNOSIS — M25.551 RIGHT HIP PAIN: Primary | ICD-10-CM

## 2025-07-22 PROCEDURE — G8419 CALC BMI OUT NRM PARAM NOF/U: HCPCS | Performed by: ORTHOPAEDIC SURGERY

## 2025-07-22 PROCEDURE — G8428 CUR MEDS NOT DOCUMENT: HCPCS | Performed by: ORTHOPAEDIC SURGERY

## 2025-07-22 PROCEDURE — 99213 OFFICE O/P EST LOW 20 MIN: CPT | Performed by: ORTHOPAEDIC SURGERY

## 2025-07-22 PROCEDURE — 3017F COLORECTAL CA SCREEN DOC REV: CPT | Performed by: ORTHOPAEDIC SURGERY

## 2025-07-22 PROCEDURE — 4004F PT TOBACCO SCREEN RCVD TLK: CPT | Performed by: ORTHOPAEDIC SURGERY

## 2025-07-22 NOTE — PROGRESS NOTES
Follow-up Visit             Date: 7/22/2025        Patient Name: Aleja Helms     MRN: [id number] YOB: 1965      Age:  60 y.o.    Assessment/Plan:      Diagnosis Orders   1. Right hip pain              Right hip pain    Weightbearing status: Weight-bearing as tolerated right lower extremity  To date patient has tried oral anti-inflammatories, physical therapy, and had a trochanteric bursal injection which was not very helpful  Given the diffuse nature of patient's pain and recommended we try additional therapeutic/diagnostic injections.  Patient does have evidence of degenerative changes involving the hip joint and endorses groin pain as part of her pain complaint.  I therefore recommended we try an intra-articular steroid injection and will send to Dr. Yoo for evaluation of this  As patient has pain in the posterior lateral aspect of the hip as well and the trochanteric bursal injection was not particularly helpful I recommended we consider piriformis tendon injection.  Will also refer patient to Dr. Yoo for evaluation for this  Lastly given patient has pain bilateral lower back around the SI joint will refer patient to Dr. Holland for consideration of SI joint injection  Pending outpatient response to injections may then consider referral particularly to total joints for for further evaluation of right hip arthritis    Follow-up:      No orders of the defined types were placed in this encounter.    No orders of the defined types were placed in this encounter.    No follow-ups on file.    Jesus Al MD  Orthopaedic Trauma Surgery    Chief Complaint:      Continued right hip pain    HPI:     Follow-up Visit    Patient presents for follow-up regarding her right hip pain.  Bursal injection performed in office at last visit which was not of any significant benefit.  Patient continues to complain of pain all around the hip area.  She has pain around the lateral aspect of the hip as well as

## 2025-07-23 ENCOUNTER — TELEPHONE (OUTPATIENT)
Dept: OBGYN CLINIC | Age: 60
End: 2025-07-23

## 2025-07-23 DIAGNOSIS — N32.81 OAB (OVERACTIVE BLADDER): Primary | ICD-10-CM

## 2025-07-23 RX ORDER — MIRABEGRON 50 MG/1
50 TABLET, FILM COATED, EXTENDED RELEASE ORAL DAILY
Qty: 30 TABLET | Refills: 0 | Status: SHIPPED | OUTPATIENT
Start: 2025-07-23

## 2025-07-23 NOTE — TELEPHONE ENCOUNTER
Pt is requesting a refill of Myrbetriq.  [facility] [address] [facility].

## 2025-07-24 DIAGNOSIS — G62.9 POLYNEUROPATHY, UNSPECIFIED: ICD-10-CM

## 2025-07-24 RX ORDER — OXYCODONE AND ACETAMINOPHEN 7.5; 325 MG/1; MG/1
1 TABLET ORAL 3 TIMES DAILY
Qty: 90 TABLET | Refills: 0 | Status: SHIPPED | OUTPATIENT
Start: 2025-07-24 | End: 2025-08-19 | Stop reason: SDUPTHER

## 2025-07-28 ENCOUNTER — TELEPHONE (OUTPATIENT)
Age: 60
End: 2025-07-28

## 2025-07-28 DIAGNOSIS — L23.7 POISON IVY: Primary | ICD-10-CM

## 2025-07-28 NOTE — TELEPHONE ENCOUNTER
PT called -she was in last week fro poison ivy-   It is not getting any better- today she noticed that she has splinter (40-50 on legs & arms)  She is requesting a referral to dermatology to get them removed- very painful

## 2025-08-01 ENCOUNTER — OFFICE VISIT (OUTPATIENT)
Age: 60
End: 2025-08-01

## 2025-08-01 VITALS
TEMPERATURE: 94.2 F | BODY MASS INDEX: 28 KG/M2 | OXYGEN SATURATION: 94 % | WEIGHT: 164 LBS | DIASTOLIC BLOOD PRESSURE: 62 MMHG | HEIGHT: 64 IN | HEART RATE: 65 BPM | SYSTOLIC BLOOD PRESSURE: 104 MMHG

## 2025-08-01 DIAGNOSIS — M16.11 PRIMARY OSTEOARTHRITIS OF RIGHT HIP: Primary | ICD-10-CM

## 2025-08-01 NOTE — PROGRESS NOTES
Types: Cigarettes      Smokeless tobacco: Never     reports no history of drug use.  --------------------------------------------------------------------------------------------------------------  Allergies   Allergen Reactions    Amoxicillin-Pot Clavulanate      Other reaction(s): GI Upset, Intolerance, Other: See Comments  Nose bleed    Amoxicillin     Other Itching     IVP dye       --------------------------------------------------------------------------------------------------------------    Current Outpatient Medications:     oxyCODONE-acetaminophen (PERCOCET) 7.5-325 MG per tablet, Take 1 tablet by mouth in the morning, at noon, and at bedtime for 30 days. Max Daily Amount: 3 tablets, Disp: 90 tablet, Rfl: 0    mirabegron (MYRBETRIQ) 50 MG TB24, Take 50 mg by mouth daily, Disp: 30 tablet, Rfl: 0    predniSONE (DELTASONE) 10 MG tablet, Take 4 tabs by mouth for 3 days, then 3 tabs daily for 3 days, then 2 tabs for 3 days, then 1 tab for 3 days , then stop.,, Disp: 30 tablet, Rfl: 0    diphenhydrAMINE (BANOPHEN) 25 MG capsule, TAKE 1 TABLET BY MOUTH EVERY 6 HOURS AS NEEDED FOR ITCHING, Disp: 56 capsule, Rfl: 3    DULoxetine (CYMBALTA) 60 MG extended release capsule, Take 2 capsules by mouth every evening, Disp: 60 capsule, Rfl: 0    traZODone (DESYREL) 100 MG tablet, TAKE 1 TABLET BY MOUTH NIGHTLY, Disp: 30 tablet, Rfl: 3    fluticasone-umeclidin-vilant (TRELEGY ELLIPTA) 100-62.5-25 MCG/ACT AEPB inhaler, Inhale 1 puff into the lungs daily, Disp: 60 each, Rfl: 0    albuterol sulfate HFA (PROVENTIL HFA) 108 (90 Base) MCG/ACT inhaler, Inhale 2 puffs into the lungs every 6 hours as needed for Wheezing, Disp: 18 g, Rfl: 3    ibuprofen (ADVIL;MOTRIN) 800 MG tablet, Take 1 tablet by mouth daily as needed for Pain, Disp: 90 tablet, Rfl: 0    fluticasone (FLONASE) 50 MCG/ACT nasal spray, 1 SPRAY BY NASAL ROUTE DAILY, Disp: 16 g, Rfl: 2    Incontinence Supply Disposable (POISE PAD) PADS, PRN. Please dispense whatever is

## 2025-08-05 ENCOUNTER — OFFICE VISIT (OUTPATIENT)
Age: 60
End: 2025-08-05

## 2025-08-05 DIAGNOSIS — M16.11 PRIMARY OSTEOARTHRITIS OF RIGHT HIP: Primary | ICD-10-CM

## 2025-08-05 RX ORDER — TRIAMCINOLONE ACETONIDE 40 MG/ML
80 INJECTION, SUSPENSION INTRA-ARTICULAR; INTRAMUSCULAR ONCE
Status: COMPLETED | OUTPATIENT
Start: 2025-08-05 | End: 2025-08-06

## 2025-08-06 RX ADMIN — Medication 3 ML: at 14:03

## 2025-08-06 RX ADMIN — TRIAMCINOLONE ACETONIDE 80 MG: 40 INJECTION, SUSPENSION INTRA-ARTICULAR; INTRAMUSCULAR at 14:04

## 2025-08-19 ENCOUNTER — OFFICE VISIT (OUTPATIENT)
Age: 60
End: 2025-08-19
Payer: COMMERCIAL

## 2025-08-19 VITALS
BODY MASS INDEX: 26.46 KG/M2 | HEART RATE: 69 BPM | DIASTOLIC BLOOD PRESSURE: 74 MMHG | OXYGEN SATURATION: 99 % | SYSTOLIC BLOOD PRESSURE: 110 MMHG | TEMPERATURE: 96.8 F | HEIGHT: 64 IN | WEIGHT: 155 LBS

## 2025-08-19 DIAGNOSIS — M16.11 PRIMARY OSTEOARTHRITIS OF RIGHT HIP: Primary | ICD-10-CM

## 2025-08-19 DIAGNOSIS — G62.9 POLYNEUROPATHY, UNSPECIFIED: ICD-10-CM

## 2025-08-19 PROCEDURE — 99213 OFFICE O/P EST LOW 20 MIN: CPT | Performed by: ORTHOPAEDIC SURGERY

## 2025-08-19 PROCEDURE — 3017F COLORECTAL CA SCREEN DOC REV: CPT | Performed by: ORTHOPAEDIC SURGERY

## 2025-08-19 PROCEDURE — G8427 DOCREV CUR MEDS BY ELIG CLIN: HCPCS | Performed by: ORTHOPAEDIC SURGERY

## 2025-08-19 PROCEDURE — G8419 CALC BMI OUT NRM PARAM NOF/U: HCPCS | Performed by: ORTHOPAEDIC SURGERY

## 2025-08-19 PROCEDURE — 4004F PT TOBACCO SCREEN RCVD TLK: CPT | Performed by: ORTHOPAEDIC SURGERY

## 2025-08-20 RX ORDER — DULOXETIN HYDROCHLORIDE 60 MG/1
120 CAPSULE, DELAYED RELEASE ORAL EVERY EVENING
Qty: 60 CAPSULE | Refills: 0 | Status: SHIPPED | OUTPATIENT
Start: 2025-08-20 | End: 2025-11-18

## 2025-08-20 RX ORDER — OXYCODONE AND ACETAMINOPHEN 7.5; 325 MG/1; MG/1
1 TABLET ORAL 3 TIMES DAILY
Qty: 90 TABLET | Refills: 0 | Status: SHIPPED | OUTPATIENT
Start: 2025-08-20 | End: 2025-09-19

## 2025-08-23 DIAGNOSIS — J30.2 SEASONAL ALLERGIC RHINITIS, UNSPECIFIED TRIGGER: ICD-10-CM

## 2025-08-24 RX ORDER — DIPHENHYDRAMINE HCL 25 MG
CAPSULE ORAL
Qty: 56 CAPSULE | Refills: 3 | Status: SHIPPED | OUTPATIENT
Start: 2025-08-24

## 2025-09-04 DIAGNOSIS — E55.9 HYPOVITAMINOSIS D: Primary | ICD-10-CM

## (undated) DEVICE — NEUROSTIMULATOR EXT SM LTWT SGL BTTN H2O RESIST WIRELESS

## (undated) DEVICE — GOWN,AURORA,NONREINFORCED,LARGE: Brand: MEDLINE

## (undated) DEVICE — C-ARMOR C-ARM EQUIPMENT COVERS CLEAR STERILE UNIVERSAL FIT 12 PER CASE: Brand: C-ARMOR

## (undated) DEVICE — SUTURE MONOCRYL SZ 4-0 L27IN ABSRB UD L19MM PS-2 1/2 CIR PRIM Y426H

## (undated) DEVICE — DRESSING HYDROFIBER AQUACEL AG ADVANTAGE 3.5X6 IN

## (undated) DEVICE — SUTURE VICRYL SZ 2-0 L36IN ABSRB UD L36MM CT-1 1/2 CIR J945H

## (undated) DEVICE — BLADE,CARBON-STEEL,11,STRL,DISPOSABLE,TB: Brand: MEDLINE

## (undated) DEVICE — LABEL MED MINI W/ MARKER

## (undated) DEVICE — LIQUIBAND RAPID ADHESIVE 36/CS 0.8ML: Brand: MEDLINE

## (undated) DEVICE — SWABSTICK MEDICATED 10% POVIDONE IOD PVP SGL ANTISEP SAT

## (undated) DEVICE — KIT ARMOR C DRP COLLAPSIBLE AND SELF EXP TOP CVR FOR FLUOROSCOPIC

## (undated) DEVICE — GOWN,SIRUS,POLYRNF,BRTHSLV,XLN/XL,20/CS: Brand: MEDLINE

## (undated) DEVICE — SUTURE PERMA-HAND SZ 2-0 L30IN NONABSORBABLE BLK L26MM SH K833H

## (undated) DEVICE — SYRINGE IRRIG 60ML SFT PLIABLE BLB EZ TO GRP 1 HND USE W/

## (undated) DEVICE — PROGRAMMER SMART COMM W/HANDSET F/SACRAL NRVE STIMULATORS

## (undated) DEVICE — TOWEL,OR,DSP,ST,BLUE,STD,4/PK,20PK/CS: Brand: MEDLINE

## (undated) DEVICE — SPONGE,LAP,18"X18",DLX,XR,ST,5/PK,40/PK: Brand: MEDLINE

## (undated) DEVICE — SUTURE PERMAHAND SZ 2-0 L12X18IN NONABSORBABLE BLK SILK A185H

## (undated) DEVICE — STIMULATOR NERVE 4.32 MM PERC EXTN KT INTERSTIM QUAD

## (undated) DEVICE — COUNTER NDL 40 COUNT HLD 70 FOAM BLK ADH W/ MAG

## (undated) DEVICE — COVER LT HNDL BLU PLAS

## (undated) DEVICE — ELECTRODE PT RET AD L9FT HI MOIST COND ADH HYDRGEL CORDED

## (undated) DEVICE — HYPODERMIC SAFETY NEEDLE: Brand: MAGELLAN

## (undated) DEVICE — GLOVE ORANGE PI 8 1/2   MSG9085

## (undated) DEVICE — BLADE,CARBON-STEEL,15,STRL,DISPOSABLE,TB: Brand: MEDLINE

## (undated) DEVICE — GENERAL MINOR: Brand: MEDLINE INDUSTRIES, INC.

## (undated) DEVICE — SYRINGE MED 10ML TRNSLUC BRL PLUNG BLK MRK POLYPR CTRL

## (undated) DEVICE — 1010 S-DRAPE TOWEL DRAPE 10/BX: Brand: STERI-DRAPE™

## (undated) DEVICE — PACK,LAPAROTOMY,NO GOWNS: Brand: MEDLINE

## (undated) DEVICE — Device

## (undated) DEVICE — NEPTUNE E-SEP SMOKE EVACUATION PENCIL, COATED, 70MM BLADE, PUSH BUTTON SWITCH: Brand: NEPTUNE E-SEP

## (undated) DEVICE — MARKER SURG SKIN GENTIAN VLT REG TIP W/ 6IN RUL DYNJSM01